# Patient Record
Sex: FEMALE | Race: WHITE | NOT HISPANIC OR LATINO | Employment: FULL TIME
[De-identification: names, ages, dates, MRNs, and addresses within clinical notes are randomized per-mention and may not be internally consistent; named-entity substitution may affect disease eponyms.]

---

## 2017-07-29 ENCOUNTER — HEALTH MAINTENANCE LETTER (OUTPATIENT)
Age: 37
End: 2017-07-29

## 2018-01-21 ENCOUNTER — HOSPITAL ENCOUNTER (EMERGENCY)
Facility: CLINIC | Age: 38
Discharge: HOME | End: 2018-01-21
Attending: NURSE PRACTITIONER | Admitting: NURSE PRACTITIONER
Payer: COMMERCIAL

## 2018-01-21 ENCOUNTER — APPOINTMENT (OUTPATIENT)
Dept: GENERAL RADIOLOGY | Facility: CLINIC | Age: 38
End: 2018-01-21
Attending: NURSE PRACTITIONER
Payer: COMMERCIAL

## 2018-01-21 VITALS
TEMPERATURE: 97.5 F | OXYGEN SATURATION: 100 % | DIASTOLIC BLOOD PRESSURE: 75 MMHG | SYSTOLIC BLOOD PRESSURE: 113 MMHG | HEART RATE: 87 BPM

## 2018-01-21 DIAGNOSIS — R07.81 RIB PAIN ON LEFT SIDE: Primary | ICD-10-CM

## 2018-01-21 PROCEDURE — 96372 THER/PROPH/DIAG INJ SC/IM: CPT

## 2018-01-21 PROCEDURE — 99213 OFFICE O/P EST LOW 20 MIN: CPT | Performed by: NURSE PRACTITIONER

## 2018-01-21 PROCEDURE — G0463 HOSPITAL OUTPT CLINIC VISIT: HCPCS | Mod: 25

## 2018-01-21 PROCEDURE — 71111 X-RAY EXAM RIBS/CHEST4/> VWS: CPT

## 2018-01-21 PROCEDURE — 25000128 H RX IP 250 OP 636: Performed by: NURSE PRACTITIONER

## 2018-01-21 RX ORDER — KETOROLAC TROMETHAMINE 10 MG/1
10 TABLET, FILM COATED ORAL EVERY 6 HOURS PRN
Qty: 20 TABLET | Refills: 0 | Status: SHIPPED | OUTPATIENT
Start: 2018-01-21 | End: 2018-01-26

## 2018-01-21 RX ORDER — KETOROLAC TROMETHAMINE 30 MG/ML
30 INJECTION, SOLUTION INTRAMUSCULAR; INTRAVENOUS ONCE
Status: COMPLETED | OUTPATIENT
Start: 2018-01-21 | End: 2018-01-21

## 2018-01-21 RX ADMIN — KETOROLAC TROMETHAMINE 30 MG: 30 INJECTION, SOLUTION INTRAMUSCULAR; INTRAVENOUS at 18:00

## 2018-01-21 ASSESSMENT — ENCOUNTER SYMPTOMS
ABDOMINAL PAIN: 0
APPETITE CHANGE: 0
DIARRHEA: 0
WHEEZING: 0
FEVER: 0
NAUSEA: 0
STRIDOR: 0
CONSTIPATION: 0
SORE THROAT: 0
COUGH: 0
EYE PAIN: 0
VOMITING: 0
FATIGUE: 0
WOUND: 0
HEADACHES: 0
SHORTNESS OF BREATH: 1
ACTIVITY CHANGE: 0
ARTHRALGIAS: 1

## 2018-01-21 NOTE — ED PROVIDER NOTES
History     Chief Complaint   Patient presents with     Back Pain     back and rib pain     HPI  Kristel Martinez is a 37 year old female who presents with sudden acute onset of rib pain noted immediately below left rest.  Patient states that the pain is worse with breathing and you can palpate the area and it is tender.  Patient states there is radiation of the pain bilaterally around to her back.  Patient denies trauma.  Patient denies recent illness.  Patient reports having similar symptoms in the past that have always been treated by her chiropractor and have always resolved.  Patient states that today the chiropractor is unavailable and she thought she would try here as the pain is severe.  Patient states that it is aching and sharp and throbbing.  Patient has tried Flexeril and reported it gave no relief for the pain.  Problem List:    There are no active problems to display for this patient.       Past Medical History:    No past medical history on file.    Past Surgical History:    No past surgical history on file.    Family History:    No family history on file.    Social History:  Marital Status:   [2]  Social History   Substance Use Topics     Smoking status: Not on file     Smokeless tobacco: Not on file     Alcohol use Not on file        Medications:      ketorolac (TORADOL) 10 MG tablet   ICY HOT EXTRA STRENGTH 10-30 % EX CREA   ZOLOFT 100 MG OR TABS     Review of Systems   Constitutional: Negative for activity change, appetite change, fatigue and fever.   HENT: Negative for congestion, ear pain and sore throat.    Eyes: Negative for pain.   Respiratory: Positive for shortness of breath. Negative for cough, wheezing and stridor.    Cardiovascular: Negative for chest pain.   Gastrointestinal: Negative for abdominal pain, constipation, diarrhea, nausea and vomiting.   Musculoskeletal: Positive for arthralgias (rib pain).   Skin: Negative for rash and wound.   Neurological: Negative for headaches.    All other systems reviewed and are negative.      Physical Exam   BP: 113/75  Pulse: 87  Temp: 97.5  F (36.4  C)  SpO2: 100 %      Physical Exam   Constitutional: She appears well-developed and well-nourished. No distress.   Eyes: Conjunctivae are normal. Right eye exhibits no discharge. Left eye exhibits no discharge.   Cardiovascular: Normal rate, regular rhythm and normal heart sounds.  Exam reveals no gallop and no friction rub.    No murmur heard.  Pulmonary/Chest: Effort normal and breath sounds normal. No respiratory distress. She has no wheezes. She has no rales. She exhibits tenderness (mid clavicular line 2 cm below left breast without palpable mass, nodule, fluctuance or crepitus or deformity or bruising).   Skin: Skin is warm and dry. No rash noted. She is not diaphoretic. No erythema.   Psychiatric: She has a normal mood and affect.   Nursing note and vitals reviewed.      ED Course     ED Course     Procedures      Labs Ordered and Resulted from Time of ED Arrival Up to the Time of Departure from the ED - No data to display  Results for orders placed or performed during the hospital encounter of 01/21/18   XR Ribs & Chest Bilateral G/E 4 Views    Narrative    BILATERAL RIBS WITH CHEST FOUR OR MORE VIEWS 1/21/2018 5:40 PM     HISTORY: Left and right lower rib pain.      Impression    IMPRESSION: Frontal view the chest and two views of the right and left  ribs are performed. Lungs are clear. No pneumothorax or pleural  effusion. Heart is normal in size. BB overlies the area of patient's  symptoms. No definite evidence of rib fracture.         PAULO TAPIA MD       Assessments & Plan (with Medical Decision Making)     I have reviewed the nursing notes.    I have reviewed the findings, diagnosis, plan and need for follow up with the patient.     WELLS PE SCORE for Pulmonary Embolism likelihood (calculator)  Background  Calculates likelihood of PE based on 7 criteria including suspected DVT, HR>100, recent  surgery or immobilization, prior VTE, hemoptysis, active cancer.  Data   does not have a problem list on file.   has no past surgical history on file.  Pulse: 87  Criteria   Of possible 12.5 points for 7 possible items:  NEGATIVE  Interpretation  Wells PE Score: pt declines DDimer lab draw  Score 0-2 points: Low PE probability (3.6% risk)    Kristel Martinez is a 37 year old female who presents with sudden acute onset of rib pain noted immediately below left rest.  Patient states that the pain is worse with breathing and you can palpate the area and it is tender.  Patient states there is radiation of the pain bilaterally around to her back.  Patient denies trauma.  Patient denies recent illness.  Patient reports having similar symptoms in the past that have always been treated by her chiropractor and have always resolved.  Patient states that today the chiropractor is unavailable and she thought she would try here as the pain is severe.  Patient states that it is aching and sharp and throbbing.  Patient has tried Flexeril and reported it gave no relief for the pain.  Exam as noted above.  Chest x-ray negative for any evidence of rib fractures or pneumothorax or pleural effusion.  These findings were explained to the patient wells criteria applied and patient is at a low risk for PE offered d-dimer and patient declined this.  As patient has had similar symptoms in the past it is likely this is musculoskeletal/costochondritis/rib pain.  Offered Toradol shot and patient accepted and offered Toradol prescription advised patient that she should not take any Advil or naproxen or any other NSAID while taking the Toradol patient agreed to this.  Advised patient she could take more Flexeril that she has at home if she so desires.  Advised topical therapies such as Biofreeze or icy hot would be acceptable.  And advised patient to follow-up if pain continues or worsens.  Advised patient she certainly can seek care with her  chiropractor as well.  Patient agrees with plan of care.    Discharge Medication List as of 1/21/2018  5:58 PM      START taking these medications    Details   ketorolac (TORADOL) 10 MG tablet Take 1 tablet (10 mg) by mouth every 6 hours as needed for moderate pain, Disp-20 tablet, R-0, E-Prescribe             Final diagnoses:   Rib pain on left side       1/21/2018   [facility] EMERGENCY DEPARTMENT     Delfina Little APRN CNP  01/21/18 2153

## 2018-01-21 NOTE — DISCHARGE INSTRUCTIONS
Chest Wall Pain: Costochondritis    The chest pain that you have had today is caused by costochondritis. This condition is caused by an inflammation of the cartilage joining your ribs to your breastbone. It is not caused by heart or lung problems. Your healthcare team has made sure that the chest pain you feel is not from a life threatening cause of chest pain such as heart attack, collapsed lung, blood clot in the lung, tear in the aorta, or esophageal rupture. The inflammation may have been brought on by a blow to the chest, lifting heavy objects, intense exercise, or an illness that made you cough and sneeze a lot. It often occurs during times of emotional stress. It can be painful, but it is not dangerous. It usually goes away in 1 to 2 weeks. But it may happen again. Rarely, a more serious condition may cause symptoms similar to costochondritis. That s why it s important to watch for the warning signs listed below.  Home care  Follow these guidelines when caring for yourself at home:    If you feel that emotional stress is a cause of your condition, try to figure out the sources of that stress. It may not be obvious. Learn ways to deal with the stress in your life. This can include regular exercise, muscle relaxation, meditation, or simply taking time out for yourself.    You may use acetaminophen, ibuprofen, or naproxen to control pain, unless another pain medicine was prescribed. If you have liver or kidney disease or ever had a stomach ulcer, talk with your healthcare provider before using these medicines.    You can also help ease pain by using a hot, wet compress or heating pad. Use this with or without a medicated skin cream that helps relieves pain.    Do stretching exercise as advised by your provider.    Take any prescribed medicines as directed.  Follow-up care  Follow up with your healthcare provider, or as advised, if you do not start to get better in the next 2 days.  When to seek medical  advice  Call your healthcare provider right away if any of these occur:    A change in the type of pain. Call if it feels different, becomes more serious, lasts longer, or spreads into your shoulder, arm, neck, jaw, or back.    Shortness of breath or pain gets worse when you breathe    Weakness, dizziness, or fainting    Cough with dark-colored sputum (phlegm) or blood    Abdominal pain    Dark red or black stools    Fever of 100.4 F (38 C) or higher, or as directed by your healthcare provider  Date Last Reviewed: 12/1/2016 2000-2017 [employer]. [address] [place] [address]. All rights reserved. This information is not intended as a substitute for professional medical care. Always follow your healthcare professional's instructions.

## 2018-01-21 NOTE — ED AVS SNAPSHOT
[facility] Emergency Department    [address] [address]    Phone:  [phone]    Fax:  [phone]                                       Kristel Martinez   MRN: [id number]    Department:  [facility] Emergency Department   Date of Visit:  1/21/2018           After Visit Summary Signature Page     I have received my discharge instructions, and my questions have been answered. I have discussed any challenges I see with this plan with the nurse or doctor.    ..........................................................................................................................................  Patient/Patient Representative Signature      ..........................................................................................................................................  Patient Representative Print Name and Relationship to Patient    ..................................................               ................................................  Date                                            Time    ..........................................................................................................................................  Reviewed by Signature/Title    ...................................................              ..............................................  Date                                                            Time

## 2018-01-21 NOTE — ED AVS SNAPSHOT
[facility] Emergency Department    [address] [place] [address]    Phone:  [phone]    Fax:  [phone]                                       Kristel Martinez   MRN: [id number]    Department:  [facility] Emergency Department   Date of Visit:  1/21/2018           Patient Information     Date Of Birth          1980        Your diagnoses for this visit were:     Rib pain on left side        You were seen by Delfina Little, DAVID CNP.      Follow-up Information     Follow up with Mar Gomez OT In 1 week.    Specialty:  Occupational Therapy    Why:  As needed, If symptoms worsen    Contact information:    XXX RESIGNED XXX  [address] [place] [address]  [phone]          Discharge Instructions         Chest Wall Pain: Costochondritis    The chest pain that you have had today is caused by costochondritis. This condition is caused by an inflammation of the cartilage joining your ribs to your breastbone. It is not caused by heart or lung problems. Your healthcare team has made sure that the chest pain you feel is not from a life threatening cause of chest pain such as heart attack, collapsed lung, blood clot in the lung, tear in the aorta, or esophageal rupture. The inflammation may have been brought on by a blow to the chest, lifting heavy objects, intense exercise, or an illness that made you cough and sneeze a lot. It often occurs during times of emotional stress. It can be painful, but it is not dangerous. It usually goes away in 1 to 2 weeks. But it may happen again. Rarely, a more serious condition may cause symptoms similar to costochondritis. That s why it s important to watch for the warning signs listed below.  Home care  Follow these guidelines when caring for yourself at home:    If you feel that emotional stress is a cause of your condition, try to figure out the sources of that stress. It may not be obvious. Learn ways to deal with the stress in your life. This can  include regular exercise, muscle relaxation, meditation, or simply taking time out for yourself.    You may use acetaminophen, ibuprofen, or naproxen to control pain, unless another pain medicine was prescribed. If you have liver or kidney disease or ever had a stomach ulcer, talk with your healthcare provider before using these medicines.    You can also help ease pain by using a hot, wet compress or heating pad. Use this with or without a medicated skin cream that helps relieves pain.    Do stretching exercise as advised by your provider.    Take any prescribed medicines as directed.  Follow-up care  Follow up with your healthcare provider, or as advised, if you do not start to get better in the next 2 days.  When to seek medical advice  Call your healthcare provider right away if any of these occur:    A change in the type of pain. Call if it feels different, becomes more serious, lasts longer, or spreads into your shoulder, arm, neck, jaw, or back.    Shortness of breath or pain gets worse when you breathe    Weakness, dizziness, or fainting    Cough with dark-colored sputum (phlegm) or blood    Abdominal pain    Dark red or black stools    Fever of 100.4 F (38 C) or higher, or as directed by your healthcare provider  Date Last Reviewed: 12/1/2016 2000-2017 [employer]. [address] [place] [address], [place] [address]. All rights reserved. This information is not intended as a substitute for professional medical care. Always follow your healthcare professional's instructions.          24 Hour Appointment Hotline       To make an appointment at any [facility], call [phone] ([phone]). If you don't have a family doctor or clinic, we will help you find one. [facility] clinics are conveniently located to serve the needs of you and your family.             Review of your medicines      START taking        Dose / Directions Last dose taken    ketorolac 10 MG tablet   Commonly known as:   TORADOL   Dose:  10 mg   Quantity:  20 tablet        Take 1 tablet (10 mg) by mouth every 6 hours as needed for moderate pain   Refills:  0          Our records show that you are taking the medicines listed below. If these are incorrect, please call your family doctor or clinic.        Dose / Directions Last dose taken    ICY HOT EXTRA STRENGTH 10-30 % Crea   Quantity:  OTC        as directed   Refills:  0        ZOLOFT 100 MG tablet   Generic drug:  sertraline        150mg daily   Refills:  1 YEAR          STOP taking        Dose Reason for stopping Comments    ibuprofen 200 MG tablet   Commonly known as:  ADVIL/MOTRIN              naproxen 500 MG tablet   Commonly known as:  NAPROSYN                      Prescriptions were sent or printed at these locations (1 Prescription)                   [facility] [place], [place] - [address]   [address] [address]    Telephone:  [phone]   Fax:  [phone]   Hours:                  E-Prescribed (1 of 1)         ketorolac (TORADOL) 10 MG tablet                Procedures and tests performed during your visit     XR Ribs & Chest Bilateral G/E 4 Views      Orders Needing Specimen Collection     None      Pending Results     No orders found from 1/19/2018 to 1/22/2018.            Pending Culture Results     No orders found from 1/19/2018 to 1/22/2018.            Pending Results Instructions     If you had any lab results that were not finalized at the time of your Discharge, you can call the ED Lab Result RN at [phone]. You will be contacted by this team for any positive Lab results or changes in treatment. The nurses are available 7 days a week from 10A to 6:30P.  You can leave a message 24 hours per day and they will return your call.        Test Results From Your Hospital Stay              1/21/2018  5:57 PM      Narrative     BILATERAL RIBS WITH CHEST FOUR OR MORE VIEWS 1/21/2018 5:40 PM     HISTORY: Left and right lower rib  pain.        Impression     IMPRESSION: Frontal view the chest and two views of the right and left  ribs are performed. Lungs are clear. No pneumothorax or pleural  effusion. Heart is normal in size. BB overlies the area of patient's  symptoms. No definite evidence of rib fracture.         PAULO TAPIA MD                Thank you for choosing [facility]       Thank you for choosing [facility] for your care. Our goal is always to provide you with excellent care. Hearing back from our patients is one way we can continue to improve our services. Please take a few minutes to complete the written survey that you may receive in the mail after you visit with us. Thank you!        [employer] Information     [employer] gives you secure access to your electronic health record. If you see a primary care provider, you can also send messages to your care team and make appointments. If you have questions, please call your primary care clinic.  If you do not have a primary care provider, please call [phone] and they will assist you.        Care EveryWhere ID     This is your Care EveryWhere ID. This could be used by other organizations to access your [facility] medical records  [id number]        Equal Access to Services     ASMIAT ALFARO : Hadii moustapha Alvarez, waaxda luqadaha, qaybta kaaldawn swain, kiah cowart . So [facility] [phone].    ATENCIÓN: Si habla español, tiene a juarez disposición servicios gratuitos de asistencia lingüística. Llame al [phone].    We comply with applicable federal civil rights laws and [place] laws. We do not discriminate on the basis of race, color, national origin, age, disability, sex, sexual orientation, or gender identity.            After Visit Summary       This is your record. Keep this with you and show to your community pharmacist(s) and doctor(s) at your next visit.

## 2018-03-17 ENCOUNTER — HOSPITAL ENCOUNTER (EMERGENCY)
Facility: CLINIC | Age: 38
Discharge: HOME | End: 2018-03-17
Attending: EMERGENCY MEDICINE | Admitting: EMERGENCY MEDICINE
Payer: COMMERCIAL

## 2018-03-17 VITALS
RESPIRATION RATE: 18 BRPM | OXYGEN SATURATION: 100 % | TEMPERATURE: 97.4 F | WEIGHT: 220 LBS | DIASTOLIC BLOOD PRESSURE: 71 MMHG | SYSTOLIC BLOOD PRESSURE: 124 MMHG | HEART RATE: 92 BPM

## 2018-03-17 DIAGNOSIS — K11.7 DISTURBANCE OF SALIVARY SECRETION: ICD-10-CM

## 2018-03-17 PROCEDURE — 99283 EMERGENCY DEPT VISIT LOW MDM: CPT | Mod: Z6 | Performed by: EMERGENCY MEDICINE

## 2018-03-17 PROCEDURE — 99282 EMERGENCY DEPT VISIT SF MDM: CPT | Performed by: EMERGENCY MEDICINE

## 2018-03-17 ASSESSMENT — ENCOUNTER SYMPTOMS
FEVER: 0
TROUBLE SWALLOWING: 0

## 2018-03-17 NOTE — DISCHARGE INSTRUCTIONS
Return if worsening or new symptoms.    Salivary Gland Swelling, Uncertain Cause  Salivary glands make saliva in response to food in your mouth. Saliva is mostly water. It also has minerals and proteins that help break down food and keep the mouth and teeth healthy. There are three pairs of salivary glands:    Parotid glands (in front of the ear)    Submandibular glands (below the jaw)    Sublingual glands (below the tongue)  Each gland has a duct (channel) that carries saliva from the gland into the mouth.   Swelling of the salivary glands can sometimes occur. Causes can include:    Viral infection (such as childhood mumps)    Bacterial infections    Sjögren's syndrome    Diabetes    Malnutrition    Sarcoidosis    Blockage of the salivary duct (from stones or tumors)  Certain medicines can affect salivary flow. This can lead to swelling of the gland. Be sure to tell your healthcare provider about all of the medicines you take.  Tests are being done to determine the cause of the swelling. These may include blood tests, X-ray, ultrasound, CT scan, or injection of dye into the duct to look for blockage. Treatment depends on the exact cause of the swelling.  Home care    If the area is painful, you can take over-the-counter medicines, such as acetaminophen or ibuprofen, unless you were prescribed another medicine. Wetting a cloth with warm water and putting it over the affected gland for 10-15 minutes at a time can also help ease pain.    To help prevent blockages and infections:    Drink 6-8 glasses of fluid per day (such as water, tea, and clear soup) to keep well hydrated.    If you smoke, ask your healthcare provider for help to quit. Smoking makes salivary gland stones more likely.    Maintain good dental hygiene. Brush and floss your teeth daily. See your dentist for regular cleanings.  Follow-up care  Follow up with your healthcare provider or as advised. See your healthcare provider for further exams and  testing. If you have been referred to a specialist, make an appointment promptly.  When to seek medical advice  Call your healthcare provider if any of the following occur:    Increasing pain or swelling in the gland    Inability to open mouth or pain when opening mouth    Fever of 100.4 F (38 C) or higher, or as directed by your healthcare provider    Redness over the gland    Pus draining into the mouth    Trouble breathing or swallowing    Any new symptoms  Date Last Reviewed: 5/4/2015 2000-2017 [employer]. [address] [place] [address]. All rights reserved. This information is not intended as a substitute for professional medical care. Always follow your healthcare professional's instructions.

## 2018-03-17 NOTE — ED NOTES
PT c/o 1 wk of constant right sided jaw pain/behind her ear and an associated metalic/watery taste in her mouth.

## 2018-03-17 NOTE — ED AVS SNAPSHOT
[facility] Emergency Department    [address] [place] [address]    Phone:  [phone]    Fax:  [phone]                                       Kristel Martinez   MRN: [id number]    Department:  [facility] Emergency Department   Date of Visit:  3/17/2018           After Visit Summary Signature Page     I have received my discharge instructions, and my questions have been answered. I have discussed any challenges I see with this plan with the nurse or doctor.    ..........................................................................................................................................  Patient/Patient Representative Signature      ..........................................................................................................................................  Patient Representative Print Name and Relationship to Patient    ..................................................               ................................................  Date                                            Time    ..........................................................................................................................................  Reviewed by Signature/Title    ...................................................              ..............................................  Date                                                            Time

## 2018-03-17 NOTE — ED AVS SNAPSHOT
[facility] Emergency Department    [address] [place] [address]    Phone:  [phone]    Fax:  [phone]                                       Kristel Martinez   MRN: [id number]    Department:  [facility]   Date of Visit:  3/17/2018           Patient Information     Date Of Birth          1980        Your diagnoses for this visit were:     Disturbance of salivary secretion        You were seen by Collin Marie MD.      Follow-up Information     Schedule an appointment as soon as possible for a visit with Riley Mendez MD.    Specialty:  Otolaryngology    Why:  ER follow up if symptoms don't improve    Contact information:    [address] [place] [address] [phone]          Discharge Instructions       Return if worsening or new symptoms.    Salivary Gland Swelling, Uncertain Cause  Salivary glands make saliva in response to food in your mouth. Saliva is mostly water. It also has minerals and proteins that help break down food and keep the mouth and teeth healthy. There are three pairs of salivary glands:    Parotid glands (in front of the ear)    Submandibular glands (below the jaw)    Sublingual glands (below the tongue)  Each gland has a duct (channel) that carries saliva from the gland into the mouth.   Swelling of the salivary glands can sometimes occur. Causes can include:    Viral infection (such as childhood mumps)    Bacterial infections    Sjögren's syndrome    Diabetes    Malnutrition    Sarcoidosis    Blockage of the salivary duct (from stones or tumors)  Certain medicines can affect salivary flow. This can lead to swelling of the gland. Be sure to tell your healthcare provider about all of the medicines you take.  Tests are being done to determine the cause of the swelling. These may include blood tests, X-ray, ultrasound, CT scan, or injection of dye into the duct to look for blockage. Treatment depends on the exact cause  of the swelling.  Home care    If the area is painful, you can take over-the-counter medicines, such as acetaminophen or ibuprofen, unless you were prescribed another medicine. Wetting a cloth with warm water and putting it over the affected gland for 10-15 minutes at a time can also help ease pain.    To help prevent blockages and infections:    Drink 6-8 glasses of fluid per day (such as water, tea, and clear soup) to keep well hydrated.    If you smoke, ask your healthcare provider for help to quit. Smoking makes salivary gland stones more likely.    Maintain good dental hygiene. Brush and floss your teeth daily. See your dentist for regular cleanings.  Follow-up care  Follow up with your healthcare provider or as advised. See your healthcare provider for further exams and testing. If you have been referred to a specialist, make an appointment promptly.  When to seek medical advice  Call your healthcare provider if any of the following occur:    Increasing pain or swelling in the gland    Inability to open mouth or pain when opening mouth    Fever of 100.4 F (38 C) or higher, or as directed by your healthcare provider    Redness over the gland    Pus draining into the mouth    Trouble breathing or swallowing    Any new symptoms  Date Last Reviewed: 5/4/2015 2000-2017 [employer]. [address] [place] [address]. All rights reserved. This information is not intended as a substitute for professional medical care. Always follow your healthcare professional's instructions.          Future Appointments        Provider Department Dept Phone Center    5/25/2018 3:30 PM Marian Jorgensen MD [facility] [phone] [place]      24 Hour Appointment Hotline       To make an appointment at any [facility], call [phone] ([phone]). If you don't have a family doctor or clinic, we will help you find one. [facility] are conveniently located to serve the  needs of you and your family.             Review of your medicines      START taking        Dose / Directions Last dose taken    amoxicillin-clavulanate 875-125 MG per tablet   Commonly known as:  AUGMENTIN   Dose:  1 tablet   Quantity:  14 tablet        Take 1 tablet by mouth 2 times daily for 7 days   Refills:  0          Our records show that you are taking the medicines listed below. If these are incorrect, please call your family doctor or clinic.        Dose / Directions Last dose taken    ICY HOT EXTRA STRENGTH 10-30 % Crea   Quantity:  OTC        as directed   Refills:  0        LEVOTHYROXINE SODIUM PO        Refills:  0        ZOLOFT 100 MG tablet   Generic drug:  sertraline        150mg daily   Refills:  1 YEAR                Prescriptions were sent or printed at these locations (1 Prescription)                   [facility] [place], [place] - [address]    [address]  [place] [address]    Telephone:  [phone]   Fax:  [phone]   Hours:                  E-Prescribed (1 of 1)         amoxicillin-clavulanate (AUGMENTIN) 875-125 MG per tablet                Orders Needing Specimen Collection     None      Pending Results     No orders found from 3/15/2018 to 3/18/2018.            Pending Culture Results     No orders found from 3/15/2018 to 3/18/2018.            Pending Results Instructions     If you had any lab results that were not finalized at the time of your Discharge, you can call the ED Lab Result RN at [phone]. You will be contacted by this team for any positive Lab results or changes in treatment. The nurses are available 7 days a week from 10A to 6:30P.  You can leave a message 24 hours per day and they will return your call.        Thank you for choosing [facility]       Thank you for choosing [facility] for your care. Our goal is always to provide you with excellent care. Hearing back from our patients is one way we can continue to improve our services. Please  take a few minutes to complete the written survey that you may receive in the mail after you visit with us. Thank you!        [employer] Information     [employer] gives you secure access to your electronic health record. If you see a primary care provider, you can also send messages to your care team and make appointments. If you have questions, please call your primary care clinic.  If you do not have a primary care provider, please call [phone] and they will assist you.        Care EveryWhere ID     This is your Care EveryWhere ID. This could be used by other organizations to access your [place] medical records  [id number]        Equal Access to Services     [facility] : Brayan Alvarez, all becerril, shabbir swain, kiah cowart . So [facility] [phone].    ATENCIÓN: Si habla español, tiene a juarez disposición servicios gratuitos de asistencia lingüística. Louieame al [phone].    We comply with applicable federal civil rights laws and [place] laws. We do not discriminate on the basis of race, color, national origin, age, disability, sex, sexual orientation, or gender identity.            After Visit Summary       This is your record. Keep this with you and show to your community pharmacist(s) and doctor(s) at your next visit.

## 2018-03-17 NOTE — ED PROVIDER NOTES
History     Chief Complaint   Patient presents with     Jaw Pain     The history is provided by the patient.     Kristel Martinez is a 37 year old female with a history of Hypothyroidism due to a thyroidectomy in 2015 to treat papillary adenocarcinoma of the thyroid, who presents to the ED complaining of jaw pain. The patient reports that she first started experiencing right jaw pain about 1 week ago, that has persisted since onset. She says that this pain is along her right jaw, under her right jaw, and around her right ear. She notes some swelling in the same area. Patient also reports a metallic taste in her mouth and increased salivation on the right. Patient denies any fever, pain with swallowing, jaw popping, or other associated symptoms. No PMHx of dental issues.     Problem List:    There are no active problems to display for this patient.       Past Medical History:    History reviewed. No pertinent past medical history.    Past Surgical History:    Past Surgical History:   Procedure Laterality Date     THYROIDECTOMY      2015       Family History:    No family history on file.    Social History:  Marital Status:   [2]  Social History   Substance Use Topics     Smoking status: Former Smoker     Smokeless tobacco: Never Used     Alcohol use Yes      Comment: 1 drink per wk        Medications:      LEVOTHYROXINE SODIUM PO   amoxicillin-clavulanate (AUGMENTIN) 875-125 MG per tablet   ZOLOFT 100 MG OR TABS   ICY HOT EXTRA STRENGTH 10-30 % EX CREA         Review of Systems   Constitutional: Negative for fever.   HENT: Negative for trouble swallowing.         Positive for right jaw pain radiating to the right ear and right neck.   Positive for metallic taste.   Positive for increased salivation.    All other systems reviewed and are negative.      Physical Exam   BP: 124/71  Pulse: 92  Temp: 97.4  F (36.3  C)  Resp: 18  Weight: 99.8 kg (220 lb)  SpO2: 100 %      Physical Exam  Vitals  reviewed  NAD  HEENT:NC/Atraumatic, no tmj tenderness or clicking, slight enlargement and tenderness to right parotid and submandibular glands along with slight tenderness under the right side of the tongue.  EOMI, anicteric, PERRL and symmetric, mucous membranes moist, Ext ears nl, OP clear, tm's are normal  Chest/PULM: no resp distress  CV: rrr without m/r/g S1S2, peripheral pulses normal  ABD: soft NT, non distended, no guarding or rebound  Extremities: atraumatic, no edema, full rom  : deferred  Neuro: CN 2-12 grossly intact, motor and sensation grossly intact    ED Course     ED Course     Procedures                    No results found for this or any previous visit (from the past 24 hour(s)).    Medications - No data to display    Assessments & Plan (with Medical Decision Making)  Kristel is a 37 year old female who presents to the ED complaining of right jaw pain for one week, radiating to her right neck and right ear. The patient also complains of increased salivation and a metallic taste in her mouth. She is afebrile with normal vitals upon presentation to the ED. On exam, she has some tenderness and swelling to the parotid and submandibular gland. Discussed with the patient that I think she likely has an infection of her salivary glands although its possible it could be something else causing this swelling. She doesn't have any dental pain. Prescribed Augmentin to treat possible infection, see orders. Patient can use heat/ Ibuprofen/ Tylenol for discomfort prn. If her symptoms persist, she will follow up with ENT - referred the patient. Patient is in agreement with this treatment plan and stable for discharge. Follow up with ENT as instructed, or sooner with high fever, trouble breathing, or other worsening symptoms.       I have reviewed the nursing notes.    I have reviewed the findings, diagnosis, plan and need for follow up with the patient.  A:  Parotid and salivary glad swelling    Discharge  Medication List as of 3/17/2018  3:21 PM      START taking these medications    Details   amoxicillin-clavulanate (AUGMENTIN) 875-125 MG per tablet Take 1 tablet by mouth 2 times daily for 7 days, Disp-14 tablet, R-0, E-Prescribe             Final diagnoses:   Disturbance of salivary secretion     This document serves as a record of services personally performed by Collin Marie MD. It was created on their behalf by Rosio Goldman, a trained medical scribe. The creation of this record is based on the provider's personal observations and the statements of the patient. This document has been checked and approved by the attending provider.    Note: Chart documentation done in part with Dragon Voice Recognition software. Although reviewed after completion, some word and grammatical errors may remain.    3/17/2018   [facility] EMERGENCY DEPARTMENT     Collin Marie MD  03/17/18 [id number]

## 2018-03-18 ENCOUNTER — TELEPHONE (OUTPATIENT)
Dept: EMERGENCY MEDICINE | Facility: CLINIC | Age: 38
End: 2018-03-18

## 2018-03-18 ENCOUNTER — NURSE TRIAGE (OUTPATIENT)
Dept: NURSING | Facility: CLINIC | Age: 38
End: 2018-03-18

## 2018-03-18 NOTE — TELEPHONE ENCOUNTER
Seen at [place] ED 3/17/18 with Jaw pain and Dx with disturbance of salivary secretions and Started antibiotic Augmentin , yesterday and within   hour had  severe stomach pain for few hours duration  and only took one dose so  Underlying  Symptoms unchanged  . Currently : no fever ,   jaw pain with movement  is continuing 3/10 on  painscale ,  nauseated but no abdominal pain .  Seen by Dr Wing Marie who advised calling back if problems with Augmentiin to ED  , Allergic to Sulfa and keflex with skin rashes  , and FNA  left message for charge nurse June to call back Pt and advised Pt , if she needs to go in to be seen but if no answer to message by 1230 pm to go into be seen locally at urgent care and Pt agrees.   .Ibis Barr RN [place] nurse advisors.

## 2018-03-18 NOTE — ED NOTES
Patient spoke with triage nurse via phone prior to calling ED.  Called ED to ask about augmentin that is making her stomach feel upset.  Patient has taken 1 dose last night and felt nauseated about 20 minutes after taking it.  Pt mentioned that she had zofran ODT at home.  Writer advised she could try a zofran and eat some food, wait about 20-30 minutes then try another dose of augmentin.  Informed patient that antibiotics on an empty stomach can cause upset.  Pt verbalized understanding and will try zofran, food and her 2nd dose.  If continues to have upset stomach will call ED back or come back in if needed.

## 2018-03-23 ENCOUNTER — OFFICE VISIT (OUTPATIENT)
Dept: FAMILY MEDICINE | Facility: CLINIC | Age: 38
End: 2018-03-23
Payer: COMMERCIAL

## 2018-03-23 VITALS
WEIGHT: 224 LBS | DIASTOLIC BLOOD PRESSURE: 70 MMHG | TEMPERATURE: 98.3 F | SYSTOLIC BLOOD PRESSURE: 110 MMHG | HEIGHT: 69 IN | HEART RATE: 88 BPM | BODY MASS INDEX: 33.18 KG/M2

## 2018-03-23 DIAGNOSIS — L98.9 SKIN LESION OF FACE: ICD-10-CM

## 2018-03-23 DIAGNOSIS — Z00.00 ENCOUNTER FOR ROUTINE ADULT HEALTH EXAMINATION WITHOUT ABNORMAL FINDINGS: Primary | ICD-10-CM

## 2018-03-23 DIAGNOSIS — Z01.419 ENCOUNTER FOR GYNECOLOGICAL EXAMINATION WITHOUT ABNORMAL FINDING: ICD-10-CM

## 2018-03-23 DIAGNOSIS — R51.9 HEADACHE DISORDER: ICD-10-CM

## 2018-03-23 PROBLEM — E89.0 POSTOPERATIVE HYPOTHYROIDISM: Status: ACTIVE | Noted: 2018-03-23

## 2018-03-23 PROCEDURE — 99213 OFFICE O/P EST LOW 20 MIN: CPT | Mod: 25 | Performed by: NURSE PRACTITIONER

## 2018-03-23 PROCEDURE — 99395 PREV VISIT EST AGE 18-39: CPT | Performed by: NURSE PRACTITIONER

## 2018-03-23 PROCEDURE — 87624 HPV HI-RISK TYP POOLED RSLT: CPT | Performed by: NURSE PRACTITIONER

## 2018-03-23 PROCEDURE — G0145 SCR C/V CYTO,THINLAYER,RESCR: HCPCS | Performed by: NURSE PRACTITIONER

## 2018-03-23 RX ORDER — SUMATRIPTAN 25 MG/1
25-50 TABLET, FILM COATED ORAL
Qty: 9 TABLET | Refills: 1 | Status: SHIPPED | OUTPATIENT
Start: 2018-03-23 | End: 2018-08-15

## 2018-03-23 NOTE — PATIENT INSTRUCTIONS
The the Imitrex for the headache-take at onset    Dermatology referral made    Preventive Health Recommendations  Female Ages 26 - 39  Yearly exam:   See your health care provider every year in order to    Review health changes.     Discuss preventive care.      Review your medicines if you your doctor has prescribed any.    Until age 30: Get a Pap test every three years (more often if you have had an abnormal result).    After age 30: Talk to your doctor about whether you should have a Pap test every 3 years or have a Pap test with HPV screening every 5 years.   You do not need a Pap test if your uterus was removed (hysterectomy) and you have not had cancer.  You should be tested each year for STDs (sexually transmitted diseases), if you're at risk.   Talk to your provider about how often to have your cholesterol checked.  If you are at risk for diabetes, you should have a diabetes test (fasting glucose).  Shots: Get a flu shot each year. Get a tetanus shot every 10 years.   Nutrition:     Eat at least 5 servings of fruits and vegetables each day.    Eat whole-grain bread, whole-wheat pasta and brown rice instead of white grains and rice.    Talk to your provider about Calcium and Vitamin D.     Lifestyle    Exercise at least 150 minutes a week (30 minutes a day, 5 days of the week). This will help you control your weight and prevent disease.    Limit alcohol to one drink per day.    No smoking.     Wear sunscreen to prevent skin cancer.    See your dentist every six months for an exam and cleaning.

## 2018-03-23 NOTE — MR AVS SNAPSHOT
After Visit Summary   3/23/2018    Kristel Martinez    MRN: [id number]           Patient Information     Date Of Birth          1980        Visit Information        Provider Department      3/23/2018 3:00 PM Yulissa Nogueira APRN [facility]        Today's Diagnoses     Encounter for gynecological examination without abnormal finding    -  1    Headache disorder        Skin lesion of face          Care Instructions    The the Imitrex for the headache-take at onset    Dermatology referral made    Preventive Health Recommendations  Female Ages 26 - 39  Yearly exam:   See your health care provider every year in order to    Review health changes.     Discuss preventive care.      Review your medicines if you your doctor has prescribed any.    Until age 30: Get a Pap test every three years (more often if you have had an abnormal result).    After age 30: Talk to your doctor about whether you should have a Pap test every 3 years or have a Pap test with HPV screening every 5 years.   You do not need a Pap test if your uterus was removed (hysterectomy) and you have not had cancer.  You should be tested each year for STDs (sexually transmitted diseases), if you're at risk.   Talk to your provider about how often to have your cholesterol checked.  If you are at risk for diabetes, you should have a diabetes test (fasting glucose).  Shots: Get a flu shot each year. Get a tetanus shot every 10 years.   Nutrition:     Eat at least 5 servings of fruits and vegetables each day.    Eat whole-grain bread, whole-wheat pasta and brown rice instead of white grains and rice.    Talk to your provider about Calcium and Vitamin D.     Lifestyle    Exercise at least 150 minutes a week (30 minutes a day, 5 days of the week). This will help you control your weight and prevent disease.    Limit alcohol to one drink per day.    No smoking.     Wear sunscreen to prevent skin cancer.    See your dentist  every six months for an exam and cleaning.            Follow-ups after your visit        Additional Services     DERMATOLOGY REFERRAL       Your provider has referred you to: [facility]AMRYN: [facility] [phone]   [email or web address]    Please be aware that coverage of these services is subject to the terms and limitations of your health insurance plan.  Call member services at your health plan with any benefit or coverage questions.      Please bring the following with you to your appointment:    (1) Any X-Rays, CTs or MRIs which have been performed.  Contact the facility where they were done to arrange for  prior to your scheduled appointment.    (2) List of current medications  (3) This referral request   (4) Any documents/labs given to you for this referral                  Your next 10 appointments already scheduled     May 25, 2018  3:30 PM CDT   New Visit with Marian Jorgensen MD   [facility] ([facility])    [address] [place] [id number] [phone]              Who to contact     If you have questions or need follow up information about today's clinic visit or your schedule please contact [facility] directly at [phone].  Normal or non-critical lab and imaging results will be communicated to you by MyChart, letter or phone within 4 business days after the clinic has received the results. If you do not hear from us within 7 days, please contact the clinic through MyChart or phone. If you have a critical or abnormal lab result, we will notify you by phone as soon as possible.  Submit refill requests through [employer] or call your pharmacy and they will forward the refill request to us. Please allow 3 business days for your refill to be completed.          Additional Information About Your Visit        [employer] Information     [employer] gives you secure access to your  "electronic health record. If you see a primary care provider, you can also send messages to your care team and make appointments. If you have questions, please call your primary care clinic.  If you do not have a primary care provider, please call [phone] and they will assist you.        Care EveryWhere ID     This is your Care EveryWhere ID. This could be used by other organizations to access your [place] medical records  [id number]        Your Vitals Were     Pulse Temperature Height Last Period BMI (Body Mass Index)       88 98.3  F (36.8  C) (Tympanic) 5' 9.25\" (1.759 m) 03/13/2018 32.84 kg/m2        Blood Pressure from Last 3 Encounters:   03/23/18 110/70   03/17/18 124/71   01/21/18 113/75    Weight from Last 3 Encounters:   03/23/18 224 lb (101.6 kg)   03/17/18 220 lb (99.8 kg)              We Performed the Following     DERMATOLOGY REFERRAL     HPV High Risk Types DNA Cervical     Pap imaged thin layer screen with HPV - recommended age 30 - 65          Today's Medication Changes          These changes are accurate as of 3/23/18  3:57 PM.  If you have any questions, ask your nurse or doctor.               Start taking these medicines.        Dose/Directions    SUMAtriptan 25 MG tablet   Commonly known as:  IMITREX   Used for:  Headache disorder   Started by:  Yulissa Nogueira APRN CNP        Dose:  25-50 mg   Take 1-2 tablets (25-50 mg) by mouth at onset of headache for migraine May repeat in 2 hours. Max 8 tablets/24 hours.   Quantity:  9 tablet   Refills:  1         Stop taking these medicines if you haven't already. Please contact your care team if you have questions.     ICY HOT EXTRA STRENGTH 10-30 % Crea   Stopped by:  Yulissa Nogueira APRN CNP                Where to get your medicines      These medications were sent to [facility] - [address], [facility] [address]     Phone:  [phone]     SUMAtriptan 25 MG " tablet                Primary Care Provider Fax #    Physician No Ref-Primary [phone]       No address on file        Equal Access to Services     ASMITA ALFARO : Hadii aad ku hadanmol Alvarez, wajuan jda lutiffanie, almazta kavelmada wiliam, kiah shahrodolfo salomón felipe laTraceyramy bowling. So [facility] [phone].    ATENCIÓN: Si habla español, tiene a juarez disposición servicios gratuitos de asistencia lingüística. Llame al [phone].    We comply with applicable federal civil rights laws and [place] laws. We do not discriminate on the basis of race, color, national origin, age, disability, sex, sexual orientation, or gender identity.            Thank you!     Thank you for choosing [facility]  for your care. Our goal is always to provide you with excellent care. Hearing back from our patients is one way we can continue to improve our services. Please take a few minutes to complete the written survey that you may receive in the mail after your visit with us. Thank you!             Your Updated Medication List - Protect others around you: Learn how to safely use, store and throw away your medicines at www.disposemymeds.org.          This list is accurate as of 3/23/18  3:57 PM.  Always use your most recent med list.                   Brand Name Dispense Instructions for use Diagnosis    amoxicillin-clavulanate 875-125 MG per tablet    AUGMENTIN    14 tablet    Take 1 tablet by mouth 2 times daily for 7 days        LEVOTHYROXINE SODIUM PO      Take 150 mcg by mouth daily        SUMAtriptan 25 MG tablet    IMITREX    9 tablet    Take 1-2 tablets (25-50 mg) by mouth at onset of headache for migraine May repeat in 2 hours. Max 8 tablets/24 hours.    Headache disorder       ZOLOFT 100 MG tablet   Generic drug:  sertraline      150mg daily

## 2018-03-23 NOTE — PROGRESS NOTES
"   SUBJECTIVE:   CC: Kristel Martinez is an 37 year old woman who presents for preventive health visit.     Physical   Annual:     Getting at least 3 servings of Calcium per day::  Yes    Bi-annual eye exam::  Yes    Dental care twice a year::  Yes    Sleep apnea or symptoms of sleep apnea::  None    Diet::  Regular (no restrictions) and Breakfast skipped    Taking medications regularly::  Yes    Medication side effects::  None    Additional concerns today::  YES            Derm Problem - pt states she seen a dermatologist in the past. States she has \"flat warts.\" These are on her face. Was told to use differen gel. This is not helping   Has been seen by two different dermatologists and no interventions have helped    Headaches      Duration: 5 years     Description  Location: from neck to middle of forehead    Character: dull pain  Frequency:  Once monthly   Duration:  1-2 days     Intensity:  moderate    Accompanying signs and symptoms:    Precipitating or Alleviating factors:  Nausea/vomiting: yes   Dizziness: no  Weakness or numbness: no  Visual changes: spots in vision   Fever: no   Sinus or URI symptoms no     History  Head trauma: no  Family history of migraines: no  Previous tests for headaches: YES- MRI in past   Neurologist evaluations: no  Able to do daily activities when headache present: no  Wake with headaches: YES  Daily pain medication use: no  Any changes in: none     Precipitating or Alleviating factors (light/sound/sleep/caffeine): sound worse   Therapies tried and outcome: has taken medication in the past but unsure of what it was.      Does have aura like symptoms prior to headache-sees \"spots\"  Has done chiropractic care, massage, Tens unit-but nothing seems to help symptoms.    Today's PHQ-2 Score:   PHQ-2 ( 1999 [employer]) 3/23/2018   Q1: Little interest or pleasure in doing things 1   Q2: Feeling down, depressed or hopeless 1   PHQ-2 Score 2   Q1: Little interest or pleasure in doing things Several " "days   Q2: Feeling down, depressed or hopeless Several days   PHQ-2 Score 2       Abuse: Current or Past(Physical, Sexual or Emotional)- No  Do you feel safe in your environment - Yes    Social History   Substance Use Topics     Smoking status: Former Smoker     Smokeless tobacco: Never Used     Alcohol use Yes      Comment: 1 drink per wk     Alcohol Use 3/23/2018   If you drink alcohol do you typically have greater than 3 drinks per day OR greater than 7 drinks per week? No     Reviewed orders with patient.  Reviewed health maintenance and updated orders accordingly - Yes  Labs reviewed in EPIC    Mammogram not appropriate for this patient based on age.    Pertinent mammograms are reviewed under the imaging tab.  History of abnormal Pap smear: Last 3 Pap Results: No results found for: PAP    Reviewed and updated as needed this visit by clinical staff  Tobacco  Allergies  Meds  Med Hx  Surg Hx  Fam Hx  Soc Hx        Reviewed and updated as needed this visit by Provider  Allergies            Review of Systems  C: NEGATIVE for fever, chills, change in weight  INTEGUMENTARY/SKIN: POSITIVE for pigmentation and papules on chin  E: NEGATIVE for vision changes or irritation  ENT: NEGATIVE for ear, mouth and throat problems  R: NEGATIVE for significant cough or SOB  B: NEGATIVE for masses, tenderness or discharge  CV: NEGATIVE for chest pain, palpitations or peripheral edema  GI: NEGATIVE for nausea, abdominal pain, heartburn, or change in bowel habits  : NEGATIVE for unusual urinary or vaginal symptoms. Periods are regular.  M: NEGATIVE for significant arthralgias or POSITIVE for neck and back pain  N: NEGATIVE for weakness, dizziness or paresthesias  P: NEGATIVE for changes in mood or affect     OBJECTIVE:   /70 (Cuff Size: Adult Large)  Pulse 88  Temp 98.3  F (36.8  C) (Tympanic)  Ht 5' 9.25\" (1.759 m)  Wt 224 lb (101.6 kg)  LMP 03/13/2018  BMI 32.84 kg/m2  Physical Exam  GENERAL: healthy, alert and " no distress  EYES: Eyes grossly normal to inspection, PERRL and conjunctivae and sclerae normal  HENT: ear canals and TM's normal, nose and mouth without ulcers or lesions  NECK: no adenopathy, no asymmetry, masses, or scars and thyroid normal to palpation  RESP: lungs clear to auscultation - no rales, rhonchi or wheezes  BREAST: normal without masses, tenderness or nipple discharge and no palpable axillary masses or adenopathy  CV: regular rate and rhythm, normal S1 S2, no S3 or S4, no murmur, click or rub, no peripheral edema and peripheral pulses strong  ABDOMEN: soft, nontender, no hepatosplenomegaly, no masses and bowel sounds normal   (female): normal female external genitalia, normal urethral meatus, vaginal mucosa pink, moist, well rugated, and normal cervix/adnexa/uterus without masses or discharge  MS: no gross musculoskeletal defects noted, no edema  SKIN: flesh colored papules on chin  NEURO: Normal strength and tone, mentation intact and speech normal  PSYCH: mentation appears normal, affect normal/bright    ASSESSMENT/PLAN:   1. Encounter for routine adult health examination without abnormal findings      2. Encounter for gynecological examination without abnormal finding    - Pap imaged thin layer screen with HPV - recommended age 30 - 65  - HPV High Risk Types DNA Cervical    3. Headache disorder  Tension vs migraine with aura symptoms-will try Imitrex for symptoms.  Risks and benefits of medication discussed.  - SUMAtriptan (IMITREX) 25 MG tablet; Take 1-2 tablets (25-50 mg) by mouth at onset of headache for migraine May repeat in 2 hours. Max 8 tablets/24 hours.  Dispense: 9 tablet; Refill: 1    4. Skin lesion of face  With ongoing symptoms on face will refer to dermatology for further evaluation and plan  - DERMATOLOGY REFERRAL    Home care instructions were reviewed with the patient. The risks, benefits and treatment options of prescribed medications or other treatments have been discussed with  "the patient. The patient verbalized their understanding and should call or follow up if no improvement or if they develop further problems.      COUNSELING:  Reviewed preventive health counseling, as reflected in patient instructions         reports that she has quit smoking. She has never used smokeless tobacco.    Estimated body mass index is 32.84 kg/(m^2) as calculated from the following:    Height as of this encounter: 5' 9.25\" (1.759 m).    Weight as of this encounter: 224 lb (101.6 kg).       Counseling Resources:  ATP IV Guidelines  Pooled Cohorts Equation Calculator  Breast Cancer Risk Calculator  FRAX Risk Assessment  ICSI Preventive Guidelines  Dietary Guidelines for Americans, 2010  USDA's MyPlate  ASA Prophylaxis  Lung CA Screening    DAVID Clifton [facility]  Answers for HPI/ROS submitted by the patient on 3/23/2018   PHQ-2 Score: 2    "

## 2018-03-27 LAB
COPATH REPORT: NORMAL
PAP: NORMAL

## 2018-03-29 LAB
FINAL DIAGNOSIS: NORMAL
HPV HR 12 DNA CVX QL NAA+PROBE: NEGATIVE
HPV16 DNA SPEC QL NAA+PROBE: NEGATIVE
HPV18 DNA SPEC QL NAA+PROBE: NEGATIVE
SPECIMEN DESCRIPTION: NORMAL
SPECIMEN SOURCE CVX/VAG CYTO: NORMAL

## 2018-05-25 ENCOUNTER — OFFICE VISIT (OUTPATIENT)
Dept: ENDOCRINOLOGY | Facility: CLINIC | Age: 38
End: 2018-05-25
Payer: COMMERCIAL

## 2018-05-25 VITALS
HEIGHT: 69 IN | HEART RATE: 89 BPM | SYSTOLIC BLOOD PRESSURE: 118 MMHG | DIASTOLIC BLOOD PRESSURE: 75 MMHG | BODY MASS INDEX: 32.79 KG/M2 | WEIGHT: 221.4 LBS

## 2018-05-25 DIAGNOSIS — C73 MALIGNANT NEOPLASM OF THYROID GLAND (H): Primary | ICD-10-CM

## 2018-05-25 LAB
T4 FREE SERPL-MCNC: 1.08 NG/DL (ref 0.76–1.46)
TSH SERPL DL<=0.005 MIU/L-ACNC: 0.36 MU/L (ref 0.4–4)

## 2018-05-25 PROCEDURE — 84443 ASSAY THYROID STIM HORMONE: CPT | Performed by: INTERNAL MEDICINE

## 2018-05-25 PROCEDURE — 99000 SPECIMEN HANDLING OFFICE-LAB: CPT | Performed by: INTERNAL MEDICINE

## 2018-05-25 PROCEDURE — 84432 ASSAY OF THYROGLOBULIN: CPT | Mod: 90 | Performed by: INTERNAL MEDICINE

## 2018-05-25 PROCEDURE — 99204 OFFICE O/P NEW MOD 45 MIN: CPT | Performed by: INTERNAL MEDICINE

## 2018-05-25 PROCEDURE — 84439 ASSAY OF FREE THYROXINE: CPT | Performed by: INTERNAL MEDICINE

## 2018-05-25 PROCEDURE — 36415 COLL VENOUS BLD VENIPUNCTURE: CPT | Performed by: INTERNAL MEDICINE

## 2018-05-25 PROCEDURE — 86800 THYROGLOBULIN ANTIBODY: CPT | Mod: 90 | Performed by: INTERNAL MEDICINE

## 2018-05-25 RX ORDER — LEVOTHYROXINE SODIUM 13 UG/1
150 CAPSULE ORAL DAILY
Qty: 90 CAPSULE | Refills: 3 | Status: SHIPPED | OUTPATIENT
Start: 2018-05-25 | End: 2018-05-31

## 2018-05-25 NOTE — NURSING NOTE
"Kristel Martinez's goals for this visit include:   Chief Complaint   Patient presents with     Endocrine Problem       She requests these members of her care team be copied on today's visit information: Yes PCP    PCP: No Ref-Primary, Physician    Referring Provider:  Referred Self, MD  No address on file    /75  Pulse 89  Ht 1.753 m (5' 9\")  Wt 100.4 kg (221 lb 6.4 oz)  BMI 32.7 kg/m2    Do you need any medication refills at today's visit? NO    "

## 2018-05-25 NOTE — MR AVS SNAPSHOT
After Visit Summary   5/25/2018    Kristel Martinez    MRN: [id number]           Patient Information     Date Of Birth          1980        Visit Information        Provider Department      5/25/2018 3:30 PM Marian Jorgensen MD [facility]        Today's Diagnoses     Malignant neoplasm of thyroid gland (H)    -  1      Care Instructions    Labs today, we will follow up the results.     Please schedule for the head and neck ultrasound.           Follow-ups after your visit        Follow-up notes from your care team     Return in about 1 year (around 5/25/2019).      Future tests that were ordered for you today     Open Future Orders        Priority Expected Expires Ordered    Thyroglobulin and antibody Routine  12/21/2018 5/25/2018    US Head Neck Soft Tissue Routine  5/25/2019 5/25/2018    TSH Routine  5/25/2019 5/25/2018    T4 free Routine  5/25/2019 5/25/2018            Who to contact     If you have questions or need follow up information about today's clinic visit or your schedule please contact [facility] directly at [phone].  Normal or non-critical lab and imaging results will be communicated to you by [employer] Interactive[employer], letter or phone within 4 business days after the clinic has received the results. If you do not hear from us within 7 days, please contact the clinic through [employer] or phone. If you have a critical or abnormal lab result, we will notify you by phone as soon as possible.  Submit refill requests through Mingly or call your pharmacy and they will forward the refill request to us. Please allow 3 business days for your refill to be completed.          Additional Information About Your Visit        MyChart Information     Mingly gives you secure access to your electronic health record. If you see a primary care provider, you can also send messages to your care team and make appointments. If you have questions, please call your primary  "care clinic.  If you do not have a primary care provider, please call [phone] and they will assist you.      sifonr is an electronic gateway that provides easy, online access to your medical records. With sifonr, you can request a clinic appointment, read your test results, renew a prescription or communicate with your care team.     To access your existing account, please contact your [facility] or call [phone] for assistance.        Care EveryWhere ID     This is your Care EveryWhere ID. This could be used by other organizations to access your [place] medical records  [id number]        Your Vitals Were     Pulse Height BMI (Body Mass Index)             89 1.753 m (5' 9\") 32.7 kg/m2          Blood Pressure from Last 3 Encounters:   05/25/18 118/75   03/23/18 110/70   03/17/18 124/71    Weight from Last 3 Encounters:   05/25/18 100.4 kg (221 lb 6.4 oz)   03/23/18 101.6 kg (224 lb)   03/17/18 99.8 kg (220 lb)                 Today's Medication Changes          These changes are accurate as of 5/25/18  3:55 PM.  If you have any questions, ask your nurse or doctor.               These medicines have changed or have updated prescriptions.        Dose/Directions    Levothyroxine Sodium 150 MCG Caps   This may have changed:  medication strength   Used for:  Malignant neoplasm of thyroid gland (H)   Changed by:  Marian Jorgensen MD        Dose:  150 mcg   Take 150 mcg by mouth daily   Quantity:  90 capsule   Refills:  3            Where to get your medicines      These medications were sent to [facility] [address] [place] [address] AT [facility] & [address]  [address] [facility] [address]     Phone:  [phone]     Levothyroxine Sodium 150 MCG Caps                Primary Care Provider Fax #    Physician No Ref-Primary [phone]       No address on file        Equal Access to Services     ASMITA ALFARO AH: Hadii moustapha purcell " joshua Alvarez, wajuan jda luchayitoadaha, qakaryta katodd swain, kiah dorinain hayaan mohsenkaleigh leajailyn lamilagrosrodolfo tawnya. [facility] [phone].    ATENCIÓN: Si habla español, tiene a juarez disposición servicios gratuitos de asistencia lingüística. Jaye al [phone].    We comply with applicable federal civil rights laws and [place] laws. We do not discriminate on the basis of race, color, national origin, age, disability, sex, sexual orientation, or gender identity.            Thank you!     Thank you for choosing [facility]  for your care. Our goal is always to provide you with excellent care. Hearing back from our patients is one way we can continue to improve our services. Please take a few minutes to complete the written survey that you may receive in the mail after your visit with us. Thank you!             Your Updated Medication List - Protect others around you: Learn how to safely use, store and throw away your medicines at www.disposemymeds.org.          This list is accurate as of 5/25/18  3:55 PM.  Always use your most recent med list.                   Brand Name Dispense Instructions for use Diagnosis    Levothyroxine Sodium 150 MCG Caps     90 capsule    Take 150 mcg by mouth daily    Malignant neoplasm of thyroid gland (H)       SUMAtriptan 25 MG tablet    IMITREX    9 tablet    Take 1-2 tablets (25-50 mg) by mouth at onset of headache for migraine May repeat in 2 hours. Max 8 tablets/24 hours.    Headache disorder       ZOLOFT 100 MG tablet   Generic drug:  sertraline      150mg daily

## 2018-05-25 NOTE — LETTER
5/25/2018         RE: Kristel Martniez  [address]  [place] [facility] [place] [place] [address]        Dear Colleague,    Thank you for referring your patient, Kristel Martinez, to the [facility]. Please see a copy of my visit note below.    Endocrine follow-up visit:     Date: May 25, 2018  Referring Physician: No Ref-Primary, Physician      Assessment   Kristel is a 37 year old female who is here for evaluation of Thyroid Cancer. There is no evidence of clinical features suggestive of hyper or hypothyroidism.     Thyroid cancer: FVPTC, encapsulated, multifocal, PT1b, N0  TTY 2014  GARCIAS 50 mCI 6/2014  Low risk of recurrence.   US 6/2017: no evidence of recurrence.   Annual follow up.   US till 6/2019 and then every other year.   TG and ab    Postsurgical hypothyroidism  LT4 150 mcg daily.   Labs today.     Obesity  Diet and exercise.   Discussed about plate methods,   Encouraged to continue exercising and maintaining diet.     Marian Jorgensen MD  [address]  Endocrinology Service      Patient Instructions   Labs today, we will follow up the results.     Please schedule for the head and neck ultrasound.         Orders Placed This Encounter   Procedures     US Head Neck Soft Tissue     Thyroglobulin and antibody     TSH     T4 free        ===============================      HPI:   Kristel is here for a initial visit.     She has history of goiter inititially felt by patient 2007. 3 nodule were detected. One was biopsied and was benign. Subsequent FU showed increasing size. Re-FNA showed benign cytology and due to continued increase in size over years, surgery was recommended that showed FVPTC in 2014.     Diagnosed in 12/13  Final Path: FVPTC 1.7 cm, encapsulated.   TNM pT1b Nx MX  Completion TTY 3/2014: 0.3 cm PTV  GARCIAS: 50 mcI 6/2014  MACIS:   CHRIS Risk: Low.     FU Labs and US    DATE TSH / CHRIS / TG US   6/2017 3.97/1.0/ 0.1 No LN                                  Temperature: Heat intolerance No  Denies having felt a goiter,  "neck pain, difficulty in swallowing, difficulty in breathing and changes in voice  General:  Good energy levels  Sleep is normal  Weight gain Yes: stable, cannot loose weight.   Wt Readings from Last 4 Encounters:   05/25/18 100.4 kg (221 lb 6.4 oz)   03/23/18 101.6 kg (224 lb)   03/17/18 99.8 kg (220 lb)     Palpitation No    Other ROS:   History of CAD No  Lipid disorder No  Shortness of breath No  Constipation No  Skin and hair:  No Change  Extremity Swelling No  Menstrual irregularity No  Musculoskeletal: Cramps   No eye symptoms  No headache, change in vision, loss of peripheral vision  Complete ROS that were obtained were negative.     Past Medical History:   Diagnosis Date     Depressive disorder      Past Surgical History:   Procedure Laterality Date     THYROIDECTOMY      2015     TONSILLECTOMY       TUBAL LIGATION       No family history on file.  Social History     Social History     Marital status:      Spouse name: N/A     Number of children: N/A     Years of education: N/A     Occupational History     Not on file.     Social History Main Topics     Smoking status: Former Smoker     Smokeless tobacco: Never Used     Alcohol use Yes      Comment: 1 drink per wk     Drug use: No     Sexual activity: Yes     Partners: Male     Other Topics Concern     Not on file     Social History Narrative        Allergies   Allergen Reactions     Cephalosporins      Sulfa Drugs      Current Outpatient Prescriptions   Medication     Levothyroxine Sodium 150 MCG CAPS     SUMAtriptan (IMITREX) 25 MG tablet     ZOLOFT 100 MG OR TABS     [DISCONTINUED] LEVOTHYROXINE SODIUM PO     No current facility-administered medications for this visit.            Exam:  /75  Pulse 89  Ht 1.753 m (5' 9\")  Wt 100.4 kg (221 lb 6.4 oz)  BMI 32.7 kg/m2   Constitutional: pleasant.   Head: Normocephalic. No masses, lesions, tenderness or abnormalities  Neck: Neck supple. No adenopathy.   Thyroid is not palpable. Carotids " without bruits.  ENT: No throat congestion, no neck nodes or sinus tenderness  Cardiovascular: regular rhythm, no tachycardia  Respiratory: Lungs clear  Gastrointestinal: Abdomen soft, non-tender. BS normal. No striae  Musculoskeletal: gait normal and normal muscle tone  Neurologic: Normal speech, reflexes normal.   Psychiatric: mentation appears normal       TSH   Date Value Ref Range Status   05/25/2018 0.36 (L) 0.40 - 4.00 mU/L Final   ]  T4 Free   Date Value Ref Range Status   05/25/2018 1.08 0.76 - 1.46 ng/dL Final   ]      CBC RESULTS: No results for input(s): WBC, RBC, HGB, HCT, MCV, MCH, MCHC, RDW, PLT in the last 62034 hours.  No results for input(s): NA, POTASSIUM, CHLORIDE, CO2, ANIONGAP, GLC, BUN, CR, VASQUEZ in the last 82789 hours.    I reviewed the outside records.     [facility] MEDICAL IMAGING  NM THYROID UPTAKE SINGLE MULTIPLE QUANT MEASUREMENT  6/19/2014 11:45 AM    INDICATION: Thyroid carcinoma post thyroidectomy, I-131 treatment planning.  TECHNIQUE: 0.2 mCi I-123 was ingested by the patient, and 24-hour neck uptake and pinhole neck imaging performed.  COMPARISON: None.    FINDINGS: 24-hour uptake is 13.8%. This corresponds to a relatively prominent amount of residual uptake in the right thyroid bed likely functioning thyroid tissue. Activity in the nasopharynx, stomach, colon, and bladder is compatible with normal excretion.    CONCLUSION:  1.  Residual tissue post thyroidectomy right neck.    2.  I-131 ablation therapy requested and planned    [facility] MEDICAL IMAGING  NM ONCOLOGY THYROID WHOLE BODY POST TREATMENT  6/24/2014 3:07 PM    INDICATION: Thyroid carcinoma post thyroidectomy and I-131 treatment whole body imaging for staging.  TECHNIQUE: Whole body and lateral head and neck imaging performed 5 days post I-131 therapy dosing.  COMPARISON: I-123 uptake and scan 06/19/2014    FINDINGS: Neck activity corresponds to pretreatment scan and indicates accumulation as desired for treatment. Activity in the  liver and bowel is compatible with normal excretion.    CONCLUSION:  1.  No evidence of additional metastatic disease.        2.   Patient is to followup with Dr. Munguia    [facility] MEDICAL IMAGING  NM THERAPY ORAL ADMIN  6/19/2014 1:48 PM    INDICATION: Thyroid carcinoma post thyroidectomy I-131 ablation requested  DOSE: A therapy dose of approximately 50 mCi is calculated as appropriate.  COMPARISON: I-123 pretreatment uptake and scan 06/19/2014    PROCEDURE: Following discussion of implications, alternative treatments, and precautions to take following the treatment, the patient ingested 51.3 mCi of I-131 without difficulty. The patient left the department with no acute change in condition, no unanswered questions, and written postprocedure instructions.    [facility]  US NECK OR HEAD SOFT TISSUE  6/29/2017 4:11 PM    INDICATION: Papillary carcinoma of the thyroid status post thyroidectomy. Follow-up.  TECHNIQUE: Routine thyroid bed ultrasound.  COMPARISON: 07/25/2016    FINDINGS: Thyroidectomy. No recurrent thyroid bed nodule.    No cervical adenopathy.    CONCLUSION:  1.  Thyroidectomy. No evidence for recurrent or metastatic disease.    SURGICAL PATHOLOGY REPORT  LAB:  Phone:    Case#:[id number]             Final Report    DIAGNOSIS  THYROID, RIGHT LOBE AND ISTHMUS, RESECTION:  1. 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 No parathyroid glands are identified  3. 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 Benign lymph node  2. Negative for metastatic carcinoma    B) THYROID, COMPLETION THYROIDECTOMY:  1. Papillary thyroid carcinoma, 0.3 cm focus  2. Surgical margins free of tumor  3. See staging parameters for additional information    Again, thank you for allowing me to participate in the care of your patient.         Sincerely,        Marian Jorgensen MD

## 2018-05-25 NOTE — LETTER
August 23, 2019      Kristel Martinez  [address]  [place] [address]              Dear Kristel,    APPOINTMENT REMINDER:        In order to ensure that we are providing the best quality care, we would like to remind you that you are due for a follow up endocrinology appointment with Dr. Jorgensen and a follow up thyroid ultrasound.      Please call your clinic at [phone] opt [phone] to make an appointment with your provider. Please let us know if you have any questions and we would be happy to help.      Thank you for trusting us with your care.  Sincerely,      Marbella Estrada, [facility]  Adult Endocrinology  [facility]

## 2018-05-25 NOTE — PROGRESS NOTES
Endocrine follow-up visit:     Date: May 25, 2018  Referring Physician: No Ref-Primary, Physician      Assessment   Kristel is a 37 year old female who is here for evaluation of Thyroid Cancer. There is no evidence of clinical features suggestive of hyper or hypothyroidism.     Thyroid cancer: FVPTC, encapsulated, multifocal, PT1b, N0  TTY 2014  GARCIAS 50 mCI 6/2014  Low risk of recurrence.   US 6/2017: no evidence of recurrence.   Annual follow up.   US till 6/2019 and then every other year.   TG and ab    Postsurgical hypothyroidism  LT4 150 mcg daily.   Labs today.     Obesity  Diet and exercise.   Discussed about plate methods,   Encouraged to continue exercising and maintaining diet.     Marian Jorgensen MD  [id number]  Endocrinology Service      Patient Instructions   Labs today, we will follow up the results.     Please schedule for the head and neck ultrasound.         Orders Placed This Encounter   Procedures     US Head Neck Soft Tissue     Thyroglobulin and antibody     TSH     T4 free        ===============================      HPI:   Kristel is here for a initial visit.     She has history of goiter inititially felt by patient 2007. 3 nodule were detected. One was biopsied and was benign. Subsequent FU showed increasing size. Re-FNA showed benign cytology and due to continued increase in size over years, surgery was recommended that showed FVPTC in 2014.     Diagnosed in 12/13  Final Path: FVPTC 1.7 cm, encapsulated.   TNM pT1b Nx MX  Completion TTY 3/2014: 0.3 cm PTV  GARCIAS: 50 mcI 6/2014  MACIS:   CHRIS Risk: Low.     FU Labs and US    DATE TSH / CHRIS / TG US   6/2017 3.97/1.0/ 0.1 No LN                                  Temperature: Heat intolerance No  Denies having felt a goiter, neck pain, difficulty in swallowing, difficulty in breathing and changes in voice  General:  Good energy levels  Sleep is normal  Weight gain Yes: stable, cannot loose weight.   Wt Readings from Last 4 Encounters:   05/25/18 100.4 kg (221  "lb 6.4 oz)   03/23/18 101.6 kg (224 lb)   03/17/18 99.8 kg (220 lb)     Palpitation No    Other ROS:   History of CAD No  Lipid disorder No  Shortness of breath No  Constipation No  Skin and hair:  No Change  Extremity Swelling No  Menstrual irregularity No  Musculoskeletal: Cramps   No eye symptoms  No headache, change in vision, loss of peripheral vision  Complete ROS that were obtained were negative.     Past Medical History:   Diagnosis Date     Depressive disorder      Past Surgical History:   Procedure Laterality Date     THYROIDECTOMY      2015     TONSILLECTOMY       TUBAL LIGATION       No family history on file.  Social History     Social History     Marital status:      Spouse name: N/A     Number of children: N/A     Years of education: N/A     Occupational History     Not on file.     Social History Main Topics     Smoking status: Former Smoker     Smokeless tobacco: Never Used     Alcohol use Yes      Comment: 1 drink per wk     Drug use: No     Sexual activity: Yes     Partners: Male     Other Topics Concern     Not on file     Social History Narrative        Allergies   Allergen Reactions     Cephalosporins      Sulfa Drugs      Current Outpatient Prescriptions   Medication     Levothyroxine Sodium 150 MCG CAPS     SUMAtriptan (IMITREX) 25 MG tablet     ZOLOFT 100 MG OR TABS     [DISCONTINUED] LEVOTHYROXINE SODIUM PO     No current facility-administered medications for this visit.            Exam:  /75  Pulse 89  Ht 1.753 m (5' 9\")  Wt 100.4 kg (221 lb 6.4 oz)  BMI 32.7 kg/m2   Constitutional: pleasant.   Head: Normocephalic. No masses, lesions, tenderness or abnormalities  Neck: Neck supple. No adenopathy.   Thyroid is not palpable. Carotids without bruits.  ENT: No throat congestion, no neck nodes or sinus tenderness  Cardiovascular: regular rhythm, no tachycardia  Respiratory: Lungs clear  Gastrointestinal: Abdomen soft, non-tender. BS normal. No striae  Musculoskeletal: gait " normal and normal muscle tone  Neurologic: Normal speech, reflexes normal.   Psychiatric: mentation appears normal       TSH   Date Value Ref Range Status   05/25/2018 0.36 (L) 0.40 - 4.00 mU/L Final   ]  T4 Free   Date Value Ref Range Status   05/25/2018 1.08 0.76 - 1.46 ng/dL Final   ]      CBC RESULTS: No results for input(s): WBC, RBC, HGB, HCT, MCV, MCH, MCHC, RDW, PLT in the last 17077 hours.  No results for input(s): NA, POTASSIUM, CHLORIDE, CO2, ANIONGAP, GLC, BUN, CR, VASQUEZ in the last 87324 hours.    I reviewed the outside records.     [facility] MEDICAL IMAGING  NM THYROID UPTAKE SINGLE MULTIPLE QUANT MEASUREMENT  6/19/2014 11:45 AM    INDICATION: Thyroid carcinoma post thyroidectomy, I-131 treatment planning.  TECHNIQUE: 0.2 mCi I-123 was ingested by the patient, and 24-hour neck uptake and pinhole neck imaging performed.  COMPARISON: None.    FINDINGS: 24-hour uptake is 13.8%. This corresponds to a relatively prominent amount of residual uptake in the right thyroid bed likely functioning thyroid tissue. Activity in the nasopharynx, stomach, colon, and bladder is compatible with normal excretion.    CONCLUSION:  1.  Residual tissue post thyroidectomy right neck.    2.  I-131 ablation therapy requested and planned    [facility] MEDICAL [facility] ONCOLOGY THYROID WHOLE BODY POST TREATMENT  6/24/2014 3:07 PM    INDICATION: Thyroid carcinoma post thyroidectomy and I-131 treatment whole body imaging for staging.  TECHNIQUE: Whole body and lateral head and neck imaging performed 5 days post I-131 therapy dosing.  COMPARISON: I-123 uptake and scan 06/19/2014    FINDINGS: Neck activity corresponds to pretreatment scan and indicates accumulation as desired for treatment. Activity in the liver and bowel is compatible with normal excretion.    CONCLUSION:  1.  No evidence of additional metastatic disease.        2.   Patient is to followup with Dr. Munguia    [facility] MEDICAL IMAGING  NM THERAPY ORAL ADMIN  6/19/2014 1:48  PM    INDICATION: Thyroid carcinoma post thyroidectomy I-131 ablation requested  DOSE: A therapy dose of approximately 50 mCi is calculated as appropriate.  COMPARISON: I-123 pretreatment uptake and scan 06/19/2014    PROCEDURE: Following discussion of implications, alternative treatments, and precautions to take following the treatment, the patient ingested 51.3 mCi of I-131 without difficulty. The patient left the department with no acute change in condition, no unanswered questions, and written postprocedure instructions.    [facility]  US NECK OR HEAD SOFT TISSUE  6/29/2017 4:11 PM    INDICATION: Papillary carcinoma of the thyroid status post thyroidectomy. Follow-up.  TECHNIQUE: Routine thyroid bed ultrasound.  COMPARISON: 07/25/2016    FINDINGS: Thyroidectomy. No recurrent thyroid bed nodule.    No cervical adenopathy.    CONCLUSION:  1.  Thyroidectomy. No evidence for recurrent or metastatic disease.    SURGICAL PATHOLOGY REPORT  LAB:  Phone:    Case#:[id number]             Final Report    DIAGNOSIS  THYROID, RIGHT LOBE AND ISTHMUS, RESECTION:  1. 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 No parathyroid glands are identified  3. 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 Benign lymph node  2. Negative for metastatic carcinoma    B) THYROID, COMPLETION THYROIDECTOMY:  1. Papillary thyroid carcinoma, 0.3 cm focus  2. Surgical margins free of tumor  3. See staging parameters for additional information

## 2018-05-29 ENCOUNTER — TELEPHONE (OUTPATIENT)
Dept: ENDOCRINOLOGY | Facility: CLINIC | Age: 38
End: 2018-05-29

## 2018-05-29 DIAGNOSIS — E89.0 POSTOPERATIVE HYPOTHYROIDISM: Primary | ICD-10-CM

## 2018-05-29 NOTE — TELEPHONE ENCOUNTER
Pt has [facility]-[id number]; PCN-[id number];GP-[facility]: [id number]    Central Prior Authorization Team   Phone: [phone]      PA Initiation    Medication: Levothyroxine Sodium 150 MCG CAPS (TIROSINT)-PA initiated  Insurance Company:    Pharmacy Filling the Rx: [facility] [id number] [place] [address] AT [facility] & [address]  Filling Pharmacy Phone: [phone]  Filling Pharmacy Fax:    Start Date: 5/29/2018

## 2018-05-30 NOTE — TELEPHONE ENCOUNTER
Will forward to Dr. Jorgensen.    Per PA Team Comment:  Hi,   This medication was denied. If the provider would like to appeal please provide a letter of medical necessity and route back to the team. Otherwise you can close the encounter.   Thank you,   Central PA Team   (Routing comment)                   Marisol Harley LPN  Adult Endocrinology  [facility]

## 2018-05-30 NOTE — TELEPHONE ENCOUNTER
PRIOR AUTHORIZATION DENIED    Medication: Levothyroxine Sodium 150 MCG CAPS (TIROSINT)-PA denied    Denial Date: 5/29/2018    Denial Rational:        Appeal Information: N/A

## 2018-05-31 RX ORDER — LEVOTHYROXINE SODIUM 150 UG/1
150 TABLET ORAL DAILY
Qty: 90 TABLET | Refills: 3 | Status: SHIPPED | OUTPATIENT
Start: 2018-05-31 | End: 2019-03-15

## 2018-05-31 NOTE — TELEPHONE ENCOUNTER
Please call patient and let her know that insurance declined it. She is currently absorbing levothyroxine without problem and therefore I do not think it will be approved if we re-appeal it.     Marian Jorgensen MD  [id number]  Endocrinology Service

## 2018-05-31 NOTE — TELEPHONE ENCOUNTER
Patient notified.     Prescription completed for Levothyroxine 150 mcg to [facility] per patient request.    Marisol Harley LPN  Adult Endocrinology  [facility]

## 2018-05-31 NOTE — TELEPHONE ENCOUNTER
Left message for patient to return call.    Marisol Harley LPN  Adult Endocrinology  [facility]

## 2018-06-07 LAB — LAB SCANNED RESULT: NORMAL

## 2018-06-08 NOTE — PROGRESS NOTES
Dear Kristel,     The thyroglobulin levels are reassuringly undetectable.   No changes are needed at this time. We will retest TSH in future, it is slightly suppressed but does not necessitate dose adjustments.     With Best Regards,   Marian Jorgensen MD  Endocrinology Service.

## 2018-08-10 ENCOUNTER — MYC MEDICAL ADVICE (OUTPATIENT)
Dept: FAMILY MEDICINE | Facility: CLINIC | Age: 38
End: 2018-08-10

## 2018-08-10 DIAGNOSIS — F33.1 MAJOR DEPRESSIVE DISORDER, RECURRENT EPISODE, MODERATE (H): Primary | ICD-10-CM

## 2018-08-13 ENCOUNTER — MYC MEDICAL ADVICE (OUTPATIENT)
Dept: FAMILY MEDICINE | Facility: CLINIC | Age: 38
End: 2018-08-13

## 2018-08-13 DIAGNOSIS — F33.1 MAJOR DEPRESSIVE DISORDER, RECURRENT EPISODE, MODERATE (H): ICD-10-CM

## 2018-08-13 RX ORDER — SERTRALINE HYDROCHLORIDE 100 MG/1
200 TABLET, FILM COATED ORAL DAILY
Qty: 180 TABLET | Refills: 0 | Status: CANCELLED | OUTPATIENT
Start: 2018-08-13

## 2018-08-13 RX ORDER — SERTRALINE HYDROCHLORIDE 100 MG/1
200 TABLET, FILM COATED ORAL DAILY
Qty: 180 TABLET | Refills: 0 | Status: SHIPPED | OUTPATIENT
Start: 2018-08-13 | End: 2018-08-15

## 2018-08-13 ASSESSMENT — PATIENT HEALTH QUESTIONNAIRE - PHQ9
SUM OF ALL RESPONSES TO PHQ QUESTIONS 1-9: 9
10. IF YOU CHECKED OFF ANY PROBLEMS, HOW DIFFICULT HAVE THESE PROBLEMS MADE IT FOR YOU TO DO YOUR WORK, TAKE CARE OF THINGS AT HOME, OR GET ALONG WITH OTHER PEOPLE: SOMEWHAT DIFFICULT
SUM OF ALL RESPONSES TO PHQ QUESTIONS 1-9: 9

## 2018-08-14 ENCOUNTER — MYC MEDICAL ADVICE (OUTPATIENT)
Dept: FAMILY MEDICINE | Facility: CLINIC | Age: 38
End: 2018-08-14

## 2018-08-14 ENCOUNTER — E-VISIT (OUTPATIENT)
Dept: FAMILY MEDICINE | Facility: CLINIC | Age: 38
End: 2018-08-14
Payer: COMMERCIAL

## 2018-08-14 DIAGNOSIS — K59.00 CONSTIPATION, UNSPECIFIED CONSTIPATION TYPE: Primary | ICD-10-CM

## 2018-08-14 DIAGNOSIS — F33.1 MAJOR DEPRESSIVE DISORDER, RECURRENT EPISODE, MODERATE (H): ICD-10-CM

## 2018-08-14 PROCEDURE — 99207 ZZC NO BILLABLE SERVICE THIS VISIT: CPT | Performed by: NURSE PRACTITIONER

## 2018-08-14 ASSESSMENT — PATIENT HEALTH QUESTIONNAIRE - PHQ9: SUM OF ALL RESPONSES TO PHQ QUESTIONS 1-9: 9

## 2018-08-15 RX ORDER — SERTRALINE HYDROCHLORIDE 100 MG/1
200 TABLET, FILM COATED ORAL DAILY
Qty: 180 TABLET | Refills: 0 | Status: SHIPPED | OUTPATIENT
Start: 2018-08-15 | End: 2018-09-21

## 2018-08-21 ENCOUNTER — HOSPITAL ENCOUNTER (EMERGENCY)
Facility: CLINIC | Age: 38
Discharge: HOME | End: 2018-08-21
Attending: EMERGENCY MEDICINE | Admitting: EMERGENCY MEDICINE
Payer: COMMERCIAL

## 2018-08-21 VITALS
HEIGHT: 70 IN | DIASTOLIC BLOOD PRESSURE: 74 MMHG | RESPIRATION RATE: 18 BRPM | BODY MASS INDEX: 31.5 KG/M2 | HEART RATE: 66 BPM | TEMPERATURE: 97.2 F | SYSTOLIC BLOOD PRESSURE: 112 MMHG | OXYGEN SATURATION: 100 % | WEIGHT: 220 LBS

## 2018-08-21 DIAGNOSIS — R10.32 ABDOMINAL PAIN, LEFT LOWER QUADRANT: ICD-10-CM

## 2018-08-21 DIAGNOSIS — R19.7 DIARRHEA, UNSPECIFIED TYPE: ICD-10-CM

## 2018-08-21 LAB
ALBUMIN SERPL-MCNC: 4.1 G/DL (ref 3.4–5)
ALBUMIN UR-MCNC: NEGATIVE MG/DL
ALP SERPL-CCNC: 57 U/L (ref 40–150)
ALT SERPL W P-5'-P-CCNC: 18 U/L (ref 0–50)
ANION GAP SERPL CALCULATED.3IONS-SCNC: 7 MMOL/L (ref 3–14)
APPEARANCE UR: CLEAR
AST SERPL W P-5'-P-CCNC: 9 U/L (ref 0–45)
BASOPHILS # BLD AUTO: 0 10E9/L (ref 0–0.2)
BASOPHILS NFR BLD AUTO: 0.4 %
BILIRUB SERPL-MCNC: 0.2 MG/DL (ref 0.2–1.3)
BILIRUB UR QL STRIP: NEGATIVE
BUN SERPL-MCNC: 8 MG/DL (ref 7–30)
CALCIUM SERPL-MCNC: 9.3 MG/DL (ref 8.5–10.1)
CHLORIDE SERPL-SCNC: 107 MMOL/L (ref 94–109)
CO2 SERPL-SCNC: 27 MMOL/L (ref 20–32)
COLOR UR AUTO: NORMAL
CREAT SERPL-MCNC: 0.67 MG/DL (ref 0.52–1.04)
DIFFERENTIAL METHOD BLD: NORMAL
EOSINOPHIL # BLD AUTO: 0.1 10E9/L (ref 0–0.7)
EOSINOPHIL NFR BLD AUTO: 1.2 %
ERYTHROCYTE [DISTWIDTH] IN BLOOD BY AUTOMATED COUNT: 12.8 % (ref 10–15)
GFR SERPL CREATININE-BSD FRML MDRD: >90 ML/MIN/1.7M2
GLUCOSE SERPL-MCNC: 63 MG/DL (ref 70–99)
GLUCOSE UR STRIP-MCNC: NEGATIVE MG/DL
HCG UR QL: NEGATIVE
HCT VFR BLD AUTO: 40.7 % (ref 35–47)
HGB BLD-MCNC: 13 G/DL (ref 11.7–15.7)
HGB UR QL STRIP: NEGATIVE
IMM GRANULOCYTES # BLD: 0 10E9/L (ref 0–0.4)
IMM GRANULOCYTES NFR BLD: 0.2 %
KETONES UR STRIP-MCNC: NEGATIVE MG/DL
LEUKOCYTE ESTERASE UR QL STRIP: NEGATIVE
LIPASE SERPL-CCNC: 129 U/L (ref 73–393)
LYMPHOCYTES # BLD AUTO: 1.6 10E9/L (ref 0.8–5.3)
LYMPHOCYTES NFR BLD AUTO: 32 %
MCH RBC QN AUTO: 27.5 PG (ref 26.5–33)
MCHC RBC AUTO-ENTMCNC: 31.9 G/DL (ref 31.5–36.5)
MCV RBC AUTO: 86 FL (ref 78–100)
MONOCYTES # BLD AUTO: 0.2 10E9/L (ref 0–1.3)
MONOCYTES NFR BLD AUTO: 4.9 %
NEUTROPHILS # BLD AUTO: 3 10E9/L (ref 1.6–8.3)
NEUTROPHILS NFR BLD AUTO: 61.3 %
NITRATE UR QL: NEGATIVE
NRBC # BLD AUTO: 0 10*3/UL
NRBC BLD AUTO-RTO: 0 /100
PH UR STRIP: 6 PH (ref 5–7)
PLATELET # BLD AUTO: 280 10E9/L (ref 150–450)
POTASSIUM SERPL-SCNC: 3.5 MMOL/L (ref 3.4–5.3)
PROT SERPL-MCNC: 7.5 G/DL (ref 6.8–8.8)
RBC # BLD AUTO: 4.73 10E12/L (ref 3.8–5.2)
RBC #/AREA URNS AUTO: 0 /HPF (ref 0–2)
SODIUM SERPL-SCNC: 141 MMOL/L (ref 133–144)
SOURCE: NORMAL
SP GR UR STRIP: 1.01 (ref 1–1.03)
SQUAMOUS #/AREA URNS AUTO: <1 /HPF (ref 0–1)
UROBILINOGEN UR STRIP-MCNC: 0 MG/DL (ref 0–2)
WBC # BLD AUTO: 4.9 10E9/L (ref 4–11)
WBC #/AREA URNS AUTO: 1 /HPF (ref 0–5)

## 2018-08-21 PROCEDURE — 81025 URINE PREGNANCY TEST: CPT | Performed by: EMERGENCY MEDICINE

## 2018-08-21 PROCEDURE — 85025 COMPLETE CBC W/AUTO DIFF WBC: CPT | Performed by: EMERGENCY MEDICINE

## 2018-08-21 PROCEDURE — 81003 URINALYSIS AUTO W/O SCOPE: CPT | Performed by: EMERGENCY MEDICINE

## 2018-08-21 PROCEDURE — 99283 EMERGENCY DEPT VISIT LOW MDM: CPT

## 2018-08-21 PROCEDURE — 83690 ASSAY OF LIPASE: CPT | Performed by: EMERGENCY MEDICINE

## 2018-08-21 PROCEDURE — 80053 COMPREHEN METABOLIC PANEL: CPT | Performed by: EMERGENCY MEDICINE

## 2018-08-21 PROCEDURE — 99283 EMERGENCY DEPT VISIT LOW MDM: CPT | Mod: Z6 | Performed by: EMERGENCY MEDICINE

## 2018-08-21 NOTE — ED AVS SNAPSHOT
[facility] Emergency Department    [address] [facility] [address]    Phone:  [phone]    Fax:  [phone]                                       Kristel Martinez   MRN: [id number]    Department:  [facility] Emergency Department   Date of Visit:  8/21/2018           After Visit Summary Signature Page     I have received my discharge instructions, and my questions have been answered. I have discussed any challenges I see with this plan with the nurse or doctor.    ..........................................................................................................................................  Patient/Patient Representative Signature      ..........................................................................................................................................  Patient Representative Print Name and Relationship to Patient    ..................................................               ................................................  Date                                            Time    ..........................................................................................................................................  Reviewed by Signature/Title    ...................................................              ..............................................  Date                                                            Time

## 2018-08-21 NOTE — ED NOTES
Pt discharged with stool sample collection kit. Pt denies any questions about collection for sample

## 2018-08-21 NOTE — ED AVS SNAPSHOT
[facility] Emergency Department    [address]    [facility] [address]    Phone:  [phone]    Fax:  [phone]                                       Kristel Martinez   MRN: [id number]    Department:  [facility] Emergency Department   Date of Visit:  8/21/2018           Patient Information     Date Of Birth          1980        Your diagnoses for this visit were:     Abdominal pain, left lower quadrant     Diarrhea, unspecified type        You were seen by Nicola Lawson MD.        Discharge Instructions         Diarrhea with Uncertain Cause (Adult)    Diarrhea is when stools are loose and watery. This can be caused by:    Viral infections    Bacterial infections    Food poisoning    Parasites    Irritable bowel syndrome (IBS)    Inflammatory bowel diseases such as ulcerative colitis, Crohn's disease, and celiac disease    Food intolerance, such as to lactose, the sugar found in milk and milk products    Reaction to medicines like antibiotics, laxatives, cancer drugs, and antacids  Along with diarrhea, you may also have:    Abdominal pain and cramping    Nausea and vomiting    Loss of bowel control    Fever and chills    Bloody stools  In some cases, antibiotics may help to treat diarrhea. You may have a stool sample test. This is done to see what is causing your diarrhea, and if antibiotics will help treat it. The results of a stool sample test may take up to 2 days. The healthcare provider may not give you antibiotics until he or she has the stool test results.  Diarrhea can cause dehydration. This is the loss of too much water and other fluids from the body. When this occurs, body fluid must be replaced. This can be done with oral rehydration solutions. Oral rehydration solutions are available at drugstores and grocery stores without a prescription.  Home care  Follow all instructions given by your healthcare provider. Rest at home for the next 24 hours, or until you feel better. Avoid  caffeine, tobacco, and alcohol. These can make diarrhea, cramping, and pain worse.  If taking medicines:    Don t take over-the-counter diarrhea or nausea medicines unless your healthcare provider tells you to.    You may use acetaminophen or NSAID medicines like ibuprofen or naproxen to reduce pain and fever. Don t use these if you have chronic liver or kidney disease, or ever had a stomach ulcer or gastrointestinal bleeding. Don't use NSAID medicines if you are already taking one for another condition (like arthritis) or are on daily aspirin therapy (such as for heart disease or after a stroke). Talk with your healthcare provider first.    If antibiotics were prescribed, be sure you take them until they are finished. Don t stop taking them even when you feel better. Antibiotics must be taken as a full course.  To prevent the spread of illness:    Remember that washing with soap and water and using alcohol-based  is the best way to prevent the spread of infection.    Clean the toilet after each use.    Wash your hands before eating.    Wash your hands before and after preparing food. Keep in mind that people with diarrhea or vomiting should not prepare food for others.    Wash your hands after using cutting boards, countertops, and knives that have been in contact with raw foods.    Wash and then peel fruits and vegetables.    Keep uncooked meats away from cooked and ready-to-eat foods.    Use a food thermometer when cooking. Cook poultry to at least 165 F (74 C). Cook ground meat (beef, veal, pork, lamb) to at least 160 F (71 C). Cook fresh beef, veal, lamb, and pork to at least 145 F (63 C).    Don t eat raw or undercooked eggs (poached or kennedi side up), poultry, meat, or unpasteurized milk and juices.  Food and drinks  The main goal while treating vomiting or diarrhea is to prevent dehydration. This is done by taking small amounts of liquids often.    Keep in mind that liquids are more important than  food right now.    Drink only small amounts of liquids at a time.    Don t force yourself to eat, especially if you are having cramping, vomiting, or diarrhea. Don t eat large amounts at a time, even if you are hungry.    If you eat, avoid fatty, greasy, spicy, or fried foods.    Don t eat dairy foods or drink milk if you have diarrhea. These can make diarrhea worse.  During the first 24 hours you can try:    Oral rehydration solutions. Do not use sports drinks. They have too much sugar and not enough electrolytes.    Soft drinks without caffeine    Ginger ale    Water (plain or flavored)    Decaf tea or coffee    Clear broth, consommé, or bouillon    Gelatin, popsicles, or frozen fruit juice bars  The second 24 hours, if you are feeling better, you can add:    Hot cereal, plain toast, bread, rolls, or crackers    Plain noodles, rice, mashed potatoes, chicken noodle soup, or rice soup    Unsweetened canned fruit (no pineapple)    Bananas  As you recover:    Limit fat intake to less than 15 grams per day. Don t eat margarine, butter, oils, mayonnaise, sauces, gravies, fried foods, peanut butter, meat, poultry, or fish.    Limit fiber. Don t eat raw or cooked vegetables, fresh fruits except bananas, or bran cereals.    Limit caffeine and chocolate.    Limit dairy.    Don t use spices or seasonings except salt.    Go back to your normal diet over time, as you feel better and your symptoms improve.    If the symptoms come back, go back to a simple diet or clear liquids.  Follow-up care  Follow up with your healthcare provider, or as advised. If a stool sample was taken or cultures were done, call the healthcare provider for the results as instructed.  Call 911  Call 911 if you have any of these symptoms:    Trouble breathing    Confusion    Extreme drowsiness or trouble walking    Loss of consciousness    Rapid heart rate    Chest pain    Stiff neck    Seizure  When to seek medical advice  Call your healthcare provider  right away if any of these occur:    Abdominal pain that gets worse    Constant lower right abdominal pain    Continued vomiting and inability to keep liquids down    Diarrhea more than 5 times a day    Blood in vomit or stool    Dark urine or no urine for 8 hours, dry mouth and tongue, tiredness, weakness, or dizziness    Drowsiness    New rash    You don t get better in 2 to 3 days    Fever of 100.4 F (38 C) or higher, or as directed by your healthcare provider  Date Last Reviewed: 1/3/2016    4772-4119 [employer]. [address] [place] [address]. All rights reserved. This information is not intended as a substitute for professional medical care. Always follow your healthcare professional's instructions.    Follow up [facility]      Your next 10 appointments already scheduled     Aug 22, 2018 11:20 AM CDT   SHORT with Avelina Mustafa NP   [facility]    [address] [place] [address]   [phone]            Aug 27, 2018  3:30 PM CDT   (Arrive by 3:00 PM)   New Visit with ANTHONY Morris   [facility] ([facility]    [address] [place] [address] [phone]              24 Hour Appointment Hotline       To make an appointment at any [facility], call [phone] ([phone]). If you don't have a family doctor or clinic, we will help you find one. [facility] are conveniently located to serve the needs of you and your family.          ED Discharge Orders     C. DIFF TOXIN A & B, BY EIA           Enteric Bacteria and Virus Panel by ROMY Stool           Giardia antigen                    Review of your medicines      Our records show that you are taking the medicines listed below. If these are incorrect, please call your family doctor or clinic.        Dose / Directions Last dose taken    levothyroxine 150 MCG tablet    Commonly known as:  SYNTHROID/LEVOTHROID   Dose:  150 mcg   Quantity:  90 tablet        Take 1 tablet (150 mcg) by mouth daily   Refills:  3        sertraline 100 MG tablet   Commonly known as:  ZOLOFT   Dose:  200 mg   Quantity:  180 tablet        Take 2 tablets (200 mg) by mouth daily   Refills:  0        SUMAtriptan 25 MG tablet   Commonly known as:  IMITREX   Quantity:  18 tablet        TAKE 1 TO 2 TABLETS BY  MOUTH AT ONSET OF HEADACHE  FOR MIGRAINE MAY REPEAT IN  2 HOURS. MAX 8 TABLETS IN  24 HOURS.   Refills:  0                Procedures and tests performed during your visit     CBC with platelets differential    Comprehensive metabolic panel    HCG qualitative urine (UPT)    Lipase    Peripheral IV catheter    UA reflex to Microscopic      Orders Needing Specimen Collection     Ordered          08/21/18 1703  Enteric Bacteria and Virus Panel by ROMY Stool - ROUTINE, Prio: Routine, Needs to be Collected     Scheduled Task Status   08/21/18 1704 Collect Enteric Bacteria and Virus Panel by ROMY Stool Open   Order Class:  PCU Collect                08/21/18 1703  Clostridium difficile toxin B PCR - ROUTINE, Prio: Routine, Needs to be Collected     Scheduled Task Status   08/21/18 1704 Collect Clostridium difficile toxin B PCR Open   Order Class:  PCU Collect                08/21/18 1703  Giardia antigen - ROUTINE, Prio: Routine, Needs to be Collected     Scheduled Task Status   08/21/18 1704 Collect Giardia antigen Open   Order Class:  PCU Collect                  Pending Results     No orders found from 8/19/2018 to 8/22/2018.            Pending Culture Results     No orders found from 8/19/2018 to 8/22/2018.            Pending Results Instructions     If you had any lab results that were not finalized at the time of your Discharge, you can call the ED Lab Result RN at [phone]. You will be contacted by this team for any positive Lab results or changes in treatment. The nurses are available 7 days a week  from 10A to 6:30P.  You can leave a message 24 hours per day and they will return your call.        Test Results From Your Hospital Stay        8/21/2018  3:54 PM      Component Results     Component Value Ref Range & Units Status    WBC 4.9 4.0 - 11.0 10e9/L Final    RBC Count 4.73 3.8 - 5.2 10e12/L Final    Hemoglobin 13.0 11.7 - 15.7 g/dL Final    Hematocrit 40.7 35.0 - 47.0 % Final    MCV 86 78 - 100 fl Final    MCH 27.5 26.5 - 33.0 pg Final    MCHC 31.9 31.5 - 36.5 g/dL Final    RDW 12.8 10.0 - 15.0 % Final    Platelet Count 280 150 - 450 10e9/L Final    Diff Method Automated Method  Final    % Neutrophils 61.3 % Final    % Lymphocytes 32.0 % Final    % Monocytes 4.9 % Final    % Eosinophils 1.2 % Final    % Basophils 0.4 % Final    % Immature Granulocytes 0.2 % Final    Nucleated RBCs 0 0 /100 Final    Absolute Neutrophil 3.0 1.6 - 8.3 10e9/L Final    Absolute Lymphocytes 1.6 0.8 - 5.3 10e9/L Final    Absolute Monocytes 0.2 0.0 - 1.3 10e9/L Final    Absolute Eosinophils 0.1 0.0 - 0.7 10e9/L Final    Absolute Basophils 0.0 0.0 - 0.2 10e9/L Final    Abs Immature Granulocytes 0.0 0 - 0.4 10e9/L Final    Absolute Nucleated RBC 0.0  Final         8/21/2018  4:11 PM      Component Results     Component Value Ref Range & Units Status    Sodium 141 133 - 144 mmol/L Final    Potassium 3.5 3.4 - 5.3 mmol/L Final    Chloride 107 94 - 109 mmol/L Final    Carbon Dioxide 27 20 - 32 mmol/L Final    Anion Gap 7 3 - 14 mmol/L Final    Glucose 63 (L) 70 - 99 mg/dL Final    Urea Nitrogen 8 7 - 30 mg/dL Final    Creatinine 0.67 0.52 - 1.04 mg/dL Final    GFR Estimate >90 >60 mL/min/1.7m2 Final    Non  GFR Calc    GFR Estimate If Black >90 >60 mL/min/1.7m2 Final    African American GFR Calc    Calcium 9.3 8.5 - 10.1 mg/dL Final    Bilirubin Total 0.2 0.2 - 1.3 mg/dL Final    Albumin 4.1 3.4 - 5.0 g/dL Final    Protein Total 7.5 6.8 - 8.8 g/dL Final    Alkaline Phosphatase 57 40 - 150 U/L Final    ALT 18 0 - 50 U/L  Final    AST 9 0 - 45 U/L Final         8/21/2018  4:08 PM      Component Results     Component Value Ref Range & Units Status    Lipase 129 73 - 393 U/L Final         8/21/2018  4:04 PM      Component Results     Component Value Ref Range & Units Status    Color Urine Straw  Final    Appearance Urine Clear  Final    Glucose Urine Negative NEG^Negative mg/dL Final    Bilirubin Urine Negative NEG^Negative Final    Ketones Urine Negative NEG^Negative mg/dL Final    Specific Gravity Urine 1.006 1.003 - 1.035 Final    Blood Urine Negative NEG^Negative Final    pH Urine 6.0 5.0 - 7.0 pH Final    Protein Albumin Urine Negative NEG^Negative mg/dL Final    Urobilinogen mg/dL 0.0 0.0 - 2.0 mg/dL Final    Nitrite Urine Negative NEG^Negative Final    Leukocyte Esterase Urine Negative NEG^Negative Final    Source Midstream Urine  Final    RBC Urine 0 0 - 2 /HPF Final    WBC Urine 1 0 - 5 /HPF Final    Squamous Epithelial /HPF Urine <1 0 - 1 /HPF Final         8/21/2018  4:00 PM      Component Results     Component Value Ref Range & Units Status    HCG Qual Urine Negative NEG^Negative Final    This test is for screening purposes.  Results should be interpreted along with   the clinical picture.  Confirmation testing is available if warranted by   ordering BSA443, HCG Quantitative Pregnancy.                  Thank you for choosing [facility]       Thank you for choosing [facility] for your care. Our goal is always to provide you with excellent care. Hearing back from our patients is one way we can continue to improve our services. Please take a few minutes to complete the written survey that you may receive in the mail after you visit with us. Thank you!        [facility] Information     [employer] gives you secure access to your electronic health record. If you see a primary care provider, you can also send messages to your care team and make appointments. If you have questions, please call your primary care clinic.  If you do not have a  primary care provider, please call [phone] and they will assist you.        Care EveryWhere ID     This is your Care EveryWhere ID. This could be used by other organizations to access your [facility] medical records  [id number]        Equal Access to Services     ASMITA ALFARO : Brayan Alvarez, all becerril, shabbir kaaldawn swain, kiah bowling. So [facility] [phone].    ATENCIÓN: Si habla español, tiene a juarez disposición servicios gratuitos de asistencia lingüística. Llame al [phone].    We comply with applicable federal civil rights laws and [place] laws. We do not discriminate on the basis of race, color, national origin, age, disability, sex, sexual orientation, or gender identity.            After Visit Summary       This is your record. Keep this with you and show to your community pharmacist(s) and doctor(s) at your next visit.

## 2018-08-21 NOTE — DISCHARGE INSTRUCTIONS
Diarrhea with Uncertain Cause (Adult)    Diarrhea is when stools are loose and watery. This can be caused by:    Viral infections    Bacterial infections    Food poisoning    Parasites    Irritable bowel syndrome (IBS)    Inflammatory bowel diseases such as ulcerative colitis, Crohn's disease, and celiac disease    Food intolerance, such as to lactose, the sugar found in milk and milk products    Reaction to medicines like antibiotics, laxatives, cancer drugs, and antacids  Along with diarrhea, you may also have:    Abdominal pain and cramping    Nausea and vomiting    Loss of bowel control    Fever and chills    Bloody stools  In some cases, antibiotics may help to treat diarrhea. You may have a stool sample test. This is done to see what is causing your diarrhea, and if antibiotics will help treat it. The results of a stool sample test may take up to 2 days. The healthcare provider may not give you antibiotics until he or she has the stool test results.  Diarrhea can cause dehydration. This is the loss of too much water and other fluids from the body. When this occurs, body fluid must be replaced. This can be done with oral rehydration solutions. Oral rehydration solutions are available at drugstores and grocery stores without a prescription.  Home care  Follow all instructions given by your healthcare provider. Rest at home for the next 24 hours, or until you feel better. Avoid caffeine, tobacco, and alcohol. These can make diarrhea, cramping, and pain worse.  If taking medicines:    Don t take over-the-counter diarrhea or nausea medicines unless your healthcare provider tells you to.    You may use acetaminophen or NSAID medicines like ibuprofen or naproxen to reduce pain and fever. Don t use these if you have chronic liver or kidney disease, or ever had a stomach ulcer or gastrointestinal bleeding. Don't use NSAID medicines if you are already taking one for another condition (like arthritis) or are on daily  aspirin therapy (such as for heart disease or after a stroke). Talk with your healthcare provider first.    If antibiotics were prescribed, be sure you take them until they are finished. Don t stop taking them even when you feel better. Antibiotics must be taken as a full course.  To prevent the spread of illness:    Remember that washing with soap and water and using alcohol-based  is the best way to prevent the spread of infection.    Clean the toilet after each use.    Wash your hands before eating.    Wash your hands before and after preparing food. Keep in mind that people with diarrhea or vomiting should not prepare food for others.    Wash your hands after using cutting boards, countertops, and knives that have been in contact with raw foods.    Wash and then peel fruits and vegetables.    Keep uncooked meats away from cooked and ready-to-eat foods.    Use a food thermometer when cooking. Cook poultry to at least 165 F (74 C). Cook ground meat (beef, veal, pork, lamb) to at least 160 F (71 C). Cook fresh beef, veal, lamb, and pork to at least 145 F (63 C).    Don t eat raw or undercooked eggs (poached or kennedi side up), poultry, meat, or unpasteurized milk and juices.  Food and drinks  The main goal while treating vomiting or diarrhea is to prevent dehydration. This is done by taking small amounts of liquids often.    Keep in mind that liquids are more important than food right now.    Drink only small amounts of liquids at a time.    Don t force yourself to eat, especially if you are having cramping, vomiting, or diarrhea. Don t eat large amounts at a time, even if you are hungry.    If you eat, avoid fatty, greasy, spicy, or fried foods.    Don t eat dairy foods or drink milk if you have diarrhea. These can make diarrhea worse.  During the first 24 hours you can try:    Oral rehydration solutions. Do not use sports drinks. They have too much sugar and not enough electrolytes.    Soft drinks without  caffeine    Ginger ale    Water (plain or flavored)    Decaf tea or coffee    Clear broth, consommé, or bouillon    Gelatin, popsicles, or frozen fruit juice bars  The second 24 hours, if you are feeling better, you can add:    Hot cereal, plain toast, bread, rolls, or crackers    Plain noodles, rice, mashed potatoes, chicken noodle soup, or rice soup    Unsweetened canned fruit (no pineapple)    Bananas  As you recover:    Limit fat intake to less than 15 grams per day. Don t eat margarine, butter, oils, mayonnaise, sauces, gravies, fried foods, peanut butter, meat, poultry, or fish.    Limit fiber. Don t eat raw or cooked vegetables, fresh fruits except bananas, or bran cereals.    Limit caffeine and chocolate.    Limit dairy.    Don t use spices or seasonings except salt.    Go back to your normal diet over time, as you feel better and your symptoms improve.    If the symptoms come back, go back to a simple diet or clear liquids.  Follow-up care  Follow up with your healthcare provider, or as advised. If a stool sample was taken or cultures were done, call the healthcare provider for the results as instructed.  Call 911  Call 911 if you have any of these symptoms:    Trouble breathing    Confusion    Extreme drowsiness or trouble walking    Loss of consciousness    Rapid heart rate    Chest pain    Stiff neck    Seizure  When to seek medical advice  Call your healthcare provider right away if any of these occur:    Abdominal pain that gets worse    Constant lower right abdominal pain    Continued vomiting and inability to keep liquids down    Diarrhea more than 5 times a day    Blood in vomit or stool    Dark urine or no urine for 8 hours, dry mouth and tongue, tiredness, weakness, or dizziness    Drowsiness    New rash    You don t get better in 2 to 3 days    Fever of 100.4 F (38 C) or higher, or as directed by your healthcare provider  Date Last Reviewed: 1/3/2016    3492-1466 [employer] [address]  [place] [address]. All rights reserved. This information is not intended as a substitute for professional medical care. Always follow your healthcare professional's instructions.    Follow up [facility]

## 2018-08-21 NOTE — ED PROVIDER NOTES
History     Chief Complaint   Patient presents with     Abdominal Pain     RUQ and LUQ pain x 1 week.  exacerbated by eating.  pt reports belching frequently.  afebrile. some diarrhea.  denies n/v     HPI  Kristel Martinez is a 38 year old female who presents with abdominal pain cramping and diarrhea.  Symptoms began several months ago, waxing and waning loose to watery stools without black or bloody component.  Max 6 times daily, yesterday 4 times, described as yellow, possibly with some associated mucus,.  Denies associated fever.  Intermittent abdominal cramps left upper quadrant.  Appetite somewhat diminished, no weight change.  No prior abdominal surgery.  Distant history of C. difficile colitis proximate 5 years ago.  No recent travel, ill contacts, exposure to surface water, antibiotics.  Denies family history of inflammatory bowel disease.    Problem List:    Patient Active Problem List    Diagnosis Date Noted     Postoperative hypothyroidism 03/23/2018     Priority: Medium     Vitiligo 04/09/2015     Priority: Medium     Papillary adenocarcinoma of thyroid (H) 03/01/2014     Priority: Medium     Overview:   12/2013: R thyroid lobectomy 1.7 cm follicular variant papillary cancer, MACIS 3.6  03/2014: Completion Thyroidectomy 0.3 cm incidental papillary cancer left lobe. Iodine ablation with 53 mCi.   07/2016, 07/2017: Neck US neg.       Vitamin D deficiency 07/06/2009     Priority: Medium     Last Assessment & Plan:   7/09  29.4  vit  d   on 1000units  daily  since  7/09       Major depressive disorder, recurrent episode, moderate (H) 11/16/2007     Priority: Medium        Past Medical History:    Past Medical History:   Diagnosis Date     Depressive disorder        Past Surgical History:    Past Surgical History:   Procedure Laterality Date     THYROIDECTOMY      2015     TONSILLECTOMY       TUBAL LIGATION         Family History:    No family history on file.    Social History:  Marital Status:    "[2]  Social History   Substance Use Topics     Smoking status: Former Smoker     Smokeless tobacco: Never Used     Alcohol use Yes      Comment: 1 drink per wk        Medications:      levothyroxine (SYNTHROID/LEVOTHROID) 150 MCG tablet   sertraline (ZOLOFT) 100 MG tablet   SUMAtriptan (IMITREX) 25 MG tablet         Review of Systems  All other systems reviewed and are negative.    Physical Exam   BP: 120/68  Pulse: 66  Temp: 97.2  F (36.2  C)  Resp: 18  Height: 177.8 cm (5' 10\")  Weight: 99.8 kg (220 lb)  SpO2: 100 %      Physical Exam  Nontoxic appearing no respiratory distress alert and oriented ×3  Head atraumatic normocephalic   Neck supple full active painless range of motion  Lungs clear to auscultation  Heart regular no murmur  Abdomen soft mild tenderness left upper quadrant without guarding or rebound bowel sounds positive no masses or HSM  Strength and sensation grossly intact throughout the extremities, gait and station normal  Speech is fluent, good eye contact, thought processes are rational  Lower extremities without swelling, redness or tenderness  Pedal pulses symmetrical and strong    ED Course     ED Course     Procedures               Critical Care time:  none               Results for orders placed or performed during the hospital encounter of 08/21/18 (from the past 24 hour(s))   CBC with platelets differential   Result Value Ref Range    WBC 4.9 4.0 - 11.0 10e9/L    RBC Count 4.73 3.8 - 5.2 10e12/L    Hemoglobin 13.0 11.7 - 15.7 g/dL    Hematocrit 40.7 35.0 - 47.0 %    MCV 86 78 - 100 fl    MCH 27.5 26.5 - 33.0 pg    MCHC 31.9 31.5 - 36.5 g/dL    RDW 12.8 10.0 - 15.0 %    Platelet Count 280 150 - 450 10e9/L    Diff Method Automated Method     % Neutrophils 61.3 %    % Lymphocytes 32.0 %    % Monocytes 4.9 %    % Eosinophils 1.2 %    % Basophils 0.4 %    % Immature Granulocytes 0.2 %    Nucleated RBCs 0 0 /100    Absolute Neutrophil 3.0 1.6 - 8.3 10e9/L    Absolute Lymphocytes 1.6 0.8 - 5.3 " 10e9/L    Absolute Monocytes 0.2 0.0 - 1.3 10e9/L    Absolute Eosinophils 0.1 0.0 - 0.7 10e9/L    Absolute Basophils 0.0 0.0 - 0.2 10e9/L    Abs Immature Granulocytes 0.0 0 - 0.4 10e9/L    Absolute Nucleated RBC 0.0    Comprehensive metabolic panel   Result Value Ref Range    Sodium 141 133 - 144 mmol/L    Potassium 3.5 3.4 - 5.3 mmol/L    Chloride 107 94 - 109 mmol/L    Carbon Dioxide 27 20 - 32 mmol/L    Anion Gap 7 3 - 14 mmol/L    Glucose 63 (L) 70 - 99 mg/dL    Urea Nitrogen 8 7 - 30 mg/dL    Creatinine 0.67 0.52 - 1.04 mg/dL    GFR Estimate >90 >60 mL/min/1.7m2    GFR Estimate If Black >90 >60 mL/min/1.7m2    Calcium 9.3 8.5 - 10.1 mg/dL    Bilirubin Total 0.2 0.2 - 1.3 mg/dL    Albumin 4.1 3.4 - 5.0 g/dL    Protein Total 7.5 6.8 - 8.8 g/dL    Alkaline Phosphatase 57 40 - 150 U/L    ALT 18 0 - 50 U/L    AST 9 0 - 45 U/L   Lipase   Result Value Ref Range    Lipase 129 73 - 393 U/L   UA reflex to Microscopic   Result Value Ref Range    Color Urine Straw     Appearance Urine Clear     Glucose Urine Negative NEG^Negative mg/dL    Bilirubin Urine Negative NEG^Negative    Ketones Urine Negative NEG^Negative mg/dL    Specific Gravity Urine 1.006 1.003 - 1.035    Blood Urine Negative NEG^Negative    pH Urine 6.0 5.0 - 7.0 pH    Protein Albumin Urine Negative NEG^Negative mg/dL    Urobilinogen mg/dL 0.0 0.0 - 2.0 mg/dL    Nitrite Urine Negative NEG^Negative    Leukocyte Esterase Urine Negative NEG^Negative    Source Midstream Urine     RBC Urine 0 0 - 2 /HPF    WBC Urine 1 0 - 5 /HPF    Squamous Epithelial /HPF Urine <1 0 - 1 /HPF   HCG qualitative urine (UPT)   Result Value Ref Range    HCG Qual Urine Negative NEG^Negative       Medications - No data to display    Assessments & Plan (with Medical Decision Making)  38-year-old female presents with waxing waning diarrhea for the past several months.  Abdominal exam is benign, lab workup within normal.  Usual differential considered, stool studies ordered, recommend  follow-up with GI for further evaluation and probable endoscopy.  Return here for fever, bleeding, pain or any other concern.     I have reviewed the nursing notes.    I have reviewed the findings, diagnosis, plan and need for follow up with the patient.       New Prescriptions    No medications on file       Final diagnoses:   Abdominal pain, left lower quadrant   Diarrhea, unspecified type       8/21/2018   [facility] EMERGENCY DEPARTMENT     Nicola Lawson MD  08/21/18 0550

## 2018-08-22 ENCOUNTER — TRANSFERRED RECORDS (OUTPATIENT)
Dept: HEALTH INFORMATION MANAGEMENT | Facility: CLINIC | Age: 38
End: 2018-08-22

## 2018-08-27 ENCOUNTER — TRANSFERRED RECORDS (OUTPATIENT)
Dept: HEALTH INFORMATION MANAGEMENT | Facility: CLINIC | Age: 38
End: 2018-08-27

## 2018-08-27 ENCOUNTER — MEDICAL CORRESPONDENCE (OUTPATIENT)
Dept: HEALTH INFORMATION MANAGEMENT | Facility: CLINIC | Age: 38
End: 2018-08-27

## 2018-08-30 ENCOUNTER — TRANSFERRED RECORDS (OUTPATIENT)
Dept: HEALTH INFORMATION MANAGEMENT | Facility: CLINIC | Age: 38
End: 2018-08-30

## 2018-09-06 ENCOUNTER — MYC MEDICAL ADVICE (OUTPATIENT)
Dept: FAMILY MEDICINE | Facility: CLINIC | Age: 38
End: 2018-09-06

## 2018-09-06 DIAGNOSIS — L98.9 SKIN LESION: Primary | ICD-10-CM

## 2018-09-12 ENCOUNTER — OFFICE VISIT (OUTPATIENT)
Dept: ALLERGY | Facility: OTHER | Age: 38
End: 2018-09-12
Payer: COMMERCIAL

## 2018-09-12 VITALS
OXYGEN SATURATION: 99 % | TEMPERATURE: 98.6 F | WEIGHT: 217 LBS | SYSTOLIC BLOOD PRESSURE: 102 MMHG | DIASTOLIC BLOOD PRESSURE: 68 MMHG | HEART RATE: 94 BPM | BODY MASS INDEX: 31.14 KG/M2

## 2018-09-12 DIAGNOSIS — D80.2 IGA DEFICIENCY (H): Primary | ICD-10-CM

## 2018-09-12 PROCEDURE — 99203 OFFICE O/P NEW LOW 30 MIN: CPT | Performed by: ALLERGY & IMMUNOLOGY

## 2018-09-12 NOTE — PROGRESS NOTES
Kristel Martinez is a 38 year old White female with previous medical history significant for hypothyroidism. Kristel Martinez is being seen today for evaluation of low IgA level.     The patient recently had blood testing done by gastroenterology. She has a history of chronic diarrhea over the last few months. She had recent colonoscopy that she will be following up with GI about in the next few weeks. The patient reports that she had a low IgA level. I do not see these results but she tells me her IgA level was 71. She denies any history of recurrent sinusitis, pneumonias or GI infections.  No history of chronic skin infections, osteomyelitis or meningitis.  No prior history of IgA deficiency.  No history of immunodeficiency.    The patient has no history of asthma, eczema, food allergies, medications allergies or hives.     ENVIRONMENTAL HISTORY: [place] The [place] is heated with a forced air. They does not have central air conditioning. The patient's bedroom is furnished with.  Pets inside the house include 2 cat(s) and 2 dog(s). There is not history of cockroach or mice infestation. There is/are 0 smokers in the house.  The house does have a damp basement.       Past Medical History:   Diagnosis Date     Depressive disorder      History reviewed. No pertinent family history.  Past Surgical History:   Procedure Laterality Date     COLONOSCOPY  8-27/18     THYROIDECTOMY      2015     TONSILLECTOMY       TUBAL LIGATION         REVIEW OF SYSTEMS:  General: negative for weight gain. positive  for weight loss. negative for changes in sleep.   Ears: negative for fullness. negative for hearing loss. negative for dizziness.   Nose: negative for snoring.negative for changes in smell. negative for drainage.   Eyes: negative for eye watering. negative for eye itching. negative for vision changes. negative for eye redness.  Throat: negative for hoarseness. negative for sore throat. negative  for trouble swallowing.   Lungs: negative for shortness of breath.negative for wheezing. negative for sputum production.   Cardiovascular: negative for chest pain. negative for swelling of ankles. negative for fast or irregular heartbeat.   Gastrointestinal: positive  for nausea. negative for heartburn. negative for acid reflux.   Musculoskeletal: negative for joint pain. negative for joint stiffness. negative for joint swelling.   Neurologic: negative for seizures. negative for fainting. negative for weakness.   Psychiatric: negative for changes in mood. negative for anxiety.   Endocrine: negative for cold intolerance. negative for heat intolerance. negative for tremors.   Lymphatic: negative for lower extremity swelling. negative for lymph node swelling.   Hematologic: negative for easy bruising. negative for easy bleeding.  Integumentary: negative for rash. negative for scaling. negative for nail changes.       Current Outpatient Prescriptions:      levothyroxine (SYNTHROID/LEVOTHROID) 150 MCG tablet, Take 1 tablet (150 mcg) by mouth daily, Disp: 90 tablet, Rfl: 3     sertraline (ZOLOFT) 100 MG tablet, Take 2 tablets (200 mg) by mouth daily, Disp: 180 tablet, Rfl: 0     SUMAtriptan (IMITREX) 25 MG tablet, TAKE 1 TO 2 TABLETS BY  MOUTH AT ONSET OF HEADACHE  FOR MIGRAINE MAY REPEAT IN  2 HOURS. MAX 8 TABLETS IN  24 HOURS., Disp: 18 tablet, Rfl: 0    There is no immunization history on file for this patient.  Allergies   Allergen Reactions     Cephalosporins Rash     Cephalexin     Sulfa Drugs Rash         EXAM:   Constitutional:  Appears well-developed and well-nourished. No distress.   HEENT:   Head: Normocephalic.   Cardiovascular: Normal rate, regular rhythm and normal heart sounds. Exam reveals no gallop and no friction rub.   No murmur heard.  Respiratory: Effort normal and breath sounds normal. No respiratory distress. No wheezes. No rales.   Musculoskeletal: Normal range of motion.   Neuro: Oriented to  person, place, and time.  Psychiatric: Normal mood and affect.     Nursing note and vitals reviewed.      ASSESSMENT/PLAN:  Problem List Items Addressed This Visit        Immune    IgA deficiency (H) - Primary     Slightly low IgA level. No history of recurrent infections. IgA level is low but not absent.     - Discussed with patient that IgA deficiency is the most common immunodeficiency and that most IgA deficient patients have no clinical symptoms. She does not have complete deficiency but rather very mild. I do not think this mild deficiency is causing her any clinical symptoms as no recurrent infections.   - No need to check IgM, IgG or complete immune workup given no recurrent infections.              Return as needed.     Chart documentation with Dragon Voice recognition Software. Although reviewed after completion, some words and grammatical errors may remain.    Jose Griffin DO   Allergy/Immunology  [facility], [place] and [place], [place]

## 2018-09-12 NOTE — PATIENT INSTRUCTIONS
Allergy Staff Appt Hours Shot Hours Locations    Physician     Jose Griffin, DO       Support Staff     Sandra REZA RN      Lizzette REZA, [facility]  Monday:                      [place] 8-7     Tuesday:         [place] 8-5     Wednesday:        [place]: 7-5     Friday:        [place] 7-5   [place]        Monday: 9-5:50        Wednesday: 2-5:50        Friday: 7-12:50     [place]        Tuesday: 7-10:50        Thursday: 1:30-6:30     [place] Monday: 7:10-4:50        Tuesday: 12:30-6:30        Thursday: 7-11:50 [facility]  [address] [place] [address]  Appt Line: [phone]  Allergy RN (Monday):  [phone]    [facility]  [address] [place] [address]  Appt Line: [phone]  Allergy RN (Tues & Wed):  [phone]    [facility]  [address] [place] [address]  Appt Line: [phone]  Allergy RN (Friday):  [phone]       Important Scheduling Information  Aspirin Desensitization: Appt will last 2 clinic days. Please call the Allergy RN line for your clinic to schedule. Discontinue antihistamines 7 days prior to the appointment.     Food Challenges: Appt will last 3-4 hours. Please call the Allergy RN line for your clinic to schedule. Discontinue antihistamines 7 days prior to the appointment.     Penicillin Testing: Appt will last 2-3 hours. Please call the Allergy RN line for your clinic to schedule. Discontinue antihistamines 7 days prior to the appointment.     Skin Testing: Appt will about 40 minutes. Call the appointment line for your clinic to schedule. Discontinue antihistamines 7 days prior to the appointment.     Venom Testing: Appt will last 2-3 hours. Please call the Allergy RN line for your clinic to schedule. Discontinue antihistamines 7 days prior to the appointment.     Thank you for trusting us with your Allergy, Asthma, and Immunology care. Please feel free to contact us with any questions or concerns you may have.

## 2018-09-12 NOTE — MR AVS SNAPSHOT
After Visit Summary   9/12/2018    Kristel Martinez    MRN: [id number]           Patient Information     Date Of Birth          1980        Visit Information        Provider Department      9/12/2018 2:00 PM Jose Griffin DO [facility]        Today's Diagnoses     IgA deficiency (H)    -  1      Care Instructions    Allergy Staff Appt Hours Shot Hours Locations    Physician     Jose Griffin DO       Support Staff     Sandra REZA RN      Lizzette REZA, [facility]  Monday:                      [place] 8-7     Tuesday:         [place] 8-5     Wednesday:        [place]: 7-5     Friday:        [place] 7-5   [place] Monday: 9-5:50        Wednesday: 2-5:50        Friday: 7-12:50     [place]        Tuesday: 7-10:50        Thursday: 1:30-6:30     [place] Monday: 7:10-4:50        Tuesday: 12:30-6:30        Thursday: 7-11:50 [facility]  [address] [place] [address]  Appt Line: [phone]  Allergy RN (Monday):  [phone]    [facility]  [address] [place] [address]  Appt Line: [phone]  Allergy RN (Tues & Wed):  [phone]    [facility]  [address] [place] [address]  Appt Line: [phone]  Allergy RN (Friday):  [phone]       Important Scheduling Information  Aspirin Desensitization: Appt will last 2 clinic days. Please call the Allergy RN line for your clinic to schedule. Discontinue antihistamines 7 days prior to the appointment.     Food Challenges: Appt will last 3-4 hours. Please call the Allergy RN line for your clinic to schedule. Discontinue antihistamines 7 days prior to the appointment.     Penicillin Testing: Appt will last 2-3 hours. Please call the Allergy RN line for your clinic to schedule. Discontinue antihistamines 7 days prior to the appointment.     Skin Testing: Appt will about 40 minutes. Call the appointment line for your clinic to schedule. Discontinue antihistamines 7 days prior to the  appointment.     Venom Testing: Appt will last 2-3 hours. Please call the Allergy RN line for your clinic to schedule. Discontinue antihistamines 7 days prior to the appointment.     Thank you for trusting us with your Allergy, Asthma, and Immunology care. Please feel free to contact us with any questions or concerns you may have.                Follow-ups after your visit        Follow-up notes from your care team     Return if symptoms worsen or fail to improve.      Your next 10 appointments already scheduled     Oct 22, 2018  2:15 PM CDT   [facility] Visit with [facility]    [address] [facility] [address] [phone]              Who to contact     If you have questions or need follow up information about today's clinic visit or your schedule please contact [facility] at [phone].  Normal or non-critical lab and imaging results will be communicated to you by MyChart, letter or phone within 4 business days after the clinic has received the results. If you do not hear from us within 7 days, please contact the clinic through [employer] or phone. If you have a critical or abnormal lab result, we will notify you by phone as soon as possible.  Submit refill requests through [employer] or call your pharmacy and they will forward the refill request to us. Please allow 3 business days for your refill to be completed.          Additional Information About Your Visit        MyChart Information     [employer] gives you secure access to your electronic health record. If you see a primary care provider, you can also send messages to your care team and make appointments. If you have questions, please call your primary care clinic.  If you do not have a primary care provider, please call [phone] and they will assist you.        Care EveryWhere ID     This is your Care EveryWhere ID. This could be used by other  organizations to access your [facility] medical records  [id number]        Your Vitals Were     Pulse Temperature Pulse Oximetry BMI (Body Mass Index)          94 98.6  F (37  C) (Oral) 99% 31.14 kg/m2         Blood Pressure from Last 3 Encounters:   09/12/18 102/68   08/21/18 112/74   05/25/18 118/75    Weight from Last 3 Encounters:   09/12/18 98.4 kg (217 lb)   08/21/18 99.8 kg (220 lb)   05/25/18 100.4 kg (221 lb 6.4 oz)              Today, you had the following     No orders found for display       Primary Care Provider Office Phone # Fax #    Yulissa Nogueira DAVID [phone] [address] [place] [address]        Equal Access to Services     ASMITA ALFARO : Brayan Alvarez, waaxda luqadaha, qaybta kaalmada wiliam, kiah cowart . So [facility] [place] [phone].    ATENCIÓN: Si habla español, tiene a juarez disposición servicios gratuitos de asistencia lingüística. Jaye al [phone].    We comply with applicable federal civil rights laws and [place] laws. We do not discriminate on the basis of race, color, national origin, age, disability, sex, sexual orientation, or gender identity.            Thank you!     Thank you for choosing [facility]  for your care. Our goal is always to provide you with excellent care. Hearing back from our patients is one way we can continue to improve our services. Please take a few minutes to complete the written survey that you may receive in the mail after your visit with us. Thank you!             Your Updated Medication List - Protect others around you: Learn how to safely use, store and throw away your medicines at www.disposemymeds.org.          This list is accurate as of 9/12/18  2:45 PM.  Always use your most recent med list.                   Brand Name Dispense Instructions for use Diagnosis    levothyroxine 150 MCG tablet    SYNTHROID/LEVOTHROID    90 tablet    Take 1 tablet (150 mcg)  by mouth daily    Postoperative hypothyroidism       sertraline 100 MG tablet    ZOLOFT    180 tablet    Take 2 tablets (200 mg) by mouth daily    Major depressive disorder, recurrent episode, moderate (H)       SUMAtriptan 25 MG tablet    IMITREX    18 tablet    TAKE 1 TO 2 TABLETS BY  MOUTH AT ONSET OF HEADACHE  FOR MIGRAINE MAY REPEAT IN  2 HOURS. MAX 8 TABLETS IN  24 HOURS.    Headache disorder

## 2018-09-12 NOTE — ASSESSMENT & PLAN NOTE
Slightly low IgA level. No history of recurrent infections. IgA level is low but not absent.     - Discussed with patient that IgA deficiency is the most common immunodeficiency and that most IgA deficient patients have no clinical symptoms. She does not have complete deficiency but rather very mild. I do not think this mild deficiency is causing her any clinical symptoms as no recurrent infections.   - No need to check IgM, IgG or complete immune workup given no recurrent infections.

## 2018-09-12 NOTE — LETTER
9/12/2018         RE: Kristel Martinez  [address] [facility] [address] [facility], located within [facility] [address]        Dear Colleague,    Thank you for referring your patient, Kristel Martinez, to the [facility]. Please see a copy of my visit note below.    Kristel Martinez is a 38 year old White female with previous medical history significant for hypothyroidism. Kristel Martinez is being seen today for evaluation of low IgA level.     The patient recently had blood testing done by gastroenterology. She has a history of chronic diarrhea over the last few months. She had recent colonoscopy that she will be following up with GI about in the next few weeks. The patient reports that she had a low IgA level. I do not see these results but she tells me her IgA level was 71. She denies any history of recurrent sinusitis, pneumonias or GI infections.  No history of chronic skin infections, osteomyelitis or meningitis.  No prior history of IgA deficiency.  No history of immunodeficiency.    The patient has no history of asthma, eczema, food allergies, medications allergies or hives.     ENVIRONMENTAL HISTORY: [place] The home is heated with a forced air. They does not have central air conditioning. The patient's bedroom is furnished with.  Pets inside the house include 2 cat(s) and 2 dog(s). There is not history of cockroach or mice infestation. There is/are 0 smokers in the house.  The house does have a damp basement.       Past Medical History:   Diagnosis Date     Depressive disorder      History reviewed. No pertinent family history.  Past Surgical History:   Procedure Laterality Date     COLONOSCOPY  8-27/18     THYROIDECTOMY      2015     TONSILLECTOMY       TUBAL LIGATION         REVIEW OF SYSTEMS:  General: negative for weight gain. positive  for weight loss. negative for changes in sleep.   Ears: negative for fullness. negative for hearing loss. negative for dizziness.   Nose: negative for  snoring.negative for changes in smell. negative for drainage.   Eyes: negative for eye watering. negative for eye itching. negative for vision changes. negative for eye redness.  Throat: negative for hoarseness. negative for sore throat. negative for trouble swallowing.   Lungs: negative for shortness of breath.negative for wheezing. negative for sputum production.   Cardiovascular: negative for chest pain. negative for swelling of ankles. negative for fast or irregular heartbeat.   Gastrointestinal: positive  for nausea. negative for heartburn. negative for acid reflux.   Musculoskeletal: negative for joint pain. negative for joint stiffness. negative for joint swelling.   Neurologic: negative for seizures. negative for fainting. negative for weakness.   Psychiatric: negative for changes in mood. negative for anxiety.   Endocrine: negative for cold intolerance. negative for heat intolerance. negative for tremors.   Lymphatic: negative for lower extremity swelling. negative for lymph node swelling.   Hematologic: negative for easy bruising. negative for easy bleeding.  Integumentary: negative for rash. negative for scaling. negative for nail changes.       Current Outpatient Prescriptions:      levothyroxine (SYNTHROID/LEVOTHROID) 150 MCG tablet, Take 1 tablet (150 mcg) by mouth daily, Disp: 90 tablet, Rfl: 3     sertraline (ZOLOFT) 100 MG tablet, Take 2 tablets (200 mg) by mouth daily, Disp: 180 tablet, Rfl: 0     SUMAtriptan (IMITREX) 25 MG tablet, TAKE 1 TO 2 TABLETS BY  MOUTH AT ONSET OF HEADACHE  FOR MIGRAINE MAY REPEAT IN  2 HOURS. MAX 8 TABLETS IN  24 HOURS., Disp: 18 tablet, Rfl: 0    There is no immunization history on file for this patient.  Allergies   Allergen Reactions     Cephalosporins Rash     Cephalexin     Sulfa Drugs Rash         EXAM:   Constitutional:  Appears well-developed and well-nourished. No distress.   HEENT:   Head: Normocephalic.   Cardiovascular: Normal rate, regular rhythm and normal  heart sounds. Exam reveals no gallop and no friction rub.   No murmur heard.  Respiratory: Effort normal and breath sounds normal. No respiratory distress. No wheezes. No rales.   Musculoskeletal: Normal range of motion.   Neuro: Oriented to person, place, and time.  Psychiatric: Normal mood and affect.     Nursing note and vitals reviewed.      ASSESSMENT/PLAN:  Problem List Items Addressed This Visit        Immune    IgA deficiency (H) - Primary     Slightly low IgA level. No history of recurrent infections. IgA level is low but not absent.     - Discussed with patient that IgA deficiency is the most common immunodeficiency and that most IgA deficient patients have no clinical symptoms. She does not have complete deficiency but rather very mild. I do not think this mild deficiency is causing her any clinical symptoms as no recurrent infections.   - No need to check IgM, IgG or complete immune workup given no recurrent infections.              Return as needed.     Chart documentation with Dragon Voice recognition Software. Although reviewed after completion, some words and grammatical errors may remain.    Jose Griffin,    Allergy/Immunology  [facility] [place] and [facility]        Again, thank you for allowing me to participate in the care of your patient.        Sincerely,        Jose Griffin, DO

## 2018-09-17 ENCOUNTER — MYC MEDICAL ADVICE (OUTPATIENT)
Dept: FAMILY MEDICINE | Facility: CLINIC | Age: 38
End: 2018-09-17

## 2018-09-21 ENCOUNTER — OFFICE VISIT (OUTPATIENT)
Dept: FAMILY MEDICINE | Facility: CLINIC | Age: 38
End: 2018-09-21
Payer: COMMERCIAL

## 2018-09-21 VITALS
DIASTOLIC BLOOD PRESSURE: 62 MMHG | RESPIRATION RATE: 16 BRPM | HEIGHT: 70 IN | TEMPERATURE: 98.7 F | BODY MASS INDEX: 31.5 KG/M2 | WEIGHT: 220 LBS | SYSTOLIC BLOOD PRESSURE: 100 MMHG | HEART RATE: 76 BPM

## 2018-09-21 DIAGNOSIS — F33.1 MAJOR DEPRESSIVE DISORDER, RECURRENT EPISODE, MODERATE (H): ICD-10-CM

## 2018-09-21 PROCEDURE — 99214 OFFICE O/P EST MOD 30 MIN: CPT | Performed by: NURSE PRACTITIONER

## 2018-09-21 RX ORDER — BUPROPION HYDROCHLORIDE 150 MG/1
150 TABLET ORAL EVERY MORNING
Qty: 90 TABLET | Refills: 1 | Status: SHIPPED | OUTPATIENT
Start: 2018-09-21 | End: 2018-10-30

## 2018-09-21 RX ORDER — SERTRALINE HYDROCHLORIDE 100 MG/1
200 TABLET, FILM COATED ORAL DAILY
Qty: 180 TABLET | Refills: 1 | Status: SHIPPED | OUTPATIENT
Start: 2018-09-21 | End: 2018-12-27

## 2018-09-21 ASSESSMENT — ANXIETY QUESTIONNAIRES
7. FEELING AFRAID AS IF SOMETHING AWFUL MIGHT HAPPEN: NOT AT ALL
4. TROUBLE RELAXING: SEVERAL DAYS
1. FEELING NERVOUS, ANXIOUS, OR ON EDGE: MORE THAN HALF THE DAYS
7. FEELING AFRAID AS IF SOMETHING AWFUL MIGHT HAPPEN: NOT AT ALL
GAD7 TOTAL SCORE: 9
2. NOT BEING ABLE TO STOP OR CONTROL WORRYING: MORE THAN HALF THE DAYS
GAD7 TOTAL SCORE: 9
5. BEING SO RESTLESS THAT IT IS HARD TO SIT STILL: NOT AT ALL
6. BECOMING EASILY ANNOYED OR IRRITABLE: MORE THAN HALF THE DAYS
GAD7 TOTAL SCORE: 9
3. WORRYING TOO MUCH ABOUT DIFFERENT THINGS: MORE THAN HALF THE DAYS

## 2018-09-21 ASSESSMENT — PATIENT HEALTH QUESTIONNAIRE - PHQ9
SUM OF ALL RESPONSES TO PHQ QUESTIONS 1-9: 14
10. IF YOU CHECKED OFF ANY PROBLEMS, HOW DIFFICULT HAVE THESE PROBLEMS MADE IT FOR YOU TO DO YOUR WORK, TAKE CARE OF THINGS AT HOME, OR GET ALONG WITH OTHER PEOPLE: VERY DIFFICULT
SUM OF ALL RESPONSES TO PHQ QUESTIONS 1-9: 14

## 2018-09-21 NOTE — MR AVS SNAPSHOT
After Visit Summary   9/21/2018    Kristel Martinez    MRN: [id number]           Patient Information     Date Of Birth          1980        Visit Information        Provider Department      9/21/2018 2:40 PM Yulissa Nogueira APRN CNP [facility]        Today's Diagnoses     Major depressive disorder, recurrent episode, moderate (H)          Care Instructions    Add the Wellbutrin-take in the morning    Sertraline sent to the pharmacy for symptoms    Recheck in 1-2 months, sooner if needed          Follow-ups after your visit        Your next 10 appointments already scheduled     Sep 25, 2018  3:00 PM CDT   (Arrive by 2:45 PM)   New Patient Visit with Rissa Corona PA-C   [facility] ([facility])    [address] [place] [id number] [phone]              Who to contact     If you have questions or need follow up information about today's clinic visit or your schedule please contact [facility] directly at [phone].  Normal or non-critical lab and imaging results will be communicated to you by [facility] [facility], letter or phone within 4 business days after the clinic has received the results. If you do not hear from us within 7 days, please contact the clinic through MyChart or phone. If you have a critical or abnormal lab result, we will notify you by phone as soon as possible.  Submit refill requests through [employer] or call your pharmacy and they will forward the refill request to us. Please allow 3 business days for your refill to be completed.          Additional Information About Your Visit        MyChart Information     [employer] gives you secure access to your electronic health record. If you see a primary care provider, you can also send messages to your care team and make appointments. If you have questions, please call your primary care clinic.  If you do not have a primary care provider,  "please call [phone] and they will assist you.        Care EveryWhere ID     This is your Care EveryWhere ID. This could be used by other organizations to access your [place] medical records  [id number]        Your Vitals Were     Pulse Temperature Respirations Height BMI (Body Mass Index)       76 98.7  F (37.1  C) (Tympanic) 16 5' 10\" (1.778 m) 31.57 kg/m2        Blood Pressure from Last 3 Encounters:   09/21/18 100/62   09/12/18 102/68   08/21/18 112/74    Weight from Last 3 Encounters:   09/21/18 220 lb (99.8 kg)   09/12/18 217 lb (98.4 kg)   08/21/18 220 lb (99.8 kg)              We Performed the Following     DEPRESSION ACTION PLAN (DAP)          Today's Medication Changes          These changes are accurate as of 9/21/18  3:00 PM.  If you have any questions, ask your nurse or doctor.               Start taking these medicines.        Dose/Directions    buPROPion 150 MG 24 hr tablet   Commonly known as:  WELLBUTRIN XL   Used for:  Major depressive disorder, recurrent episode, moderate (H)   Started by:  Yulissa Nogueira APRN CNP        Dose:  150 mg   Take 1 tablet (150 mg) by mouth every morning   Quantity:  90 tablet   Refills:  1            Where to get your medicines      These medications were sent to [facility] [address] [place] [address] AT [facility] &  [address]  [address] [facility] [address]     Phone:  [phone]     buPROPion 150 MG 24 hr tablet    sertraline 100 MG tablet                Primary Care Provider Office Phone # Fax #    DAVID Novak [phone] [address] [place] [address]        Equal Access to Services     ASMITA ALFARO AH: Brayan Alvarez, all becerril, shabbir kaalmada wiliam, kiah bowling. Sofy [facility] [phone].    ATENCIÓN: Si habla español, tiene a juarez disposición servicios gratuitos de asistencia lingüística. Llame al " [phone].    We comply with applicable federal civil rights laws and [place] laws. We do not discriminate on the basis of race, color, national origin, age, disability, sex, sexual orientation, or gender identity.            Thank you!     Thank you for choosing [facility]  for your care. Our goal is always to provide you with excellent care. Hearing back from our patients is one way we can continue to improve our services. Please take a few minutes to complete the written survey that you may receive in the mail after your visit with us. Thank you!             Your Updated Medication List - Protect others around you: Learn how to safely use, store and throw away your medicines at www.disposemymeds.org.          This list is accurate as of 9/21/18  3:00 PM.  Always use your most recent med list.                   Brand Name Dispense Instructions for use Diagnosis    buPROPion 150 MG 24 hr tablet    WELLBUTRIN XL    90 tablet    Take 1 tablet (150 mg) by mouth every morning    Major depressive disorder, recurrent episode, moderate (H)       levothyroxine 150 MCG tablet    SYNTHROID/LEVOTHROID    90 tablet    Take 1 tablet (150 mcg) by mouth daily    Postoperative hypothyroidism       sertraline 100 MG tablet    ZOLOFT    180 tablet    Take 2 tablets (200 mg) by mouth daily    Major depressive disorder, recurrent episode, moderate (H)       SUMAtriptan 25 MG tablet    IMITREX    18 tablet    TAKE 1 TO 2 TABLETS BY  MOUTH AT ONSET OF HEADACHE  FOR MIGRAINE MAY REPEAT IN  2 HOURS. MAX 8 TABLETS IN  24 HOURS.    Headache disorder

## 2018-09-21 NOTE — PROGRESS NOTES
SUBJECTIVE:   Kristel Martinez is a 38 year old female who presents to clinic today for the following health issues:    Depression Followup    Status since last visit: Worsened     See PHQ-9 for current symptoms.  Other associated symptoms: None    Complicating factors:   Significant life event:  No   Current substance abuse:  None  Anxiety or Panic symptoms:  Yes    PHQ-9 8/13/2018 9/21/2018   Total Score 9 14   Q9: Suicide Ideation Not at all Not at all     PHQ-9 ([employer]) 9/21/2018   1.  Little interest or pleasure in doing things 1   2.  Feeling down, depressed, or hopeless 3   3.  Trouble falling or staying asleep, or sleeping too much 3   4.  Feeling tired or having little energy 3   5.  Poor appetite or overeating 2   6.  Feeling bad about yourself 2   7.  Trouble concentrating 0   8.  Moving slowly or restless 0   9.  Suicidal or self-harm thoughts 0   PHQ-9 Total Score 14     MALCOM-7   [employer], 2002; Used with Permission) 9/21/2018   MALCOM 7 TOTAL SCORE 9 (mild anxiety)   1. Feeling nervous, anxious, or on edge 2   2. Not being able to stop or control worrying 2   3. Worrying too much about different things 2   4. Trouble relaxing 1   5. Being so restless that it is hard to sit still 0   6. Becoming easily annoyed or irritable 2   7. Feeling afraid, as if something awful might happen 0   MALCOM-7 Total Score 9     PHQ-9  English  PHQ-9   Any Language  Suicide Assessment Five-step Evaluation and Treatment (SAFE-T)    Problems at home or worsening symptoms.  [facility] and [facility] are [facility].  No thoughts of suicide or self harm    Problem list and histories reviewed & adjusted, as indicated.  Additional history: as documented    Patient Active Problem List   Diagnosis     Major depressive disorder, recurrent episode, moderate (H)     Papillary adenocarcinoma of thyroid (H)     Postoperative hypothyroidism     Vitiligo     Vitamin D deficiency     IgA deficiency (H)     Past  "Surgical History:   Procedure Laterality Date     COLONOSCOPY  8-27/18     THYROIDECTOMY      2015     TONSILLECTOMY       TUBAL LIGATION         Social History   Substance Use Topics     Smoking status: Former Smoker     Smokeless tobacco: Never Used     Alcohol use Yes      Comment: 1 drink per wk     Family History   Problem Relation Age of Onset     Hypertension Father      Arrhythmia Father      Lymphoma Maternal Grandmother      Diabetes Paternal Grandfather      type 1      Asthma Son      Arthritis Daughter          Current Outpatient Prescriptions   Medication Sig Dispense Refill     buPROPion (WELLBUTRIN XL) 150 MG 24 hr tablet Take 1 tablet (150 mg) by mouth every morning 90 tablet 1     levothyroxine (SYNTHROID/LEVOTHROID) 150 MCG tablet Take 1 tablet (150 mcg) by mouth daily 90 tablet 3     sertraline (ZOLOFT) 100 MG tablet Take 2 tablets (200 mg) by mouth daily 180 tablet 1     SUMAtriptan (IMITREX) 25 MG tablet TAKE 1 TO 2 TABLETS BY  MOUTH AT ONSET OF HEADACHE  FOR MIGRAINE MAY REPEAT IN  2 HOURS. MAX 8 TABLETS IN  24 HOURS. 18 tablet 0     Allergies   Allergen Reactions     Cephalosporins Rash     Cephalexin     Sulfa Drugs Rash     Labs reviewed in EPIC    Reviewed and updated as needed this visit by clinical staff       Reviewed and updated as needed this visit by Provider         ROS:  Constitutional, HEENT, cardiovascular, pulmonary, gi and gu systems are negative, except as otherwise noted.    OBJECTIVE:     /62 (Cuff Size: Adult Large)  Pulse 76  Temp 98.7  F (37.1  C) (Tympanic)  Resp 16  Ht 5' 10\" (1.778 m)  Wt 220 lb (99.8 kg)  BMI 31.57 kg/m2  Body mass index is 31.57 kg/(m^2).  GENERAL: healthy, alert and no distress  PSYCH: mentation appears normal, affect normal/bright    Diagnostic Test Results:  none     ASSESSMENT/PLAN:     1. Major depressive disorder, recurrent episode, moderate (H)  Not controlled.  Will add Wellbutrin for better control of symptoms.  Potential side " effects discussed including but not limited to increased risk of self harm or suicide.  Kristel verbalized understanding of risks.  Recheck in 1-2 months.  - sertraline (ZOLOFT) 100 MG tablet; Take 2 tablets (200 mg) by mouth daily  Dispense: 180 tablet; Refill: 1  - buPROPion (WELLBUTRIN XL) 150 MG 24 hr tablet; Take 1 tablet (150 mg) by mouth every morning  Dispense: 90 tablet; Refill: 1    Home care instructions were reviewed with the patient. The risks, benefits and treatment options of prescribed medications or other treatments have been discussed with the patient. The patient verbalized their understanding and should call or follow up if no improvement or if they develop further problems.    Patient Instructions   Add the Wellbutrin-take in the morning    Sertraline sent to the pharmacy for symptoms    Recheck in 1-2 months, sooner if needed      DAVID [facility]

## 2018-09-21 NOTE — PATIENT INSTRUCTIONS
Add the Wellbutrin-take in the morning    Sertraline sent to the pharmacy for symptoms    Recheck in 1-2 months, sooner if needed

## 2018-09-21 NOTE — LETTER
My Depression Action Plan  Name: Kristel Martinez   Date of Birth 1980  Date: 9/21/2018    My doctor: Yulissa Nogueira   My clinic: [address] [address] [phone]          GREEN    ZONE   Good Control    What it looks like:     Things are going generally well. You have normal up s and down s. You may even feel depressed from time to time, but bad moods usually last less than a day.   What you need to do:  1. Continue to care for yourself (see self care plan)  2. Check your depression survival kit and update it as needed  3. Follow your physician s recommendations including any medication.  4. Do not stop taking medication unless you consult with your physician first.           YELLOW         ZONE Getting Worse    What it looks like:     Depression is starting to interfere with your life.     It may be hard to get out of bed; you may be starting to isolate yourself from others.    Symptoms of depression are starting to last most all day and this has happened for several days.     You may have suicidal thoughts but they are not constant.   What you need to do:     1. Call your care team, your response to treatment will improve if you keep your care team informed of your progress. Yellow periods are signs an adjustment may need to be made.     2. Continue your self-care, even if you have to fake it!    3. Talk to someone in your support network    4. Open up your depression survival kit           RED    ZONE Medical Alert - Get Help    What it looks like:     Depression is seriously interfering with your life.     You may experience these or other symptoms: You can t get out of bed most days, can t work or engage in other necessary activities, you have trouble taking care of basic hygiene, or basic responsibilities, thoughts of suicide or death that will not go away, self-injurious behavior.     What you need to do:  1. Call your care team  and request a same-day appointment. If they are not available (weekends or after hours) call your local crisis line, emergency room or 911.            Depression Self Care Plan / Survival Kit    Self-Care for Depression  Here s the deal. Your body and mind are really not as separate as most people think.  What you do and think affects how you feel and how you feel influences what you do and think. This means if you do things that people who feel good do, it will help you feel better.  Sometimes this is all it takes.  There is also a place for medication and therapy depending on how severe your depression is, so be sure to consult with your medical provider and/ or Behavioral Health Consultant if your symptoms are worsening or not improving.     In order to better manage my stress, I will:    Exercise  Get some form of exercise, every day. This will help reduce pain and release endorphins, the  feel good  chemicals in your brain. This is almost as good as taking antidepressants!  This is not the same as joining a gym and then never going! (they count on that by the way ) It can be as simple as just going for a walk or doing some gardening, anything that will get you moving.      Hygiene   Maintain good hygiene (Get out of bed in the morning, Make your bed, Brush your teeth, Take a shower, and Get dressed like you were going to work, even if you are unemployed).  If your clothes don't fit try to get ones that do.    Diet  I will strive to eat foods that are good for me, drink plenty of water, and avoid excessive sugar, caffeine, alcohol, and other mood-altering substances.  Some foods that are helpful in depression are: complex carbohydrates, B vitamins, flaxseed, fish or fish oil, fresh fruits and vegetables.    Psychotherapy  I agree to participate in Individual Therapy (if recommended).    Medication  If prescribed medications, I agree to take them.  Missing doses can result in serious side effects.  I understand  that drinking alcohol, or other illicit drug use, may cause potential side effects.  I will not stop my medication abruptly without first discussing it with my provider.    Staying Connected With Others  I will stay in touch with my friends, family members, and my primary care provider/team.    Use your imagination  Be creative.  We all have a creative side; it doesn t matter if it s oil painting, sand castles, or mud pies! This will also kick up the endorphins.    Witness Beauty  (AKA stop and smell the roses) Take a look outside, even in mid-winter. Notice colors, textures. Watch the squirrels and birds.     Service to others  Be of service to others.  There is always someone else in need.  By helping others we can  get out of ourselves  and remember the really important things.  This also provides opportunities for practicing all the other parts of the program.    Humor  Laugh and be silly!  Adjust your TV habits for less news and crime-drama and more comedy.    Control your stress  Try breathing deep, massage therapy, biofeedback, and meditation. Find time to relax each day.     My support system    Clinic Contact:  Phone number:    Contact 1:  Phone number:    Contact 2:  Phone number:    Uatsdin/:  Phone number:    Therapist:  Phone number:    Local crisis center:    Phone number:    Other community support:  Phone number:

## 2018-09-22 ASSESSMENT — ANXIETY QUESTIONNAIRES: GAD7 TOTAL SCORE: 9

## 2018-09-22 ASSESSMENT — PATIENT HEALTH QUESTIONNAIRE - PHQ9: SUM OF ALL RESPONSES TO PHQ QUESTIONS 1-9: 14

## 2018-09-25 ENCOUNTER — OFFICE VISIT (OUTPATIENT)
Dept: DERMATOLOGY | Facility: CLINIC | Age: 38
End: 2018-09-25

## 2018-09-25 DIAGNOSIS — L71.0 POD (PERIORAL DERMATITIS): Primary | ICD-10-CM

## 2018-09-25 RX ORDER — METRONIDAZOLE 7.5 MG/G
LOTION TOPICAL DAILY
Qty: 59 ML | Refills: 1 | Status: SHIPPED | OUTPATIENT
Start: 2018-09-25 | End: 2019-06-21

## 2018-09-25 RX ORDER — MINOCYCLINE HYDROCHLORIDE 100 MG/1
100 CAPSULE ORAL 2 TIMES DAILY
Qty: 120 CAPSULE | Refills: 0 | Status: SHIPPED | OUTPATIENT
Start: 2018-09-25 | End: 2018-12-05

## 2018-09-25 ASSESSMENT — PAIN SCALES - GENERAL: PAINLEVEL: NO PAIN (0)

## 2018-09-25 NOTE — PROGRESS NOTES
[facility] Dermatology Note      Dermatology Problem List:  1. Hx of vitiligo - hands/feet  2. Hx of thyroid cancer - has since had thyroid removed  3. Perioral dermatitis - minocycline 100mg po BID, metronidazole 0.75% lotion    CC:   Chief Complaint   Patient presents with     Derm Problem     Kristel is here today to be seen for a rash on her face- been going on for about 3wks.          Encounter Date: Sep 25, 2018    History of Present Illness:  Ms. Kristel Martinez is a 38 year old female who presents with a rash on the lower face. She has had this before. She has tried some kind of liquid antibiotic as well as Differin gel. Neither of these have helped. She most recently had this back about 6mo ago. She says it eventually goes away. She says this current flare up is the worst its been and she feels like it is moving up her face. She gets regular menstrual cycles. She does not notice a correlation with this rash and that however. Currently washes face with a sensitive skin cleanser and a medicated cleasner as well as a cleanser offline which was supposed annita good for dermatitis. She uses differin-brand moisturizer. She also uses a dermatitis cream that came to her in a jar. She has not dexter using any steroids or hydrocortisone cream on the face.    She says this rash is a little itchy and kind of flakes.     Past Medical History:   Patient Active Problem List   Diagnosis     Major depressive disorder, recurrent episode, moderate (H)     Papillary adenocarcinoma of thyroid (H)     Postoperative hypothyroidism     Vitiligo     Vitamin D deficiency     IgA deficiency (H)     Past Medical History:   Diagnosis Date     Depressive disorder      Past Surgical History:   Procedure Laterality Date     COLONOSCOPY  8-27/18     THYROIDECTOMY      2015     TONSILLECTOMY       TUBAL LIGATION         Social History:  Not obtained today.    Family History:  Not obtained today.    Medications:  Current Outpatient  Prescriptions   Medication Sig Dispense Refill     buPROPion (WELLBUTRIN XL) 150 MG 24 hr tablet Take 1 tablet (150 mg) by mouth every morning 90 tablet 1     levothyroxine (SYNTHROID/LEVOTHROID) 150 MCG tablet Take 1 tablet (150 mcg) by mouth daily 90 tablet 3     sertraline (ZOLOFT) 100 MG tablet Take 2 tablets (200 mg) by mouth daily 180 tablet 1     SUMAtriptan (IMITREX) 25 MG tablet TAKE 1 TO 2 TABLETS BY  MOUTH AT ONSET OF HEADACHE  FOR MIGRAINE MAY REPEAT IN  2 HOURS. MAX 8 TABLETS IN  24 HOURS. 18 tablet 0     Allergies   Allergen Reactions     Cephalosporins Rash     Cephalexin     Sulfa Drugs Rash         Review of Systems:  -Constitutional: The patient denies fatigue, fevers, chills, unintended weight loss, and night sweats.  -GI: no nausea, abdominal pain, vomiting, diarrhea or blood in the stool  -Skin: As above in HPI. No additional skin concerns.    Physical exam:  Vitals: There were no vitals taken for this visit.  GEN: This is a well developed, well-nourished female in no acute distress, in a pleasant mood.    SKIN: Focused examination of the face and neck was performed.  -scattered, numerous, erythematous micro papules with a slight scaling periorally sparing the upper and lower lip boarder  -No other lesions of concern on areas examined.     Impression/Plan:  1. Perioral dermatitis    Start minocycline 100mg po BID x 2 mo - SE discussed with patient    Start metronidazole 0.75% lotion every day or BID to lower face x 3mo    Avoid topical corticosteroids to the face as this may make worse - also avoid irritants like toothpastes/certain cosmetics if she feels her condition is not improving with treatment      CC Dr. Murillo on close of this encounter.  Follow-up in 3 months, earlier for new or changing lesions.       Staff Involved:  Staff Only  All risks, benefits and alternatives were discussed with patient.  Patient is in agreement and understands the assessment and plan.  All questions were  answered.    Rissa Corona PA-C  [facility]: Phone: [phone], Fax: [phone]

## 2018-09-25 NOTE — LETTER
9/25/2018       RE: Kristel Martinez  [address] [place] [address]     Dear Colleague,    Thank you for referring your patient, Kristel Martinez, to the [facility] DERMATOLOGY at [facility]. Please see a copy of my visit note below.    [facility] Dermatology Note      Dermatology Problem List:  1. Hx of vitiligo - hands/feet  2. Hx of thyroid cancer - has since had thyroid removed  3. Perioral dermatitis - minocycline 100mg po BID, metronidazole 0.75% lotion    CC:   Chief Complaint   Patient presents with     Derm Problem     Kristel is here today to be seen for a rash on her face- been going on for about 3wks.          Encounter Date: Sep 25, 2018    History of Present Illness:  Ms. Kristel Martinez is a 38 year old female who presents with a rash on the lower face. She has had this before. She has tried some kind of liquid antibiotic as well as Differin gel. Neither of these have helped. She most recently had this back about 6mo ago. She says it eventually goes away. She says this current flare up is the worst its been and she feels like it is moving up her face. She gets regular menstrual cycles. She does not notice a correlation with this rash and that however. Currently washes face with a sensitive skin cleanser and a medicated cleasner as well as a cleanser offline which was supposed annita good for dermatitis. She uses differin-brand moisturizer. She also uses a dermatitis cream that came to her in a jar. She has not dexter using any steroids or hydrocortisone cream on the face.    She says this rash is a little itchy and kind of flakes.     Past Medical History:   Patient Active Problem List   Diagnosis     Major depressive disorder, recurrent episode, moderate (H)     Papillary adenocarcinoma of thyroid (H)     Postoperative hypothyroidism     Vitiligo     Vitamin D deficiency     IgA deficiency (H)     Past Medical History:   Diagnosis Date     Depressive  disorder      Past Surgical History:   Procedure Laterality Date     COLONOSCOPY  8-27/18     THYROIDECTOMY      2015     TONSILLECTOMY       TUBAL LIGATION         Social History:  Not obtained today.    Family History:  Not obtained today.    Medications:  Current Outpatient Prescriptions   Medication Sig Dispense Refill     buPROPion (WELLBUTRIN XL) 150 MG 24 hr tablet Take 1 tablet (150 mg) by mouth every morning 90 tablet 1     levothyroxine (SYNTHROID/LEVOTHROID) 150 MCG tablet Take 1 tablet (150 mcg) by mouth daily 90 tablet 3     sertraline (ZOLOFT) 100 MG tablet Take 2 tablets (200 mg) by mouth daily 180 tablet 1     SUMAtriptan (IMITREX) 25 MG tablet TAKE 1 TO 2 TABLETS BY  MOUTH AT ONSET OF HEADACHE  FOR MIGRAINE MAY REPEAT IN  2 HOURS. MAX 8 TABLETS IN  24 HOURS. 18 tablet 0     Allergies   Allergen Reactions     Cephalosporins Rash     Cephalexin     Sulfa Drugs Rash         Review of Systems:  -Constitutional: The patient denies fatigue, fevers, chills, unintended weight loss, and night sweats.  -GI: no nausea, abdominal pain, vomiting, diarrhea or blood in the stool  -Skin: As above in HPI. No additional skin concerns.    Physical exam:  Vitals: There were no vitals taken for this visit.  GEN: This is a well developed, well-nourished female in no acute distress, in a pleasant mood.    SKIN: Focused examination of the face and neck was performed.  -scattered, numerous, erythematous micro papules with a slight scaling periorally sparing the upper and lower lip boarder  -No other lesions of concern on areas examined.     Impression/Plan:  1. Perioral dermatitis    Start minocycline 100mg po BID x 2 mo - SE discussed with patient    Start metronidazole 0.75% lotion every day or BID to lower face x 3mo    Avoid topical corticosteroids to the face as this may make worse - also avoid irritants like toothpastes/certain cosmetics if she feels her condition is not improving with treatment      CC Dr. Murillo  on close of this encounter.  Follow-up in 3 months, earlier for new or changing lesions.       Staff Involved:  Staff Only  All risks, benefits and alternatives were discussed with patient.  Patient is in agreement and understands the assessment and plan.  All questions were answered.      Again, thank you for allowing me to participate in the care of your patient.      Sincerely,    Rissa Corona PA-C

## 2018-09-25 NOTE — NURSING NOTE
Dermatology Rooming Note    Kristel Martinez's goals for this visit include:   Chief Complaint   Patient presents with     Derm Problem     Kristel is here today to be seen for a rash on her face- been going on for about 3wks.      GALINA Mcknight

## 2018-09-25 NOTE — MR AVS SNAPSHOT
After Visit Summary   9/25/2018    Kristel Martinez    MRN: [id number]           Patient Information     Date Of Birth          1980        Visit Information        Provider Department      9/25/2018 3:00 PM Rissa Corona PA-C [facility] Dermatology        Today's Diagnoses     POD (perioral dermatitis)    -  1      Care Instructions    SUMMARY AND RECOMMENDATIONS  ?Perioral dermatitis (POD), also known as periorificial dermatitis, is an inflammatory condition of facial skin that primarily affects young women. Less frequently, the disorder occurs in older individuals, men, and children. (See 'Epidemiology' above.)  ?Although a variety of intrinsic and extrinsic factors have been proposed as contributors to POD, the pathogenesis of this disorder is not well understood. The factor most frequently reported in association with the occurrence or persistence of POD is topical corticosteroid use. (See 'Etiology and pathogenesis' above.)  ?Patients with POD typically present with small, inflammatory papules, papulovesicles, or papulopustules and scaling surrounding the mouth, nose, or eyes (picture 1A-H, 2A-B). In patients with perioral lesions, the skin adjacent to the lip border is typically spared. (See 'Clinical manifestations' above.)  ?Chronic granulomatous periorificial dermatitis is a variant of POD that is typically seen in prepubertal children (picture 3). Histopathologic examination of lesional skin reveals a granulomatous infiltrate in these patients. (See 'Clinical manifestations' above.)  ?The clinical history and physical examination is usually sufficient for the diagnosis of POD. The histopathologic findings of POD are nonspecific, and skin biopsies are rarely necessary. (See 'Diagnosis' above.)  ?The treatment of perioral dermatitis involves the discontinuation of topical corticosteroids and the avoidance of skin irritants. Although patients may eventually improve without additional  interventions, pharmacologic therapy is often instituted in an attempt to accelerate lesion resolution. (See 'Approach to therapy' above.)  ?For adults and children with mild to moderate perioral dermatitis who desire an attempt to accelerate improvement, we suggest treatment with topical pimecrolimus (Grade 2A). Topical erythromycin and topical metronidazole are additional, less expensive options for therapy. (See 'First-line topical pharmacologic agents' above.)  ?For adolescents and adults with moderate to severe perioral dermatitis or with milder disease refractory to topical agents who desire an attempt to accelerate improvement, we suggest treatment with oral tetracycline (Grade 2B). Doxycycline and minocycline may also be used for treatment. The response to oral antibiotic therapy is often slow; a typical course of treatment is two months. (See 'First-line systemic agents' above.)  ?For children under the age of nine with moderate to severe perioral dermatitis or who fail treatment with topical agents, we suggest treatment with oral erythromycin or oral azithromycin (Grade 2C). Young children should not receive oral tetracyclines due to the potential for adverse effects on tooth and bone development. (See 'First-line systemic agents' above.)            Follow-ups after your visit        Your next 10 appointments already scheduled     Dec 18, 2018  3:30 PM CST   (Arrive by 3:15 PM)   Return Visit with Rissa Corona PA-C   [facility] ([facility])    [address] [place] [address] [phone]              Who to contact     Please call your clinic at [phone] to:    Ask questions about your health    Make or cancel appointments    Discuss your medicines    Learn about your test results    Speak to your doctor            Additional Information About Your Visit        [employer] Information     [employer] gives you secure access to your electronic  health record. If you see a primary care provider, you can also send messages to your care team and make appointments. If you have questions, please call your primary care clinic.  If you do not have a primary care provider, please call [phone] and they will assist you.      [employer] is an electronic gateway that provides easy, online access to your medical records. With [employer], you can request a clinic appointment, read your test results, renew a prescription or communicate with your care team.     To access your existing account, please contact your [facility] or call [phone] for assistance.        Care EveryWhere ID     This is your Care EveryWhere ID. This could be used by other organizations to access your [place] medical records  [id number]         Blood Pressure from Last 3 Encounters:   09/21/18 100/62   09/12/18 102/68   08/21/18 112/74    Weight from Last 3 Encounters:   09/21/18 99.8 kg (220 lb)   09/12/18 98.4 kg (217 lb)   08/21/18 99.8 kg (220 lb)              Today, you had the following     No orders found for display         Today's Medication Changes          These changes are accurate as of 9/25/18  3:34 PM.  If you have any questions, ask your nurse or doctor.               Start taking these medicines.        Dose/Directions    metroNIDAZOLE 0.75 % Lotn   Used for:  POD (perioral dermatitis)   Started by:  Rissa Corona PA-C        Externally apply topically daily   Quantity:  59 mL   Refills:  1       minocycline 100 MG capsule   Commonly known as:  MINOCIN/DYNACIN   Used for:  POD (perioral dermatitis)   Started by:  Rissa Corona PA-C        Dose:  100 mg   Take 1 capsule (100 mg) by mouth 2 times daily   Quantity:  120 capsule   Refills:  0            Where to get your medicines      These medications were sent to [facility] [place], [place] - [address] AT [address]  [address]  [address]     Phone:  [phone]     metroNIDAZOLE 0.75 % Lotn    minocycline 100 MG capsule                Primary Care Provider Office Phone # Fax #    DAVID Novak [phone] [address] [facility] [address]        Equal Access to Services     ASMITA ALFARO : Hadii aad ku hadasho Soomaali, waaxda luqadaha, qaybta kaalmada adeegyada, kiah madden pablorodolfo tatevasquezpascual bowling. So [facility] [place] [phone].    ATENCIÓN: Si habla español, tiene a juarez disposición servicios gratuitos de asistencia lingüística. [facility] [phone].    We comply with applicable federal civil rights laws and [place] laws. We do not discriminate on the basis of race, color, national origin, age, disability, sex, sexual orientation, or gender identity.            Thank you!     Thank you for choosing [facility] DERMATOLOGY  for your care. Our goal is always to provide you with excellent care. Hearing back from our patients is one way we can continue to improve our services. Please take a few minutes to complete the written survey that you may receive in the mail after your visit with us. Thank you!             Your Updated Medication List - Protect others around you: Learn how to safely use, store and throw away your medicines at www.disposemymeds.org.          This list is accurate as of 9/25/18  3:34 PM.  Always use your most recent med list.                   Brand Name Dispense Instructions for use Diagnosis    buPROPion 150 MG 24 hr tablet    WELLBUTRIN XL    90 tablet    Take 1 tablet (150 mg) by mouth every morning    Major depressive disorder, recurrent episode, moderate (H)       levothyroxine 150 MCG tablet    SYNTHROID/LEVOTHROID    90 tablet    Take 1 tablet (150 mcg) by mouth daily    Postoperative hypothyroidism       metroNIDAZOLE 0.75 % Lotn     59 mL    Externally apply topically daily    POD (perioral dermatitis)       minocycline 100 MG capsule    MINOCIN/DYNACIN    120 capsule     Take 1 capsule (100 mg) by mouth 2 times daily    POD (perioral dermatitis)       sertraline 100 MG tablet    ZOLOFT    180 tablet    Take 2 tablets (200 mg) by mouth daily    Major depressive disorder, recurrent episode, moderate (H)       SUMAtriptan 25 MG tablet    IMITREX    18 tablet    TAKE 1 TO 2 TABLETS BY  MOUTH AT ONSET OF HEADACHE  FOR MIGRAINE MAY REPEAT IN  2 HOURS. MAX 8 TABLETS IN  24 HOURS.    Headache disorder

## 2018-09-25 NOTE — PATIENT INSTRUCTIONS
SUMMARY AND RECOMMENDATIONS  ?Perioral dermatitis (POD), also known as periorificial dermatitis, is an inflammatory condition of facial skin that primarily affects young women. Less frequently, the disorder occurs in older individuals, men, and children. (See 'Epidemiology' above.)  ?Although a variety of intrinsic and extrinsic factors have been proposed as contributors to POD, the pathogenesis of this disorder is not well understood. The factor most frequently reported in association with the occurrence or persistence of POD is topical corticosteroid use. (See 'Etiology and pathogenesis' above.)  ?Patients with POD typically present with small, inflammatory papules, papulovesicles, or papulopustules and scaling surrounding the mouth, nose, or eyes (picture 1A-H, 2A-B). In patients with perioral lesions, the skin adjacent to the lip border is typically spared. (See 'Clinical manifestations' above.)  ?Chronic granulomatous periorificial dermatitis is a variant of POD that is typically seen in prepubertal children (picture 3). Histopathologic examination of lesional skin reveals a granulomatous infiltrate in these patients. (See 'Clinical manifestations' above.)  ?The clinical history and physical examination is usually sufficient for the diagnosis of POD. The histopathologic findings of POD are nonspecific, and skin biopsies are rarely necessary. (See 'Diagnosis' above.)  ?The treatment of perioral dermatitis involves the discontinuation of topical corticosteroids and the avoidance of skin irritants. Although patients may eventually improve without additional interventions, pharmacologic therapy is often instituted in an attempt to accelerate lesion resolution. (See 'Approach to therapy' above.)  ?For adults and children with mild to moderate perioral dermatitis who desire an attempt to accelerate improvement, we suggest treatment with topical pimecrolimus (Grade 2A). Topical erythromycin and topical metronidazole  are additional, less expensive options for therapy. (See 'First-line topical pharmacologic agents' above.)  ?For adolescents and adults with moderate to severe perioral dermatitis or with milder disease refractory to topical agents who desire an attempt to accelerate improvement, we suggest treatment with oral tetracycline (Grade 2B). Doxycycline and minocycline may also be used for treatment. The response to oral antibiotic therapy is often slow; a typical course of treatment is two months. (See 'First-line systemic agents' above.)  ?For children under the age of nine with moderate to severe perioral dermatitis or who fail treatment with topical agents, we suggest treatment with oral erythromycin or oral azithromycin (Grade 2C). Young children should not receive oral tetracyclines due to the potential for adverse effects on tooth and bone development. (See 'First-line systemic agents' above.)

## 2018-10-08 ENCOUNTER — TRANSFERRED RECORDS (OUTPATIENT)
Dept: HEALTH INFORMATION MANAGEMENT | Facility: CLINIC | Age: 38
End: 2018-10-08

## 2018-10-26 ENCOUNTER — MYC MEDICAL ADVICE (OUTPATIENT)
Dept: FAMILY MEDICINE | Facility: CLINIC | Age: 38
End: 2018-10-26

## 2018-10-28 ENCOUNTER — MYC MEDICAL ADVICE (OUTPATIENT)
Dept: FAMILY MEDICINE | Facility: CLINIC | Age: 38
End: 2018-10-28

## 2018-10-29 NOTE — TELEPHONE ENCOUNTER
Spoke with Kristel about symptoms, stable for past week. Appointment made for tomorrow morning, needs to be seen sooner with any worsening symptoms overnight.  Kristel verbalized understanding.    DAVID Clifton CNP

## 2018-10-30 ENCOUNTER — MYC MEDICAL ADVICE (OUTPATIENT)
Dept: FAMILY MEDICINE | Facility: CLINIC | Age: 38
End: 2018-10-30

## 2018-10-30 ENCOUNTER — HOSPITAL ENCOUNTER (OUTPATIENT)
Dept: CT IMAGING | Facility: CLINIC | Age: 38
Discharge: HOME | End: 2018-10-30
Attending: NURSE PRACTITIONER | Admitting: NURSE PRACTITIONER
Payer: COMMERCIAL

## 2018-10-30 ENCOUNTER — OFFICE VISIT (OUTPATIENT)
Dept: FAMILY MEDICINE | Facility: CLINIC | Age: 38
End: 2018-10-30
Payer: COMMERCIAL

## 2018-10-30 VITALS
HEIGHT: 70 IN | OXYGEN SATURATION: 98 % | BODY MASS INDEX: 31.64 KG/M2 | RESPIRATION RATE: 18 BRPM | TEMPERATURE: 98.8 F | SYSTOLIC BLOOD PRESSURE: 110 MMHG | HEART RATE: 100 BPM | DIASTOLIC BLOOD PRESSURE: 68 MMHG | WEIGHT: 221 LBS

## 2018-10-30 DIAGNOSIS — R07.89 CHEST PRESSURE: ICD-10-CM

## 2018-10-30 DIAGNOSIS — R00.2 PALPITATIONS: ICD-10-CM

## 2018-10-30 DIAGNOSIS — R06.02 SOB (SHORTNESS OF BREATH): ICD-10-CM

## 2018-10-30 DIAGNOSIS — R07.89 CHEST PRESSURE: Primary | ICD-10-CM

## 2018-10-30 DIAGNOSIS — K64.4 EXTERNAL HEMORRHOIDS: ICD-10-CM

## 2018-10-30 DIAGNOSIS — R00.2 PALPITATIONS: Primary | ICD-10-CM

## 2018-10-30 LAB
ALBUMIN SERPL-MCNC: 3.9 G/DL (ref 3.4–5)
ALP SERPL-CCNC: 62 U/L (ref 40–150)
ALT SERPL W P-5'-P-CCNC: 15 U/L (ref 0–50)
ANION GAP SERPL CALCULATED.3IONS-SCNC: 5 MMOL/L (ref 3–14)
AST SERPL W P-5'-P-CCNC: 11 U/L (ref 0–45)
BILIRUB SERPL-MCNC: 0.4 MG/DL (ref 0.2–1.3)
BUN SERPL-MCNC: 10 MG/DL (ref 7–30)
CALCIUM SERPL-MCNC: 8.3 MG/DL (ref 8.5–10.1)
CHLORIDE SERPL-SCNC: 107 MMOL/L (ref 94–109)
CO2 SERPL-SCNC: 27 MMOL/L (ref 20–32)
CREAT SERPL-MCNC: 0.75 MG/DL (ref 0.52–1.04)
ERYTHROCYTE [DISTWIDTH] IN BLOOD BY AUTOMATED COUNT: 13.3 % (ref 10–15)
GFR SERPL CREATININE-BSD FRML MDRD: 87 ML/MIN/1.7M2
GLUCOSE SERPL-MCNC: 84 MG/DL (ref 70–99)
HCT VFR BLD AUTO: 39.1 % (ref 35–47)
HGB BLD-MCNC: 13 G/DL (ref 11.7–15.7)
MAGNESIUM SERPL-MCNC: 2.2 MG/DL (ref 1.6–2.3)
MCH RBC QN AUTO: 27.5 PG (ref 26.5–33)
MCHC RBC AUTO-ENTMCNC: 33.2 G/DL (ref 31.5–36.5)
MCV RBC AUTO: 83 FL (ref 78–100)
PLATELET # BLD AUTO: 187 10E9/L (ref 150–450)
POTASSIUM SERPL-SCNC: 4.2 MMOL/L (ref 3.4–5.3)
PROT SERPL-MCNC: 7.2 G/DL (ref 6.8–8.8)
RBC # BLD AUTO: 4.73 10E12/L (ref 3.8–5.2)
SODIUM SERPL-SCNC: 139 MMOL/L (ref 133–144)
TSH SERPL DL<=0.005 MIU/L-ACNC: 0.79 MU/L (ref 0.4–4)
WBC # BLD AUTO: 4.7 10E9/L (ref 4–11)

## 2018-10-30 PROCEDURE — 85027 COMPLETE CBC AUTOMATED: CPT | Performed by: NURSE PRACTITIONER

## 2018-10-30 PROCEDURE — 80053 COMPREHEN METABOLIC PANEL: CPT | Performed by: NURSE PRACTITIONER

## 2018-10-30 PROCEDURE — 71260 CT THORAX DX C+: CPT

## 2018-10-30 PROCEDURE — 99214 OFFICE O/P EST MOD 30 MIN: CPT | Performed by: NURSE PRACTITIONER

## 2018-10-30 PROCEDURE — 25000128 H RX IP 250 OP 636: Performed by: RADIOLOGY

## 2018-10-30 PROCEDURE — 93000 ELECTROCARDIOGRAM COMPLETE: CPT | Performed by: NURSE PRACTITIONER

## 2018-10-30 PROCEDURE — 83735 ASSAY OF MAGNESIUM: CPT | Performed by: NURSE PRACTITIONER

## 2018-10-30 PROCEDURE — 84443 ASSAY THYROID STIM HORMONE: CPT | Performed by: NURSE PRACTITIONER

## 2018-10-30 PROCEDURE — 36415 COLL VENOUS BLD VENIPUNCTURE: CPT | Performed by: NURSE PRACTITIONER

## 2018-10-30 PROCEDURE — 25000125 ZZHC RX 250: Performed by: RADIOLOGY

## 2018-10-30 RX ORDER — IOPAMIDOL 755 MG/ML
89 INJECTION, SOLUTION INTRAVASCULAR ONCE
Status: COMPLETED | OUTPATIENT
Start: 2018-10-30 | End: 2018-10-30

## 2018-10-30 RX ADMIN — IOPAMIDOL 89 ML: 755 INJECTION, SOLUTION INTRAVENOUS at 08:53

## 2018-10-30 RX ADMIN — SODIUM CHLORIDE 100 ML: 9 INJECTION, SOLUTION INTRAVENOUS at 08:53

## 2018-10-30 NOTE — PROGRESS NOTES
SUBJECTIVE:   Kristel Martinez is a 38 year old female who presents to clinic today for the following health issues:    Chest Pain      Onset: 1.5 weeks     Description (location/character/radiation/duration): chest pressure, tachycardia, sob    Intensity:  mild    Accompanying signs and symptoms:        Shortness of breath: YES       Sweating: no        Nausea/vomitting: no        Palpitations: YES       Other (fevers/chills/cough/heartburn/lightheadedness): no     History (similar episodes/previous evaluation): episodes like this in past - pt states work up in past with no diagnosis     Precipitating or alleviating factors:       Worse with exertion: YES       Worse with breathing: YES       Related to eating: no        Better with burping: no     Therapies tried and outcome: Stopped taking Wellbutrin yesterday with no improvement     History of a tubal ligation-not on birth control  No personal for family history of blood clots or heart problems  Had been on Wellbutrin for 5 weeks-symptoms just started a week ago, stopping Wellbutrin did not help  Feels like something is sitting on her chest  No recent URI symptoms  Difficult to take a deep breath    Hemorid painful   Has been doing symptomatic care but symptoms continue    Problem list and histories reviewed & adjusted, as indicated.  Additional history: as documented    Patient Active Problem List   Diagnosis     Major depressive disorder, recurrent episode, moderate (H)     Papillary adenocarcinoma of thyroid (H)     Postoperative hypothyroidism     Vitiligo     Vitamin D deficiency     IgA deficiency (H)     Past Surgical History:   Procedure Laterality Date     COLONOSCOPY  8-27/18     THYROIDECTOMY      2015     TONSILLECTOMY       TUBAL LIGATION         Social History   Substance Use Topics     Smoking status: Former Smoker     Smokeless tobacco: Never Used     Alcohol use Yes      Comment: 1 drink per wk     Family History   Problem Relation Age of Onset  "    Hypertension Father      Arrhythmia Father      Lymphoma Maternal Grandmother      Diabetes Paternal Grandfather      type 1      Asthma Son      Arthritis Daughter          Current Outpatient Prescriptions   Medication Sig Dispense Refill     levothyroxine (SYNTHROID/LEVOTHROID) 150 MCG tablet Take 1 tablet (150 mcg) by mouth daily 90 tablet 3     metroNIDAZOLE 0.75 % LOTN Externally apply topically daily 59 mL 1     minocycline (MINOCIN/DYNACIN) 100 MG capsule Take 1 capsule (100 mg) by mouth 2 times daily 120 capsule 0     sertraline (ZOLOFT) 100 MG tablet Take 2 tablets (200 mg) by mouth daily 180 tablet 1     SUMAtriptan (IMITREX) 25 MG tablet TAKE 1 TO 2 TABLETS BY  MOUTH AT ONSET OF HEADACHE  FOR MIGRAINE MAY REPEAT IN  2 HOURS. MAX 8 TABLETS IN  24 HOURS. 18 tablet 0     buPROPion (WELLBUTRIN XL) 150 MG 24 hr tablet Take 1 tablet (150 mg) by mouth every morning (Patient not taking: Reported on 10/30/2018) 90 tablet 1     Allergies   Allergen Reactions     Cephalosporins Rash     Cephalexin     Sulfa Drugs Rash     Labs reviewed in EPIC    Reviewed and updated as needed this visit by clinical staff       Reviewed and updated as needed this visit by Provider         ROS:  Constitutional, HEENT, cardiovascular, pulmonary, gi and gu systems are negative, except as otherwise noted.    OBJECTIVE:     /68 (Cuff Size: Adult Regular)  Pulse 100  Temp 98.8  F (37.1  C) (Tympanic)  Resp 18  Ht 5' 10\" (1.778 m)  Wt 221 lb (100.2 kg)  SpO2 98%  BMI 31.71 kg/m2  Body mass index is 31.71 kg/(m^2).  GENERAL: healthy, alert and no distress  HENT: ear canals and TM's normal, nose and mouth without ulcers or lesions  NECK: no adenopathy, no asymmetry, masses, or scars and thyroid normal to palpation  RESP: lungs clear to auscultation - no rales, rhonchi or wheezes  CV: regular rate and rhythm, normal S1 S2, no S3 or S4, no murmur, click or rub, no peripheral edema and peripheral pulses strong  ABDOMEN: soft, " nontender, no hepatosplenomegaly, no masses and bowel sounds normal  RECTAL: dime sized non-thrombosed external hemorid present  MS: no gross musculoskeletal defects noted, no edema  NEURO: Normal strength and tone, mentation intact and speech normal  PSYCH: mentation appears normal, affect normal/bright    Diagnostic Test Results:  No results found for this or any previous visit (from the past 24 hour(s)).    ASSESSMENT/PLAN:     1. Chest pressure  No acute distress, EKG normal.  With chest pressure, shortness of breath and palpations will do CT scan to rule out PE.  Will do labs pending CT scan for further work up of palpations.  Stay off Wellbutrin.  - EKG 12-lead complete w/read - Clinics  - CT Chest Pulmonary Embolism w Contrast; Future  - CBC with platelets; Future  - **TSH with free T4 reflex FUTURE anytime; Future  - **Comprehensive metabolic panel FUTURE anytime; Future  - Magnesium; Future    2. SOB (shortness of breath)  Per above.  - EKG 12-lead complete w/read - Clinics  - CT Chest Pulmonary Embolism w Contrast; Future  - CBC with platelets; Future  - **TSH with free T4 reflex FUTURE anytime; Future  - **Comprehensive metabolic panel FUTURE anytime; Future  - Magnesium; Future    3. Palpitations  Per above.  - EKG 12-lead complete w/read - Clinics  - CT Chest Pulmonary Embolism w Contrast; Future  - CBC with platelets; Future  - **TSH with free T4 reflex FUTURE anytime; Future  - **Comprehensive metabolic panel FUTURE anytime; Future  - Magnesium; Future    4. External hemorrhoids  Anusol sent to the pharmacy for symptoms. Symptomatic care and follow up discussed.  - hydrocortisone (ANUSOL-HC) 2.5 % cream; Place rectally 2 times daily as needed for hemorrhoids  Dispense: 30 g; Refill: 0    Home care instructions were reviewed with the patient. The risks, benefits and treatment options of prescribed medications or other treatments have been discussed with the patient. The patient verbalized their  understanding and should call or follow up if no improvement or if they develop further problems.    DAVID Clifton [facility]

## 2018-10-30 NOTE — MR AVS SNAPSHOT
After Visit Summary   10/30/2018    Kristel Martinez    MRN: [id number]           Patient Information     Date Of Birth          1980        Visit Information        Provider Department      10/30/2018 7:40 AM Yulissa Nogueira APRN CNP [facility]        Today's Diagnoses     Chest pressure    -  1    SOB (shortness of breath)        Palpitations        External hemorrhoids           Follow-ups after your visit        Follow-up notes from your care team     Return in about 1 week (around 11/6/2018), or if symptoms worsen or fail to improve.      Your next 10 appointments already scheduled     Dec 18, 2018  3:30 PM CST   (Arrive by 3:15 PM)   Return Visit with Rissa Corona PA-C   [facility] ([facility])    [address] [place] [address]  3rd [facility] [address] [phone]              Future tests that were ordered for you today     Open Future Orders        Priority Expected Expires Ordered    CT Chest Pulmonary Embolism w Contrast STAT  10/30/2019 10/30/2018            Who to contact     If you have questions or need follow up information about today's clinic visit or your schedule please contact [facility] directly at [phone].  Normal or non-critical lab and imaging results will be communicated to you by MyChart, letter or phone within 4 business days after the clinic has received the results. If you do not hear from us within 7 days, please contact the clinic through MyChart or phone. If you have a critical or abnormal lab result, we will notify you by phone as soon as possible.  Submit refill requests through [facility] or call your pharmacy and they will forward the refill request to us. Please allow 3 business days for your refill to be completed.          Additional Information About Your Visit        MyChart Information     [facility] gives you secure access to your electronic health record. If you see a  "primary care provider, you can also send messages to your care team and make appointments. If you have questions, please call your primary care clinic.  If you do not have a primary care provider, please call [phone] and they will assist you.        Care EveryWhere ID     This is your Care EveryWhere ID. This could be used by other organizations to access your [place] medical records  [id number]        Your Vitals Were     Pulse Temperature Respirations Height Pulse Oximetry BMI (Body Mass Index)    100 98.8  F (37.1  C) (Tympanic) 18 5' 10\" (1.778 m) 98% 31.71 kg/m2       Blood Pressure from Last 3 Encounters:   10/30/18 110/68   09/21/18 100/62   09/12/18 102/68    Weight from Last 3 Encounters:   10/30/18 221 lb (100.2 kg)   09/21/18 220 lb (99.8 kg)   09/12/18 217 lb (98.4 kg)              We Performed the Following     EKG 12-lead complete w/read - Clinics          Today's Medication Changes          These changes are accurate as of 10/30/18  9:40 AM.  If you have any questions, ask your nurse or doctor.               Start taking these medicines.        Dose/Directions    hydrocortisone 2.5 % cream   Commonly known as:  ANUSOL-HC   Used for:  External hemorrhoids   Started by:  Yulissa Nogueira APRN CNP        Place rectally 2 times daily as needed for hemorrhoids   Quantity:  30 g   Refills:  0         Stop taking these medicines if you haven't already. Please contact your care team if you have questions.     buPROPion 150 MG 24 hr tablet   Commonly known as:  WELLBUTRIN XL   Stopped by:  Yulissa Nogueira APRN CNP                Where to get your medicines      These medications were sent to [facility] [address] [place] [address] - [address] [place] [address] AT [facility] [address] [facility] [address]  [address] [facility] [address]     Phone:  [phone]     hydrocortisone 2.5 % cream                Primary Care Provider Office Phone # Fax #    DAVID Novak [phone] " [phone]       [address] [place] [address]        Equal Access to Services     KEVONKARAN ANGELINA : Hadii aad ku hadebenezermarian Alvarez, all becerril, almazsolomon sauervelmatheresa swain, kiah tatevasquezpascual bowling. So [facility] [phone].    ATENCIÓN: Si habla español, tiene a juarez disposición servicios gratuitos de asistencia lingüística. Llame al [phone].    We comply with applicable federal civil rights laws and [place] laws. We do not discriminate on the basis of race, color, national origin, age, disability, sex, sexual orientation, or gender identity.            Thank you!     Thank you for choosing [facility]  for your care. Our goal is always to provide you with excellent care. Hearing back from our patients is one way we can continue to improve our services. Please take a few minutes to complete the written survey that you may receive in the mail after your visit with us. Thank you!             Your Updated Medication List - Protect others around you: Learn how to safely use, store and throw away your medicines at www.disposemymeds.org.          This list is accurate as of 10/30/18  9:40 AM.  Always use your most recent med list.                   Brand Name Dispense Instructions for use Diagnosis    hydrocortisone 2.5 % cream    ANUSOL-HC    30 g    Place rectally 2 times daily as needed for hemorrhoids    External hemorrhoids       levothyroxine 150 MCG tablet    SYNTHROID/LEVOTHROID    90 tablet    Take 1 tablet (150 mcg) by mouth daily    Postoperative hypothyroidism       metroNIDAZOLE 0.75 % Lotn     59 mL    Externally apply topically daily    POD (perioral dermatitis)       minocycline 100 MG capsule    MINOCIN/DYNACIN    120 capsule    Take 1 capsule (100 mg) by mouth 2 times daily    POD (perioral dermatitis)       sertraline 100 MG tablet    ZOLOFT    180 tablet    Take 2 tablets (200 mg) by mouth daily    Major depressive disorder, recurrent episode, moderate (H)        SUMAtriptan 25 MG tablet    IMITREX    18 tablet    TAKE 1 TO 2 TABLETS BY  MOUTH AT ONSET OF HEADACHE  FOR MIGRAINE MAY REPEAT IN  2 HOURS. MAX 8 TABLETS IN  24 HOURS.    Headache disorder

## 2018-11-06 DIAGNOSIS — F33.1 MAJOR DEPRESSIVE DISORDER, RECURRENT EPISODE, MODERATE (H): ICD-10-CM

## 2018-11-07 RX ORDER — SERTRALINE HYDROCHLORIDE 100 MG/1
TABLET, FILM COATED ORAL
Qty: 180 TABLET | Refills: 0 | Status: SHIPPED | OUTPATIENT
Start: 2018-11-07 | End: 2018-12-05

## 2018-11-07 NOTE — TELEPHONE ENCOUNTER
"Requested Prescriptions   Pending Prescriptions Disp Refills     sertraline (ZOLOFT) 100 MG tablet [Pharmacy Med Name: SERTRALINE HCL 100MG TABLET] 180 tablet      Sig: TAKE 2 TABLETS BY MOUTH  DAILY    SSRIs Protocol Failed    11/6/2018  8:13 PM       Failed - PHQ-9 score less than 5 in past 6 months    Please review last PHQ-9 score.     PHQ-9 SCORE 8/13/2018 9/21/2018   Total Score MyChart 9 (Mild depression) 14 (Moderate depression)   Total Score 9 14     MALCOM-7 SCORE 9/21/2018   Total Score 9 (mild anxiety)   Total Score 9                Passed - Patient is age 18 or older       Passed - No active pregnancy on record       Passed - No positive pregnancy test in last 12 months       Passed - Recent (6 mo) or future (30 days) visit within the authorizing provider's specialty    Patient had office visit in the last 6 months or has a visit in the next 30 days with authorizing provider or within the authorizing provider's specialty.  See \"Patient Info\" tab in inbasket, or \"Choose Columns\" in Meds & Orders section of the refill encounter.     Last Written Prescription Date:  9/21/18  Last Fill Quantity: 90,  # refills: 1   Last office visit: 10/30/2018 with prescribing provider:     Future Office Visit:                 "

## 2018-11-08 ENCOUNTER — HOSPITAL ENCOUNTER (OUTPATIENT)
Dept: CARDIOLOGY | Facility: CLINIC | Age: 38
Discharge: HOME | End: 2018-11-08
Attending: NURSE PRACTITIONER | Admitting: NURSE PRACTITIONER
Payer: COMMERCIAL

## 2018-11-08 DIAGNOSIS — R00.2 PALPITATIONS: ICD-10-CM

## 2018-11-08 PROCEDURE — 0298T ZZC EXT ECG > 48HR TO 21 DAY REVIEW AND INTERPRETATN: CPT | Performed by: INTERNAL MEDICINE

## 2018-11-08 PROCEDURE — 0296T ZIO PATCH HOLTER: CPT

## 2018-11-20 ENCOUNTER — MYC MEDICAL ADVICE (OUTPATIENT)
Dept: FAMILY MEDICINE | Facility: CLINIC | Age: 38
End: 2018-11-20

## 2018-11-29 DIAGNOSIS — R07.89 CHEST PRESSURE: Primary | ICD-10-CM

## 2018-12-03 ENCOUNTER — MYC MEDICAL ADVICE (OUTPATIENT)
Dept: FAMILY MEDICINE | Facility: CLINIC | Age: 38
End: 2018-12-03

## 2018-12-05 ENCOUNTER — OFFICE VISIT (OUTPATIENT)
Dept: PODIATRY | Facility: CLINIC | Age: 38
End: 2018-12-05
Payer: COMMERCIAL

## 2018-12-05 VITALS
HEIGHT: 70 IN | WEIGHT: 220 LBS | SYSTOLIC BLOOD PRESSURE: 109 MMHG | DIASTOLIC BLOOD PRESSURE: 72 MMHG | BODY MASS INDEX: 31.5 KG/M2 | HEART RATE: 92 BPM

## 2018-12-05 DIAGNOSIS — M79.89 MASS OF SOFT TISSUE: Primary | ICD-10-CM

## 2018-12-05 PROCEDURE — 99203 OFFICE O/P NEW LOW 30 MIN: CPT | Performed by: PODIATRIST

## 2018-12-05 NOTE — PROGRESS NOTES
PATIENT HISTORY:  Kristel Martinez is a 38 year old female who presents to clinic for a painful left foot .  The patient describes having a lump on the heel of the left foot.    REVIEW OF SYSTEMS:  Constitutional, HEENT, cardiovascular, pulmonary, GI, , musculoskeletal, neuro, skin, endocrine and psych systems are negative, except as otherwise noted.     PAST MEDICAL HISTORY:   Past Medical History:   Diagnosis Date     Depressive disorder         PAST SURGICAL HISTORY:   Past Surgical History:   Procedure Laterality Date     COLONOSCOPY  8-27/18     THYROIDECTOMY      2015     TONSILLECTOMY       TUBAL LIGATION          MEDICATIONS:   Current Outpatient Prescriptions:      hydrocortisone (ANUSOL-HC) 2.5 % cream, Place rectally 2 times daily as needed for hemorrhoids, Disp: 30 g, Rfl: 0     levothyroxine (SYNTHROID/LEVOTHROID) 150 MCG tablet, Take 1 tablet (150 mcg) by mouth daily, Disp: 90 tablet, Rfl: 3     metroNIDAZOLE 0.75 % LOTN, Externally apply topically daily, Disp: 59 mL, Rfl: 1     sertraline (ZOLOFT) 100 MG tablet, Take 2 tablets (200 mg) by mouth daily, Disp: 180 tablet, Rfl: 1     SUMAtriptan (IMITREX) 25 MG tablet, TAKE 1 TO 2 TABLETS BY  MOUTH AT ONSET OF HEADACHE  FOR MIGRAINE MAY REPEAT IN  2 HOURS. MAX 8 TABLETS IN  24 HOURS., Disp: 18 tablet, Rfl: 0     [DISCONTINUED] sertraline (ZOLOFT) 100 MG tablet, TAKE 2 TABLETS BY MOUTH  DAILY, Disp: 180 tablet, Rfl: 0     ALLERGIES:    Allergies   Allergen Reactions     Cephalosporins Rash     Cephalexin     Sulfa Drugs Rash        SOCIAL HISTORY:   Social History     Social History     Marital status:      Spouse name: N/A     Number of children: N/A     Years of education: N/A     Occupational History     Not on file.     Social History Main Topics     Smoking status: Former Smoker     Smokeless tobacco: Never Used     Alcohol use Yes      Comment: 1 drink per wk     Drug use: No     Sexual activity: Yes     Partners: Male     Other Topics Concern  "    Not on file     Social History Narrative        FAMILY HISTORY:   Family History   Problem Relation Age of Onset     Hypertension Father      Arrhythmia Father      Lymphoma Maternal Grandmother      Diabetes Paternal Grandfather      type 1      Asthma Son      Arthritis Daughter         EXAM:Vitals: /72 (BP Location: Right arm, Patient Position: Sitting, Cuff Size: Adult Large)  Pulse 92  Ht 5' 10\" (1.778 m)  Wt 220 lb (99.8 kg)  BMI 31.57 kg/m2  BMI= Body mass index is 31.57 kg/(m^2).    Weight management plan: Patient was referred to their PCP to discuss a diet and exercise plan.    General appearance: Patient is alert and fully cooperative with history & exam.  No sign of distress is noted during the visit.     Psychiatric: Affect is pleasant & appropriate.  Patient appears motivated to improve health.     Respiratory: Breathing is regular & unlabored while sitting.     HEENT: Hearing is intact to spoken word.  Speech is clear.  No gross evidence of visual impairment that would impact ambulation.     Dermatologic: Skin is intact to both lower extremities without significant lesions, rash or abrasion.  No paronychia or evidence of soft tissue infection is noted.     Vascular: DP & PT pulses are intact & regular bilaterally.  No significant edema or varicosities noted.  CFT and skin temperature is normal to both lower extremities.     Neurologic: Lower extremity sensation is intact to light touch.  No evidence of weakness or contracture in the lower extremities.  No evidence of neuropathy.     Musculoskeletal: Patient is ambulatory without assistive device or brace.  No gross ankle deformity noted.  No foot or ankle joint effusion is noted.  One can palpate a small area of fullness located on the plantar instep of the left heel.  No surrounding erythema noted.         ASSESSMENT / PLAN:     ICD-10-CM    1. Mass of soft tissue M79.9     left heel       I have explained to Kristel  about the conditions. "  We discussed the nature of the condition as well as the treatment plan and expected length of recovery.  At this time, the patient will obtain an MRI of the left heel for further evaluation of soft tissue mass.  Patient will be notified of the results.      Disclaimer: This note consists of symbols derived from keyboarding, dictation and/or voice recognition software. As a result, there may be errors in the script that have gone undetected. Please consider this when interpreting information found in this chart.       DIONI Pearson D.P.M., F.AMREK.C.F.A.S.

## 2018-12-05 NOTE — LETTER
12/5/2018         RE: Kristel Martinez  [address] [facility] [address] [facility] [address]        Dear Colleague,    Thank you for referring your patient, Kristel Martinez, to the [facility]. Please see a copy of my visit note below.    PATIENT HISTORY:  Kristel Martinez is a 38 year old female who presents to clinic for a painful left foot .  The patient describes having a lump on the heel of the left foot.    REVIEW OF SYSTEMS:  Constitutional, HEENT, cardiovascular, pulmonary, GI, , musculoskeletal, neuro, skin, endocrine and psych systems are negative, except as otherwise noted.     PAST MEDICAL HISTORY:   Past Medical History:   Diagnosis Date     Depressive disorder         PAST SURGICAL HISTORY:   Past Surgical History:   Procedure Laterality Date     COLONOSCOPY  8-27/18     THYROIDECTOMY      2015     TONSILLECTOMY       TUBAL LIGATION          MEDICATIONS:   Current Outpatient Prescriptions:      hydrocortisone (ANUSOL-HC) 2.5 % cream, Place rectally 2 times daily as needed for hemorrhoids, Disp: 30 g, Rfl: 0     levothyroxine (SYNTHROID/LEVOTHROID) 150 MCG tablet, Take 1 tablet (150 mcg) by mouth daily, Disp: 90 tablet, Rfl: 3     metroNIDAZOLE 0.75 % LOTN, Externally apply topically daily, Disp: 59 mL, Rfl: 1     sertraline (ZOLOFT) 100 MG tablet, Take 2 tablets (200 mg) by mouth daily, Disp: 180 tablet, Rfl: 1     SUMAtriptan (IMITREX) 25 MG tablet, TAKE 1 TO 2 TABLETS BY  MOUTH AT ONSET OF HEADACHE  FOR MIGRAINE MAY REPEAT IN  2 HOURS. MAX 8 TABLETS IN  24 HOURS., Disp: 18 tablet, Rfl: 0     [DISCONTINUED] sertraline (ZOLOFT) 100 MG tablet, TAKE 2 TABLETS BY MOUTH  DAILY, Disp: 180 tablet, Rfl: 0     ALLERGIES:    Allergies   Allergen Reactions     Cephalosporins Rash     Cephalexin     Sulfa Drugs Rash        SOCIAL HISTORY:   Social History     Social History     Marital status:      Spouse name: N/A     Number of children: N/A     Years of education: N/A     Occupational History     Not on  "file.     Social History Main Topics     Smoking status: Former Smoker     Smokeless tobacco: Never Used     Alcohol use Yes      Comment: 1 drink per wk     Drug use: No     Sexual activity: Yes     Partners: Male     Other Topics Concern     Not on file     Social History Narrative        FAMILY HISTORY:   Family History   Problem Relation Age of Onset     Hypertension Father      Arrhythmia Father      Lymphoma Maternal Grandmother      Diabetes Paternal Grandfather      type 1      Asthma Son      Arthritis Daughter         EXAM:Vitals: /72 (BP Location: Right arm, Patient Position: Sitting, Cuff Size: Adult Large)  Pulse 92  Ht 5' 10\" (1.778 m)  Wt 220 lb (99.8 kg)  BMI 31.57 kg/m2  BMI= Body mass index is 31.57 kg/(m^2).    Weight management plan: Patient was referred to their PCP to discuss a diet and exercise plan.    General appearance: Patient is alert and fully cooperative with history & exam.  No sign of distress is noted during the visit.     Psychiatric: Affect is pleasant & appropriate.  Patient appears motivated to improve health.     Respiratory: Breathing is regular & unlabored while sitting.     HEENT: Hearing is intact to spoken word.  Speech is clear.  No gross evidence of visual impairment that would impact ambulation.     Dermatologic: Skin is intact to both lower extremities without significant lesions, rash or abrasion.  No paronychia or evidence of soft tissue infection is noted.     Vascular: DP & PT pulses are intact & regular bilaterally.  No significant edema or varicosities noted.  CFT and skin temperature is normal to both lower extremities.     Neurologic: Lower extremity sensation is intact to light touch.  No evidence of weakness or contracture in the lower extremities.  No evidence of neuropathy.     Musculoskeletal: Patient is ambulatory without assistive device or brace.  No gross ankle deformity noted.  No foot or ankle joint effusion is noted.  One can palpate a " small area of fullness located on the plantar instep of the left heel.  No surrounding erythema noted.         ASSESSMENT / PLAN:     ICD-10-CM    1. Mass of soft tissue M79.9     left heel       I have explained to Kristel  about the conditions.  We discussed the nature of the condition as well as the treatment plan and expected length of recovery.  At this time, the patient will obtain an MRI of the left heel for further evaluation of soft tissue mass.  Patient will be notified of the results.      Disclaimer: This note consists of symbols derived from keyboarding, dictation and/or voice recognition software. As a result, there may be errors in the script that have gone undetected. Please consider this when interpreting information found in this chart.       TARA To.SANTA.RIKY., F.A.C.F.A.S.        Again, thank you for allowing me to participate in the care of your patient.        Sincerely,        Nile Pearson DPM

## 2018-12-08 ENCOUNTER — HOSPITAL ENCOUNTER (OUTPATIENT)
Dept: MRI IMAGING | Facility: CLINIC | Age: 38
Discharge: HOME | End: 2018-12-08
Attending: PODIATRIST | Admitting: PODIATRIST
Payer: COMMERCIAL

## 2018-12-08 DIAGNOSIS — M79.89 MASS OF SOFT TISSUE: ICD-10-CM

## 2018-12-08 PROCEDURE — 73721 MRI JNT OF LWR EXTRE W/O DYE: CPT | Mod: LT

## 2018-12-27 DIAGNOSIS — F33.1 MAJOR DEPRESSIVE DISORDER, RECURRENT EPISODE, MODERATE (H): ICD-10-CM

## 2018-12-27 DIAGNOSIS — R51.9 HEADACHE DISORDER: ICD-10-CM

## 2018-12-28 RX ORDER — SUMATRIPTAN 25 MG/1
TABLET, FILM COATED ORAL
Qty: 18 TABLET | Refills: 0 | Status: SHIPPED | OUTPATIENT
Start: 2018-12-28 | End: 2019-03-15

## 2018-12-28 RX ORDER — SERTRALINE HYDROCHLORIDE 100 MG/1
TABLET, FILM COATED ORAL
Qty: 180 TABLET | Refills: 0 | Status: SHIPPED | OUTPATIENT
Start: 2018-12-28 | End: 2019-03-15

## 2018-12-28 NOTE — TELEPHONE ENCOUNTER
"Requested Prescriptions   Pending Prescriptions Disp Refills     sertraline (ZOLOFT) 100 MG tablet [Pharmacy Med Name: SERTRALINE HCL 100MG TABLET] 180 tablet 1     Sig: TAKE 2 TABLETS BY MOUTH  DAILY    SSRIs Protocol Failed - 12/27/2018  3:01 PM       Failed - PHQ-9 score less than 5 in past 6 months    Please review last PHQ-9 score.          Passed - Patient is age 18 or older       Passed - No active pregnancy on record       Passed - No positive pregnancy test in last 12 months       Passed - Recent (6 mo) or future (30 days) visit within the authorizing provider's specialty    Patient had office visit in the last 6 months or has a visit in the next 30 days with authorizing provider or within the authorizing provider's specialty.  See \"Patient Info\" tab in inbasket, or \"Choose Columns\" in Meds & Orders section of the refill encounter.            SUMAtriptan (IMITREX) 25 MG tablet [Pharmacy Med Name: SUMATRIPTAN  25MG  TAB] 18 tablet 0     Sig: TAKE 1 TO 2 TABLETS BY  MOUTH AT ONSET OF HEADACHE  FOR MIGRAINE MAY REPEAT IN  2 HOURS. MAX 8 TABLETS IN  24 HOURS.    Serotonin Agonists Failed - 12/27/2018  3:01 PM       Failed - Serotonin Agonist request needs review.    Please review patient's record. If patient has had 8 or more treatments in the past month, please forward to provider.         Passed - Blood pressure under 140/90 in past 12 months    BP Readings from Last 3 Encounters:   12/05/18 109/72   10/30/18 110/68   09/21/18 100/62                Passed - Recent (12 mo) or future (30 days) visit within the authorizing provider's specialty    Patient had office visit in the last 12 months or has a visit in the next 30 days with authorizing provider or within the authorizing provider's specialty.  See \"Patient Info\" tab in inbasket, or \"Choose Columns\" in Meds & Orders section of the refill encounter.             Passed - Patient is age 18 or older       Passed - No active pregnancy on record       Passed - " No positive pregnancy test in past 12 months        sertraline (ZOLOFT) 100 MG tablet  Last Written Prescription Date:  09/21/2018  Last Fill Quantity: 180 tablet,  # refills: 1   Last office visit: 10/30/2018 with prescribing provider:  DICK Nogueira   Future Office Visit:      SUMAtriptan (IMITREX) 25 MG tablet  Last Written Prescription Date:  08/16/2018  Last Fill Quantity: 18 tablet,  # refills: 0   Last office visit: 10/30/2018 with prescribing provider:  DICK Nogueira   Future Office Visit:      PHQ-9 SCORE 8/13/2018 9/21/2018   PHQ-9 Total Score MyChart 9 (Mild depression) 14 (Moderate depression)   PHQ-9 Total Score 9 14     MALCOM-7 SCORE 9/21/2018   Total Score 9 (mild anxiety)   Total Score 9       Elizabeth STALLINGS (R) (M)

## 2019-01-25 ENCOUNTER — OFFICE VISIT (OUTPATIENT)
Dept: FAMILY MEDICINE | Facility: CLINIC | Age: 39
End: 2019-01-25
Payer: COMMERCIAL

## 2019-01-25 VITALS
SYSTOLIC BLOOD PRESSURE: 105 MMHG | TEMPERATURE: 99.4 F | WEIGHT: 222 LBS | DIASTOLIC BLOOD PRESSURE: 71 MMHG | HEART RATE: 80 BPM | BODY MASS INDEX: 31.85 KG/M2 | RESPIRATION RATE: 14 BRPM | OXYGEN SATURATION: 98 %

## 2019-01-25 DIAGNOSIS — J02.9 SORE THROAT: ICD-10-CM

## 2019-01-25 DIAGNOSIS — J06.9 VIRAL UPPER RESPIRATORY TRACT INFECTION: Primary | ICD-10-CM

## 2019-01-25 DIAGNOSIS — Z20.828 EXPOSURE TO THE FLU: ICD-10-CM

## 2019-01-25 LAB
DEPRECATED S PYO AG THROAT QL EIA: NORMAL
FLUAV+FLUBV AG SPEC QL: NEGATIVE
FLUAV+FLUBV AG SPEC QL: NEGATIVE
SPECIMEN SOURCE: NORMAL
SPECIMEN SOURCE: NORMAL

## 2019-01-25 PROCEDURE — 87081 CULTURE SCREEN ONLY: CPT | Performed by: PHYSICIAN ASSISTANT

## 2019-01-25 PROCEDURE — 87804 INFLUENZA ASSAY W/OPTIC: CPT | Mod: 59 | Performed by: PHYSICIAN ASSISTANT

## 2019-01-25 PROCEDURE — 87880 STREP A ASSAY W/OPTIC: CPT | Performed by: PHYSICIAN ASSISTANT

## 2019-01-25 PROCEDURE — 99213 OFFICE O/P EST LOW 20 MIN: CPT | Performed by: PHYSICIAN ASSISTANT

## 2019-01-25 ASSESSMENT — ENCOUNTER SYMPTOMS
SHORTNESS OF BREATH: 0
NAUSEA: 0
EYE REDNESS: 0
EYE DISCHARGE: 0
COUGH: 1
SORE THROAT: 1
WHEEZING: 0
DIARRHEA: 0
PALPITATIONS: 0
FEVER: 1
CHILLS: 0
MYALGIAS: 0
VOMITING: 0
HEADACHES: 0
BLURRED VISION: 0
ABDOMINAL PAIN: 0

## 2019-01-25 ASSESSMENT — PAIN SCALES - GENERAL: PAINLEVEL: MODERATE PAIN (5)

## 2019-01-25 NOTE — PROGRESS NOTES
SUBJECTIVE:   Kristel Martinez is a 38 year old female who presents to clinic today for the following health issues:      ENT Symptoms             Symptoms: cc Present Absent Comment   Fever/Chills  x     Fatigue  x     Muscle Aches  x     Eye Irritation   x    Sneezing   x    Nasal Thanh/Drg  x     Sinus Pressure/Pain   x    Loss of smell   x    Dental pain   x    Sore Throat x      Swollen Glands  x     Ear Pain/Fullness  x     Cough  x  Little bit   Wheeze   x    Chest Pain   x    Shortness of breath  x     Rash   x    Other         Symptom duration:  3 days   Symptom severity:  severe   Treatments tried:  dayquil   Contacts:  works at school, daughter has influenza A         Problem list and histories reviewed & adjusted, as indicated.  Additional history: as documented    Patient Active Problem List   Diagnosis     Major depressive disorder, recurrent episode, moderate (H)     Papillary adenocarcinoma of thyroid (H)     Postoperative hypothyroidism     Vitiligo     Vitamin D deficiency     IgA deficiency (H)     Past Surgical History:   Procedure Laterality Date     COLONOSCOPY  8-27/18     THYROIDECTOMY      2015     TONSILLECTOMY       TUBAL LIGATION         Social History     Tobacco Use     Smoking status: Former Smoker     Smokeless tobacco: Never Used   Substance Use Topics     Alcohol use: Yes     Comment: 1 drink per wk     Family History   Problem Relation Age of Onset     Hypertension Father      Arrhythmia Father      Lymphoma Maternal Grandmother      Diabetes Paternal Grandfather         type 1      Asthma Son      Arthritis Daughter          Current Outpatient Medications   Medication Sig Dispense Refill     hydrocortisone (ANUSOL-HC) 2.5 % cream Place rectally 2 times daily as needed for hemorrhoids 30 g 0     levothyroxine (SYNTHROID/LEVOTHROID) 150 MCG tablet Take 1 tablet (150 mcg) by mouth daily 90 tablet 3     metroNIDAZOLE 0.75 % LOTN Externally apply topically daily 59 mL 1     sertraline  (ZOLOFT) 100 MG tablet TAKE 2 TABLETS BY MOUTH  DAILY 180 tablet 0     SUMAtriptan (IMITREX) 25 MG tablet TAKE 1 TO 2 TABLETS BY  MOUTH AT ONSET OF HEADACHE  FOR MIGRAINE MAY REPEAT IN  2 HOURS. MAX 8 TABLETS IN  24 HOURS. 18 tablet 0     Allergies   Allergen Reactions     Cephalosporins Rash     Cephalexin     Sulfa Drugs Rash     Labs reviewed in EPIC    Reviewed and updated as needed this visit by clinical staff  Tobacco  Allergies  Meds  Problems  Med Hx  Surg Hx  Fam Hx       Reviewed and updated as needed this visit by Provider  Tobacco  Allergies  Meds  Problems  Med Hx  Surg Hx  Fam Hx         ROS:  Review of Systems   Constitutional: Positive for fever and malaise/fatigue. Negative for chills.   HENT: Positive for congestion and sore throat. Negative for ear pain.    Eyes: Negative for blurred vision, discharge and redness.   Respiratory: Positive for cough. Negative for shortness of breath and wheezing.    Cardiovascular: Negative for chest pain and palpitations.   Gastrointestinal: Negative for abdominal pain, diarrhea, nausea and vomiting.   Musculoskeletal: Negative for joint pain and myalgias.   Skin: Negative for rash.   Neurological: Negative for headaches.         OBJECTIVE:     /71   Pulse 80   Temp 99.4  F (37.4  C) (Tympanic)   Resp 14   Wt 100.7 kg (222 lb)   SpO2 98%   BMI 31.85 kg/m    Body mass index is 31.85 kg/m .    Physical Exam   Constitutional: She appears well-developed and well-nourished.   HENT:   Head: Normocephalic.   Right Ear: Tympanic membrane and ear canal normal.   Left Ear: Tympanic membrane and ear canal normal.   Mouth/Throat: Posterior oropharyngeal erythema present. No oropharyngeal exudate.   Eyes: Conjunctivae are normal. Pupils are equal, round, and reactive to light.   Cardiovascular: Normal rate and normal heart sounds.   Pulmonary/Chest: Effort normal and breath sounds normal.   Skin: Skin is warm and dry. No rash noted.   Psychiatric: She  has a normal mood and affect. Her behavior is normal.       Diagnostic Test Results:  Strep screen - Negative  Influenza- Negative     ASSESSMENT/PLAN:       1. Viral upper respiratory tract infection  Rapid strep and influenza are negative. This is likely viral. Continue with supportive care. Get plenty of rest and push fluids. Can use Tylenol and/or ibuprofen as needed for pain and/or fever control. Allow 7-10 days for symptoms to improve. Follow up as needed.      2. Sore throat    - Strep, Rapid Screen  - Beta strep group A culture    3. Exposure to the flu    - Influenza A/B antigen       ELLE Haile SAME DAY PROVIDER  [facility]

## 2019-01-25 NOTE — LETTER
[facility]  [address] [place] [id number]  Phone: [phone]  Fax: [phone]    January 25, 2019        Kristel Martinez  [address] [place] [address] [address] [facility] [address]          To whom it may concern:    RE: Kristel Martinez    Patient was seen and treated today at our clinic and missed work 01/24/19 and 01/25/19    Please contact me for questions or concerns.      Sincerely,        Tess Laboy PA-C

## 2019-01-25 NOTE — NURSING NOTE
"Chief Complaint   Patient presents with     Pharyngitis     fever, body aches       Initial /71   Pulse 80   Temp 99.4  F (37.4  C) (Tympanic)   Resp 14   Wt 100.7 kg (222 lb)   SpO2 98%   BMI 31.85 kg/m   Estimated body mass index is 31.85 kg/m  as calculated from the following:    Height as of 12/5/18: 1.778 m (5' 10\").    Weight as of this encounter: 100.7 kg (222 lb).    Patient presents to the clinic using No DME    Health Maintenance that is potentially due pending provider review:  NONE    n/a    Is there anyone who you would like to be able to receive your results? No  If yes have patient fill out GILBERT      "

## 2019-01-26 LAB
BACTERIA SPEC CULT: NORMAL
SPECIMEN SOURCE: NORMAL

## 2019-02-18 ENCOUNTER — OFFICE VISIT (OUTPATIENT)
Dept: CARDIOLOGY | Facility: CLINIC | Age: 39
End: 2019-02-18
Payer: COMMERCIAL

## 2019-02-18 VITALS
OXYGEN SATURATION: 98 % | DIASTOLIC BLOOD PRESSURE: 71 MMHG | SYSTOLIC BLOOD PRESSURE: 111 MMHG | HEART RATE: 74 BPM | BODY MASS INDEX: 32.14 KG/M2 | WEIGHT: 224 LBS

## 2019-02-18 DIAGNOSIS — R00.2 PALPITATIONS: Primary | ICD-10-CM

## 2019-02-18 PROCEDURE — 99204 OFFICE O/P NEW MOD 45 MIN: CPT | Performed by: INTERNAL MEDICINE

## 2019-02-18 NOTE — PROGRESS NOTES
HPI and Plan:   Today I had the pleasure of seeing Kristel Martinez at [facility] in [place]. She is a pleasant 38 year old patient with a past medical history of thyroid cancer status post thyroidectomy   In 2013, now with hypothyroidism who presents to the clinic for complaints of palpitations.    The patient underwent placement of 3 day zio patch which showed rare PACs and PVCs.  Monitor activation was sometimes correlated with PACs.  There were no significant arrhythmias noted on the study.  The patient today reports that she occasionally develops palpitations.  It is accompanied with shortness of breath.  This lasted for a couple of seconds.  There are no aggravating or relieving factors.  She does not become lightheaded or dizzy during these episodes.  She does not take any stimulants or drink coffee during the day.  She does not have any family history of significant arrhythmias in any of her first-degree relatives.    Assessment and plan    1.  Palpitations  I discussed with the patient that PACs can be a frequent cause of palpitations.  However, I also reassured her that PACs are generally benign and do not have any long-term sequela.  I told her that PACs do not cause cardiomyopathy or heart failure and do not lead to any significant arrhythmias.  I discussed with her that we can start her on a low-dose of beta-blocker if the symptoms are limiting her lifestyle.  However, the patient does not want to start medications at this time.  I will will perform a transthoracic echocardiogram.  I will see her as needed.  She constantly has her thyroid levels checked and it is being monitored by her primary care physician.  I discussed with her that hypothyroidism can cause palpitations.     Thank you for allowing me to participate in the care of Kristel Martinez    Shane Ball MD  Cardiology        Orders Placed This Encounter   Procedures     Echocardiogram Complete       No orders of the defined  types were placed in this encounter.      Medications Discontinued During This Encounter   Medication Reason     amoxicillin-clavulanate (AUGMENTIN) 875-125 MG per tablet        Encounter Diagnosis   Name Primary?     Palpitations Yes       CURRENT MEDICATIONS:  Current Outpatient Medications   Medication Sig Dispense Refill     levothyroxine (SYNTHROID/LEVOTHROID) 150 MCG tablet Take 1 tablet (150 mcg) by mouth daily 90 tablet 3     metroNIDAZOLE 0.75 % LOTN Externally apply topically daily 59 mL 1     sertraline (ZOLOFT) 100 MG tablet TAKE 2 TABLETS BY MOUTH  DAILY 180 tablet 0     SUMAtriptan (IMITREX) 25 MG tablet TAKE 1 TO 2 TABLETS BY  MOUTH AT ONSET OF HEADACHE  FOR MIGRAINE MAY REPEAT IN  2 HOURS. MAX 8 TABLETS IN  24 HOURS. 18 tablet 0     hydrocortisone (ANUSOL-HC) 2.5 % cream Place rectally 2 times daily as needed for hemorrhoids (Patient not taking: Reported on 2/18/2019) 30 g 0       ALLERGIES     Allergies   Allergen Reactions     Cephalosporins Rash     Cephalexin     Sulfa Drugs Rash       PAST MEDICAL HISTORY:  Past Medical History:   Diagnosis Date     Depressive disorder        PAST SURGICAL HISTORY:  Past Surgical History:   Procedure Laterality Date     COLONOSCOPY  8-27/18     THYROIDECTOMY      2015     TONSILLECTOMY       TUBAL LIGATION         FAMILY HISTORY:  Family History   Problem Relation Age of Onset     Hypertension Father      Arrhythmia Father      Lymphoma Maternal Grandmother      Diabetes Paternal Grandfather         type 1      Asthma Son      Arthritis Daughter        SOCIAL HISTORY:  Social History     Socioeconomic History     Marital status:      Spouse name: None     Number of children: None     Years of education: None     Highest education level: None   Social Needs     Financial resource strain: None     Food insecurity - worry: None     Food insecurity - inability: None     Transportation needs - medical: None     Transportation needs - non-medical: None    Occupational History     None   Tobacco Use     Smoking status: Former Smoker     Smokeless tobacco: Never Used   Substance and Sexual Activity     Alcohol use: Yes     Comment: 1 drink per wk     Drug use: No     Sexual activity: Yes     Partners: Male   Other Topics Concern     Parent/sibling w/ CABG, MI or angioplasty before 65F 55M? Not Asked   Social History Narrative     None       Review of Systems:  Skin:  Negative       Eyes:  Positive for glasses    ENT:  Negative      Respiratory:  Positive for shortness of breath     Cardiovascular:    palpitations;Positive for;fatigue;lightheadedness;dizziness    Gastroenterology: Negative      Genitourinary:  Negative      Musculoskeletal:  Negative      Neurologic:  Negative      Psychiatric:  Positive for anxiety;depression    Heme/Lymph/Imm:  Negative      Endocrine:  Positive for thyroid disorder      Physical Exam:  Vitals: /71   Pulse 74   Wt 101.6 kg (224 lb)   SpO2 98%   BMI 32.14 kg/m      Constitutional:       Well-developed well-nourished    Skin:         No ulcers or excoriations    Head:       Normocephalic atraumatic    Eyes:       Normal conjunctiva and sclera    Lymph:  No cervical lymphadenopathy    ENT:       Normal external ears    Neck:       No JVD    Respiratory:        Good air entry bilaterally, no accessory muscle use, bronchial vesicular breath sounds on all auscultated areas, no crackles or wheezes    Cardiac:                Regular rate and rhythm, normal S1-S2, no murmur border.                                        GI:       Soft, non-distended    Extremities and Muscular Skeletal:             Normal range of motion of all 4 extremities    Neurological:       No sensory deficits    Psych:     Normal mood and affect      Recent Lab Results:  LIPID RESULTS:    LIVER ENZYME RESULTS:  Lab Results   Component Value Date    AST 11 10/30/2018    ALT 15 10/30/2018       CBC RESULTS:  Lab Results   Component Value Date    WBC 4.7  10/30/2018    RBC 4.73 10/30/2018    HGB 13.0 10/30/2018    HCT 39.1 10/30/2018    MCV 83 10/30/2018    MCH 27.5 10/30/2018    MCHC 33.2 10/30/2018    RDW 13.3 10/30/2018     10/30/2018       BMP RESULTS:  Lab Results   Component Value Date     10/30/2018    POTASSIUM 4.2 10/30/2018    CHLORIDE 107 10/30/2018    CO2 27 10/30/2018    ANIONGAP 5 10/30/2018    GLC 84 10/30/2018    BUN 10 10/30/2018    CR 0.75 10/30/2018    GFRESTIMATED 87 10/30/2018    GFRESTBLACK >90 10/30/2018    VASQUEZ 8.3 (L) 10/30/2018        A1C RESULTS:  No results found for: A1C    INR RESULTS:  No results found for: INR    CC  DAVID Novak CNP  [address] [place] [id number]      All medical records were reviewed in detail and discussed with the patient. Greater than 60 mins were spent with the patient.  50% of this time was spent on counseling and coordination of care.  After visit summary was printed and given to the patient.

## 2019-02-18 NOTE — LETTER
2/18/2019    Yulissa Nogueira, APRN CNP  [address] [facility] [address]    RE: Kristel Hamptonsch       Dear Colleague,    I had the pleasure of seeing Kristel Martinez in the [facility].    HPI and Plan:   Today I had the pleasure of seeing Kristel Martinez at [facility] in [place]. She is a pleasant 38 year old patient with a past medical history of thyroid cancer status post thyroidectomy   In 2013, now with hypothyroidism who presents to the clinic for complaints of palpitations.    The patient underwent placement of 3 day zio patch which showed rare PACs and PVCs.  Monitor activation was sometimes correlated with PACs.  There were no significant arrhythmias noted on the study.  The patient today reports that she occasionally develops palpitations.  It is accompanied with shortness of breath.  This lasted for a couple of seconds.  There are no aggravating or relieving factors.  She does not become lightheaded or dizzy during these episodes.  She does not take any stimulants or drink coffee during the day.  She does not have any family history of significant arrhythmias in any of her first-degree relatives.    Assessment and plan    1.  Palpitations  I discussed with the patient that PACs can be a frequent cause of palpitations.  However, I also reassured her that PACs are generally benign and do not have any long-term sequela.  I told her that PACs do not cause cardiomyopathy or heart failure and do not lead to any significant arrhythmias.  I discussed with her that we can start her on a low-dose of beta-blocker if the symptoms are limiting her lifestyle.  However, the patient does not want to start medications at this time.  I will will perform a transthoracic echocardiogram.  I will see her as needed.  She constantly has her thyroid levels checked and it is being monitored by her primary care physician.  I discussed with her that hypothyroidism can cause  palpitations.     Thank you for allowing me to participate in the care of Kristel Martinez    Shane Ball MD  Cardiology        Orders Placed This Encounter   Procedures     Echocardiogram Complete       No orders of the defined types were placed in this encounter.      Medications Discontinued During This Encounter   Medication Reason     amoxicillin-clavulanate (AUGMENTIN) 875-125 MG per tablet        Encounter Diagnosis   Name Primary?     Palpitations Yes       CURRENT MEDICATIONS:  Current Outpatient Medications   Medication Sig Dispense Refill     levothyroxine (SYNTHROID/LEVOTHROID) 150 MCG tablet Take 1 tablet (150 mcg) by mouth daily 90 tablet 3     metroNIDAZOLE 0.75 % LOTN Externally apply topically daily 59 mL 1     sertraline (ZOLOFT) 100 MG tablet TAKE 2 TABLETS BY MOUTH  DAILY 180 tablet 0     SUMAtriptan (IMITREX) 25 MG tablet TAKE 1 TO 2 TABLETS BY  MOUTH AT ONSET OF HEADACHE  FOR MIGRAINE MAY REPEAT IN  2 HOURS. MAX 8 TABLETS IN  24 HOURS. 18 tablet 0     hydrocortisone (ANUSOL-HC) 2.5 % cream Place rectally 2 times daily as needed for hemorrhoids (Patient not taking: Reported on 2/18/2019) 30 g 0       ALLERGIES     Allergies   Allergen Reactions     Cephalosporins Rash     Cephalexin     Sulfa Drugs Rash       PAST MEDICAL HISTORY:  Past Medical History:   Diagnosis Date     Depressive disorder        PAST SURGICAL HISTORY:  Past Surgical History:   Procedure Laterality Date     COLONOSCOPY  8-27/18     THYROIDECTOMY      2015     TONSILLECTOMY       TUBAL LIGATION         FAMILY HISTORY:  Family History   Problem Relation Age of Onset     Hypertension Father      Arrhythmia Father      Lymphoma Maternal Grandmother      Diabetes Paternal Grandfather         type 1      Asthma Son      Arthritis Daughter        SOCIAL HISTORY:  Social History     Socioeconomic History     Marital status:      Spouse name: None     Number of children: None     Years of education: None     Highest education  level: None   Social Needs     Financial resource strain: None     Food insecurity - worry: None     Food insecurity - inability: None     Transportation needs - medical: None     Transportation needs - non-medical: None   Occupational History     None   Tobacco Use     Smoking status: Former Smoker     Smokeless tobacco: Never Used   Substance and Sexual Activity     Alcohol use: Yes     Comment: 1 drink per wk     Drug use: No     Sexual activity: Yes     Partners: Male   Other Topics Concern     Parent/sibling w/ CABG, MI or angioplasty before 65F 55M? Not Asked   Social History Narrative     None       Review of Systems:  Skin:  Negative       Eyes:  Positive for glasses    ENT:  Negative      Respiratory:  Positive for shortness of breath     Cardiovascular:    palpitations;Positive for;fatigue;lightheadedness;dizziness    Gastroenterology: Negative      Genitourinary:  Negative      Musculoskeletal:  Negative      Neurologic:  Negative      Psychiatric:  Positive for anxiety;depression    Heme/Lymph/Imm:  Negative      Endocrine:  Positive for thyroid disorder      Physical Exam:  Vitals: /71   Pulse 74   Wt 101.6 kg (224 lb)   SpO2 98%   BMI 32.14 kg/m       Constitutional:       Well-developed well-nourished    Skin:         No ulcers or excoriations    Head:       Normocephalic atraumatic    Eyes:       Normal conjunctiva and sclera    Lymph:  No cervical lymphadenopathy    ENT:       Normal external ears    Neck:       No JVD    Respiratory:        Good air entry bilaterally, no accessory muscle use, bronchial vesicular breath sounds on all auscultated areas, no crackles or wheezes    Cardiac:                Regular rate and rhythm, normal S1-S2, no murmur border.                                        GI:       Soft, non-distended    Extremities and Muscular Skeletal:             Normal range of motion of all 4 extremities    Neurological:       No sensory deficits    Psych:     Normal mood and  affect      Recent Lab Results:  LIPID RESULTS:    LIVER ENZYME RESULTS:  Lab Results   Component Value Date    AST 11 10/30/2018    ALT 15 10/30/2018       CBC RESULTS:  Lab Results   Component Value Date    WBC 4.7 10/30/2018    RBC 4.73 10/30/2018    HGB 13.0 10/30/2018    HCT 39.1 10/30/2018    MCV 83 10/30/2018    MCH 27.5 10/30/2018    MCHC 33.2 10/30/2018    RDW 13.3 10/30/2018     10/30/2018       BMP RESULTS:  Lab Results   Component Value Date     10/30/2018    POTASSIUM 4.2 10/30/2018    CHLORIDE 107 10/30/2018    CO2 27 10/30/2018    ANIONGAP 5 10/30/2018    GLC 84 10/30/2018    BUN 10 10/30/2018    CR 0.75 10/30/2018    GFRESTIMATED 87 10/30/2018    GFRESTBLACK >90 10/30/2018    VASQUEZ 8.3 (L) 10/30/2018        A1C RESULTS:  No results found for: A1C    INR RESULTS:  No results found for: INR    CC  DAVID Novak CNP  [address] [place], [place] [id number]      All medical records were reviewed in detail and discussed with the patient. Greater than 60 mins were spent with the patient.  50% of this time was spent on counseling and coordination of care.  After visit summary was printed and given to the patient.        Thank you for allowing me to participate in the care of your patient.      Sincerely,     Shane Ball MD     [facility]

## 2019-02-18 NOTE — PATIENT INSTRUCTIONS
"[facility]  [facility]~[address] [facility] Floor~[place], [place]~[address]  Thank you for your [facility] visit today. If you have questions regarding your visit, please contact your cardiology RN's, Luna Singh or Aaliyah Nathan, at [phone]. Your provider has recommended the following:  Medication Changes:  None today. Continue taking your medications as you have been.    Recommendations:  Echocardiogram next available, we will call you with the results.  Follow-up:  As needed.    To schedule a future appointment, we kindly ask that you call cardiology scheduling at [phone] three months prior to requested revisit date.  [facility] cardiology clinic is staffed with \"Advance Practice Providers\". These are our cardiology Physician Assistants and Nurse Practitioners. Please call cardiology scheduling if you feel you need clinical evaluation with them at any time for any cardiac reason.                 Reminder:    For your safety, we ask that you bring in your current medication(s) or an updated list of your medications with you to EACH office visit. Include the medication name, dose of pill on bottle and how you are taking it. Include over-the-counter medications or supplements. Your provider will review this at each visit and plan your care based on your current information.       "

## 2019-02-21 ENCOUNTER — HOSPITAL ENCOUNTER (OUTPATIENT)
Dept: CARDIOLOGY | Facility: CLINIC | Age: 39
Discharge: HOME | End: 2019-02-21
Attending: INTERNAL MEDICINE | Admitting: INTERNAL MEDICINE
Payer: COMMERCIAL

## 2019-02-21 DIAGNOSIS — R00.2 PALPITATIONS: ICD-10-CM

## 2019-02-21 PROCEDURE — 93306 TTE W/DOPPLER COMPLETE: CPT

## 2019-02-21 PROCEDURE — 93306 TTE W/DOPPLER COMPLETE: CPT | Mod: 26 | Performed by: INTERNAL MEDICINE

## 2019-03-12 ENCOUNTER — MYC MEDICAL ADVICE (OUTPATIENT)
Dept: FAMILY MEDICINE | Facility: CLINIC | Age: 39
End: 2019-03-12

## 2019-03-12 ASSESSMENT — PATIENT HEALTH QUESTIONNAIRE - PHQ9
10. IF YOU CHECKED OFF ANY PROBLEMS, HOW DIFFICULT HAVE THESE PROBLEMS MADE IT FOR YOU TO DO YOUR WORK, TAKE CARE OF THINGS AT HOME, OR GET ALONG WITH OTHER PEOPLE: SOMEWHAT DIFFICULT
SUM OF ALL RESPONSES TO PHQ QUESTIONS 1-9: 8
SUM OF ALL RESPONSES TO PHQ QUESTIONS 1-9: 8

## 2019-03-13 ASSESSMENT — PATIENT HEALTH QUESTIONNAIRE - PHQ9: SUM OF ALL RESPONSES TO PHQ QUESTIONS 1-9: 8

## 2019-03-15 DIAGNOSIS — E89.0 POSTOPERATIVE HYPOTHYROIDISM: ICD-10-CM

## 2019-03-15 DIAGNOSIS — F33.1 MAJOR DEPRESSIVE DISORDER, RECURRENT EPISODE, MODERATE (H): ICD-10-CM

## 2019-03-15 DIAGNOSIS — R51.9 HEADACHE DISORDER: ICD-10-CM

## 2019-03-15 RX ORDER — SERTRALINE HYDROCHLORIDE 100 MG/1
TABLET, FILM COATED ORAL
Qty: 180 TABLET | Refills: 0 | Status: SHIPPED | OUTPATIENT
Start: 2019-03-15 | End: 2019-06-28

## 2019-03-15 RX ORDER — SUMATRIPTAN 25 MG/1
TABLET, FILM COATED ORAL
Qty: 18 TABLET | Refills: 0 | Status: SHIPPED | OUTPATIENT
Start: 2019-03-15 | End: 2019-08-30

## 2019-03-15 RX ORDER — LEVOTHYROXINE SODIUM 150 UG/1
150 TABLET ORAL DAILY
Qty: 90 TABLET | Refills: 0 | Status: SHIPPED | OUTPATIENT
Start: 2019-03-15 | End: 2019-07-01

## 2019-03-15 NOTE — TELEPHONE ENCOUNTER
"Requested Prescriptions   Pending Prescriptions Disp Refills     SUMAtriptan (IMITREX) 25 MG tablet [Pharmacy Med Name: SUMATRIPTAN  25MG  TAB] 18 tablet 0     Sig: TAKE 1 TO 2 TABLETS BY  MOUTH AT ONSET OF HEADACHE  FOR MIGRAINE. MAY REPEAT IN 2 HOURS. MAX OF 8 TABLETS  IN 24 HOURS.    Serotonin Agonists Failed - 3/15/2019  8:46 AM       Failed - Serotonin Agonist request needs review.    Please review patient's record. If patient has had 8 or more treatments in the past month, please forward to provider.         Passed - Blood pressure under 140/90 in past 12 months    BP Readings from Last 3 Encounters:   02/18/19 111/71   01/25/19 105/71   12/05/18 109/72                Passed - Recent (12 mo) or future (30 days) visit within the authorizing provider's specialty    Patient had office visit in the last 12 months or has a visit in the next 30 days with authorizing provider or within the authorizing provider's specialty.  See \"Patient Info\" tab in inbasket, or \"Choose Columns\" in Meds & Orders section of the refill encounter.             Passed - Medication is active on med list       Passed - Patient is age 18 or older       Passed - No active pregnancy on record       Passed - No positive pregnancy test in past 12 months        sertraline (ZOLOFT) 100 MG tablet [Pharmacy Med Name: SERTRALINE HCL 100MG TABLET] 180 tablet 0     Sig: TAKE 2 TABLETS BY MOUTH  DAILY    SSRIs Protocol Failed - 3/15/2019  8:46 AM       Failed - PHQ-9 score less than 5 in past 6 months    Please review last PHQ-9 score.          Passed - Medication is active on med list       Passed - Patient is age 18 or older       Passed - No active pregnancy on record       Passed - No positive pregnancy test in last 12 months       Passed - Recent (6 mo) or future (30 days) visit within the authorizing provider's specialty    Patient had office visit in the last 6 months or has a visit in the next 30 days with authorizing provider or within the " "authorizing provider's specialty.  See \"Patient Info\" tab in inbasket, or \"Choose Columns\" in Meds & Orders section of the refill encounter.            Sertraline 100 mg tab      Last Written Prescription Date:  12/28/18  Last Fill Quantity: 180,   # refills: 0  Last Office Visit: 1/25/19   Future Office visit:       Sumatriptan 25 mg tab      Last Written Prescription Date:  12/28/18  Last Fill Quantity: 18,   # refills: 0  Last Office Visit: 1/25/19  Future Office visit:         "

## 2019-03-15 NOTE — TELEPHONE ENCOUNTER
Faxed refill request received from   [facility] - [address]  [address] [place] [address]  [address]  [facility] [address]  Phone: [phone] Fax: [phone]  Medication Requested: Levothyroxine  Directions: take 1 tab daily  Quantity: 90  Last Office Visit: 5/25/18  Next Appointment Scheduled for: None scheduled    Marbella Estrada CMA  Adult Endocrinology  [facility]

## 2019-03-18 NOTE — TELEPHONE ENCOUNTER
1st Attempt LVM for Kristel to call back to schedule her yearly follow up appointment with Dr Jorgensen. I asked her to call our office at [phone] to schedule this appointment.     Cheng Khan  Procedure , [place] [facility] Specialty and Adult Endocrinology

## 2019-03-27 NOTE — TELEPHONE ENCOUNTER
2nd Attempt: Letter sent to Kristel to call back to schedule her routine follow up appointment with Dr Jorgensen.  Patient is due to be seen May 2019 for an office visit and an ultrasound of her neck .  I asked Kristel to call [phone] to schedule this appointment.     Cheng Khan  Procedure , [place]  [facility] Specialty and Adult Endocrinology

## 2019-04-08 NOTE — TELEPHONE ENCOUNTER
3rd Attempt: LVM for Kristel to call back to schedule her routine follow up appointment with Dr Jorgensen.  Patient is due to be seen May 2019 .  I asked Kristel to call [phone] to schedule this appointment.       Cheng Khan  Procedure , [place] [facility] Specialty and Adult Endocrinology

## 2019-04-29 ENCOUNTER — MYC MEDICAL ADVICE (OUTPATIENT)
Dept: FAMILY MEDICINE | Facility: CLINIC | Age: 39
End: 2019-04-29

## 2019-04-29 DIAGNOSIS — F33.1 MAJOR DEPRESSIVE DISORDER, RECURRENT EPISODE, MODERATE (H): Primary | ICD-10-CM

## 2019-06-17 NOTE — PROGRESS NOTES
Subjective     Kristel Martinez is a 38 year old female who presents to clinic today for the following health issues:    HPI   Musculoskeletal problem/pain      Duration: 1 month     Description  Location: right shoulder     Intensity:  moderate    Accompanying signs and symptoms: pain with ROM or lifting heavy. Pain down right arm     History  Previous similar problem: yes - bursitis in past   Previous evaluation:  x-ray     Precipitating or alleviating factors:  Trauma or overuse: no     Therapies tried and outcome: rest/inactivity    Patient Active Problem List   Diagnosis     Major depressive disorder, recurrent episode, moderate (H)     Papillary adenocarcinoma of thyroid (H)     Postoperative hypothyroidism     Vitiligo     Vitamin D deficiency     IgA deficiency (H)     Past Surgical History:   Procedure Laterality Date     COLONOSCOPY  8-27/18     THYROIDECTOMY      2015     TONSILLECTOMY       TUBAL LIGATION         Social History     Tobacco Use     Smoking status: Former Smoker     Smokeless tobacco: Never Used   Substance Use Topics     Alcohol use: Yes     Comment: 1 drink per wk     Family History   Problem Relation Age of Onset     Hypertension Father      Arrhythmia Father      Lymphoma Maternal Grandmother      Diabetes Paternal Grandfather         type 1      Asthma Son      Arthritis Daughter          Current Outpatient Medications   Medication Sig Dispense Refill     hydrocortisone (ANUSOL-HC) 2.5 % cream Place rectally 2 times daily as needed for hemorrhoids 30 g 0     levothyroxine (SYNTHROID/LEVOTHROID) 150 MCG tablet Take 1 tablet (150 mcg) by mouth daily 90 tablet 0     sertraline (ZOLOFT) 100 MG tablet TAKE 2 TABLETS BY MOUTH  DAILY 180 tablet 0     SUMAtriptan (IMITREX) 25 MG tablet TAKE 1 TO 2 TABLETS BY  MOUTH AT ONSET OF HEADACHE  FOR MIGRAINE. MAY REPEAT IN 2 HOURS. MAX OF 8 TABLETS  IN 24 HOURS. 18 tablet 0     metroNIDAZOLE (METROLOTION) 0.75 % external lotion Apply topically daily 59 mL  "1     Allergies   Allergen Reactions     Cephalosporins Rash     Cephalexin     Sulfa Drugs Rash     Reviewed and updated as needed this visit by Provider  Tobacco  Allergies  Meds  Problems  Med Hx  Surg Hx  Fam Hx         Review of Systems   ROS COMP: Constitutional, HEENT, cardiovascular, pulmonary, gi and gu systems are negative, except as otherwise noted.      Objective    /68 (Cuff Size: Adult Large)   Pulse 72   Temp 98.5  F (36.9  C) (Tympanic)   Resp 16   Ht 1.778 m (5' 10\")   Wt 102.1 kg (225 lb)   BMI 32.28 kg/m    Body mass index is 32.28 kg/m .  Physical Exam   GENERAL: healthy, alert and no distress  MS: tenderness to palpation right AC, full range of motion and strength  PSYCH: mentation appears normal, affect normal/bright    Diagnostic Test Results:  Xray - HISTORY:  Acute pain of right shoulder     COMPARISON:  None.     FINDINGS:  No fracture or osseous lesion is seen. The joint spaces are  well preserved. No soft tissue pathology is seen.                                                                      IMPRESSION:  Unremarkable examination.     BRANDON LANGSTON MD        Assessment & Plan     1. Acute pain of right shoulder  With ongoing symptoms recommend PT for further evaluation of symptoms.  Symptomatic care and follow up discussed.  - XR Shoulder Right 2 Views; Future  - PHYSICAL THERAPY REFERRAL; Future     Home care instructions were reviewed with the patient. The risks, benefits and treatment options of prescribed medications or other treatments have been discussed with the patient. The patient verbalized their understanding and should call or follow up if no improvement or if they develop further problems.    Patient Instructions   Physical therapy referral placed    2 Aleve twice daily with food for 1 week    Follow up if symptoms do not improve or worsen.        Return in about 4 weeks (around 7/16/2019), or if symptoms worsen or fail to improve.    Yulissa SAUCEDO" DAVID Nogueira [facility]

## 2019-06-18 ENCOUNTER — OFFICE VISIT (OUTPATIENT)
Dept: FAMILY MEDICINE | Facility: CLINIC | Age: 39
End: 2019-06-18
Payer: COMMERCIAL

## 2019-06-18 ENCOUNTER — ANCILLARY PROCEDURE (OUTPATIENT)
Dept: GENERAL RADIOLOGY | Facility: CLINIC | Age: 39
End: 2019-06-18
Attending: NURSE PRACTITIONER
Payer: COMMERCIAL

## 2019-06-18 VITALS
SYSTOLIC BLOOD PRESSURE: 108 MMHG | BODY MASS INDEX: 32.21 KG/M2 | HEART RATE: 72 BPM | DIASTOLIC BLOOD PRESSURE: 68 MMHG | WEIGHT: 225 LBS | HEIGHT: 70 IN | TEMPERATURE: 98.5 F | RESPIRATION RATE: 16 BRPM

## 2019-06-18 DIAGNOSIS — M25.511 ACUTE PAIN OF RIGHT SHOULDER: Primary | ICD-10-CM

## 2019-06-18 DIAGNOSIS — M25.511 ACUTE PAIN OF RIGHT SHOULDER: ICD-10-CM

## 2019-06-18 PROCEDURE — 99213 OFFICE O/P EST LOW 20 MIN: CPT | Performed by: NURSE PRACTITIONER

## 2019-06-18 PROCEDURE — 73030 X-RAY EXAM OF SHOULDER: CPT | Mod: RT

## 2019-06-18 ASSESSMENT — MIFFLIN-ST. JEOR: SCORE: 1780.84

## 2019-06-18 NOTE — PATIENT INSTRUCTIONS
Physical therapy referral placed    2 Aleve twice daily with food for 1 week    Follow up if symptoms do not improve or worsen.

## 2019-06-21 DIAGNOSIS — L71.0 POD (PERIORAL DERMATITIS): ICD-10-CM

## 2019-06-21 RX ORDER — METRONIDAZOLE 7.5 MG/G
LOTION TOPICAL DAILY
Qty: 59 ML | Refills: 1 | Status: SHIPPED | OUTPATIENT
Start: 2019-06-21 | End: 2019-07-10

## 2019-06-28 DIAGNOSIS — F33.1 MAJOR DEPRESSIVE DISORDER, RECURRENT EPISODE, MODERATE (H): ICD-10-CM

## 2019-06-30 NOTE — TELEPHONE ENCOUNTER
"Requested Prescriptions   Pending Prescriptions Disp Refills     sertraline (ZOLOFT) 100 MG tablet   Last Written Prescription Date:  3/15/19  Last Fill Quantity: 180,  # refills: 0   Last office visit: 6/18/2019 with prescribing provider:  TOM Nogueira   Future Office Visit:   Next 5 appointments (look out 90 days)    Aug 30, 2019  8:00 AM CDT  [facility] Visit with [facility]ensen MD  [facility] [address] [place] [id number]  [phone]            180 tablet 0     Sig: TAKE 2 TABLETS BY MOUTH  DAILY       SSRIs Protocol Failed - 6/28/2019  8:15 PM        Failed - PHQ-9 score less than 5 in past 6 months     Please review last PHQ-9 score.           Passed - Medication is active on med list        Passed - Patient is age 18 or older        Passed - No active pregnancy on record        Passed - No positive pregnancy test in last 12 months        Passed - Recent (6 mo) or future (30 days) visit within the authorizing provider's specialty     Patient had office visit in the last 6 months or has a visit in the next 30 days with authorizing provider or within the authorizing provider's specialty.  See \"Patient Info\" tab in inbasket, or \"Choose Columns\" in Meds & Orders section of the refill encounter.              "

## 2019-07-01 DIAGNOSIS — E89.0 POSTOPERATIVE HYPOTHYROIDISM: ICD-10-CM

## 2019-07-01 RX ORDER — LEVOTHYROXINE SODIUM 150 UG/1
150 TABLET ORAL DAILY
Qty: 90 TABLET | Refills: 0 | Status: SHIPPED | OUTPATIENT
Start: 2019-07-01 | End: 2019-08-25

## 2019-07-01 RX ORDER — SERTRALINE HYDROCHLORIDE 100 MG/1
TABLET, FILM COATED ORAL
Qty: 180 TABLET | Refills: 0 | Status: SHIPPED | OUTPATIENT
Start: 2019-07-01 | End: 2019-08-25

## 2019-07-01 NOTE — TELEPHONE ENCOUNTER
Faxed refill request received from [facility].   Medication Requested: Levothyroxine 150mcg  Directions: Take 1 tablet by mouth daily  Quantity: 90  Last Office Visit: 5/28/18  Next Appointment Scheduled for: 8/30/19

## 2019-07-01 NOTE — TELEPHONE ENCOUNTER
Will refill 1 time until appointment on 8/30/19.       Vira Beatty, RN  Endocrine Care Coordinator  [facility]

## 2019-07-10 ENCOUNTER — OFFICE VISIT (OUTPATIENT)
Dept: DERMATOLOGY | Facility: CLINIC | Age: 39
End: 2019-07-10
Payer: COMMERCIAL

## 2019-07-10 VITALS — DIASTOLIC BLOOD PRESSURE: 76 MMHG | SYSTOLIC BLOOD PRESSURE: 114 MMHG

## 2019-07-10 DIAGNOSIS — Z79.899 MEDICATION MANAGEMENT: ICD-10-CM

## 2019-07-10 DIAGNOSIS — L70.0 ACNE VULGARIS: Primary | ICD-10-CM

## 2019-07-10 LAB
BUN SERPL-MCNC: 9 MG/DL (ref 7–30)
CREAT SERPL-MCNC: 0.62 MG/DL (ref 0.52–1.04)
GFR SERPL CREATININE-BSD FRML MDRD: >90 ML/MIN/{1.73_M2}
POTASSIUM SERPL-SCNC: 4 MMOL/L (ref 3.4–5.3)

## 2019-07-10 RX ORDER — METRONIDAZOLE 7.5 MG/G
LOTION TOPICAL DAILY
Qty: 59 ML | Refills: 1 | Status: SHIPPED | OUTPATIENT
Start: 2019-07-10 | End: 2019-08-30

## 2019-07-10 RX ORDER — SPIRONOLACTONE 50 MG/1
50 TABLET, FILM COATED ORAL 2 TIMES DAILY
Qty: 120 TABLET | Refills: 1 | Status: SHIPPED | OUTPATIENT
Start: 2019-07-10 | End: 2019-08-30

## 2019-07-10 RX ORDER — MINOCYCLINE HYDROCHLORIDE 100 MG/1
100 CAPSULE ORAL 2 TIMES DAILY
Qty: 120 CAPSULE | Refills: 0 | Status: SHIPPED | OUTPATIENT
Start: 2019-07-10 | End: 2019-08-30

## 2019-07-10 ASSESSMENT — PAIN SCALES - GENERAL: PAINLEVEL: NO PAIN (0)

## 2019-07-10 NOTE — PROGRESS NOTES
[facility] Dermatology Note      Dermatology Problem List:  1. Hx of vitiligo - hands/feet  2. Hx of thyroid cancer - has since had thyroid removed  3. Acne Vulgaris  -Current Tx: minocycline 100mg po BID, metronidazole 0.75% lotion, spironolactone 50 mg BID - 7/10/2019    Encounter Date: Jul 10, 2019    CC:  Chief Complaint   Patient presents with     Derm Problem     Kristel is here today for a perioral dermatitis follow up.      Skin Check     Kristel would like a spot on her back looked at.          History of Present Illness:  Ms. Kristel Martinez is a 38 year old female who is a return patient that presents for perioral dermatitis follow up and a lesion of concern on her back. The patient was last seen on 09/25/2018 when she started minocycline 100 mg and metronidazole 0.75% for Perioral dermatitis. At today's visit, the patient states that she has been experiencing perioral dermatitis breakouts as of late. She also complains of itchiness. She says her POD will get better on the medications, but about 1-2mo later will reappear.The patient mentions she was previously on spironolactone many years ago, but does not remember taking it for very long or it was helpful. Patient denies history of kidney or heart disease.The patient denies painful, itching, tingling or bleeding lesions unless otherwise noted.      Past Medical History:   Patient Active Problem List   Diagnosis     Major depressive disorder, recurrent episode, moderate (H)     Papillary adenocarcinoma of thyroid (H)     Postoperative hypothyroidism     Vitiligo     Vitamin D deficiency     IgA deficiency (H)     Past Medical History:   Diagnosis Date     Depressive disorder      Past Surgical History:   Procedure Laterality Date     COLONOSCOPY  8-27/18     THYROIDECTOMY      2015     TONSILLECTOMY       TUBAL LIGATION         Social History:   reports that she has quit smoking. She has never used smokeless tobacco. She reports that she  drinks alcohol. She reports that she does not use drugs.    Family History:  Family History   Problem Relation Age of Onset     Hypertension Father      Arrhythmia Father      Lymphoma Maternal Grandmother      Diabetes Paternal Grandfather         type 1      Asthma Son      Arthritis Daughter        Medications:  Current Outpatient Medications   Medication Sig Dispense Refill     hydrocortisone (ANUSOL-HC) 2.5 % cream Place rectally 2 times daily as needed for hemorrhoids 30 g 0     levothyroxine (SYNTHROID/LEVOTHROID) 150 MCG tablet Take 1 tablet (150 mcg) by mouth daily 90 tablet 0     metroNIDAZOLE (METROLOTION) 0.75 % external lotion Apply topically daily 59 mL 1     sertraline (ZOLOFT) 100 MG tablet TAKE 2 TABLETS BY MOUTH  DAILY 180 tablet 0     SUMAtriptan (IMITREX) 25 MG tablet TAKE 1 TO 2 TABLETS BY  MOUTH AT ONSET OF HEADACHE  FOR MIGRAINE. MAY REPEAT IN 2 HOURS. MAX OF 8 TABLETS  IN 24 HOURS. 18 tablet 0       Allergies   Allergen Reactions     Cephalosporins Rash     Cephalexin     Sulfa Drugs Rash       Review of Systems:  -GI: The patient denies nausea, vomiting, diarrhea or abdominal pain.  -: No changes in menstrual cycle, no breast tenderness, no change in urination  -CVS: no palpitations, dizziness  -Constitutional: The patient denies fatigue, fevers, chills, unintended weight loss, and night sweats.  -HEENT: Patient denies nonhealing oral sores.  -Skin: As above in HPI. No additional skin concerns.    Physical exam:  Vitals: There were no vitals taken for this visit.  GEN: This is a well developed, well-nourished female in no acute distress, in a pleasant mood.    SKIN: Waist-up skin, which includes the head/face, neck, both arms, chest, back, abdomen, digits and/or nails was examined.  -There is a waxy stuck on tan to brown papule on the left mid back.  -Scattered erythematous papules on the lower face and jaw line, forehead and cheeks are clear  -No other lesions of concern on areas  examined.       Impression/Plan:  1. Acne Vulgaris - Previously diagnosed as perioral dermatitis, however given continuous recurrece it seems to more closely resemble hormonal acne    Discussed treatment options including oral antibiotics and spironolactone     Side effects of spironolactone discussed including postural hypotension, increased frequency of urination, breast tenderness, menstrual spotting, cardiac arrythmias and the need for contraception due to fetal feminization.      Baseline blood pressure, potassium, and BUN/Cr obtained. Discussed with patient that medication will need to be stopped if pregnancy is desired.     Start Spironolactone 50mg BID.      There is no family history or personal history of breast malignancy known to patient    Continue metronidazole 0.75% lotion, refilled today    Restart minocycline 100mg po BID x 2mo while waiting for spironolactone to take effect, then will discontinue minocycline    CC Dr. Rodriguez on close of this encounter.  Follow-up in 3 months, earlier for new or changing lesions.       Staff Involved:  Staff/Scribe    Scribe Disclosure:  I, Basilio Bourgeois, am serving as a scribe to document services personally performed by Rissa Corona PA-C, based on data collection and the provider's statements to me.     Provider Disclosure:   The documentation recorded by the scribe accurately reflects the services I personally performed and the decisions made by me.    All risks, benefits and alternatives were discussed with patient.  Patient is in agreement and understands the assessment and plan.  All questions were answered.    Rissa Corona PA-C  [facility]: Phone: [phone], Fax: [phone]

## 2019-07-10 NOTE — NURSING NOTE
Dermatology Rooming Note    Kristel Martinez's goals for this visit include:   Chief Complaint   Patient presents with     Derm Problem     Kristel is here today for a perioral dermatitis follow up.      Skin Check     Kristel would like a spot on her back looked at.      GALINA Mcknight

## 2019-07-10 NOTE — LETTER
7/10/2019       RE: Kristel Martinez  [id number] [facility] [address]     Dear Colleague,    Thank you for referring your patient, Kristel Martinez, to the [facility] DERMATOLOGY at [facility]. Please see a copy of my visit note below.    [facility] Dermatology Note      Dermatology Problem List:  1. Hx of vitiligo - hands/feet  2. Hx of thyroid cancer - has since had thyroid removed  3. Acne Vulgaris  -Current Tx: minocycline 100mg po BID, metronidazole 0.75% lotion, spironolactone 50 mg BID - 7/10/2019    Encounter Date: Jul 10, 2019    CC:  Chief Complaint   Patient presents with     Derm Problem     Kristel is here today for a perioral dermatitis follow up.      Skin Check     Kristel would like a spot on her back looked at.          History of Present Illness:  Ms. Kristel Martinez is a 38 year old female who is a return patient that presents for perioral dermatitis follow up and a lesion of concern on her back. The patient was last seen on 09/25/2018 when she started minocycline 100 mg and metronidazole 0.75% for Perioral dermatitis. At today's visit, the patient states that she has been experiencing perioral dermatitis breakouts as of late. She also complains of itchiness. She says her POD will get better on the medications, but about 1-2mo later will reappear.The patient mentions she was previously on spironolactone many years ago, but does not remember taking it for very long or it was helpful. Patient denies history of kidney or heart disease.The patient denies painful, itching, tingling or bleeding lesions unless otherwise noted.      Past Medical History:   Patient Active Problem List   Diagnosis     Major depressive disorder, recurrent episode, moderate (H)     Papillary adenocarcinoma of thyroid (H)     Postoperative hypothyroidism     Vitiligo     Vitamin D deficiency     IgA deficiency (H)     Past Medical History:   Diagnosis Date     Depressive disorder       Past Surgical History:   Procedure Laterality Date     COLONOSCOPY  8-27/18     THYROIDECTOMY      2015     TONSILLECTOMY       TUBAL LIGATION         Social History:   reports that she has quit smoking. She has never used smokeless tobacco. She reports that she drinks alcohol. She reports that she does not use drugs.    Family History:  Family History   Problem Relation Age of Onset     Hypertension Father      Arrhythmia Father      Lymphoma Maternal Grandmother      Diabetes Paternal Grandfather         type 1      Asthma Son      Arthritis Daughter        Medications:  Current Outpatient Medications   Medication Sig Dispense Refill     hydrocortisone (ANUSOL-HC) 2.5 % cream Place rectally 2 times daily as needed for hemorrhoids 30 g 0     levothyroxine (SYNTHROID/LEVOTHROID) 150 MCG tablet Take 1 tablet (150 mcg) by mouth daily 90 tablet 0     metroNIDAZOLE (METROLOTION) 0.75 % external lotion Apply topically daily 59 mL 1     sertraline (ZOLOFT) 100 MG tablet TAKE 2 TABLETS BY MOUTH  DAILY 180 tablet 0     SUMAtriptan (IMITREX) 25 MG tablet TAKE 1 TO 2 TABLETS BY  MOUTH AT ONSET OF HEADACHE  FOR MIGRAINE. MAY REPEAT IN 2 HOURS. MAX OF 8 TABLETS  IN 24 HOURS. 18 tablet 0       Allergies   Allergen Reactions     Cephalosporins Rash     Cephalexin     Sulfa Drugs Rash       Review of Systems:  -GI: The patient denies nausea, vomiting, diarrhea or abdominal pain.  -: No changes in menstrual cycle, no breast tenderness, no change in urination  -CVS: no palpitations, dizziness  -Constitutional: The patient denies fatigue, fevers, chills, unintended weight loss, and night sweats.  -HEENT: Patient denies nonhealing oral sores.  -Skin: As above in HPI. No additional skin concerns.    Physical exam:  Vitals: There were no vitals taken for this visit.  GEN: This is a well developed, well-nourished female in no acute distress, in a pleasant mood.    SKIN: Waist-up skin, which includes the head/face, neck, both arms,  chest, back, abdomen, digits and/or nails was examined.  -There is a waxy stuck on tan to brown papule on the left mid back.  -Scattered erythematous papules on the lower face and jaw line, forehead and cheeks are clear  -No other lesions of concern on areas examined.       Impression/Plan:  1. Acne Vulgaris - Previously diagnosed as perioral dermatitis, however given continuous recurrece it seems to more closely resemble hormonal acne    Discussed treatment options including oral antibiotics and spironolactone     Side effects of spironolactone discussed including postural hypotension, increased frequency of urination, breast tenderness, menstrual spotting, cardiac arrythmias and the need for contraception due to fetal feminization.      Baseline blood pressure, potassium, and BUN/Cr obtained. Discussed with patient that medication will need to be stopped if pregnancy is desired.     Start Spironolactone 50mg BID.      There is no family history or personal history of breast malignancy known to patient    Continue metronidazole 0.75% lotion, refilled today    Restart minocycline 100mg po BID x 2mo while waiting for spironolactone to take effect, then will discontinue minocycline    CC Dr. Rodriguez on close of this encounter.  Follow-up in 3 months, earlier for new or changing lesions.       Staff Involved:  Staff/Scribe    Scribe Disclosure:  I, Basilio Bourgeois, am serving as a scribe to document services personally performed by Rissa Corona PA-C, based on data collection and the provider's statements to me.     Provider Disclosure:   The documentation recorded by the scribe accurately reflects the services I personally performed and the decisions made by me.    All risks, benefits and alternatives were discussed with patient.  Patient is in agreement and understands the assessment and plan.  All questions were answered.    Rissa Corona PA-C  [facility]: Phone: [phone], Fax: [phone]

## 2019-08-05 ENCOUNTER — MYC MEDICAL ADVICE (OUTPATIENT)
Dept: FAMILY MEDICINE | Facility: CLINIC | Age: 39
End: 2019-08-05

## 2019-08-12 ENCOUNTER — OFFICE VISIT (OUTPATIENT)
Dept: FAMILY MEDICINE | Facility: CLINIC | Age: 39
End: 2019-08-12
Payer: COMMERCIAL

## 2019-08-12 VITALS
SYSTOLIC BLOOD PRESSURE: 108 MMHG | TEMPERATURE: 99.2 F | HEIGHT: 70 IN | HEART RATE: 83 BPM | WEIGHT: 227 LBS | OXYGEN SATURATION: 99 % | RESPIRATION RATE: 16 BRPM | DIASTOLIC BLOOD PRESSURE: 76 MMHG | BODY MASS INDEX: 32.5 KG/M2

## 2019-08-12 DIAGNOSIS — J02.9 SORE THROAT: Primary | ICD-10-CM

## 2019-08-12 LAB
DEPRECATED S PYO AG THROAT QL EIA: NORMAL
SPECIMEN SOURCE: NORMAL

## 2019-08-12 PROCEDURE — 87880 STREP A ASSAY W/OPTIC: CPT | Performed by: NURSE PRACTITIONER

## 2019-08-12 PROCEDURE — 99213 OFFICE O/P EST LOW 20 MIN: CPT | Performed by: NURSE PRACTITIONER

## 2019-08-12 PROCEDURE — 87081 CULTURE SCREEN ONLY: CPT | Performed by: NURSE PRACTITIONER

## 2019-08-12 ASSESSMENT — MIFFLIN-ST. JEOR: SCORE: 1789.92

## 2019-08-12 NOTE — NURSING NOTE
"Chief Complaint   Patient presents with     Pharyngitis       Initial /76 (BP Location: Right arm, Patient Position: Chair, Cuff Size: Adult Large)   Pulse 83   Temp 99.2  F (37.3  C) (Tympanic)   Resp 16   Ht 1.778 m (5' 10\")   Wt 103 kg (227 lb)   SpO2 99%   Breastfeeding? No   BMI 32.57 kg/m   Estimated body mass index is 32.57 kg/m  as calculated from the following:    Height as of this encounter: 1.778 m (5' 10\").    Weight as of this encounter: 103 kg (227 lb).    Patient presents to the clinic using No DME    Health Maintenance that is potentially due pending provider review:  NONE    n/a    Is there anyone who you would like to be able to receive your results? No  If yes have patient fill out [facility]IL[facility]    "

## 2019-08-12 NOTE — PATIENT INSTRUCTIONS

## 2019-08-12 NOTE — PROGRESS NOTES
Subjective     Kristel Martinez is a 38 year old female who presents to clinic today for the following health issues:    HPI   ENT Symptoms             Symptoms: cc Present Absent Comment   Fever/Chills  x     Fatigue  x     Muscle Aches  x     Eye Irritation   x    Sneezing   x    Nasal Thanh/Drg  x     Sinus Pressure/Pain   x    Loss of smell   x    Dental pain   x    Sore Throat  x     Swollen Glands  x     Ear Pain/Fullness  x     Cough   x    Wheeze   x    Chest Pain   x    Shortness of breath   x    Rash   x    Other         Symptom duration:  end of last week   Symptom severity:  moderate   Treatments tried:  none   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       -------------------------------------    Patient Active Problem List   Diagnosis     Major depressive disorder, recurrent episode, moderate (H)     Papillary adenocarcinoma of thyroid (H)     Postoperative hypothyroidism     Vitiligo     Vitamin D deficiency     IgA deficiency (H)     Past Surgical History:   Procedure Laterality Date     COLONOSCOPY  8-27/18     THYROIDECTOMY      2015     TONSILLECTOMY       TUBAL LIGATION         Social History     Tobacco Use     Smoking status: Former Smoker     Smokeless tobacco: Never Used   Substance Use Topics     Alcohol use: Yes     Comment: 1 drink per wk     Family History   Problem Relation Age of Onset     Hypertension Father      Arrhythmia Father      Lymphoma Maternal Grandmother      Diabetes Paternal Grandfather         type 1      Asthma Son      Arthritis Daughter            -------------------------------------  Reviewed and updated as needed this visit by Provider         Review of Systems   ROS COMP: Constitutional, HEENT, cardiovascular, pulmonary, GI, , musculoskeletal, neuro, skin, endocrine and psych systems are negative, except as otherwise noted.      Objective    /76 (BP Location: Right arm, Patient Position: Chair, Cuff Size: Adult Large)   Pulse 83   Temp 99.2  F (37.3  C)  "(Tympanic)   Resp 16   Ht 1.778 m (5' 10\")   Wt 103 kg (227 lb)   SpO2 99%   Breastfeeding? No   BMI 32.57 kg/m    Body mass index is 32.57 kg/m .  Physical Exam   GENERAL: healthy, alert and no distress  EYES: Eyes grossly normal to inspection, PERRL and conjunctivae and sclerae normal  HENT: ear canals and TM's normal, nose and mouth without ulcers or lesions posterior pharynx is slightly injected  NECK: Tender anterior cervical adenopathy adenopathy, no other asymmetry, status post thyroidectomy  RESP: lungs clear to auscultation - no rales, rhonchi or wheezes  CV: regular rate and rhythm, normal S1 S2, no S3 or S4, no murmur, click or rub, no peripheral edema and peripheral pulses strong  ABDOMEN: soft, nontender, no hepatosplenomegaly, no masses and bowel sounds normal  MS: no gross musculoskeletal defects noted, no edema  SKIN: no suspicious lesions or rashes  NEURO: Normal strength and tone, mentation intact and speech normal  PSYCH: mentation appears normal, affect normal/bright    Diagnostic Test Results:  Labs reviewed in [facility]    Rapid strep negative    Assessment & Plan       ICD-10-CM    1. Sore throat J02.9 Strep, Rapid Screen     Beta strep group A culture     Symptomatic care strategies reviewed.  Viral URI suspected  Salt water gargles recommended  Saline nasal irrigation recommended  Tylenol up to 3500 mg daily as needed for fever and discomfort  We will contact her with results of her rapid strep culture as they become available      BMI:   Estimated body mass index is 32.57 kg/m  as calculated from the following:    Height as of this encounter: 1.778 m (5' 10\").    Weight as of this encounter: 103 kg (227 lb).   Weight management plan: Discussed healthy diet and exercise guidelines        Call or return to the clinic with any worsening of symptoms or no resolution. Patient/Parent verbalized understanding and is in agreement. Medication side effects reviewed.   Current Outpatient Medications "   Medication Sig Dispense Refill     hydrocortisone (ANUSOL-HC) 2.5 % cream Place rectally 2 times daily as needed for hemorrhoids 30 g 0     levothyroxine (SYNTHROID/LEVOTHROID) 150 MCG tablet Take 1 tablet (150 mcg) by mouth daily 90 tablet 0     metroNIDAZOLE (METROLOTION) 0.75 % external lotion Apply topically daily 59 mL 1     minocycline (MINOCIN/DYNACIN) 100 MG capsule Take 1 capsule (100 mg) by mouth 2 times daily 120 capsule 0     sertraline (ZOLOFT) 100 MG tablet TAKE 2 TABLETS BY MOUTH  DAILY 180 tablet 0     spironolactone (ALDACTONE) 50 MG tablet Take 1 tablet (50 mg) by mouth 2 times daily 120 tablet 1     SUMAtriptan (IMITREX) 25 MG tablet TAKE 1 TO 2 TABLETS BY  MOUTH AT ONSET OF HEADACHE  FOR MIGRAINE. MAY REPEAT IN 2 HOURS. MAX OF 8 TABLETS  IN 24 HOURS. 18 tablet 0     Chart documentation with Dragon Voice recognition Software. Although reviewed after completion, some words and grammatical errors may remain.    See Patient Instructions    Return in about 2 weeks (around 8/26/2019) for Primary care provider with ongoing symptoms.    DAVID Al [facility]

## 2019-08-13 LAB
BACTERIA SPEC CULT: NORMAL
SPECIMEN SOURCE: NORMAL

## 2019-08-19 ENCOUNTER — OFFICE VISIT (OUTPATIENT)
Dept: FAMILY MEDICINE | Facility: CLINIC | Age: 39
End: 2019-08-19
Payer: COMMERCIAL

## 2019-08-19 VITALS
TEMPERATURE: 98.6 F | WEIGHT: 228.2 LBS | DIASTOLIC BLOOD PRESSURE: 74 MMHG | SYSTOLIC BLOOD PRESSURE: 110 MMHG | HEART RATE: 68 BPM | BODY MASS INDEX: 32.74 KG/M2

## 2019-08-19 DIAGNOSIS — R51.9 HEADACHE, CHRONIC DAILY: Primary | ICD-10-CM

## 2019-08-19 DIAGNOSIS — R11.0 NAUSEA: ICD-10-CM

## 2019-08-19 PROCEDURE — 99214 OFFICE O/P EST MOD 30 MIN: CPT | Performed by: NURSE PRACTITIONER

## 2019-08-19 RX ORDER — ONDANSETRON 4 MG/1
4 TABLET, ORALLY DISINTEGRATING ORAL EVERY 8 HOURS PRN
Qty: 30 TABLET | Refills: 3 | Status: SHIPPED | OUTPATIENT
Start: 2019-08-19 | End: 2020-08-14

## 2019-08-19 RX ORDER — TOPIRAMATE 25 MG/1
25 TABLET, FILM COATED ORAL 2 TIMES DAILY
Qty: 60 TABLET | Refills: 3 | Status: SHIPPED | OUTPATIENT
Start: 2019-08-19 | End: 2020-12-29

## 2019-08-19 NOTE — NURSING NOTE
"Chief Complaint   Patient presents with     Headache       Initial /74 (BP Location: Right arm, Patient Position: Chair, Cuff Size: Adult Large)   Pulse 68   Temp 98.6  F (37  C) (Tympanic)   Wt 103.5 kg (228 lb 3.2 oz)   BMI 32.74 kg/m   Estimated body mass index is 32.74 kg/m  as calculated from the following:    Height as of 8/12/19: 1.778 m (5' 10\").    Weight as of this encounter: 103.5 kg (228 lb 3.2 oz).    Patient presents to the clinic using No DME    Health Maintenance that is potentially due pending provider review:  NONE    n/a    Is there anyone who you would like to be able to receive your results? No  If yes have patient fill out GILBERT  Vinod Gonzalez M.A.        "

## 2019-08-19 NOTE — PROGRESS NOTES
Subjective     Kristel Martinez is a 38 year old female who presents to clinic today for the following health issues:    HPI   Headache  Onset: Worse last few months     Description:   Location: varies on location   Character: squeezing pain  Frequency:  Daily   Duration:  Whole day     Intensity: moderate    Progression of Symptoms:  worsening    Accompanying Signs & Symptoms:  Stiff neck: YES  Neck or upper back pain: YES  Fever: no  Sinus pressure: YES  Nausea or vomiting: YES  Dizziness: YES  Numbness: no  Weakness: no  Visual changes: YES    History:   Head trauma: no  Family history of migraines: no  Previous tests for headaches: YES- MRI possibly at [facility]   Neurologist evaluations: no  Able to do daily activities: YES- sometimes  Wake with a headaches: YES  Do headaches wake you up: no  Daily pain medication use: no, trying not to  Work/school stressors/changes: no    Precipitating factors:   Does light make it worse: no  Does sound make it worse: YES    Alleviating factors:  Does sleep help: YES- Sometimes     Therapies Tried and outcome: Chiropractor, ice/ heat, stretching, Ibuprofen (Advil, Motrin), Excedrin and Imitrex    MRI 2010 at [facility] with small stable pineal cyst otherwise unremarkable    Patient Active Problem List   Diagnosis     Major depressive disorder, recurrent episode, moderate (H)     Papillary adenocarcinoma of thyroid (H)     Postoperative hypothyroidism     Vitiligo     Vitamin D deficiency     IgA deficiency (H)     Past Surgical History:   Procedure Laterality Date     COLONOSCOPY  8-27/18     THYROIDECTOMY      2015     TONSILLECTOMY       TUBAL LIGATION         Social History     Tobacco Use     Smoking status: Former Smoker     Smokeless tobacco: Never Used   Substance Use Topics     Alcohol use: Yes     Comment: 1 drink per wk     Family History   Problem Relation Age of Onset     Hypertension Father      Arrhythmia Father      Lymphoma Maternal Grandmother      Diabetes Paternal  Grandfather         type 1      Asthma Son      Arthritis Daughter          Current Outpatient Medications   Medication Sig Dispense Refill     hydrocortisone (ANUSOL-HC) 2.5 % cream Place rectally 2 times daily as needed for hemorrhoids 30 g 0     levothyroxine (SYNTHROID/LEVOTHROID) 150 MCG tablet Take 1 tablet (150 mcg) by mouth daily 90 tablet 0     metroNIDAZOLE (METROLOTION) 0.75 % external lotion Apply topically daily 59 mL 1     minocycline (MINOCIN/DYNACIN) 100 MG capsule Take 1 capsule (100 mg) by mouth 2 times daily 120 capsule 0     ondansetron (ZOFRAN-ODT) 4 MG ODT tab Take 1 tablet (4 mg) by mouth every 8 hours as needed for nausea 30 tablet 3     sertraline (ZOLOFT) 100 MG tablet TAKE 2 TABLETS BY MOUTH  DAILY 180 tablet 0     spironolactone (ALDACTONE) 50 MG tablet Take 1 tablet (50 mg) by mouth 2 times daily 120 tablet 1     SUMAtriptan (IMITREX) 25 MG tablet TAKE 1 TO 2 TABLETS BY  MOUTH AT ONSET OF HEADACHE  FOR MIGRAINE. MAY REPEAT IN 2 HOURS. MAX OF 8 TABLETS  IN 24 HOURS. 18 tablet 0     topiramate (TOPAMAX) 25 MG tablet Take 1 tablet (25 mg) by mouth 2 times daily 60 tablet 3     Allergies   Allergen Reactions     Cephalosporins Rash     Cephalexin     Sulfa Drugs Rash       Reviewed and updated as needed this visit by Provider  Tobacco  Allergies  Meds  Problems  Med Hx  Surg Hx  Fam Hx         Review of Systems   ROS COMP: Constitutional, HEENT, cardiovascular, pulmonary, gi and gu systems are negative, except as otherwise noted.      Objective    /74 (BP Location: Right arm, Patient Position: Chair, Cuff Size: Adult Large)   Pulse 68   Temp 98.6  F (37  C) (Tympanic)   Wt 103.5 kg (228 lb 3.2 oz)   BMI 32.74 kg/m    Body mass index is 32.74 kg/m .  Physical Exam   GENERAL: healthy, alert and no distress  EYES: Eyes grossly normal to inspection, PERRL and conjunctivae and sclerae normal  HENT: ear canals and TM's normal, nose and mouth without ulcers or lesions  NECK: no  adenopathy, no asymmetry, masses, or scars and thyroid normal to palpation  RESP: lungs clear to auscultation - no rales, rhonchi or wheezes  CV: regular rate and rhythm, normal S1 S2, no S3 or S4, no murmur, click or rub, no peripheral edema and peripheral pulses strong  ABDOMEN: soft, nontender, no hepatosplenomegaly, no masses and bowel sounds normal  MUSCULOSKELETAL:  tenderness present on upper trapezius and occipital area of neck  NEURO: Normal strength and tone, sensory exam grossly normal, mentation intact and cranial nerves 2-12 intact  PSYCH: mentation appears normal, affect normal/bright    Diagnostic Test Results:  Labs reviewed in Epic        Assessment & Plan     1. Headache, chronic daily  With significant daily headaches will do MRI for further evaluation.  Will also start Topamax and refer to neurology for further evaluation and plan.  Follow up immediately with any worsening symptoms.   - topiramate (TOPAMAX) 25 MG tablet; Take 1 tablet (25 mg) by mouth 2 times daily  Dispense: 60 tablet; Refill: 3  - NEUROLOGY ADULT REFERRAL  - MR Brain w/o & w Contrast; Future  - ondansetron (ZOFRAN-ODT) 4 MG ODT tab; Take 1 tablet (4 mg) by mouth every 8 hours as needed for nausea  Dispense: 30 tablet; Refill: 3    2. Nausea  Zofran as needed with nausea associated with headaches.   - ondansetron (ZOFRAN-ODT) 4 MG ODT tab; Take 1 tablet (4 mg) by mouth every 8 hours as needed for nausea  Dispense: 30 tablet; Refill: 3     Home care instructions were reviewed with the patient. The risks, benefits and treatment options of prescribed medications or other treatments have been discussed with the patient. The patient verbalized their understanding and should call or follow up if no improvement or if they develop further problems.    Patient Instructions   Please call [phone] to schedule your MRI    Neurology referral placed    Topamax twice daily-start at night to minimize side effects    Zofran as needed for  nausea    Follow up if symptoms do not improve or worsen.        Return in about 1 week (around 8/26/2019), or if symptoms worsen or fail to improve.    DAVID Clifton [facility]

## 2019-08-19 NOTE — PATIENT INSTRUCTIONS
Please call [phone] to schedule your MRI    Neurology referral placed    Topamax twice daily-start at night to minimize side effects    Zofran as needed for nausea    Follow up if symptoms do not improve or worsen.

## 2019-08-20 ENCOUNTER — PRE VISIT (OUTPATIENT)
Dept: NEUROLOGY | Facility: CLINIC | Age: 39
End: 2019-08-20

## 2019-08-20 NOTE — TELEPHONE ENCOUNTER
FUTURE VISIT INFORMATION      FUTURE VISIT INFORMATION:    Date: 9/23/2019    Time: 3pm    Location: [facility]  REFERRAL INFORMATION:    Referring provider:  Yulissa Nogueira CNP    Referring providers clinic:  [facility]    Reason for visit/diagnosis  Headaches     RECORDS REQUESTED FROM:       Clinic name Comments Records Status Imaging Status   [facility]  Care Everywhere  N/A

## 2019-08-25 DIAGNOSIS — F33.1 MAJOR DEPRESSIVE DISORDER, RECURRENT EPISODE, MODERATE (H): ICD-10-CM

## 2019-08-25 DIAGNOSIS — E89.0 POSTOPERATIVE HYPOTHYROIDISM: ICD-10-CM

## 2019-08-26 RX ORDER — LEVOTHYROXINE SODIUM 150 UG/1
TABLET ORAL
Qty: 90 TABLET | Refills: 0 | Status: SHIPPED | OUTPATIENT
Start: 2019-08-26 | End: 2019-10-01

## 2019-08-26 RX ORDER — SERTRALINE HYDROCHLORIDE 100 MG/1
TABLET, FILM COATED ORAL
Qty: 180 TABLET | Refills: 0 | Status: SHIPPED | OUTPATIENT
Start: 2019-08-26 | End: 2019-10-01

## 2019-08-26 NOTE — TELEPHONE ENCOUNTER
"Requested Prescriptions   Pending Prescriptions Disp Refills     sertraline (ZOLOFT) 100 MG tablet [Pharmacy Med Name: SERTRALINE HCL 100MG TABLET] 180 tablet 0     Sig: TAKE 2 TABLETS BY MOUTH  DAILY       SSRIs Protocol Failed - 8/25/2019  8:29 PM        Failed - PHQ-9 score less than 5 in past 6 months     Please review last PHQ-9 score.           Passed - Medication is active on med list        Passed - Patient is age 18 or older        Passed - No active pregnancy on record        Passed - No positive pregnancy test in last 12 months        Passed - Recent (6 mo) or future (30 days) visit within the authorizing provider's specialty     Patient had office visit in the last 6 months or has a visit in the next 30 days with authorizing provider or within the authorizing provider's specialty.  See \"Patient Info\" tab in inbasket, or \"Choose Columns\" in Meds & Orders section of the refill encounter.            Last Written Prescription Date:  7/1/19  Last Fill Quantity: 180,  # refills: 0   Last office visit: 8/19/2019 with prescribing provider:  Yulissa Nogueira     Future Office Visit:   Next 5 appointments (look out 90 days)    Aug 30, 2019  8:00 AM CDT  Return Visit with Marian Jorgensen MD  [facility] ([facility]) [address] [address] [place] [id number] [phone]           "

## 2019-08-30 DIAGNOSIS — R51.9 HEADACHE DISORDER: ICD-10-CM

## 2019-08-30 DIAGNOSIS — L70.0 ACNE VULGARIS: ICD-10-CM

## 2019-09-01 NOTE — TELEPHONE ENCOUNTER
"Requested Prescriptions   Pending Prescriptions Disp Refills     SUMAtriptan (IMITREX) 25 MG tablet   Last Written Prescription Date:  3/15/19  Last Fill Quantity: 18,  # refills: 0   Last office visit: 8/19/2019 with prescribing provider:  TOM Nogueira   Future Office Visit:   Next 5 appointments (look out 90 days)    Nov 29, 2019 12:30 PM CST  [facility] [address] [place] [id number] [phone]          18 tablet 0     Sig: TAKE 1 TO 2 TABLETS BY  MOUTH AT ONSET OF HEADACHE  FOR MIGRAINE. MAY REPEAT IN 2 HOURS. MAX OF 8 TABLETS  IN 24 HOURS.       Serotonin Agonists Failed - 8/30/2019  4:30 PM        Failed - Serotonin Agonist request needs review.     Please review patient's record. If patient has had 8 or more treatments in the past month, please forward to provider.          Passed - Blood pressure under 140/90 in past 12 months     BP Readings from Last 3 Encounters:   08/19/19 110/74   08/12/19 108/76   07/10/19 114/76                 Passed - Recent (12 mo) or future (30 days) visit within the authorizing provider's specialty     Patient had office visit in the last 12 months or has a visit in the next 30 days with authorizing provider or within the authorizing provider's specialty.  See \"Patient Info\" tab in inbasket, or \"Choose Columns\" in Meds & Orders section of the refill encounter.              Passed - Medication is active on med list        Passed - Patient is age 18 or older        Passed - No active pregnancy on record        Passed - No positive pregnancy test in past 12 months          "

## 2019-09-03 RX ORDER — METRONIDAZOLE 7.5 MG/G
LOTION TOPICAL
Qty: 59 ML | Refills: 1 | Status: SHIPPED | OUTPATIENT
Start: 2019-09-03 | End: 2021-07-09

## 2019-09-03 RX ORDER — SPIRONOLACTONE 50 MG/1
TABLET, FILM COATED ORAL
Qty: 120 TABLET | Refills: 1 | Status: SHIPPED | OUTPATIENT
Start: 2019-09-03 | End: 2019-12-16

## 2019-09-03 RX ORDER — MINOCYCLINE HYDROCHLORIDE 100 MG/1
CAPSULE ORAL
Qty: 120 CAPSULE | Refills: 0 | Status: SHIPPED | OUTPATIENT
Start: 2019-09-03 | End: 2019-10-01

## 2019-09-03 RX ORDER — SUMATRIPTAN 25 MG/1
TABLET, FILM COATED ORAL
Qty: 18 TABLET | Refills: 0 | Status: SHIPPED | OUTPATIENT
Start: 2019-09-03 | End: 2019-10-01

## 2019-09-12 ENCOUNTER — MYC MEDICAL ADVICE (OUTPATIENT)
Dept: FAMILY MEDICINE | Facility: CLINIC | Age: 39
End: 2019-09-12

## 2019-09-12 DIAGNOSIS — B37.31 CANDIDIASIS OF VAGINA: Primary | ICD-10-CM

## 2019-09-13 RX ORDER — FLUCONAZOLE 150 MG/1
150 TABLET ORAL ONCE
Qty: 1 TABLET | Refills: 0 | Status: SHIPPED | OUTPATIENT
Start: 2019-09-13 | End: 2019-10-21

## 2019-09-16 ENCOUNTER — OFFICE VISIT (OUTPATIENT)
Dept: FAMILY MEDICINE | Facility: CLINIC | Age: 39
End: 2019-09-16
Payer: COMMERCIAL

## 2019-09-16 VITALS
WEIGHT: 226 LBS | HEART RATE: 78 BPM | DIASTOLIC BLOOD PRESSURE: 76 MMHG | RESPIRATION RATE: 16 BRPM | HEIGHT: 70 IN | TEMPERATURE: 97.9 F | BODY MASS INDEX: 32.35 KG/M2 | OXYGEN SATURATION: 98 % | SYSTOLIC BLOOD PRESSURE: 120 MMHG

## 2019-09-16 DIAGNOSIS — M25.562 ACUTE PAIN OF LEFT KNEE: Primary | ICD-10-CM

## 2019-09-16 PROCEDURE — 99214 OFFICE O/P EST MOD 30 MIN: CPT | Performed by: NURSE PRACTITIONER

## 2019-09-16 ASSESSMENT — MIFFLIN-ST. JEOR: SCORE: 1780.38

## 2019-09-16 ASSESSMENT — PAIN SCALES - GENERAL: PAINLEVEL: MODERATE PAIN (4)

## 2019-09-16 NOTE — PROGRESS NOTES
Subjective     Kristel Martinez is a 39 year old female who presents to clinic today for the following health issues:    HPI   Joint Pain    Onset: Aug 22nd - fell    Description:   Location: left knee  Character: Dull ache    Intensity: moderate    Progression of Symptoms: worse    Accompanying Signs & Symptoms:  Other symptoms: none    History:   Previous similar pain: no       Precipitating factors:   Trauma or overuse: YES    Alleviating factors:  Improved by: rest/inactivity, heat and ice    Therapies Tried and outcome: not helping    Locks and clicks  Was dancing a slipped falling hard and twisting left knee    Patient Active Problem List   Diagnosis     Major depressive disorder, recurrent episode, moderate (H)     Papillary adenocarcinoma of thyroid (H)     Postoperative hypothyroidism     Vitiligo     Vitamin D deficiency     IgA deficiency (H)     Past Surgical History:   Procedure Laterality Date     COLONOSCOPY  8-27/18     THYROIDECTOMY      2015     TONSILLECTOMY       TUBAL LIGATION         Social History     Tobacco Use     Smoking status: Former Smoker     Smokeless tobacco: Never Used   Substance Use Topics     Alcohol use: Yes     Comment: 1 drink per wk     Family History   Problem Relation Age of Onset     Hypertension Father      Arrhythmia Father      Lymphoma Maternal Grandmother      Diabetes Paternal Grandfather         type 1      Asthma Son      Arthritis Daughter          Current Outpatient Medications   Medication Sig Dispense Refill     hydrocortisone (ANUSOL-HC) 2.5 % cream Place rectally 2 times daily as needed for hemorrhoids 30 g 0     levothyroxine (SYNTHROID/LEVOTHROID) 150 MCG tablet TAKE 1 TABLET BY MOUTH  DAILY 90 tablet 0     metroNIDAZOLE (METROLOTION) 0.75 % external lotion APPLY TO AFFECTED AREA(S)  TOPICALLY DAILY 59 mL 1     minocycline (MINOCIN/DYNACIN) 100 MG capsule TAKE 1 CAPSULE BY MOUTH TWO TIMES DAILY 120 capsule 0     ondansetron (ZOFRAN-ODT) 4 MG ODT tab Take 1  "tablet (4 mg) by mouth every 8 hours as needed for nausea 30 tablet 3     sertraline (ZOLOFT) 100 MG tablet TAKE 2 TABLETS BY MOUTH  DAILY 180 tablet 0     spironolactone (ALDACTONE) 50 MG tablet TAKE 1 TABLET BY MOUTH TWO  TIMES DAILY 120 tablet 1     SUMAtriptan (IMITREX) 25 MG tablet TAKE 1 TO 2 TABLETS BY  MOUTH AT ONSET OF HEADACHE  FOR MIGRAINE. MAY REPEAT IN 2 HOURS. MAX OF 8 TABLETS  IN 24 HOURS. 18 tablet 0     topiramate (TOPAMAX) 25 MG tablet Take 1 tablet (25 mg) by mouth 2 times daily 60 tablet 3     Allergies   Allergen Reactions     Cephalosporins Rash     Cephalexin     Sulfa Drugs Rash       Reviewed and updated as needed this visit by Provider  Tobacco  Allergies  Meds  Problems  Med Hx  Surg Hx  Fam Hx         Review of Systems   ROS COMP: Constitutional, HEENT, cardiovascular, pulmonary, gi and gu systems are negative, except as otherwise noted.      Objective    /76 (BP Location: Right arm, Patient Position: Sitting, Cuff Size: Adult Large)   Pulse 78   Temp 97.9  F (36.6  C) (Tympanic)   Resp 16   Ht 1.778 m (5' 10\")   Wt 102.5 kg (226 lb)   LMP 09/12/2019 (Exact Date)   SpO2 98%   Breastfeeding? No   BMI 32.43 kg/m    Body mass index is 32.43 kg/m .  Physical Exam   GENERAL: healthy, alert and no distress  MS: tenderness to palpation left medial knee, full range of motion and strength  NEURO: Normal strength and tone, mentation intact and speech normal  PSYCH: mentation appears normal, affect normal/bright    Diagnostic Test Results:  none         Assessment & Plan     1. Acute pain of left knee  With worsening symptoms, locking and injury will do MRI for further evaluation of symptoms.  Symptomatic care and follow up discussed.  - MR Knee Left w/o Contrast; Future     Home care instructions were reviewed with the patient. The risks, benefits and treatment options of prescribed medications or other treatments have been discussed with the patient. The patient verbalized " their understanding and should call or follow up if no improvement or if they develop further problems.    Return in about 4 weeks (around 10/14/2019), or if symptoms worsen or fail to improve.    DAVID Clifton [facility]

## 2019-09-20 ENCOUNTER — TELEPHONE (OUTPATIENT)
Dept: FAMILY MEDICINE | Facility: CLINIC | Age: 39
End: 2019-09-20

## 2019-09-20 ENCOUNTER — NURSE TRIAGE (OUTPATIENT)
Dept: NURSING | Facility: CLINIC | Age: 39
End: 2019-09-20

## 2019-09-20 DIAGNOSIS — M25.562 ACUTE PAIN OF LEFT KNEE: Primary | ICD-10-CM

## 2019-09-20 NOTE — TELEPHONE ENCOUNTER
Left message for Kristel to return call to clinic. We need to do an x ray of her knee as insurance will not cover the MRI due to no x ray.  I ordered the X ray so she can schedule an appointment with imaging to get this done.    Thanks,     DAVID Clifton CNP

## 2019-09-20 NOTE — TELEPHONE ENCOUNTER
Relayed message from provider to patient about needing an xray.  She will call back to make an appointment.    Sirisha Elder RN  [facility]    Reason for Disposition    Health Information question, no triage required and triager able to answer question    Protocols used: INFORMATION ONLY CALL-A-AH

## 2019-09-24 ENCOUNTER — HOSPITAL ENCOUNTER (OUTPATIENT)
Dept: MRI IMAGING | Facility: CLINIC | Age: 39
Discharge: HOME | End: 2019-09-24
Attending: NURSE PRACTITIONER | Admitting: NURSE PRACTITIONER
Payer: COMMERCIAL

## 2019-09-24 DIAGNOSIS — R51.9 HEADACHE, CHRONIC DAILY: ICD-10-CM

## 2019-09-24 PROCEDURE — 25500064 ZZH RX 255 OP 636: Performed by: NURSE PRACTITIONER

## 2019-09-24 PROCEDURE — 70553 MRI BRAIN STEM W/O & W/DYE: CPT

## 2019-09-24 PROCEDURE — A9585 GADOBUTROL INJECTION: HCPCS | Performed by: NURSE PRACTITIONER

## 2019-09-24 RX ORDER — GADOBUTROL 604.72 MG/ML
10 INJECTION INTRAVENOUS ONCE
Status: COMPLETED | OUTPATIENT
Start: 2019-09-24 | End: 2019-09-24

## 2019-09-24 RX ADMIN — GADOBUTROL 10 ML: 604.72 INJECTION INTRAVENOUS at 18:18

## 2019-10-01 DIAGNOSIS — L70.0 ACNE VULGARIS: ICD-10-CM

## 2019-10-01 DIAGNOSIS — F33.1 MAJOR DEPRESSIVE DISORDER, RECURRENT EPISODE, MODERATE (H): ICD-10-CM

## 2019-10-01 DIAGNOSIS — R51.9 HEADACHE DISORDER: ICD-10-CM

## 2019-10-01 NOTE — TELEPHONE ENCOUNTER
"Requested Prescriptions   Pending Prescriptions Disp Refills     SUMAtriptan (IMITREX) 25 MG tablet [Pharmacy Med Name: SUMATRIPTAN  25MG  TAB] 18 tablet 0     Sig: TAKE 1 TO 2 TABLETS BY  MOUTH AT ONSET OF HEADACHE  FOR MIGRAINE. MAY REPEAT IN 2 HOURS. MAX OF 8 TABLETS  IN 24 HOURS.       Serotonin Agonists Failed - 10/1/2019  1:01 PM        Failed - Serotonin Agonist request needs review.     Please review patient's record. If patient has had 8 or more treatments in the past month, please forward to provider.          Passed - Blood pressure under 140/90 in past 12 months     BP Readings from Last 3 Encounters:   09/16/19 120/76   08/19/19 110/74   08/12/19 108/76                 Passed - Recent (12 mo) or future (30 days) visit within the authorizing provider's specialty     Patient has had an office visit with the authorizing provider or a provider within the authorizing providers department within the previous 12 mos or has a future within next 30 days. See \"Patient Info\" tab in inbasket, or \"Choose Columns\" in Meds & Orders section of the refill encounter.      Last Written Prescription Date:  9/3/18  Last Fill Quantity: 18,  # refills: 0   Last office visit: 9/16/2019 with prescribing provider:     Future Office Visit:   Next 5 appointments (look out 90 days)    Nov 06, 2019 12:30 PM CST  Return Visit with Randee Fuchs [facility] ([facility]) [address] [place] [id number] [phone]                     Passed - Medication is active on med list        Passed - Patient is age 18 or older        Passed - No active pregnancy on record        Passed - No positive pregnancy test in past 12 months        sertraline (ZOLOFT) 100 MG tablet [Pharmacy Med Name: SERTRALINE HCL 100MG TABLET] 180 tablet 0     Sig: TAKE 2 TABLETS BY MOUTH  DAILY       SSRIs Protocol Failed - 10/1/2019  1:01 PM        Failed - PHQ-9 score less than 5 in past 6 months     Please " "review last PHQ-9 score.           Passed - Medication is active on med list        Passed - Patient is age 18 or older        Passed - No active pregnancy on record        Passed - No positive pregnancy test in last 12 months        Passed - Recent (6 mo) or future (30 days) visit within the authorizing provider's specialty     Patient had office visit in the last 6 months or has a visit in the next 30 days with authorizing provider or within the authorizing provider's specialty.  See \"Patient Info\" tab in inbasket, or \"Choose Columns\" in Meds & Orders section of the refill encounter.      Last Written Prescription Date:  8/26/19  Last Fill Quantity: 180,  # refills: 0   Last office visit: 9/16/2019 with prescribing provider:     Future Office Visit:   Next 5 appointments (look out 90 days)    Nov 06, 2019 12:30 PM CST  Return Visit with Randee Fuchs [facility] ([facility]) [address] [place] [id number] [phone]                     "

## 2019-10-01 NOTE — TELEPHONE ENCOUNTER
"Requested Prescriptions   Pending Prescriptions Disp Refills     minocycline (MINOCIN/DYNACIN) 100 MG capsule [Pharmacy Med Name: MINOCYCLINE  100MG  CAP] 120 capsule 0     Sig: TAKE 1 CAPSULE BY MOUTH TWO TIMES DAILY       Oral Acne/Rosacea Medications Protocol Failed - 10/1/2019  1:00 PM        Failed - Confirmation of diagnosis is required     Please confirm diagnosis is acne or rosacea.     If NOT acne or rosacea; refer request to provider for further evaluation.    If diagnosis IS acne or rosacea, OK to refill BASED ON PREVIOUS REFILL CLINICAL NOTE RECOMMENDATION.          Passed - Patient is 12 years of age or older        Passed - Recent (12 mo) or future (30 days) visit within the authorizing provider's specialty     Patient has had an office visit with the authorizing provider or a provider within the authorizing providers department within the previous 12 mos or has a future within next 30 days. See \"Patient Info\" tab in inbasket, or \"Choose Columns\" in Meds & Orders section of the refill encounter.     Last Written Prescription Date:  9/3/19  Last Fill Quantity: 120,  # refills: 0   Last office visit: 9/16/19   Future Office Visit:   Next 5 appointments (look out 90 days)    Nov 06, 2019 12:30 PM CST  Return Visit with Randee Fuchs [facility] [address] [place] [id number] [phone]                     Passed - Medication is active on med list        Passed - No active pregnancy on record        Passed - No positive prenancy test is past 12 months          "

## 2019-10-02 RX ORDER — MINOCYCLINE HYDROCHLORIDE 100 MG/1
CAPSULE ORAL
Qty: 60 CAPSULE | Refills: 0 | Status: SHIPPED | OUTPATIENT
Start: 2019-10-02 | End: 2020-01-23

## 2019-10-03 RX ORDER — SERTRALINE HYDROCHLORIDE 100 MG/1
TABLET, FILM COATED ORAL
Qty: 180 TABLET | Refills: 0 | Status: SHIPPED | OUTPATIENT
Start: 2019-10-03 | End: 2020-01-23

## 2019-10-03 RX ORDER — SUMATRIPTAN 25 MG/1
TABLET, FILM COATED ORAL
Qty: 18 TABLET | Refills: 0 | Status: SHIPPED | OUTPATIENT
Start: 2019-10-03 | End: 2020-08-11

## 2019-10-04 ENCOUNTER — MYC MEDICAL ADVICE (OUTPATIENT)
Dept: FAMILY MEDICINE | Facility: CLINIC | Age: 39
End: 2019-10-04

## 2019-10-21 ENCOUNTER — OFFICE VISIT (OUTPATIENT)
Dept: ENDOCRINOLOGY | Facility: CLINIC | Age: 39
End: 2019-10-21
Payer: COMMERCIAL

## 2019-10-21 VITALS
DIASTOLIC BLOOD PRESSURE: 73 MMHG | HEART RATE: 76 BPM | WEIGHT: 226 LBS | SYSTOLIC BLOOD PRESSURE: 112 MMHG | BODY MASS INDEX: 32.43 KG/M2 | OXYGEN SATURATION: 98 %

## 2019-10-21 DIAGNOSIS — E89.0 POSTOPERATIVE HYPOTHYROIDISM: Primary | ICD-10-CM

## 2019-10-21 DIAGNOSIS — C73 MALIGNANT NEOPLASM OF THYROID GLAND (H): ICD-10-CM

## 2019-10-21 DIAGNOSIS — N92.0 MENORRHAGIA WITH REGULAR CYCLE: ICD-10-CM

## 2019-10-21 LAB
ESTRADIOL SERPL-MCNC: 129 PG/ML
FSH SERPL-ACNC: 3.5 IU/L
IRON SATN MFR SERPL: 10 % (ref 15–46)
IRON SERPL-MCNC: 37 UG/DL (ref 35–180)
LH SERPL-ACNC: 6.9 IU/L
PROLACTIN SERPL-MCNC: 20 UG/L (ref 3–27)
T4 FREE SERPL-MCNC: 0.94 NG/DL (ref 0.76–1.46)
TIBC SERPL-MCNC: 361 UG/DL (ref 240–430)
TSH SERPL DL<=0.005 MIU/L-ACNC: 0.37 MU/L (ref 0.4–4)

## 2019-10-21 PROCEDURE — 84432 ASSAY OF THYROGLOBULIN: CPT | Performed by: INTERNAL MEDICINE

## 2019-10-21 PROCEDURE — 83002 ASSAY OF GONADOTROPIN (LH): CPT | Performed by: INTERNAL MEDICINE

## 2019-10-21 PROCEDURE — 83550 IRON BINDING TEST: CPT | Performed by: INTERNAL MEDICINE

## 2019-10-21 PROCEDURE — 99214 OFFICE O/P EST MOD 30 MIN: CPT | Performed by: INTERNAL MEDICINE

## 2019-10-21 PROCEDURE — 82670 ASSAY OF TOTAL ESTRADIOL: CPT | Performed by: INTERNAL MEDICINE

## 2019-10-21 PROCEDURE — 83001 ASSAY OF GONADOTROPIN (FSH): CPT | Performed by: INTERNAL MEDICINE

## 2019-10-21 PROCEDURE — 84443 ASSAY THYROID STIM HORMONE: CPT | Performed by: INTERNAL MEDICINE

## 2019-10-21 PROCEDURE — 83540 ASSAY OF IRON: CPT | Performed by: INTERNAL MEDICINE

## 2019-10-21 PROCEDURE — 86800 THYROGLOBULIN ANTIBODY: CPT | Performed by: INTERNAL MEDICINE

## 2019-10-21 PROCEDURE — 84439 ASSAY OF FREE THYROXINE: CPT | Performed by: INTERNAL MEDICINE

## 2019-10-21 PROCEDURE — 84146 ASSAY OF PROLACTIN: CPT | Performed by: INTERNAL MEDICINE

## 2019-10-21 PROCEDURE — 36415 COLL VENOUS BLD VENIPUNCTURE: CPT | Performed by: INTERNAL MEDICINE

## 2019-10-21 NOTE — LETTER
10/21/2019         RE: Kristel Martinez  [address] [facility] [address]        Dear Colleague,    Thank you for referring your patient, Kristel Martinez, to the [facility]. Please see a copy of my visit note below.    S: Pt being seen in f/u for PTC.  Previously seen by Dr Jorgensen.    Surgery 3/5/14:  DIAGNOSIS  A) SUPERIOR THYROID LYMPH NODE, EXCISION:  1. Benign lymph node  2. Negative for metastatic carcinoma    B) THYROID, COMPLETION THYROIDECTOMY:  1. Papillary thyroid carcinoma, 0.3 cm focus  2. Surgical margins free of tumor  3. See staging parameters for additional information    2013 THYROID CANCER STAGING PARAMETERS  Case number: [id number]    Patient name: Kristel Martinez  Staging parameters include data from the following case(s): [id number]    Final TNM: [id number]  2013 stage: I    MACROSCOPIC     SPECIMEN TYPE          Total thyroidectomy     DOMINANT TUMOR LOCATION AND SIZE          Location: Right lobe          Greatest dimension: 1.7 cm     ADDITIONAL TUMOR SIZE(S)          Left lobe: 0.3 cm     TUMOR FOCALITY          Multifocal - Bilateral    MICROSCOPIC     HISTOLOGIC TYPE          Papillary carcinoma, follicular variant, encapsulated     MARGINS          Uninvolved by invasive carcinoma     LYMPHATIC VASCULAR INVASION          Not identified     EXTRATHYROIDAL EXTENSION          Not identified    PATHOLOGIC STAGING     EXTENT OF INVASION          pT1b.  (Tumor more than 1 cm but not more than 2 cm in greatest            dimension, limited to the thyroid)     REGIONAL LYMPH NODES          pN0.  (No regional lymph node metastasis)            Number of lymph nodes examined:     1            Number of lymph nodes involved:     0     DISTANT METASTASIS          pM0. (Not applicable)     MACIS SCORE          3.6     PATHOLOGIC STAGE Summary          Final TNM: [id number]          2013 stage: I          **  The pathologic stage presumes no distant metastasis.    No plans for more children. She has a  "tubal ligation.   No recent change to levothyroxine dose. Continues on 150 mcg daily.     Menses are regular but have become heavier over the last year.   Also now beginning to have migraines with menses.   Energy down over the last year. No sleep disturbance.     Palpitations unrelated to activity.     Struggles with losing weight.     ROS: 10 point ROS neg other than the symptoms noted above in the HPI.    O:  Vital signs:      BP: 112/73 Pulse: 76     SpO2: 98 %       Weight: 102.5 kg (226 lb)  Estimated body mass index is 32.43 kg/m  as calculated from the following:    Height as of 9/16/19: 1.778 m (5' 10\").    Weight as of this encounter: 102.5 kg (226 lb).  Gen: In NAD.   HEENT: no proptosis or lid lag, EOMI, no palpable thyroid tissue.  Card: S1 S2 RRR no m/r/g.  Pulm: CTA b/l.   GI: NT ND +BS.   Ext: no LE edema.   MSK: no gross deformities.  Neuro: no tremor, +2 DTR's.     A/P:   Papillary thyroid cancer - Original surgery in 2014. Follicular variant. PT1b, N0. 50 mCi I 131 given in 6/2014.   US - negative in 6/2017. Repeat due.   Tg - negative with negative Ab in 5/2018 ([facility] lab assay). Repeat due.   TSH - at goal in 10/2018. Repeat due.     -Labs today.   -1500 calories a day or less from food to loose 1/2 pound a week.   -Schedule ultrasound.   Call [place] radiology scheduling for your procedure:  For scheduling in the North ([facility], [facility]) call [phone] or [phone]      For scheduling at ealth ([facility], [facility]), call [phone] or [phone]      For scheduling in the South ([facility], [facility]) call [phone]  or  [phone]      Heavy menses - New issue over the last 1 year.   -Check iron panel, LH, FSH, TSH, estradiol, prolactin. LMP 3 weeks ago.   -Recommend pelvic US.     Hadley GUERRIER I spent 25 minutes with the patient. Greater than 50% of the time spent in " . Risks/benefits/alternatives reviewed.     Hadley Santillan MD on 10/22/2019 at 7:51 AM          Again, thank you for allowing me to participate in the care of your patient.        Sincerely,        Hadley Santillan MD

## 2019-10-21 NOTE — PROGRESS NOTES
S: Pt being seen in f/u for PTC.  Previously seen by Dr Jorgensen.    Surgery 3/5/14:  DIAGNOSIS  A) SUPERIOR THYROID LYMPH NODE, EXCISION:  1. Benign lymph node  2. Negative for metastatic carcinoma    B) THYROID, COMPLETION THYROIDECTOMY:  1. Papillary thyroid carcinoma, 0.3 cm focus  2. Surgical margins free of tumor  3. See staging parameters for additional information    2013 THYROID CANCER STAGING PARAMETERS  Case number: [id number]    Patient name: Kristel Martinez  Staging parameters include data from the following case(s): [id number]    Final TNM: [id number]  2013 stage: I    MACROSCOPIC     SPECIMEN TYPE          Total thyroidectomy     DOMINANT TUMOR LOCATION AND SIZE          Location: Right lobe          Greatest dimension: 1.7 cm     ADDITIONAL TUMOR SIZE(S)          Left lobe: 0.3 cm     TUMOR FOCALITY          Multifocal - Bilateral    MICROSCOPIC     HISTOLOGIC TYPE          Papillary carcinoma, follicular variant, encapsulated     MARGINS          Uninvolved by invasive carcinoma     LYMPHATIC VASCULAR INVASION          Not identified     EXTRATHYROIDAL EXTENSION          Not identified    PATHOLOGIC STAGING     EXTENT OF INVASION          pT1b.  (Tumor more than 1 cm but not more than 2 cm in greatest            dimension, limited to the thyroid)     REGIONAL LYMPH NODES          pN0.  (No regional lymph node metastasis)            Number of lymph nodes examined:     1            Number of lymph nodes involved:     0     DISTANT METASTASIS          pM0. (Not applicable)     MACIS SCORE          3.6     PATHOLOGIC STAGE Summary          Final TNM: [id number]          2013 stage: I          **  The pathologic stage presumes no distant metastasis.    No plans for more children. She has a tubal ligation.   No recent change to levothyroxine dose. Continues on 150 mcg daily.     Menses are regular but have become heavier over the last year.   Also now beginning to have migraines with menses.   Energy down over  "the last year. No sleep disturbance.     Palpitations unrelated to activity.     Struggles with losing weight.     ROS: 10 point ROS neg other than the symptoms noted above in the HPI.    O:  Vital signs:      BP: 112/73 Pulse: 76     SpO2: 98 %       Weight: 102.5 kg (226 lb)  Estimated body mass index is 32.43 kg/m  as calculated from the following:    Height as of 9/16/19: 1.778 m (5' 10\").    Weight as of this encounter: 102.5 kg (226 lb).  Gen: In NAD.   HEENT: no proptosis or lid lag, EOMI, no palpable thyroid tissue.  Card: S1 S2 RRR no m/r/g.  Pulm: CTA b/l.   GI: NT ND +BS.   Ext: no LE edema.   MSK: no gross deformities.  Neuro: no tremor, +2 DTR's.     A/P:   Papillary thyroid cancer - Original surgery in 2014. Follicular variant. PT1b, N0. 50 mCi I 131 given in 6/2014.   US - negative in 6/2017. Repeat due.   Tg - negative with negative Ab in 5/2018 ([facility] lab assay). Repeat due.   TSH - at goal in 10/2018. Repeat due.     -Labs today.   -1500 calories a day or less from food to loose 1/2 pound a week.   -Schedule ultrasound.   Call [place] radiology scheduling for your procedure:  For scheduling in the [place] ([facility], [facility]) call [phone] or [phone]      For scheduling at [facility] ([facility], [facility]), call [phone] or [phone]      For scheduling in the South ([facility]) call [phone]  or  [phone]      Heavy menses - New issue over the last 1 year.   -Check iron panel, LH, FSH, TSH, estradiol, prolactin. LMP 3 weeks ago.   -Recommend pelvic US.     Hadley GUERRIER I spent 25 minutes with the patient. Greater than 50% of the time spent in . Risks/benefits/alternatives reviewed.     Hadley Santillan MD on 10/22/2019 at 7:51 AM        "

## 2019-10-21 NOTE — PATIENT INSTRUCTIONS
-Labs today.     -1500 calories a day or less from food to loose 1/2 pound a week.     -Schedule ultrasound thyroid and pelvis.     Call [place] radiology scheduling for your procedure:  For scheduling in the North ([facility], [facility]) call [phone] or [phone]      For scheduling at [facility] ([facility], [facility]), call [phone] or [phone]      For scheduling in the [place] ([facility]) call [phone]  or  [phone]

## 2019-10-22 LAB
THYROGLOB AB SERPL IA-ACNC: <20 IU/ML (ref 0–40)
THYROGLOB SERPL-MCNC: <0.2 NG/ML

## 2019-10-23 DIAGNOSIS — E89.0 POSTOPERATIVE HYPOTHYROIDISM: Primary | ICD-10-CM

## 2019-10-24 ENCOUNTER — OFFICE VISIT (OUTPATIENT)
Dept: URGENT CARE | Facility: RETAIL CLINIC | Age: 39
End: 2019-10-24
Payer: COMMERCIAL

## 2019-10-24 VITALS
OXYGEN SATURATION: 100 % | DIASTOLIC BLOOD PRESSURE: 75 MMHG | RESPIRATION RATE: 18 BRPM | HEART RATE: 84 BPM | TEMPERATURE: 98.5 F | SYSTOLIC BLOOD PRESSURE: 133 MMHG

## 2019-10-24 DIAGNOSIS — J02.9 ACUTE PHARYNGITIS, UNSPECIFIED ETIOLOGY: Primary | ICD-10-CM

## 2019-10-24 DIAGNOSIS — B34.9 VIRAL SYNDROME: ICD-10-CM

## 2019-10-24 LAB — S PYO AG THROAT QL IA.RAPID: NORMAL

## 2019-10-24 PROCEDURE — 87081 CULTURE SCREEN ONLY: CPT | Performed by: FAMILY MEDICINE

## 2019-10-24 PROCEDURE — 99213 OFFICE O/P EST LOW 20 MIN: CPT | Performed by: FAMILY MEDICINE

## 2019-10-24 PROCEDURE — 87880 STREP A ASSAY W/OPTIC: CPT | Mod: QW | Performed by: FAMILY MEDICINE

## 2019-10-24 NOTE — PROGRESS NOTES
SUBJECTIVE:  Kristel Martinez is a 39 year old female with a chief complaint of sore throat.  Onset of symptoms was 3 day(s) ago.    Course of illness: still present.  Severity moderate  Current and Associated symptoms: fever, chills, fatigue and nausea  Treatment measures tried include Tylenol/Ibuprofen.  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    Past Medical History:   Diagnosis Date     Depressive disorder      Current Outpatient Medications   Medication Sig Dispense Refill     hydrocortisone (ANUSOL-HC) 2.5 % cream Place rectally 2 times daily as needed for hemorrhoids 30 g 0     levothyroxine (SYNTHROID/LEVOTHROID) 150 MCG tablet TAKE 1 TABLET BY MOUTH  DAILY 30 tablet 1     metroNIDAZOLE (METROLOTION) 0.75 % external lotion APPLY TO AFFECTED AREA(S)  TOPICALLY DAILY 59 mL 1     minocycline (MINOCIN/DYNACIN) 100 MG capsule TAKE 1 CAPSULE BY MOUTH TWO TIMES DAILY 60 capsule 0     ondansetron (ZOFRAN-ODT) 4 MG ODT tab Take 1 tablet (4 mg) by mouth every 8 hours as needed for nausea 30 tablet 3     sertraline (ZOLOFT) 100 MG tablet TAKE 2 TABLETS BY MOUTH  DAILY 180 tablet 0     spironolactone (ALDACTONE) 50 MG tablet TAKE 1 TABLET BY MOUTH TWO  TIMES DAILY 120 tablet 1     SUMAtriptan (IMITREX) 25 MG tablet TAKE 1 TO 2 TABLETS BY  MOUTH AT ONSET OF HEADACHE  FOR MIGRAINE. MAY REPEAT IN 2 HOURS. MAX OF 8 TABLETS  IN 24 HOURS. 18 tablet 0     topiramate (TOPAMAX) 25 MG tablet Take 1 tablet (25 mg) by mouth 2 times daily 60 tablet 3     History   Smoking Status     Former Smoker   Smokeless Tobacco     Never Used       ROS:  Review of systems negative except as stated above.    OBJECTIVE:   /75 (BP Location: Left arm, Patient Position: Sitting, Cuff Size: Adult Regular)   Pulse 84   Temp 98.5  F (36.9  C) (Oral)   Resp 18   SpO2 100%   GENERAL APPEARANCE: healthy, alert and no distress  EYES: EOMI,  PERRL, conjunctiva clear  HENT: ear canals and TM's normal.  Nose normal.  Pharynx  erythematous with some exudate noted.  NECK: supple, non-tender to palpation, no adenopathy noted  RESP: lungs clear to auscultation - no rales, rhonchi or wheezes  CV: regular rates and rhythm, normal S1 S2, no murmur noted  ABDOMEN:  soft, nontender, no HSM or masses and bowel sounds normal  SKIN: no suspicious lesions or rashes    Rapid Strep test is negative; await throat culture results.    ASSESSMENT:     Viral syndrome  Acute pharyngitis, unspecified etiology    PLAN: emphasized rest. Symptomatic treat with gargles, lozenges, and OTC analgesic as needed.   Follow-up with primary care provider if not improving.

## 2019-10-24 NOTE — LETTER
[facility]  [address]  [place] [address]  Phone: [phone]    October 24, 2019        Kristel Martinez  [facility]  [place] [address]          To whom it may concern:    RE: Kristel Martinez    Patient was seen and treated today at our clinic and missed work.    Please contact me for questions or concerns.      Sincerely,        Dannie Miller MD

## 2019-10-25 ENCOUNTER — OFFICE VISIT (OUTPATIENT)
Dept: URGENT CARE | Facility: URGENT CARE | Age: 39
End: 2019-10-25
Payer: COMMERCIAL

## 2019-10-25 VITALS
TEMPERATURE: 100.1 F | SYSTOLIC BLOOD PRESSURE: 128 MMHG | BODY MASS INDEX: 32.71 KG/M2 | HEART RATE: 106 BPM | OXYGEN SATURATION: 100 % | DIASTOLIC BLOOD PRESSURE: 68 MMHG | RESPIRATION RATE: 18 BRPM | WEIGHT: 228 LBS

## 2019-10-25 DIAGNOSIS — R52 GENERALIZED BODY ACHES: ICD-10-CM

## 2019-10-25 DIAGNOSIS — J02.9 PHARYNGITIS WITH VIRAL SYNDROME: Primary | ICD-10-CM

## 2019-10-25 DIAGNOSIS — R50.9 FEVER, UNSPECIFIED FEVER CAUSE: ICD-10-CM

## 2019-10-25 DIAGNOSIS — B34.9 PHARYNGITIS WITH VIRAL SYNDROME: Primary | ICD-10-CM

## 2019-10-25 LAB
FLUAV+FLUBV AG SPEC QL: NEGATIVE
FLUAV+FLUBV AG SPEC QL: NEGATIVE
SPECIMEN SOURCE: NORMAL

## 2019-10-25 PROCEDURE — 99213 OFFICE O/P EST LOW 20 MIN: CPT | Performed by: NURSE PRACTITIONER

## 2019-10-25 PROCEDURE — 87804 INFLUENZA ASSAY W/OPTIC: CPT | Performed by: NURSE PRACTITIONER

## 2019-10-25 ASSESSMENT — ENCOUNTER SYMPTOMS
MYALGIAS: 1
FATIGUE: 1
WHEEZING: 0
ACTIVITY CHANGE: 1
SORE THROAT: 1
VOMITING: 0
HEADACHES: 0
DIARRHEA: 0
RHINORRHEA: 1
COUGH: 0
SHORTNESS OF BREATH: 0
FEVER: 1
DYSURIA: 0
APPETITE CHANGE: 1

## 2019-10-25 NOTE — LETTER
Return to  Work Release    Date: 10/25/2019      Name: Kristel Martinez                       YOB: 1980    The patient was seen at: [facility], may return to work on, 10/29/2019.          _________________________  DAVID Montiel CNP

## 2019-10-26 LAB
BACTERIA SPEC CULT: NORMAL
SPECIMEN SOURCE: NORMAL

## 2019-10-26 NOTE — NURSING NOTE
"Chief Complaint   Patient presents with     Generalized Body Aches     Started yesterday with sore throat.  Today woke up with congestion, cough, body aches, Fever- 99.4.  Dayquil this morning        Initial /68 (BP Location: Right arm, Patient Position: Chair, Cuff Size: Adult Large)   Pulse 106   Temp 100.1  F (37.8  C) (Tympanic)   Resp 18   Wt 103.4 kg (228 lb)   SpO2 100%   BMI 32.71 kg/m   Estimated body mass index is 32.71 kg/m  as calculated from the following:    Height as of 9/16/19: 1.778 m (5' 10\").    Weight as of this encounter: 103.4 kg (228 lb).    Patient presents to the clinic using No DME    Health Maintenance that is potentially due pending provider review:  NONE    n/a    Is there anyone who you would like to be able to receive your results? No  If yes have patient fill out [facility]    Vinod Gonzalez M.A.      "

## 2019-10-26 NOTE — PROGRESS NOTES
SUBJECTIVE:   Kristel Martinez is a 39 year old female presenting with a chief complaint of   Chief Complaint   Patient presents with     Generalized Body Aches     Started yesterday with sore throat.  Today woke up with congestion, cough, body aches, Fever- 99.4.  Dayquil this morning        She is an established patient of [facility].    URI Adult    Onset of symptoms was 2 days ago.  Course of illness is worsening  Current and Associated symptoms: fever, chills, stuffy nose, body aches, and sore throat  Treatment measures tried include OTC Cough med.  Predisposing factors include ill contact: School and Work.  Negative rapid strep in [facility] 10/24  LMP:       Review of Systems   Constitutional: Positive for activity change, appetite change, fatigue and fever.   HENT: Positive for congestion, rhinorrhea and sore throat.    Respiratory: Negative for cough, shortness of breath and wheezing.    Gastrointestinal: Negative for diarrhea and vomiting.   Genitourinary: Negative for dysuria.   Musculoskeletal: Positive for myalgias.   Neurological: Negative for headaches.   All other systems reviewed and are negative.      Past Medical History:   Diagnosis Date     Depressive disorder      Family History   Problem Relation Age of Onset     Hypertension Father      Arrhythmia Father      Lymphoma Maternal Grandmother      Diabetes Paternal Grandfather         type 1      Asthma Son      Arthritis Daughter      Current Outpatient Medications   Medication Sig Dispense Refill     hydrocortisone (ANUSOL-HC) 2.5 % cream Place rectally 2 times daily as needed for hemorrhoids 30 g 0     levothyroxine (SYNTHROID/LEVOTHROID) 150 MCG tablet TAKE 1 TABLET BY MOUTH  DAILY 30 tablet 1     metroNIDAZOLE (METROLOTION) 0.75 % external lotion APPLY TO AFFECTED AREA(S)  TOPICALLY DAILY 59 mL 1     minocycline (MINOCIN/DYNACIN) 100 MG capsule TAKE 1 CAPSULE BY MOUTH TWO TIMES DAILY 60 capsule 0     ondansetron (ZOFRAN-ODT) 4 MG ODT tab Take 1  tablet (4 mg) by mouth every 8 hours as needed for nausea 30 tablet 3     sertraline (ZOLOFT) 100 MG tablet TAKE 2 TABLETS BY MOUTH  DAILY 180 tablet 0     spironolactone (ALDACTONE) 50 MG tablet TAKE 1 TABLET BY MOUTH TWO  TIMES DAILY 120 tablet 1     SUMAtriptan (IMITREX) 25 MG tablet TAKE 1 TO 2 TABLETS BY  MOUTH AT ONSET OF HEADACHE  FOR MIGRAINE. MAY REPEAT IN 2 HOURS. MAX OF 8 TABLETS  IN 24 HOURS. 18 tablet 0     topiramate (TOPAMAX) 25 MG tablet Take 1 tablet (25 mg) by mouth 2 times daily 60 tablet 3     Social History     Tobacco Use     Smoking status: Former Smoker     Smokeless tobacco: Never Used   Substance Use Topics     Alcohol use: Yes     Comment: 1 drink per wk       OBJECTIVE  /68 (BP Location: Right arm, Patient Position: Chair, Cuff Size: Adult Large)   Pulse 106   Temp 100.1  F (37.8  C) (Tympanic)   Resp 18   Wt 103.4 kg (228 lb)   SpO2 100%   BMI 32.71 kg/m      Physical Exam  Constitutional:       General: She is not in acute distress.     Appearance: She is ill-appearing. She is not toxic-appearing or diaphoretic.   HENT:      Right Ear: Tympanic membrane, ear canal and external ear normal. There is no impacted cerumen.      Left Ear: Tympanic membrane, ear canal and external ear normal. There is no impacted cerumen.      Nose: Congestion and rhinorrhea present.      Mouth/Throat:      Mouth: Mucous membranes are moist.      Pharynx: Posterior oropharyngeal erythema present. No oropharyngeal exudate.   Neck:      Musculoskeletal: Neck supple.   Cardiovascular:      Rate and Rhythm: Regular rhythm. Tachycardia present.      Pulses: Normal pulses.      Heart sounds: Normal heart sounds. No murmur. No friction rub. No gallop.    Pulmonary:      Effort: Pulmonary effort is normal. No respiratory distress.      Breath sounds: Normal breath sounds. No stridor. No rhonchi or rales.   Chest:      Chest wall: No tenderness.   Lymphadenopathy:      Cervical: Cervical adenopathy present.    Skin:     General: Skin is warm.      Capillary Refill: Capillary refill takes less than 2 seconds.      Findings: No rash.   Neurological:      General: No focal deficit present.      Mental Status: She is alert and oriented to person, place, and time. Mental status is at baseline.   Psychiatric:         Mood and Affect: Mood normal.         Behavior: Behavior normal.         Labs:  Results for orders placed or performed in visit on 10/25/19 (from the past 24 hour(s))   Influenza A/B antigen   Result Value Ref Range    Influenza A/B Agn Specimen Nasal     Influenza A Negative NEG^Negative    Influenza B Negative NEG^Negative       ASSESSMENT:    ICD-10-CM    1. Pharyngitis with viral syndrome J02.9     B34.9    2. Fever, unspecified fever cause R50.9 Influenza A/B antigen   3. Generalized body aches R52 Influenza A/B antigen        Medical Decision Making:    Differential Diagnosis:  URI Adult/Peds:  Influenza, Viral syndrome and Viral upper respiratory illness    Serious Comorbid Conditions:  Adult:  None    PLAN:Discussed with patient causes for viral syndrome. Advised ongoing symptomatic cares with warm gargle, use acetaminophen OTC for fever control, increased soups, water and rest to stay hydrated. Negative flu reviewed. Letter for work provided.     Follow up with PCP in 1 week if not improved or sooner if symptoms worsen or do not improve as discussed.    DAVID Montiel, CNP      Patient Instructions     Patient Education     Viral Syndrome (Adult)  A viral illness may cause a number of symptoms such as fever. Other symptoms depend on the part of the body that the virus affects. If it settles in your nose, throat, and lungs, it may cause cough, sore throat, congestion, runny nose, headache, earache and other ear symptoms, or shortness of breath. If it settles in your stomach and intestinal tract, it may cause nausea, vomiting, cramping, and diarrhea. Sometimes it causes generalized symptoms like  "\"aching all over,\" feeling tired, loss of energy, or loss of appetite.  A viral illness usually lasts anywhere from several days to several weeks, but sometimes it lasts longer. In some cases, a more serious infection can look like a viral syndrome in the first few days of the illness. You may need another exam and additional tests to know the difference. Watch for the warning signs listed below for when to seek medical advice.  Home care  Follow these guidelines for taking care of yourself at home:    If symptoms are severe, rest at home for the first 2 to 3 days.    Stay away from cigarette smoke - both your smoke and the smoke from others.    You may use over-the-counter acetaminophen or ibuprofen for fever, muscle aching, and headache, unless another medicine was prescribed for this. If you have chronic liver or kidney disease or ever had a stomach ulcer or gastrointestinal bleeding, talk with your healthcare provider before using these medicines. No one who is younger than 18 and ill with a fever should take aspirin. It may cause severe disease or death.    Your appetite may be poor, so a light diet is fine. Avoid dehydration by drinking 8 to 12, 8-ounce glasses of fluids each day. This may include water; orange juice; lemonade; apple, grape, and cranberry juice; clear fruit drinks; electrolyte replacement and sports drinks; and decaffeinated teas and coffee. If you have been diagnosed with a kidney disease, ask your healthcare provider how much and what types of fluids you should drink to prevent dehydration. If you have kidney disease, drinking too much fluid can cause it build up in the your body and be dangerous to your health.    Over-the-counter remedies won't shorten the length of the illness but may be helpful for symptoms such as cough, sore throat, nasal and sinus congestion, or diarrhea. Don't use decongestants if you have high blood pressure.  Follow-up care  Follow up with your healthcare provider " if you do not improve over the next week.  Call 911  Call 911 if any of the following occur:    Convulsion    Feeling weak, dizzy, or like you are going to faint    Chest pain, or more than mild shortness of breath  When to seek medical advice  Call your healthcare provider right away if any of these occur:    Cough with lots of colored sputum (mucus) or blood in your sputum    Chest pain, shortness of breath, wheezing, or trouble breathing    Severe headache; face, neck, or ear pain    Severe, constant pain in the lower right side of your belly (abdominal)    Continued vomiting (can t keep liquids down)    Frequent diarrhea (more than 5 times a day); blood (red or black color) or mucus in diarrhea    Feeling weak, dizzy, or like you are going to faint    Extreme thirst    Fever of 100.4 F (38 C) or higher, or as directed by your healthcare provider  Date Last Reviewed: 4/1/2018 2000-2018 [employer]. [address] [place] [address], [place] [address]. All rights reserved. This information is not intended as a substitute for professional medical care. Always follow your healthcare professional's instructions.

## 2019-10-26 NOTE — PATIENT INSTRUCTIONS
"  Patient Education     Viral Syndrome (Adult)  A viral illness may cause a number of symptoms such as fever. Other symptoms depend on the part of the body that the virus affects. If it settles in your nose, throat, and lungs, it may cause cough, sore throat, congestion, runny nose, headache, earache and other ear symptoms, or shortness of breath. If it settles in your stomach and intestinal tract, it may cause nausea, vomiting, cramping, and diarrhea. Sometimes it causes generalized symptoms like \"aching all over,\" feeling tired, loss of energy, or loss of appetite.  A viral illness usually lasts anywhere from several days to several weeks, but sometimes it lasts longer. In some cases, a more serious infection can look like a viral syndrome in the first few days of the illness. You may need another exam and additional tests to know the difference. Watch for the warning signs listed below for when to seek medical advice.  Home care  Follow these guidelines for taking care of yourself at home:    If symptoms are severe, rest at home for the first 2 to 3 days.    Stay away from cigarette smoke - both your smoke and the smoke from others.    You may use over-the-counter acetaminophen or ibuprofen for fever, muscle aching, and headache, unless another medicine was prescribed for this. If you have chronic liver or kidney disease or ever had a stomach ulcer or gastrointestinal bleeding, talk with your healthcare provider before using these medicines. No one who is younger than 18 and ill with a fever should take aspirin. It may cause severe disease or death.    Your appetite may be poor, so a light diet is fine. Avoid dehydration by drinking 8 to 12, 8-ounce glasses of fluids each day. This may include water; orange juice; lemonade; apple, grape, and cranberry juice; clear fruit drinks; electrolyte replacement and sports drinks; and decaffeinated teas and coffee. If you have been diagnosed with a kidney disease, ask your " healthcare provider how much and what types of fluids you should drink to prevent dehydration. If you have kidney disease, drinking too much fluid can cause it build up in the your body and be dangerous to your health.    Over-the-counter remedies won't shorten the length of the illness but may be helpful for symptoms such as cough, sore throat, nasal and sinus congestion, or diarrhea. Don't use decongestants if you have high blood pressure.  Follow-up care  Follow up with your healthcare provider if you do not improve over the next week.  Call 911  Call 911 if any of the following occur:    Convulsion    Feeling weak, dizzy, or like you are going to faint    Chest pain, or more than mild shortness of breath  When to seek medical advice  Call your healthcare provider right away if any of these occur:    Cough with lots of colored sputum (mucus) or blood in your sputum    Chest pain, shortness of breath, wheezing, or trouble breathing    Severe headache; face, neck, or ear pain    Severe, constant pain in the lower right side of your belly (abdominal)    Continued vomiting (can t keep liquids down)    Frequent diarrhea (more than 5 times a day); blood (red or black color) or mucus in diarrhea    Feeling weak, dizzy, or like you are going to faint    Extreme thirst    Fever of 100.4 F (38 C) or higher, or as directed by your healthcare provider  Date Last Reviewed: 4/1/2018 2000-2018 [employer]. [address] [place] [id number]. All rights reserved. This information is not intended as a substitute for professional medical care. Always follow your healthcare professional's instructions.

## 2019-10-30 ENCOUNTER — OFFICE VISIT (OUTPATIENT)
Dept: PSYCHOLOGY | Facility: CLINIC | Age: 39
End: 2019-10-30
Attending: NURSE PRACTITIONER
Payer: COMMERCIAL

## 2019-10-30 DIAGNOSIS — F33.1 MAJOR DEPRESSIVE DISORDER, RECURRENT EPISODE, MODERATE (H): Primary | ICD-10-CM

## 2019-10-30 PROCEDURE — 90791 PSYCH DIAGNOSTIC EVALUATION: CPT | Performed by: SOCIAL WORKER

## 2019-10-30 ASSESSMENT — COLUMBIA-SUICIDE SEVERITY RATING SCALE - C-SSRS
LETHALITY/MEDICAL DAMAGE CODE MOST RECENT ACTUAL ATTEMPT: MODERATE PHYSICAL DAMAGE, MEDICAL ATTENTION NEEDED
TOTAL  NUMBER OF ACTUAL ATTEMPTS LIFETIME: 1
REASONS FOR IDEATION LIFETIME: EQUALLY TO GET ATTENTION, REVENGE OR A REACTION FROM OTHERS AND TO END/STOP THE PAIN
6. HAVE YOU EVER DONE ANYTHING, STARTED TO DO ANYTHING, OR PREPARED TO DO ANYTHING TO END YOUR LIFE?: NO
TOTAL  NUMBER OF INTERRUPTED ATTEMPTS LIFETIME: NO
TOTAL  NUMBER OF ABORTED OR SELF INTERRUPTED ATTEMPTS PAST LIFETIME: NO
ATTEMPT LIFETIME: YES
ATTEMPT PAST THREE MONTHS: NO
2. HAVE YOU ACTUALLY HAD ANY THOUGHTS OF KILLING YOURSELF LIFETIME?: YES
1. IN THE PAST MONTH, HAVE YOU WISHED YOU WERE DEAD OR WISHED YOU COULD GO TO SLEEP AND NOT WAKE UP?: YES
4. HAVE YOU HAD THESE THOUGHTS AND HAD SOME INTENTION OF ACTING ON THEM?: YES
LETHALITY/MEDICAL DAMAGE CODE FIRST ACTUAL ATTEMPT: MODERATE PHYSICAL DAMAGE, MEDICAL ATTENTION NEEDED
TOTAL  NUMBER OF INTERRUPTED ATTEMPTS PAST 3 MONTHS: NO
2. HAVE YOU ACTUALLY HAD ANY THOUGHTS OF KILLING YOURSELF?: NO
4. HAVE YOU HAD THESE THOUGHTS AND HAD SOME INTENTION OF ACTING ON THEM?: NO
5. HAVE YOU STARTED TO WORK OUT OR WORKED OUT THE DETAILS OF HOW TO KILL YOURSELF? DO YOU INTEND TO CARRY OUT THIS PLAN?: YES
5. HAVE YOU STARTED TO WORK OUT OR WORKED OUT THE DETAILS OF HOW TO KILL YOURSELF? DO YOU INTEND TO CARRY OUT THIS PLAN?: NO
1. IN THE PAST MONTH, HAVE YOU WISHED YOU WERE DEAD OR WISHED YOU COULD GO TO SLEEP AND NOT WAKE UP?: NO
6. HAVE YOU EVER DONE ANYTHING, STARTED TO DO ANYTHING, OR PREPARED TO DO ANYTHING TO END YOUR LIFE?: YES
LETHALITY/MEDICAL DAMAGE CODE MOST LETHAL ACTUAL ATTEMPT: MODERATE PHYSICAL DAMAGE, MEDICAL ATTENTION NEEDED
TOTAL  NUMBER OF ABORTED OR SELF INTERRUPTED ATTEMPTS PAST 3 MONTHS: NO
3. HAVE YOU BEEN THINKING ABOUT HOW YOU MIGHT KILL YOURSELF?: YES

## 2019-10-30 ASSESSMENT — ANXIETY QUESTIONNAIRES
GAD7 TOTAL SCORE: 4
1. FEELING NERVOUS, ANXIOUS, OR ON EDGE: SEVERAL DAYS
5. BEING SO RESTLESS THAT IT IS HARD TO SIT STILL: NOT AT ALL
3. WORRYING TOO MUCH ABOUT DIFFERENT THINGS: SEVERAL DAYS
2. NOT BEING ABLE TO STOP OR CONTROL WORRYING: NOT AT ALL
7. FEELING AFRAID AS IF SOMETHING AWFUL MIGHT HAPPEN: NOT AT ALL
IF YOU CHECKED OFF ANY PROBLEMS ON THIS QUESTIONNAIRE, HOW DIFFICULT HAVE THESE PROBLEMS MADE IT FOR YOU TO DO YOUR WORK, TAKE CARE OF THINGS AT HOME, OR GET ALONG WITH OTHER PEOPLE: NOT DIFFICULT AT ALL
6. BECOMING EASILY ANNOYED OR IRRITABLE: MORE THAN HALF THE DAYS

## 2019-10-30 ASSESSMENT — PATIENT HEALTH QUESTIONNAIRE - PHQ9
SUM OF ALL RESPONSES TO PHQ QUESTIONS 1-9: 9
5. POOR APPETITE OR OVEREATING: NOT AT ALL
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
## 2019-10-30 NOTE — Clinical Note
Good Afternoon, I met with this patient to complete a diagnostic assessment, and plan to continue to see her for individual therapy services. Please let me know if there are any questions or additional concerns to be discussed in therapy. Thank you,Randee

## 2019-10-31 ASSESSMENT — ANXIETY QUESTIONNAIRES: GAD7 TOTAL SCORE: 4

## 2019-11-04 ENCOUNTER — HEALTH MAINTENANCE LETTER (OUTPATIENT)
Age: 39
End: 2019-11-04

## 2019-11-06 ENCOUNTER — OFFICE VISIT (OUTPATIENT)
Dept: PSYCHOLOGY | Facility: CLINIC | Age: 39
End: 2019-11-06
Payer: COMMERCIAL

## 2019-11-06 DIAGNOSIS — F33.1 MAJOR DEPRESSIVE DISORDER, RECURRENT EPISODE, MODERATE (H): Primary | ICD-10-CM

## 2019-11-06 PROCEDURE — 90834 PSYTX W PT 45 MINUTES: CPT | Performed by: SOCIAL WORKER

## 2019-11-06 NOTE — PATIENT INSTRUCTIONS
Between now and the next appointment, Kristel will start to notice/track her symptoms as they occur, making note of what else is occurring at the time, as well as thoughts, emotions, and responses to symptoms.

## 2019-11-06 NOTE — PROGRESS NOTES
Progress Note    Patient Name: Kristel Martinez  Date: 11/6/19         Service Type: Individual  Video Visit: No     Session Start Time: 12:30pm Session End Time: 1:12pm     Session Length: 42 minutes  Session #: 2    Attendees: Patient attended alone     Treatment Plan Last Reviewed: Treatment plan developed and reviewed today, 11/6/19  PHQ-9 / MALCOM-7 : completed on 10/30/19  PHQ-9: 9  MALCOM-7: 4    DATA  Interactive Complexity: No  Crisis: No       Progress Since Last Session (Related to Symptoms / Goals / Homework):   Symptoms: No change Patient reports no significant changes to reported symptoms since previous appointment    Homework: N/A--previous appointment was a diagnostic assessment, so no homework had been assigned      Episode of Care Goals: N/A--treatment plan goals were developed during today's appointment, 11/6/19     Current / Ongoing Stressors and Concerns:   Patient reports that her primary stressor at this time is her relationship with her , following an incident this past year, that caused significant conflict in their marriage, which continues to date. She notes that they had tried couples' therapy, but that this was not successful, and that after a few appointments, the couple's therapist had recommended that both patient and her  access individual therapy services prior to resuming couple's therapy.      Treatment Objective(s) Addressed in This Session:   N/A--treatment plan objectives were developed during today's appointment, 11/6/19     Intervention:   CBT: Time was spent discussing the current symptoms and situations in patient's life that she finds most challenging, as well as the thoughts, emotions and behaviors that often accompany these symptoms and situations. We discussed her starting to track her symptoms between now and the next appointment, making note of symptoms she was experiencing, what else was occurring at the time, as well  as thoughts, emotions and behaviors in response to the situation/symptoms.      Motivational Interviewing: Therapist utilized open ended questions, reflective listening, affirmations, summarizing and eliciting self-motivational statements to better understand patient's perception of her current challenges, and to develop a treatment plan that is meaningful and useful to her in working toward her identified goals.     ASSESSMENT: Current Emotional / Mental Status (status of significant symptoms):   Risk status (Self / Other harm or suicidal ideation)   Patient denies current fears or concerns for personal safety.   Patient denies current or recent suicidal ideation or behaviors.   Patientdenies current or recent homicidal ideation or behaviors.   Patient denies current or recent self injurious behavior or ideation.   Patient denies other safety concerns.   Patient reports there has been no change in risk factors since their last session.     Patient reports there has been no change in protective factors since their last session.     Recommended that patient call 911 or go to the local ED should there be a change in any of these risk factors.     Appearance:   Appropriate    Eye Contact:   Good    Psychomotor Behavior: Normal    Attitude:   Cooperative    Orientation:   All   Speech    Rate / Production: Normal     Volume:  Normal    Mood:    Normal   Affect:    Appropriate    Thought Content:  Clear    Thought Form:  Coherent  Logical    Insight:    Good      Medication Review:   No changes to current psychiatric medication(s)     Medication Compliance:   Yes     Changes in Health Issues:   None reported     Chemical Use Review:   Substance Use: Chemical use reviewed, no active concerns identified      Tobacco Use: No current tobacco use.      Diagnosis:  1. Major depressive disorder, recurrent episode, moderate (H)        Collateral Reports Completed:   Not Applicable    PLAN: (Patient Tasks / Therapist Tasks /  Other)  Between now and the next appointment, patient will start to notice/track her symptoms as they occur, making note of what else is occurring at the time, as well as thoughts, emotions, and responses to symptoms.       Randee Fuchs, [facility]                                                         ______________________________________________________________________    Treatment Plan    Patient's Name: Kristel Martinez  YOB: 1980    Date: 11/6/19    DSM5 Diagnoses: 296.32 (F33.1) Major Depressive Disorder, Recurrent Episode, Moderate _     Psychosocial / Contextual Factors:      Patient is a 39 year old female, who presents for a diagnostic assessment and individual therapy services, stating that she has experienced an increase in the frequency and intensity of depression and anxiety symptoms over the past year. She states that she has struggled with depression for many years, and has been effectively managing her symptoms with medication. She has tried therapy services in the past as well. Patient notes that this past year, there was an incident with her  that caused significant conflict in their marriage, and they sought out couple's therapy. Patient states that after a few appointments, the couple's therapist had recommended that both client and her  access individual therapy services prior to resuming couple's therapy. Patient currently resides with her  and two children. She reports that she is employed full time in a job that she enjoys. She reports a support system of family and friends. Patient would like to access therapy services in order to process through life stressors and significant life events, as well as to further develop adaptive coping skills that can assist her in managing her reported symptoms.     WHODAS: WHODAS completed at the time of diagnostic assessment, with a score of 17    Referral / Collaboration:  Referral to another professional/service is not  indicated at this time..    Anticipated number of session or this episode of care: 6-8      MeasurableTreatment Goal(s) related to diagnosis / functional impairment(s)  Goal 1: Patient will experience a decrease in reported symptoms of depression and anxiety, as evidenced by a decrease in PHQ-9 and MALCOM-7 scores (baseline PHQ-9: 9  MALCOM-7: 4 ), her report, and therapist's observation.       I will know I've met my goal when I'm able to get more done, and feel more motivated.      Objective #A (Patient Action)    Patient will be able to identify at least 2-3 triggers to her reported symptoms, as well as being able to identify at least 2-3 initial signs that she is starting to feel depressed or anxious, so that she will be able to make decisions about how to cope with symptoms as they arise.  Status: New - Date: 11/619     Intervention(s)  Therapist will ask patient to track mood and anxiety symptoms, in order to notice and address patterns in the frequency and intensity of her symptoms, as well as to identify triggers and cues that accompany her symptoms.    Objective #B  Patient will be able to effectively utilize adaptive coping skills, at least 1-2 times daily, to assist her in managing her reported symptoms of depression and anxiety.  Status: New - Date: 11/6/19     Intervention(s)  Therapist will assist patient in identifying situations in which she tends to experience symptoms of depression and anxiety, and will assist her in developing short and long term plans for how she might manage this, making use of adaptive coping skills, and through processing current life circumstances identified as being related to her reported symptoms.      Goal 2: Patient will experience increased satisfaction in her relationships and communication with her , as evidenced by the use of assertive communication skills and healthy boundary setting, at least 1-2 times weekly.       I will know I've met my goal when I know how I  want to move forward with my marriage.      Objective #A (Patient Action)  Patient will compile a list of boundaries that she would like to set with her , that she believes to be healthy and sustainable, and will practice setting healthy boundaries with her  at least 1-2 times per week.     Status: New - Date: 11/6/19     Intervention(s)  Therapist will assist patient in identifying potential areas of change that she would like to address in her marriage,  related to healthy boundaries and communication, and will assist patient in developing short and long-term plans for how she might work toward making these changes.     Objective #B  Patient will learn and utilize healthy and assertive communication skills at least 1-2 times daily in her interactions with her , so that she can feel as though she is being heard and having her needs met in this relationship.  Status: New - Date: 11/6/19     Intervention(s)  Therapist will role-play assertive communication skills with patient, so that she feels confident in utilizing these skills in her daily life.       Patient has reviewed and agreed to the above plan.      Randee Fuchs, [facility]  November 6, 2019

## 2019-11-10 ENCOUNTER — E-VISIT (OUTPATIENT)
Dept: FAMILY MEDICINE | Facility: CLINIC | Age: 39
End: 2019-11-10
Payer: COMMERCIAL

## 2019-11-10 DIAGNOSIS — J01.90 ACUTE SINUSITIS WITH SYMPTOMS > 10 DAYS: Primary | ICD-10-CM

## 2019-11-10 PROCEDURE — 99444 ZZC PHYSICIAN ONLINE EVALUATION & MANAGEMENT SERVICE: CPT | Performed by: NURSE PRACTITIONER

## 2019-11-18 ENCOUNTER — HOSPITAL ENCOUNTER (EMERGENCY)
Facility: CLINIC | Age: 39
Discharge: HOME | End: 2019-11-19
Attending: FAMILY MEDICINE | Admitting: FAMILY MEDICINE
Payer: COMMERCIAL

## 2019-11-18 ENCOUNTER — FCC EXTENDED DOCUMENTATION (OUTPATIENT)
Dept: PSYCHOLOGY | Facility: CLINIC | Age: 39
End: 2019-11-18

## 2019-11-18 DIAGNOSIS — R10.9 ABDOMINAL DISCOMFORT: ICD-10-CM

## 2019-11-18 DIAGNOSIS — R11.10 VOMITING AND DIARRHEA: ICD-10-CM

## 2019-11-18 DIAGNOSIS — R19.7 VOMITING AND DIARRHEA: ICD-10-CM

## 2019-11-18 PROCEDURE — 99284 EMERGENCY DEPT VISIT MOD MDM: CPT | Mod: Z6 | Performed by: FAMILY MEDICINE

## 2019-11-18 PROCEDURE — 96374 THER/PROPH/DIAG INJ IV PUSH: CPT | Performed by: FAMILY MEDICINE

## 2019-11-18 PROCEDURE — 96361 HYDRATE IV INFUSION ADD-ON: CPT | Performed by: FAMILY MEDICINE

## 2019-11-18 PROCEDURE — 99284 EMERGENCY DEPT VISIT MOD MDM: CPT | Mod: 25 | Performed by: FAMILY MEDICINE

## 2019-11-18 NOTE — LETTER
[facility]  Emergency Department  [address] [place] [address]    [phone] fax      11/19/2019      Kristel Martinez  [address] [place] [address] [address] [facility] [address]  [phone] (home)         TO WHOM IT MAY CONCERN:      Kristel was seen in the Emergency Department on 11/19/2019.    Please excuse from work due to illness.    She may return, without restrictions, when improved.      Sincerely,        Tremaine Mcdaniel MD  Emergency Physician

## 2019-11-18 NOTE — ED AVS SNAPSHOT
[facility] Emergency Department  [address] [place] [address]  Phone:  [phone]  Fax:  [phone]                                    Kristel Martinez   MRN: [id number]    Department:  [facility] Emergency Department   Date of Visit:  11/18/2019           After Visit Summary Signature Page    I have received my discharge instructions, and my questions have been answered. I have discussed any challenges I see with this plan with the nurse or doctor.    ..........................................................................................................................................  Patient/Patient Representative Signature      ..........................................................................................................................................  Patient Representative Print Name and Relationship to Patient    ..................................................               ................................................  Date                                   Time    ..........................................................................................................................................  Reviewed by Signature/Title    ...................................................              ..............................................  Date                                               Time          22EPIC Rev 08/18

## 2019-11-18 NOTE — PROGRESS NOTES
Discharge Summary  Multiple Sessions    Client Name: Kristel Martinez MRN#: [id number] YOB: 1980      Intake / Discharge Date: 10/30/19 (intake date), 11/18/19 (discharge date)      DSM5 Diagnoses: (Sustained by DSM5 Criteria Listed Above)  Diagnoses: 296.32 (F33.1) Major Depressive Disorder, Recurrent Episode, Moderate _  Psychosocial & Contextual Factors:    Client is a 39 year old female, who presented for a diagnostic assessment and individual therapy services, stating that she has experienced an increase in the frequency and intensity of depression and anxiety symptoms over the past year. She stated that she has struggled with depression for many years, and has been effectively managing her symptoms with medication. She has tried therapy services in the past as well. Client notes that this past year, there was an incident with her  that caused significant conflict in their marriage, and they sought out couple's therapy. Client stated that after a few appointments, the couple's therapist had recommended that both client and her  access individual therapy services prior to resuming couple's therapy. Client resides with her  and two children. She reported that she is employed full time in a job that she enjoys. She reported a support system of family and friends.    WHODAS 2.0 (12 item) Score: WHODAS completed at the time of diagnostic assessment, with a score of 17          Presenting Concern:  Client reported the reason for seeking therapy as: increased depression and anxiety symptoms over the past year, which she believes is related to conflict wtihin her marriage. Client stated that her symptoms have resulted in the following functional impairments: home life with her , management/completion of household tasks, relationship(s) and self-care. Client reported that she often experiences low motivation to complete tasks that she knows she needs to complete,  "and that she has had some difficulties in relating to her  since an incident last year that created significant conflict within their marriage. She reported that she sometimes \"feels stuck\" and is not sure what she should do about her circumstances. Client and her  had tried couple's therapy, however the counselor had recommended that they both attend individual therapy, prior to resuming couple's therapy. Client reported that these problem(s) began approximately one year ago. Client has attempted to resolve these concerns in the past through medication management and counseling. Client reported that other professional(s) are involved in providing support / services including her primary care provider for medication management.      Reason for Discharge:  Client did not return      Disposition at Time of Last Encounter:   Comments:   This provider met with client for two appointments. At the time of the second appointment, client reported that she continued to experience depression and anxiety symptoms, and was considering different strategies that might be helpful to her in managing her symptoms.      Risk Management:   Client denies a history of suicidal ideation, suicide attempts, self-injurious behavior, homicidal ideation, homicidal behavior and and other safety concerns  Recommended that patient call 911 or go to the local ED should there be a change in any of these risk factors.      Referred To:  N/A        Randee Fuchs, [facility]   11/18/2019  "

## 2019-11-19 ENCOUNTER — HOSPITAL ENCOUNTER (EMERGENCY)
Facility: CLINIC | Age: 39
Discharge: HOME | End: 2019-11-19
Attending: PHYSICIAN ASSISTANT | Admitting: PHYSICIAN ASSISTANT
Payer: COMMERCIAL

## 2019-11-19 ENCOUNTER — NURSE TRIAGE (OUTPATIENT)
Dept: NURSING | Facility: CLINIC | Age: 39
End: 2019-11-19

## 2019-11-19 ENCOUNTER — MYC MEDICAL ADVICE (OUTPATIENT)
Dept: FAMILY MEDICINE | Facility: CLINIC | Age: 39
End: 2019-11-19

## 2019-11-19 VITALS
DIASTOLIC BLOOD PRESSURE: 68 MMHG | OXYGEN SATURATION: 98 % | TEMPERATURE: 98.5 F | RESPIRATION RATE: 17 BRPM | SYSTOLIC BLOOD PRESSURE: 108 MMHG

## 2019-11-19 VITALS
BODY MASS INDEX: 33 KG/M2 | TEMPERATURE: 98.3 F | SYSTOLIC BLOOD PRESSURE: 118 MMHG | HEART RATE: 77 BPM | WEIGHT: 230 LBS | RESPIRATION RATE: 17 BRPM | DIASTOLIC BLOOD PRESSURE: 67 MMHG | OXYGEN SATURATION: 97 %

## 2019-11-19 DIAGNOSIS — R51.9 LEFT-SIDED HEADACHE: ICD-10-CM

## 2019-11-19 LAB
ALBUMIN SERPL-MCNC: 4 G/DL (ref 3.4–5)
ALBUMIN UR-MCNC: NEGATIVE MG/DL
ALP SERPL-CCNC: 68 U/L (ref 40–150)
ALT SERPL W P-5'-P-CCNC: 15 U/L (ref 0–50)
ANION GAP SERPL CALCULATED.3IONS-SCNC: 6 MMOL/L (ref 3–14)
APPEARANCE UR: CLEAR
AST SERPL W P-5'-P-CCNC: 9 U/L (ref 0–45)
BACTERIA #/AREA URNS HPF: ABNORMAL /HPF
BASOPHILS # BLD AUTO: 0 10E9/L (ref 0–0.2)
BASOPHILS NFR BLD AUTO: 0.3 %
BILIRUB SERPL-MCNC: 0.3 MG/DL (ref 0.2–1.3)
BILIRUB UR QL STRIP: NEGATIVE
BUN SERPL-MCNC: 13 MG/DL (ref 7–30)
CALCIUM SERPL-MCNC: 8.8 MG/DL (ref 8.5–10.1)
CHLORIDE SERPL-SCNC: 109 MMOL/L (ref 94–109)
CO2 SERPL-SCNC: 28 MMOL/L (ref 20–32)
COLOR UR AUTO: YELLOW
CREAT SERPL-MCNC: 0.68 MG/DL (ref 0.52–1.04)
DIFFERENTIAL METHOD BLD: ABNORMAL
EOSINOPHIL NFR BLD AUTO: 0.9 %
ERYTHROCYTE [DISTWIDTH] IN BLOOD BY AUTOMATED COUNT: 13.1 % (ref 10–15)
GFR SERPL CREATININE-BSD FRML MDRD: >90 ML/MIN/{1.73_M2}
GLUCOSE SERPL-MCNC: 113 MG/DL (ref 70–99)
GLUCOSE UR STRIP-MCNC: NEGATIVE MG/DL
HCG UR QL: NEGATIVE
HCT VFR BLD AUTO: 40.5 % (ref 35–47)
HGB BLD-MCNC: 13.1 G/DL (ref 11.7–15.7)
HGB UR QL STRIP: NEGATIVE
IMM GRANULOCYTES # BLD: 0.1 10E9/L (ref 0–0.4)
IMM GRANULOCYTES NFR BLD: 0.6 %
KETONES UR STRIP-MCNC: NEGATIVE MG/DL
LEUKOCYTE ESTERASE UR QL STRIP: NEGATIVE
LIPASE SERPL-CCNC: 114 U/L (ref 73–393)
LYMPHOCYTES # BLD AUTO: 0.6 10E9/L (ref 0.8–5.3)
LYMPHOCYTES NFR BLD AUTO: 7.4 %
MCH RBC QN AUTO: 26.4 PG (ref 26.5–33)
MCHC RBC AUTO-ENTMCNC: 32.3 G/DL (ref 31.5–36.5)
MCV RBC AUTO: 82 FL (ref 78–100)
MONOCYTES # BLD AUTO: 0.5 10E9/L (ref 0–1.3)
MONOCYTES NFR BLD AUTO: 6 %
MUCOUS THREADS #/AREA URNS LPF: PRESENT /LPF
NEUTROPHILS # BLD AUTO: 7.3 10E9/L (ref 1.6–8.3)
NEUTROPHILS NFR BLD AUTO: 84.8 %
NITRATE UR QL: NEGATIVE
NRBC # BLD AUTO: 0 10*3/UL
NRBC BLD AUTO-RTO: 0 /100
PH UR STRIP: 5 PH (ref 5–7)
PLATELET # BLD AUTO: 231 10E9/L (ref 150–450)
POTASSIUM SERPL-SCNC: 3.7 MMOL/L (ref 3.4–5.3)
PROT SERPL-MCNC: 7.7 G/DL (ref 6.8–8.8)
RBC # BLD AUTO: 4.96 10E12/L (ref 3.8–5.2)
RBC #/AREA URNS AUTO: 1 /HPF (ref 0–2)
SODIUM SERPL-SCNC: 143 MMOL/L (ref 133–144)
SOURCE: ABNORMAL
SP GR UR STRIP: 1.03 (ref 1–1.03)
SQUAMOUS #/AREA URNS AUTO: 1 /HPF (ref 0–1)
UROBILINOGEN UR STRIP-MCNC: 2 MG/DL (ref 0–2)
WBC # BLD AUTO: 8.6 10E9/L (ref 4–11)
WBC #/AREA URNS AUTO: 3 /HPF (ref 0–5)

## 2019-11-19 PROCEDURE — 99283 EMERGENCY DEPT VISIT LOW MDM: CPT | Mod: Z6 | Performed by: PHYSICIAN ASSISTANT

## 2019-11-19 PROCEDURE — 25800030 ZZH RX IP 258 OP 636: Performed by: FAMILY MEDICINE

## 2019-11-19 PROCEDURE — 96374 THER/PROPH/DIAG INJ IV PUSH: CPT | Performed by: FAMILY MEDICINE

## 2019-11-19 PROCEDURE — 81001 URINALYSIS AUTO W/SCOPE: CPT | Performed by: FAMILY MEDICINE

## 2019-11-19 PROCEDURE — 81025 URINE PREGNANCY TEST: CPT | Performed by: FAMILY MEDICINE

## 2019-11-19 PROCEDURE — 99283 EMERGENCY DEPT VISIT LOW MDM: CPT | Performed by: PHYSICIAN ASSISTANT

## 2019-11-19 PROCEDURE — 85025 COMPLETE CBC W/AUTO DIFF WBC: CPT | Performed by: FAMILY MEDICINE

## 2019-11-19 PROCEDURE — 80053 COMPREHEN METABOLIC PANEL: CPT | Performed by: FAMILY MEDICINE

## 2019-11-19 PROCEDURE — 83690 ASSAY OF LIPASE: CPT | Performed by: FAMILY MEDICINE

## 2019-11-19 PROCEDURE — 25000128 H RX IP 250 OP 636: Performed by: FAMILY MEDICINE

## 2019-11-19 RX ORDER — TETRACAINE HYDROCHLORIDE 5 MG/ML
1-2 SOLUTION OPHTHALMIC ONCE
Status: DISCONTINUED | OUTPATIENT
Start: 2019-11-19 | End: 2019-11-20 | Stop reason: HOSPADM

## 2019-11-19 RX ORDER — ONDANSETRON 2 MG/ML
4 INJECTION INTRAMUSCULAR; INTRAVENOUS
Status: COMPLETED | OUTPATIENT
Start: 2019-11-19 | End: 2019-11-19

## 2019-11-19 RX ORDER — SODIUM CHLORIDE 9 MG/ML
1000 INJECTION, SOLUTION INTRAVENOUS CONTINUOUS
Status: DISCONTINUED | OUTPATIENT
Start: 2019-11-19 | End: 2019-11-19 | Stop reason: HOSPADM

## 2019-11-19 RX ADMIN — SODIUM CHLORIDE 1000 ML: 9 INJECTION, SOLUTION INTRAVENOUS at 00:40

## 2019-11-19 RX ADMIN — ONDANSETRON 4 MG: 2 INJECTION INTRAMUSCULAR; INTRAVENOUS at 00:13

## 2019-11-19 NOTE — DISCHARGE INSTRUCTIONS
Your blood work was reassuring tonight.  I suspect your symptoms are related to the Augmentin.  Your ear actually looks and I would suggest stopping the Augmentin and letting things settle down.    Use the Zofran ODT as needed for nausea.  Encourage fluids.  As we discussed, we will use time as a diagnostic tool and if your symptoms worsen or change, we will need to reevaluate.  Please return to the ED if you develop persistent vomiting, bloody diarrhea, increasing pain, or any concerns.    It was a pleasure visiting with both of you.  I am glad you are feeling at least a little bit better and hope you continue to improve.    Thank you for choosing [facility]. We appreciate the opportunity to meet your urgent medical needs. Please let us know if we could have done anything to make your stay more satisfying.    After discharge, please closely monitor for any new or worsening symptoms. Return to the Emergency Department if you develop any acute worsening signs or symptoms.    If you had lab work, cultures or imaging studies done during your stay, the final results may still be pending. We will call you if your plan of care needs to change. However, if you are not improving as expected, please follow up with your primary care provider or clinic.     Start any prescription medications that were prescribed to you and take them as directed.     Please see additional handouts that may be pertinent to your condition.

## 2019-11-19 NOTE — ED AVS SNAPSHOT
[facility] Emergency Department  [address] [place] [address]  Phone:  [phone]  Fax:  [phone]                                    Kristel Martinez   MRN: [id number]    Department:  [facility] Emergency Department   Date of Visit:  11/19/2019           After Visit Summary Signature Page    I have received my discharge instructions, and my questions have been answered. I have discussed any challenges I see with this plan with the nurse or doctor.    ..........................................................................................................................................  Patient/Patient Representative Signature      ..........................................................................................................................................  Patient Representative Print Name and Relationship to Patient    ..................................................               ................................................  Date                                   Time    ..........................................................................................................................................  Reviewed by Signature/Title    ...................................................              ..............................................  Date                                               Time          22EPIC Rev 08/18

## 2019-11-19 NOTE — ED PROVIDER NOTES
"  History     Chief Complaint   Patient presents with     Abdominal Pain     Nausea & Vomiting     HPI  Kristel Martienz is a 39 year old female who presents to the ED with abdominal discomfort and nausea.  She has had a rough several weeks she had a fever sore throat for about 10 days and had a negative influenza and strep test.  Was several weeks ago.  A week ago after lunch she just did not feel well after taking the first dose of Augmentin for an ear infection.  She vomited that night.  She thought she probably had a stomach flu because several other coworkers and kids at her work were sick with similar symptoms.  She only took the 1 dose of Augmentin and stopped that.  Her ear is still bothering her.  She was sick for about 3 days with vomiting diarrhea and body aches.  She improved about 80% but never quite made it back to full strength.      Since her ear was still bothering her, she did take another dose of Augmentin today at around 4:30 PM with food.  About an hour later she started with some epigastric discomfort and then around 10:30 this evening had another episode of emesis.  She just had an episode of diarrhea here in the emergency department and feels like she has to go again.  Not so much abdominal pain just a \"achy feeling\" and nausea right now.  Status post tubal ligation.  Denies other abdominal surgeries.  Last menstrual period was 2 weeks ago.  Here with her , Farhad.  He did not eat the same thing she did but her kids did and they are not sick.  Earlier today she had a muffin, mashed potatoes and a quesadilla at home.        Allergies:  Allergies   Allergen Reactions     Cephalosporins Rash     Cephalexin     Sulfa Drugs Rash       Problem List:    Patient Active Problem List    Diagnosis Date Noted     IgA deficiency (H) 09/12/2018     Priority: Medium     Postoperative hypothyroidism 03/23/2018     Priority: Medium     Vitiligo 04/09/2015     Priority: Medium     Papillary adenocarcinoma of " thyroid (H) 03/01/2014     Priority: Medium     Overview:   12/2013: R thyroid lobectomy 1.7 cm follicular variant papillary cancer, MACIS 3.6  03/2014: Completion Thyroidectomy 0.3 cm incidental papillary cancer left lobe. Iodine ablation with 53 mCi.   07/2016, 07/2017: Neck US neg.       Vitamin D deficiency 07/06/2009     Priority: Medium     Last Assessment & Plan:   7/09  29.4  vit  d   on 1000units  daily  since  7/09       Major depressive disorder, recurrent episode, moderate (H) 11/16/2007     Priority: Medium        Past Medical History:    Past Medical History:   Diagnosis Date     Depressive disorder        Past Surgical History:    Past Surgical History:   Procedure Laterality Date     COLONOSCOPY  8-27/18     THYROIDECTOMY      2015     TONSILLECTOMY       TUBAL LIGATION         Family History:    Family History   Problem Relation Age of Onset     Hypertension Father      Arrhythmia Father      Lymphoma Maternal Grandmother      Diabetes Paternal Grandfather         type 1      Asthma Son      Arthritis Daughter        Social History:  Marital Status:   [2]  Social History     Tobacco Use     Smoking status: Former Smoker     Smokeless tobacco: Never Used   Substance Use Topics     Alcohol use: Yes     Comment: 1 drink per wk     Drug use: No        Medications:    amoxicillin-clavulanate (AUGMENTIN) 875-125 MG tablet  levothyroxine (SYNTHROID/LEVOTHROID) 150 MCG tablet  metroNIDAZOLE (METROLOTION) 0.75 % external lotion  minocycline (MINOCIN/DYNACIN) 100 MG capsule  ondansetron (ZOFRAN-ODT) 4 MG ODT tab  sertraline (ZOLOFT) 100 MG tablet  topiramate (TOPAMAX) 25 MG tablet  hydrocortisone (ANUSOL-HC) 2.5 % cream  spironolactone (ALDACTONE) 50 MG tablet  SUMAtriptan (IMITREX) 25 MG tablet          Review of Systems   All other systems reviewed and are negative.      Physical Exam   BP: 127/61  Heart Rate: 95  Temp: 98.5  F (36.9  C)  Resp: 20  SpO2: 97 %      Physical Exam  Vitals signs  "reviewed.   Constitutional:       General: She is in acute distress (mild).      Appearance: She is well-developed.   Cardiovascular:      Rate and Rhythm: Normal rate and regular rhythm.   Pulmonary:      Effort: Pulmonary effort is normal.      Breath sounds: Normal breath sounds.   Abdominal:      General: Abdomen is flat.      Tenderness: There is no abdominal tenderness.      Comments: No tenderness, just feels \"blah\" with palpation   Skin:     General: Skin is warm and dry.   Neurological:      General: No focal deficit present.      Mental Status: She is alert.   Psychiatric:         Mood and Affect: Mood normal.         ED Course  (with Medical Decision Making)    39-year-old female with nausea vomiting and now diarrhea x1 after restarting Augmentin earlier today for an ear infection.  Had similar symptoms about a week ago and thought she had the stomach flu as coworkers and kids at work were all sick with similar symptoms.  She had vomiting diarrhea and body aches for about 3 days and improved but never quite got back to normal.  Nominal discomfort started about an hour after taking another dose of Augmentin this afternoon and now vomiting tonight.    On exam she really does not have tenderness to palpation just feels \"blah\".  Abdomen is soft.  IV placed.  Labs drawn.  1 L normal saline.  Zofran for nausea.      She is feeling improved after the fluids and Zofran.  Her CBC, comprehensive profile and lipase were all unremarkable.  Suspect that her abdominal discomfort, nausea vomiting and diarrhea is due to the Augmentin.  Could have a stomach virus as well but that will have to run its course.  Her ear actually looks very good and I suggested she stop the Augmentin and let things settle down.  We discussed reportable signs and the need to reevaluate if her symptoms change or worsen.  She is feeling better and feels ready to go home.  She feels comfortable with this plan.  Work note written.        "   Procedures               Critical Care time:  none               Results for orders placed or performed during the hospital encounter of 11/18/19 (from the past 24 hour(s))   Routine UA with microscopic   Result Value Ref Range    Color Urine Yellow     Appearance Urine Clear     Glucose Urine Negative NEG^Negative mg/dL    Bilirubin Urine Negative NEG^Negative    Ketones Urine Negative NEG^Negative mg/dL    Specific Gravity Urine 1.028 1.003 - 1.035    Blood Urine Negative NEG^Negative    pH Urine 5.0 5.0 - 7.0 pH    Protein Albumin Urine Negative NEG^Negative mg/dL    Urobilinogen mg/dL 2.0 0.0 - 2.0 mg/dL    Nitrite Urine Negative NEG^Negative    Leukocyte Esterase Urine Negative NEG^Negative    Source Midstream Urine     WBC Urine 3 0 - 5 /HPF    RBC Urine 1 0 - 2 /HPF    Bacteria Urine Few (A) NEG^Negative /HPF    Squamous Epithelial /HPF Urine 1 0 - 1 /HPF    Mucous Urine Present (A) NEG^Negative /LPF   HCG qualitative urine   Result Value Ref Range    HCG Qual Urine Negative NEG^Negative   CBC with platelets differential   Result Value Ref Range    WBC 8.6 4.0 - 11.0 10e9/L    RBC Count 4.96 3.8 - 5.2 10e12/L    Hemoglobin 13.1 11.7 - 15.7 g/dL    Hematocrit 40.5 35.0 - 47.0 %    MCV 82 78 - 100 fl    MCH 26.4 (L) 26.5 - 33.0 pg    MCHC 32.3 31.5 - 36.5 g/dL    RDW 13.1 10.0 - 15.0 %    Platelet Count 231 150 - 450 10e9/L    Diff Method Automated Method     % Neutrophils 84.8 %    % Lymphocytes 7.4 %    % Monocytes 6.0 %    % Eosinophils 0.9 %    % Basophils 0.3 %    % Immature Granulocytes 0.6 %    Nucleated RBCs 0 0 /100    Absolute Neutrophil 7.3 1.6 - 8.3 10e9/L    Absolute Lymphocytes 0.6 (L) 0.8 - 5.3 10e9/L    Absolute Monocytes 0.5 0.0 - 1.3 10e9/L    Absolute Basophils 0.0 0.0 - 0.2 10e9/L    Abs Immature Granulocytes 0.1 0 - 0.4 10e9/L    Absolute Nucleated RBC 0.0    Comprehensive metabolic panel   Result Value Ref Range    Sodium 143 133 - 144 mmol/L    Potassium 3.7 3.4 - 5.3 mmol/L    Chloride  109 94 - 109 mmol/L    Carbon Dioxide 28 20 - 32 mmol/L    Anion Gap 6 3 - 14 mmol/L    Glucose 113 (H) 70 - 99 mg/dL    Urea Nitrogen 13 7 - 30 mg/dL    Creatinine 0.68 0.52 - 1.04 mg/dL    GFR Estimate >90 >60 mL/min/[1.73_m2]    GFR Estimate If Black >90 >60 mL/min/[1.73_m2]    Calcium 8.8 8.5 - 10.1 mg/dL    Bilirubin Total 0.3 0.2 - 1.3 mg/dL    Albumin 4.0 3.4 - 5.0 g/dL    Protein Total 7.7 6.8 - 8.8 g/dL    Alkaline Phosphatase 68 40 - 150 U/L    ALT 15 0 - 50 U/L    AST 9 0 - 45 U/L   Lipase   Result Value Ref Range    Lipase 114 73 - 393 U/L       Medications   0.9% sodium chloride BOLUS (has no administration in time range)     Followed by   sodium chloride 0.9% infusion (has no administration in time range)   ondansetron (ZOFRAN) injection 4 mg (4 mg Intravenous Given 11/19/19 0013)       Assessments & Plan     I have reviewed the nursing notes.    I have reviewed the findings, diagnosis, plan and need for follow up with the patient.       New Prescriptions    No medications on file       Final diagnoses:   Vomiting and diarrhea   Abdominal discomfort       11/18/2019   [facility] EMERGENCY DEPARTMENT     Tremaine Mckinley MD  11/19/19 0150

## 2019-11-19 NOTE — ED TRIAGE NOTES
Pt reports last Monday she had n/v and abd pain last week Monday, states she didn't feel well for about 3 days but then started feeling better. States this afternoon she started having some nausea and abd this afternoon and this evening about an hour ago she started vomiting.

## 2019-11-20 NOTE — TELEPHONE ENCOUNTER
Kristel calls and says that last night she vomited  And developed left eye pressure. Upper eyelid is swollen. Denies any eye injury.      Reason for Disposition    Severe eye pain    Additional Information    Negative: Chemical got in the eye    Negative: Piece of something got in the eye    Negative: Eye redness followed an eye injury    Negative: Yellow or green pus in the eyes    Negative: [1] Eyelid is swollen AND [2] no redness of white of eye (sclera)    Negative: Caused by pollen or other allergy OR the main symptom is itchy eyes    Negative: Red, widespread rash also present    Protocols used: EYE - RED WITHOUT PUS-A-AH

## 2019-11-20 NOTE — DISCHARGE INSTRUCTIONS
I think the pressure you have behind her eye is related to a headache caused from vomiting.  The force of vomiting can increase pressure in your sinuses which can lead to pain behind the eyes.  I suggest trying ibuprofen or Tylenol at home with lots of fluids and rest.  As discussed if you develop any worsening symptoms please return to the emergency department.    Thank you for choosing [facility] Emergency Department. It was a pleasure taking care of you today. If you have any questions, please call [phone].    Odilia Rojas PA-C

## 2019-11-20 NOTE — ED TRIAGE NOTES
Presents to ED with left eye pain that started last night after she vomited. Mild swelling noted periorbital area. No redness noted. Patient reports no vision change.

## 2019-11-20 NOTE — ED PROVIDER NOTES
History     Chief Complaint   Patient presents with     Eye Pain     HPI  Kristel Martinez is a 39 year old female who presents to the emergency department complaining of left eye pain. The patient reports she was here yesterday with vomiting and diarrhea and overall is feeling better from that.  Last night after significant episode of vomiting she developed pain behind her left eye she describes as a pressure.  She denies any associated vision changes, pain in the eye itself, drainage from the eye.  Her nausea and vomiting has resolved.  She has not taken anything for her symptoms due to fear of upsetting her stomach.  She otherwise is feeling improved.    Allergies:  Allergies   Allergen Reactions     Cephalosporins Rash     Cephalexin     Sulfa Drugs Rash       Problem List:    Patient Active Problem List    Diagnosis Date Noted     IgA deficiency (H) 09/12/2018     Priority: Medium     Postoperative hypothyroidism 03/23/2018     Priority: Medium     Vitiligo 04/09/2015     Priority: Medium     Papillary adenocarcinoma of thyroid (H) 03/01/2014     Priority: Medium     Overview:   12/2013: R thyroid lobectomy 1.7 cm follicular variant papillary cancer, MACIS 3.6  03/2014: Completion Thyroidectomy 0.3 cm incidental papillary cancer left lobe. Iodine ablation with 53 mCi.   07/2016, 07/2017: Neck US neg.       Vitamin D deficiency 07/06/2009     Priority: Medium     Last Assessment & Plan:   7/09  29.4  vit  d   on 1000units  daily  since  7/09       Major depressive disorder, recurrent episode, moderate (H) 11/16/2007     Priority: Medium        Past Medical History:    Past Medical History:   Diagnosis Date     Depressive disorder        Past Surgical History:    Past Surgical History:   Procedure Laterality Date     COLONOSCOPY  8-27/18     THYROIDECTOMY      2015     TONSILLECTOMY       TUBAL LIGATION         Family History:    Family History   Problem Relation Age of Onset     Hypertension Father      Arrhythmia  Father      Lymphoma Maternal Grandmother      Diabetes Paternal Grandfather         type 1      Asthma Son      Arthritis Daughter        Social History:  Marital Status:   [2]  Social History     Tobacco Use     Smoking status: Former Smoker     Smokeless tobacco: Never Used   Substance Use Topics     Alcohol use: Yes     Comment: 1 drink per wk     Drug use: No        Medications:    amoxicillin-clavulanate (AUGMENTIN) 875-125 MG tablet  hydrocortisone (ANUSOL-HC) 2.5 % cream  levothyroxine (SYNTHROID/LEVOTHROID) 150 MCG tablet  metroNIDAZOLE (METROLOTION) 0.75 % external lotion  minocycline (MINOCIN/DYNACIN) 100 MG capsule  ondansetron (ZOFRAN-ODT) 4 MG ODT tab  sertraline (ZOLOFT) 100 MG tablet  spironolactone (ALDACTONE) 50 MG tablet  SUMAtriptan (IMITREX) 25 MG tablet  topiramate (TOPAMAX) 25 MG tablet          Review of Systems   All other systems reviewed and are negative.      Physical Exam   BP: 118/67  Pulse: 77  Temp: 98.3  F (36.8  C)  Resp: 17  Weight: 104.3 kg (230 lb)  SpO2: 97 %      Physical Exam  Vitals signs and nursing note reviewed.   Constitutional:       General: She is not in acute distress.     Appearance: Normal appearance. She is not ill-appearing, toxic-appearing or diaphoretic.   HENT:      Head: Normocephalic and atraumatic.      Right Ear: Tympanic membrane normal.      Left Ear: Tympanic membrane normal.      Nose: Nose normal.      Mouth/Throat:      Mouth: Mucous membranes are moist.      Pharynx: Oropharynx is clear.   Eyes:      General:         Right eye: No discharge.         Left eye: No discharge.      Extraocular Movements: Extraocular movements intact.      Conjunctiva/sclera: Conjunctivae normal.      Pupils: Pupils are equal, round, and reactive to light.      Comments: No tenderness surrounding eyes.  Left eye pressure 16  Right eye pressure 18   Neck:      Musculoskeletal: Normal range of motion and neck supple.   Pulmonary:      Effort: Pulmonary effort is  normal. No respiratory distress.   Musculoskeletal:         General: No deformity.   Skin:     General: Skin is warm and dry.   Neurological:      General: No focal deficit present.      Mental Status: She is alert and oriented to person, place, and time. Mental status is at baseline.      Cranial Nerves: No cranial nerve deficit.      Motor: No weakness.      Coordination: Coordination normal.   Psychiatric:         Mood and Affect: Mood normal.         Behavior: Behavior normal.         ED Course        Procedures           Results for orders placed or performed during the hospital encounter of 11/18/19 (from the past 24 hour(s))   Routine UA with microscopic   Result Value Ref Range    Color Urine Yellow     Appearance Urine Clear     Glucose Urine Negative NEG^Negative mg/dL    Bilirubin Urine Negative NEG^Negative    Ketones Urine Negative NEG^Negative mg/dL    Specific Gravity Urine 1.028 1.003 - 1.035    Blood Urine Negative NEG^Negative    pH Urine 5.0 5.0 - 7.0 pH    Protein Albumin Urine Negative NEG^Negative mg/dL    Urobilinogen mg/dL 2.0 0.0 - 2.0 mg/dL    Nitrite Urine Negative NEG^Negative    Leukocyte Esterase Urine Negative NEG^Negative    Source Midstream Urine     WBC Urine 3 0 - 5 /HPF    RBC Urine 1 0 - 2 /HPF    Bacteria Urine Few (A) NEG^Negative /HPF    Squamous Epithelial /HPF Urine 1 0 - 1 /HPF    Mucous Urine Present (A) NEG^Negative /LPF   HCG qualitative urine   Result Value Ref Range    HCG Qual Urine Negative NEG^Negative   CBC with platelets differential   Result Value Ref Range    WBC 8.6 4.0 - 11.0 10e9/L    RBC Count 4.96 3.8 - 5.2 10e12/L    Hemoglobin 13.1 11.7 - 15.7 g/dL    Hematocrit 40.5 35.0 - 47.0 %    MCV 82 78 - 100 fl    MCH 26.4 (L) 26.5 - 33.0 pg    MCHC 32.3 31.5 - 36.5 g/dL    RDW 13.1 10.0 - 15.0 %    Platelet Count 231 150 - 450 10e9/L    Diff Method Automated Method     % Neutrophils 84.8 %    % Lymphocytes 7.4 %    % Monocytes 6.0 %    % Eosinophils 0.9 %    %  Basophils 0.3 %    % Immature Granulocytes 0.6 %    Nucleated RBCs 0 0 /100    Absolute Neutrophil 7.3 1.6 - 8.3 10e9/L    Absolute Lymphocytes 0.6 (L) 0.8 - 5.3 10e9/L    Absolute Monocytes 0.5 0.0 - 1.3 10e9/L    Absolute Basophils 0.0 0.0 - 0.2 10e9/L    Abs Immature Granulocytes 0.1 0 - 0.4 10e9/L    Absolute Nucleated RBC 0.0    Comprehensive metabolic panel   Result Value Ref Range    Sodium 143 133 - 144 mmol/L    Potassium 3.7 3.4 - 5.3 mmol/L    Chloride 109 94 - 109 mmol/L    Carbon Dioxide 28 20 - 32 mmol/L    Anion Gap 6 3 - 14 mmol/L    Glucose 113 (H) 70 - 99 mg/dL    Urea Nitrogen 13 7 - 30 mg/dL    Creatinine 0.68 0.52 - 1.04 mg/dL    GFR Estimate >90 >60 mL/min/[1.73_m2]    GFR Estimate If Black >90 >60 mL/min/[1.73_m2]    Calcium 8.8 8.5 - 10.1 mg/dL    Bilirubin Total 0.3 0.2 - 1.3 mg/dL    Albumin 4.0 3.4 - 5.0 g/dL    Protein Total 7.7 6.8 - 8.8 g/dL    Alkaline Phosphatase 68 40 - 150 U/L    ALT 15 0 - 50 U/L    AST 9 0 - 45 U/L   Lipase   Result Value Ref Range    Lipase 114 73 - 393 U/L       Medications   tetracaine (PONTOCAINE) 0.5 % ophthalmic solution 1-2 drop (1 drop Both Eyes Handoff 11/19/19 2209)       Assessments & Plan (with Medical Decision Making)  Kristel Martinez is a 39 year old female who presented to the ED complaining of pressure behind the left eye after forceful vomiting last night.  Denies any visual changes, drainage of the eye, pain in the eye, nausea or vomiting.  Her vitals on arrival were unremarkable.  Ocular exam completely benign.  Ocular pressures were normal bilaterally, left is actually slightly lower than the right.  Discussed case with Dr. Mckinley.  Given her reassuring exam I did not feel imaging warranted.  I suspect she likely has a headache related to the increased pressure caused from vomiting.  Discussed treatment options and offered her medications here in the ED but she felt comfortable going home and trying ibuprofen and Tylenol with fluids and rest.  I  think this is reasonable.  I did go over warning signs and symptoms of when to return to the ED.  All questions were answered and the patient was discharged home in suitable condition.     I have reviewed the nursing notes.    I have reviewed the findings, diagnosis, plan and need for follow up with the patient.    New Prescriptions    No medications on file       Final diagnoses:   Left-sided headache     Note: Chart documentation done in part with Dragon Voice Recognition software. Although reviewed after completion, some word and grammatical errors may remain.     11/19/2019   [facility] EMERGENCY DEPARTMENT     Odilia Rojas PA-C  11/19/19 3597

## 2019-11-25 DIAGNOSIS — L70.0 ACNE VULGARIS: ICD-10-CM

## 2019-11-26 RX ORDER — SPIRONOLACTONE 50 MG/1
TABLET, FILM COATED ORAL
Qty: 180 TABLET | OUTPATIENT
Start: 2019-11-26

## 2019-12-05 DIAGNOSIS — L70.0 ACNE VULGARIS: ICD-10-CM

## 2019-12-06 RX ORDER — SPIRONOLACTONE 50 MG/1
TABLET, FILM COATED ORAL
Qty: 180 TABLET | OUTPATIENT
Start: 2019-12-06

## 2019-12-06 NOTE — TELEPHONE ENCOUNTER
Last Clinic Visit: 7/10/19  NV: NONE * Follow-up in 3 months   Scheduling has been notified to contact the pt for appointment.

## 2019-12-12 ENCOUNTER — OFFICE VISIT (OUTPATIENT)
Dept: DERMATOLOGY | Facility: CLINIC | Age: 39
End: 2019-12-12
Payer: COMMERCIAL

## 2019-12-12 VITALS — DIASTOLIC BLOOD PRESSURE: 69 MMHG | SYSTOLIC BLOOD PRESSURE: 117 MMHG

## 2019-12-12 DIAGNOSIS — L70.0 ACNE VULGARIS: Primary | ICD-10-CM

## 2019-12-12 RX ORDER — TRETINOIN 0.25 MG/G
CREAM TOPICAL
Qty: 45 G | Refills: 0 | Status: SHIPPED | OUTPATIENT
Start: 2019-12-12 | End: 2021-07-09

## 2019-12-12 ASSESSMENT — PAIN SCALES - GENERAL: PAINLEVEL: NO PAIN (0)

## 2019-12-12 NOTE — LETTER
12/12/2019       RE: Kristel Martinez  [address] [facility] [address] [facility] [address]     Dear Colleague,    Thank you for referring your patient, Kristel Martinez, to the [facility] DERMATOLOGY at [facility]. Please see a copy of my visit note below.    [facility] Dermatology Note      Dermatology Problem List:  1. Hx of vitiligo - hands/feet  2. Hx of thyroid cancer - has since had thyroid removed  3. Acne Vulgaris  -Current Tx: tretinoin 0.025% cream, metronidazole 0.75% lotion  -Previous Tx: Spironolactone (discontinued due to constipation, patient also has IBS) , minocycline 100 mg       Encounter Date: Dec 12, 2019    CC:  Chief Complaint   Patient presents with     Acne     Kristel is here today for an acne follow up.          History of Present Illness:  Ms. Kristel Martinez is a 39 year old female who presents for an acne follow up. She was last seen on 7/10/2019 when she started on spironolactone 50 mg, continued on metronidazole 0.75% lotion and restarted minocycline 100 mg for two months. She reports she is doing well since her last visit and that her skin is clear. She discontinued spironolactone due to constipation as a side effect and tried miralax without any relief. The patient suffers from IBS, so she notes sensitivity to the diuretic. She is currently taking minocycline and using metro lotion. She voices no other concerns.     Past Medical History:   Patient Active Problem List   Diagnosis     Major depressive disorder, recurrent episode, moderate (H)     Papillary adenocarcinoma of thyroid (H)     Postoperative hypothyroidism     Vitiligo     Vitamin D deficiency     IgA deficiency (H)     Past Medical History:   Diagnosis Date     Depressive disorder      Past Surgical History:   Procedure Laterality Date     COLONOSCOPY  8-27/18     THYROIDECTOMY      2015     TONSILLECTOMY       TUBAL LIGATION         Social History:   reports that she has quit smoking. She has  never used smokeless tobacco. She reports current alcohol use. She reports that she does not use drugs.    Family History:  Family History   Problem Relation Age of Onset     Hypertension Father      Arrhythmia Father      Lymphoma Maternal Grandmother      Diabetes Paternal Grandfather         type 1      Asthma Son      Arthritis Daughter        Medications:  Current Outpatient Medications   Medication Sig Dispense Refill     hydrocortisone (ANUSOL-HC) 2.5 % cream Place rectally 2 times daily as needed for hemorrhoids 30 g 0     levothyroxine (SYNTHROID/LEVOTHROID) 150 MCG tablet TAKE 1 TABLET BY MOUTH  DAILY 30 tablet 1     metroNIDAZOLE (METROLOTION) 0.75 % external lotion APPLY TO AFFECTED AREA(S)  TOPICALLY DAILY 59 mL 1     minocycline (MINOCIN/DYNACIN) 100 MG capsule TAKE 1 CAPSULE BY MOUTH TWO TIMES DAILY 60 capsule 0     ondansetron (ZOFRAN-ODT) 4 MG ODT tab Take 1 tablet (4 mg) by mouth every 8 hours as needed for nausea 30 tablet 3     sertraline (ZOLOFT) 100 MG tablet TAKE 2 TABLETS BY MOUTH  DAILY 180 tablet 0     spironolactone (ALDACTONE) 50 MG tablet TAKE 1 TABLET BY MOUTH TWO  TIMES DAILY 120 tablet 1     SUMAtriptan (IMITREX) 25 MG tablet TAKE 1 TO 2 TABLETS BY  MOUTH AT ONSET OF HEADACHE  FOR MIGRAINE. MAY REPEAT IN 2 HOURS. MAX OF 8 TABLETS  IN 24 HOURS. 18 tablet 0     topiramate (TOPAMAX) 25 MG tablet Take 1 tablet (25 mg) by mouth 2 times daily 60 tablet 3       Allergies   Allergen Reactions     Cephalosporins Rash     Cephalexin     Sulfa Drugs Rash       Review of Systems:  -GI: The patient denies nausea, vomiting, diarrhea or abdominal pain.  -: No changes in menstrual cycle, no breast tenderness, no change in urination  -CVS: no palpitations, dizziness  -Constitutional: The patient denies fatigue, fevers, chills, unintended weight loss, and night sweats.  -HEENT: Patient denies nonhealing oral sores.  -Skin: As above in HPI. No additional skin concerns.    Physical exam:  Vitals: BP  117/69 (BP Location: Left arm, Patient Position: Sitting, Cuff Size: Adult Regular)   GEN: This is a well developed, well-nourished female in no acute distress, in a pleasant mood.    SKIN: Acne exam, which includes the face, neck, upper central chest, and upper central back was performed.  -  Face is clear  -No other lesions of concern on areas examined.       Impression/Plan:  1. Acne Vulgaris - Previously diagnosed as perioral dermatitis, however given continuous recurrece it seems to more closely resemble hormonal acne  - Discontinued spironolactone due to constipation  - Continue metronidazole 0.75% lotion QD  - Start tretinoin 0.025 cream. Apply a pea-sized amount to face nightly as tolerated. Edu on associated dryness.  - Discontinue minocycline 100mg  - We will consider Accutane in the future if acne persists    CC Dr. Murillo on close of this encounter.  Follow-up in 6 months, earlier for new or changing lesions.       Staff Involved:  Staff/Scribe    Scribe Disclosure:  I, Cynthia Velazquez, am serving as a scribe to document services personally performed by Rissa Corona PA-C, based on data collection and the provider's statements to me.     Provider Disclosure:   The documentation recorded by the scribe accurately reflects the services I personally performed and the decisions made by me.    All risks, benefits and alternatives were discussed with patient.  Patient is in agreement and understands the assessment and plan.  All questions were answered.    Rissa Corona PA-C, MPAS  [facility] [place]: Phone: [phone], Fax: [phone]  [facility]: Phone: [phone],  Fax: [phone]

## 2019-12-12 NOTE — NURSING NOTE
Dermatology Rooming Note    Kristel Martinez's goals for this visit include:   Chief Complaint   Patient presents with     Acne     Kristel is here today for an acne follow up.      GALINA Mcknight

## 2019-12-12 NOTE — PROGRESS NOTES
[facility] Dermatology Note      Dermatology Problem List:  1. Hx of vitiligo - hands/feet  2. Hx of thyroid cancer - has since had thyroid removed  3. Acne Vulgaris  -Current Tx: tretinoin 0.025% cream, metronidazole 0.75% lotion  -Previous Tx: Spironolactone (discontinued due to constipation, patient also has IBS) , minocycline 100 mg       Encounter Date: Dec 12, 2019    CC:  Chief Complaint   Patient presents with     Acne     Kristel is here today for an acne follow up.          History of Present Illness:  Ms. Kristel Martinez is a 39 year old female who presents for an acne follow up. She was last seen on 7/10/2019 when she started on spironolactone 50 mg, continued on metronidazole 0.75% lotion and restarted minocycline 100 mg for two months. She reports she is doing well since her last visit and that her skin is clear. She discontinued spironolactone due to constipation as a side effect and tried miralax without any relief. The patient suffers from IBS, so she notes sensitivity to the diuretic. She is currently taking minocycline and using metro lotion. She voices no other concerns.     Past Medical History:   Patient Active Problem List   Diagnosis     Major depressive disorder, recurrent episode, moderate (H)     Papillary adenocarcinoma of thyroid (H)     Postoperative hypothyroidism     Vitiligo     Vitamin D deficiency     IgA deficiency (H)     Past Medical History:   Diagnosis Date     Depressive disorder      Past Surgical History:   Procedure Laterality Date     COLONOSCOPY  8-27/18     THYROIDECTOMY      2015     TONSILLECTOMY       TUBAL LIGATION         Social History:   reports that she has quit smoking. She has never used smokeless tobacco. She reports current alcohol use. She reports that she does not use drugs.    Family History:  Family History   Problem Relation Age of Onset     Hypertension Father      Arrhythmia Father      Lymphoma Maternal Grandmother      Diabetes  Paternal Grandfather         type 1      Asthma Son      Arthritis Daughter        Medications:  Current Outpatient Medications   Medication Sig Dispense Refill     hydrocortisone (ANUSOL-HC) 2.5 % cream Place rectally 2 times daily as needed for hemorrhoids 30 g 0     levothyroxine (SYNTHROID/LEVOTHROID) 150 MCG tablet TAKE 1 TABLET BY MOUTH  DAILY 30 tablet 1     metroNIDAZOLE (METROLOTION) 0.75 % external lotion APPLY TO AFFECTED AREA(S)  TOPICALLY DAILY 59 mL 1     minocycline (MINOCIN/DYNACIN) 100 MG capsule TAKE 1 CAPSULE BY MOUTH TWO TIMES DAILY 60 capsule 0     ondansetron (ZOFRAN-ODT) 4 MG ODT tab Take 1 tablet (4 mg) by mouth every 8 hours as needed for nausea 30 tablet 3     sertraline (ZOLOFT) 100 MG tablet TAKE 2 TABLETS BY MOUTH  DAILY 180 tablet 0     spironolactone (ALDACTONE) 50 MG tablet TAKE 1 TABLET BY MOUTH TWO  TIMES DAILY 120 tablet 1     SUMAtriptan (IMITREX) 25 MG tablet TAKE 1 TO 2 TABLETS BY  MOUTH AT ONSET OF HEADACHE  FOR MIGRAINE. MAY REPEAT IN 2 HOURS. MAX OF 8 TABLETS  IN 24 HOURS. 18 tablet 0     topiramate (TOPAMAX) 25 MG tablet Take 1 tablet (25 mg) by mouth 2 times daily 60 tablet 3       Allergies   Allergen Reactions     Cephalosporins Rash     Cephalexin     Sulfa Drugs Rash       Review of Systems:  -GI: The patient denies nausea, vomiting, diarrhea or abdominal pain.  -: No changes in menstrual cycle, no breast tenderness, no change in urination  -CVS: no palpitations, dizziness  -Constitutional: The patient denies fatigue, fevers, chills, unintended weight loss, and night sweats.  -HEENT: Patient denies nonhealing oral sores.  -Skin: As above in HPI. No additional skin concerns.    Physical exam:  Vitals: /69 (BP Location: Left arm, Patient Position: Sitting, Cuff Size: Adult Regular)   GEN: This is a well developed, well-nourished female in no acute distress, in a pleasant mood.    SKIN: Acne exam, which includes the face, neck, upper central chest, and upper  central back was performed.  -  Face is clear  -No other lesions of concern on areas examined.       Impression/Plan:  1. Acne Vulgaris - Previously diagnosed as perioral dermatitis, however given continuous recurrece it seems to more closely resemble hormonal acne  - Discontinued spironolactone due to constipation  - Continue metronidazole 0.75% lotion QD  - Start tretinoin 0.025 cream. Apply a pea-sized amount to face nightly as tolerated. Edu on associated dryness.  - Discontinue minocycline 100mg  - We will consider Accutane in the future if acne persists    CC Dr. Murillo on close of this encounter.  Follow-up in 6 months, earlier for new or changing lesions.       Staff Involved:  Staff/Scribe    Scribe Disclosure:  I, Cynthia Velazquez, am serving as a scribe to document services personally performed by Rissa Corona PA-C, based on data collection and the provider's statements to me.     Provider Disclosure:   The documentation recorded by the scribe accurately reflects the services I personally performed and the decisions made by me.    All risks, benefits and alternatives were discussed with patient.  Patient is in agreement and understands the assessment and plan.  All questions were answered.    Rissa Corona PA-C, MPAS  [facility] [place]: Phone: [phone], Fax: [phone]  [facility]: Phone: [phone],  Fax: [phone]

## 2019-12-16 ENCOUNTER — OFFICE VISIT (OUTPATIENT)
Dept: FAMILY MEDICINE | Facility: CLINIC | Age: 39
End: 2019-12-16
Payer: COMMERCIAL

## 2019-12-16 ENCOUNTER — ANCILLARY PROCEDURE (OUTPATIENT)
Dept: GENERAL RADIOLOGY | Facility: CLINIC | Age: 39
End: 2019-12-16
Attending: NURSE PRACTITIONER
Payer: COMMERCIAL

## 2019-12-16 VITALS
WEIGHT: 229 LBS | BODY MASS INDEX: 32.78 KG/M2 | SYSTOLIC BLOOD PRESSURE: 106 MMHG | RESPIRATION RATE: 16 BRPM | TEMPERATURE: 97.9 F | HEIGHT: 70 IN | DIASTOLIC BLOOD PRESSURE: 62 MMHG | HEART RATE: 68 BPM

## 2019-12-16 DIAGNOSIS — R59.1 LYMPHADENOPATHY: Primary | ICD-10-CM

## 2019-12-16 DIAGNOSIS — J06.9 UPPER RESPIRATORY TRACT INFECTION, UNSPECIFIED TYPE: ICD-10-CM

## 2019-12-16 DIAGNOSIS — N63.21 BREAST LUMP ON LEFT SIDE AT 1 O'CLOCK POSITION: ICD-10-CM

## 2019-12-16 DIAGNOSIS — R59.1 LYMPHADENOPATHY: ICD-10-CM

## 2019-12-16 LAB
BASOPHILS # BLD AUTO: 0 10E9/L (ref 0–0.2)
BASOPHILS NFR BLD AUTO: 0.4 %
DIFFERENTIAL METHOD BLD: NORMAL
EOSINOPHIL # BLD AUTO: 0.1 10E9/L (ref 0–0.7)
EOSINOPHIL NFR BLD AUTO: 1.5 %
ERYTHROCYTE [DISTWIDTH] IN BLOOD BY AUTOMATED COUNT: 13.9 % (ref 10–15)
HCT VFR BLD AUTO: 36.3 % (ref 35–47)
HETEROPH AB SER QL: NEGATIVE
HGB BLD-MCNC: 11.8 G/DL (ref 11.7–15.7)
LYMPHOCYTES # BLD AUTO: 1.5 10E9/L (ref 0.8–5.3)
LYMPHOCYTES NFR BLD AUTO: 28 %
MCH RBC QN AUTO: 26.6 PG (ref 26.5–33)
MCHC RBC AUTO-ENTMCNC: 32.5 G/DL (ref 31.5–36.5)
MCV RBC AUTO: 82 FL (ref 78–100)
MONOCYTES # BLD AUTO: 0.3 10E9/L (ref 0–1.3)
MONOCYTES NFR BLD AUTO: 5.2 %
NEUTROPHILS # BLD AUTO: 3.5 10E9/L (ref 1.6–8.3)
NEUTROPHILS NFR BLD AUTO: 64.9 %
PLATELET # BLD AUTO: 239 10E9/L (ref 150–450)
RBC # BLD AUTO: 4.44 10E12/L (ref 3.8–5.2)
WBC # BLD AUTO: 5.4 10E9/L (ref 4–11)

## 2019-12-16 PROCEDURE — 86308 HETEROPHILE ANTIBODY SCREEN: CPT | Performed by: NURSE PRACTITIONER

## 2019-12-16 PROCEDURE — 71046 X-RAY EXAM CHEST 2 VIEWS: CPT

## 2019-12-16 PROCEDURE — 36415 COLL VENOUS BLD VENIPUNCTURE: CPT | Performed by: NURSE PRACTITIONER

## 2019-12-16 PROCEDURE — 85025 COMPLETE CBC W/AUTO DIFF WBC: CPT | Performed by: NURSE PRACTITIONER

## 2019-12-16 PROCEDURE — 99214 OFFICE O/P EST MOD 30 MIN: CPT | Performed by: NURSE PRACTITIONER

## 2019-12-16 ASSESSMENT — MIFFLIN-ST. JEOR: SCORE: 1793.99

## 2019-12-16 NOTE — PROGRESS NOTES
Subjective     Kristel Martinez is a 39 year old female who presents to clinic today for the following health issues:    HPI   Acute Illness   Acute illness concerns: swollen lymph nodes   Onset: October 2019    Fever: no - in past with illness that started this     Chills/Sweats: no    Headache (location?): no    Sinus Pressure:YES    Conjunctivitis:  no    Ear Pain: YES: both    Rhinorrhea: no     Congestion: no     Sore Throat: no      Cough: no    Wheeze: no    Decreased Appetite: no    Nausea: YES    Vomiting: no    Diarrhea:  YES- with constipation - this has improved some     Dysuria/Freq.: no    Fatigue/Achiness: YES    Sick/Strep Exposure: no     Swelling - lymph nodes neck and armpits      Therapies Tried and outcome: ibu/ cold medications     Patient Active Problem List   Diagnosis     Major depressive disorder, recurrent episode, moderate (H)     Papillary adenocarcinoma of thyroid (H)     Postoperative hypothyroidism     Vitiligo     Vitamin D deficiency     IgA deficiency (H)     Past Surgical History:   Procedure Laterality Date     COLONOSCOPY  8-27/18     THYROIDECTOMY      2015     TONSILLECTOMY       TUBAL LIGATION         Social History     Tobacco Use     Smoking status: Former Smoker     Smokeless tobacco: Never Used   Substance Use Topics     Alcohol use: Yes     Comment: 1 drink per wk     Family History   Problem Relation Age of Onset     Skin Cancer Mother      Hypertension Father      Arrhythmia Father      Lymphoma Maternal Grandmother      Diabetes Paternal Grandfather         type 1      Asthma Son      Arthritis Daughter          Current Outpatient Medications   Medication Sig Dispense Refill     hydrocortisone (ANUSOL-HC) 2.5 % cream Place rectally 2 times daily as needed for hemorrhoids 30 g 0     levothyroxine (SYNTHROID/LEVOTHROID) 150 MCG tablet TAKE 1 TABLET BY MOUTH  DAILY 30 tablet 1     metroNIDAZOLE (METROLOTION) 0.75 % external lotion APPLY TO AFFECTED AREA(S)  TOPICALLY DAILY  "59 mL 1     minocycline (MINOCIN/DYNACIN) 100 MG capsule TAKE 1 CAPSULE BY MOUTH TWO TIMES DAILY 60 capsule 0     ondansetron (ZOFRAN-ODT) 4 MG ODT tab Take 1 tablet (4 mg) by mouth every 8 hours as needed for nausea 30 tablet 3     sertraline (ZOLOFT) 100 MG tablet TAKE 2 TABLETS BY MOUTH  DAILY 180 tablet 0     SUMAtriptan (IMITREX) 25 MG tablet TAKE 1 TO 2 TABLETS BY  MOUTH AT ONSET OF HEADACHE  FOR MIGRAINE. MAY REPEAT IN 2 HOURS. MAX OF 8 TABLETS  IN 24 HOURS. 18 tablet 0     topiramate (TOPAMAX) 25 MG tablet Take 1 tablet (25 mg) by mouth 2 times daily 60 tablet 3     tretinoin (RETIN-A) 0.025 % external cream Use every night 45 g 0     Allergies   Allergen Reactions     Cephalosporins Rash     Cephalexin     Sulfa Drugs Rash       Reviewed and updated as needed this visit by Provider  Tobacco  Allergies  Meds  Problems  Med Hx  Surg Hx  Fam Hx         Review of Systems   ROS COMP: Constitutional, HEENT, cardiovascular, pulmonary, gi and gu systems are negative, except as otherwise noted.      Objective    /62 (Cuff Size: Adult Large)   Pulse 68   Temp 97.9  F (36.6  C) (Tympanic)   Resp 16   Ht 1.778 m (5' 10\")   Wt 103.9 kg (229 lb)   BMI 32.86 kg/m    Body mass index is 32.86 kg/m .  Physical Exam   GENERAL: healthy, alert and no distress  HENT: ear canals and TM's normal, nose and mouth without ulcers or lesions  NECK: bilateral anterior cervical adenopathy, no asymmetry, masses, or scars and thyroid normal to palpation  RESP: lungs clear to auscultation - no rales, rhonchi or wheezes  BREAST: axillary findings: enlarged lymph nodes and 0.5 cm tender mass left breast at 1 o'clock  CV: regular rate and rhythm, normal S1 S2, no S3 or S4, no murmur, click or rub  PSYCH: mentation appears normal, affect normal/bright    Diagnostic Test Results:  Results for orders placed or performed in visit on 12/16/19   CBC with platelets and differential     Status: None   Result Value Ref Range    WBC " 5.4 4.0 - 11.0 10e9/L    RBC Count 4.44 3.8 - 5.2 10e12/L    Hemoglobin 11.8 11.7 - 15.7 g/dL    Hematocrit 36.3 35.0 - 47.0 %    MCV 82 78 - 100 fl    MCH 26.6 26.5 - 33.0 pg    MCHC 32.5 31.5 - 36.5 g/dL    RDW 13.9 10.0 - 15.0 %    Platelet Count 239 150 - 450 10e9/L    % Neutrophils 64.9 %    % Lymphocytes 28.0 %    % Monocytes 5.2 %    % Eosinophils 1.5 %    % Basophils 0.4 %    Absolute Neutrophil 3.5 1.6 - 8.3 10e9/L    Absolute Lymphocytes 1.5 0.8 - 5.3 10e9/L    Absolute Monocytes 0.3 0.0 - 1.3 10e9/L    Absolute Eosinophils 0.1 0.0 - 0.7 10e9/L    Absolute Basophils 0.0 0.0 - 0.2 10e9/L    Diff Method Automated Method    Mononucleosis screen     Status: None   Result Value Ref Range    Mononucleosis Screen Negative NEG^Negative           Assessment & Plan     1. Lymphadenopathy  Labs and chest x ray unremarkable.  Will do ultrasound with breast lump and enlarged lymph nodes. Symptomatic care and follow up discussed.  - CBC with platelets and differential  - Mononucleosis screen  - XR Chest 2 Views; Future    2. Breast lump on left side at 1 o'clock position  Per above .  - US Breast Left Limited 1-3 Quadrants; Future    3. Upper respiratory tract infection, unspecified type  Per above.   - XR Chest 2 Views; Future     Home care instructions were reviewed with the patient. The risks, benefits and treatment options of prescribed medications or other treatments have been discussed with the patient. The patient verbalized their understanding and should call or follow up if no improvement or if they develop further problems.    Patient Instructions   Please call [phone] to schedule your mammogram and ultrasound    Chest x ray today      Return in about 1 week (around 12/23/2019), or if symptoms worsen or fail to improve.    DAVID Lazo [facility]

## 2019-12-17 ENCOUNTER — MYC MEDICAL ADVICE (OUTPATIENT)
Dept: FAMILY MEDICINE | Facility: CLINIC | Age: 39
End: 2019-12-17

## 2019-12-19 ENCOUNTER — TELEPHONE (OUTPATIENT)
Dept: DERMATOLOGY | Facility: CLINIC | Age: 39
End: 2019-12-19

## 2019-12-19 NOTE — TELEPHONE ENCOUNTER
PA Initiation    Medication: tretinoin (RETIN-A) 0.025 % cream -   Insurance Company: [employer] ([facility]) - Phone [phone] Fax [phone]  Pharmacy Filling the Rx: [id number] - [place], [place] - [address] [place] [address] AT [facility] & [address]  Filling Pharmacy Phone: [phone]  Filling Pharmacy Fax: [phone]  Start Date: 12/19/2019

## 2019-12-19 NOTE — TELEPHONE ENCOUNTER
Prior Authorization Retail Medication Request    Medication/Dose: tretinoin 0.025% Cream   ICD code (if different than what is on RX): L70.0  Previously Tried and Failed:    Rationale:     Insurance Name: [facility]  Insurance ID:[id number]      Pharmacy Information (if different than what is on RX)  Name:    Phone:

## 2019-12-23 ENCOUNTER — ANCILLARY PROCEDURE (OUTPATIENT)
Dept: MAMMOGRAPHY | Facility: CLINIC | Age: 39
End: 2019-12-23
Attending: NURSE PRACTITIONER
Payer: COMMERCIAL

## 2019-12-23 DIAGNOSIS — N63.21 BREAST LUMP ON LEFT SIDE AT 1 O'CLOCK POSITION: ICD-10-CM

## 2019-12-24 NOTE — TELEPHONE ENCOUNTER
Prior Authorization Approval    Medication: tretinoin (RETIN-A) 0.025 % cream - APPROVED was approved on 12/19/2019  Effective:   to 12/19/2020  Reference #:    Approved Dose/Quantity:   Insurance Company: [employer] ([facility]) - Phone [phone] Fax [phone]  Expected CoPay:    Pharmacy Filling the Rx: [id number] - [place], [place] - [address] [place] [address] [place] AT [facility] & [address]  Pharmacy Notified: Yes  Patient Notified: Comment:  **Instructed pharmacy to notify patient when script is ready to /ship.**

## 2020-01-02 ENCOUNTER — OFFICE VISIT (OUTPATIENT)
Dept: URGENT CARE | Facility: URGENT CARE | Age: 40
End: 2020-01-02
Payer: COMMERCIAL

## 2020-01-02 VITALS
HEIGHT: 70 IN | HEART RATE: 88 BPM | BODY MASS INDEX: 32.96 KG/M2 | DIASTOLIC BLOOD PRESSURE: 64 MMHG | RESPIRATION RATE: 17 BRPM | TEMPERATURE: 98.9 F | OXYGEN SATURATION: 100 % | SYSTOLIC BLOOD PRESSURE: 116 MMHG | WEIGHT: 230.2 LBS

## 2020-01-02 DIAGNOSIS — J01.01 ACUTE RECURRENT MAXILLARY SINUSITIS: Primary | ICD-10-CM

## 2020-01-02 PROCEDURE — 99213 OFFICE O/P EST LOW 20 MIN: CPT | Performed by: NURSE PRACTITIONER

## 2020-01-02 RX ORDER — DOXYCYCLINE 100 MG/1
100 CAPSULE ORAL 2 TIMES DAILY
Qty: 20 CAPSULE | Refills: 0 | Status: SHIPPED | OUTPATIENT
Start: 2020-01-02 | End: 2020-01-23

## 2020-01-02 ASSESSMENT — MIFFLIN-ST. JEOR: SCORE: 1799.43

## 2020-01-03 NOTE — PATIENT INSTRUCTIONS
Increase rest and fluids. Tylenol and/or Ibuprofen for comfort. Cool mist vaporizer. If your symptoms worsen or do not resolve follow up with your primary care provider in 1 week and sooner if needed.      Mucinex 600 mg 12 hour formula for ear, head and chest congestion.  It can also thin post nasal drip which can cause a cough and sore throat.    Indications for emergent return to emergency department discussed with patient, who verbalized good understanding and agreement.  Patient understands the limitations of today's evaluation.           Patient Education     Sinusitis (Antibiotic Treatment)    The sinuses are air-filled spaces within the bones of the face. They connect to the inside of the nose. Sinusitis is an inflammation of the tissue that lines the sinuses. Sinusitis can occur during a cold. It can also happen due to allergies to pollens and other particles in the air. Sinusitis can cause symptoms of sinus congestion and a feeling of fullness. A sinus infection causes fever, headache, and facial pain. There is often green or yellow fluid draining from the nose or into the back of the throat (post-nasal drip). You have been given antibiotics to treat this condition.  Home care    Take the full course of antibiotics as instructed. Do not stop taking them, even when you feel better.    Drink plenty of water, hot tea, and other liquids. This may help thin nasal mucus. It also may help your sinuses drain fluids.    Heat may help soothe painful areas of your face. Use a towel soaked in hot water. Or,  the shower and direct the warm spray onto your face. Using a vaporizer along with a menthol rub at night may also help soothe symptoms.     An expectorant with guaifenesin may help thin nasal mucus and help your sinuses drain fluids.    You can use an over-the-counter decongestant, unless a similar medicine was prescribed to you. Nasal sprays work the fastest. Use one that contains phenylephrine or  oxymetazoline. First blow your nose gently. Then use the spray. Do not use these medicines more often than directed on the label. If you do, your symptoms may get worse. You may also take pills that contain pseudoephedrine. Don t use products that combine multiple medicines. This is because side effects may be increased. Read labels. You can also ask the pharmacist for help. (People with high blood pressure should not use decongestants. They can raise blood pressure.)    Over-the-counter antihistamines may help if allergies contributed to your sinusitis.      Do not use nasal rinses or irrigation during an acute sinus infection, unless your healthcare provider tells you to. Rinsing may spread the infection to other areas in your sinuses.    Use acetaminophen or ibuprofen to control pain, unless another pain medicine was prescribed to you. If you have chronic liver or kidney disease or ever had a stomach ulcer, talk with your healthcare provider before using these medicines. (Aspirin should never be taken by anyone under age 18 who is ill with a fever. It may cause severe liver damage.)    Don't smoke. This can make symptoms worse.  Follow-up care  Follow up with your healthcare provider or our staff if you are not better in 1 week.  When to seek medical advice  Call your healthcare provider if any of these occur:    Facial pain or headache that gets worse    Stiff neck    Unusual drowsiness or confusion    Swelling of your forehead or eyelids    Vision problems, such as blurred or double vision    Fever of 100.4 F (38 C) or higher, or as directed by your healthcare provider    Seizure    Breathing problems    Symptoms don't go away in 10 days  Prevention  Here are steps you can take to help prevent an infection:    Keep good hand washing habits.    Don t have close contact with people who have sore throats, colds, or other upper respiratory infections.    Don t smoke, and stay away from secondhand smoke.    Stay up  to date with of your vaccines.  Date Last Reviewed: 11/1/2017 2000-2018 [employer] [address] [place] [address], [place] [address]. All rights reserved. This information is not intended as a substitute for professional medical care. Always follow your healthcare professional's instructions.

## 2020-01-03 NOTE — PROGRESS NOTES
Subjective     Kristel Martinez is a 39 year old female who presents to clinic today for the following health issues:    HPI     Chief Complaint   Patient presents with     URI     6 days ear pain, sinus pressure and pain, headache, fever 99.6 today, little cough, body aches. Taking Mucinex, tylenol cold and flu, vicks.         Patient Active Problem List   Diagnosis     Major depressive disorder, recurrent episode, moderate (H)     Papillary adenocarcinoma of thyroid (H)     Postoperative hypothyroidism     Vitiligo     Vitamin D deficiency     IgA deficiency (H)     Past Surgical History:   Procedure Laterality Date     COLONOSCOPY  8-27/18     THYROIDECTOMY      2015     TONSILLECTOMY       TUBAL LIGATION         Social History     Tobacco Use     Smoking status: Former Smoker     Smokeless tobacco: Never Used   Substance Use Topics     Alcohol use: Yes     Comment: 1 drink per wk     Family History   Problem Relation Age of Onset     Skin Cancer Mother      Hypertension Father      Arrhythmia Father      Lymphoma Maternal Grandmother      Diabetes Paternal Grandfather         type 1      Asthma Son      Arthritis Daughter          Current Outpatient Medications   Medication Sig Dispense Refill     doxycycline monohydrate (MONODOX) 100 MG capsule Take 1 capsule (100 mg) by mouth 2 times daily for 10 days 20 capsule 0     hydrocortisone (ANUSOL-HC) 2.5 % cream Place rectally 2 times daily as needed for hemorrhoids 30 g 0     levothyroxine (SYNTHROID/LEVOTHROID) 150 MCG tablet TAKE 1 TABLET BY MOUTH  DAILY 30 tablet 1     metroNIDAZOLE (METROLOTION) 0.75 % external lotion APPLY TO AFFECTED AREA(S)  TOPICALLY DAILY 59 mL 1     minocycline (MINOCIN/DYNACIN) 100 MG capsule TAKE 1 CAPSULE BY MOUTH TWO TIMES DAILY 60 capsule 0     sertraline (ZOLOFT) 100 MG tablet TAKE 2 TABLETS BY MOUTH  DAILY 180 tablet 0     tretinoin (RETIN-A) 0.025 % external cream Use every night 45 g 0     ondansetron (ZOFRAN-ODT) 4 MG ODT tab Take 1  "tablet (4 mg) by mouth every 8 hours as needed for nausea (Patient not taking: Reported on 1/2/2020) 30 tablet 3     SUMAtriptan (IMITREX) 25 MG tablet TAKE 1 TO 2 TABLETS BY  MOUTH AT ONSET OF HEADACHE  FOR MIGRAINE. MAY REPEAT IN 2 HOURS. MAX OF 8 TABLETS  IN 24 HOURS. (Patient not taking: Reported on 1/2/2020) 18 tablet 0     topiramate (TOPAMAX) 25 MG tablet Take 1 tablet (25 mg) by mouth 2 times daily (Patient not taking: Reported on 1/2/2020) 60 tablet 3     Allergies   Allergen Reactions     Augmentin Cramps, Diarrhea, Nausea and Nausea and Vomiting     Cephalosporins Rash     Cephalexin     Sulfa Drugs Rash         Reviewed and updated as needed this visit by Provider  Tobacco  Allergies  Meds  Problems  Med Hx  Surg Hx  Fam Hx         Review of Systems   ROS COMP: Constitutional, HEENT, cardiovascular, pulmonary, GI, , musculoskeletal, neuro, skin, endocrine and psych systems are negative, except as otherwise noted.      Objective    /64 (BP Location: Right arm, Patient Position: Sitting, Cuff Size: Adult Regular)   Pulse 88   Temp 98.9  F (37.2  C) (Tympanic)   Resp 17   Ht 1.778 m (5' 10\")   Wt 104.4 kg (230 lb 3.2 oz)   LMP 12/10/2019   SpO2 100%   BMI 33.03 kg/m    Body mass index is 33.03 kg/m .  Physical Exam   GENERAL: healthy, alert and no distress, nontoxic in appearance  EYES: Eyes grossly normal to inspection, PERRL and conjunctivae and sclerae normal  HENT: ear canals and TM's normal, nose and mouth without ulcers or lesions  NECK: no adenopathy, supple with full ROM  RESP: lungs clear to auscultation - no rales, rhonchi or wheezes  CV: regular rate and rhythm, normal S1 S2, no S3 or S4, no murmur, click or rub, no peripheral edema   ABDOMEN: soft, nontender, no hepatosplenomegaly, no masses and bowel sounds normal  MS: no gross musculoskeletal defects noted, no edema  No rash    Diagnostic Test Results:  Labs reviewed in Epic  No results found for this or any previous " visit (from the past 24 hour(s)).        Assessment & Plan  Hold minocycline and retin-A while on doxy for sinus infection.  Problem List Items Addressed This Visit     None      Visit Diagnoses     Acute recurrent maxillary sinusitis    -  Primary    Relevant Medications    doxycycline monohydrate (MONODOX) 100 MG capsule               Patient Instructions   Increase rest and fluids. Tylenol and/or Ibuprofen for comfort. Cool mist vaporizer. If your symptoms worsen or do not resolve follow up with your primary care provider in 1 week and sooner if needed.      Mucinex 600 mg 12 hour formula for ear, head and chest congestion.  It can also thin post nasal drip which can cause a cough and sore throat.    Indications for emergent return to emergency department discussed with patient, who verbalized good understanding and agreement.  Patient understands the limitations of today's evaluation.           Patient Education     Sinusitis (Antibiotic Treatment)    The sinuses are air-filled spaces within the bones of the face. They connect to the inside of the nose. Sinusitis is an inflammation of the tissue that lines the sinuses. Sinusitis can occur during a cold. It can also happen due to allergies to pollens and other particles in the air. Sinusitis can cause symptoms of sinus congestion and a feeling of fullness. A sinus infection causes fever, headache, and facial pain. There is often green or yellow fluid draining from the nose or into the back of the throat (post-nasal drip). You have been given antibiotics to treat this condition.  Home care    Take the full course of antibiotics as instructed. Do not stop taking them, even when you feel better.    Drink plenty of water, hot tea, and other liquids. This may help thin nasal mucus. It also may help your sinuses drain fluids.    Heat may help soothe painful areas of your face. Use a towel soaked in hot water. Or,  the shower and direct the warm spray onto your  face. Using a vaporizer along with a menthol rub at night may also help soothe symptoms.     An expectorant with guaifenesin may help thin nasal mucus and help your sinuses drain fluids.    You can use an over-the-counter decongestant, unless a similar medicine was prescribed to you. Nasal sprays work the fastest. Use one that contains phenylephrine or oxymetazoline. First blow your nose gently. Then use the spray. Do not use these medicines more often than directed on the label. If you do, your symptoms may get worse. You may also take pills that contain pseudoephedrine. Don t use products that combine multiple medicines. This is because side effects may be increased. Read labels. You can also ask the pharmacist for help. (People with high blood pressure should not use decongestants. They can raise blood pressure.)    Over-the-counter antihistamines may help if allergies contributed to your sinusitis.      Do not use nasal rinses or irrigation during an acute sinus infection, unless your healthcare provider tells you to. Rinsing may spread the infection to other areas in your sinuses.    Use acetaminophen or ibuprofen to control pain, unless another pain medicine was prescribed to you. If you have chronic liver or kidney disease or ever had a stomach ulcer, talk with your healthcare provider before using these medicines. (Aspirin should never be taken by anyone under age 18 who is ill with a fever. It may cause severe liver damage.)    Don't smoke. This can make symptoms worse.  Follow-up care  Follow up with your healthcare provider or our staff if you are not better in 1 week.  When to seek medical advice  Call your healthcare provider if any of these occur:    Facial pain or headache that gets worse    Stiff neck    Unusual drowsiness or confusion    Swelling of your forehead or eyelids    Vision problems, such as blurred or double vision    Fever of 100.4 F (38 C) or higher, or as directed by your healthcare  provider    Seizure    Breathing problems    Symptoms don't go away in 10 days  Prevention  Here are steps you can take to help prevent an infection:    Keep good hand washing habits.    Don t have close contact with people who have sore throats, colds, or other upper respiratory infections.    Don t smoke, and stay away from secondhand smoke.    Stay up to date with of your vaccines.  Date Last Reviewed: 11/1/2017 2000-2018 [employer]. [address] [place] [address], [place] [address]. All rights reserved. This information is not intended as a substitute for professional medical care. Always follow your healthcare professional's instructions.             Return in about 10 days (around 1/12/2020) for Follow up with your primary care provider.    DAVID Cortes CNP  [facility]

## 2020-01-22 ENCOUNTER — MYC MEDICAL ADVICE (OUTPATIENT)
Dept: FAMILY MEDICINE | Facility: CLINIC | Age: 40
End: 2020-01-22

## 2020-01-22 DIAGNOSIS — F33.1 MAJOR DEPRESSIVE DISORDER, RECURRENT EPISODE, MODERATE (H): Primary | ICD-10-CM

## 2020-01-23 ENCOUNTER — OFFICE VISIT (OUTPATIENT)
Dept: FAMILY MEDICINE | Facility: CLINIC | Age: 40
End: 2020-01-23
Payer: COMMERCIAL

## 2020-01-23 VITALS
RESPIRATION RATE: 18 BRPM | HEART RATE: 64 BPM | BODY MASS INDEX: 32.21 KG/M2 | SYSTOLIC BLOOD PRESSURE: 112 MMHG | TEMPERATURE: 98.1 F | HEIGHT: 70 IN | WEIGHT: 225 LBS | DIASTOLIC BLOOD PRESSURE: 80 MMHG

## 2020-01-23 DIAGNOSIS — F33.2 SEVERE EPISODE OF RECURRENT MAJOR DEPRESSIVE DISORDER, WITHOUT PSYCHOTIC FEATURES (H): Primary | ICD-10-CM

## 2020-01-23 DIAGNOSIS — E89.0 POSTOPERATIVE HYPOTHYROIDISM: ICD-10-CM

## 2020-01-23 LAB — TSH SERPL DL<=0.005 MIU/L-ACNC: 6.44 MU/L (ref 0.4–4)

## 2020-01-23 PROCEDURE — 84443 ASSAY THYROID STIM HORMONE: CPT | Performed by: FAMILY MEDICINE

## 2020-01-23 PROCEDURE — 36415 COLL VENOUS BLD VENIPUNCTURE: CPT | Performed by: FAMILY MEDICINE

## 2020-01-23 PROCEDURE — 99214 OFFICE O/P EST MOD 30 MIN: CPT | Performed by: FAMILY MEDICINE

## 2020-01-23 RX ORDER — ESCITALOPRAM OXALATE 10 MG/1
TABLET ORAL
Qty: 90 TABLET | Refills: 0 | Status: SHIPPED | OUTPATIENT
Start: 2020-01-23 | End: 2020-03-18

## 2020-01-23 ASSESSMENT — ANXIETY QUESTIONNAIRES
7. FEELING AFRAID AS IF SOMETHING AWFUL MIGHT HAPPEN: NOT AT ALL
GAD7 TOTAL SCORE: 9
GAD7 TOTAL SCORE: 9
6. BECOMING EASILY ANNOYED OR IRRITABLE: MORE THAN HALF THE DAYS
GAD7 TOTAL SCORE: 9
3. WORRYING TOO MUCH ABOUT DIFFERENT THINGS: SEVERAL DAYS
4. TROUBLE RELAXING: SEVERAL DAYS
7. FEELING AFRAID AS IF SOMETHING AWFUL MIGHT HAPPEN: NOT AT ALL
1. FEELING NERVOUS, ANXIOUS, OR ON EDGE: MORE THAN HALF THE DAYS
5. BEING SO RESTLESS THAT IT IS HARD TO SIT STILL: SEVERAL DAYS
2. NOT BEING ABLE TO STOP OR CONTROL WORRYING: MORE THAN HALF THE DAYS

## 2020-01-23 ASSESSMENT — PATIENT HEALTH QUESTIONNAIRE - PHQ9
SUM OF ALL RESPONSES TO PHQ QUESTIONS 1-9: 17
10. IF YOU CHECKED OFF ANY PROBLEMS, HOW DIFFICULT HAVE THESE PROBLEMS MADE IT FOR YOU TO DO YOUR WORK, TAKE CARE OF THINGS AT HOME, OR GET ALONG WITH OTHER PEOPLE: EXTREMELY DIFFICULT
SUM OF ALL RESPONSES TO PHQ QUESTIONS 1-9: 17

## 2020-01-23 ASSESSMENT — MIFFLIN-ST. JEOR: SCORE: 1775.84

## 2020-01-23 NOTE — LETTER
January 23, 2020      Kristel Martinez  [address] [place] [address]        To Whom It May Concern:        Kristel Martinez was seen in our clinic. Kindly excuse her work absence for yesterday and today.           Sincerely,          Kai Mueller MD

## 2020-01-23 NOTE — NURSING NOTE
"Chief Complaint   Patient presents with     Depression     /80 (Cuff Size: Adult Regular)   Pulse 64   Temp 98.1  F (36.7  C) (Tympanic)   Resp 18   Ht 1.778 m (5' 10\")   Wt 102.1 kg (225 lb)   LMP 01/02/2020   Breastfeeding No   BMI 32.28 kg/m   Estimated body mass index is 32.28 kg/m  as calculated from the following:    Height as of this encounter: 1.778 m (5' 10\").    Weight as of this encounter: 102.1 kg (225 lb).  Patient presents to the clinic using No DME      Health Maintenance that is potentially due pending provider review:    Health Maintenance Due   Topic Date Due     HIV SCREENING  08/20/1995     PNEUMOCOCCAL IMMUNIZATION 19-64 HIGHEST RISK (1 of 3 - PCV13) 08/20/1999     DTAP/TDAP/TD IMMUNIZATION (2 - Td) 10/19/2018     PREVENTIVE CARE VISIT  03/23/2019     INFLUENZA VACCINE (1) 09/01/2019                "

## 2020-01-23 NOTE — PATIENT INSTRUCTIONS
Patient Education     Depression  Depression is one of the most common mental health problems today. It is not just a state of unhappiness or sadness. It is a true disease. The cause seems to be related to a decrease in chemicals that transmit signals in the brain. Having a family history of depression, alcoholism, or suicide increases the risk. Chronic illness, chronic pain, migraine headaches, and high emotional stress also increase the risk.  Depression is something we tend to recognize in others, but may have a hard time seeing in ourselves. It can show in many physical and emotional ways:    Loss of appetite    Overeating    Not being able to sleep    Sleeping too much    Tiredness not related to physical exertion    Restlessness or irritability    Slowness of movement or speech    Feeling depressed or withdrawn    Loss of interest in things you once enjoyed    Trouble concentrating, poor memory, trouble making decisions    Thoughts of harming or killing oneself, or thoughts that life is not worth living    Low self-esteem  The treatment for depression may include both medicine and psychotherapy. Antidepressants can reduce suffering and can improve the ability to function during the depressed period. Therapy can offer emotional support and help you understand emotional factors that may be causing the depression.  Home care    Ongoing care and support help people manage this disease. Find a healthcare provider and therapist who meet your needs. Seek help when you feel like you may be getting ill.    Be kind to yourself. Make it a point to do things that you enjoy (gardening, walking in nature, going to a movie). Reward yourself for small successes.    Take care of your physical body. Eat a balanced diet (low in saturated fat and high in fruits and vegetables). Exercise at least 3 times a week for 30 minutes. Even mild-moderate exercise (like brisk walking) can make you feel better.    Don't drink alcohol,  which can make depression worse.    Take medicine as prescribed.    Tell each of your healthcare providers about all of the prescription and over-the-counter medicines, vitamins, and supplements you take. Certain supplements interact with medicines and can result in dangerous side effects. Ask your pharmacist when you have questions about medicine interactions.    Talk with your family and trusted friends about your feelings and thoughts. Ask them to help you recognize behavior changes early so you can get help and, if needed, medicine can be adjusted.    Follow-up care  Follow up with your healthcare provider, or as advised.  Call 911  Call 911 if you:    Have suicidal thoughts, a suicide plan, and the means to carry out the plan; or serious thoughts of hurting someone else     Have trouble breathing    Are very confused    Feel very drowsy or have trouble awakening    Faint or lose consciousness    Have new chest pain that becomes more severe, lasts longer, or spreads into your shoulder, arm, neck, jaw, or back  When to seek medical advice  Call your healthcare provider right away if any of these happen:    Feeling extreme depression, fear, anxiety, or anger toward yourself or others    Feeling out of control    Feeling that you may try to harm yourself or another    Hearing voices that others do not hear    Seeing things that others do not see    Can t sleep or eat for 3 days in a row    Friends or family express concern over your behavior and ask you to seek help  Date Last Reviewed: 10/1/2017    5468-8160 [employer]. [address] [place] [address], [place] [address]. All rights reserved. This information is not intended as a substitute for professional medical care. Always follow your healthcare professional's instructions.

## 2020-01-23 NOTE — PROGRESS NOTES
SUBJECTIVE   Kristel Martinez is a 39 year old female who presents with     Depression and Anxiety Follow-Up    How are you doing with your depression since your last visit? Worsened     How are you doing with your anxiety since your last visit?  Worsened     Are you having other symptoms that might be associated with depression or anxiety? No    Have you had a significant life event? OTHER:     Do you have any concerns with your use of alcohol or other drugs? No    Social History     Tobacco Use     Smoking status: Former Smoker     Smokeless tobacco: Never Used   Substance Use Topics     Alcohol use: Yes     Comment: 1 drink per wk     Drug use: No     PHQ 7/1/2019 10/30/2019 1/23/2020   PHQ-9 Total Score 10 9 17   Q9: Thoughts of better off dead/self-harm past 2 weeks Not at all Not at all Not at all     MALCOM-7 SCORE 7/1/2019 10/30/2019 1/23/2020   Total Score 4 (minimal anxiety) - 9 (mild anxiety)   Total Score 4 4 9     Last PHQ-9 1/23/2020   1.  Little interest or pleasure in doing things 2   2.  Feeling down, depressed, or hopeless 3   3.  Trouble falling or staying asleep, or sleeping too much 2   4.  Feeling tired or having little energy 3   5.  Poor appetite or overeating 3   6.  Feeling bad about yourself 3   7.  Trouble concentrating 1   8.  Moving slowly or restless 0   Q9: Thoughts of better off dead/self-harm past 2 weeks 0   PHQ-9 Total Score 17   Difficulty at work, home, or with people -     MALCOM-7  1/23/2020   1. Feeling nervous, anxious, or on edge 2   2. Not being able to stop or control worrying 2   3. Worrying too much about different things 1   4. Trouble relaxing 1   5. Being so restless that it is hard to sit still 1   6. Becoming easily annoyed or irritable 2   7. Feeling afraid, as if something awful might happen 0   MALCOM-7 Total Score 9   If you checked any problems, how difficult have they made it for you to do your work, take care of things at home, or get along with other people? -     In  the past two weeks have you had thoughts of suicide or self-harm?  No.    Do you have concerns about your personal safety or the safety of others?   No    Suicide Assessment Five-step Evaluation and Treatment (SAFE-T)      PCP   DAVID Lazo [facility] [phone]    Health Maintenance        Health Maintenance Due   Topic Date Due     HIV SCREENING  08/20/1995     PNEUMOCOCCAL IMMUNIZATION 19-64 HIGHEST RISK (1 of 3 - PCV13) 08/20/1999     DTAP/TDAP/TD IMMUNIZATION (2 - Td) 10/19/2018     PREVENTIVE CARE VISIT  03/23/2019     INFLUENZA VACCINE (1) 09/01/2019       HPI        Patient Active Problem List   Diagnosis     Major depressive disorder, recurrent episode, moderate (H)     Papillary adenocarcinoma of thyroid (H)     Postoperative hypothyroidism     Vitiligo     Vitamin D deficiency     IgA deficiency (H)     Current Outpatient Medications   Medication     levothyroxine (SYNTHROID/LEVOTHROID) 150 MCG tablet     metroNIDAZOLE (METROLOTION) 0.75 % external lotion     sertraline (ZOLOFT) 100 MG tablet     tretinoin (RETIN-A) 0.025 % external cream     hydrocortisone (ANUSOL-HC) 2.5 % cream     ondansetron (ZOFRAN-ODT) 4 MG ODT tab     SUMAtriptan (IMITREX) 25 MG tablet     topiramate (TOPAMAX) 25 MG tablet     No current facility-administered medications for this visit.        Patient Active Problem List   Diagnosis     Major depressive disorder, recurrent episode, moderate (H)     Papillary adenocarcinoma of thyroid (H)     Postoperative hypothyroidism     Vitiligo     Vitamin D deficiency     IgA deficiency (H)     Past Surgical History:   Procedure Laterality Date     COLONOSCOPY  8-27/18     THYROIDECTOMY      2015     TONSILLECTOMY       TUBAL LIGATION         Social History     Tobacco Use     Smoking status: Former Smoker     Smokeless tobacco: Never Used   Substance Use Topics     Alcohol use: Yes     Comment: 1 drink per wk     Family History   Problem Relation Age of Onset     Skin Cancer Mother       Hypertension Father      Arrhythmia Father      Lymphoma Maternal Grandmother      Diabetes Paternal Grandfather         type 1      Asthma Son      Arthritis Daughter          Current Outpatient Medications   Medication Sig Dispense Refill     levothyroxine (SYNTHROID/LEVOTHROID) 150 MCG tablet TAKE 1 TABLET BY MOUTH  DAILY 30 tablet 1     metroNIDAZOLE (METROLOTION) 0.75 % external lotion APPLY TO AFFECTED AREA(S)  TOPICALLY DAILY 59 mL 1     sertraline (ZOLOFT) 100 MG tablet TAKE 2 TABLETS BY MOUTH  DAILY 180 tablet 0     tretinoin (RETIN-A) 0.025 % external cream Use every night 45 g 0     hydrocortisone (ANUSOL-HC) 2.5 % cream Place rectally 2 times daily as needed for hemorrhoids (Patient not taking: Reported on 1/23/2020) 30 g 0     ondansetron (ZOFRAN-ODT) 4 MG ODT tab Take 1 tablet (4 mg) by mouth every 8 hours as needed for nausea (Patient not taking: Reported on 1/2/2020) 30 tablet 3     SUMAtriptan (IMITREX) 25 MG tablet TAKE 1 TO 2 TABLETS BY  MOUTH AT ONSET OF HEADACHE  FOR MIGRAINE. MAY REPEAT IN 2 HOURS. MAX OF 8 TABLETS  IN 24 HOURS. (Patient not taking: Reported on 1/2/2020) 18 tablet 0     topiramate (TOPAMAX) 25 MG tablet Take 1 tablet (25 mg) by mouth 2 times daily (Patient not taking: Reported on 1/2/2020) 60 tablet 3     Allergies   Allergen Reactions     Augmentin Cramps, Diarrhea, Nausea and Nausea and Vomiting     Cephalosporins Rash     Cephalexin     Sulfa Drugs Rash     Recent Labs   Lab Test 11/19/19  0015 10/21/19  1630 07/10/19  1123 10/30/18  0937 08/21/18  1533   ALT 15  --   --  15 18   CR 0.68  --  0.62 0.75 0.67   GFRESTIMATED >90  --  >90 87 >90   GFRESTBLACK >90  --  >90 >90 >90   POTASSIUM 3.7  --  4.0 4.2 3.5   TSH  --  0.37*  --  0.79  --       BP Readings from Last 3 Encounters:   01/23/20 112/80   01/02/20 116/64   12/16/19 106/62    Wt Readings from Last 3 Encounters:   01/23/20 102.1 kg (225 lb)   01/02/20 104.4 kg (230 lb 3.2 oz)   12/16/19 103.9 kg (229 lb)           "          Reviewed and updated:  Tobacco  Allergies  Meds  Med Hx  Surg Hx  Fam Hx  Soc Hx     ROS:  Constitutional, neuro, ENT, endocrine, pulmonary, cardiac, gastrointestinal, genitourinary, musculoskeletal, integument and psychiatric systems are negative, except as otherwise noted.    PHYSICAL EXAM   /80 (Cuff Size: Adult Regular)   Pulse 64   Temp 98.1  F (36.7  C) (Tympanic)   Resp 18   Ht 1.778 m (5' 10\")   Wt 102.1 kg (225 lb)   LMP 01/02/2020   Breastfeeding No   BMI 32.28 kg/m    Body mass index is 32.28 kg/m .  GENERAL: alert, no distress and obese  EYES: Eyes grossly normal to inspection, PERRL and conjunctivae and sclerae normal  HENT: normal cephalic/atraumatic, nose and mouth without ulcers or lesions, oropharynx clear and oral mucous membranes moist  NECK: no adenopathy, no asymmetry, masses, or scars and thyroid normal to palpation  RESP: lungs clear to auscultation - no rales, rhonchi or wheezes  CV: regular rate and rhythm, normal S1 S2, no S3 or S4, no murmur, click or rub, no peripheral edema and peripheral pulses strong  ABDOMEN: soft, nontender, no hepatosplenomegaly, no masses and bowel sounds normal  MS: no gross musculoskeletal defects noted, no edema  NEURO: Normal strength and tone, mentation intact and speech normal  PSYCH: mentation appears normal, anxious, judgement and insight intact and appearance well groomed      Assessment & Plan     (F33.2) Severe episode of recurrent major depressive disorder, without psychotic features (H)  (primary encounter diagnosis)  Comment: Patient complains of worsening mood symptoms, known to have depression with anxiety.  Using Zoloft for several years, has not tolerated Wellbutrin well in the past.  Treatment options discussed.  Will switch Zoloft to Lexapro, suggested to taper off Zoloft gradually.  Patient will continue counseling.  TSH ordered to monitor thyroid function.  Follow-up in 4 weeks or earlier if needed.  Patient " understood and in agreement with above plan.  All questions answered.  Plan: escitalopram (LEXAPRO) 10 MG tablet          Patient Instructions     Patient Education     Depression  Depression is one of the most common mental health problems today. It is not just a state of unhappiness or sadness. It is a true disease. The cause seems to be related to a decrease in chemicals that transmit signals in the brain. Having a family history of depression, alcoholism, or suicide increases the risk. Chronic illness, chronic pain, migraine headaches, and high emotional stress also increase the risk.  Depression is something we tend to recognize in others, but may have a hard time seeing in ourselves. It can show in many physical and emotional ways:    Loss of appetite    Overeating    Not being able to sleep    Sleeping too much    Tiredness not related to physical exertion    Restlessness or irritability    Slowness of movement or speech    Feeling depressed or withdrawn    Loss of interest in things you once enjoyed    Trouble concentrating, poor memory, trouble making decisions    Thoughts of harming or killing oneself, or thoughts that life is not worth living    Low self-esteem  The treatment for depression may include both medicine and psychotherapy. Antidepressants can reduce suffering and can improve the ability to function during the depressed period. Therapy can offer emotional support and help you understand emotional factors that may be causing the depression.  Home care    Ongoing care and support help people manage this disease. Find a healthcare provider and therapist who meet your needs. Seek help when you feel like you may be getting ill.    Be kind to yourself. Make it a point to do things that you enjoy (gardening, walking in nature, going to a movie). Reward yourself for small successes.    Take care of your physical body. Eat a balanced diet (low in saturated fat and high in fruits and vegetables).  Exercise at least 3 times a week for 30 minutes. Even mild-moderate exercise (like brisk walking) can make you feel better.    Don't drink alcohol, which can make depression worse.    Take medicine as prescribed.    Tell each of your healthcare providers about all of the prescription and over-the-counter medicines, vitamins, and supplements you take. Certain supplements interact with medicines and can result in dangerous side effects. Ask your pharmacist when you have questions about medicine interactions.    Talk with your family and trusted friends about your feelings and thoughts. Ask them to help you recognize behavior changes early so you can get help and, if needed, medicine can be adjusted.    Follow-up care  Follow up with your healthcare provider, or as advised.  Call 911  Call 911 if you:    Have suicidal thoughts, a suicide plan, and the means to carry out the plan; or serious thoughts of hurting someone else     Have trouble breathing    Are very confused    Feel very drowsy or have trouble awakening    Faint or lose consciousness    Have new chest pain that becomes more severe, lasts longer, or spreads into your shoulder, arm, neck, jaw, or back  When to seek medical advice  Call your healthcare provider right away if any of these happen:    Feeling extreme depression, fear, anxiety, or anger toward yourself or others    Feeling out of control    Feeling that you may try to harm yourself or another    Hearing voices that others do not hear    Seeing things that others do not see    Can t sleep or eat for 3 days in a row    Friends or family express concern over your behavior and ask you to seek help  Date Last Reviewed: 10/1/2017    [id number] [employer]. [address] [place] [address], [place] [address]. All rights reserved. This information is not intended as a substitute for professional medical care. Always follow your healthcare professional's instructions.                 Kai Mueller,  MD  [facility]

## 2020-01-23 NOTE — TELEPHONE ENCOUNTER
Pt called. Agreed to appointment with Dr Mueller today. I will route to pcp as well. Sandra Cohen RN

## 2020-01-24 ENCOUNTER — DOCUMENTATION ONLY (OUTPATIENT)
Dept: FAMILY MEDICINE | Facility: CLINIC | Age: 40
End: 2020-01-24

## 2020-01-24 DIAGNOSIS — E89.0 POSTOPERATIVE HYPOTHYROIDISM: Primary | ICD-10-CM

## 2020-01-24 RX ORDER — LEVOTHYROXINE SODIUM 112 UG/1
112 TABLET ORAL DAILY
Qty: 90 TABLET | Refills: 1 | Status: SHIPPED | OUTPATIENT
Start: 2020-01-24 | End: 2020-03-19

## 2020-01-24 RX ORDER — LEVOTHYROXINE SODIUM 50 UG/1
50 TABLET ORAL DAILY
Qty: 90 TABLET | Refills: 1 | Status: SHIPPED | OUTPATIENT
Start: 2020-01-24 | End: 2020-04-21

## 2020-01-24 ASSESSMENT — ANXIETY QUESTIONNAIRES: GAD7 TOTAL SCORE: 9

## 2020-01-28 ENCOUNTER — ANCILLARY PROCEDURE (OUTPATIENT)
Dept: GENERAL RADIOLOGY | Facility: CLINIC | Age: 40
End: 2020-01-28
Attending: PHYSICIAN ASSISTANT

## 2020-01-28 ENCOUNTER — OFFICE VISIT (OUTPATIENT)
Dept: FAMILY MEDICINE | Facility: CLINIC | Age: 40
End: 2020-01-28
Payer: OTHER MISCELLANEOUS

## 2020-01-28 VITALS
DIASTOLIC BLOOD PRESSURE: 74 MMHG | SYSTOLIC BLOOD PRESSURE: 110 MMHG | TEMPERATURE: 98.7 F | HEART RATE: 74 BPM | RESPIRATION RATE: 18 BRPM | HEIGHT: 70 IN | WEIGHT: 227.4 LBS | BODY MASS INDEX: 32.56 KG/M2

## 2020-01-28 DIAGNOSIS — M25.511 ACUTE PAIN OF RIGHT SHOULDER: Primary | ICD-10-CM

## 2020-01-28 DIAGNOSIS — M25.511 ACUTE PAIN OF RIGHT SHOULDER: ICD-10-CM

## 2020-01-28 PROCEDURE — 99214 OFFICE O/P EST MOD 30 MIN: CPT | Performed by: PHYSICIAN ASSISTANT

## 2020-01-28 PROCEDURE — 73030 X-RAY EXAM OF SHOULDER: CPT | Mod: RT

## 2020-01-28 RX ORDER — CYCLOBENZAPRINE HCL 10 MG
10 TABLET ORAL
Qty: 20 TABLET | Refills: 0 | Status: SHIPPED | OUTPATIENT
Start: 2020-01-28 | End: 2020-02-07

## 2020-01-28 ASSESSMENT — PAIN SCALES - GENERAL: PAINLEVEL: SEVERE PAIN (7)

## 2020-01-28 ASSESSMENT — MIFFLIN-ST. JEOR: SCORE: 1786.73

## 2020-01-28 NOTE — PATIENT INSTRUCTIONS
The majority of swelling comes in the first 48 hours after an injury.  You can reduce the effects of this by applying cold to the injury immediately after an injury and for at least 20 minutes of every hour as able.  Rest and elevation of the injured area (if possible) and anti-inflammatory medications such as ibuprofen or naproxen will also be helpful during this time.    As a general rule, the body heals itself in response to the forces that are applied to it.  In other words, if you just rest, you'll never fully recover. After the initial rest period, gently moving the injured joint (if appropriate) will also help speed ultimate recovery.  If injuries are mild to moderate, you can progress to full range of motion without resistance.  As this becomes easier and more comfortable over the course of days, this area can then begin to be tested carefully with controlled weight-bearing or resistance activities.      If you have additional questions about specific rehabilitation over time, please contact me.

## 2020-01-28 NOTE — NURSING NOTE
"Chief Complaint   Patient presents with     Shoulder     Musculoskeletal Problem       Initial /74 (BP Location: Left arm, Patient Position: Sitting, Cuff Size: Adult Large)   Pulse 74   Temp 98.7  F (37.1  C) (Tympanic)   Resp 18   Ht 1.778 m (5' 10\")   Wt 103.1 kg (227 lb 6.4 oz)   LMP 01/02/2020   BMI 32.63 kg/m   Estimated body mass index is 32.63 kg/m  as calculated from the following:    Height as of this encounter: 1.778 m (5' 10\").    Weight as of this encounter: 103.1 kg (227 lb 6.4 oz).    Patient presents to the clinic using No DME    Health Maintenance that is potentially due pending provider review:  NONE    n/a    Is there anyone who you would like to be able to receive your results? No  If yes have patient fill out GIL[facility]ERT    Bev Rizvi CMA    "

## 2020-01-28 NOTE — PROGRESS NOTES
Subjective     Kristel Martinez is a 39 year old female who presents to clinic today for the following health issues:    HPI   Musculoskeletal problem/pain  R handed.     Duration: Since 1/14/20     Description  Location: Right shoulder arm-Patient states that she fell at work. Saw the chiropractor 1/15/20 and 1/27/20.     Intensity:  Moderate, constant worse with motion    Accompanying signs and symptoms: Intermittent tingling numbness into the fourth and fifth digits of the hand    History  Previous similar problem: no.  No prior surgeries on the shoulder  Previous evaluation:  none    Precipitating or alleviating factors:  Trauma or overuse: YES, fell directly onto the right shoulder at work 2 weeks ago  Aggravating factors include: overuse    Therapies tried and outcome: Trialed ibuprofen intermittently.  No improvements with chiropractic adjustments    Denies any neck pain, chest pain, weakness in the right upper extremity.      Patient Active Problem List   Diagnosis     Major depressive disorder, recurrent episode, moderate (H)     Papillary adenocarcinoma of thyroid (H)     Postoperative hypothyroidism     Vitiligo     Vitamin D deficiency     IgA deficiency (H)     Past Surgical History:   Procedure Laterality Date     COLONOSCOPY  8-27/18     THYROIDECTOMY      2015     TONSILLECTOMY       TUBAL LIGATION         Social History     Tobacco Use     Smoking status: Former Smoker     Smokeless tobacco: Never Used   Substance Use Topics     Alcohol use: Yes     Comment: 1 drink per wk     Family History   Problem Relation Age of Onset     Skin Cancer Mother      Hypertension Father      Arrhythmia Father      Lymphoma Maternal Grandmother      Diabetes Paternal Grandfather         type 1      Asthma Son      Arthritis Daughter          Current Outpatient Medications   Medication Sig Dispense Refill     cyclobenzaprine (FLEXERIL) 10 MG tablet Take 1 tablet (10 mg) by mouth nightly as needed for muscle spasms (pain)  "20 tablet 0     escitalopram (LEXAPRO) 10 MG tablet Take 1 tab (10 mg) for 5 days and then 2 tabs (20 mg) thereafter 90 tablet 0     hydrocortisone (ANUSOL-HC) 2.5 % cream Place rectally 2 times daily as needed for hemorrhoids 30 g 0     levothyroxine (SYNTHROID/LEVOTHROID) 112 MCG tablet Take 1 tablet (112 mcg) by mouth daily total of 162 mg daily. 90 tablet 1     levothyroxine (SYNTHROID/LEVOTHROID) 50 MCG tablet Take 1 tablet (50 mcg) by mouth daily total of 162 mg daily. 90 tablet 1     metroNIDAZOLE (METROLOTION) 0.75 % external lotion APPLY TO AFFECTED AREA(S)  TOPICALLY DAILY 59 mL 1     ondansetron (ZOFRAN-ODT) 4 MG ODT tab Take 1 tablet (4 mg) by mouth every 8 hours as needed for nausea 30 tablet 3     SUMAtriptan (IMITREX) 25 MG tablet TAKE 1 TO 2 TABLETS BY  MOUTH AT ONSET OF HEADACHE  FOR MIGRAINE. MAY REPEAT IN 2 HOURS. MAX OF 8 TABLETS  IN 24 HOURS. 18 tablet 0     topiramate (TOPAMAX) 25 MG tablet Take 1 tablet (25 mg) by mouth 2 times daily 60 tablet 3     tretinoin (RETIN-A) 0.025 % external cream Use every night 45 g 0     Allergies   Allergen Reactions     Augmentin Cramps, Diarrhea, Nausea and Nausea and Vomiting     Cephalosporins Rash     Cephalexin     Sulfa Drugs Rash     Reviewed and updated as needed this visit by Provider  Tobacco  Allergies  Meds  Problems  Med Hx  Surg Hx  Fam Hx         Review of Systems   ROS COMP: CONSTITUTIONAL: NEGATIVE for fever, chills, change in weight  RESP: NEGATIVE for significant cough or SOB  CV: NEGATIVE for chest pain, palpitations or peripheral edema  MUSCULOSKELETAL: NEGATIVE for significant arthralgias or myalgia other than what was stated in the HPI  NEURO: Positive for paresthesias NEGATIVE for weakness  ROS otherwise negative      Objective    /74 (BP Location: Left arm, Patient Position: Sitting, Cuff Size: Adult Large)   Pulse 74   Temp 98.7  F (37.1  C) (Tympanic)   Resp 18   Ht 1.778 m (5' 10\")   Wt 103.1 kg (227 lb 6.4 oz)   " [facility] 01/02/2020   BMI 32.63 kg/m    Body mass index is 32.63 kg/m .  Physical Exam   GENERAL: healthy, alert and no distress  EYES: Eyes grossly normal to inspection, conjunctivae and sclerae normal  NECK: no asymmetry, central horizontal well-healed surgical scar on anterior neck, no neck or trapezius tenderness.  RESP: No respiratory distress  MS: There is no swelling or ecchymosis of the right shoulder.  Full range of motion of the right shoulder except in terminal flexion and extension of the shoulder due to pain. No tenderness over the right trap.  There is tenderness over the AC joint.  There is no tenderness over the anterior biceps.  There is no tenderness over the humerus, elbow, forearm.  Neuro: 5 out of 5 strength in right biceps and triceps.  Neer test negative.  Wheeler test negative.  Internal rotation lag test negative. Sensation, and strength intact to the median ulnar and radial distributions of the right hand.  She is able to give a thumbs up.  She is able to make an okay sign.  no gross musculoskeletal defects noted, no edema.   SKIN: no suspicious lesions or rashes    Xray -3 view of the right shoulder per my read was without obvious fracture dislocation, or AC joint separation.        Assessment & Plan     (M25.511) Acute pain of right shoulder  (primary encounter diagnosis)  Comment: Patient with 2 weeks of traumatic right shoulder pain.  Associated intermittent paresthesias into the fourth and fifth digits of the right hand.  Her exam today was largely unremarkable except for some tenderness over the right AC joint.  Her range of motion and strength in the right shoulder and right upper extremity strength are largely normal.  Do not suspect a rotator cuff injury, dislocation, frozen shoulder at this time.  An x-ray was taken and we will call her with the results once it is read by the radiologist.  We will treat conservatively with ibuprofen/Tylenol and she was given a prescription for  Flexeril to help her sleep at night.  She will also try gentle range of motion and shoulder exercises once her pain starts to improve.  Plan: XR Shoulder Right G/E 3 Views        Return to clinic in 4 weeks if symptoms not improved or sooner for any worsening or new symptoms.    Return in about 4 weeks (around 2/25/2020), or if symptoms worsen or fail to improve.    Dimitrios Bauer PA-C  [facility]

## 2020-02-07 ENCOUNTER — MYC MEDICAL ADVICE (OUTPATIENT)
Dept: FAMILY MEDICINE | Facility: CLINIC | Age: 40
End: 2020-02-07

## 2020-02-07 ENCOUNTER — OFFICE VISIT (OUTPATIENT)
Dept: FAMILY MEDICINE | Facility: CLINIC | Age: 40
End: 2020-02-07
Payer: COMMERCIAL

## 2020-02-07 VITALS
HEIGHT: 70 IN | RESPIRATION RATE: 16 BRPM | HEART RATE: 84 BPM | DIASTOLIC BLOOD PRESSURE: 62 MMHG | WEIGHT: 226 LBS | TEMPERATURE: 97.5 F | SYSTOLIC BLOOD PRESSURE: 110 MMHG | BODY MASS INDEX: 32.35 KG/M2

## 2020-02-07 DIAGNOSIS — F33.1 MAJOR DEPRESSIVE DISORDER, RECURRENT EPISODE, MODERATE (H): Primary | ICD-10-CM

## 2020-02-07 PROCEDURE — 99213 OFFICE O/P EST LOW 20 MIN: CPT | Performed by: NURSE PRACTITIONER

## 2020-02-07 ASSESSMENT — ANXIETY QUESTIONNAIRES
1. FEELING NERVOUS, ANXIOUS, OR ON EDGE: MORE THAN HALF THE DAYS
2. NOT BEING ABLE TO STOP OR CONTROL WORRYING: NEARLY EVERY DAY
6. BECOMING EASILY ANNOYED OR IRRITABLE: NEARLY EVERY DAY
7. FEELING AFRAID AS IF SOMETHING AWFUL MIGHT HAPPEN: SEVERAL DAYS
5. BEING SO RESTLESS THAT IT IS HARD TO SIT STILL: SEVERAL DAYS
3. WORRYING TOO MUCH ABOUT DIFFERENT THINGS: NEARLY EVERY DAY
GAD7 TOTAL SCORE: 15

## 2020-02-07 ASSESSMENT — MIFFLIN-ST. JEOR: SCORE: 1780.38

## 2020-02-07 ASSESSMENT — PATIENT HEALTH QUESTIONNAIRE - PHQ9
5. POOR APPETITE OR OVEREATING: MORE THAN HALF THE DAYS
SUM OF ALL RESPONSES TO PHQ QUESTIONS 1-9: 18

## 2020-02-07 NOTE — PROGRESS NOTES
Subjective     Kristel Martinez is a 39 year old female who presents to clinic today for the following health issues:    HPI   Depression Followup    How are you doing with your depression since your last visit? Has yet to see difference with change in med     Are you having other symptoms that might be associated with depression? Yes:  .     Have you had a significant life event?  No     Are you feeling anxious or having panic attacks?   No    Do you have any concerns with your use of alcohol or other drugs? No     Has done a slow taper off the sertraline and started the Lexapro  So far no improvement in symptoms  Working with counselor for herself and marriage counseling with her    Deciding whether it is worth it to stay in her marriage at this point- has done some things that are difficult to forgive  Has had a lot of medical issues over the past year-feels that they were caused by her mental health    Social History     Tobacco Use     Smoking status: Former Smoker     Smokeless tobacco: Never Used   Substance Use Topics     Alcohol use: Yes     Comment: 1 drink per wk     Drug use: No     PHQ 10/30/2019 1/23/2020 2/7/2020   PHQ-9 Total Score 9 17 18   Q9: Thoughts of better off dead/self-harm past 2 weeks Not at all Not at all Not at all     MALCOM-7 SCORE 10/30/2019 1/23/2020 2/7/2020   Total Score - 9 (mild anxiety) -   Total Score 4 9 15       Suicide Assessment Five-step Evaluation and Treatment (SAFE-T)      Patient Active Problem List   Diagnosis     Major depressive disorder, recurrent episode, moderate (H)     Papillary adenocarcinoma of thyroid (H)     Postoperative hypothyroidism     Vitiligo     Vitamin D deficiency     IgA deficiency (H)     Past Surgical History:   Procedure Laterality Date     COLONOSCOPY  8-27/18     THYROIDECTOMY      2015     TONSILLECTOMY       TUBAL LIGATION         Social History     Tobacco Use     Smoking status: Former Smoker     Smokeless tobacco: Never Used    Substance Use Topics     Alcohol use: Yes     Comment: 1 drink per wk     Family History   Problem Relation Age of Onset     Skin Cancer Mother      Hypertension Father      Arrhythmia Father      Lymphoma Maternal Grandmother      Diabetes Paternal Grandfather         type 1      Asthma Son      Arthritis Daughter          Current Outpatient Medications   Medication Sig Dispense Refill     escitalopram (LEXAPRO) 10 MG tablet Take 1 tab (10 mg) for 5 days and then 2 tabs (20 mg) thereafter 90 tablet 0     hydrocortisone (ANUSOL-HC) 2.5 % cream Place rectally 2 times daily as needed for hemorrhoids 30 g 0     levothyroxine (SYNTHROID/LEVOTHROID) 112 MCG tablet Take 1 tablet (112 mcg) by mouth daily total of 162 mg daily. 90 tablet 1     levothyroxine (SYNTHROID/LEVOTHROID) 50 MCG tablet Take 1 tablet (50 mcg) by mouth daily total of 162 mg daily. 90 tablet 1     metroNIDAZOLE (METROLOTION) 0.75 % external lotion APPLY TO AFFECTED AREA(S)  TOPICALLY DAILY 59 mL 1     ondansetron (ZOFRAN-ODT) 4 MG ODT tab Take 1 tablet (4 mg) by mouth every 8 hours as needed for nausea 30 tablet 3     SUMAtriptan (IMITREX) 25 MG tablet TAKE 1 TO 2 TABLETS BY  MOUTH AT ONSET OF HEADACHE  FOR MIGRAINE. MAY REPEAT IN 2 HOURS. MAX OF 8 TABLETS  IN 24 HOURS. 18 tablet 0     topiramate (TOPAMAX) 25 MG tablet Take 1 tablet (25 mg) by mouth 2 times daily 60 tablet 3     tretinoin (RETIN-A) 0.025 % external cream Use every night 45 g 0     Allergies   Allergen Reactions     Augmentin Cramps, Diarrhea, Nausea and Nausea and Vomiting     Cephalosporins Rash     Cephalexin     Sulfa Drugs Rash       Reviewed and updated as needed this visit by Provider  Tobacco  Allergies  Meds  Problems  Med Hx  Surg Hx  Fam Hx         Review of Systems   ROS COMP: Constitutional, HEENT, cardiovascular, pulmonary, gi and gu systems are negative, except as otherwise noted.      Objective    /62 (Cuff Size: Adult Regular)   Pulse 84   Temp 97.5  F  "(36.4  C) (Tympanic)   Resp 16   Ht 1.778 m (5' 10\")   Wt 102.5 kg (226 lb)   BMI 32.43 kg/m    Body mass index is 32.43 kg/m .  Physical Exam   GENERAL: healthy, alert and no distress  PSYCH: mentation appears normal, affect flat and tearful    Diagnostic Test Results:  Labs reviewed in Epic        Assessment & Plan     1. Major depressive disorder, recurrent episode, moderate (H)  Not controlled.  Will continue with current medication as taper off sertraline was just completed and Lexapro started.  Will complete [facility] paperwork so Kristel can complete her therapies and appointments. Recheck in 1 month.     Home care instructions were reviewed with the patient. The risks, benefits and treatment options of prescribed medications or other treatments have been discussed with the patient. The patient verbalized their understanding and should call or follow up if no improvement or if they develop further problems.    Return in about 4 weeks (around 3/6/2020).    DAVID Lazo [facility]      "

## 2020-02-08 ASSESSMENT — ANXIETY QUESTIONNAIRES: GAD7 TOTAL SCORE: 15

## 2020-02-12 ENCOUNTER — MYC MEDICAL ADVICE (OUTPATIENT)
Dept: FAMILY MEDICINE | Facility: CLINIC | Age: 40
End: 2020-02-12

## 2020-02-24 ENCOUNTER — OFFICE VISIT (OUTPATIENT)
Dept: FAMILY MEDICINE | Facility: CLINIC | Age: 40
End: 2020-02-24
Payer: COMMERCIAL

## 2020-02-24 VITALS
WEIGHT: 228 LBS | TEMPERATURE: 98.6 F | SYSTOLIC BLOOD PRESSURE: 110 MMHG | DIASTOLIC BLOOD PRESSURE: 72 MMHG | OXYGEN SATURATION: 98 % | HEIGHT: 70 IN | HEART RATE: 90 BPM | RESPIRATION RATE: 16 BRPM | BODY MASS INDEX: 32.64 KG/M2

## 2020-02-24 DIAGNOSIS — J06.9 VIRAL URI WITH COUGH: ICD-10-CM

## 2020-02-24 DIAGNOSIS — R09.82 POST-NASAL DRAINAGE: Primary | ICD-10-CM

## 2020-02-24 PROCEDURE — 99213 OFFICE O/P EST LOW 20 MIN: CPT | Performed by: NURSE PRACTITIONER

## 2020-02-24 RX ORDER — FLUTICASONE PROPIONATE 50 MCG
1 SPRAY, SUSPENSION (ML) NASAL DAILY
Qty: 16 G | Refills: 1 | Status: SHIPPED | OUTPATIENT
Start: 2020-02-24 | End: 2021-07-09

## 2020-02-24 ASSESSMENT — MIFFLIN-ST. JEOR: SCORE: 1789.45

## 2020-02-24 NOTE — PROGRESS NOTES
Subjective     Kristel Martinez is a 39 year old female who presents to clinic today for the following health issues:    HPI   ENT Symptoms             Symptoms: cc Present Absent Comment   Fever/Chills   x    Fatigue  x     Muscle Aches   x    Eye Irritation   x    Sneezing  x     Nasal Thanh/Drg  x     Sinus Pressure/Pain  x     Loss of smell  x     Dental pain   x    Sore Throat  x  Scratchy    Swollen Glands   x    Ear Pain/Fullness  x  Full    Cough  x  Mild    Wheeze   x    Chest Pain   x    Shortness of breath   x    Rash   x    Other   x      Symptom duration:  1 week    Symptom severity:  mild   Treatments tried:  mucinex    Contacts:  none        Patient Active Problem List   Diagnosis     Major depressive disorder, recurrent episode, moderate (H)     Papillary adenocarcinoma of thyroid (H)     Postoperative hypothyroidism     Vitiligo     Vitamin D deficiency     IgA deficiency (H)     Past Surgical History:   Procedure Laterality Date     COLONOSCOPY  8-27/18     THYROIDECTOMY      2015     TONSILLECTOMY       TUBAL LIGATION         Social History     Tobacco Use     Smoking status: Former Smoker     Smokeless tobacco: Never Used   Substance Use Topics     Alcohol use: Yes     Comment: 1 drink per wk     Family History   Problem Relation Age of Onset     Skin Cancer Mother      Hypertension Father      Arrhythmia Father      Lymphoma Maternal Grandmother      Diabetes Paternal Grandfather         type 1      Asthma Son      Arthritis Daughter          Current Outpatient Medications   Medication Sig Dispense Refill     escitalopram (LEXAPRO) 10 MG tablet Take 1 tab (10 mg) for 5 days and then 2 tabs (20 mg) thereafter 90 tablet 0     fluticasone (FLONASE) 50 MCG/ACT nasal spray Spray 1 spray into both nostrils daily 16 g 1     hydrocortisone (ANUSOL-HC) 2.5 % cream Place rectally 2 times daily as needed for hemorrhoids 30 g 0     levothyroxine (SYNTHROID/LEVOTHROID) 112 MCG tablet Take 1 tablet (112 mcg) by  "mouth daily total of 162 mg daily. 90 tablet 1     levothyroxine (SYNTHROID/LEVOTHROID) 50 MCG tablet Take 1 tablet (50 mcg) by mouth daily total of 162 mg daily. 90 tablet 1     metroNIDAZOLE (METROLOTION) 0.75 % external lotion APPLY TO AFFECTED AREA(S)  TOPICALLY DAILY 59 mL 1     ondansetron (ZOFRAN-ODT) 4 MG ODT tab Take 1 tablet (4 mg) by mouth every 8 hours as needed for nausea 30 tablet 3     SUMAtriptan (IMITREX) 25 MG tablet TAKE 1 TO 2 TABLETS BY  MOUTH AT ONSET OF HEADACHE  FOR MIGRAINE. MAY REPEAT IN 2 HOURS. MAX OF 8 TABLETS  IN 24 HOURS. 18 tablet 0     topiramate (TOPAMAX) 25 MG tablet Take 1 tablet (25 mg) by mouth 2 times daily 60 tablet 3     tretinoin (RETIN-A) 0.025 % external cream Use every night 45 g 0     Allergies   Allergen Reactions     Augmentin Cramps, Diarrhea, Nausea and Nausea and Vomiting     Cephalosporins Rash     Cephalexin     Sulfa Drugs Rash       Reviewed and updated as needed this visit by Provider  Tobacco  Allergies  Meds  Problems  Med Hx  Surg Hx  Fam Hx         Review of Systems   ROS COMP: Constitutional, HEENT, cardiovascular, pulmonary, gi and gu systems are negative, except as otherwise noted.      Objective    /72 (Cuff Size: Adult Large)   Pulse 90   Temp 98.6  F (37  C) (Tympanic)   Resp 16   Ht 1.778 m (5' 10\")   Wt 103.4 kg (228 lb)   SpO2 98%   BMI 32.71 kg/m    Body mass index is 32.71 kg/m .  Physical Exam   GENERAL: healthy, alert and no distress  HENT: normal cephalic/atraumatic, both ears: clear effusion, nose and mouth without ulcers or lesions, oropharynx clear and oral mucous membranes moist  NECK: no adenopathy  RESP: lungs clear to auscultation - no rales, rhonchi or wheezes  CV: regular rate and rhythm, normal S1 S2, no S3 or S4, no murmur, click or rub  MS: no gross musculoskeletal defects noted, no edema  PSYCH: mentation appears normal, affect normal/bright    Diagnostic Test Results:  Labs reviewed in Epic        Assessment & " Plan     1. Viral URI with cough  No acute distress.  Will start Flonase for symptoms.  If not improving with symptomatic care, will consider antibiotics.  Symptomatic care and follow up discussed.    2. Post-nasal drainage  Per above  - fluticasone (FLONASE) 50 MCG/ACT nasal spray; Spray 1 spray into both nostrils daily  Dispense: 16 g; Refill: 1     Home care instructions were reviewed with the patient. The risks, benefits and treatment options of prescribed medications or other treatments have been discussed with the patient. The patient verbalized their understanding and should call or follow up if no improvement or if they develop further problems.    Return in about 1 week (around 3/2/2020), or if symptoms worsen or fail to improve.    DAVID Lazo [facility]

## 2020-03-19 DIAGNOSIS — E89.0 POSTOPERATIVE HYPOTHYROIDISM: ICD-10-CM

## 2020-03-19 RX ORDER — LEVOTHYROXINE SODIUM 112 UG/1
112 TABLET ORAL DAILY
Qty: 90 TABLET | Refills: 1 | Status: SHIPPED | OUTPATIENT
Start: 2020-03-19 | End: 2020-04-21

## 2020-03-19 NOTE — TELEPHONE ENCOUNTER
"Requested Prescriptions   Pending Prescriptions Disp Refills     levothyroxine (SYNTHROID/LEVOTHROID) 112 MCG tablet 90 tablet 1     Sig: Take 1 tablet (112 mcg) by mouth daily total of 162 mg daily.       Thyroid Protocol Failed - 3/19/2020  8:27 AM        Failed - Normal TSH on file in past 12 months     Recent Labs   Lab Test 01/23/20  1613   TSH 6.44*              Passed - Patient is 12 years or older        Passed - Recent (12 mo) or future (30 days) visit within the authorizing provider's specialty     Patient has had an office visit with the authorizing provider or a provider within the authorizing providers department within the previous 12 mos or has a future within next 30 days. See \"Patient Info\" tab in inbasket, or \"Choose Columns\" in Meds & Orders section of the refill encounter.              Passed - Medication is active on med list        Passed - No active pregnancy on record     If patient is pregnant or has had a positive pregnancy test, please check TSH.          Passed - No positive pregnancy test in past 12 months     If patient is pregnant or has had a positive pregnancy test, please check TSH.             Last Written Prescription Date:  1/24/20  Last Fill Quantity: 90,  # refills: 1   Last office visit: 2/24/2020 with prescribing provider:     Future Office Visit:      "

## 2020-03-19 NOTE — TELEPHONE ENCOUNTER
TSH   Date Value Ref Range Status   01/23/2020 6.44 (H) 0.40 - 4.00 mU/L Final   10/21/2019 0.37 (L) 0.40 - 4.00 mU/L Final   10/30/2018 0.79 0.40 - 4.00 mU/L Final   05/25/2018 0.36 (L) 0.40 - 4.00 mU/L Final   01/25/2009 1.41 0.4 - 5.0 mU/L Final     Due for 2 month lab F/U.  Spoke re: lab appt. Denies any current cough, fever, no recent travel, no exposure to COVID19.  Please advise lab necessary now or extend refill.  SHERYL Matos RN

## 2020-03-24 ENCOUNTER — E-VISIT (OUTPATIENT)
Dept: FAMILY MEDICINE | Facility: CLINIC | Age: 40
End: 2020-03-24
Payer: COMMERCIAL

## 2020-03-24 DIAGNOSIS — N30.00 ACUTE CYSTITIS WITHOUT HEMATURIA: Primary | ICD-10-CM

## 2020-03-24 PROCEDURE — 99421 OL DIG E/M SVC 5-10 MIN: CPT | Performed by: NURSE PRACTITIONER

## 2020-03-24 RX ORDER — NITROFURANTOIN 25; 75 MG/1; MG/1
100 CAPSULE ORAL 2 TIMES DAILY
Qty: 10 CAPSULE | Refills: 0 | Status: SHIPPED | OUTPATIENT
Start: 2020-03-24 | End: 2020-04-01

## 2020-03-24 NOTE — PATIENT INSTRUCTIONS
Thank you for choosing us for your care. I have placed an order for a prescription so that you can start treatment. View your full visit summary for details by clicking on the link below. Your pharmacist will able to address any questions you may have about the medication.     If you re not feeling better within 2-3 days, please schedule an appointment.  You can schedule an appointment right here in [facility], or call [phone]  If the visit is for the same symptoms as your e-visit, we ll refund the cost of your e-visit if seen within seven days.

## 2020-03-25 ENCOUNTER — TELEPHONE (OUTPATIENT)
Dept: FAMILY MEDICINE | Facility: CLINIC | Age: 40
End: 2020-03-25

## 2020-03-25 ENCOUNTER — MYC MEDICAL ADVICE (OUTPATIENT)
Dept: FAMILY MEDICINE | Facility: CLINIC | Age: 40
End: 2020-03-25

## 2020-03-25 DIAGNOSIS — B00.1 RECURRENT COLD SORES: Primary | ICD-10-CM

## 2020-03-25 DIAGNOSIS — B00.1 RECURRENT COLD SORES: ICD-10-CM

## 2020-03-25 RX ORDER — VALACYCLOVIR HYDROCHLORIDE 1 G/1
2000 TABLET, FILM COATED ORAL 2 TIMES DAILY
Qty: 4 TABLET | Refills: 1 | Status: SHIPPED | OUTPATIENT
Start: 2020-03-25 | End: 2020-03-25

## 2020-03-25 RX ORDER — ACYCLOVIR 400 MG/1
400 TABLET ORAL EVERY 8 HOURS
Qty: 15 TABLET | Refills: 0 | Status: SHIPPED | OUTPATIENT
Start: 2020-03-25 | End: 2020-04-01

## 2020-03-25 NOTE — TELEPHONE ENCOUNTER
Glenda Schaffer says Valacyclovir 1 GM tablets are not covered by her insurance.  The preferred alternative is Acyclovir   Please send new Rx with strength, directions, quantity and refills.

## 2020-03-30 ENCOUNTER — MYC MEDICAL ADVICE (OUTPATIENT)
Dept: FAMILY MEDICINE | Facility: CLINIC | Age: 40
End: 2020-03-30

## 2020-04-01 ENCOUNTER — VIRTUAL VISIT (OUTPATIENT)
Dept: FAMILY MEDICINE | Facility: CLINIC | Age: 40
End: 2020-04-01
Payer: COMMERCIAL

## 2020-04-01 DIAGNOSIS — H66.002 ACUTE SUPPURATIVE OTITIS MEDIA OF LEFT EAR WITHOUT SPONTANEOUS RUPTURE OF TYMPANIC MEMBRANE, RECURRENCE NOT SPECIFIED: Primary | ICD-10-CM

## 2020-04-01 DIAGNOSIS — B37.31 CANDIDIASIS OF VAGINA: ICD-10-CM

## 2020-04-01 PROCEDURE — 99214 OFFICE O/P EST MOD 30 MIN: CPT | Mod: TEL | Performed by: NURSE PRACTITIONER

## 2020-04-01 RX ORDER — FLUCONAZOLE 150 MG/1
150 TABLET ORAL ONCE
Qty: 1 TABLET | Refills: 0 | Status: SHIPPED | OUTPATIENT
Start: 2020-04-01 | End: 2020-04-29

## 2020-04-01 RX ORDER — AMOXICILLIN 875 MG
875 TABLET ORAL 2 TIMES DAILY
Qty: 20 TABLET | Refills: 0 | Status: SHIPPED | OUTPATIENT
Start: 2020-04-01 | End: 2020-04-29

## 2020-04-01 NOTE — PROGRESS NOTES
"Subjective     Kristel Martinez is a 39 year old female who is being evaluated via a billable telephone visit.      The patient has been notified of following:     \"This telephone visit will be conducted via a call between you and your physician/provider. We have found that certain health care needs can be provided without the need for a physical exam.  This service lets us provide the care you need with a short phone conversation.  If a prescription is necessary we can send it directly to your pharmacy.  If lab work is needed we can place an order for that and you can then stop by our lab to have the test done at a later time.    If during the course of the call the physician/provider feels a telephone visit is not appropriate, you will not be charged for this service.\"     Patient has given verbal consent for Telephone visit?  Yes    Kristel Martinez complains of   Chief Complaint   Patient presents with     Otalgia       ALLERGIES  Augmentin; Cephalosporins; and Sulfa drugs    ENT Symptoms             Symptoms: cc Present Absent Comment   Fever/Chills   x    Fatigue   x    Muscle Aches   x    Eye Irritation   x    Sneezing   x    Nasal Thanh/Drg   x    Sinus Pressure/Pain   x    Loss of smell   x    Dental pain   x    Sore Throat   x    Swollen Glands x   Left side    Ear Pain/Fullness x   Left side    Cough   x    Wheeze   x    Chest Pain   x    Shortness of breath   x    Rash   x    Other x   Jaw pain left      Symptom duration:  1.5 weeks    Symptom severity:  mild   Treatments tried:  heat pack, tylenol    Contacts:  none      At first felt like one sided TMJ then lymph nodes started to have significant swelling and tenderness  No fevers  Getting worse, woke her up last night  Tylenol not helping    Patient Active Problem List   Diagnosis     Major depressive disorder, recurrent episode, moderate (H)     Papillary adenocarcinoma of thyroid (H)     Postoperative hypothyroidism     Vitiligo     Vitamin D deficiency     " IgA deficiency (H)     Past Surgical History:   Procedure Laterality Date     COLONOSCOPY  8-27/18     THYROIDECTOMY      2015     TONSILLECTOMY       TUBAL LIGATION         Social History     Tobacco Use     Smoking status: Former Smoker     Smokeless tobacco: Never Used   Substance Use Topics     Alcohol use: Yes     Comment: 1 drink per wk     Family History   Problem Relation Age of Onset     Skin Cancer Mother      Hypertension Father      Arrhythmia Father      Lymphoma Maternal Grandmother      Diabetes Paternal Grandfather         type 1      Asthma Son      Arthritis Daughter          Current Outpatient Medications   Medication Sig Dispense Refill     amoxicillin (AMOXIL) 875 MG tablet Take 1 tablet (875 mg) by mouth 2 times daily for 10 days 20 tablet 0     escitalopram (LEXAPRO) 10 MG tablet TAKE ONE TABLET BY MOUTH DAILY FOR 5 DAYS THEN TAKE TWO TABLETS THEREAFTER 90 tablet 0     fluconazole (DIFLUCAN) 150 MG tablet Take 1 tablet (150 mg) by mouth once for 1 dose 1 tablet 0     fluticasone (FLONASE) 50 MCG/ACT nasal spray Spray 1 spray into both nostrils daily 16 g 1     hydrocortisone (ANUSOL-HC) 2.5 % cream Place rectally 2 times daily as needed for hemorrhoids 30 g 0     levothyroxine (SYNTHROID/LEVOTHROID) 112 MCG tablet Take 1 tablet (112 mcg) by mouth daily total of 162 mg daily. 90 tablet 1     levothyroxine (SYNTHROID/LEVOTHROID) 50 MCG tablet Take 1 tablet (50 mcg) by mouth daily total of 162 mg daily. 90 tablet 1     metroNIDAZOLE (METROLOTION) 0.75 % external lotion APPLY TO AFFECTED AREA(S)  TOPICALLY DAILY 59 mL 1     ondansetron (ZOFRAN-ODT) 4 MG ODT tab Take 1 tablet (4 mg) by mouth every 8 hours as needed for nausea 30 tablet 3     SUMAtriptan (IMITREX) 25 MG tablet TAKE 1 TO 2 TABLETS BY  MOUTH AT ONSET OF HEADACHE  FOR MIGRAINE. MAY REPEAT IN 2 HOURS. MAX OF 8 TABLETS  IN 24 HOURS. 18 tablet 0     topiramate (TOPAMAX) 25 MG tablet Take 1 tablet (25 mg) by mouth 2 times daily 60 tablet 3      tretinoin (RETIN-A) 0.025 % external cream Use every night 45 g 0     Allergies   Allergen Reactions     Augmentin Cramps, Diarrhea, Nausea and Nausea and Vomiting     Cephalosporins Rash     Cephalexin     Sulfa Drugs Rash       Reviewed and updated as needed this visit by Provider  Tobacco  Allergies  Meds  Problems  Med Hx  Surg Hx  Fam Hx         Review of Systems   ROS COMP: Constitutional, HEENT, cardiovascular, pulmonary, gi and gu systems are negative, except as otherwise noted.       Objective   Reported vitals:  There were no vitals taken for this visit.   Psych: Alert and oriented times 3; coherent speech, normal   rate and volume, able to articulate logical thoughts, able   to abstract reason, no tangential thoughts, no hallucinations   or delusions  Her affect is normal     Diagnostic Test Results:  Labs reviewed in Epic        Assessment/Plan:  1. Acute suppurative otitis media of left ear without spontaneous rupture of tympanic membrane, recurrence not specified  With ongoing and worsening symptoms will start Amoxicillin.  Symptomatic care and follow up discussed.  - amoxicillin (AMOXIL) 875 MG tablet; Take 1 tablet (875 mg) by mouth 2 times daily for 10 days  Dispense: 20 tablet; Refill: 0    2. Candidiasis of vagina  History of candida infections with antibiotics, Diflucan sent to the pharmacy.    - fluconazole (DIFLUCAN) 150 MG tablet; Take 1 tablet (150 mg) by mouth once for 1 dose  Dispense: 1 tablet; Refill: 0    Return in about 1 week (around 4/8/2020), or if symptoms worsen or fail to improve.     Home care instructions were reviewed with the patient. The risks, benefits and treatment options of prescribed medications or other treatments have been discussed with the patient. The patient verbalized their understanding and should call or follow up if no improvement or if they develop further problems.    Phone call duration:  7 minutes    DAVID Lazo CNP

## 2020-04-21 DIAGNOSIS — F33.2 SEVERE EPISODE OF RECURRENT MAJOR DEPRESSIVE DISORDER, WITHOUT PSYCHOTIC FEATURES (H): ICD-10-CM

## 2020-04-21 DIAGNOSIS — E89.0 POSTOPERATIVE HYPOTHYROIDISM: ICD-10-CM

## 2020-04-21 RX ORDER — LEVOTHYROXINE SODIUM 50 UG/1
50 TABLET ORAL DAILY
Qty: 90 TABLET | Refills: 0 | Status: SHIPPED | OUTPATIENT
Start: 2020-04-21 | End: 2020-08-11

## 2020-04-21 RX ORDER — ESCITALOPRAM OXALATE 20 MG/1
20 TABLET ORAL DAILY
Qty: 90 TABLET | Refills: 0 | Status: SHIPPED | OUTPATIENT
Start: 2020-04-21 | End: 2020-06-05

## 2020-04-21 RX ORDER — LEVOTHYROXINE SODIUM 112 UG/1
112 TABLET ORAL DAILY
Qty: 90 TABLET | Refills: 0 | Status: SHIPPED | OUTPATIENT
Start: 2020-04-21 | End: 2020-09-16

## 2020-04-21 NOTE — TELEPHONE ENCOUNTER
"Requested Prescriptions   Pending Prescriptions Disp Refills     levothyroxine (SYNTHROID/LEVOTHROID) 112 MCG tablet       Sig: Take 1 tablet (112 mcg) by mouth daily total of 162 mg daily.       Thyroid Protocol Failed - 4/21/2020 10:47 AM        Failed - Normal TSH on file in past 12 months     Recent Labs   Lab Test 01/23/20  1613   TSH 6.44*              Passed - Patient is 12 years or older        Passed - Recent (12 mo) or future (30 days) visit within the authorizing provider's specialty     Patient has had an office visit with the authorizing provider or a provider within the authorizing providers department within the previous 12 mos or has a future within next 30 days. See \"Patient Info\" tab in inbasket, or \"Choose Columns\" in Meds & Orders section of the refill encounter.              Passed - Medication is active on med list        Passed - No active pregnancy on record     If patient is pregnant or has had a positive pregnancy test, please check TSH.          Passed - No positive pregnancy test in past 12 months     If patient is pregnant or has had a positive pregnancy test, please check TSH.             levothyroxine (SYNTHROID/LEVOTHROID) 50 MCG tablet       Sig: Take 1 tablet (50 mcg) by mouth daily total of 162 mg daily.       Thyroid Protocol Failed - 4/21/2020 10:47 AM        Failed - Normal TSH on file in past 12 months     Recent Labs   Lab Test 01/23/20  1613   TSH 6.44*              Passed - Patient is 12 years or older        Passed - Recent (12 mo) or future (30 days) visit within the authorizing provider's specialty     Patient has had an office visit with the authorizing provider or a provider within the authorizing providers department within the previous 12 mos or has a future within next 30 days. See \"Patient Info\" tab in inbasket, or \"Choose Columns\" in Meds & Orders section of the refill encounter.              Passed - Medication is active on med list        Passed - No active " pregnancy on record     If patient is pregnant or has had a positive pregnancy test, please check TSH.          Passed - No positive pregnancy test in past 12 months     If patient is pregnant or has had a positive pregnancy test, please check TSH.

## 2020-04-21 NOTE — TELEPHONE ENCOUNTER
Reason for Call:  Medication or medication refill:    Do you use a [facility]?  Name of the pharmacy and phone number for the current request:  [facility]     Name of the medication requested: Pt is requesting Escitalopram  20 mg. & Levothyroxine     Pt is requesting two scripts [facility] days for [facility] and 90 day to [facility]    Pt was given 10 mg and now taking 2 Tablets daily.    Can we leave a detailed message on this number? YES    Phone number patient can be reached at: Home number on file [phone] (home)    Best Time: Any Time    Also requesting her Levothyroxine 50 mcg & 112 mcg       Call taken on 4/21/2020 at 10:40 AM by Julissa Bradley

## 2020-04-29 ENCOUNTER — VIRTUAL VISIT (OUTPATIENT)
Dept: FAMILY MEDICINE | Facility: CLINIC | Age: 40
End: 2020-04-29
Payer: OTHER MISCELLANEOUS

## 2020-04-29 DIAGNOSIS — G89.29 CHRONIC PAIN IN RIGHT SHOULDER: Primary | ICD-10-CM

## 2020-04-29 DIAGNOSIS — M25.511 CHRONIC PAIN IN RIGHT SHOULDER: Primary | ICD-10-CM

## 2020-04-29 PROCEDURE — 99213 OFFICE O/P EST LOW 20 MIN: CPT | Mod: 95 | Performed by: PHYSICIAN ASSISTANT

## 2020-04-29 NOTE — PROGRESS NOTES
"Kristel Martinez is a 39 year old female who is being evaluated via a billable video visit.      The patient has been notified of following:     \"This video visit will be conducted via a call between you and your physician/provider. We have found that certain health care needs can be provided without the need for an in-person physical exam.  This service lets us provide the care you need with a video conversation.  If a prescription is necessary we can send it directly to your pharmacy.  If lab work is needed we can place an order for that and you can then stop by our lab to have the test done at a later time.    Video visits are billed at different rates depending on your insurance coverage.  Please reach out to your insurance provider with any questions.    If during the course of the call the physician/provider feels a video visit is not appropriate, you will not be charged for this service.\"    Patient has given verbal consent for Video visit? Yes    How would you like to obtain your AVS? [employer]    Patient would like the video invitation sent by: Text to cell phone: [phone]    Will anyone else be joining your video visit? No    Subjective   Kristel Martinez is a 39 year old female who presents to clinic today for the following health issues:    HPI  Musculoskeletal problem/pain      Duration: Jan     Description  Location: right shoulder     Intensity:  moderate, worsening, constant but made worse with movement.    Accompanying signs and symptoms: Pain radiates down into right hand.  Patient reports mostly to the middle 2 digits, third and fourth.    History  Previous similar problem: YES  Previous evaluation:  none    Precipitating or alleviating factors:  Trauma or overuse: YES- fell at work  Aggravating factors include: driving, sleeping on the right shoulder at night, movement of the right shoulder.  There is no alleviating or exacerbating symptoms with movement of the head or neck.     Therapies tried and " outcome: muscle relaxer - didn't help      No new injury or trauma since January.    Video Start Time: 4:16 PM    Patient Active Problem List   Diagnosis     Major depressive disorder, recurrent episode, moderate (H)     Papillary adenocarcinoma of thyroid (H)     Postoperative hypothyroidism     Vitiligo     Vitamin D deficiency     IgA deficiency (H)     Past Surgical History:   Procedure Laterality Date     COLONOSCOPY  8-27/18     THYROIDECTOMY      2015     TONSILLECTOMY       TUBAL LIGATION         Social History     Tobacco Use     Smoking status: Former Smoker     Smokeless tobacco: Never Used   Substance Use Topics     Alcohol use: Yes     Comment: 1 drink per wk     Family History   Problem Relation Age of Onset     Skin Cancer Mother      Hypertension Father      Arrhythmia Father      Lymphoma Maternal Grandmother      Diabetes Paternal Grandfather         type 1      Asthma Son      Arthritis Daughter          Current Outpatient Medications   Medication Sig Dispense Refill     escitalopram (LEXAPRO) 20 MG tablet Take 1 tablet (20 mg) by mouth daily 90 tablet 0     fluticasone (FLONASE) 50 MCG/ACT nasal spray Spray 1 spray into both nostrils daily 16 g 1     hydrocortisone (ANUSOL-HC) 2.5 % cream Place rectally 2 times daily as needed for hemorrhoids 30 g 0     levothyroxine (SYNTHROID/LEVOTHROID) 112 MCG tablet Take 1 tablet (112 mcg) by mouth daily total of 162 mg daily. 90 tablet 0     levothyroxine (SYNTHROID/LEVOTHROID) 50 MCG tablet Take 1 tablet (50 mcg) by mouth daily total of 162 mg daily. 90 tablet 0     metroNIDAZOLE (METROLOTION) 0.75 % external lotion APPLY TO AFFECTED AREA(S)  TOPICALLY DAILY 59 mL 1     ondansetron (ZOFRAN-ODT) 4 MG ODT tab Take 1 tablet (4 mg) by mouth every 8 hours as needed for nausea 30 tablet 3     SUMAtriptan (IMITREX) 25 MG tablet TAKE 1 TO 2 TABLETS BY  MOUTH AT ONSET OF HEADACHE  FOR MIGRAINE. MAY REPEAT IN 2 HOURS. MAX OF 8 TABLETS  IN 24 HOURS. 18 tablet 0      "tiZANidine (ZANAFLEX) 4 MG tablet Take 1 tablet (4 mg) by mouth 3 times daily 30 tablet 0     tretinoin (RETIN-A) 0.025 % external cream Use every night 45 g 0     topiramate (TOPAMAX) 25 MG tablet Take 1 tablet (25 mg) by mouth 2 times daily (Patient not taking: Reported on 4/29/2020) 60 tablet 3     Allergies   Allergen Reactions     Augmentin Cramps, Diarrhea, Nausea and Nausea and Vomiting     Cephalosporins Rash     Cephalexin     Sulfa Drugs Rash       Reviewed and updated as needed this visit by Provider  Tobacco  Allergies  Meds  Problems  Med Hx  Surg Hx  Fam Hx         Review of Systems   ROS COMP: Constitutional, msk, neuro, skin, systems are negative, except as otherwise noted.      Objective    There were no vitals taken for this visit.  Estimated body mass index is 32.71 kg/m  as calculated from the following:    Height as of 2/24/20: 1.778 m (5' 10\").    Weight as of 2/24/20: 103.4 kg (228 lb).  Physical Exam   GENERAL: healthy, alert and no distress  EYES: Eyes grossly normal to inspection, conjunctivae and sclerae normal  HENT: normal cephalic/atraumatic.  External ears, nose and mouth without ulcers or lesions.  NECK: no asymmetry, masses or scars.  Full range of motion of the neck.  Patient palpates her C-spine and paraspinal muscles without any tenderness.  RESP: no audible wheeze, cough, or visible cyanosis.  No visible retractions or increased work of breathing.  Able to speak fully in complete sentences.  MS: no gross musculoskeletal defects noted.  Normal range of motion of the right shoulder but there is reproducible pain with full abduction and flexion of the right shoulder.  NEURO: Cranial nerves grossly intact, mentation intact and speech normal  PSYCH: mentation appears normal, affect normal/bright, judgement and insight intact, normal speech and appearance well-groomed    Diagnostic Test Results:  none       Assessment & Plan   (M25.511,  G89.29) Chronic pain in right shoulder  " (primary encounter diagnosis)  Comment: Patient now with 4 months of right shoulder pain after a slip and fall at work.  Her pain is progressively worsened and conservative measures with exercises and muscle relaxers and ibuprofen have not been helpful.  Based on my evaluation today via a video visit and her persistent pain we should move forward with a MRI of the right shoulder for further evaluation.  If this is negative given her radiation of pain into her hand this could be a neck issue and I recommend that she come in to clinic for a full exam at that time.  I did prescribe her a different muscle relaxer to see if this would help her sleep at night and help with pain.  Otherwise continue ibuprofen and Tylenol and to keep the shoulder active as best as possible during this time.  Plan: tiZANidine (ZANAFLEX) 4 MG tablet, MR Shoulder         Right w/o Contrast    Return in about 4 weeks (around 5/27/2020).    Dimitrios Bauer PA-C  [facility]    Video-Visit Details    Type of service:  Video Visit    Video End Time: 4:23 PM    Originating Location (pt. Location): Home    Distant Location (provider location):  [facility]     Mode of Communication:  Video Conference via [employer]    Dimitrios Bauer PA-C

## 2020-05-13 ENCOUNTER — MYC MEDICAL ADVICE (OUTPATIENT)
Dept: FAMILY MEDICINE | Facility: CLINIC | Age: 40
End: 2020-05-13

## 2020-06-05 ENCOUNTER — VIRTUAL VISIT (OUTPATIENT)
Dept: FAMILY MEDICINE | Facility: CLINIC | Age: 40
End: 2020-06-05
Payer: COMMERCIAL

## 2020-06-05 DIAGNOSIS — F33.1 MAJOR DEPRESSIVE DISORDER, RECURRENT EPISODE, MODERATE (H): Primary | ICD-10-CM

## 2020-06-05 DIAGNOSIS — F41.1 GAD (GENERALIZED ANXIETY DISORDER): ICD-10-CM

## 2020-06-05 PROCEDURE — 99214 OFFICE O/P EST MOD 30 MIN: CPT | Mod: 95 | Performed by: NURSE PRACTITIONER

## 2020-06-05 RX ORDER — ESCITALOPRAM OXALATE 10 MG/1
10 TABLET ORAL DAILY
Qty: 90 TABLET | Refills: 1 | Status: SHIPPED | OUTPATIENT
Start: 2020-06-05 | End: 2020-11-10

## 2020-06-05 RX ORDER — HYDROXYZINE HYDROCHLORIDE 25 MG/1
25-50 TABLET, FILM COATED ORAL 3 TIMES DAILY PRN
Qty: 30 TABLET | Refills: 3 | Status: SHIPPED | OUTPATIENT
Start: 2020-06-05 | End: 2020-08-11

## 2020-06-05 RX ORDER — BUPROPION HYDROCHLORIDE 150 MG/1
150 TABLET ORAL EVERY MORNING
Qty: 90 TABLET | Refills: 1 | Status: SHIPPED | OUTPATIENT
Start: 2020-06-05 | End: 2020-11-10

## 2020-06-05 ASSESSMENT — ANXIETY QUESTIONNAIRES
6. BECOMING EASILY ANNOYED OR IRRITABLE: SEVERAL DAYS
5. BEING SO RESTLESS THAT IT IS HARD TO SIT STILL: NOT AT ALL
3. WORRYING TOO MUCH ABOUT DIFFERENT THINGS: SEVERAL DAYS
1. FEELING NERVOUS, ANXIOUS, OR ON EDGE: NEARLY EVERY DAY
GAD7 TOTAL SCORE: 6
7. FEELING AFRAID AS IF SOMETHING AWFUL MIGHT HAPPEN: NOT AT ALL
2. NOT BEING ABLE TO STOP OR CONTROL WORRYING: SEVERAL DAYS

## 2020-06-05 ASSESSMENT — PATIENT HEALTH QUESTIONNAIRE - PHQ9
5. POOR APPETITE OR OVEREATING: NOT AT ALL
SUM OF ALL RESPONSES TO PHQ QUESTIONS 1-9: 18

## 2020-06-05 NOTE — PROGRESS NOTES
"Kristel Martinez is a 39 year old female who is being evaluated via a billable video visit.      The patient has been notified of following:     \"This video visit will be conducted via a call between you and your physician/provider. We have found that certain health care needs can be provided without the need for an in-person physical exam.  This service lets us provide the care you need with a video conversation.  If a prescription is necessary we can send it directly to your pharmacy.  If lab work is needed we can place an order for that and you can then stop by our lab to have the test done at a later time.    Video visits are billed at different rates depending on your insurance coverage.  Please reach out to your insurance provider with any questions.    If during the course of the call the physician/provider feels a video visit is not appropriate, you will not be charged for this service.\"    Patient has given verbal consent for Video visit? Yes    How would you like to obtain your AVS? [facility]    Patient would like the video invitation sent by: Text to cell phone: [phone]     Will anyone else be joining your video visit? No    Subjective     Kristel Martinez is a 39 year old female who presents today via video visit for the following health issues:    HPI  Depression Followup    How are you doing with your depression since your last visit? Worsened - not having any emotions     Are you having other symptoms that might be associated with depression? Yes:  see above     Have you had a significant life event?  No     Are you feeling anxious or having panic attacks?   Yes:  .    Do you have any concerns with your use of alcohol or other drugs? No     Went from an emotional basket case to being numb. \"Some of that is from healing and feeling better, but it just feels different\".   Having panic attacks. Having them since about 3 months ago. Ex came to the house to get some things and went in full on panic mode. " "\"Couldn't catch my breath, racing heart, and anxious\"     Happening at least once per week-panic attacks    Social History     Tobacco Use     Smoking status: Former Smoker     Smokeless tobacco: Never Used   Substance Use Topics     Alcohol use: Yes     Comment: 1 drink per wk     Drug use: No     PHQ 1/23/2020 2/7/2020 6/5/2020   PHQ-9 Total Score 17 18 18   Q9: Thoughts of better off dead/self-harm past 2 weeks Not at all Not at all Not at all     MALCOM-7 SCORE 1/23/2020 2/7/2020 6/5/2020   Total Score 9 (mild anxiety) - -   Total Score 9 15 6     Suicide Assessment Five-step Evaluation and Treatment (SAFE-T)    Video Start Time: 1002    Patient Active Problem List   Diagnosis     Major depressive disorder, recurrent episode, moderate (H)     Papillary adenocarcinoma of thyroid (H)     Postoperative hypothyroidism     Vitiligo     Vitamin D deficiency     IgA deficiency (H)     Past Surgical History:   Procedure Laterality Date     COLONOSCOPY  8-27/18     THYROIDECTOMY      2015     TONSILLECTOMY       TUBAL LIGATION         Social History     Tobacco Use     Smoking status: Former Smoker     Smokeless tobacco: Never Used   Substance Use Topics     Alcohol use: Yes     Comment: 1 drink per wk     Family History   Problem Relation Age of Onset     Skin Cancer Mother      Hypertension Father      Arrhythmia Father      Lymphoma Maternal Grandmother      Diabetes Paternal Grandfather         type 1      Asthma Son      Arthritis Daughter          Current Outpatient Medications   Medication Sig Dispense Refill     buPROPion (WELLBUTRIN XL) 150 MG 24 hr tablet Take 1 tablet (150 mg) by mouth every morning 90 tablet 1     escitalopram (LEXAPRO) 10 MG tablet Take 1 tablet (10 mg) by mouth daily 90 tablet 1     fluticasone (FLONASE) 50 MCG/ACT nasal spray Spray 1 spray into both nostrils daily 16 g 1     hydrocortisone (ANUSOL-HC) 2.5 % cream Place rectally 2 times daily as needed for hemorrhoids 30 g 0     hydrOXYzine " "(ATARAX) 25 MG tablet Take 1-2 tablets (25-50 mg) by mouth 3 times daily as needed for anxiety 30 tablet 3     levothyroxine (SYNTHROID/LEVOTHROID) 112 MCG tablet Take 1 tablet (112 mcg) by mouth daily total of 162 mg daily. 90 tablet 0     levothyroxine (SYNTHROID/LEVOTHROID) 50 MCG tablet Take 1 tablet (50 mcg) by mouth daily total of 162 mg daily. 90 tablet 0     metroNIDAZOLE (METROLOTION) 0.75 % external lotion APPLY TO AFFECTED AREA(S)  TOPICALLY DAILY 59 mL 1     ondansetron (ZOFRAN-ODT) 4 MG ODT tab Take 1 tablet (4 mg) by mouth every 8 hours as needed for nausea 30 tablet 3     SUMAtriptan (IMITREX) 25 MG tablet TAKE 1 TO 2 TABLETS BY  MOUTH AT ONSET OF HEADACHE  FOR MIGRAINE. MAY REPEAT IN 2 HOURS. MAX OF 8 TABLETS  IN 24 HOURS. 18 tablet 0     tiZANidine (ZANAFLEX) 4 MG tablet Take 1 tablet (4 mg) by mouth 3 times daily 30 tablet 0     topiramate (TOPAMAX) 25 MG tablet Take 1 tablet (25 mg) by mouth 2 times daily 60 tablet 3     tretinoin (RETIN-A) 0.025 % external cream Use every night 45 g 0     Allergies   Allergen Reactions     Augmentin Cramps, Diarrhea, Nausea and Nausea and Vomiting     Cephalosporins Rash     Cephalexin     Sulfa Drugs Rash       Reviewed and updated as needed this visit by Provider  Tobacco  Allergies  Meds  Problems  Med Hx  Surg Hx  Fam Hx         Review of Systems   Constitutional, HEENT, cardiovascular, pulmonary, gi and gu systems are negative, except as otherwise noted.      Objective    There were no vitals taken for this visit.  Estimated body mass index is 32.71 kg/m  as calculated from the following:    Height as of 2/24/20: 1.778 m (5' 10\").    Weight as of 2/24/20: 103.4 kg (228 lb).  Physical Exam     GENERAL: Healthy, alert and no distress  EYES: Eyes grossly normal to inspection.  No discharge or erythema, or obvious scleral/conjunctival abnormalities.  RESP: No audible wheeze, cough, or visible cyanosis.  No visible retractions or increased work of " breathing.    SKIN: Visible skin clear. No significant rash, abnormal pigmentation or lesions.  NEURO: Cranial nerves grossly intact.  Mentation and speech appropriate for age.  PSYCH: Mentation appears normal, affect normal/bright, judgement and insight intact, normal speech and appearance well-groomed.      Diagnostic Test Results:  Labs reviewed in Epic        Assessment & Plan     1. Major depressive disorder, recurrent episode, moderate (H)  Not controlled.  Will decrease the Lexapro back down to 10 mg and add Wellbutrin.  Potential side effects discussed including but not limited to increased risk of self harm or suicide.  Kristel verbalized understanding of risks.  Recheck in 1-2 months sooner if needed.   - buPROPion (WELLBUTRIN XL) 150 MG 24 hr tablet; Take 1 tablet (150 mg) by mouth every morning  Dispense: 90 tablet; Refill: 1  - escitalopram (LEXAPRO) 10 MG tablet; Take 1 tablet (10 mg) by mouth daily  Dispense: 90 tablet; Refill: 1    2. MALCOM (generalized anxiety disorder)  Not controlled per above.  Atarax as needed for anxiety.  Symptomatic care and follow up discussed.  - escitalopram (LEXAPRO) 10 MG tablet; Take 1 tablet (10 mg) by mouth daily  Dispense: 90 tablet; Refill: 1  - hydrOXYzine (ATARAX) 25 MG tablet; Take 1-2 tablets (25-50 mg) by mouth 3 times daily as needed for anxiety  Dispense: 30 tablet; Refill: 3     Home care instructions were reviewed with the patient. The risks, benefits and treatment options of prescribed medications or other treatments have been discussed with the patient. The patient verbalized their understanding and should call or follow up if no improvement or if they develop further problems.    Return in about 4 weeks (around 7/3/2020) for Depression check.    DAVID Lazo [facility]      Video-Visit Details    Type of service:  Video Visit    Video End Time:10:16 AM    Originating Location (pt. Location): Home    Distant Location (provider  location):  [facility]     Platform used for Video Visit: Doximity    Return in about 4 weeks (around 7/3/2020) for Depression check.       DAVID Lazo CNP

## 2020-06-06 ASSESSMENT — ANXIETY QUESTIONNAIRES: GAD7 TOTAL SCORE: 6

## 2020-07-10 ENCOUNTER — HOSPITAL ENCOUNTER (OUTPATIENT)
Dept: MRI IMAGING | Facility: CLINIC | Age: 40
Discharge: HOME | End: 2020-07-10
Attending: PHYSICIAN ASSISTANT | Admitting: PHYSICIAN ASSISTANT
Payer: COMMERCIAL

## 2020-07-10 DIAGNOSIS — G89.29 CHRONIC PAIN IN RIGHT SHOULDER: ICD-10-CM

## 2020-07-10 DIAGNOSIS — M25.511 CHRONIC PAIN IN RIGHT SHOULDER: ICD-10-CM

## 2020-07-10 PROCEDURE — 73221 MRI JOINT UPR EXTREM W/O DYE: CPT | Mod: RT

## 2020-07-13 DIAGNOSIS — G89.29 CHRONIC PAIN IN RIGHT SHOULDER: Primary | ICD-10-CM

## 2020-07-13 DIAGNOSIS — M25.511 CHRONIC PAIN IN RIGHT SHOULDER: Primary | ICD-10-CM

## 2020-07-20 ENCOUNTER — MYC MEDICAL ADVICE (OUTPATIENT)
Dept: FAMILY MEDICINE | Facility: CLINIC | Age: 40
End: 2020-07-20

## 2020-07-20 ENCOUNTER — VIRTUAL VISIT (OUTPATIENT)
Dept: DERMATOLOGY | Facility: CLINIC | Age: 40
End: 2020-07-20
Payer: COMMERCIAL

## 2020-07-20 DIAGNOSIS — L70.0 ACNE VULGARIS: Primary | ICD-10-CM

## 2020-07-20 RX ORDER — MINOCYCLINE HYDROCHLORIDE 50 MG/1
100 CAPSULE ORAL DAILY
Qty: 30 CAPSULE | Refills: 0 | Status: SHIPPED | OUTPATIENT
Start: 2020-07-20 | End: 2020-08-17

## 2020-07-20 ASSESSMENT — PAIN SCALES - GENERAL: PAINLEVEL: NO PAIN (0)

## 2020-07-20 NOTE — PROGRESS NOTES
[facility]  Store and Forward and Video ( Invitation sent by:  [employer] and text to cell phone ) [phone]      Impression and Recommendations (Patient Counseled on the Following):  1. Acne Vulgaris vs POD flare  -previously stable on metronidazole 0.75% lotion and topical tretinoin however with recent lower face flare over the past 1mo  -Continue above mentioned topicals  -Start Panoxyl BPO cleanser once daily - edu on bleaching effects of products containing BPO  -Start minocycline 100mg daily, if not resolved in 1mo will continue with minocycline for an additional month.     Follow-up:   Follow-up with dermatology in approximately 1mo. Earlier for new or changing lesions or rash.      Staff only    All risks, benefits and alternatives were discussed with patient.  Patient is in agreement and understands the assessment and plan.  All questions were answered.    Rissa Corona PA-C, MPAS  [facility] [place]: Phone: [phone], Fax: [phone]  [facility]: Phone: [phone],  Fax: [phone]  _____________________________________________________________________________    Dermatology Problem List:  1. Hx of vitiligo - hands/feet  2. Hx of thyroid cancer - has since had thyroid removed  3. Acne Vulgaris  -Current Tx: tretinoin 0.025% cream, metronidazole 0.75% lotion  -Previous Tx: Spironolactone (discontinued due to constipation, patient also has IBS) , minocycline 100 mg   4. Hx of perioral dermatitis    Encounter Date: Jul 20, 2020    CC:   Chief Complaint   Patient presents with     Acne     Kristel is following up on acne.        History of Present Illness:  I have reviewed the teledermatology information and the nursing intake corresponding to this issue. Kristel Martinez is a 39 year old female who presents via teledermatology for acne. She has continued to use just topical tretinoin 0.025% and the  metronidazole 0.75% lotion. Currently washing face with cetaphil cleanser. Reports lower face eruption with small pimples and then they turn to a more flaky scab appearance. This has been ongoing for 1mo. She has a hx of POD in the past. Would like a recommendation for an acne fighting cleanser. She is otherwise doing well and has no other concerns.     ROS: Patient is generally feeling well today   -Constitutional: no unintended weight loss/gain, no night sweats or fevers  -Skin as per HPI  -GI: no nausea, abdominal pain, vomiting, diarrhea or blood in the stool    Physical Examination:  General: Well-appearing, appropriately-developed individual.  Skin: Focused examination within the teledermatology photograph(s) including face was performed.   -video quality was poor, difficulty to appreciate active acne flare on lower face, but some additional erythema was noted     Past Medical History:   Patient Active Problem List   Diagnosis     Major depressive disorder, recurrent episode, moderate (H)     Papillary adenocarcinoma of thyroid (H)     Postoperative hypothyroidism     Vitiligo     Vitamin D deficiency     IgA deficiency (H)     Past Medical History:   Diagnosis Date     Depressive disorder      Past Surgical History:   Procedure Laterality Date     COLONOSCOPY  8-27/18     THYROIDECTOMY      2015     TONSILLECTOMY       TUBAL LIGATION         Social History:  Patient reports that she has quit smoking. She has never used smokeless tobacco. She reports current alcohol use. She reports that she does not use drugs.    Family History:  Family History   Problem Relation Age of Onset     Skin Cancer Mother      Hypertension Father      Arrhythmia Father      Lymphoma Maternal Grandmother      Diabetes Paternal Grandfather         type 1      Asthma Son      Arthritis Daughter        Medications:  Current Outpatient Medications   Medication     buPROPion (WELLBUTRIN XL) 150 MG 24 hr tablet     escitalopram (LEXAPRO)  10 MG tablet     fluticasone (FLONASE) 50 MCG/ACT nasal spray     hydrocortisone (ANUSOL-HC) 2.5 % cream     hydrOXYzine (ATARAX) 25 MG tablet     levothyroxine (SYNTHROID/LEVOTHROID) 112 MCG tablet     levothyroxine (SYNTHROID/LEVOTHROID) 50 MCG tablet     metroNIDAZOLE (METROLOTION) 0.75 % external lotion     ondansetron (ZOFRAN-ODT) 4 MG ODT tab     SUMAtriptan (IMITREX) 25 MG tablet     tiZANidine (ZANAFLEX) 4 MG tablet     topiramate (TOPAMAX) 25 MG tablet     tretinoin (RETIN-A) 0.025 % external cream     No current facility-administered medications for this visit.           Allergies   Allergen Reactions     Augmentin Cramps, Diarrhea, Nausea and Nausea and Vomiting     Cephalosporins Rash     Cephalexin     Sulfa Drugs Rash         _____________________________________________________________________________    Teledermatology information:  - Location of patient: [place]  - Patient presented as: return  - Location of teledermatologist:  [facility] DERMATOLOGY )  - Reason teledermatology is appropriate:  of National Emergency Regarding Coronavirus disease (COVID 19) Outbreak  - Image quality and interpretability: acceptable  - Physician has received verbal consent for a Video/Photos Visit from the patient? Yes  - In-person dermatology visit recommendation: no  - Date of images: 7/20/20  - Length of call: 8min  - Date of report: 7/20/20

## 2020-07-20 NOTE — PATIENT INSTRUCTIONS
[facility] Teledermatology Visit    Thank you for allowing us to participate in your care. Your findings, instructions and follow-up plan are as follows:    Acne flare  -continue tretinoin and metronidazole lotion as you have been  -start minocycline 100mg daily.     When should I call my doctor?    If you are worsening or not improving, please, contact us or seek urgent care as noted below.     Who should I call with questions (adults)?    [facility] (adult and pediatric): [phone]     [facility] (adult): [phone]    For urgent needs outside of business hours call the [facility] at [phone] and ask for the dermatology resident on call    If this is a medical emergency and you are unable to reach an ER, Call 101      Who should I call with questions (pediatric)?  [facility]- Pediatric Dermatology  Dr. Sandee Bingham, Dr. Tono Foy, Dr. Marisol Crowell, Heidi Yoo, PA  Dr. Mandi Titus, Dr. Lillian Murillo & Dr. Savage Muñoz  Non Urgent  Nurse Triage Line; [phone]- Akua and Moira RN Care Coordinators   Deisy (/Complex ) [phone]    If you need a prescription refill, please contact your pharmacy. Refills are approved or denied by our Physicians during normal business hours, Monday through Fridays  Per office policy, refills will not be granted if you have not been seen within the past year (or sooner depending on your child's condition)    Scheduling Information:  Pediatric Appointment Scheduling and Call Center [phone]  Radiology Scheduling- [phone]  Sedation Unit Scheduling- [phone]  [place] Scheduling- General [phone]; Pediatric Dermatology [phone]  Main  Services: [phone]  Portuguese: [phone]  Ethiopian: [phone]  Hmong/Greenlandic/Togolese: [phone]  Preadmission Nursing Department Fax Number:  [phone] (Fax all pre-operative paperwork to this number)    For urgent matters arising during evenings, weekends, or holidays that cannot wait for normal business hours please call [phone] and ask for the Dermatology Resident On-Call to be paged.

## 2020-07-20 NOTE — NURSING NOTE
Dermatology Rooming Note    Kristel Martinez's goals for this visit include:   Chief Complaint   Patient presents with     Acne     Kristel is following up on acne.      GALINA Mcknight

## 2020-07-20 NOTE — LETTER
7/20/2020       RE: Kristel Martinez  [address] [facility] [facility] [address]     Dear Colleague,    Thank you for referring your patient, Kristel Martinez, to the [facility] DERMATOLOGY at [facility] acute [facility]. Please see a copy of my visit note below.    [facility]  Store and Forward and Video ( Invitation sent by:  [employer] and text to cell phone ) [phone]      Impression and Recommendations (Patient Counseled on the Following):  1. Acne Vulgaris vs POD flare  -previously stable on metronidazole 0.75% lotion and topical tretinoin however with recent lower face flare over the past 1mo  -Continue above mentioned topicals  -Start Panoxyl BPO cleanser once daily - edu on bleaching effects of products containing BPO  -Start minocycline 100mg daily, if not resolved in 1mo will continue with minocycline for an additional month.     Follow-up:   Follow-up with dermatology in approximately 1mo. Earlier for new or changing lesions or rash.      Staff only    All risks, benefits and alternatives were discussed with patient.  Patient is in agreement and understands the assessment and plan.  All questions were answered.    Rissa Corona PA-C, MPAS  [facility]: Phone: [phone], Fax: [phone]  [facility]: Phone: [phone],  Fax: [phone]  _____________________________________________________________________________    Dermatology Problem List:  1. Hx of vitiligo - hands/feet  2. Hx of thyroid cancer - has since had thyroid removed  3. Acne Vulgaris  -Current Tx: tretinoin 0.025% cream, metronidazole 0.75% lotion  -Previous Tx: Spironolactone (discontinued due to constipation, patient also has IBS) , minocycline 100 mg   4. Hx of perioral dermatitis    Encounter Date: Jul 20, 2020    CC:   Chief Complaint   Patient presents with     Acne     Kristel is following up on acne.         History of Present Illness:  I have reviewed the teledermatology information and the nursing intake corresponding to this issue. Kristel Martinez is a 39 year old female who presents via teledermatology for acne. She has continued to use just topical tretinoin 0.025% and the metronidazole 0.75% lotion. Currently washing face with cetaphil cleanser. Reports lower face eruption with small pimples and then they turn to a more flaky scab appearance. This has been ongoing for 1mo. She has a hx of POD in the past. Would like a recommendation for an acne fighting cleanser. She is otherwise doing well and has no other concerns.     ROS: Patient is generally feeling well today   -Constitutional: no unintended weight loss/gain, no night sweats or fevers  -Skin as per HPI  -GI: no nausea, abdominal pain, vomiting, diarrhea or blood in the stool    Physical Examination:  General: Well-appearing, appropriately-developed individual.  Skin: Focused examination within the teledermatology photograph(s) including face was performed.   -video quality was poor, difficulty to appreciate active acne flare on lower face, but some additional erythema was noted     Past Medical History:   Patient Active Problem List   Diagnosis     Major depressive disorder, recurrent episode, moderate (H)     Papillary adenocarcinoma of thyroid (H)     Postoperative hypothyroidism     Vitiligo     Vitamin D deficiency     IgA deficiency (H)     Past Medical History:   Diagnosis Date     Depressive disorder      Past Surgical History:   Procedure Laterality Date     COLONOSCOPY  8-27/18     THYROIDECTOMY      2015     TONSILLECTOMY       TUBAL LIGATION         Social History:  Patient reports that she has quit smoking. She has never used smokeless tobacco. She reports current alcohol use. She reports that she does not use drugs.    Family History:  Family History   Problem Relation Age of Onset     Skin Cancer Mother      Hypertension Father       Arrhythmia Father      Lymphoma Maternal Grandmother      Diabetes Paternal Grandfather         type 1      Asthma Son      Arthritis Daughter        Medications:  Current Outpatient Medications   Medication     buPROPion (WELLBUTRIN XL) 150 MG 24 hr tablet     escitalopram (LEXAPRO) 10 MG tablet     fluticasone (FLONASE) 50 MCG/ACT nasal spray     hydrocortisone (ANUSOL-HC) 2.5 % cream     hydrOXYzine (ATARAX) 25 MG tablet     levothyroxine (SYNTHROID/LEVOTHROID) 112 MCG tablet     levothyroxine (SYNTHROID/LEVOTHROID) 50 MCG tablet     metroNIDAZOLE (METROLOTION) 0.75 % external lotion     ondansetron (ZOFRAN-ODT) 4 MG ODT tab     SUMAtriptan (IMITREX) 25 MG tablet     tiZANidine (ZANAFLEX) 4 MG tablet     topiramate (TOPAMAX) 25 MG tablet     tretinoin (RETIN-A) 0.025 % external cream     No current facility-administered medications for this visit.           Allergies   Allergen Reactions     Augmentin Cramps, Diarrhea, Nausea and Nausea and Vomiting     Cephalosporins Rash     Cephalexin     Sulfa Drugs Rash         _____________________________________________________________________________    Teledermatology information:  - Location of patient: [place]  - Patient presented as: return  - Location of teledermatologist:  [facility] DERMATOLOGY )  - Reason teledermatology is appropriate:  of National Emergency Regarding Coronavirus disease (COVID 19) Outbreak  - Image quality and interpretability: acceptable  - Physician has received verbal consent for a Video/Photos Visit from the patient? Yes  - In-person dermatology visit recommendation: no  - Date of images: 7/20/20  - Length of call: 8min  - Date of report: 7/20/20    Again, thank you for allowing me to participate in the care of your patient.      Sincerely,    Rissa Corona PA-C

## 2020-07-23 ENCOUNTER — ANCILLARY PROCEDURE (OUTPATIENT)
Dept: GENERAL RADIOLOGY | Facility: CLINIC | Age: 40
End: 2020-07-23
Attending: ORTHOPAEDIC SURGERY
Payer: COMMERCIAL

## 2020-07-23 ENCOUNTER — OFFICE VISIT (OUTPATIENT)
Dept: ORTHOPEDICS | Facility: CLINIC | Age: 40
End: 2020-07-23
Payer: COMMERCIAL

## 2020-07-23 VITALS
WEIGHT: 233.3 LBS | DIASTOLIC BLOOD PRESSURE: 79 MMHG | SYSTOLIC BLOOD PRESSURE: 115 MMHG | HEIGHT: 70 IN | BODY MASS INDEX: 33.4 KG/M2

## 2020-07-23 DIAGNOSIS — M25.511 RIGHT SHOULDER PAIN: ICD-10-CM

## 2020-07-23 DIAGNOSIS — S46.011A TRAUMATIC INCOMPLETE TEAR OF RIGHT ROTATOR CUFF, INITIAL ENCOUNTER: Primary | ICD-10-CM

## 2020-07-23 DIAGNOSIS — M75.21 BICIPITAL TENDONITIS OF RIGHT SHOULDER: ICD-10-CM

## 2020-07-23 DIAGNOSIS — M75.41 SUBACROMIAL IMPINGEMENT OF RIGHT SHOULDER: ICD-10-CM

## 2020-07-23 PROCEDURE — 99203 OFFICE O/P NEW LOW 30 MIN: CPT | Mod: 25 | Performed by: ORTHOPAEDIC SURGERY

## 2020-07-23 PROCEDURE — 73030 X-RAY EXAM OF SHOULDER: CPT | Mod: TC

## 2020-07-23 PROCEDURE — 20610 DRAIN/INJ JOINT/BURSA W/O US: CPT | Mod: RT | Performed by: ORTHOPAEDIC SURGERY

## 2020-07-23 RX ORDER — TRIAMCINOLONE ACETONIDE 40 MG/ML
40 INJECTION, SUSPENSION INTRA-ARTICULAR; INTRAMUSCULAR ONCE
Status: COMPLETED | OUTPATIENT
Start: 2020-07-23 | End: 2020-07-23

## 2020-07-23 RX ADMIN — TRIAMCINOLONE ACETONIDE 40 MG: 40 INJECTION, SUSPENSION INTRA-ARTICULAR; INTRAMUSCULAR at 13:54

## 2020-07-23 ASSESSMENT — MIFFLIN-ST. JEOR: SCORE: 1813.49

## 2020-07-23 ASSESSMENT — PAIN SCALES - GENERAL: PAINLEVEL: MILD PAIN (3)

## 2020-07-23 NOTE — LETTER
7/23/2020         RE: Kristel Martinez  [address] [facility] [address]        Dear Colleague,    Thank you for referring your patient, Kristel Martinez, to the [facility]. Please see a copy of my visit note below.    ORTHOPEDIC CONSULT      Chief Complaint: Kristel Martinez is a 39 year old female who is being seen for   Chief Complaint   Patient presents with     Shoulder Pain     right shoulder pain- W/C DOI: 1/14/2020     Consult     ref: Dimitrios Arguelles       History of Present Illness:   Kristel Martinez is a 39 year old female who is seen in consultation at the request of HASMUKH Gonzáles for evaluation of right shoulder chronic pain.  Mechanism of Injury: January 14th, reports at work, slipped on ice, fell backwards from standing height jarring her buttock and elbow, questionable if had shoulder pain but by the next day was having quite a bit of shoulder and was seen.  Was told she was ok, given muscle relaxers.  Over the past 6 months the pain has not improved much. Pain is anterior/lateral shoulder pain. Non radiating. Continues to struggles with use of the shoulder especially with overhead, reaching away from body and ADLs like brushing teeth and hair.    Better with: ibuprofen, rest, activity modification.   Treatments tried: ibuprofen, rest, tylenol, activity modification  Prior history of related problems:   No previous shoulder pain or issues prior to the fall.   No previous surgeries or significant trauma.       She also reports she has pain that radiates down to her fingers.  Right middle finger wakes up numb and tingly.  Typically goes away shortly after.  No neck pain.  Does not feel the symptoms during the day  Patient's past medical, surgical, social and family histories reviewed.     Past Medical History:   Diagnosis Date     Depressive disorder        Past Surgical History:   Procedure Laterality Date     COLONOSCOPY  8-27/18     THYROIDECTOMY      2015     TONSILLECTOMY        TUBAL LIGATION         Medications:  buPROPion (WELLBUTRIN XL) 150 MG 24 hr tablet, Take 1 tablet (150 mg) by mouth every morning  escitalopram (LEXAPRO) 10 MG tablet, Take 1 tablet (10 mg) by mouth daily  fluticasone (FLONASE) 50 MCG/ACT nasal spray, Spray 1 spray into both nostrils daily  hydrocortisone (ANUSOL-HC) 2.5 % cream, Place rectally 2 times daily as needed for hemorrhoids  hydrOXYzine (ATARAX) 25 MG tablet, Take 1-2 tablets (25-50 mg) by mouth 3 times daily as needed for anxiety  levothyroxine (SYNTHROID/LEVOTHROID) 112 MCG tablet, Take 1 tablet (112 mcg) by mouth daily total of 162 mg daily.  levothyroxine (SYNTHROID/LEVOTHROID) 50 MCG tablet, Take 1 tablet (50 mcg) by mouth daily total of 162 mg daily.  metroNIDAZOLE (METROLOTION) 0.75 % external lotion, APPLY TO AFFECTED AREA(S)  TOPICALLY DAILY  minocycline (MINOCIN) 50 MG capsule, Take 2 capsules (100 mg) by mouth daily  topiramate (TOPAMAX) 25 MG tablet, Take 1 tablet (25 mg) by mouth 2 times daily  tretinoin (RETIN-A) 0.025 % external cream, Use every night  ondansetron (ZOFRAN-ODT) 4 MG ODT tab, Take 1 tablet (4 mg) by mouth every 8 hours as needed for nausea (Patient not taking: Reported on 7/23/2020)  SUMAtriptan (IMITREX) 25 MG tablet, TAKE 1 TO 2 TABLETS BY  MOUTH AT ONSET OF HEADACHE  FOR MIGRAINE. MAY REPEAT IN 2 HOURS. MAX OF 8 TABLETS  IN 24 HOURS. (Patient not taking: Reported on 7/23/2020)  tiZANidine (ZANAFLEX) 4 MG tablet, Take 1 tablet (4 mg) by mouth 3 times daily (Patient not taking: Reported on 7/23/2020)    No current facility-administered medications on file prior to visit.       Allergies   Allergen Reactions     Augmentin Cramps, Diarrhea, Nausea and Nausea and Vomiting     Cephalosporins Rash     Cephalexin     Sulfa Drugs Rash       Social History     Occupational History     Not on file   Tobacco Use     Smoking status: Former Smoker     Smokeless tobacco: Never Used   Substance and Sexual Activity     Alcohol use: Yes      "Comment: 1 drink per wk     Drug use: No     Sexual activity: Yes     Partners: Male       Family History   Problem Relation Age of Onset     Skin Cancer Mother      Hypertension Father      Arrhythmia Father      Lymphoma Maternal Grandmother      Diabetes Paternal Grandfather         type 1      Asthma Son      Arthritis Daughter        REVIEW OF SYSTEMS  10 point review systems performed otherwise negative as noted as per history of present illness.    Physical Exam:  Vitals: /79   Ht 1.778 m (5' 10\")   Wt 105.8 kg (233 lb 4.8 oz)   BMI 33.48 kg/m    BMI= Body mass index is 33.48 kg/m .  Constitutional: healthy, alert and no acute distress   Psychiatric: mentation appears normal and affect normal/bright  NEURO: no focal deficits  RESP: Normal with easy respirations and no use of accessory muscles to breathe, no audible wheezing or retractions  CV: RUE:  no edema         Regular rate and rhythm by palpation  SKIN: No erythema, rashes, excoriation, or breakdown. No evidence of infection.   JOINT/EXTREMITIES:right shoulder: no effusion, deformity, atrophy. AROM 180/170/60/L5. PROM 180/180.  Pain especially with internal rotation. +Wheeler. +Speeds. No weakness with bicep testing. Pain but no significant weakness with supraspinatus test +Empty can test. No pain or weakness with infraspinatus. Some pain but no weakness with subscapularis. +Lift off test. Negative cross body. Focally tender along proximal bicipital grove. No other bony or focal tenderness including AC joint.    Lymph: no appreciated lymphedema  GAIT: not tested     Diagnostic Modalities:  right shoulder X-ray: Well preserved glenohumeral articular space. No fracture, dislocation and or lesion.   Right shoulder MRI: tendinosis with low to moderate grade intrasubstance tear of supraspinatus just proximal to footprint. No full thickness tear.  No other tearing to the rotator cuff.  No biceps pathology seen. No glenohumeral chondromalacia. "   Independent visualization of the images was performed.      Impression: right shoulder partial rotator cuff tear  Right shoulder subacromial impingement  Right shoulder proximal bicipital tendonitis.     Plan:  All of the above pertinent physical exam and imaging modalities findings was reviewed with Kristel. Discussed with patient has a partial tearing of the supraspinatus.  Recommend starting with conservative care including physical therapy and injection, if this does not resolve symptoms would consider shoulder arthroscopy in the future. Referral for physical therapy placed.     The patient was counseled about an  injection, including discussion of risks (including infection), contents of the injection, rationale for performing the injection, and expected benefits of the injection. The skin was prepped with alcohol and betadine and then utilizing sterile technique an injection of the right shoulder subacromial space from the posterolateral approach  was performed. The injection consisted 1ml of Kenalog (40mg per 1 ml) mixed with 3ml of 0.5% Marcaine. The patient tolerated the injection well, and there were no complications. The injection site was covered with a Band-Aid. The injection was performed by DAVID Grimaldo, CNP, DNP    She also describes some numbness and tingling to her middle finger.  Mainly present upon waking then goes away.  Recommend closely following this as well.  Hopefully physical therapy will be able to assist.  Return to clinic 4-6 weeks if not improving, or sooner as needed for changes.  Re-x-ray on return: No    Scribed by:  DAVID Grimaldo, CNP  1:08 PM  7/23/2020  The information in this document, created by a scribe for me, accurately reflects the services I personally performed and the decisions made by me. I have reviewed and approved this document for accuracy    Bigg Henderson DO         Clinic Administered Medication Documentation      Injection Documentation     Injection  was administered by provider (please see MAR for given by information). Please see MAR and medication order for additional information.     Site: Joint injection   Medication Used: Kenalog 40mg   Expiration Date:  11/2021  The following medication was given by DAVID Friend, CNP, DNP:     MEDICATION: Kenalog 40mg/1ml  ROUTE: Joint Injection  SITE: right shoulder  DOSE: 1 mL  LOT #: [id number]  : [employer]  EXPIRATION DATE:  11/2021  NDC: [address]-5                            Again, thank you for allowing me to participate in the care of your patient.        Sincerely,        Bigg Henderson, DO

## 2020-07-23 NOTE — PROGRESS NOTES
Clinic Administered Medication Documentation      Injection Documentation     Injection was administered by provider (please see MAR for given by information). Please see MAR and medication order for additional information.     Site: Joint injection   Medication Used: Kenalog 40mg   Expiration Date:  11/2021  The following medication was given by DAVID Friend, CNP, DNP:     MEDICATION: Kenalog 40mg/1ml  ROUTE: Joint Injection  SITE: right shoulder  DOSE: 1 mL  LOT #: [id number]  : [employer]  EXPIRATION DATE:  11/2021  NDC: [address]-0

## 2020-07-23 NOTE — PROGRESS NOTES
ORTHOPEDIC CONSULT      Chief Complaint: Kristel Martinez is a 39 year old female who is being seen for   Chief Complaint   Patient presents with     Shoulder Pain     right shoulder pain- W/C DOI: 1/14/2020     Consult     ref: Dimitrios Arguelles       History of Present Illness:   Kristel Martinez is a 39 year old female who is seen in consultation at the request of HASMUKH Gonzáles for evaluation of right shoulder chronic pain.  Mechanism of Injury: January 14th, reports at work, slipped on ice, fell backwards from standing height jarring her buttock and elbow, questionable if had shoulder pain but by the next day was having quite a bit of shoulder and was seen.  Was told she was ok, given muscle relaxers.  Over the past 6 months the pain has not improved much. Pain is anterior/lateral shoulder pain. Non radiating. Continues to struggles with use of the shoulder especially with overhead, reaching away from body and ADLs like brushing teeth and hair.    Better with: ibuprofen, rest, activity modification.   Treatments tried: ibuprofen, rest, tylenol, activity modification  Prior history of related problems:   No previous shoulder pain or issues prior to the fall.   No previous surgeries or significant trauma.       She also reports she has pain that radiates down to her fingers.  Right middle finger wakes up numb and tingly.  Typically goes away shortly after.  No neck pain.  Does not feel the symptoms during the day  Patient's past medical, surgical, social and family histories reviewed.     Past Medical History:   Diagnosis Date     Depressive disorder        Past Surgical History:   Procedure Laterality Date     COLONOSCOPY  8-27/18     THYROIDECTOMY      2015     TONSILLECTOMY       TUBAL LIGATION         Medications:  buPROPion (WELLBUTRIN XL) 150 MG 24 hr tablet, Take 1 tablet (150 mg) by mouth every morning  escitalopram (LEXAPRO) 10 MG tablet, Take 1 tablet (10 mg) by mouth daily  fluticasone (FLONASE) 50 MCG/ACT  nasal spray, Spray 1 spray into both nostrils daily  hydrocortisone (ANUSOL-HC) 2.5 % cream, Place rectally 2 times daily as needed for hemorrhoids  hydrOXYzine (ATARAX) 25 MG tablet, Take 1-2 tablets (25-50 mg) by mouth 3 times daily as needed for anxiety  levothyroxine (SYNTHROID/LEVOTHROID) 112 MCG tablet, Take 1 tablet (112 mcg) by mouth daily total of 162 mg daily.  levothyroxine (SYNTHROID/LEVOTHROID) 50 MCG tablet, Take 1 tablet (50 mcg) by mouth daily total of 162 mg daily.  metroNIDAZOLE (METROLOTION) 0.75 % external lotion, APPLY TO AFFECTED AREA(S)  TOPICALLY DAILY  minocycline (MINOCIN) 50 MG capsule, Take 2 capsules (100 mg) by mouth daily  topiramate (TOPAMAX) 25 MG tablet, Take 1 tablet (25 mg) by mouth 2 times daily  tretinoin (RETIN-A) 0.025 % external cream, Use every night  ondansetron (ZOFRAN-ODT) 4 MG ODT tab, Take 1 tablet (4 mg) by mouth every 8 hours as needed for nausea (Patient not taking: Reported on 7/23/2020)  SUMAtriptan (IMITREX) 25 MG tablet, TAKE 1 TO 2 TABLETS BY  MOUTH AT ONSET OF HEADACHE  FOR MIGRAINE. MAY REPEAT IN 2 HOURS. MAX OF 8 TABLETS  IN 24 HOURS. (Patient not taking: Reported on 7/23/2020)  tiZANidine (ZANAFLEX) 4 MG tablet, Take 1 tablet (4 mg) by mouth 3 times daily (Patient not taking: Reported on 7/23/2020)    No current facility-administered medications on file prior to visit.       Allergies   Allergen Reactions     Augmentin Cramps, Diarrhea, Nausea and Nausea and Vomiting     Cephalosporins Rash     Cephalexin     Sulfa Drugs Rash       Social History     Occupational History     Not on file   Tobacco Use     Smoking status: Former Smoker     Smokeless tobacco: Never Used   Substance and Sexual Activity     Alcohol use: Yes     Comment: 1 drink per wk     Drug use: No     Sexual activity: Yes     Partners: Male       Family History   Problem Relation Age of Onset     Skin Cancer Mother      Hypertension Father      Arrhythmia Father      Lymphoma Maternal  "Grandmother      Diabetes Paternal Grandfather         type 1      Asthma Son      Arthritis Daughter        REVIEW OF SYSTEMS  10 point review systems performed otherwise negative as noted as per history of present illness.    Physical Exam:  Vitals: /79   Ht 1.778 m (5' 10\")   Wt 105.8 kg (233 lb 4.8 oz)   BMI 33.48 kg/m    BMI= Body mass index is 33.48 kg/m .  Constitutional: healthy, alert and no acute distress   Psychiatric: mentation appears normal and affect normal/bright  NEURO: no focal deficits  RESP: Normal with easy respirations and no use of accessory muscles to breathe, no audible wheezing or retractions  CV: RUE:  no edema         Regular rate and rhythm by palpation  SKIN: No erythema, rashes, excoriation, or breakdown. No evidence of infection.   JOINT/EXTREMITIES:right shoulder: no effusion, deformity, atrophy. AROM 180/170/60/L5. PROM 180/180.  Pain especially with internal rotation. +Wheeler. +Speeds. No weakness with bicep testing. Pain but no significant weakness with supraspinatus test +Empty can test. No pain or weakness with infraspinatus. Some pain but no weakness with subscapularis. +Lift off test. Negative cross body. Focally tender along proximal bicipital grove. No other bony or focal tenderness including AC joint.    Lymph: no appreciated lymphedema  GAIT: not tested     Diagnostic Modalities:  right shoulder X-ray: Well preserved glenohumeral articular space. No fracture, dislocation and or lesion.   Right shoulder MRI: tendinosis with low to moderate grade intrasubstance tear of supraspinatus just proximal to footprint. No full thickness tear.  No other tearing to the rotator cuff.  No biceps pathology seen. No glenohumeral chondromalacia.   Independent visualization of the images was performed.      Impression: right shoulder partial rotator cuff tear  Right shoulder subacromial impingement  Right shoulder proximal bicipital tendonitis.     Plan:  All of the above pertinent " physical exam and imaging modalities findings was reviewed with Kristel. Discussed with patient has a partial tearing of the supraspinatus.  Recommend starting with conservative care including physical therapy and injection, if this does not resolve symptoms would consider shoulder arthroscopy in the future. Referral for physical therapy placed.     The patient was counseled about an  injection, including discussion of risks (including infection), contents of the injection, rationale for performing the injection, and expected benefits of the injection. The skin was prepped with alcohol and betadine and then utilizing sterile technique an injection of the right shoulder subacromial space from the posterolateral approach  was performed. The injection consisted 1ml of Kenalog (40mg per 1 ml) mixed with 3ml of 0.5% Marcaine. The patient tolerated the injection well, and there were no complications. The injection site was covered with a Band-Aid. The injection was performed by DAVID Grimaldo, CNP, DNP    She also describes some numbness and tingling to her middle finger.  Mainly present upon waking then goes away.  Recommend closely following this as well.  Hopefully physical therapy will be able to assist.  Return to clinic 4-6 weeks if not improving, or sooner as needed for changes.  Re-x-ray on return: No    Scribed by:  DAVID Grimaldo CNP  1:08 PM  7/23/2020  The information in this document, created by a scribe for me, accurately reflects the services I personally performed and the decisions made by me. I have reviewed and approved this document for accuracy    Bigg Henderson DO

## 2020-08-04 ENCOUNTER — MYC MEDICAL ADVICE (OUTPATIENT)
Dept: ORTHOPEDICS | Facility: CLINIC | Age: 40
End: 2020-08-04

## 2020-08-04 DIAGNOSIS — M75.41 SUBACROMIAL IMPINGEMENT OF RIGHT SHOULDER: ICD-10-CM

## 2020-08-04 DIAGNOSIS — M75.21 BICIPITAL TENDONITIS OF RIGHT SHOULDER: ICD-10-CM

## 2020-08-04 DIAGNOSIS — S46.011A TRAUMATIC INCOMPLETE TEAR OF RIGHT ROTATOR CUFF, INITIAL ENCOUNTER: Primary | ICD-10-CM

## 2020-08-04 NOTE — LETTER
[address] [address]  Phone: [phone]  Fax: [phone]    August 6, 2020        Kristel Martinez  [address] [place] [address] [address] [facility] [place] [address]          To whom it may concern:    RE: Kristel Martinez    Patient is under our professional care.  Patient may continue to work but with the following restrictions:  -No overhead work  -No overhead lifting  -limit lifting to 10 pounds or less  -no repetitive motion with the right arm.    Next evaluation 3 weeks.     Please contact me for questions or concerns.      Sincerely,        DAVID Friend, CNP  Orthopedic Surgery

## 2020-08-05 ENCOUNTER — VIRTUAL VISIT (OUTPATIENT)
Dept: URGENT CARE | Facility: CLINIC | Age: 40
End: 2020-08-05
Payer: COMMERCIAL

## 2020-08-05 DIAGNOSIS — K58.0 IRRITABLE BOWEL SYNDROME WITH DIARRHEA: ICD-10-CM

## 2020-08-05 DIAGNOSIS — K21.00 GASTROESOPHAGEAL REFLUX DISEASE WITH ESOPHAGITIS: Primary | ICD-10-CM

## 2020-08-05 PROCEDURE — 99214 OFFICE O/P EST MOD 30 MIN: CPT | Mod: 95 | Performed by: EMERGENCY MEDICINE

## 2020-08-05 RX ORDER — DICYCLOMINE HCL 20 MG
20 TABLET ORAL 4 TIMES DAILY PRN
Qty: 28 TABLET | Refills: 0 | Status: SHIPPED | OUTPATIENT
Start: 2020-08-05 | End: 2023-02-01

## 2020-08-06 ENCOUNTER — MYC MEDICAL ADVICE (OUTPATIENT)
Dept: ORTHOPEDICS | Facility: CLINIC | Age: 40
End: 2020-08-06

## 2020-08-06 NOTE — PROGRESS NOTES
HPI: Patient is a 39-year-old female who has concerns regarding several issues:    1.  IBS- patient has a history of IBS and for a month and a half she has been having daily diarrhea.  It has been attributed to stress in the past but she does not feel that stress is been excessive.  She is not aware of any changes in her diet which have triggered this.  Patient had a colonoscopy several years ago with no other abnormal findings other than minor findings suggestive of IBS.    2.  GERD- patient has a history of reflux and for 2 to 3 weeks she has had dyspepsia and a sense of fullness in the lower part of her esophagus.  Patient had EGD several years ago which was negative.    3: Irregular menses- for a month and a half, patient is had daily menstrual bleeding.  She describes it light at this time.  She has history of tubal ligation and has not been on any hormonal therapy.    ROS: See HPI otherwise negative.    Allergies   Allergen Reactions     Augmentin Cramps, Diarrhea, Nausea and Nausea and Vomiting     Cephalosporins Rash     Cephalexin     Sulfa Drugs Rash      Current Outpatient Medications   Medication Sig Dispense Refill     dicyclomine (BENTYL) 20 MG tablet Take 1 tablet (20 mg) by mouth 4 times daily as needed (IBS symptoms) 28 tablet 0     omeprazole (PRILOSEC) 20 MG DR capsule Take 1 capsule (20 mg) by mouth daily for 10 days 10 capsule 0     buPROPion (WELLBUTRIN XL) 150 MG 24 hr tablet Take 1 tablet (150 mg) by mouth every morning 90 tablet 1     escitalopram (LEXAPRO) 10 MG tablet Take 1 tablet (10 mg) by mouth daily 90 tablet 1     fluticasone (FLONASE) 50 MCG/ACT nasal spray Spray 1 spray into both nostrils daily 16 g 1     hydrocortisone (ANUSOL-HC) 2.5 % cream Place rectally 2 times daily as needed for hemorrhoids 30 g 0     hydrOXYzine (ATARAX) 25 MG tablet Take 1-2 tablets (25-50 mg) by mouth 3 times daily as needed for anxiety 30 tablet 3     levothyroxine (SYNTHROID/LEVOTHROID) 112 MCG tablet  Take 1 tablet (112 mcg) by mouth daily total of 162 mg daily. 90 tablet 0     levothyroxine (SYNTHROID/LEVOTHROID) 50 MCG tablet Take 1 tablet (50 mcg) by mouth daily total of 162 mg daily. 90 tablet 0     metroNIDAZOLE (METROLOTION) 0.75 % external lotion APPLY TO AFFECTED AREA(S)  TOPICALLY DAILY 59 mL 1     minocycline (MINOCIN) 50 MG capsule Take 2 capsules (100 mg) by mouth daily 30 capsule 0     ondansetron (ZOFRAN-ODT) 4 MG ODT tab Take 1 tablet (4 mg) by mouth every 8 hours as needed for nausea (Patient not taking: Reported on 7/23/2020) 30 tablet 3     SUMAtriptan (IMITREX) 25 MG tablet TAKE 1 TO 2 TABLETS BY  MOUTH AT ONSET OF HEADACHE  FOR MIGRAINE. MAY REPEAT IN 2 HOURS. MAX OF 8 TABLETS  IN 24 HOURS. (Patient not taking: Reported on 7/23/2020) 18 tablet 0     tiZANidine (ZANAFLEX) 4 MG tablet Take 1 tablet (4 mg) by mouth 3 times daily (Patient not taking: Reported on 7/23/2020) 30 tablet 0     topiramate (TOPAMAX) 25 MG tablet Take 1 tablet (25 mg) by mouth 2 times daily 60 tablet 3     tretinoin (RETIN-A) 0.025 % external cream Use every night 45 g 0         PE: No acute distress on the phone.        TREATMENT: None      ASSESSMENT: Patient has had exacerbation of her IBS and GERD.  No acute concerns at this time.  Her daily menses are not heavy and I do not have concerned that she may be anemic or the cause is anything significant knowing that she has an appointment next week to see her primary care provider.      DIAGNOSIS: 1. Diarrhea, secondary to exacerbation of IBS.  Dyspepsia, secondary to exacerbation of GERD.  3.  Menstrual irregularity of uncertain etiology but perimenopausal consideration knowing patient is 39 disease must be considered.  This will require further assessment by her primary care provider at their appointment next week.      PLAN: Patient is asked to research both IBS and GERD to do self-assessment for triggers of her symptoms.  She will be given a short course of Bentyl for  her IBS and Prilosec for her GERD (patient told in the future she can get Prilosec without a prescription).  Patient is to monitor her menstrual bleeding and only be seen for heavy bleeding otherwise discuss with Yulissa Nogueira NP, next week at her appointment.    Time: 20 minutes

## 2020-08-06 NOTE — TELEPHONE ENCOUNTER
Called patient to discuss directly.  Unable to leave voicemail as mailbox is full.  Will send mychart message

## 2020-08-06 NOTE — TELEPHONE ENCOUNTER
I am sorry for the delay.  Steroid injections are typically kicking in by now.  Did you get any relief?  Are you taking any medications currently?    I would recommend avoiding a sling as this can lead to further issues.

## 2020-08-07 NOTE — TELEPHONE ENCOUNTER
Letter faxed to [facility] [phone] as requested by patient.  Ivy Farfan RN on 8/7/2020 at 8:36 AM

## 2020-08-09 DIAGNOSIS — E89.0 POSTOPERATIVE HYPOTHYROIDISM: ICD-10-CM

## 2020-08-09 DIAGNOSIS — F41.1 GAD (GENERALIZED ANXIETY DISORDER): ICD-10-CM

## 2020-08-09 DIAGNOSIS — R51.9 HEADACHE DISORDER: ICD-10-CM

## 2020-08-10 DIAGNOSIS — Z11.59 ENCOUNTER FOR SCREENING FOR OTHER VIRAL DISEASES: Primary | ICD-10-CM

## 2020-08-11 RX ORDER — LEVOTHYROXINE SODIUM 50 UG/1
TABLET ORAL
Qty: 90 TABLET | Refills: 3 | Status: SHIPPED | OUTPATIENT
Start: 2020-08-11 | End: 2021-03-23

## 2020-08-11 RX ORDER — HYDROXYZINE HYDROCHLORIDE 25 MG/1
TABLET, FILM COATED ORAL
Qty: 120 TABLET | Refills: 0 | Status: SHIPPED | OUTPATIENT
Start: 2020-08-11 | End: 2020-09-16

## 2020-08-11 RX ORDER — SUMATRIPTAN 25 MG/1
TABLET, FILM COATED ORAL
Qty: 18 TABLET | Refills: 0 | Status: SHIPPED | OUTPATIENT
Start: 2020-08-11 | End: 2020-09-16

## 2020-08-11 NOTE — TELEPHONE ENCOUNTER
"Requested Prescriptions   Pending Prescriptions Disp Refills     SUMAtriptan (IMITREX) 25 MG tablet [Pharmacy Med Name: SUMATRIPTAN  25MG  TAB] 18 tablet 0     Sig: TAKE 1 TO 2 TABLETS BY  MOUTH AT ONSET OF HEADACHE  FOR MIGRAINE. MAY REPEAT IN 2 HOURS. MAX OF 8 TABLETS  IN 24 HOURS.       Serotonin Agonists Failed - 8/9/2020 11:25 PM        Failed - Serotonin Agonist request needs review.     Please review patient's record. If patient has had 8 or more treatments in the past month, please forward to provider.          Passed - Blood pressure under 140/90 in past 12 months     BP Readings from Last 3 Encounters:   07/23/20 115/79   02/24/20 110/72   02/07/20 110/62                 Passed - Recent (12 mo) or future (30 days) visit within the authorizing provider's specialty     Patient has had an office visit with the authorizing provider or a provider within the authorizing providers department within the previous 12 mos or has a future within next 30 days. See \"Patient Info\" tab in [employer] [facility], or \"Choose Columns\" in Meds & Orders section of the refill encounter.              Passed - Medication is active on med list        Passed - Patient is age 18 or older        Passed - No active pregnancy on record        Passed - No positive pregnancy test in past 12 months           hydrOXYzine (ATARAX) 25 MG tablet [Pharmacy Med Name: HYDROXYZINE HYDROCHLORIDE  25MG  TAB] 120 tablet      Sig: TAKE 1 TO 2 TABLETS BY  MOUTH 3 TIMES DAILY AS  NEEDED FOR ANXIETY       Antihistamines Protocol Passed - 8/9/2020 11:25 PM        Passed - Recent (12 mo) or future (30 days) visit within the authorizing provider's specialty     Patient has had an office visit with the authorizing provider or a provider within the authorizing providers department within the previous 12 mos or has a future within next 30 days. See \"Patient Info\" tab in inbasket, or \"Choose Columns\" in Meds & Orders section of the refill encounter.              Passed - " "Patient is age 3 or older     Apply age and/or weight-based dosing for peds patients age 3 and older.    Forward request to provider for patients under the age of 3.          Passed - Medication is active on med list           levothyroxine (SYNTHROID/LEVOTHROID) 50 MCG tablet [Pharmacy Med Name: LEVOTHYROXINE  50MCG  TAB] 90 tablet 3     Sig: TAKE 1 TABLET BY MOUTH  DAILY ALONG WITH 112MCG  TABLET FOR A TOTAL DAILY  DOSE OF 162MCG.       Thyroid Protocol Failed - 8/9/2020 11:25 PM        Failed - Normal TSH on file in past 12 months     Recent Labs   Lab Test 01/23/20  1613   TSH 6.44*              Passed - Patient is 12 years or older        Passed - Recent (12 mo) or future (30 days) visit within the authorizing provider's specialty     Patient has had an office visit with the authorizing provider or a provider within the authorizing providers department within the previous 12 mos or has a future within next 30 days. See \"Patient Info\" tab in inbasket, or \"Choose Columns\" in Meds & Orders section of the refill encounter.              Passed - Medication is active on med list        Passed - No active pregnancy on record     If patient is pregnant or has had a positive pregnancy test, please check TSH.          Passed - No positive pregnancy test in past 12 months     If patient is pregnant or has had a positive pregnancy test, please check TSH.               "

## 2020-08-14 ENCOUNTER — OFFICE VISIT (OUTPATIENT)
Dept: FAMILY MEDICINE | Facility: CLINIC | Age: 40
End: 2020-08-14
Payer: COMMERCIAL

## 2020-08-14 VITALS
HEIGHT: 70 IN | SYSTOLIC BLOOD PRESSURE: 110 MMHG | DIASTOLIC BLOOD PRESSURE: 80 MMHG | TEMPERATURE: 98.3 F | RESPIRATION RATE: 16 BRPM | WEIGHT: 236 LBS | HEART RATE: 72 BPM | BODY MASS INDEX: 33.79 KG/M2 | OXYGEN SATURATION: 99 %

## 2020-08-14 DIAGNOSIS — N93.9 ABNORMAL VAGINAL BLEEDING: ICD-10-CM

## 2020-08-14 DIAGNOSIS — Z23 IMMUNIZATION DUE: ICD-10-CM

## 2020-08-14 DIAGNOSIS — K58.2 IRRITABLE BOWEL SYNDROME WITH BOTH CONSTIPATION AND DIARRHEA: ICD-10-CM

## 2020-08-14 DIAGNOSIS — E89.0 POSTOPERATIVE HYPOTHYROIDISM: Primary | ICD-10-CM

## 2020-08-14 LAB
T4 FREE SERPL-MCNC: 1.09 NG/DL (ref 0.76–1.46)
TSH SERPL DL<=0.005 MIU/L-ACNC: 0.26 MU/L (ref 0.4–4)

## 2020-08-14 PROCEDURE — 36415 COLL VENOUS BLD VENIPUNCTURE: CPT | Performed by: NURSE PRACTITIONER

## 2020-08-14 PROCEDURE — 84443 ASSAY THYROID STIM HORMONE: CPT | Performed by: NURSE PRACTITIONER

## 2020-08-14 PROCEDURE — 90715 TDAP VACCINE 7 YRS/> IM: CPT | Performed by: NURSE PRACTITIONER

## 2020-08-14 PROCEDURE — 90471 IMMUNIZATION ADMIN: CPT | Performed by: NURSE PRACTITIONER

## 2020-08-14 PROCEDURE — 84439 ASSAY OF FREE THYROXINE: CPT | Performed by: NURSE PRACTITIONER

## 2020-08-14 PROCEDURE — 99214 OFFICE O/P EST MOD 30 MIN: CPT | Mod: 25 | Performed by: NURSE PRACTITIONER

## 2020-08-14 ASSESSMENT — MIFFLIN-ST. JEOR: SCORE: 1825.74

## 2020-08-14 NOTE — NURSING NOTE
"Chief Complaint   Patient presents with     Abdominal Pain     Abnormal Bleeding Problem     had period for 1 month straight       Initial /80 (BP Location: Right arm)   Pulse 72   Temp 98.3  F (36.8  C) (Tympanic)   Resp 16   Ht 1.778 m (5' 10\")   Wt 107 kg (236 lb)   LMP 07/19/2020   SpO2 99%   BMI 33.86 kg/m   Estimated body mass index is 33.86 kg/m  as calculated from the following:    Height as of this encounter: 1.778 m (5' 10\").    Weight as of this encounter: 107 kg (236 lb).    Patient presents to the clinic using No DME    Health Maintenance that is potentially due pending provider review:  NONE    n/a    Is there anyone who you would like to be able to receive your results? No  If yes have patient fill out [facility]IL[facility]    "

## 2020-08-14 NOTE — PROGRESS NOTES
Subjective     Kristel Martinez is a 39 year old female who presents to clinic today for the following health issues:    HPI       Hypothyroidism Follow-up      Since last visit, patient describes the following symptoms: Weight stable, no hair loss, no skin changes, no constipation, no loose stools    Menstrual issues - bled for 1 month straight  Moderate bleeding-stringy   No pain  No concern for STI  No change in discharge    IBS is worse  Just started food journals  Thought alcohol but had symptoms without alcohol  Stress and moods stable  Diarrhea and constipation mixed  Ongoing for years     Patient Active Problem List   Diagnosis     Major depressive disorder, recurrent episode, moderate (H)     Papillary adenocarcinoma of thyroid (H)     Postoperative hypothyroidism     Vitiligo     Vitamin D deficiency     IgA deficiency (H)     Traumatic incomplete tear of right rotator cuff, initial encounter     Bicipital tendonitis of right shoulder     Subacromial impingement of right shoulder     Past Surgical History:   Procedure Laterality Date     COLONOSCOPY  8-27/18     THYROIDECTOMY      2015     TONSILLECTOMY       TUBAL LIGATION         Social History     Tobacco Use     Smoking status: Former Smoker     Smokeless tobacco: Never Used   Substance Use Topics     Alcohol use: Yes     Comment: 1 drink per wk     Family History   Problem Relation Age of Onset     Skin Cancer Mother      Hypertension Father      Arrhythmia Father      Lymphoma Maternal Grandmother      Diabetes Paternal Grandfather         type 1      Asthma Son      Arthritis Daughter          Current Outpatient Medications   Medication Sig Dispense Refill     buPROPion (WELLBUTRIN XL) 150 MG 24 hr tablet Take 1 tablet (150 mg) by mouth every morning 90 tablet 1     dicyclomine (BENTYL) 20 MG tablet Take 1 tablet (20 mg) by mouth 4 times daily as needed (IBS symptoms) 28 tablet 0     escitalopram (LEXAPRO) 10 MG tablet Take 1 tablet (10 mg) by mouth  "daily 90 tablet 1     fluticasone (FLONASE) 50 MCG/ACT nasal spray Spray 1 spray into both nostrils daily 16 g 1     hydrocortisone (ANUSOL-HC) 2.5 % cream Place rectally 2 times daily as needed for hemorrhoids 30 g 0     hydrOXYzine (ATARAX) 25 MG tablet TAKE 1 TO 2 TABLETS BY  MOUTH 3 TIMES DAILY AS  NEEDED FOR ANXIETY 120 tablet 0     levothyroxine (SYNTHROID/LEVOTHROID) 112 MCG tablet Take 1 tablet (112 mcg) by mouth daily total of 162 mg daily. 90 tablet 0     levothyroxine (SYNTHROID/LEVOTHROID) 50 MCG tablet TAKE 1 TABLET BY MOUTH  DAILY ALONG WITH 112MCG  TABLET FOR A TOTAL DAILY  DOSE OF 162MCG. 90 tablet 3     metroNIDAZOLE (METROLOTION) 0.75 % external lotion APPLY TO AFFECTED AREA(S)  TOPICALLY DAILY 59 mL 1     minocycline (MINOCIN) 50 MG capsule Take 2 capsules (100 mg) by mouth daily 30 capsule 0     SUMAtriptan (IMITREX) 25 MG tablet TAKE 1 TO 2 TABLETS BY  MOUTH AT ONSET OF HEADACHE  FOR MIGRAINE. MAY REPEAT IN 2 HOURS. MAX OF 8 TABLETS  IN 24 HOURS. 18 tablet 0     tiZANidine (ZANAFLEX) 4 MG tablet Take 1 tablet (4 mg) by mouth 2 times daily as needed for muscle spasms Can cause sedation. Do not take and work. 30 tablet 0     topiramate (TOPAMAX) 25 MG tablet Take 1 tablet (25 mg) by mouth 2 times daily 60 tablet 3     tretinoin (RETIN-A) 0.025 % external cream Use every night 45 g 0     Allergies   Allergen Reactions     Augmentin Cramps, Diarrhea, Nausea and Nausea and Vomiting     Cephalosporins Rash     Cephalexin     Sulfa Drugs Rash       Reviewed and updated as needed this visit by Provider  Tobacco  Allergies  Meds  Problems  Med Hx  Surg Hx  Fam Hx         Review of Systems   Constitutional, HEENT, cardiovascular, pulmonary, gi and gu systems are negative, except as otherwise noted.      Objective    /80 (BP Location: Right arm)   Pulse 72   Temp 98.3  F (36.8  C) (Tympanic)   Resp 16   Ht 1.778 m (5' 10\")   Wt 107 kg (236 lb)   LMP 07/19/2020   SpO2 99%   BMI 33.86 " kg/m    Body mass index is 33.86 kg/m .  Physical Exam   GENERAL: healthy, alert and no distress  NECK: no adenopathy, no asymmetry, masses, or scars and thyroid normal to palpation  RESP: lungs clear to auscultation - no rales, rhonchi or wheezes  CV: regular rate and rhythm, normal S1 S2, no S3 or S4, no murmur, click or rub  ABDOMEN: soft, nontender, no hepatosplenomegaly, no masses and bowel sounds normal  PSYCH: mentation appears normal, affect normal/bright    Diagnostic Test Results:  Labs reviewed in Epic        Assessment & Plan     1. Postoperative hypothyroidism  Stable with current dose of levothyroxine.   - TSH with free T4 reflex  - T4 free    2. Abnormal vaginal bleeding  With abnormal vaginal bleeding will do pelvic ultrasound for further evaluation.   - US Pelvic Complete with Transvaginal; Future    3. Irritable bowel syndrome with both constipation and diarrhea  Keep the food journal to identify triggers-then consider trial off gluten and dairy at different times.  If no improvement in symptoms consider GI referral.     4. Immunization due    - THER/PROPH/DIAG INJ, SC/IM     Home care instructions were reviewed with the patient. The risks, benefits and treatment options of prescribed medications or other treatments have been discussed with the patient. The patient verbalized their understanding and should call or follow up if no improvement or if they develop further problems.      Return in about 4 weeks (around 9/11/2020), or if symptoms worsen or fail to improve.    DAVID Lazo [facility]

## 2020-08-14 NOTE — PATIENT INSTRUCTIONS
Please call [phone] to schedule your ultrasound    Labs today-we will notify you with those results    Keep the food journal to identify triggers-then consider trial off gluten and dairy at different times    Follow up if symptoms do not improve or worsen.

## 2020-08-15 DIAGNOSIS — Z11.59 ENCOUNTER FOR SCREENING FOR OTHER VIRAL DISEASES: ICD-10-CM

## 2020-08-15 PROCEDURE — U0003 INFECTIOUS AGENT DETECTION BY NUCLEIC ACID (DNA OR RNA); SEVERE ACUTE RESPIRATORY SYNDROME CORONAVIRUS 2 (SARS-COV-2) (CORONAVIRUS DISEASE [COVID-19]), AMPLIFIED PROBE TECHNIQUE, MAKING USE OF HIGH THROUGHPUT TECHNOLOGIES AS DESCRIBED BY CMS-2020-01-R: HCPCS | Performed by: NURSE PRACTITIONER

## 2020-08-17 ENCOUNTER — TELEPHONE (OUTPATIENT)
Dept: FAMILY MEDICINE | Facility: CLINIC | Age: 40
End: 2020-08-17

## 2020-08-17 ENCOUNTER — VIRTUAL VISIT (OUTPATIENT)
Dept: DERMATOLOGY | Facility: CLINIC | Age: 40
End: 2020-08-17
Payer: COMMERCIAL

## 2020-08-17 DIAGNOSIS — L70.0 ACNE VULGARIS: ICD-10-CM

## 2020-08-17 LAB
SARS-COV-2 RNA SPEC QL NAA+PROBE: NOT DETECTED
SPECIMEN SOURCE: NORMAL

## 2020-08-17 RX ORDER — MINOCYCLINE HYDROCHLORIDE 50 MG/1
100 CAPSULE ORAL DAILY
Qty: 60 CAPSULE | Refills: 0 | Status: SHIPPED | OUTPATIENT
Start: 2020-08-17 | End: 2020-09-21

## 2020-08-17 ASSESSMENT — PAIN SCALES - GENERAL: PAINLEVEL: NO PAIN (0)

## 2020-08-17 NOTE — NURSING NOTE
"Dermatology Rooming Note    Kristel Martinez's goals for this visit include:   Chief Complaint   Patient presents with     Acne     Kristel is following up on acne- Kristel states \"it's a little better\".      Guera Smith, RMMAREK     "

## 2020-08-17 NOTE — PROGRESS NOTES
"[facility] Dermatology Record:  Video: ( Invitation sent by:  [employer] and text to cell phone ) [phone]      Dermatology Problem List:  1. Hx of vitiligo - hands/feet  2. Hx of thyroid cancer - has since had thyroid removed  3. Acne Vulgaris/POD  -Current Tx: tretinoin 0.025% cream, metronidazole 0.75% lotion, minocycline 100 mg   -Previous Tx: Spironolactone (discontinued due to constipation, patient also has IBS)    Encounter Date: Aug 17, 2020    CC:   Chief Complaint   Patient presents with     Acne     Kristel is following up on acne- Kristel states \"it's a little better\".        History of Present Illness:  Kristel Martinez is a 39 year old female who presents for acne/POD. She notes things are a little better. Breakouts are not as bad and not as itchy. No nausea on the minocycline 100mg daily. She has continued with the tretinoin 0.025% cream and the metronidazole 0.75% lotion topically. She previously was on spironolactone, however discontinued this due to issues with constipation (has IBS). She has had a tubal ligation. Not currently on OCPs. Notes however that she is seeing her OBGYN next week to discuss options because she is \"having issues with her period\".     ROS: Patient is generally feeling well today   -GI: no nausea, abdominal pain, vomiting, diarrhea or blood in the stool  -Skin: as per HPI  -Constitutional: no unintended weight loss/gain, no night sweats or fevers    Physical Examination:  General: Well-appearing, appropriately-developed individual.  Skin: Focused examination including face was performed.   -scattered comedones/papules on the lower face. No erythematous papules today. Some hyperpigmented macules on the chin from previous acneiform outbreaks    Past Medical History:   Patient Active Problem List   Diagnosis     Major depressive disorder, recurrent episode, moderate (H)     Papillary adenocarcinoma of thyroid (H)     Postoperative hypothyroidism     Vitiligo     Vitamin D deficiency     " IgA deficiency (H)     Traumatic incomplete tear of right rotator cuff, initial encounter     Bicipital tendonitis of right shoulder     Subacromial impingement of right shoulder     Past Medical History:   Diagnosis Date     Depressive disorder      Past Surgical History:   Procedure Laterality Date     COLONOSCOPY  8-27/18     THYROIDECTOMY      2015     TONSILLECTOMY       TUBAL LIGATION         Social History:  Patient reports that she has quit smoking. She has never used smokeless tobacco. She reports current alcohol use. She reports that she does not use drugs.    Family History:  Family History   Problem Relation Age of Onset     Skin Cancer Mother      Hypertension Father      Arrhythmia Father      Lymphoma Maternal Grandmother      Diabetes Paternal Grandfather         type 1      Asthma Son      Arthritis Daughter        Medications:  Current Outpatient Medications   Medication     buPROPion (WELLBUTRIN XL) 150 MG 24 hr tablet     dicyclomine (BENTYL) 20 MG tablet     escitalopram (LEXAPRO) 10 MG tablet     fluticasone (FLONASE) 50 MCG/ACT nasal spray     hydrocortisone (ANUSOL-HC) 2.5 % cream     hydrOXYzine (ATARAX) 25 MG tablet     levothyroxine (SYNTHROID/LEVOTHROID) 112 MCG tablet     levothyroxine (SYNTHROID/LEVOTHROID) 50 MCG tablet     metroNIDAZOLE (METROLOTION) 0.75 % external lotion     minocycline (MINOCIN) 50 MG capsule     SUMAtriptan (IMITREX) 25 MG tablet     tiZANidine (ZANAFLEX) 4 MG tablet     topiramate (TOPAMAX) 25 MG tablet     tretinoin (RETIN-A) 0.025 % external cream     No current facility-administered medications for this visit.      Allergies   Allergen Reactions     Augmentin Cramps, Diarrhea, Nausea and Nausea and Vomiting     Cephalosporins Rash     Cephalexin     Sulfa Drugs Rash       Impression and Recommendations (Patient Counseled on the Following):  1. Acne Vulgaris - hormonal component - previously diagnosed as perioral dermatitis, however not responsive to typical  POD treatment. Would like to pursue isotretinoin potentially or spironolactone. Patient would like to wait one month before initiating one of these as she is meeting with her OBGYN next week and thinks she may be starting OCPs  -For now continue minocycline 100mg po every day, tretinoin 0.025% cream nightly and metronidazole 0.75% lotion QAM  -Discussed spironolactone as an option - patient would like to avoid for now due to previous issues with constipation  -Briefly discussed isotretinoin, patient would like to consider, but wants to wait until after her follow-up with her OBGYN  -OCPs may also be a helpful option for this patient if she in facts starts them with her doctor, this may be the ultimate treatment for her acne should she respond to this.     Follow-up:   Follow-up with dermatology in approximately 1mo. Earlier for new or changing lesions or rash.   CC Dr. Murillo on close of this encounter.     Staff only    All risks, benefits and alternatives were discussed with patient.  Patient is in agreement and understands the assessment and plan.  All questions were answered.    Rissa Corona PA-C, MPAS  [facility] [place]: Phone: [phone], Fax: [phone]  [facility]: Phone: [phone],  Fax: [phone]  _____________________________________________________________________________    Teledermatology information:  - Location of patient: [place]  - Patient presented as: return  - Location of teledermatologist:  ([facility]ER[facility] )  - Reason teledermatology is appropriate:  of National Emergency Regarding Coronavirus disease (COVID 19) Outbreak  - Image quality and interpretability: acceptable  - Physician has received verbal consent for a Video/Photos Visit from the patient? Yes  - In-person dermatology visit recommendation: no  - Date of images: 8/17/20  - Length of call: 6min  - Date of report: 8/17/2020

## 2020-08-17 NOTE — LETTER
"8/17/2020       RE: Kristel Martinez  [address] [facility] [address]     Dear Colleague,    Thank you for referring your patient, Kristel Martinez, to the [facility] DERMATOLOGY at [facility]. Please see a copy of my visit note below.    [facility] Dermatology Record:  Video: ( Invitation sent by:  [employer] and text to cell phone ) [phone]      Dermatology Problem List:  1. Hx of vitiligo - hands/feet  2. Hx of thyroid cancer - has since had thyroid removed  3. Acne Vulgaris/POD  -Current Tx: tretinoin 0.025% cream, metronidazole 0.75% lotion, minocycline 100 mg   -Previous Tx: Spironolactone (discontinued due to constipation, patient also has IBS)    Encounter Date: Aug 17, 2020    CC:   Chief Complaint   Patient presents with     Acne     Kristel is following up on acne- Kristel states \"it's a little better\".        History of Present Illness:  Kristel Martinez is a 39 year old female who presents for acne/POD. She notes things are a little better. Breakouts are not as bad and not as itchy. No nausea on the minocycline 100mg daily. She has continued with the tretinoin 0.025% cream and the metronidazole 0.75% lotion topically. She previously was on spironolactone, however discontinued this due to issues with constipation (has IBS). She has had a tubal ligation. Not currently on OCPs. Notes however that she is seeing her OBGYN next week to discuss options because she is \"having issues with her period\".     ROS: Patient is generally feeling well today   -GI: no nausea, abdominal pain, vomiting, diarrhea or blood in the stool  -Skin: as per HPI  -Constitutional: no unintended weight loss/gain, no night sweats or fevers    Physical Examination:  General: Well-appearing, appropriately-developed individual.  Skin: Focused examination including face was performed.   -scattered comedones/papules on the lower face. No erythematous papules today. Some hyperpigmented macules on the chin from " previous acneiform outbreaks    Past Medical History:   Patient Active Problem List   Diagnosis     Major depressive disorder, recurrent episode, moderate (H)     Papillary adenocarcinoma of thyroid (H)     Postoperative hypothyroidism     Vitiligo     Vitamin D deficiency     IgA deficiency (H)     Traumatic incomplete tear of right rotator cuff, initial encounter     Bicipital tendonitis of right shoulder     Subacromial impingement of right shoulder     Past Medical History:   Diagnosis Date     Depressive disorder      Past Surgical History:   Procedure Laterality Date     COLONOSCOPY  8-27/18     THYROIDECTOMY      2015     TONSILLECTOMY       TUBAL LIGATION         Social History:  Patient reports that she has quit smoking. She has never used smokeless tobacco. She reports current alcohol use. She reports that she does not use drugs.    Family History:  Family History   Problem Relation Age of Onset     Skin Cancer Mother      Hypertension Father      Arrhythmia Father      Lymphoma Maternal Grandmother      Diabetes Paternal Grandfather         type 1      Asthma Son      Arthritis Daughter        Medications:  Current Outpatient Medications   Medication     buPROPion (WELLBUTRIN XL) 150 MG 24 hr tablet     dicyclomine (BENTYL) 20 MG tablet     escitalopram (LEXAPRO) 10 MG tablet     fluticasone (FLONASE) 50 MCG/ACT nasal spray     hydrocortisone (ANUSOL-HC) 2.5 % cream     hydrOXYzine (ATARAX) 25 MG tablet     levothyroxine (SYNTHROID/LEVOTHROID) 112 MCG tablet     levothyroxine (SYNTHROID/LEVOTHROID) 50 MCG tablet     metroNIDAZOLE (METROLOTION) 0.75 % external lotion     minocycline (MINOCIN) 50 MG capsule     SUMAtriptan (IMITREX) 25 MG tablet     tiZANidine (ZANAFLEX) 4 MG tablet     topiramate (TOPAMAX) 25 MG tablet     tretinoin (RETIN-A) 0.025 % external cream     No current facility-administered medications for this visit.      Allergies   Allergen Reactions     Augmentin Cramps, Diarrhea, Nausea and  Nausea and Vomiting     Cephalosporins Rash     Cephalexin     Sulfa Drugs Rash       Impression and Recommendations (Patient Counseled on the Following):  1. Acne Vulgaris - hormonal component - previously diagnosed as perioral dermatitis, however not responsive to typical POD treatment. Would like to pursue isotretinoin potentially or spironolactone. Patient would like to wait one month before initiating one of these as she is meeting with her OBGYN next week and thinks she may be starting OCPs  -For now continue minocycline 100mg po every day, tretinoin 0.025% cream nightly and metronidazole 0.75% lotion QAM  -Discussed spironolactone as an option - patient would like to avoid for now due to previous issues with constipation  -Briefly discussed isotretinoin, patient would like to consider, but wants to wait until after her follow-up with her OBGYN  -OCPs may also be a helpful option for this patient if she in facts starts them with her doctor, this may be the ultimate treatment for her acne should she respond to this.     Follow-up:   Follow-up with dermatology in approximately 1mo. Earlier for new or changing lesions or rash.   CC Dr. Murillo on close of this encounter.     Staff only    All risks, benefits and alternatives were discussed with patient.  Patient is in agreement and understands the assessment and plan.  All questions were answered.    Rissa Corona PA-C, MPAS  [facility] [place]: Phone: [phone], Fax: [phone]  [facility]: Phone: [phone],  Fax: [phone]  _____________________________________________________________________________    Teledermatology information:  - Location of patient: [place]  - Patient presented as: return  - Location of teledermatologist:  ([facility] DERMATOLOGY )  - Reason teledermatology is appropriate:  of National Emergency Regarding Coronavirus disease (COVID 19)  Outbreak  - Image quality and interpretability: acceptable  - Physician has received verbal consent for a Video/Photos Visit from the patient? Yes  - In-person dermatology visit recommendation: no  - Date of images: 8/17/20  - Length of call: 6min  - Date of report: 8/17/2020     Again, thank you for allowing me to participate in the care of your patient.      Sincerely,    Rissa Corona PA-C

## 2020-08-17 NOTE — PATIENT INSTRUCTIONS
[facility] Dermatology Visit    Thank you for allowing us to participate in your care. Your findings, instructions and follow-up plan are as follows:     -Continue current treatment, will reevaluate in 1mo pending follow up with OBGYN    When should I call my doctor?    If you are worsening or not improving, please, contact us or seek urgent care as noted below.     Who should I call with questions (adults)?    [facility] (adult and pediatric): [phone]     [facility] (adult): [phone]    For urgent needs outside of business hours call the [facility] at [phone] and ask for the dermatology resident on call    If this is a medical emergency and you are unable to reach an ER, Call 911      Who should I call with questions (pediatric)?  [facility]- Pediatric Dermatology  Dr. Sandee Bingham, Dr. Tono Foy, Dr. Marisol Crowell, Heidi Yoo, PA  Dr. Mandi Titus, Dr. Lillian Murillo & Dr. Savage Muñoz  Non Urgent  Nurse Triage Line; [phone]- Akua and Moira HURTADO Care Coordinators   Deisy (/Complex ) [phone]    If you need a prescription refill, please contact your pharmacy. Refills are approved or denied by our Physicians during normal business hours, Monday through Fridays  Per office policy, refills will not be granted if you have not been seen within the past year (or sooner depending on your child's condition)    Scheduling Information:  Pediatric Appointment Scheduling and Call Center [phone]  Radiology Scheduling- [phone]  Sedation Unit Scheduling- [phone]  [place] Scheduling- General [phone]; Pediatric Dermatology [phone]  Main  Services: [phone]  Slovak: [phone]  Welsh: [phone]  Hmong/Suresh/Uzbek: [phone]  Preadmission Nursing Department Fax Number: [phone] (Fax all  pre-operative paperwork to this number)    For urgent matters arising during evenings, weekends, or holidays that cannot wait for normal business hours please call [phone] and ask for the Dermatology Resident On-Call to be paged.

## 2020-08-18 ENCOUNTER — HOSPITAL ENCOUNTER (OUTPATIENT)
Dept: GENERAL RADIOLOGY | Facility: CLINIC | Age: 40
Discharge: HOME | End: 2020-08-18
Attending: NURSE PRACTITIONER | Admitting: NURSE PRACTITIONER
Payer: COMMERCIAL

## 2020-08-18 DIAGNOSIS — M75.41 SUBACROMIAL IMPINGEMENT OF RIGHT SHOULDER: ICD-10-CM

## 2020-08-18 DIAGNOSIS — M75.21 BICIPITAL TENDONITIS OF RIGHT SHOULDER: ICD-10-CM

## 2020-08-18 DIAGNOSIS — S46.011A TRAUMATIC INCOMPLETE TEAR OF RIGHT ROTATOR CUFF, INITIAL ENCOUNTER: ICD-10-CM

## 2020-08-18 PROCEDURE — 25000125 ZZHC RX 250: Performed by: RADIOLOGY

## 2020-08-18 PROCEDURE — 25000128 H RX IP 250 OP 636: Performed by: RADIOLOGY

## 2020-08-18 PROCEDURE — 25500064 ZZH RX 255 OP 636: Performed by: RADIOLOGY

## 2020-08-18 PROCEDURE — 20610 DRAIN/INJ JOINT/BURSA W/O US: CPT | Mod: RT

## 2020-08-18 RX ORDER — BUPIVACAINE HYDROCHLORIDE 2.5 MG/ML
10 INJECTION, SOLUTION EPIDURAL; INFILTRATION; INTRACAUDAL ONCE
Status: COMPLETED | OUTPATIENT
Start: 2020-08-18 | End: 2020-08-18

## 2020-08-18 RX ORDER — IOPAMIDOL 408 MG/ML
50 INJECTION, SOLUTION INTRAVASCULAR ONCE
Status: COMPLETED | OUTPATIENT
Start: 2020-08-18 | End: 2020-08-18

## 2020-08-18 RX ORDER — TRIAMCINOLONE ACETONIDE 40 MG/ML
40 INJECTION, SUSPENSION INTRA-ARTICULAR; INTRAMUSCULAR ONCE
Status: COMPLETED | OUTPATIENT
Start: 2020-08-18 | End: 2020-08-18

## 2020-08-18 RX ORDER — LIDOCAINE HYDROCHLORIDE 10 MG/ML
10 INJECTION, SOLUTION EPIDURAL; INFILTRATION; INTRACAUDAL; PERINEURAL ONCE
Status: COMPLETED | OUTPATIENT
Start: 2020-08-18 | End: 2020-08-18

## 2020-08-18 RX ADMIN — LIDOCAINE HYDROCHLORIDE 2 ML: 10 INJECTION, SOLUTION EPIDURAL; INFILTRATION; INTRACAUDAL; PERINEURAL at 14:15

## 2020-08-18 RX ADMIN — IOPAMIDOL 1 ML: 408 INJECTION, SOLUTION INTRAVASCULAR at 14:21

## 2020-08-18 RX ADMIN — BUPIVACAINE HYDROCHLORIDE 4 ML: 2.5 INJECTION, SOLUTION EPIDURAL; INFILTRATION; INTRACAUDAL at 14:21

## 2020-08-18 RX ADMIN — TRIAMCINOLONE ACETONIDE 40 MG: 40 INJECTION, SUSPENSION INTRA-ARTICULAR; INTRAMUSCULAR at 14:21

## 2020-08-19 ENCOUNTER — TRANSFERRED RECORDS (OUTPATIENT)
Dept: HEALTH INFORMATION MANAGEMENT | Facility: CLINIC | Age: 40
End: 2020-08-19

## 2020-08-24 ENCOUNTER — MYC MEDICAL ADVICE (OUTPATIENT)
Dept: FAMILY MEDICINE | Facility: CLINIC | Age: 40
End: 2020-08-24

## 2020-08-25 ENCOUNTER — MYC MEDICAL ADVICE (OUTPATIENT)
Dept: FAMILY MEDICINE | Facility: CLINIC | Age: 40
End: 2020-08-25

## 2020-08-25 DIAGNOSIS — B00.1 RECURRENT COLD SORES: ICD-10-CM

## 2020-08-27 ENCOUNTER — TELEPHONE (OUTPATIENT)
Dept: FAMILY MEDICINE | Facility: CLINIC | Age: 40
End: 2020-08-27

## 2020-08-27 ENCOUNTER — MYC MEDICAL ADVICE (OUTPATIENT)
Dept: ORTHOPEDICS | Facility: CLINIC | Age: 40
End: 2020-08-27

## 2020-08-27 DIAGNOSIS — B00.1 RECURRENT COLD SORES: Primary | ICD-10-CM

## 2020-08-27 RX ORDER — VALACYCLOVIR HYDROCHLORIDE 1 G/1
2000 TABLET, FILM COATED ORAL 2 TIMES DAILY
Qty: 4 TABLET | Refills: 1 | Status: SHIPPED | OUTPATIENT
Start: 2020-08-27 | End: 2021-07-12

## 2020-08-27 NOTE — TELEPHONE ENCOUNTER
Reason for Call:  Other prescription    Detailed comments: Valacyclovir is not covered by Ins. The preferred Alternative is Acyclovirt tab mg  Per Pharmacy [facility]     Call taken on 8/27/2020 at 3:04 PM by Julissa Bradley

## 2020-08-28 RX ORDER — ACYCLOVIR 400 MG/1
400 TABLET ORAL EVERY 8 HOURS
Qty: 15 TABLET | Refills: 0 | Status: SHIPPED | OUTPATIENT
Start: 2020-08-28 | End: 2021-01-20

## 2020-08-31 ENCOUNTER — TRANSFERRED RECORDS (OUTPATIENT)
Dept: HEALTH INFORMATION MANAGEMENT | Facility: CLINIC | Age: 40
End: 2020-08-31

## 2020-08-31 ENCOUNTER — ANCILLARY PROCEDURE (OUTPATIENT)
Dept: ULTRASOUND IMAGING | Facility: CLINIC | Age: 40
End: 2020-08-31
Attending: NURSE PRACTITIONER
Payer: COMMERCIAL

## 2020-08-31 DIAGNOSIS — N93.9 ABNORMAL VAGINAL BLEEDING: ICD-10-CM

## 2020-08-31 PROCEDURE — 76830 TRANSVAGINAL US NON-OB: CPT | Performed by: STUDENT IN AN ORGANIZED HEALTH CARE EDUCATION/TRAINING PROGRAM

## 2020-08-31 PROCEDURE — 76856 US EXAM PELVIC COMPLETE: CPT | Mod: 59 | Performed by: STUDENT IN AN ORGANIZED HEALTH CARE EDUCATION/TRAINING PROGRAM

## 2020-09-01 LAB — RADIOLOGIST FLAGS: NORMAL

## 2020-09-02 ENCOUNTER — MYC MEDICAL ADVICE (OUTPATIENT)
Dept: FAMILY MEDICINE | Facility: CLINIC | Age: 40
End: 2020-09-02

## 2020-09-02 ENCOUNTER — TELEPHONE (OUTPATIENT)
Dept: DERMATOLOGY | Facility: CLINIC | Age: 40
End: 2020-09-02

## 2020-09-02 DIAGNOSIS — N93.9 ABNORMAL VAGINAL BLEEDING: Primary | ICD-10-CM

## 2020-09-02 NOTE — TELEPHONE ENCOUNTER
Attempted to call pt and see if she could come in to see Rissa on 9/21/20 instead of having a virtual visit. However, pts mailbox was full. Will send pt [employer] message.   GALINA Mcknight

## 2020-09-02 NOTE — TELEPHONE ENCOUNTER
----- Message from Rissa Corona PA-C sent at 9/2/2020 12:44 PM CDT -----  Regarding: needs an in person visit for potential bx  Hi!    Can someone please call this patient of mine and have her schedule for an in person visit with me sometime in the next 1-2mo (nothing urgent) - she may be needed a potential tiny biopsy on the face. Spot check. :)    Thanks!Chloe

## 2020-09-03 ENCOUNTER — MYC MEDICAL ADVICE (OUTPATIENT)
Dept: FAMILY MEDICINE | Facility: CLINIC | Age: 40
End: 2020-09-03

## 2020-09-03 DIAGNOSIS — Z80.41 FAMILY HISTORY OF MALIGNANT NEOPLASM OF OVARY: Primary | ICD-10-CM

## 2020-09-03 NOTE — TELEPHONE ENCOUNTER
ONCOLOGY INTAKE: Records Information      APPT INFORMATION:  Referring provider:  Yulissa HERNANDEZ CNP  Referring provider s clinic:  [facility]  Reason for visit/diagnosis:  Family history of malignant neoplasm of ovary   Has patient been notified of appointment date and time?: Yes    RECORDS INFORMATION:  Were the records received with the referral (via [employer])? No,Internal Referral      Has patient been seen for any external appt for this diagnosis? No    If yes, where? NA      ADDITIONAL INFORMATION:  None

## 2020-09-09 ENCOUNTER — OFFICE VISIT (OUTPATIENT)
Dept: ORTHOPEDICS | Facility: CLINIC | Age: 40
End: 2020-09-09
Payer: OTHER MISCELLANEOUS

## 2020-09-09 ENCOUNTER — TRANSFERRED RECORDS (OUTPATIENT)
Dept: HEALTH INFORMATION MANAGEMENT | Facility: CLINIC | Age: 40
End: 2020-09-09

## 2020-09-09 VITALS
WEIGHT: 236 LBS | BODY MASS INDEX: 33.79 KG/M2 | DIASTOLIC BLOOD PRESSURE: 82 MMHG | HEIGHT: 70 IN | SYSTOLIC BLOOD PRESSURE: 110 MMHG

## 2020-09-09 DIAGNOSIS — M54.12 CERVICAL RADICULOPATHY: Primary | ICD-10-CM

## 2020-09-09 PROCEDURE — 99214 OFFICE O/P EST MOD 30 MIN: CPT | Performed by: ORTHOPAEDIC SURGERY

## 2020-09-09 ASSESSMENT — PAIN SCALES - GENERAL: PAINLEVEL: MODERATE PAIN (5)

## 2020-09-09 ASSESSMENT — MIFFLIN-ST. JEOR: SCORE: 1820.74

## 2020-09-09 NOTE — PROGRESS NOTES
Office Visit-Follow up    Chief Complaint: Kristel Martinez is a 40 year old female who is being seen for   Chief Complaint   Patient presents with     MECHE     - CHRISTEL/DREW DOI: 1/14/2020-right shoulder partial rotator cuff tear, Right shoulder subacromial impingement,   Right shoulder proximal bicipital tendonitis.          History of Present Illness:   Today's visit:  Returns for her right shoulder.  She reports on her last visit she received a subacromial steroid injection which provided absolutely no relief including a diagnostic response.  She was then scheduled for a fluoroscopically guided intra-articular steroid injection which only provided about 30% improvement in symptoms for a few hours.  She has been having pain radiate down her arm into her hand.  It is associate with some numbness tingling that goes into her middle finger.  Worse with reaching overhead activities.  Although has pain at rest.  She does not feel like she has made improvements with physical therapy.    July 23, 2020 visit:  Kristel Martinez is a 39 year old female who is seen in consultation at the request of HASMUKH Gonzáles for evaluation of right shoulder chronic pain.  Mechanism of Injury: January 14th, reports at work, slipped on ice, fell backwards from standing height jarring her buttock and elbow, questionable if had shoulder pain but by the next day was having quite a bit of shoulder and was seen.  Was told she was ok, given muscle relaxers.  Over the past 6 months the pain has not improved much. Pain is anterior/lateral shoulder pain. Non radiating. Continues to struggles with use of the shoulder especially with overhead, reaching away from body and ADLs like brushing teeth and hair.    Better with: ibuprofen, rest, activity modification.   Treatments tried: ibuprofen, rest, tylenol, activity modification  Prior history of related problems:   No previous shoulder pain or issues prior to the fall.   No previous surgeries or significant  "trauma.     Social History     Occupational History     Not on file   Tobacco Use     Smoking status: Former Smoker     Smokeless tobacco: Never Used   Substance and Sexual Activity     Alcohol use: Yes     Comment: 1 drink per wk     Drug use: No     Sexual activity: Yes     Partners: Male       REVIEW OF SYSTEMS  General: negative for, night sweats, dizziness, fatigue  Resp: No shortness of breath and no cough  CV: negative for chest pain, syncope or near-syncope  GI: negative for nausea, vomiting and diarrhea  : negative for dysuria and hematuria  Musculoskeletal: as above  Neurologic: negative for syncope   Hematologic: negative for bleeding disorder    Physical Exam:  Vitals: /82   Ht 1.778 m (5' 10\")   Wt 107 kg (236 lb)   BMI 33.86 kg/m    BMI= Body mass index is 33.86 kg/m .  Constitutional: healthy, alert and no acute distress   Psychiatric: mentation appears normal and affect normal/bright  NEURO: no focal deficits  RESP: Normal with easy respirations and no use of accessory muscles to breathe, no audible wheezing or retractions  CV: RUE:  no edema         Regular rate and rhythm by palpation  SKIN: No erythema, rashes, excoriation, or breakdown. No evidence of infection.   JOINT/EXTREMITIES:right shoulder: She does have some pain and weakness with supraspinatus testing.  She feels like she has a pulling pain with resisted bicep flexion.  No focal areas of neurologic deficit.  Motor is intact although some weakness.  Sensation is intact to light touch.  GAIT: not tested   No peripheral lymphedema          Diagnostic Modalities:  Previous imaging reviewed.  Independent visualization of the images was performed.      Impression: right shoulder partial-thickness rotator cuff tendon tear, biceps tendinitis  Right-sided cervical radicular type pain    Plan:  All of the above pertinent physical exam and imaging modalities findings was reviewed with Kristel.    Unfortunately her fluoroscopically guided " injection only provided a diagnostic response of approximately 30%.  She now endorses symptoms that go down her arm associated with some numbness and tingling to her middle finger that have been intermittent.  Given the symptoms and lack of improvement into the shoulder I recommended a MRI of her cervical spine to rule out a herniated disc.  Also recommended a EMG for full evaluation.    Work note was provided.    Her QRC was updated in her presence    She also reports after her fluoroscopically guided injection she missed some days due to the shoulder pain.  She asked for a excuse note for work.  I am okay providing her a note as she reports it was all shoulder issues that stop her from going to work.  She is going to MyChart us back the specific days.  We can provider notes that covers her.    Return to clinic to discuss test results, or sooner as needed for changes.  Re-x-ray on return: No    Geovanny Henderson D.O.

## 2020-09-09 NOTE — LETTER
9/9/2020         RE: Kristel Martinez  [address] [facility] [address]        Dear Colleague,    Thank you for referring your patient, Kristel Martinez, to the [facility]. Please see a copy of my visit note below.    Office Visit-Follow up    Chief Complaint: Kristel Martinez is a 40 year old female who is being seen for   Chief Complaint   Patient presents with     RECHECK     - W/C DOI: 1/14/2020-right shoulder partial rotator cuff tear, Right shoulder subacromial impingement,   Right shoulder proximal bicipital tendonitis.          History of Present Illness:   Today's visit:  Returns for her right shoulder.  She reports on her last visit she received a subacromial steroid injection which provided absolutely no relief including a diagnostic response.  She was then scheduled for a fluoroscopically guided intra-articular steroid injection which only provided about 30% improvement in symptoms for a few hours.  She has been having pain radiate down her arm into her hand.  It is associate with some numbness tingling that goes into her middle finger.  Worse with reaching overhead activities.  Although has pain at rest.  She does not feel like she has made improvements with physical therapy.    July 23, 2020 visit:  Kristel Martinez is a 39 year old female who is seen in consultation at the request of HASMUKH Gonzáles for evaluation of right shoulder chronic pain.  Mechanism of Injury: January 14th, reports at work, slipped on ice, fell backwards from standing height jarring her buttock and elbow, questionable if had shoulder pain but by the next day was having quite a bit of shoulder and was seen.  Was told she was ok, given muscle relaxers.  Over the past 6 months the pain has not improved much. Pain is anterior/lateral shoulder pain. Non radiating. Continues to struggles with use of the shoulder especially with overhead, reaching away from body and ADLs like brushing teeth and hair.    Better with:  "ibuprofen, rest, activity modification.   Treatments tried: ibuprofen, rest, tylenol, activity modification  Prior history of related problems:   No previous shoulder pain or issues prior to the fall.   No previous surgeries or significant trauma.     Social History     Occupational History     Not on file   Tobacco Use     Smoking status: Former Smoker     Smokeless tobacco: Never Used   Substance and Sexual Activity     Alcohol use: Yes     Comment: 1 drink per wk     Drug use: No     Sexual activity: Yes     Partners: Male       REVIEW OF SYSTEMS  General: negative for, night sweats, dizziness, fatigue  Resp: No shortness of breath and no cough  CV: negative for chest pain, syncope or near-syncope  GI: negative for nausea, vomiting and diarrhea  : negative for dysuria and hematuria  Musculoskeletal: as above  Neurologic: negative for syncope   Hematologic: negative for bleeding disorder    Physical Exam:  Vitals: /82   Ht 1.778 m (5' 10\")   Wt 107 kg (236 lb)   BMI 33.86 kg/m    BMI= Body mass index is 33.86 kg/m .  Constitutional: healthy, alert and no acute distress   Psychiatric: mentation appears normal and affect normal/bright  NEURO: no focal deficits  RESP: Normal with easy respirations and no use of accessory muscles to breathe, no audible wheezing or retractions  CV: RUE:  no edema         Regular rate and rhythm by palpation  SKIN: No erythema, rashes, excoriation, or breakdown. No evidence of infection.   JOINT/EXTREMITIES:right shoulder: She does have some pain and weakness with supraspinatus testing.  She feels like she has a pulling pain with resisted bicep flexion.  No focal areas of neurologic deficit.  Motor is intact although some weakness.  Sensation is intact to light touch.  GAIT: not tested   No peripheral lymphedema          Diagnostic Modalities:  Previous imaging reviewed.  Independent visualization of the images was performed.      Impression: right shoulder partial-thickness " rotator cuff tendon tear, biceps tendinitis  Right-sided cervical radicular type pain    Plan:  All of the above pertinent physical exam and imaging modalities findings was reviewed with Kristel.    Unfortunately her fluoroscopically guided injection only provided a diagnostic response of approximately 30%.  She now endorses symptoms that go down her arm associated with some numbness and tingling to her middle finger that have been intermittent.  Given the symptoms and lack of improvement into the shoulder I recommended a MRI of her cervical spine to rule out a herniated disc.  Also recommended a EMG for full evaluation.    Work note was provided.    Her QRC was updated in her presence    She also reports after her fluoroscopically guided injection she missed some days due to the shoulder pain.  She asked for a excuse note for work.  I am okay providing her a note as she reports it was all shoulder issues that stop her from going to work.  She is going to MyChart us back the specific days.  We can provider notes that covers her.    Return to clinic to discuss test results, or sooner as needed for changes.  Re-x-ray on return: No    Geovanny Henderson D.O.          Again, thank you for allowing me to participate in the care of your patient.        Sincerely,        Bigg Henderson, DO

## 2020-09-09 NOTE — LETTER
[address] [address]  Phone: [phone]  Fax: [phone]    September 9, 2020        Kristel Martinez  [address] [place] [address] [address]  [facility] [address]          To whom it may concern:    RE: Kristel Martinez    Patient was seen and treated today at our clinic.  Patient may return to work with the following: with right upper extremity-  No push, pull or lift greater than 5lbs below chest level.  No push, pull or lift overhead.  Unable to restrain.  All this is until follow up.    Please contact me for questions or concerns.      Sincerely,        Bigg Henderson, DO

## 2020-09-11 ENCOUNTER — MYC MEDICAL ADVICE (OUTPATIENT)
Dept: ORTHOPEDICS | Facility: CLINIC | Age: 40
End: 2020-09-11

## 2020-09-11 NOTE — TELEPHONE ENCOUNTER
See [employer] message to patient.   Jaycee Hopkins, JOCELINE on 9/11/2020 at 1:39 PM

## 2020-09-11 NOTE — LETTER
[address] [address]  [phone]          September 15, 2020    RE: Kristel Martinez  : 1980      To whom it may concern:     Please excuse the above patient from work on 2020, 2020, and 2020 due to shoulder pain secondary to a procedure she had on 2020.  If you have any questions or concerns please reach out to us at the number provided above.       Sincerely,             Dr. Henderson

## 2020-09-14 ENCOUNTER — TRANSFERRED RECORDS (OUTPATIENT)
Dept: HEALTH INFORMATION MANAGEMENT | Facility: CLINIC | Age: 40
End: 2020-09-14

## 2020-09-14 ENCOUNTER — HOSPITAL ENCOUNTER (OUTPATIENT)
Dept: MRI IMAGING | Facility: CLINIC | Age: 40
Discharge: HOME | End: 2020-09-14
Attending: ORTHOPAEDIC SURGERY | Admitting: ORTHOPAEDIC SURGERY
Payer: COMMERCIAL

## 2020-09-14 DIAGNOSIS — M54.12 CERVICAL RADICULOPATHY: ICD-10-CM

## 2020-09-14 PROCEDURE — 72141 MRI NECK SPINE W/O DYE: CPT

## 2020-09-14 NOTE — TELEPHONE ENCOUNTER
Dr. Henderson,  Patient would like work notes excusing her from work these days.  Tuesday August 18th Friday August 21st Thursday August 27th    Please advise...

## 2020-09-15 NOTE — TELEPHONE ENCOUNTER
Per last OV okay to write this letter per Dr. Henderson.     Nieves BENSON, Lead RN, BSN. . .  9/15/2020, 9:44 AM

## 2020-09-18 ENCOUNTER — MYC MEDICAL ADVICE (OUTPATIENT)
Dept: ORTHOPEDICS | Facility: CLINIC | Age: 40
End: 2020-09-18

## 2020-09-18 ENCOUNTER — TRANSFERRED RECORDS (OUTPATIENT)
Dept: HEALTH INFORMATION MANAGEMENT | Facility: CLINIC | Age: 40
End: 2020-09-18

## 2020-09-21 ENCOUNTER — VIRTUAL VISIT (OUTPATIENT)
Dept: DERMATOLOGY | Facility: CLINIC | Age: 40
End: 2020-09-21
Payer: COMMERCIAL

## 2020-09-21 DIAGNOSIS — L70.0 ACNE VULGARIS: ICD-10-CM

## 2020-09-21 RX ORDER — MINOCYCLINE HYDROCHLORIDE 50 MG/1
50 CAPSULE ORAL DAILY
Qty: 180 CAPSULE | Refills: 0 | Status: SHIPPED | OUTPATIENT
Start: 2020-09-21 | End: 2020-12-29

## 2020-09-21 ASSESSMENT — PAIN SCALES - GENERAL: PAINLEVEL: NO PAIN (0)

## 2020-09-21 NOTE — PATIENT INSTRUCTIONS
[facility] Dermatology Visit    Thank you for allowing us to participate in your care. Your findings, instructions and follow-up plan are as follows:    Continue current treatment plan pending next steps with OBGYN    When should I call my doctor?    If you are worsening or not improving, please, contact us or seek urgent care as noted below.     Who should I call with questions (adults)?    [facility] (adult and pediatric): [phone]     [facility] (adult): [phone]    For urgent needs outside of business hours call the [facility] at [phone] and ask for the dermatology resident on call    If this is a medical emergency and you are unable to reach an ER, Call [phone]      Who should I call with questions (pediatric)?  [facility]- Pediatric Dermatology  Dr. Sandee Binhgam, Dr. Tono Foy, Dr. Marisol Crowell, Heidi Yoo, PA  Dr. Mandi Titus, Dr. Lillian Murillo & Dr. Savage Muñoz  Non Urgent  Nurse Triage Line; [phone]- Akua and Moira HURTADO Care Coordinators   Deisy (/Complex ) [phone]    If you need a prescription refill, please contact your pharmacy. Refills are approved or denied by our Physicians during normal business hours, Monday through Fridays  Per office policy, refills will not be granted if you have not been seen within the past year (or sooner depending on your child's condition)    Scheduling Information:  Pediatric Appointment Scheduling and Call Center [phone]  Radiology Scheduling- [phone]  Sedation Unit Scheduling- [phone]  [place] Scheduling- General [phone]; Pediatric Dermatology [phone]  Main  Services: [phone]  Korean: [phone]  Guatemalan: [phone]  Hmong/Suresh/Mauritian: [phone]  Preadmission Nursing Department Fax Number: [phone] (Fax all pre-operative paperwork to  this number)    For urgent matters arising during evenings, weekends, or holidays that cannot wait for normal business hours please call [phone] and ask for the Dermatology Resident On-Call to be paged.

## 2020-09-21 NOTE — PROGRESS NOTES
" [facility]:  Mychart Connected    Dermatology Problem List:  1. Hx of vitiligo - hands/feet  2. Hx of thyroid cancer - has since had thyroid removed  3. Acne Vulgaris/POD  -Current Tx: tretinoin 0.025% cream, metronidazole 0.75% lotion, minocycline 100 mg   -Previous Tx: Spironolactone (discontinued due to constipation, patient also has IBS)    Encounter Date: Sep 21, 2020    CC:   Chief Complaint   Patient presents with     Derm Problem     Acne vulgaris - Kristel states she has been stable      History of Present Illness:  Kristel Martinez is a 40 year old female who presents for acne follow up. She reports today things have been stable to good. She has continued with minocycline 100mg daily, tretinoin 0.025% cream nightly and metronidazole 0.75% lotion in the morning. She was supposed to have met with her OBGYN regarding \"period issues\" last month. Notes she has a thickened endometrium and has a follow up US in 1mo. We considered restarting spironolactone, OCPs or possibly a course on isotretinoin. She is doing well otherwise and has no other concerns today.    ROS: Patient is generally feeling well today   -GI: no nausea, abdominal pain, vomiting, diarrhea or blood in the stool  -Constitutional: no unintended weight loss/gain, no night sweats or fevers  -Skin: as per HPI    Physical Examination:  General: Well-appearing, appropriately-developed individual.  Skin: Focused examination including face was performed.   -overall clear, one small erythematous papule on the L nasal ala    Labs:  none    Past Medical History:   Patient Active Problem List   Diagnosis     Major depressive disorder, recurrent episode, moderate (H)     Papillary adenocarcinoma of thyroid (H)     Postoperative hypothyroidism     Vitiligo     Vitamin D deficiency     IgA deficiency (H)     Traumatic incomplete tear of right rotator cuff, initial encounter     Bicipital tendonitis of right shoulder     Subacromial impingement of " right shoulder     Past Medical History:   Diagnosis Date     Depressive disorder      Past Surgical History:   Procedure Laterality Date     COLONOSCOPY  8-27/18     THYROIDECTOMY      2015     TONSILLECTOMY       TUBAL LIGATION       Social History:  Patient reports that she has quit smoking. She has never used smokeless tobacco. She reports current alcohol use. She reports that she does not use drugs.    Family History:  Family History   Problem Relation Age of Onset     Skin Cancer Mother      Hypertension Father      Arrhythmia Father      Lymphoma Maternal Grandmother      Diabetes Paternal Grandfather         type 1      Asthma Son      Arthritis Daughter        Medications:  Current Outpatient Medications   Medication     buPROPion (WELLBUTRIN XL) 150 MG 24 hr tablet     dicyclomine (BENTYL) 20 MG tablet     escitalopram (LEXAPRO) 10 MG tablet     fluticasone (FLONASE) 50 MCG/ACT nasal spray     hydrocortisone (ANUSOL-HC) 2.5 % cream     hydrOXYzine (ATARAX) 25 MG tablet     levothyroxine (SYNTHROID/LEVOTHROID) 112 MCG tablet     levothyroxine (SYNTHROID/LEVOTHROID) 50 MCG tablet     metroNIDAZOLE (METROLOTION) 0.75 % external lotion     minocycline (MINOCIN) 50 MG capsule     SUMAtriptan (IMITREX) 25 MG tablet     tiZANidine (ZANAFLEX) 4 MG tablet     topiramate (TOPAMAX) 25 MG tablet     tretinoin (RETIN-A) 0.025 % external cream     valACYclovir (VALTREX) 1000 mg tablet     acyclovir (ZOVIRAX) 400 MG tablet     No current facility-administered medications for this visit.           Allergies   Allergen Reactions     Augmentin Cramps, Diarrhea, Nausea and Nausea and Vomiting     Cephalosporins Rash     Cephalexin     Sulfa Drugs Rash     Impression and Recommendations (Patient Counseled on the Following):  1. Acne Vulgaris - hormonal component - previously diagnosed as perioral dermatitis, however not responsive to typical POD treatment. Would like to pursue isotretinoin potentially or spironolactone.  Patient would like to wait one-two more months before initiating one of these as she is meeting with her OBGYN in October and thinks she may be starting OCPs. She is currently being evaluated for a thickened endometrium she says and is awaiting US results.  -For now continue minocycline 100mg po every day, tretinoin 0.025% cream nightly and metronidazole 0.75% lotion QAM  -Discussed spironolactone as an option - patient would like to avoid for now due to previous issues with constipation  -Briefly discussed isotretinoin, patient would like to consider, but wants to wait until after her follow-up with her OBGYN  -OCPs may also be a helpful option for this patient if she in facts starts them with her doctor, this may be the ultimate treatment for her acne should she respond to this.     Follow-up:   Follow-up with dermatology in approximately 3mo. Earlier for new or changing lesions or rash.   CC Dr. Murillo on close of this encounter.      Staff only    All risks, benefits and alternatives were discussed with patient.  Patient is in agreement and understands the assessment and plan.  All questions were answered.    Rissa Corona PA-C, MPAS  [facility] [place]: Phone: [phone], Fax: [phone]  [facility]: Phone: [phone],  Fax: [phone]  _____________________________________________________________________________    Teledermatology information:  - Location of patient: [place]  - Patient presented as: return  - Location of teledermatologist:  ([facility] DERMATOLOGY )  - Reason teledermatology is appropriate:  of National Emergency Regarding Coronavirus disease (COVID 19) Outbreak  - Image quality and interpretability: acceptable  - Physician has received verbal consent for a Video/Photos Visit from the patient? Yes  - In-person dermatology visit recommendation: no  - Date of images: 9/21/20  - Length of call: 6min  -  Date of report: 9/21/2020

## 2020-09-21 NOTE — NURSING NOTE
Dermatology Rooming Note    Kristel Martinez's goals for this visit include:   Chief Complaint   Patient presents with     Derm Problem     Acne vulgaris - Kristel states she has been stable      Jluis Preciado, CMA

## 2020-09-21 NOTE — LETTER
"9/21/2020       RE: Kristel Martinez  [address] [place] [address]     Dear Colleague,    Thank you for referring your patient, Kristel Martinez, to the [facility] DERMATOLOGY at [facility]. Please see a copy of my visit note below.    [facility] Record:  Mychart Connected    Dermatology Problem List:  1. Hx of vitiligo - hands/feet  2. Hx of thyroid cancer - has since had thyroid removed  3. Acne Vulgaris/POD  -Current Tx: tretinoin 0.025% cream, metronidazole 0.75% lotion, minocycline 100 mg   -Previous Tx: Spironolactone (discontinued due to constipation, patient also has IBS)    Encounter Date: Sep 21, 2020    CC:   Chief Complaint   Patient presents with     Derm Problem     Acne vulgaris - Kristel states she has been stable      History of Present Illness:  Kristel Martinez is a 40 year old female who presents for acne follow up. She reports today things have been stable to good. She has continued with minocycline 100mg daily, tretinoin 0.025% cream nightly and metronidazole 0.75% lotion in the morning. She was supposed to have met with her OBGYN regarding \"period issues\" last month. Notes she has a thickened endometrium and has a follow up US in 1mo. We considered restarting spironolactone, OCPs or possibly a course on isotretinoin. She is doing well otherwise and has no other concerns today.    ROS: Patient is generally feeling well today   -GI: no nausea, abdominal pain, vomiting, diarrhea or blood in the stool  -Constitutional: no unintended weight loss/gain, no night sweats or fevers  -Skin: as per HPI    Physical Examination:  General: Well-appearing, appropriately-developed individual.  Skin: Focused examination including face was performed.   -overall clear, one small erythematous papule on the L nasal ala    Labs:  none    Past Medical History:   Patient Active Problem List   Diagnosis     Major depressive disorder, recurrent episode, moderate (H)     Papillary " adenocarcinoma of thyroid (H)     Postoperative hypothyroidism     Vitiligo     Vitamin D deficiency     IgA deficiency (H)     Traumatic incomplete tear of right rotator cuff, initial encounter     Bicipital tendonitis of right shoulder     Subacromial impingement of right shoulder     Past Medical History:   Diagnosis Date     Depressive disorder      Past Surgical History:   Procedure Laterality Date     COLONOSCOPY  8-27/18     THYROIDECTOMY      2015     TONSILLECTOMY       TUBAL LIGATION       Social History:  Patient reports that she has quit smoking. She has never used smokeless tobacco. She reports current alcohol use. She reports that she does not use drugs.    Family History:  Family History   Problem Relation Age of Onset     Skin Cancer Mother      Hypertension Father      Arrhythmia Father      Lymphoma Maternal Grandmother      Diabetes Paternal Grandfather         type 1      Asthma Son      Arthritis Daughter        Medications:  Current Outpatient Medications   Medication     buPROPion (WELLBUTRIN XL) 150 MG 24 hr tablet     dicyclomine (BENTYL) 20 MG tablet     escitalopram (LEXAPRO) 10 MG tablet     fluticasone (FLONASE) 50 MCG/ACT nasal spray     hydrocortisone (ANUSOL-HC) 2.5 % cream     hydrOXYzine (ATARAX) 25 MG tablet     levothyroxine (SYNTHROID/LEVOTHROID) 112 MCG tablet     levothyroxine (SYNTHROID/LEVOTHROID) 50 MCG tablet     metroNIDAZOLE (METROLOTION) 0.75 % external lotion     minocycline (MINOCIN) 50 MG capsule     SUMAtriptan (IMITREX) 25 MG tablet     tiZANidine (ZANAFLEX) 4 MG tablet     topiramate (TOPAMAX) 25 MG tablet     tretinoin (RETIN-A) 0.025 % external cream     valACYclovir (VALTREX) 1000 mg tablet     acyclovir (ZOVIRAX) 400 MG tablet     No current facility-administered medications for this visit.           Allergies   Allergen Reactions     Augmentin Cramps, Diarrhea, Nausea and Nausea and Vomiting     Cephalosporins Rash     Cephalexin     Sulfa Drugs Rash      Impression and Recommendations (Patient Counseled on the Following):  1. Acne Vulgaris - hormonal component - previously diagnosed as perioral dermatitis, however not responsive to typical POD treatment. Would like to pursue isotretinoin potentially or spironolactone. Patient would like to wait one-two more months before initiating one of these as she is meeting with her OBGYN in October and thinks she may be starting OCPs. She is currently being evaluated for a thickened endometrium she says and is awaiting US results.  -For now continue minocycline 100mg po every day, tretinoin 0.025% cream nightly and metronidazole 0.75% lotion QAM  -Discussed spironolactone as an option - patient would like to avoid for now due to previous issues with constipation  -Briefly discussed isotretinoin, patient would like to consider, but wants to wait until after her follow-up with her OBGYN  -OCPs may also be a helpful option for this patient if she in facts starts them with her doctor, this may be the ultimate treatment for her acne should she respond to this.     Follow-up:   Follow-up with dermatology in approximately 3mo. Earlier for new or changing lesions or rash.   CC Dr. Murillo on close of this encounter.      Staff only    All risks, benefits and alternatives were discussed with patient.  Patient is in agreement and understands the assessment and plan.  All questions were answered.    Rissa Corona PA-C, MPAS  [facility] [place]: Phone: [phone], Fax: [phone]  [facility]: Phone: [phone],  Fax: [phone]  _____________________________________________________________________________    Teledermatology information:  - Location of patient: [place]  - Patient presented as: return  - Location of teledermatologist:  ([facility] DERMATOLOGY )  - Reason teledermatology is appropriate:  of National Emergency Regarding  Coronavirus disease (COVID 19) Outbreak  - Image quality and interpretability: acceptable  - Physician has received verbal consent for a Video/Photos Visit from the patient? Yes  - In-person dermatology visit recommendation: no  - Date of images: 9/21/20  - Length of call: 6min  - Date of report: 9/21/2020

## 2020-09-24 ENCOUNTER — VIRTUAL VISIT (OUTPATIENT)
Dept: FAMILY MEDICINE | Facility: OTHER | Age: 40
End: 2020-09-24
Payer: COMMERCIAL

## 2020-09-24 DIAGNOSIS — Z20.822 SUSPECTED COVID-19 VIRUS INFECTION: Primary | ICD-10-CM

## 2020-09-24 PROCEDURE — 99421 OL DIG E/M SVC 5-10 MIN: CPT | Performed by: PHYSICIAN ASSISTANT

## 2020-09-24 NOTE — PROGRESS NOTES
"Date: 2020 16:11:10  Clinician: Arvind Estrada  Clinician NPI: [id number]  Patient: Kristel Martinez  Patient : 1980  Patient Address: [address] [place], [place] [address]  Patient Phone: [phone]  Visit Protocol: URI  Patient Summary:  Kristel is a 40 year old ( : 1980 ) female who initiated a [facility] Visit for COVID-19 (Coronavirus) evaluation and screening. When asked the question \"Please sign me up to receive news, health information and promotions from [facility].\", Kristel responded \"No\".    Kristel states her symptoms started gradually 3-4 days ago.   Her symptoms consist of ear pain, rhinitis, nausea, myalgia, malaise, and diarrhea.   Symptom details   Nasal secretions: The color of her mucus is clear.   Kristel denies having cough, nasal congestion, headache, anosmia, vomiting, wheezing, facial pain or pressure, fever, chills, sore throat, teeth pain, and ageusia. She also denies double sickening (worsening symptoms after initial improvement) and having recent facial or sinus surgery in the past 60 days. She is not experiencing dyspnea.   Precipitating events  She has not recently been exposed to someone with influenza. Kristel has been in close contact with the following high risk individuals: people with asthma, heart disease or diabetes.   Pertinent COVID-19 (Coronavirus) information  In the past 14 days, Kristel has not worked in a congregate living setting.   She does not work or volunteer as healthcare worker or a  and does not work or volunteer in a healthcare facility.   Kristel also has not lived in a congregate living setting in the past 14 days. She does not live with a healthcare worker.   Kristel has not had a close contact with a laboratory-confirmed COVID-19 patient within 14 days of symptom onset.   Since 2019, Kristel and has had upper respiratory infection (URI) or influenza-like illness. Has not been diagnosed with lab-confirmed COVID-19 test      Date(s) of " previous URI or influenza-like illness (free-text): Jan 2020     Symptoms Kristel experienced during previous URI or influenza-like illness as reported by the patient (free-text): Fever, body aches, head ache, lethargy        Pertinent medical history  Kristel has taken an antibiotic medication in the past month. Antibiotic details as reported by the patient (free text): Minocycline taken for acne over the last few months   Kristel typically gets a yeast infection when she takes antibiotics. She has used fluconazole (Diflucan) to treat previous yeast infections. 2 doses of fluconazole (Diflucan) has typically been needed for symptoms to resolve in the past.  Kristel needs a return to work/school note.   Weight: 236 lbs   Kristel does not smoke or use smokeless tobacco.   She denies pregnancy and denies breastfeeding. She has menstruated in the past month.   Weight: 236 lbs    MEDICATIONS: escitalopram oxalate oral, bupropion HCl oral, levothyroxine oral, minocycline oral, ALLERGIES: Septra, Keflex, escitalopram  Clinician Response:  Dear Kristel,   Your symptoms show that you may have coronavirus (COVID-19). This illness can cause fever, cough and trouble breathing. Many people get a mild case and get better on their own. Some people can get very sick.  What should I do?  We would like to test you for this virus.   1. Please call [phone] to schedule your visit. Explain that you were referred by [facility] to have a COVID-19 test. Be ready to share your [facility] visit ID number.  The following will serve as your written order for this COVID Test, ordered by me, for the indication of suspected COVID [Z20.828]: The test will be ordered in [facility], our electronic health record, after you are scheduled. It will show as ordered and authorized by Devin Lundberg MD.  Order: COVID-19 (Coronavirus) PCR for SYMPTOMATIC testing from [facility].      2. When it's time for your COVID test:  Stay at least 6 feet away from others. (If someone will drive  "you to your test, stay in the backseat, as far away from the  as you can.)   Cover your mouth and nose with a mask, tissue or washcloth.  Go straight to the testing site. Don't make any stops on the way there or back.      3.Starting now: Stay home and away from others (self-isolate) until:   You've had no fever---and no medicine that reduces fever---for one full day (24 hours). And...   Your other symptoms have gotten better. For example, your cough or breathing has improved. And...   At least 10 days have passed since your symptoms started.       During this time, don't leave the house except for testing or medical care.   Stay in your own room, even for meals. Use your own bathroom if you can.   Stay away from others in your home. No hugging, kissing or shaking hands. No visitors.  Don't go to work, school or anywhere else.    Clean \"high touch\" surfaces often (doorknobs, counters, handles, etc.). Use a household cleaning spray or wipes. You'll find a full list of  on the EPA website: www.epa.gov/pesticide-registration/list-n-disinfectants-use-against-sars-cov-2.   Cover your mouth and nose with a mask, tissue or washcloth to avoid spreading germs.  Wash your hands and face often. Use soap and water.  Caregivers in these groups are at risk for severe illness due to COVID-19:  o People 65 years and older  o People who live in a nursing home or long-term care facility  o People with chronic disease (lung, heart, cancer, diabetes, kidney, liver, immunologic)  o People who have a weakened immune system, including those who:   Are in cancer treatment  Take medicine that weakens the immune system, such as corticosteroids  Had a bone marrow or organ transplant  Have an immune deficiency  Have poorly controlled HIV or AIDS  Are obese (body mass index of 40 or higher)  Smoke regularly   o Caregivers should wear gloves while washing dishes, handling laundry and cleaning bedrooms and bathrooms.  o Use caution " when washing and drying laundry: Don't shake dirty laundry, and use the warmest water setting that you can.  o For more tips, go to www.cdc.gov/coronavirus/2019-ncov/downloads/10Things.pdf.    How can I take care of myself?    Get lots of rest. Drink extra fluids (unless a doctor has told you not to).   Take Tylenol (acetaminophen) for fever or pain. If you have liver or kidney problems, ask your family doctor if it's okay to take Tylenol.   Adults can take either:    650 mg (two 325 mg pills) every 4 to 6 hours, or...   1,000 mg (two 500 mg pills) every 8 hours as needed.    Note: Don't take more than 3,000 mg in one day. Acetaminophen is found in many medicines (both prescribed and over-the-counter medicines). Read all labels to be sure you don't take too much.   For children, check the Tylenol bottle for the right dose. The dose is based on the child's age or weight.    If you have other health problems (like cancer, heart failure, an organ transplant or severe kidney disease): Call your specialty clinic if you don't feel better in the next 2 days.       Know when to call 911. Emergency warning signs include:    Trouble breathing or shortness of breath Pain or pressure in the chest that doesn't go away Feeling confused like you haven't felt before, or not being able to wake up Bluish-colored lips or face.  Where can I get more information?   [facility] -- About COVID-19: [email or web address]   CDC -- What to Do If You're Sick: www.cdc.gov/coronavirus/2019-ncov/about/steps-when-sick.html   CDC -- Ending Home Isolation: www.cdc.gov/coronavirus/2019-ncov/hcp/disposition-in-home-patients.html   CDC -- Caring for Someone: www.cdc.gov/coronavirus/2019-ncov/if-you-are-sick/care-for-someone.html   [facility] -- Interim Guidance for Hospital Discharge to Home: www.health.[facility].[place]/diseases/coronavirus/hcp/hospdischarge.pdf   [facility] clinical trials (COVID-19 research studies):  clinicalaffairs.[facility]-clinical-trials    Below are the COVID-19 hotlines at the [place] Department of Health ([facility]). Interpreters are available.    For health questions: Call 805-167-1958 or 1-773.971.3198 (7 a.m. to 7 p.m.) For questions about schools and childcare: Call 038-726-7627 or 1-101.971.5239 (7 a.m. to 7 p.m.)    Diagnosis: Other malaise  Diagnosis ICD: R53.81

## 2020-09-25 ENCOUNTER — MYC MEDICAL ADVICE (OUTPATIENT)
Dept: ORTHOPEDICS | Facility: CLINIC | Age: 40
End: 2020-09-25

## 2020-09-25 DIAGNOSIS — Z20.822 SUSPECTED COVID-19 VIRUS INFECTION: ICD-10-CM

## 2020-09-25 PROCEDURE — U0003 INFECTIOUS AGENT DETECTION BY NUCLEIC ACID (DNA OR RNA); SEVERE ACUTE RESPIRATORY SYNDROME CORONAVIRUS 2 (SARS-COV-2) (CORONAVIRUS DISEASE [COVID-19]), AMPLIFIED PROBE TECHNIQUE, MAKING USE OF HIGH THROUGHPUT TECHNOLOGIES AS DESCRIBED BY CMS-2020-01-R: HCPCS | Performed by: FAMILY MEDICINE

## 2020-09-25 NOTE — TELEPHONE ENCOUNTER
Forms attached, print and complete when able.     Nieves BENSON, Lead RN, BSN. . .  9/25/2020, 3:52 PM

## 2020-09-26 LAB
SARS-COV-2 RNA SPEC QL NAA+PROBE: NOT DETECTED
SPECIMEN SOURCE: NORMAL

## 2020-09-28 NOTE — TELEPHONE ENCOUNTER
Patient has a scheduled appt Wednesday this week with Dr Henderson , please address FMLA paperwork with patient at appointment.    Forms printed, note from patient printed and placed in Dr Dixon box downstairs. Spoke to JOCELINE and will address with patient at visit          Kristen HINOOJSA 9/28/2020 1:51 PM\

## 2020-09-30 ENCOUNTER — MYC MEDICAL ADVICE (OUTPATIENT)
Dept: ORTHOPEDICS | Facility: CLINIC | Age: 40
End: 2020-09-30

## 2020-09-30 ENCOUNTER — OFFICE VISIT (OUTPATIENT)
Dept: ORTHOPEDICS | Facility: CLINIC | Age: 40
End: 2020-09-30
Payer: COMMERCIAL

## 2020-09-30 VITALS
DIASTOLIC BLOOD PRESSURE: 71 MMHG | BODY MASS INDEX: 33.79 KG/M2 | HEIGHT: 70 IN | SYSTOLIC BLOOD PRESSURE: 123 MMHG | WEIGHT: 236 LBS

## 2020-09-30 DIAGNOSIS — S46.011A TRAUMATIC INCOMPLETE TEAR OF RIGHT ROTATOR CUFF, INITIAL ENCOUNTER: Primary | ICD-10-CM

## 2020-09-30 PROCEDURE — 99213 OFFICE O/P EST LOW 20 MIN: CPT | Performed by: ORTHOPAEDIC SURGERY

## 2020-09-30 ASSESSMENT — MIFFLIN-ST. JEOR: SCORE: 1820.74

## 2020-09-30 NOTE — LETTER
[address] [address]  [phone]          September 15, 2020    RE: Kristel Martinez  : 1980      To whom it may concern:     Please excuse the above patient from work on 2020, 2020, and 2020 due to shoulder pain secondary to a procedure she had on 2020.  If you have any questions or concerns please reach out to us at the number provided above.       Sincerely,             Dr. Henderson

## 2020-09-30 NOTE — PROGRESS NOTES
Office Visit-Follow up    Chief Complaint: Kristel Martinez is a 40 year old female who is being seen for   Chief Complaint   Patient presents with     RECHECK     mri results of right shoulder W/C DOI: 1/14/2020       History of Present Illness:   Today's visit:  Returns for cervical spine and EMG results.  Continues to have anterior posterior shoulder pain.  Pain will also radiate down her arm into her hands associated with numbness and tingling.    September 9, 2020 visit:  Returns for her right shoulder.  She reports on her last visit she received a subacromial steroid injection which provided absolutely no relief including a diagnostic response.  She was then scheduled for a fluoroscopically guided intra-articular steroid injection which only provided about 30% improvement in symptoms for a few hours.  She has been having pain radiate down her arm into her hand.  It is associate with some numbness tingling that goes into her middle finger.  Worse with reaching overhead activities.  Although has pain at rest.  She does not feel like she has made improvements with physical therapy.     July 23, 2020 visit:  Kristel Martinez is a 39 year old female who is seen in consultation at the request of HASMUKH Gonzáles for evaluation of right shoulder chronic pain.  Mechanism of Injury: January 14th, reports at work, slipped on ice, fell backwards from standing height jarring her buttock and elbow, questionable if had shoulder pain but by the next day was having quite a bit of shoulder and was seen.  Was told she was ok, given muscle relaxers.  Over the past 6 months the pain has not improved much. Pain is anterior/lateral shoulder pain. Non radiating. Continues to struggles with use of the shoulder especially with overhead, reaching away from body and ADLs like brushing teeth and hair.    Better with: ibuprofen, rest, activity modification.   Treatments tried: ibuprofen, rest, tylenol, activity modification  Prior history of  related problems:   No previous shoulder pain or issues prior to the fall.   No previous surgeries or significant trauma.       Social History     Occupational History     Not on file   Tobacco Use     Smoking status: Former Smoker     Smokeless tobacco: Never Used   Substance and Sexual Activity     Alcohol use: Yes     Comment: 1 drink per wk     Drug use: No     Sexual activity: Yes     Partners: Male       REVIEW OF SYSTEMS  General: negative for, night sweats, dizziness, fatigue  Resp: No shortness of breath and no cough  CV: negative for chest pain, syncope or near-syncope  GI: negative for nausea, vomiting and diarrhea  : negative for dysuria and hematuria  Musculoskeletal: as above  Neurologic: negative for syncope   Hematologic: negative for bleeding disorder    Physical Exam:  Vitals: There were no vitals taken for this visit.  BMI= There is no height or weight on file to calculate BMI.  Constitutional: healthy, alert and no acute distress   Psychiatric: mentation appears normal and affect normal/bright  NEURO: no focal deficits  RESP: Normal with easy respirations and no use of accessory muscles to breathe, no audible wheezing or retractions  CV: RUE:  no edema         Regular rate and rhythm by palpation  SKIN: No erythema, rashes, excoriation, or breakdown. No evidence of infection.   JOINT/EXTREMITIES:right shoulder: Radiation of symptoms can be reproduced with forward elevation of the arm.  With this she gets numbness and tingling into the hand.  GAIT: not tested             Diagnostic Modalities:  The radiologist report was reviewed of the cervical spine which is consistent with mild degenerative changes most at C5-6 with no high-grade spinal canal or foraminal stenosis.  Right upper extremity EMG:      Impression: right arm/shoulder pain    Plan:  All of the above pertinent physical exam and imaging modalities findings was reviewed with Kristel.    I reviewed her findings.  EMG and cervical spine  MRI was unremarkable.  Previous injections which includes a subacromial and a fluoroscopically guided intra-articular injection only provided a partial diagnostic response.  I am unable to explain her symptoms at this point.  Potential thoracic outlet in the differential.  However at this time I have recommended a second opinion.  This certainly could be another orthopedic surgeon.  I filled in the QRC.  Unfortunately, I have nothing further to add at this time to her care.    Restrictions as before we did provide a work note restrictions.    Return to clinic PRN, or sooner as needed for changes.  Re-x-ray on return: No    Geovanny Henderson D.O.

## 2020-09-30 NOTE — LETTER
[address] [address]  Phone: [phone]  Fax: [phone]    September 30, 2020        Kristel Martinez  [address] [place] [address] [address]  [facility] [address]          To whom it may concern:    RE: Kristel Martinez    Patient was seen today and  placed on the following restrictions:  with right upper extremity-  No push, pull or lift greater than 5lbs below chest level.  No push, pull or lift overhead.  Unable to restrain.   You were also excused from work 8/18/20, 8/21, 8/27 from work.        Please contact me for questions or concerns.      Sincerely,        Bigg Henderson, DO

## 2020-09-30 NOTE — TELEPHONE ENCOUNTER
If physical therapy is helping, continue with it. If it is causing more symptoms/pain and causing you to miss work then hold off until you get the second opinion.     DAVID Friend, CNP  Orthopedic Surgery

## 2020-09-30 NOTE — LETTER
[address] [address]  Phone: [phone]  Fax: [phone]    September 30, 2020        Kristel Martinez  [address] [place] [address] [address]  [facility] [address]          To whom it may concern:    RE: Kristel Martinez    Patient was seen today and  placed on the following restrictions:  with right upper extremity-  No push, pull or lift greater than 5lbs below chest level.  No push, pull or lift overhead.  Unable to restrain.   You were also excused from work 8/18/20, 8/21, 8/27 from work.                                Please excuse patient from work on Sept 24, 25, 29 & 30. Due to                                         shoulder pain.    Please contact me for questions or concerns.      Sincerely,        Bigg Henderson, DO

## 2020-09-30 NOTE — LETTER
9/30/2020         RE: Kristel Martinez  [address] [facility] [address] [facility] [address]        Dear Colleague,    Thank you for referring your patient, Kristel Martinez, to the [facility]. Please see a copy of my visit note below.    Office Visit-Follow up    Chief Complaint: Kristel Martinez is a 40 year old female who is being seen for   Chief Complaint   Patient presents with     RECHECK     mri results of right shoulder W/C DOI: 1/14/2020       History of Present Illness:   Today's visit:  Returns for cervical spine and EMG results.  Continues to have anterior posterior shoulder pain.  Pain will also radiate down her arm into her hands associated with numbness and tingling.    September 9, 2020 visit:  Returns for her right shoulder.  She reports on her last visit she received a subacromial steroid injection which provided absolutely no relief including a diagnostic response.  She was then scheduled for a fluoroscopically guided intra-articular steroid injection which only provided about 30% improvement in symptoms for a few hours.  She has been having pain radiate down her arm into her hand.  It is associate with some numbness tingling that goes into her middle finger.  Worse with reaching overhead activities.  Although has pain at rest.  She does not feel like she has made improvements with physical therapy.     July 23, 2020 visit:  Kristel Martinez is a 39 year old female who is seen in consultation at the request of HASMUKH Gonzáles for evaluation of right shoulder chronic pain.  Mechanism of Injury: January 14th, reports at work, slipped on ice, fell backwards from standing height jarring her buttock and elbow, questionable if had shoulder pain but by the next day was having quite a bit of shoulder and was seen.  Was told she was ok, given muscle relaxers.  Over the past 6 months the pain has not improved much. Pain is anterior/lateral shoulder pain. Non radiating. Continues to struggles with use of the  shoulder especially with overhead, reaching away from body and ADLs like brushing teeth and hair.    Better with: ibuprofen, rest, activity modification.   Treatments tried: ibuprofen, rest, tylenol, activity modification  Prior history of related problems:   No previous shoulder pain or issues prior to the fall.   No previous surgeries or significant trauma.       Social History     Occupational History     Not on file   Tobacco Use     Smoking status: Former Smoker     Smokeless tobacco: Never Used   Substance and Sexual Activity     Alcohol use: Yes     Comment: 1 drink per wk     Drug use: No     Sexual activity: Yes     Partners: Male       REVIEW OF SYSTEMS  General: negative for, night sweats, dizziness, fatigue  Resp: No shortness of breath and no cough  CV: negative for chest pain, syncope or near-syncope  GI: negative for nausea, vomiting and diarrhea  : negative for dysuria and hematuria  Musculoskeletal: as above  Neurologic: negative for syncope   Hematologic: negative for bleeding disorder    Physical Exam:  Vitals: There were no vitals taken for this visit.  BMI= There is no height or weight on file to calculate BMI.  Constitutional: healthy, alert and no acute distress   Psychiatric: mentation appears normal and affect normal/bright  NEURO: no focal deficits  RESP: Normal with easy respirations and no use of accessory muscles to breathe, no audible wheezing or retractions  CV: RUE:  no edema         Regular rate and rhythm by palpation  SKIN: No erythema, rashes, excoriation, or breakdown. No evidence of infection.   JOINT/EXTREMITIES:right shoulder: Radiation of symptoms can be reproduced with forward elevation of the arm.  With this she gets numbness and tingling into the hand.  GAIT: not tested             Diagnostic Modalities:  The radiologist report was reviewed of the cervical spine which is consistent with mild degenerative changes most at C5-6 with no high-grade spinal canal or foraminal  stenosis.  Right upper extremity EMG:      Impression: right arm/shoulder pain    Plan:  All of the above pertinent physical exam and imaging modalities findings was reviewed with Kristel.    I reviewed her findings.  EMG and cervical spine MRI was unremarkable.  Previous injections which includes a subacromial and a fluoroscopically guided intra-articular injection only provided a partial diagnostic response.  I am unable to explain her symptoms at this point.  Potential thoracic outlet in the differential.  However at this time I have recommended a second opinion.  This certainly could be another orthopedic surgeon.  I filled in the QRC.  Unfortunately, I have nothing further to add at this time to her care.    Restrictions as before we did provide a work note restrictions.    Return to clinic PRN, or sooner as needed for changes.  Re-x-ray on return: No    Geovanny Henderson D.O.          Again, thank you for allowing me to participate in the care of your patient.        Sincerely,        Bigg Henderson, DO

## 2020-10-05 ENCOUNTER — MYC MEDICAL ADVICE (OUTPATIENT)
Dept: ORTHOPEDICS | Facility: CLINIC | Age: 40
End: 2020-10-05

## 2020-10-06 ENCOUNTER — MYC MEDICAL ADVICE (OUTPATIENT)
Dept: FAMILY MEDICINE | Facility: CLINIC | Age: 40
End: 2020-10-06

## 2020-10-06 DIAGNOSIS — S46.011A TRAUMATIC INCOMPLETE TEAR OF RIGHT ROTATOR CUFF, INITIAL ENCOUNTER: ICD-10-CM

## 2020-10-06 DIAGNOSIS — M75.21 BICIPITAL TENDONITIS OF RIGHT SHOULDER: ICD-10-CM

## 2020-10-06 DIAGNOSIS — M75.41 SUBACROMIAL IMPINGEMENT OF RIGHT SHOULDER: ICD-10-CM

## 2020-10-08 ENCOUNTER — MYC MEDICAL ADVICE (OUTPATIENT)
Dept: FAMILY MEDICINE | Facility: CLINIC | Age: 40
End: 2020-10-08

## 2020-10-08 ENCOUNTER — MYC MEDICAL ADVICE (OUTPATIENT)
Dept: ORTHOPEDICS | Facility: CLINIC | Age: 40
End: 2020-10-08

## 2020-10-08 DIAGNOSIS — F33.1 MAJOR DEPRESSIVE DISORDER, RECURRENT EPISODE, MODERATE (H): Primary | ICD-10-CM

## 2020-10-08 DIAGNOSIS — S46.011A TRAUMATIC INCOMPLETE TEAR OF RIGHT ROTATOR CUFF, INITIAL ENCOUNTER: Primary | ICD-10-CM

## 2020-10-08 NOTE — TELEPHONE ENCOUNTER
Done. They should be reaching out to her to schedule.    DAVID Friend, CNP  Orthopedic Surgery

## 2020-10-12 ENCOUNTER — MYC MEDICAL ADVICE (OUTPATIENT)
Dept: ORTHOPEDICS | Facility: CLINIC | Age: 40
End: 2020-10-12

## 2020-10-12 DIAGNOSIS — S46.011A TRAUMATIC INCOMPLETE TEAR OF RIGHT ROTATOR CUFF, INITIAL ENCOUNTER: Primary | ICD-10-CM

## 2020-10-12 DIAGNOSIS — M75.41 SUBACROMIAL IMPINGEMENT OF RIGHT SHOULDER: ICD-10-CM

## 2020-10-12 NOTE — TELEPHONE ENCOUNTER
Dr. Henderson please review and sign possible referral to [facility] (referral has been pended).........................

## 2020-10-13 NOTE — TELEPHONE ENCOUNTER
We would rather the patient go to the [facility], but if she really wants to go to [facility] for a second opinion we would not stop her.  I do not believe she needs a referral from us but I will fill out the referral below .

## 2020-10-16 ENCOUNTER — PRE VISIT (OUTPATIENT)
Dept: ONCOLOGY | Facility: CLINIC | Age: 40
End: 2020-10-16

## 2020-10-16 ENCOUNTER — VIRTUAL VISIT (OUTPATIENT)
Dept: ONCOLOGY | Facility: CLINIC | Age: 40
End: 2020-10-16
Attending: NURSE PRACTITIONER
Payer: COMMERCIAL

## 2020-10-16 DIAGNOSIS — Z80.41 FAMILY HISTORY OF MALIGNANT NEOPLASM OF OVARY: ICD-10-CM

## 2020-10-16 DIAGNOSIS — C73 PAPILLARY ADENOCARCINOMA OF THYROID (H): Primary | ICD-10-CM

## 2020-10-16 PROCEDURE — 96040 HC GENETIC COUNSELING, EACH 30 MINUTES: CPT | Performed by: GENETIC COUNSELOR, MS

## 2020-10-16 PROCEDURE — 99207 PR NO CHARGE LOS: CPT | Performed by: GENETIC COUNSELOR, MS

## 2020-10-16 NOTE — LETTER
10/16/2020         RE: Kristel Martinez  [address] [facility] [address] [facility] [address]        Dear Colleague,    Thank you for referring your patient, Kristel Martinez, to the [facility]. Please see a copy of my visit note below.    10/16/2020    Referring Provider: DAVID Lazo CNP    Presenting Information:   I met with Kristel Martinez today for genetic counseling as part of the Cancer Risk Management Program (video visit completed due to the ongoing COVID-19 pandemic) to discuss her personal and family history of cancer.  She is here today to review this history, cancer screening recommendations, and available genetic testing options.    Personal History:  Kristel is a 40 year old female. She has a personal history of papillary thyroid cancer, follicular variant, diagnosed in 2013.   Baseline mammogram imaging from 12/23/2019 was benign.  Her ovaries, fallopian tubes and uterus are in place and she completed transvaginal pelvic ultrasound 8/31/2020.  Colonoscopy from 8/27/2018 was normal, and routine follow up at age 50 was recommended.      Family History: (Please see scanned pedigree for detailed family history information)    Her mother has a history of non-melanoma skin cancer     Her maternal grandmother is 82 and has a history of non hodgkin lymphoma    Her paternal aunt had a history of ovarian cancer diagnosed in her late 50's    Her ethnicity is Romanian/. There is no known Ashkenazi Jew ancestry on either side of her family.    Discussion:    Kristel's family history of ovarian cancer may be suggestive of a possible hereditary cancer syndrome.    We reviewed the features of sporadic, familial, and hereditary cancers.  Approximately 1-2% of women are diagnosed with ovarian cancer in their lifetime.  For some families, genetic testing may help to explain why a cancer developed, provide tailored management options, and clarify the risk of developing cancer in the future.    The  vast majority of cancers are considered sporadic and not primarily due to an inherited factor. Individuals can develop cancer due to aging, chance events, environmental exposures or lifestyles.      Many of the genes we are born with help prevent cancer.  When one of these genes is not working properly due to a mistake (or  mutation ), this may lead to an increased risk of cancer.  Approximately 10% of all cancers are thought to be caused by these inherited mutations.       More than one in five ovarian, peritoneal, or fallopian tube carcinomas appear to be associated with inherited risk.  Germline mutations in the BRCA1 and BRCA2 genes are the most common hereditary predisposition to ovarian cancer, attributing to approximately 13-15% of all diagnoses.     A detailed handout regarding hereditary ovarian cancer and the information we discussed was provided to Kristel at the end of our appointment today and can be found in the after visit summary. Topics included: inheritance pattern, cancer risks, cancer screening recommendations, and also risks, benefits and limitations of testing.    Based on her family history, Kristel meets current National Comprehensive Cancer Network (NCCN) criteria for genetic testing of high penetrance breast and/or ovarian cancer susceptibility genes.      We discussed that there are additional genes that could cause increased risk for ovarian and/or possibly thyroid cancer. As many of these genes present with overlapping features in a family and accurate cancer risk cannot always be established based upon the pedigree analysis alone, it would be reasonable for Kristel to consider panel genetic testing to analyze multiple genes at once.  Kristel elected to proceed with a prior authorization today for genetic testing to first determine insurance coverage and cost.      Kristel expressed interest in expanded genetic testing today, and elected to first proceed with an insurance authorization to  determine coverage and cost of testing.  A prior authorization will be submitted through [employer] for an expanded panel of genes related to hereditary breast, gynecologic, gastrointestinal, thyroid, and related cancers: APC, VASILIY, AXIN2, BARD1, BMPR1A, BRCA1, BRCA2, BRIP1, CDH1, CDK4, CDKN2A, CHEK2, DICER1, EPCAM, GREM1, HOXB13, MLH1, MSH2, MSH3, MSH6, MUTYH, NBN, NF1, NTHL1, PALB2, PMS2, POLD1, POLE, PTEN, SUBPA1D, RAD51C, RAD51D, RECQL, RET, SMAD4, SMARCA4, STK11, and TP53 (38-gene CustomNext-Cancer panel)    Medical Management: For Kristel, we reviewed that the information from genetic testing may determine:    additional cancer screening for which Kristel may qualify (i.e. mammogram and breast MRI, more frequent colonoscopies, more frequent dermatologic exams, etc.),    options for risk reducing surgeries Kristel could consider (i.e. bilateral mastectomy, surgery to remove her ovaries and/or uterus, etc.),      and targeted chemotherapies for certain cancers in the future (i.e. immunotherapy for individuals with Trevino syndrome, PARP inhibitors, etc.).     These recommendations will be discussed in detail once genetic testing is completed.     Plan:  1) Today Kristel elected to pursue a benefits analysis through [employer] to better understand her out of pocket cost for the 38 gene CustomNext-Cancer panel.  2) Kristel will be contacted with the expected out of pocket cost when available.      Time spent (Video visit): 18 minutes    Elsy Casanova MS, [facility]  Licensed, Certified Genetic Counselor  [facility]  Phone: [phone]

## 2020-10-16 NOTE — LETTER
Cancer Risk Management  Program Locations    [facility]  [facility]  Mailing Address  Cancer Risk Management Program  [facility]  [address]  [place] [address]    New patient appointments  [phone]  October 19, 2020    Kristel Martinez  [address] [facility] [address] [facility] [address]      Dear Kristel,    It was a pleasure speaking with you on 10/16/2020. Here is a copy of the progress note from your recent genetic counseling visit to the Cancer Risk Management Program. If you have any additional questions, please feel free to call.      Referring Provider: DAVID Lazo CNP    Presenting Information:   I met with Kristel Martinez today for genetic counseling as part of the Cancer Risk Management Program (video visit completed due to the ongoing COVID-19 pandemic) to discuss her personal and family history of cancer.  She is here today to review this history, cancer screening recommendations, and available genetic testing options.    Personal History:  Kristel is a 40 year old female. She has a personal history of papillary thyroid cancer, follicular variant, diagnosed in 2013.   Baseline mammogram imaging from 12/23/2019 was benign.  Her ovaries, fallopian tubes and uterus are in place and she completed transvaginal pelvic ultrasound 8/31/2020.  Colonoscopy from 8/27/2018 was normal, and routine follow up at age 50 was recommended.      Family History: (Please see scanned pedigree for detailed family history information)    Her mother has a history of non-melanoma skin cancer     Her maternal grandmother is 82 and has a history of non hodgkin lymphoma    Her paternal aunt had a history of ovarian cancer diagnosed in her late 50's    Her ethnicity is Syriac/. There is no known Ashkenazi Protestant ancestry on either side  of her family.    Discussion:    Kristel's family history of ovarian cancer may be suggestive of a possible hereditary cancer syndrome.    We reviewed the features of sporadic, familial, and hereditary cancers.  Approximately 1-2% of women are diagnosed with ovarian cancer in their lifetime.  For some families, genetic testing may help to explain why a cancer developed, provide tailored management options, and clarify the risk of developing cancer in the future.    The vast majority of cancers are considered sporadic and not primarily due to an inherited factor. Individuals can develop cancer due to aging, chance events, environmental exposures or lifestyles.      Many of the genes we are born with help prevent cancer.  When one of these genes is not working properly due to a mistake (or  mutation ), this may lead to an increased risk of cancer.  Approximately 10% of all cancers are thought to be caused by these inherited mutations.       More than one in five ovarian, peritoneal, or fallopian tube carcinomas appear to be associated with inherited risk.  Germline mutations in the BRCA1 and BRCA2 genes are the most common hereditary predisposition to ovarian cancer, attributing to approximately 13-15% of all diagnoses.     A detailed handout regarding hereditary ovarian cancer and the information we discussed was provided to Kristel at the end of our appointment today and can be found in the after visit summary. Topics included: inheritance pattern, cancer risks, cancer screening recommendations, and also risks, benefits and limitations of testing.    Based on her family history, Kristel meets current National Comprehensive Cancer Network (NCCN) criteria for genetic testing of high penetrance breast and/or ovarian cancer susceptibility genes.      We discussed that there are additional genes that could cause increased risk for ovarian and/or possibly thyroid cancer. As many of these genes present with overlapping features  in a family and accurate cancer risk cannot always be established based upon the pedigree analysis alone, it would be reasonable for Kristel to consider panel genetic testing to analyze multiple genes at once.  Kristel elected to proceed with a prior authorization today for genetic testing to first determine insurance coverage and cost.      Kristel expressed interest in expanded genetic testing today, and elected to first proceed with an insurance authorization to determine coverage and cost of testing.  A prior authorization will be submitted through [employer] for an expanded panel of genes related to hereditary breast, gynecologic, gastrointestinal, thyroid, and related cancers: APC, VASILIY, AXIN2, BARD1, BMPR1A, BRCA1, BRCA2, BRIP1, CDH1, CDK4, CDKN2A, CHEK2, DICER1, EPCAM, GREM1, HOXB13, MLH1, MSH2, MSH3, MSH6, MUTYH, NBN, NF1, NTHL1, PALB2, PMS2, POLD1, POLE, PTEN, HYSWT4N, RAD51C, RAD51D, RECQL, RET, SMAD4, SMARCA4, STK11, and TP53 (38-gene CustomNext-Cancer panel)    Medical Management: For Kristel, we reviewed that the information from genetic testing may determine:    additional cancer screening for which Kristel may qualify (i.e. mammogram and breast MRI, more frequent colonoscopies, more frequent dermatologic exams, etc.),    options for risk reducing surgeries Kristel could consider (i.e. bilateral mastectomy, surgery to remove her ovaries and/or uterus, etc.),      and targeted chemotherapies for certain cancers in the future (i.e. immunotherapy for individuals with Trevino syndrome, PARP inhibitors, etc.).     These recommendations will be discussed in detail once genetic testing is completed.     Plan:  1) Today Kristel elected to pursue a benefits analysis through [employer] to better understand her out of pocket cost for the 38 gene CustomNext-Cancer panel.  2) Kristel will be contacted with the expected out of pocket cost when available.      Elsy Casanova MS, [facility]  Licensed, Certified Genetic Counselor  [facility]  [facility]  Phone: [phone]

## 2020-10-16 NOTE — PROGRESS NOTES
10/16/2020    Referring Provider: DAVID Lazo CNP    Presenting Information:   I met with Kristel Martinez today for genetic counseling as part of the Cancer Risk Management Program (video visit completed due to the ongoing COVID-19 pandemic) to discuss her personal and family history of cancer.  She is here today to review this history, cancer screening recommendations, and available genetic testing options.    Personal History:  Kristel is a 40 year old female. She has a personal history of papillary thyroid cancer, follicular variant, diagnosed in 2013.   Baseline mammogram imaging from 12/23/2019 was benign.  Her ovaries, fallopian tubes and uterus are in place and she completed transvaginal pelvic ultrasound 8/31/2020.  Colonoscopy from 8/27/2018 was normal, and routine follow up at age 50 was recommended.      Family History: (Please see scanned pedigree for detailed family history information)    Her mother has a history of non-melanoma skin cancer     Her maternal grandmother is 82 and has a history of non hodgkin lymphoma    Her paternal aunt had a history of ovarian cancer diagnosed in her late 50's    Her ethnicity is Welsh/. There is no known Ashkenazi Sikh ancestry on either side of her family.    Discussion:    Kristel's family history of ovarian cancer may be suggestive of a possible hereditary cancer syndrome.    We reviewed the features of sporadic, familial, and hereditary cancers.  Approximately 1-2% of women are diagnosed with ovarian cancer in their lifetime.  For some families, genetic testing may help to explain why a cancer developed, provide tailored management options, and clarify the risk of developing cancer in the future.    The vast majority of cancers are considered sporadic and not primarily due to an inherited factor. Individuals can develop cancer due to aging, chance events, environmental exposures or lifestyles.      Many of the genes we are born with help prevent  cancer.  When one of these genes is not working properly due to a mistake (or  mutation ), this may lead to an increased risk of cancer.  Approximately 10% of all cancers are thought to be caused by these inherited mutations.       More than one in five ovarian, peritoneal, or fallopian tube carcinomas appear to be associated with inherited risk.  Germline mutations in the BRCA1 and BRCA2 genes are the most common hereditary predisposition to ovarian cancer, attributing to approximately 13-15% of all diagnoses.     A detailed handout regarding hereditary ovarian cancer and the information we discussed was provided to Kristel at the end of our appointment today and can be found in the after visit summary. Topics included: inheritance pattern, cancer risks, cancer screening recommendations, and also risks, benefits and limitations of testing.    Based on her family history, Kristel meets current National Comprehensive Cancer Network (NCCN) criteria for genetic testing of high penetrance breast and/or ovarian cancer susceptibility genes.      We discussed that there are additional genes that could cause increased risk for ovarian and/or possibly thyroid cancer. As many of these genes present with overlapping features in a family and accurate cancer risk cannot always be established based upon the pedigree analysis alone, it would be reasonable for Kristel to consider panel genetic testing to analyze multiple genes at once.  Kristel elected to proceed with a prior authorization today for genetic testing to first determine insurance coverage and cost.      Kristel expressed interest in expanded genetic testing today, and elected to first proceed with an insurance authorization to determine coverage and cost of testing.  A prior authorization will be submitted through [employer] for an expanded panel of genes related to hereditary breast, gynecologic, gastrointestinal, thyroid, and related cancers: APC, VASILIY, AXIN2, BARD1,  BMPR1A, BRCA1, BRCA2, BRIP1, CDH1, CDK4, CDKN2A, CHEK2, DICER1, EPCAM, GREM1, HOXB13, MLH1, MSH2, MSH3, MSH6, MUTYH, NBN, NF1, NTHL1, PALB2, PMS2, POLD1, POLE, PTEN, UJZGF9X, RAD51C, RAD51D, RECQL, RET, SMAD4, SMARCA4, STK11, and TP53 (38-gene CustomNext-Cancer panel)    Medical Management: For Kristel, we reviewed that the information from genetic testing may determine:    additional cancer screening for which Kristel may qualify (i.e. mammogram and breast MRI, more frequent colonoscopies, more frequent dermatologic exams, etc.),    options for risk reducing surgeries Kristel could consider (i.e. bilateral mastectomy, surgery to remove her ovaries and/or uterus, etc.),      and targeted chemotherapies for certain cancers in the future (i.e. immunotherapy for individuals with Trevino syndrome, PARP inhibitors, etc.).     These recommendations will be discussed in detail once genetic testing is completed.     Plan:  1) Today Kristel elected to pursue a benefits analysis through [employer] to better understand her out of pocket cost for the 38 gene CustomNext-Cancer panel.  2) Kristel will be contacted with the expected out of pocket cost when available.      Time spent (Video visit): 18 minutes    Elsy Casanova MS, [facility]  Licensed, Certified Genetic Counselor  [facility]  Phone: [phone]

## 2020-10-19 NOTE — PATIENT INSTRUCTIONS
Assessing Cancer Risk  Only about 5-10% of cancers are thought to be due to an inherited cancer susceptibility gene.    These families often have:    Several people with the same or related types of cancer    Cancers diagnosed at a young age (before age 50)    Individuals with more than one primary cancer    Multiple generations of the family affected with cancer    Some people may be candidates for genetic testing of more than one gene.  For these families, genetic testing using a cancer panel may be offered.  These panels will test different genes known to increase the risk for breast, ovarian, uterine, and/or other cancers. All of the genes discussed below have published clinical management guidelines for individuals who are found to carry a mutation. The purpose of this handout is to serve as a brief summary of the genes analyzed by the panels used to inquire about hereditary breast and gynecologic cancer:  VASILIY, BRCA1, BRCA2, BRIP1, CDH1, CHEK2, MLH1, MSH2, MSH6, PMS2, EPCAM, PTEN, PALB2, RAD51C, RAD51D, and TP53.  ______________________________________________________________________________  Hereditary Breast and Ovarian Cancer Syndrome   (BRCA1 and BRCA2)  A single mutation in one of the copies of BRCA1 or BRCA2 increases the risk for breast and ovarian cancer, among others.  The risk for pancreatic cancer and melanoma may also be slightly increased in some families.  The chart below shows the chance that someone with a BRCA mutation would develop cancer in his or her lifetime1,2,3,4.        A person s ethnic background is also important to consider, as individuals of Ashkenazi Zoroastrianism ancestry have a higher chance of having a BRCA gene mutation.  There are three BRCA mutations that occur more frequently in this population.    Trevino Syndrome   (MLH1, MSH2, MSH6, PMS2, and EPCAM)  Currently five genes are known to cause Trevino Syndrome: MLH1, MSH2, MSH6, PMS2, and EPCAM.  A single mutation in one of the  Trevino Syndrome genes increases the risk for colon, endometrial, ovarian, and stomach cancers.  Other cancers that occur less commonly in Trevino Syndrome include urinary tract, skin, and brain cancers.  The chart below shows the chance that a person with Trevino syndrome would develop cancer in his or her lifetime5.      *Cancer risk varies depending on Trevino syndrome gene found    Cowden Syndrome   (PTEN)  Cowden syndrome is a hereditary condition that increases the risk for breast, thyroid, endometrial, colon, and kidney cancer.  Cowden syndrome is caused by a mutation in the PTEN gene.  A single mutation in one of the copies of PTEN causes Cowden syndrome and increases cancer risk.  The chart below shows the chance that someone with a PTEN mutation would develop cancer in their lifetime6,7.  Other benign features seen in some individuals with Cowden syndrome include benign skin lesions (facial papules, keratoses, lipomas), learning disability, autism, thyroid nodules, colon polyps, and larger head size.      *One recent study found breast cancer risk to be increased to 85%    Li-Fraumeni Syndrome   (TP53)  Li-Fraumeni Syndrome (LFS) is a cancer predisposition syndrome caused by a mutation in the TP53 gene. A single mutation in one of the copies of TP53 increases the risk for multiple cancers. Individuals with LFS are at an increased risk for developing cancer at a young age. The lifetime risk for development of a LFS-associated cancer is 50% by age 30 and 90% by age 60.   Core Cancers: Sarcomas, Breast, Brain, Lung, Leukemias/Lymphomas, Adrenocortical carcinomas  Other Cancers: Gastrointestinal, Thyroid, Skin, Genitourinary    Hereditary Diffuse Gastric Cancer   (CDH1)  Currently, one gene is known to cause hereditary diffuse gastric cancer (HDGC): CDH1.  Individuals with HDGC are at increased risk for diffuse gastric cancer and lobular breast cancer. Of people diagnosed with HDGC, 30-50% have a mutation in the CDH1  gene.  This suggests there are likely other genes that may cause HDGC that have not been identified yet.      Lifetime Cancer Risks    General Population HDGC    Diffuse Gastric  <1% ~80%   Breast 12% 39-52%         Additional Genes  VASILIY  VASILIY is a moderate-risk breast cancer gene. Women who have a mutation in VASILIY can have between a 2-4 fold increased risk for breast cancer compared to the general population8. VASILIY mutations have also been associated with increased risk for pancreatic cancer, however an estimate of this cancer risk is not well understood9. Individuals who inherit two VASILIY mutations have a condition called ataxia-telangiectasia (AT).  This rare autosomal recessive condition affects the nervous system and immune system, and is associated with progressive cerebellar ataxia beginning in childhood.  Individuals with ataxia-telangiectasia often have a weakened immune system and have an increased risk for childhood cancers.    PALB2  Mutations in PALB2 have been shown to increase the risk of breast cancer up to 33-58% in some families; where individuals fall within this risk range is dependent upon family [id number]. PALB2 mutations have also been associated with increased risk for pancreatic cancer, although this risk has not been quantified yet.  Individuals who inherit two PALB2 mutations--one from their mother and one from their father--have a condition called Fanconi Anemia.  This rare autosomal recessive condition is associated with short stature, developmental delay, bone marrow failure, and increased risk for childhood cancers.    CHEK2   CHEK2 is a moderate-risk breast cancer gene.  Women who have a mutation in CHEK2 have around a 2-fold increased risk for breast cancer compared to the general population, and this risk may be higher depending upon family history.11,12,13 Mutations in CHEK2 have also been shown to increase the risk of a number of other cancers, including colon and prostate, however  these cancer risks are currently not well understood.    BRIP1, RAD51C and RAD51D  Mutations in BRIP1, RAD51C, and RAD51D have been shown to increase the risk of ovarian cancer and possibly female breast cancer as well14,15 .       Lifetime Cancer Risk    General Population BRIP1 RAD51C RAD51D   Ovarian 1-2% ~5-8% ~5-9% ~7-15%           Inheritance  All of the cancer syndromes reviewed above are inherited in an autosomal dominant pattern.  This means that if a parent has a mutation, each of his or her children will have a 50% chance of inheriting that same mutation.  Therefore, each child--male or female--would have a 50% chance of being at increased risk for developing cancer.      Image obtained from Genetics Home Reference, 2013     Mutations in some genes can occur de ac, which means that a person s mutation occurred for the first time in them and was not inherited from a parent.  Now that they have the mutation, however, it can be passed on to future generations.    Genetic Testing  Genetic testing involves a blood test and will look at the genetic information in the VASILIY, BRCA1, BRCA2, BRIP1, CDH1, CHEK2, MLH1, MSH2, MSH6, PMS2, EPCAM, PTEN, PALB2, RAD51C, RAD51D, and TP53 genes for any harmful mutations that are associated with increased cancer risk.  If possible, it is recommended that the person(s) who has had cancer be tested before other family members.  That person will give us the most useful information about whether or not a specific gene is associated with the cancer in the family.    Results  There are three possible results of genetic testing:    Positive--a harmful mutation was identified in one or more of the genes    Negative--no mutation was identified in any of the genes on this panel    Variant of unknown significance--a variation in one of the genes was identified, but it is unclear how this impacts cancer risk in the family    Advantages and Disadvantages   There are advantages and  disadvantages to genetic testing.    Advantages    May clarify your cancer risk    Can help you make medical decisions    May explain the cancers in your family    May give useful information to your family members (if you share your results)    Disadvantages    Possible negative emotional impact of learning about inherited cancer risk    Uncertainty in interpreting a negative test result in some situations    Possible genetic discrimination concerns (see below)    Genetic Information Nondiscrimination Act (POPPY)  POPPY is a federal law that protects individuals from health insurance or employment discrimination based on a genetic test result alone.  Although rare, there are currently no legal discrimination protections in terms of life insurance, long term care, or disability insurances.  Visit the [employer] [place] website to learn more.    Reducing Cancer Risk  All of the genes described above have nationally recognized cancer screening guidelines that would be recommended for individuals who test positive.  In addition to increased cancer screening, surgeries may be offered or recommended to reduce cancer risk.  Recommendations are based upon an individual s genetic test result as well as their personal and family history of cancer.    Questions to Think About Regarding Genetic Testing:    What effect will the test result have on me and my relationship with my family members if I have an inherited gene mutation?  If I don t have a gene mutation?    Should I share my test results, and how will my family react to this news, which may also affect them?    Are my children ready to learn new information that may one day affect their own health?    Hereditary Cancer Resources    FORCE: Facing Our Risk of Cancer Empowered facingourrisk.org   Bright Pink bebrightpink.org   Li-Fraumeni Syndrome Association lfsassociation.org   PTEN World PTENworld.com   No stomach for cancer, Inc.  nostomachforcancer.org   Stomach cancer relief network Scrnet.org   Collaborative Group of the Americas on Inherited Colorectal Cancer (CGA) cgaicc.com    Cancer Care cancercare.org   American Cancer Society (ACS) cancer.org   National Cancer [place] (NCI) cancer.gov     Please call us if you have any questions or concerns.   Cancer Risk Management Program [phone]-CANCER ([phone])  ? Timothy Matos, MS, [facility] [phone]  ? Mary Cavanaugh, MS, [facility]  [phone]  ? Elsy Casanova, MS, [facility]  [phone]  ? Juju Ratliff, MS, [facility] [phone]  ? Elida Zulma, MS, [facility] [phone]  ? Sharad Lundberg, MS, [facility]  [phone]  ? Graciela Melvin, MS, [facility]  [phone]    References  1. Michelle JARA, Cb PDP, Jim S, Maury OSORIO, Chad JE, Ayanna JL, Nilda N, Justin H, Vinicius O, Cooper A, Jeronimoini B, Radisirena P, Mantamarakimarylu S, Doris DM, Mckeon N, Pedro E, Keegan H, Yang E, Bharath J, Gronalvin J, Owen B, Danilous H, Thorlacius S, Eerola H, Nevmarylulinna H, Nikole K, Devin OP. Average risks of breast and ovarian cancer associated with BRCA1 or BRCA2 mutations detected in case series unselected for family history: a combined analysis of 222 studies. Am J Hum Verena. 2003;72:1117-30.  2. Zachary N, Ricarda M, Luna G.  BRCA1 and BRCA2 Hereditary Breast and Ovarian Cancer. Gene Reviews online. 2013.  3. Case YC, Elena S, Shahnaz G, Ng S. Breast cancer risk among male BRCA1 and BRCA2 mutation carriers. J Natl Cancer Inst. 2007;99:1811-4.  4. Feliciano LINDSAY, Puneet I, Pelon J, Nery E, Emma ER, Wojciech F. Risk of breast cancer in male BRCA2 carriers. J Med Verena. 2010;47:710-1.  5. National Comprehensive Cancer Network. Clinical practice guidelines in oncology, colorectal cancer screening. Available online (registration required). 2015.  6. Jono FRENCH, Caesar J, Geo J, Mary LA, Marc MONTERO, Cher C. Lifetime cancer risks in individuals with germline PTEN mutations. Clin Cancer Res. 2012;18:400-7.  7. Pilarski R. Cowden  Syndrome: A Critical Review of the Clinical Literature. J Verena . 2009:18:13-27.  8. Michael A, Antonio D, Leandro S, Anel P, Alex T, Antwan M, Guicho B, Yaw H, Janet R, Jermaine K, Jose Francisco L, Feliciano DG, Doris D, Manuel DF, Tisha MR, The Breast Cancer Susceptibility Collaboration (UK) & Margarette URBANO. VASILIY mutations that cause ataxia-telangiectasia are breast cancer susceptibility alleles. Nature Genetics. 2006;38:873-875  9. Yan N , Shantel Y, Blanca J, Yoseph L, Rita GM , Sherry ML, Gallinger S, Daugherty AG, Syngal S, Shaye ML, Crow J , Kris R, Anamika SZ, Viki JR, Corin VE, Michael M, Vojose B, Bharath N, Mayra RH, Italia KW, and Mark AP. VASILIY mutations in patients with hereditary pancreatic cancer. Cancer Discover. 2012;2:41-46  10. Michelle RAMEY, et al. Breast-Cancer Risk in Families with Mutations in PALB2. NEJM. 2014; 371(6):497-506.  11. CHEK2 Breast Cancer Case-Control Consortium. CHEK2*1100delC and susceptibility to breast cancer: A collaborative analysis involving 10,860 breast cancer cases and 9,065 controls from 10 studies. Am J Hum Verena, 74 (2004), pp. 5951-3779  12. Davonte T, Bre S, Cheryl K, et al. Spectrum of Mutations in BRCA1, BRCA2, CHEK2, and TP53 in Families at High Risk of Breast Cancer. KRISTI. 2006;295(12):9778-0029.   13. Sean C, Bryson D, Ivette A, et al. Risk of breast cancer in women with a CHEK2 mutation with and without a family history of breast cancer. J Clin Oncol. 2011;29:7381-6518.  14. Geo H, Heraclio E, Melodie SJ, et al. Contribution of germline mutations in the RAD51B, RAD51C, and RAD51D genes to ovarian cancer in the population. J Clin Oncol. 2015;33(26):5715-4157. Doi:10.1200/JCO.2015.61.2408.  15. Ry T, Dolly SOLITARIO, Henry P, et al. Mutations in BRIP1 confer high risk of ovarian cancer. Adelaide Verena. 2011;43(11):5515-3934. doi:10.1038/ng.955.

## 2020-10-20 ENCOUNTER — MYC MEDICAL ADVICE (OUTPATIENT)
Dept: ORTHOPEDICS | Facility: CLINIC | Age: 40
End: 2020-10-20

## 2020-10-20 NOTE — TELEPHONE ENCOUNTER
Darrell Humphries, here is a copy of your latest letter. You should be able to find this in the letters tab in your my chart as well. I did resend the fax to [facility] in [place] for your referral. They should be calling you to schedule. If you have not heard from them in the next day or two, here is their number to call and schedule.....  [phone]. Please let us know if you need further assistance.   Thank you,  Jimenez Sesay, CMA    Letter by Yuliet Land DO on 9/30/2020           [address] [address]  Phone: [phone]  Fax: [phone]         [address] [address]  Phone: [phone]  Fax: [phone]     September 30, 2020           Kristel Martinez  [address] [place] [address] [address] [facility] [address]              To whom it may concern:     RE: Kristel Martinez     Patient was seen today and  placed on the following restrictions:  with right upper extremity-  No push, pull or lift greater than 5lbs below chest level.  No push, pull or lift overhead.  Unable to restrain.   You were also excused from work 8/18/20, 8/21, 8/27 from work.                                Please excuse patient from work on Sept 24, 25, 29 & 30. Due to                                               shoulder pain.     Please contact me for questions or concerns.        Sincerely,           Yuliet Land DO   Communication Managment Recipients       Letter on: 9/30/2020 by: YULIET LAND   Recipients: No Communication Management recipients.

## 2020-10-26 ENCOUNTER — TRANSFERRED RECORDS (OUTPATIENT)
Dept: HEALTH INFORMATION MANAGEMENT | Facility: CLINIC | Age: 40
End: 2020-10-26

## 2020-10-30 ENCOUNTER — TRANSFERRED RECORDS (OUTPATIENT)
Dept: HEALTH INFORMATION MANAGEMENT | Facility: CLINIC | Age: 40
End: 2020-10-30

## 2020-11-04 ENCOUNTER — VIRTUAL VISIT (OUTPATIENT)
Dept: FAMILY MEDICINE | Facility: OTHER | Age: 40
End: 2020-11-04

## 2020-11-04 NOTE — PROGRESS NOTES
"Date: 2020 14:34:24  Clinician: Tanisha Werner  Clinician NPI: [id number]  Patient: Kristel Martinez  Patient : 1980  Patient Address: [address] [place] [address] [address], [place] [address]  Patient Phone: [phone]  Visit Protocol: URI  Patient Summary:  Kristel is a 40 year old ( : 1980 ) female who initiated a [facility] Visit for COVID-19 (Coronavirus) evaluation and screening. When asked the question \"Please sign me up to receive news, health information and promotions. \", Kristel responded \"No\".    Kristel states her symptoms started 1-2 days ago.   Her symptoms consist of rhinitis, myalgia, chills, malaise, a sore throat, ear pain, enlarged lymph nodes, a cough, and nausea. She is experiencing mild difficulty breathing with activities but can speak normally in full sentences. Kristel also feels feverish.   Symptom details     Nasal secretions: The color of her mucus is yellow and clear.    Cough: Kristel coughs a few times an hour and her cough is more bothersome at night. Phlegm does not come into her throat when she coughs. She does not believe her cough is caused by post-nasal drip.     Sore throat: Kristel reports having mild throat pain (1-3 on a 10 point pain scale), does not have exudate on her tonsils, and can swallow liquids. The lymph nodes in her neck are enlarged. A rash has not appeared on the skin since the sore throat started.     Temperature: Her current temperature is 100.2 degrees Fahrenheit. Kristel has had a temperature over 100 degrees Fahrenheit for 1-2 days.      Kristel denies having vomiting, facial pain or pressure, teeth pain, ageusia, diarrhea, headache, wheezing, nasal congestion, and anosmia. She also denies taking antibiotic medication in the past month, having recent facial or sinus surgery in the past 60 days, and having a sinus infection within the past year.   Precipitating events  Kristel is not sure if she has been exposed to someone with strep throat. She has not recently been exposed to " someone with influenza. Kristel has been in close contact with the following high risk individuals: people with asthma, heart disease or diabetes.   Pertinent COVID-19 (Coronavirus) information  Kristel does not work or volunteer as healthcare worker or a . In the past 14 days, Kristel has not worked or volunteered at a healthcare facility or group living setting.   In the past 14 days, she also has not lived in a congregate living setting.   Kristel has not had a close contact with a laboratory-confirmed COVID-19 patient within 14 days of symptom onset.    Since December 2019, Kristel has been tested for COVID-19 and has not had upper respiratory infection or influenza-like illness.      Result of COVID-19 test: Negative     Date of her COVID-19 test: 08/08/2020      Pertinent medical history  Kristel typically gets a yeast infection when she takes antibiotics. She has used fluconazole (Diflucan) to treat previous yeast infections. 2 doses of fluconazole (Diflucan) has typically been needed for symptoms to resolve in the past.  Kristel needs a return to work/school note.   Weight: 240 lbs   Kristel does not smoke or use smokeless tobacco.   She denies pregnancy and denies breastfeeding. She has menstruated in the past month.   Weight: 240 lbs    MEDICATIONS: escitalopram oxalate oral, bupropion HCl oral, levothyroxine oral, minocycline oral, ALLERGIES: Keflex, Septra  Clinician Response:  Dear Kristel,         Your symptoms show that you may have coronavirus (COVID-19). This&nbsp;illness can cause fever, cough and trouble breathing. Many people get a mild case and get better on their own. Some people can get very sick.  What should I do?  We would like to test you for this virus.  1. Please call [phone] to schedule your visit. Explain that you were referred by [facility] to have a COVID-19 test. Be ready to share your [facility] visit ID number. Do not schedule your appointment until you have had at least 2 days of  "symptoms or you may receive a false negative result.  The following will serve as your written order for this COVID Test, ordered by me, for the indication of suspected COVID [Z20.828]: The test will be ordered in [employer], our electronic health record, after you are scheduled. It will show as ordered and authorized by Devin Lundberg MD.  Order: COVID-19 (Coronavirus) PCR for SYMPTOMATIC testing from [facility].    2. When it's time for your COVID test:  Stay at least 6 feet away from others. (If someone will drive you to your test, stay in the backseat, as far away from the  as you can.)  Cover your mouth and nose with a mask, tissue or washcloth.  Go straight to the testing site. Don't make any stops on the way there or back.    3.Starting now:&nbsp;Stay home and away from others (self-isolate) until:   You've had&nbsp;no&nbsp;fever---and no medicine that reduces fever---for one full day (24 hours).&nbsp;And...  Your other symptoms have gotten better. For example, your cough or breathing has improved.&nbsp;And...  At least&nbsp;10 days&nbsp;have passed since your symptoms started.    During this time, don't leave the house except for testing or medical care.   Stay in your own room, even for meals. Use your own bathroom if you can.  Stay away from others in your home. No hugging, kissing or shaking hands. No visitors.  Don't go to work, school or anywhere else.   Clean \"high touch\" surfaces often (doorknobs, counters, handles, etc.). Use a household cleaning spray or wipes. You'll find a full list of  on the EPA website:&nbsp;www.epa.gov/pesticide-registration/list-n-disinfectants-use-against-sars-cov-2.   Cover your mouth and nose with a mask, tissue or washcloth to avoid spreading germs.  Wash your hands and face often. Use soap and water.  Caregivers in these groups are at risk for severe illness due to COVID-19:  o People 65 years and older  o People who live in a nursing home or long-term care facility  o " People with chronic disease (lung, heart, cancer, diabetes, kidney, liver, immunologic)  o People who have a weakened immune system, including those who:   Are in cancer treatment  Take medicine that weakens the immune system, such as corticosteroids  Had a bone marrow or organ transplant  Have an immune deficiency  Have poorly controlled HIV or AIDS  Are obese (body mass index of 40 or higher)  Smoke regularly   o Caregivers should wear gloves while washing dishes, handling laundry and cleaning bedrooms and bathrooms.  o Use caution when washing and drying laundry: Don't shake dirty laundry, and use the warmest water setting that you can.  o For more tips, go to&nbsp;www.cdc.gov/coronavirus/2019-ncov/downloads/10Things.pdf.   How can I take care of myself?    Get lots of rest. Drink extra fluids&nbsp;(unless a doctor has told you not to).  Take Tylenol (acetaminophen) for fever or pain.&nbsp;If you have liver or kidney problems, ask your family doctor if it's okay to take Tylenol.   Adults can take either:   650 mg (two 325 mg pills) every 4 to 6 hours,&nbsp;or...  1,000 mg (two 500 mg pills) every 8 hours as needed.  Note:&nbsp;Don't take more than 3,000 mg in one day. Acetaminophen is found in many medicines (both prescribed and over-the-counter medicines). Read all labels to be sure you don't take too much.   For children, check the Tylenol bottle for the right dose. The dose is based on the child's age or weight.   If you have other health problems (like cancer, heart failure, an organ transplant or severe kidney disease):&nbsp;Call your specialty clinic if you don't feel better in the next 2 days.    Know when to call 911.&nbsp;Emergency warning signs include:   Trouble breathing or shortness of breath Pain or pressure in the chest that doesn't go away Feeling confused like you haven't felt before, or not being able to wake up Bluish-colored lips or face.  Where can I get more information?   [facility]  -- About COVID-19:&nbsp;[email or web address]  CDC -- What to Do If You're Sick:&nbsp;www.cdc.gov/coronavirus/2019-ncov/about/steps-when-sick.html  [facility] -- Ending Home Isolation:&nbsp;www.cdc.gov/coronavirus/2019-ncov/hcp/disposition-in-home-patients.html  [facility] -- Caring for Someone:&nbsp;www.cdc.gov/coronavirus/2019-ncov/if-you-are-sick/care-for-someone.html  [facility] -- Interim Guidance for Hospital Discharge to Home:&nbsp;www.[facility].[place]./diseases/coronavirus/hcp/hospdischarge.pdf  [facility] clinical trials (COVID-19 research studies):&nbsp;clinicalaffairs.[facility]-clinical-trials  Below are the COVID-19 hotlines at the [facility] ([facility]). Interpreters are available.   For health questions: Call [phone] or 1-296.145.2686 (7 a.m. to 7 p.m.) For questions about schools and childcare: Call 136-917-3910 or [phone] (7 a.m. to 7 p.m.)           Diagnosis: Contact with and (suspected) exposure to other viral communicable diseases  Diagnosis ICD: Z20.828

## 2020-11-06 DIAGNOSIS — F41.1 GAD (GENERALIZED ANXIETY DISORDER): ICD-10-CM

## 2020-11-06 DIAGNOSIS — F33.1 MAJOR DEPRESSIVE DISORDER, RECURRENT EPISODE, MODERATE (H): ICD-10-CM

## 2020-11-09 ENCOUNTER — VIRTUAL VISIT (OUTPATIENT)
Dept: FAMILY MEDICINE | Facility: CLINIC | Age: 40
End: 2020-11-09
Payer: COMMERCIAL

## 2020-11-09 ENCOUNTER — MYC MEDICAL ADVICE (OUTPATIENT)
Dept: FAMILY MEDICINE | Facility: CLINIC | Age: 40
End: 2020-11-09

## 2020-11-09 DIAGNOSIS — U07.1 COVID-19 VIRUS INFECTION: Primary | ICD-10-CM

## 2020-11-09 PROCEDURE — 99213 OFFICE O/P EST LOW 20 MIN: CPT | Mod: 95 | Performed by: FAMILY MEDICINE

## 2020-11-09 NOTE — PROGRESS NOTES
"Kristel Martinez is a 40 year old female who is being evaluated via a billable video visit.      The patient has been notified of following:     \"This video visit will be conducted via a call between you and your physician/provider. We have found that certain health care needs can be provided without the need for an in-person physical exam.  This service lets us provide the care you need with a video conversation.  If a prescription is necessary we can send it directly to your pharmacy.  If lab work is needed we can place an order for that and you can then stop by our lab to have the test done at a later time.    Video visits are billed at different rates depending on your insurance coverage.  Please reach out to your insurance provider with any questions.    If during the course of the call the physician/provider feels a video visit is not appropriate, you will not be charged for this service.\"    Patient has given verbal consent for Video visit? Yes  How would you like to obtain your AVS? MyChart  If you are dropped from the video visit, the video invite should be resent to: Text to cell phone: [phone]  Will anyone else be joining your video visit? No    Subjective     Kristel Martinez is a 40 year old female who presents today via video visit for the following health issues:    HPI     Pt had positive Covid test - continues to have symptoms  I tested positive for covid. Took the test Wednesday. Results yesterday morning. A few miserable days with body aches & fever. I've had shortness of breath since a couple days before my fever. What can I do for the shortness of breath. It doesn't seem to be getting better, but worse. Also, the burning in my nostrils/sinuses.....  and my ears. I can feel the fluid in my right ear and it's feels full. Ugh this sucks!!!!!    OTC meds - mucinex, cold and flu dayquil      Video Start Time: 1:20 PM        Review of Systems   Constitutional, HEENT, cardiovascular, pulmonary, gi and gu " systems are negative, except as otherwise noted.      Objective           Vitals:  No vitals were obtained today due to virtual visit.    Physical Exam     GENERAL: Healthy, alert and no distress  EYES: Eyes grossly normal to inspection.  No discharge or erythema, or obvious scleral/conjunctival abnormalities.  RESP: No audible wheeze, cough, or visible cyanosis.  No visible retractions or increased work of breathing.    SKIN: Visible skin clear. No significant rash, abnormal pigmentation or lesions.  NEURO: Cranial nerves grossly intact.  Mentation and speech appropriate for age.  PSYCH: Mentation appears normal, affect normal/bright, judgement and insight intact, normal speech and appearance well-groomed.       Assessment & Plan     COVID-19 virus infection  Patient was diagnosed with COVID-19 infection yesterday, having symptoms for about 1 week.  No fever last few days, oxygen saturation 97% this morning, denies any fast heartbeat.  Physical examination as described above.  Differentials discussed in detail including COVID-19 related lower respiratory tract infection.  Shared decision made to continue conservative management.  Recommended well hydration, warm fluids, over-the-counter antitussive and Tylenol.  Instructed to go ER if symptoms persist or worsen.  Patient understood and in agreement with above plan.  All questions were answered.        Patient Instructions     Patient Education     Coronavirus Disease 2019 (COVID-19): Caring for Yourself or Others   If you or a household member have symptoms of COVID-19, follow the guidelines below. This will help you manage symptoms and keep the virus from spreading.  If you have symptoms of COVID-19    Stay home and contact your care team. They will tell you what to do.    Don t panic. Keep in mind that other illnesses can cause similar symptoms.    Stay away from work, school, and public places.    Limit physical contact with others in your home. Limit visitors.  No kissing.  Clean surfaces you touch with disinfectant.  If you need to cough or sneeze, do it into a tissue. Then throw the tissue into the trash. If you don't have tissues, cough or sneeze into the bend of your elbow.  Don t share food or personal items with people in your household. This includes items like eating and drinking utensils, towels, and bedding.  Wear a cloth face mask around other people. During a public health emergency, medical face masks may be reserved for healthcare workers. You may need to make a cloth face mask of your own. You can do this using a bandana, T-shirt, or other cloth. The [facility] has instructions on how to make a face mask. Wear the mask so that it covers both your nose and mouth.  If you need to go to a hospital or clinic, call ahead to let them know. Expect the care team to wear masks, gowns, gloves, and eye protection. You may be put in a separate room.  Follow all instructions from your care team.    If you ve been diagnosed with COVID-19    Stay home and away from others, including other people in your home. (This is called self-isolation.) Don t leave home unless you need to get medical care. Don t go to work, school, or public places. Don t use buses, taxis, or other public transportation.    Follow all instructions from your care team.    If you need to go to a hospital or clinic, call ahead to let them know. Expect the care team to wear masks, gowns, gloves, and eye protection. You may be put in a separate room.    Wear a face mask over your nose and mouth. This is to protect others from your germs. If you can t wear a mask, your caregivers should wear one. You may need to make your own mask using a bandana, T-shirt, or other cloth. See the [facility] s instructions on how to make a face mask.    Have no contact with pets and other animals.    Don t share food or personal items with people in your household. This includes items like eating and drinking utensils, towels, and  bedding.    If you need to cough or sneeze, do it into a tissue. Then throw the tissue into the trash. If you don't have tissues, cough or sneeze into the bend of your elbow.    Wash your hands often.    Self-care at home   At this time, there is no medicine approved to prevent or treat COVID-19. Experts are testing different medicines, trying to find one that works.  So far, the most proven treatment is to support your body while it fights the virus.    Getting extra rest.    Drink extra fluids 6 to 8 glasses of liquids each day), unless a doctor has told you not to. Ask your care team which fluids are best for you. Avoid fluids that contain caffeine or alcohol.    Taking over-the-counter (OTC) medicine to reduce pain and fever. Your care team will tell you which medicine to use.  If you ve been in the hospital for COVID-19, follow your care team s instructions. They will tell you when to stop self-isolation. They may also have you change positions to help your breathing (such as lying on your belly).  If a test showed that you have COVID-19, you may be asked to donate plasma after you ve recovered. (This is called COVID-19 convalescent plasma donation.) The plasma may contain antibodies to help fight the virus in others. Scientists are testing whether this might be a treatment in the future. For more information, talk to your care team.  Caring for a sick person     Follow all instructions from the care team.    Wash your hands often.    Wear protective clothing as advised.    Make sure the sick person wears a mask. If they can't wear a mask, don t stay in the same room with them. If you must be in the same room, wear a face mask. Make sure the mask covers both the nose and mouth.    Keep track of the sick person s symptoms.    Clean surfaces often with disinfectant. This includes phones, kitchen counters, fridge door handles, bathroom surfaces, and others.    Don t let anyone share household items with the sick  person. This includes eating and drinking tools, towels, sheets, or blankets.    Clean fabrics and laundry well.    Keep other people and pets away from the sick person.    When you can stop self-isolation  When you are sick with COVID-19, you should stay away from other people. This is called self-isolation. The rules for ending self-isolation depend on your health in general.  If you are normally healthy  You can stop self-isolation when all 3 of these are true:    You ve had no fever--and no medicine that reduces fever--for at least 24 hours. And     Your symptoms are better, such as cough or trouble breathing. And     At least 10 days have passed since your symptoms first started.  Talk with your care team before you leave home. They may tell you it s okay to leave, or they may give you different advice.  If you have a weak immune system  If you re being treated for cancer, have an immune disorder (such as HIV), or have had a transplant &$40;organ or bone marrow), follow your care team s instructions. You may be asked to stay home from 10 days to 20 days after your symptoms first started. Your care team may want to re-test you for COVID-19.  When you return to public settings  When you are well enough to go outside your home, follow the CDC s guidance on cloth face masks.    Anyone over age 2 should wear a face mask in public, especially when it's hard to socially distance. This includes public transit, public protests and marches, and crowded stores, bars, and restaurants.    Face masks are most likely to reduce the spread of COVID-19 when they are widely used by people who are out in the public.  Certain people should not wear a face covering. These include:    Children under 2 years old    Anyone with a health, developmental, or mental health condition that can be made worse by wearing a mask    Anyone who is unconscious or unable to remove the face covering without help. See the CDC's guidance on who should  "not wear a face mask.  When to call your care team  Call your care team right away if a sick person has any of these:    Trouble breathing    Pain or pressure in chest  If a sick person has any of these, call 911:    Trouble breathing that gets worse    Pain or pressure in chest that gets worse    Blue tint to lips or face    Fast or irregular heartbeat    Confusion or trouble waking    Fainting or loss of consciousness    Coughing up blood  Going home from the hospital   If you have COVID-19 and were recently in the hospital:    Follow the instructions above for self-care and isolation.    Follow the hospital care team s instructions.    Ask questions if anything is unclear to you. Write down answers so you remember them.  Date last modified: 10/13/2020  [employer] last reviewed this educational content on 4/1/2020  This information has been modified by your health care provider with permission from the publisher.    7035-2570 [employer]. [address] [place] [address]. All rights reserved. This information is not intended as a substitute for professional medical care. Always follow your healthcare professional's instructions.           Patient Education   Coping with Life after COVID-19  Being in the hospital because of COVID-19 is scary. Going home can be scary, too. You may face changes to your life, the way you work or what you can eat.   It's hard to adjust to change, and it's normal to feel afraid, frustrated or even angry. These feelings usually go away over time. If your feelings don't start to get better, it's called \"adjustment disorder.\"   What signs should I look out for?  Adjustment disorder can happen to anyone in a time of stress. It makes it hard to cope with daily life. You may feel lonely or fight with loved ones, even if you're glad to be home.   Watch for these signs:    Fear or worry    Hard time focusing    Sadness or anger    Trouble talking to family or " "friends    Feeling like you don't fit in or isolating yourself    Problems with sleep    Drinking alcohol or taking drugs to cope  What can I do?  You can help yourself get better. Feeling you have control helps you move forward. You may wonder if you'll be able to do things you did before. Be patient. Do your best to make the most of every day. Try to build relationships, be as active as you can, eat right and keep a sense of humor. Avoid smoking and drinking too much alcohol.   Call your family doctor or clinic if you're not sure what to do. They can guide you to care or other services.  When should I get help?  Think about getting counseling if your sadness or frustration gets worse. Together with a trained counselor, you can talk about your problems adjusting to life after your hospital stay. You can come up with new ways to handle changes that give you more control.   Get help if you're thinking about hurting yourself. If you need help right away, call 911 or go to the nearest emergency room. You can also try the Crisis Text Line.  Crisis Text Line: 463-425 (http://www.crisistextline.org)  The Crisis Text Line serves anyone, in any crisis. It gives free, 24/7 support. Here's how it works:  1. Text HOME to 297457 from anywhere in the [place], anytime, about any type of crisis.  2. A live, trained Crisis Counselor will text you back quickly.  3. The volunteer Crisis Counselor can help you move from a \"hot moment\" to a \"cool moment.\" They can also help you work out a safety plan.  For informational purposes only. Not to replace the advice of your health care provider. Copyright   2020 [facility]. All rights reserved. Clinically reviewed by the Ambulatory COVID-19 Care Map Team. IES [id number] - 04/20.             Kai Mueller MD  [facility]      Video-Visit Details    Type of service:  Video Visit    Video End Time:1:32 PM    Originating Location (pt. Location): " Home    Distant Location (provider location):  [facility]     Platform used for Video Visit: Karishma

## 2020-11-09 NOTE — TELEPHONE ENCOUNTER
11-04-20 VV, os COVID results yest.  Reports chest heaviness, SOA, with minimal exertion. Slight wheezing, beginning chest congestion. Denies acute rest distress. Burning sensation sinuses, nostrils.  Afebrile since Thurs.   Has been using Micines, OTC day/night cold sinus med- ineffective.  VV with Dr. Mueller today.  SHERYL Matos RN

## 2020-11-09 NOTE — PATIENT INSTRUCTIONS
Patient Education     Coronavirus Disease 2019 (COVID-19): Caring for Yourself or Others   If you or a household member have symptoms of COVID-19, follow the guidelines below. This will help you manage symptoms and keep the virus from spreading.  If you have symptoms of COVID-19    Stay home and contact your care team. They will tell you what to do.    Don t panic. Keep in mind that other illnesses can cause similar symptoms.    Stay away from work, school, and public places.    Limit physical contact with others in your home. Limit visitors. No kissing.  Clean surfaces you touch with disinfectant.  If you need to cough or sneeze, do it into a tissue. Then throw the tissue into the trash. If you don't have tissues, cough or sneeze into the bend of your elbow.  Don t share food or personal items with people in your household. This includes items like eating and drinking utensils, towels, and bedding.  Wear a cloth face mask around other people. During a public health emergency, medical face masks may be reserved for healthcare workers. You may need to make a cloth face mask of your own. You can do this using a bandana, T-shirt, or other cloth. The CDC has instructions on how to make a face mask. Wear the mask so that it covers both your nose and mouth.  If you need to go to a hospital or clinic, call ahead to let them know. Expect the care team to wear masks, gowns, gloves, and eye protection. You may be put in a separate room.  Follow all instructions from your care team.    If you ve been diagnosed with COVID-19    Stay home and away from others, including other people in your home. (This is called self-isolation.) Don t leave home unless you need to get medical care. Don t go to work, school, or public places. Don t use buses, taxis, or other public transportation.    Follow all instructions from your care team.    If you need to go to a hospital or clinic, call ahead to let them know. Expect the care team to  wear masks, gowns, gloves, and eye protection. You may be put in a separate room.    Wear a face mask over your nose and mouth. This is to protect others from your germs. If you can t wear a mask, your caregivers should wear one. You may need to make your own mask using a bandana, T-shirt, or other cloth. See the [facility] s instructions on how to make a face mask.    Have no contact with pets and other animals.    Don t share food or personal items with people in your household. This includes items like eating and drinking utensils, towels, and bedding.    If you need to cough or sneeze, do it into a tissue. Then throw the tissue into the trash. If you don't have tissues, cough or sneeze into the bend of your elbow.    Wash your hands often.    Self-care at home   At this time, there is no medicine approved to prevent or treat COVID-19. Experts are testing different medicines, trying to find one that works.  So far, the most proven treatment is to support your body while it fights the virus.    Getting extra rest.    Drink extra fluids 6 to 8 glasses of liquids each day), unless a doctor has told you not to. Ask your care team which fluids are best for you. Avoid fluids that contain caffeine or alcohol.    Taking over-the-counter (OTC) medicine to reduce pain and fever. Your care team will tell you which medicine to use.  If you ve been in the hospital for COVID-19, follow your care team s instructions. They will tell you when to stop self-isolation. They may also have you change positions to help your breathing (such as lying on your belly).  If a test showed that you have COVID-19, you may be asked to donate plasma after you ve recovered. (This is called COVID-19 convalescent plasma donation.) The plasma may contain antibodies to help fight the virus in others. Scientists are testing whether this might be a treatment in the future. For more information, talk to your care team.  Caring for a sick person     Follow all  instructions from the care team.    Wash your hands often.    Wear protective clothing as advised.    Make sure the sick person wears a mask. If they can't wear a mask, don t stay in the same room with them. If you must be in the same room, wear a face mask. Make sure the mask covers both the nose and mouth.    Keep track of the sick person s symptoms.    Clean surfaces often with disinfectant. This includes phones, kitchen counters, fridge door handles, bathroom surfaces, and others.    Don t let anyone share household items with the sick person. This includes eating and drinking tools, towels, sheets, or blankets.    Clean fabrics and laundry well.    Keep other people and pets away from the sick person.    When you can stop self-isolation  When you are sick with COVID-19, you should stay away from other people. This is called self-isolation. The rules for ending self-isolation depend on your health in general.  If you are normally healthy  You can stop self-isolation when all 3 of these are true:    You ve had no fever--and no medicine that reduces fever--for at least 24 hours. And     Your symptoms are better, such as cough or trouble breathing. And     At least 10 days have passed since your symptoms first started.  Talk with your care team before you leave home. They may tell you it s okay to leave, or they may give you different advice.  If you have a weak immune system  If you re being treated for cancer, have an immune disorder (such as HIV), or have had a transplant &$40;organ or bone marrow), follow your care team s instructions. You may be asked to stay home from 10 days to 20 days after your symptoms first started. Your care team may want to re-test you for COVID-19.  When you return to public settings  When you are well enough to go outside your home, follow the CDC s guidance on cloth face masks.    Anyone over age 2 should wear a face mask in public, especially when it's hard to socially distance.  This includes public transit, public protests and marches, and crowded stores, bars, and restaurants.    Face masks are most likely to reduce the spread of COVID-19 when they are widely used by people who are out in the public.  Certain people should not wear a face covering. These include:    Children under 2 years old    Anyone with a health, developmental, or mental health condition that can be made worse by wearing a mask    Anyone who is unconscious or unable to remove the face covering without help. See the CDC's guidance on who should not wear a face mask.  When to call your care team  Call your care team right away if a sick person has any of these:    Trouble breathing    Pain or pressure in chest  If a sick person has any of these, call 911:    Trouble breathing that gets worse    Pain or pressure in chest that gets worse    Blue tint to lips or face    Fast or irregular heartbeat    Confusion or trouble waking    Fainting or loss of consciousness    Coughing up blood  Going home from the hospital   If you have COVID-19 and were recently in the hospital:    Follow the instructions above for self-care and isolation.    Follow the hospital care team s instructions.    Ask questions if anything is unclear to you. Write down answers so you remember them.  Date last modified: 10/13/2020  [employer] last reviewed this educational content on 4/1/2020  This information has been modified by your health care provider with permission from the publisher.    9535-6600 [employer]. [address] [place] [address]. All rights reserved. This information is not intended as a substitute for professional medical care. Always follow your healthcare professional's instructions.           Patient Education   Coping with Life after COVID-19  Being in the hospital because of COVID-19 is scary. Going home can be scary, too. You may face changes to your life, the way you work or what you can eat.   It's hard  "to adjust to change, and it's normal to feel afraid, frustrated or even angry. These feelings usually go away over time. If your feelings don't start to get better, it's called \"adjustment disorder.\"   What signs should I look out for?  Adjustment disorder can happen to anyone in a time of stress. It makes it hard to cope with daily life. You may feel lonely or fight with loved ones, even if you're glad to be home.   Watch for these signs:    Fear or worry    Hard time focusing    Sadness or anger    Trouble talking to family or friends    Feeling like you don't fit in or isolating yourself    Problems with sleep    Drinking alcohol or taking drugs to cope  What can I do?  You can help yourself get better. Feeling you have control helps you move forward. You may wonder if you'll be able to do things you did before. Be patient. Do your best to make the most of every day. Try to build relationships, be as active as you can, eat right and keep a sense of humor. Avoid smoking and drinking too much alcohol.   Call your family doctor or clinic if you're not sure what to do. They can guide you to care or other services.  When should I get help?  Think about getting counseling if your sadness or frustration gets worse. Together with a trained counselor, you can talk about your problems adjusting to life after your hospital stay. You can come up with new ways to handle changes that give you more control.   Get help if you're thinking about hurting yourself. If you need help right away, call 911 or go to the nearest emergency room. You can also try the Crisis Text Line.  Crisis Text Line: 685-235 (http://www.crisistextline.org)  The Crisis Text Line serves anyone, in any crisis. It gives free, 24/7 support. Here's how it works:  1. Text HOME to 062095 from anywhere in the [place], anytime, about any type of crisis.  2. A live, trained Crisis Counselor will text you back quickly.  3. The volunteer Crisis Counselor can help you " "move from a \"hot moment\" to a \"cool moment.\" They can also help you work out a safety plan.  For informational purposes only. Not to replace the advice of your health care provider. Copyright   2020 [employer]. All rights reserved. Clinically reviewed by the Ambulatory COVID-19 Care Map Team. [employer] 153168 - 04/20.       "

## 2020-11-10 RX ORDER — ESCITALOPRAM OXALATE 10 MG/1
TABLET ORAL
Qty: 90 TABLET | Refills: 0 | Status: SHIPPED | OUTPATIENT
Start: 2020-11-10 | End: 2021-01-27

## 2020-11-10 RX ORDER — BUPROPION HYDROCHLORIDE 150 MG/1
TABLET ORAL
Qty: 90 TABLET | Refills: 0 | Status: SHIPPED | OUTPATIENT
Start: 2020-11-10 | End: 2020-12-29

## 2020-11-17 ENCOUNTER — MYC MEDICAL ADVICE (OUTPATIENT)
Dept: FAMILY MEDICINE | Facility: CLINIC | Age: 40
End: 2020-11-17

## 2020-11-17 ENCOUNTER — OFFICE VISIT (OUTPATIENT)
Dept: URGENT CARE | Facility: URGENT CARE | Age: 40
End: 2020-11-17
Payer: COMMERCIAL

## 2020-11-17 ENCOUNTER — ANCILLARY PROCEDURE (OUTPATIENT)
Dept: GENERAL RADIOLOGY | Facility: CLINIC | Age: 40
End: 2020-11-17
Attending: PHYSICIAN ASSISTANT
Payer: COMMERCIAL

## 2020-11-17 VITALS
OXYGEN SATURATION: 100 % | SYSTOLIC BLOOD PRESSURE: 108 MMHG | TEMPERATURE: 99.3 F | HEART RATE: 98 BPM | DIASTOLIC BLOOD PRESSURE: 82 MMHG

## 2020-11-17 DIAGNOSIS — R06.02 SOB (SHORTNESS OF BREATH): ICD-10-CM

## 2020-11-17 DIAGNOSIS — J01.90 ACUTE SINUSITIS WITH SYMPTOMS > 10 DAYS: Primary | ICD-10-CM

## 2020-11-17 DIAGNOSIS — U07.1 CLINICAL DIAGNOSIS OF COVID-19: ICD-10-CM

## 2020-11-17 DIAGNOSIS — R05.9 COUGH: ICD-10-CM

## 2020-11-17 DIAGNOSIS — B37.31 CANDIDIASIS OF VAGINA: Primary | ICD-10-CM

## 2020-11-17 PROCEDURE — 99214 OFFICE O/P EST MOD 30 MIN: CPT | Performed by: PHYSICIAN ASSISTANT

## 2020-11-17 PROCEDURE — 71046 X-RAY EXAM CHEST 2 VIEWS: CPT | Performed by: RADIOLOGY

## 2020-11-17 RX ORDER — DOXYCYCLINE 100 MG/1
100 CAPSULE ORAL 2 TIMES DAILY
Qty: 20 CAPSULE | Refills: 0 | Status: SHIPPED | OUTPATIENT
Start: 2020-11-17 | End: 2020-11-27

## 2020-11-17 RX ORDER — PREDNISONE 20 MG/1
40 TABLET ORAL DAILY
Qty: 10 TABLET | Refills: 0 | Status: SHIPPED | OUTPATIENT
Start: 2020-11-17 | End: 2020-11-22

## 2020-11-17 RX ORDER — ALBUTEROL SULFATE 90 UG/1
AEROSOL, METERED RESPIRATORY (INHALATION)
Qty: 18 G | Refills: 0 | Status: SHIPPED | OUTPATIENT
Start: 2020-11-17 | End: 2021-07-09

## 2020-11-17 ASSESSMENT — ENCOUNTER SYMPTOMS
ARTHRALGIAS: 0
COUGH: 1
RHINORRHEA: 0
FATIGUE: 0
SINUS PAIN: 1
FEVER: 0
SHORTNESS OF BREATH: 1
EYE PAIN: 0
CHILLS: 0
VOMITING: 0
WHEEZING: 0
SINUS PRESSURE: 1
TROUBLE SWALLOWING: 0
PALPITATIONS: 0
ABDOMINAL PAIN: 0
CHEST TIGHTNESS: 0
UNEXPECTED WEIGHT CHANGE: 0
BACK PAIN: 0
DIARRHEA: 0
EYE REDNESS: 0
NAUSEA: 0
MYALGIAS: 0
SORE THROAT: 0

## 2020-11-17 NOTE — PROGRESS NOTES
SUBJECTIVE:   Kristel Martinez is a 40 year old female presenting with a chief complaint of   Chief Complaint   Patient presents with     Shortness of Breath     11/7/2020 Covid positive, having sob and ear pain in both.     Otalgia       She is an established patient of [facility].    URI Adult    Onset of symptoms was 2 weeks.  Course of illness is worsening.    Severity moderate  Current and Associated symptoms: shortness of breath, cough, sinus pain/pressure, teeth hurt  Treatment measures tried include Tylenol/Ibuprofen and OTC Cough med.  Predisposing factors include positive COVID.        Review of Systems   Constitutional: Negative for chills, fatigue, fever and unexpected weight change.   HENT: Positive for sinus pressure and sinus pain. Negative for congestion, ear pain, postnasal drip, rhinorrhea, sore throat and trouble swallowing.    Eyes: Negative for pain, redness and visual disturbance.   Respiratory: Positive for cough and shortness of breath. Negative for chest tightness and wheezing.    Cardiovascular: Negative for chest pain and palpitations.   Gastrointestinal: Negative for abdominal pain, diarrhea, nausea and vomiting.   Musculoskeletal: Negative for arthralgias, back pain and myalgias.   Skin: Negative for rash.       Past Medical History:   Diagnosis Date     Depressive disorder      Family History   Problem Relation Age of Onset     Skin Cancer Mother      Hypertension Father      Arrhythmia Father      Lymphoma Maternal Grandmother      Diabetes Paternal Grandfather         type 1      Asthma Son      Arthritis Daughter      Current Outpatient Medications   Medication Sig Dispense Refill     albuterol (PROAIR HFA/PROVENTIL HFA/VENTOLIN HFA) 108 (90 Base) MCG/ACT inhaler Inhale 2 puffs every 4-6 hours as needed for cough, wheezing, or shortness of breath 18 g 0     buPROPion (WELLBUTRIN XL) 150 MG 24 hr tablet TAKE 1 TABLET BY MOUTH IN  THE MORNING 90 tablet 0     dicyclomine (BENTYL) 20 MG  tablet Take 1 tablet (20 mg) by mouth 4 times daily as needed (IBS symptoms) 28 tablet 0     doxycycline monohydrate (MONODOX) 100 MG capsule Take 1 capsule (100 mg) by mouth 2 times daily for 10 days 20 capsule 0     escitalopram (LEXAPRO) 10 MG tablet TAKE 1 TABLET BY MOUTH  DAILY 90 tablet 0     fluticasone (FLONASE) 50 MCG/ACT nasal spray Spray 1 spray into both nostrils daily 16 g 1     levothyroxine (SYNTHROID/LEVOTHROID) 112 MCG tablet TAKE 1 TABLET BY MOUTH  DAILY; TOTAL  MCG DAILY. 90 tablet 3     levothyroxine (SYNTHROID/LEVOTHROID) 50 MCG tablet TAKE 1 TABLET BY MOUTH  DAILY ALONG WITH 112MCG  TABLET FOR A TOTAL DAILY  DOSE OF 162MCG. 90 tablet 3     metroNIDAZOLE (METROLOTION) 0.75 % external lotion APPLY TO AFFECTED AREA(S)  TOPICALLY DAILY 59 mL 1     minocycline (MINOCIN) 50 MG capsule Take 1 capsule (50 mg) by mouth daily 180 capsule 0     predniSONE (DELTASONE) 20 MG tablet Take 2 tablets (40 mg) by mouth daily for 5 days 10 tablet 0     tiZANidine (ZANAFLEX) 4 MG tablet Take 1 tablet (4 mg) by mouth 2 times daily as needed for muscle spasms Can cause sedation. Do not take and work. 30 tablet 0     tretinoin (RETIN-A) 0.025 % external cream Use every night 45 g 0     acyclovir (ZOVIRAX) 400 MG tablet Take 1 tablet (400 mg) by mouth every 8 hours for 5 days 15 tablet 0     hydrocortisone (ANUSOL-HC) 2.5 % cream Place rectally 2 times daily as needed for hemorrhoids (Patient not taking: Reported on 11/17/2020) 30 g 0     hydrOXYzine (ATARAX) 25 MG tablet TAKE 1 TO 2 TABLETS BY  MOUTH 3 TIMES DAILY AS  NEEDED FOR ANXIETY (Patient not taking: Reported on 11/17/2020) 120 tablet 0     SUMAtriptan (IMITREX) 25 MG tablet TAKE 1 TO 2 TABLETS BY  MOUTH AT ONSET OF HEADACHE  FOR MIGRAINE. MAY REPEAT IN 2 HOURS. MAX OF 8 TABLETS  BY MOUTH IN 24 HOURS (Patient not taking: Reported on 11/17/2020) 18 tablet 3     topiramate (TOPAMAX) 25 MG tablet Take 1 tablet (25 mg) by mouth 2 times daily (Patient not  taking: Reported on 11/17/2020) 60 tablet 3     valACYclovir (VALTREX) 1000 mg tablet Take 2 tablets (2,000 mg) by mouth 2 times daily (Patient not taking: Reported on 11/17/2020) 4 tablet 1     Social History     Tobacco Use     Smoking status: Former Smoker     Smokeless tobacco: Never Used   Substance Use Topics     Alcohol use: Yes     Comment: 1 drink per wk       OBJECTIVE  /82   Pulse 98   Temp 99.3  F (37.4  C)   SpO2 100%     Physical Exam  Constitutional:       Appearance: She is well-developed.   HENT:      Head: Normocephalic.      Right Ear: Tympanic membrane and ear canal normal.      Left Ear: Tympanic membrane and ear canal normal.   Eyes:      Conjunctiva/sclera: Conjunctivae normal.      Pupils: Pupils are equal, round, and reactive to light.   Cardiovascular:      Rate and Rhythm: Normal rate.      Heart sounds: Normal heart sounds.   Pulmonary:      Effort: Pulmonary effort is normal.      Breath sounds: Normal breath sounds.   Skin:     General: Skin is warm and dry.      Findings: No rash.   Psychiatric:         Behavior: Behavior normal.         Labs:  No results found for this or any previous visit (from the past 24 hour(s)).    X-Ray was done, my findings are: no acute consolidation     ASSESSMENT:      ICD-10-CM    1. Acute sinusitis with symptoms > 10 days  J01.90 doxycycline monohydrate (MONODOX) 100 MG capsule   2. SOB (shortness of breath)  R06.02 XR Chest 2 Views     predniSONE (DELTASONE) 20 MG tablet     albuterol (PROAIR HFA/PROVENTIL HFA/VENTOLIN HFA) 108 (90 Base) MCG/ACT inhaler   3. Clinical diagnosis of COVID-19  U07.1 XR Chest 2 Views   4. Cough  R05 XR Chest 2 Views     predniSONE (DELTASONE) 20 MG tablet     albuterol (PROAIR HFA/PROVENTIL HFA/VENTOLIN HFA) 108 (90 Base) MCG/ACT inhaler        Medical Decision Making:    Differential Diagnosis:  URI Adult/Peds:  Acute right otitis media, Acute left otitis media, Bronchitis-viral, Bronchospasm, Pneumonia, Sinusitis,  Viral syndrome, Viral upper respiratory illness and ongoing COVID-19     Serious Comorbid Conditions:  Adult:  None    PLAN:    Sinusitis: will treat with doxycyline two times daily x 10 days. Can also take antihistamine, decongestant, and expectorant as needed. Return to clinic if symptoms worsen or do not improve; otherwise follow up as needed      Cough/SOB: chest x ray is clear. Will treat with prednisone 40mg daily x 5 days. Also prescribed albuterol 2 puffs every 4-6 hrs as needed. Return to clinic if symptoms worsen or do not improve; otherwise follow up as needed      Followup:    If not improving or if condition worsens, follow up with your Primary Care Provider    There are no Patient Instructions on file for this visit.

## 2020-11-17 NOTE — LETTER
[facility] [place] [id number] [address]  Phone: [phone]  Fax: [phone]    November 17, 2020        Kristel Martinez  [address] [place] [address] [address] [facility] [address]          To whom it may concern:    RE: Kristel Martinez    Patient was seen and treated today at our clinic and missed work 11/16/20 through 11/20/20    Please contact me for questions or concerns.      Sincerely,        Tess Laboy PA-C

## 2020-11-18 RX ORDER — FLUCONAZOLE 150 MG/1
150 TABLET ORAL ONCE
Qty: 1 TABLET | Refills: 0 | Status: SHIPPED | OUTPATIENT
Start: 2020-11-18 | End: 2020-11-18

## 2020-11-22 ENCOUNTER — HEALTH MAINTENANCE LETTER (OUTPATIENT)
Age: 40
End: 2020-11-22

## 2020-12-01 ENCOUNTER — MYC MEDICAL ADVICE (OUTPATIENT)
Dept: FAMILY MEDICINE | Facility: CLINIC | Age: 40
End: 2020-12-01

## 2020-12-07 ENCOUNTER — TRANSFERRED RECORDS (OUTPATIENT)
Dept: HEALTH INFORMATION MANAGEMENT | Facility: CLINIC | Age: 40
End: 2020-12-07

## 2020-12-18 ENCOUNTER — APPOINTMENT (OUTPATIENT)
Dept: ONCOLOGY | Facility: CLINIC | Age: 40
End: 2020-12-18
Attending: GENETIC COUNSELOR, MS
Payer: COMMERCIAL

## 2020-12-18 ENCOUNTER — TELEPHONE (OUTPATIENT)
Dept: ONCOLOGY | Facility: CLINIC | Age: 40
End: 2020-12-18

## 2020-12-18 NOTE — TELEPHONE ENCOUNTER
Attempted to reach Kristel by phone for her 10:15AM video visit (genetic counseling).  Insurance has approved the genetic testing (see clinic note from 10/16/2020), and per [employer], her expected out of pocket cost for this test will be $0.  Kristel is encouraged to schedule a return visit to coordinate testing.  A voicemail message with my contact information was left as Kristel was not available.

## 2020-12-29 ENCOUNTER — VIRTUAL VISIT (OUTPATIENT)
Dept: ONCOLOGY | Facility: CLINIC | Age: 40
End: 2020-12-29
Attending: GENETIC COUNSELOR, MS
Payer: COMMERCIAL

## 2020-12-29 ENCOUNTER — OFFICE VISIT (OUTPATIENT)
Dept: FAMILY MEDICINE | Facility: CLINIC | Age: 40
End: 2020-12-29
Payer: COMMERCIAL

## 2020-12-29 VITALS
WEIGHT: 235 LBS | HEIGHT: 70 IN | TEMPERATURE: 97.5 F | RESPIRATION RATE: 14 BRPM | SYSTOLIC BLOOD PRESSURE: 110 MMHG | OXYGEN SATURATION: 98 % | HEART RATE: 96 BPM | BODY MASS INDEX: 33.64 KG/M2 | DIASTOLIC BLOOD PRESSURE: 78 MMHG

## 2020-12-29 DIAGNOSIS — C73 PAPILLARY ADENOCARCINOMA OF THYROID (H): Primary | ICD-10-CM

## 2020-12-29 DIAGNOSIS — M25.511 CHRONIC PAIN IN RIGHT SHOULDER: ICD-10-CM

## 2020-12-29 DIAGNOSIS — G89.29 CHRONIC PAIN IN RIGHT SHOULDER: ICD-10-CM

## 2020-12-29 DIAGNOSIS — Z80.41 FAMILY HISTORY OF MALIGNANT NEOPLASM OF OVARY: ICD-10-CM

## 2020-12-29 DIAGNOSIS — Z01.818 PREOP GENERAL PHYSICAL EXAM: Primary | ICD-10-CM

## 2020-12-29 DIAGNOSIS — E89.0 POSTOPERATIVE HYPOTHYROIDISM: ICD-10-CM

## 2020-12-29 LAB
ERYTHROCYTE [DISTWIDTH] IN BLOOD BY AUTOMATED COUNT: 13.9 % (ref 10–15)
HCT VFR BLD AUTO: 39.3 % (ref 35–47)
HGB BLD-MCNC: 12.6 G/DL (ref 11.7–15.7)
MCH RBC QN AUTO: 26.9 PG (ref 26.5–33)
MCHC RBC AUTO-ENTMCNC: 32.1 G/DL (ref 31.5–36.5)
MCV RBC AUTO: 84 FL (ref 78–100)
PLATELET # BLD AUTO: 251 10E9/L (ref 150–450)
RBC # BLD AUTO: 4.68 10E12/L (ref 3.8–5.2)
T4 FREE SERPL-MCNC: 1.43 NG/DL (ref 0.76–1.46)
TSH SERPL DL<=0.005 MIU/L-ACNC: 0.27 MU/L (ref 0.4–4)
WBC # BLD AUTO: 5.1 10E9/L (ref 4–11)

## 2020-12-29 PROCEDURE — 999N000069 HC STATISTIC GENETIC COUNSELING, < 16 MIN: Mod: GT | Performed by: GENETIC COUNSELOR, MS

## 2020-12-29 PROCEDURE — 99214 OFFICE O/P EST MOD 30 MIN: CPT | Performed by: NURSE PRACTITIONER

## 2020-12-29 PROCEDURE — 85027 COMPLETE CBC AUTOMATED: CPT | Performed by: NURSE PRACTITIONER

## 2020-12-29 PROCEDURE — 84439 ASSAY OF FREE THYROXINE: CPT | Performed by: NURSE PRACTITIONER

## 2020-12-29 PROCEDURE — 36415 COLL VENOUS BLD VENIPUNCTURE: CPT | Performed by: NURSE PRACTITIONER

## 2020-12-29 PROCEDURE — 84443 ASSAY THYROID STIM HORMONE: CPT | Performed by: NURSE PRACTITIONER

## 2020-12-29 ASSESSMENT — MIFFLIN-ST. JEOR: SCORE: 1816.2

## 2020-12-29 NOTE — NURSING NOTE
"Chief Complaint   Patient presents with     Pre-Op Exam       Initial /78   Pulse 96   Temp 97.5  F (36.4  C) (Tympanic)   Resp 14   Ht 1.778 m (5' 10\")   Wt 106.6 kg (235 lb)   LMP 12/21/2020 (Approximate)   Breastfeeding No   BMI 33.72 kg/m   Estimated body mass index is 33.72 kg/m  as calculated from the following:    Height as of this encounter: 1.778 m (5' 10\").    Weight as of this encounter: 106.6 kg (235 lb).    Patient presents to the clinic using     Health Maintenance that is potentially due pending provider review:          Is there anyone who you would like to be able to receive your results?   If yes have patient fill out [facility]    "

## 2020-12-29 NOTE — PROGRESS NOTES
I met with Kristel today in order to discuss the pre-authorization for genetic testing that we had submitted through [employer].  Insurance has approved the genetic testing (see clinic note from 10/16/2020).  Kristel elected to proceed with genetic testing today.      Verbal consent was obtained today over video visit for an expanded panel of genes related to hereditary breast, gynecologic, gastrointestinal, thyroid, and related cancers: APC, VASILIY, AXIN2, BARD1, BMPR1A, BRCA1, BRCA2, BRIP1, CDH1, CDK4, CDKN2A, CHEK2, DICER1, EPCAM, GREM1, HOXB13, MLH1, MSH2, MSH3, MSH6, MUTYH, NBN, NF1, NTHL1, PALB2, PMS2, POLD1, POLE, PTEN, BLPTC7Q, RAD51C, RAD51D, RECQL, RET, SMAD4, SMARCA4, STK11, and TP53 (38-gene CustomNext-Cancer panel).  A saliva kit will be requested through [employer].  Turn around time: 3-4 weeks.      Elsy Casanova MS, [facility]  Licensed Genetic Counselor    Time spent: 5 minutes

## 2020-12-29 NOTE — LETTER
12/29/2020      RE: Kristel Martinez  [address] [facility] [address]       I met with Kristel today in order to discuss the pre-authorization for genetic testing that we had submitted through [employer].  Insurance has approved the genetic testing (see clinic note from 10/16/2020).  Kristel elected to proceed with genetic testing today.      Verbal consent was obtained today over video visit for an expanded panel of genes related to hereditary breast, gynecologic, gastrointestinal, thyroid, and related cancers: APC, VASILIY, AXIN2, BARD1, BMPR1A, BRCA1, BRCA2, BRIP1, CDH1, CDK4, CDKN2A, CHEK2, DICER1, EPCAM, GREM1, HOXB13, MLH1, MSH2, MSH3, MSH6, MUTYH, NBN, NF1, NTHL1, PALB2, PMS2, POLD1, POLE, PTEN, LTAYB0F, RAD51C, RAD51D, RECQL, RET, SMAD4, SMARCA4, STK11, and TP53 (38-gene CustomNext-Cancer panel).  A saliva kit will be requested through [employer].  Turn around time: 3-4 weeks.      Elsy Casanova MS, [facility]  Licensed Genetic Counselor    Time spent: 5 minutes                        Elsy Casanova GC

## 2020-12-29 NOTE — PATIENT INSTRUCTIONS

## 2020-12-29 NOTE — LETTER
12/29/2020         RE: Kristel Martinez  [address] [facility] [place] [address]        Dear Colleague,    Thank you for referring your patient, Kristel Martinez, to the [facility]. Please see a copy of my visit note below.    I met with Kristel today in order to discuss the pre-authorization for genetic testing that we had submitted through [employer].  Insurance has approved the genetic testing (see clinic note from 10/16/2020).  Kristel elected to proceed with genetic testing today.      Verbal consent was obtained today over video visit for an expanded panel of genes related to hereditary breast, gynecologic, gastrointestinal, thyroid, and related cancers: APC, VASILIY, AXIN2, BARD1, BMPR1A, BRCA1, BRCA2, BRIP1, CDH1, CDK4, CDKN2A, CHEK2, DICER1, EPCAM, GREM1, HOXB13, MLH1, MSH2, MSH3, MSH6, MUTYH, NBN, NF1, NTHL1, PALB2, PMS2, POLD1, POLE, PTEN, XDIYS7R, RAD51C, RAD51D, RECQL, RET, SMAD4, SMARCA4, STK11, and TP53 (38-gene CustomNext-Cancer panel).  A saliva kit will be requested through [employer].  Turn around time: 3-4 weeks.      Elsy Casanova MS, [facility]  Licensed Genetic Counselor    Time spent: 5 minutes                        Again, thank you for allowing me to participate in the care of your patient.        Sincerely,        Elsy Casanova, GC

## 2020-12-29 NOTE — PROGRESS NOTES
[facility]  [address] [place] [id number]  Phone: [phone]  Fax: [phone]  Primary Provider: Mena Nogueira  Pre-op Performing Provider: MENA NOGUEIRA    PREOPERATIVE EVALUATION:  Today's date: 12/29/2020    Kristel Martinez is a 40 year old female who presents for a preoperative evaluation.    Surgical Information:  Surgery/Procedure: shoulder procedure rt side  Surgery Location: [facility]  Surgeon: Dr. Rogers  Surgery Date: 01/06/21  Time of Surgery:   Where patient plans to recover: At home with family  Fax number for surgical facility: [phone]    Type of Anesthesia Anticipated: to be determined    Subjective     HPI related to upcoming procedure: chronic right shoulder pain    Preop Questions 12/29/2020   1. Have you ever had a heart attack or stroke? No   2. Have you ever had surgery on your heart or blood vessels, such as a stent placement, a coronary artery bypass, or surgery on an artery in your head, neck, heart, or legs? No   3. Do you have chest pain with activity? No   4. Do you have a history of  heart failure? No   5. Do you currently have a cold, bronchitis or symptoms of other infection? No   6. Do you have a cough, shortness of breath, or wheezing? YES - shortness of breath following COVID 19, now improved   7. Do you or anyone in your family have previous history of blood clots? No   8. Do you or does anyone in your family have a serious bleeding problem such as prolonged bleeding following surgeries or cuts? No   9. Have you ever had problems with anemia or been told to take iron pills? No   10. Have you had any abnormal blood loss such as black, tarry or bloody stools, or abnormal vaginal bleeding? YES - abnormal vaginal bleeding   11. Have you ever had a blood transfusion? No   12. Are you willing to have a blood transfusion if it is medically needed before, during, or after your surgery? Yes   13. Have you or any of your  relatives ever had problems with anesthesia? No   14. Do you have sleep apnea, excessive snoring or daytime drowsiness? No   15. Do you have any artifical heart valves or other implanted medical devices like a pacemaker, defibrillator, or continuous glucose monitor? No   16. Do you have artificial joints? No   17. Are you allergic to latex? No   18. Is there any chance that you may be pregnant? No       Health Care Directive:  Patient does not have a Health Care Directive or Living Will: Discussed advance care planning with patient; information given to patient to review.    Preoperative Review of :   reviewed - no record of controlled substances prescribed.      Review of Systems  CONSTITUTIONAL: NEGATIVE for fever, chills, change in weight  INTEGUMENTARY/SKIN: NEGATIVE for worrisome rashes, moles or lesions  EYES: NEGATIVE for vision changes or irritation  ENT/MOUTH: NEGATIVE for ear, mouth and throat problems  RESP: NEGATIVE for significant cough or SOB  CV: NEGATIVE for chest pain, palpitations or peripheral edema  GI: NEGATIVE for nausea, abdominal pain, heartburn, or change in bowel habits  : NEGATIVE for frequency, dysuria, or hematuria  MUSCULOSKELETAL:POSITIVE  for joint pain right shoulder  NEURO: NEGATIVE for weakness, dizziness or paresthesias  ENDOCRINE: NEGATIVE for temperature intolerance, skin/hair changes  HEME: NEGATIVE for bleeding problems  PSYCHIATRIC: NEGATIVE for changes in mood or affect    Patient Active Problem List    Diagnosis Date Noted     Traumatic incomplete tear of right rotator cuff, initial encounter 07/23/2020     Priority: Medium     Bicipital tendonitis of right shoulder 07/23/2020     Priority: Medium     Subacromial impingement of right shoulder 07/23/2020     Priority: Medium     IgA deficiency (H) 09/12/2018     Priority: Medium     Postoperative hypothyroidism 03/23/2018     Priority: Medium     Vitiligo 04/09/2015     Priority: Medium     Papillary adenocarcinoma  of thyroid (H) 03/01/2014     Priority: Medium     Overview:   12/2013: R thyroid lobectomy 1.7 cm follicular variant papillary cancer, MACIS 3.6  03/2014: Completion Thyroidectomy 0.3 cm incidental papillary cancer left lobe. Iodine ablation with 53 mCi.   07/2016, 07/2017: Neck US neg.       Vitamin D deficiency 07/06/2009     Priority: Medium     Last Assessment & Plan:   7/09  29.4  vit  d   on 1000units  daily  since  7/09       Major depressive disorder, recurrent episode, moderate (H) 11/16/2007     Priority: Medium      Past Medical History:   Diagnosis Date     Depressive disorder      Past Surgical History:   Procedure Laterality Date     COLONOSCOPY  8-27/18     THYROIDECTOMY      2015     TONSILLECTOMY       TUBAL LIGATION       Current Outpatient Medications   Medication Sig Dispense Refill     acyclovir (ZOVIRAX) 400 MG tablet Take 1 tablet (400 mg) by mouth every 8 hours for 5 days 15 tablet 0     albuterol (PROAIR HFA/PROVENTIL HFA/VENTOLIN HFA) 108 (90 Base) MCG/ACT inhaler Inhale 2 puffs every 4-6 hours as needed for cough, wheezing, or shortness of breath 18 g 0     dicyclomine (BENTYL) 20 MG tablet Take 1 tablet (20 mg) by mouth 4 times daily as needed (IBS symptoms) 28 tablet 0     escitalopram (LEXAPRO) 10 MG tablet TAKE 1 TABLET BY MOUTH  DAILY 90 tablet 0     fluticasone (FLONASE) 50 MCG/ACT nasal spray Spray 1 spray into both nostrils daily 16 g 1     levothyroxine (SYNTHROID/LEVOTHROID) 112 MCG tablet TAKE 1 TABLET BY MOUTH  DAILY; TOTAL  MCG DAILY. 90 tablet 3     levothyroxine (SYNTHROID/LEVOTHROID) 50 MCG tablet TAKE 1 TABLET BY MOUTH  DAILY ALONG WITH 112MCG  TABLET FOR A TOTAL DAILY  DOSE OF 162MCG. 90 tablet 3     metroNIDAZOLE (METROLOTION) 0.75 % external lotion APPLY TO AFFECTED AREA(S)  TOPICALLY DAILY 59 mL 1     SUMAtriptan (IMITREX) 25 MG tablet TAKE 1 TO 2 TABLETS BY  MOUTH AT ONSET OF HEADACHE  FOR MIGRAINE. MAY REPEAT IN 2 HOURS. MAX OF 8 TABLETS  BY MOUTH IN 24 HOURS  "(Patient not taking: Reported on 11/17/2020) 18 tablet 3     tiZANidine (ZANAFLEX) 4 MG tablet Take 1 tablet (4 mg) by mouth 2 times daily as needed for muscle spasms Can cause sedation. Do not take and work. 30 tablet 0     tretinoin (RETIN-A) 0.025 % external cream Use every night 45 g 0     valACYclovir (VALTREX) 1000 mg tablet Take 2 tablets (2,000 mg) by mouth 2 times daily (Patient not taking: Reported on 11/17/2020) 4 tablet 1       Allergies   Allergen Reactions     Augmentin Cramps, Diarrhea, Nausea and Nausea and Vomiting     Cephalosporins Rash     Cephalexin     Sulfa Drugs Rash        Social History     Tobacco Use     Smoking status: Former Smoker     Smokeless tobacco: Never Used   Substance Use Topics     Alcohol use: Yes     Comment: 1 drink per wk     History   Drug Use No         Objective     /78   Pulse 96   Temp 97.5  F (36.4  C) (Tympanic)   Resp 14   Ht 1.778 m (5' 10\")   Wt 106.6 kg (235 lb)   LMP 12/21/2020 (Approximate)   SpO2 98%   Breastfeeding No   BMI 33.72 kg/m      Physical Exam    GENERAL APPEARANCE: healthy, alert and no distress     EYES: EOMI, PERRL     HENT: ear canals and TM's normal and nose and mouth without ulcers or lesions     NECK: no adenopathy, no asymmetry, masses, or scars and thyroid normal to palpation     RESP: lungs clear to auscultation - no rales, rhonchi or wheezes     CV: regular rates and rhythm, normal S1 S2, no S3 or S4 and no murmur, click or rub     ABDOMEN:  soft, nontender, no HSM or masses and bowel sounds normal     MS: extremities normal- no gross deformities noted     SKIN: no suspicious lesions or rashes     NEURO: Normal strength and tone, sensory exam grossly normal, mentation intact and speech normal     PSYCH: mentation appears normal. and affect normal/bright     LYMPHATICS: No cervical adenopathy    Recent Labs   Lab Test 12/16/19  1604 11/19/19  0015 07/10/19  1123   HGB 11.8 13.1  --     231  --    NA  --  143  --  "   POTASSIUM  --  3.7 4.0   CR  --  0.68 0.62        Diagnostics:  Recent Results (from the past 24 hour(s))   CBC with platelets    Collection Time: 12/29/20 12:21 PM   Result Value Ref Range    WBC 5.1 4.0 - 11.0 10e9/L    RBC Count 4.68 3.8 - 5.2 10e12/L    Hemoglobin 12.6 11.7 - 15.7 g/dL    Hematocrit 39.3 35.0 - 47.0 %    MCV 84 78 - 100 fl    MCH 26.9 26.5 - 33.0 pg    MCHC 32.1 31.5 - 36.5 g/dL    RDW 13.9 10.0 - 15.0 %    Platelet Count 251 150 - 450 10e9/L   TSH with free T4 reflex    Collection Time: 12/29/20 12:21 PM   Result Value Ref Range    TSH 0.27 (L) 0.40 - 4.00 mU/L      No EKG required, no history of coronary heart disease, significant arrhythmia, peripheral arterial disease or other structural heart disease.    Revised Cardiac Risk Index (RCRI):  The patient has the following serious cardiovascular risks for perioperative complications:   - No serious cardiac risks = 0 points     RCRI Interpretation: 0 points: Class I (very low risk - 0.4% complication rate)     Assessment & Plan   The proposed surgical procedure is considered INTERMEDIATE risk.    Preop general physical exam  - CBC with platelets  - TSH with free T4 reflex    Chronic pain in right shoulder    Postoperative hypothyroidism    - TSH with free T4 reflex       Risks and Recommendations:  The patient has the following additional risks and recommendations for perioperative complications:   - No identified additional risk factors other than previously addressed    Medication Instructions:  Patient is to take all scheduled medications on the day of surgery    RECOMMENDATION:  APPROVAL GIVEN to proceed with proposed procedure, without further diagnostic evaluation.    Signed Electronically by: DAVID Lazo CNP    Copy of this evaluation report is provided to requesting physician.    Preop [facility] Preop Guidelines    Revised Cardiac Risk Index

## 2020-12-31 DIAGNOSIS — Z80.41 FAMILY HISTORY OF MALIGNANT NEOPLASM OF OVARY: ICD-10-CM

## 2020-12-31 DIAGNOSIS — C73 PAPILLARY ADENOCARCINOMA OF THYROID (H): ICD-10-CM

## 2021-01-18 ENCOUNTER — TRANSFERRED RECORDS (OUTPATIENT)
Dept: HEALTH INFORMATION MANAGEMENT | Facility: CLINIC | Age: 41
End: 2021-01-18

## 2021-01-20 ENCOUNTER — VIRTUAL VISIT (OUTPATIENT)
Dept: FAMILY MEDICINE | Facility: CLINIC | Age: 41
End: 2021-01-20
Payer: MEDICAID

## 2021-01-20 ENCOUNTER — ALLIED HEALTH/NURSE VISIT (OUTPATIENT)
Dept: FAMILY MEDICINE | Facility: CLINIC | Age: 41
End: 2021-01-20
Payer: MEDICAID

## 2021-01-20 DIAGNOSIS — J06.9 VIRAL UPPER RESPIRATORY TRACT INFECTION: Primary | ICD-10-CM

## 2021-01-20 DIAGNOSIS — J06.9 VIRAL UPPER RESPIRATORY TRACT INFECTION: ICD-10-CM

## 2021-01-20 PROCEDURE — 99213 OFFICE O/P EST LOW 20 MIN: CPT | Mod: 95 | Performed by: NURSE PRACTITIONER

## 2021-01-20 PROCEDURE — U0005 INFEC AGEN DETEC AMPLI PROBE: HCPCS | Performed by: NURSE PRACTITIONER

## 2021-01-20 PROCEDURE — 99207 PR NO CHARGE LOS: CPT

## 2021-01-20 PROCEDURE — U0003 INFECTIOUS AGENT DETECTION BY NUCLEIC ACID (DNA OR RNA); SEVERE ACUTE RESPIRATORY SYNDROME CORONAVIRUS 2 (SARS-COV-2) (CORONAVIRUS DISEASE [COVID-19]), AMPLIFIED PROBE TECHNIQUE, MAKING USE OF HIGH THROUGHPUT TECHNOLOGIES AS DESCRIBED BY CMS-2020-01-R: HCPCS | Performed by: NURSE PRACTITIONER

## 2021-01-20 RX ORDER — BUPROPION HYDROCHLORIDE 150 MG/1
TABLET ORAL
COMMUNITY
Start: 2020-12-06 | End: 2021-01-29

## 2021-01-20 ASSESSMENT — ANXIETY QUESTIONNAIRES
3. WORRYING TOO MUCH ABOUT DIFFERENT THINGS: MORE THAN HALF THE DAYS
GAD7 TOTAL SCORE: 9
6. BECOMING EASILY ANNOYED OR IRRITABLE: MORE THAN HALF THE DAYS
1. FEELING NERVOUS, ANXIOUS, OR ON EDGE: SEVERAL DAYS
5. BEING SO RESTLESS THAT IT IS HARD TO SIT STILL: NOT AT ALL
2. NOT BEING ABLE TO STOP OR CONTROL WORRYING: MORE THAN HALF THE DAYS
7. FEELING AFRAID AS IF SOMETHING AWFUL MIGHT HAPPEN: SEVERAL DAYS

## 2021-01-20 ASSESSMENT — PATIENT HEALTH QUESTIONNAIRE - PHQ9
SUM OF ALL RESPONSES TO PHQ QUESTIONS 1-9: 12
5. POOR APPETITE OR OVEREATING: SEVERAL DAYS

## 2021-01-20 NOTE — LETTER
January 20, 2021      Kristel Martinez  [address] [place] [address]        To Whom It May Concern:    Kristel Martinez was seen on 1/20/2021.  Please excuse her until at least 1/22/2021 due to illness and pending COVID 19 test.        Sincerely,        DAVID Lazo CNP

## 2021-01-20 NOTE — PROGRESS NOTES
Kristel is a 40 year old who is being evaluated via a billable video visit.      How would you like to obtain your AVS? MyChart  If the video visit is dropped, the invitation should be resent by: Text to cell phone: [phone]  Will anyone else be joining your video visit? No    Video Start Time: 9:10 AM  Assessment & Plan     Viral upper respiratory tract infection  No acute distress.  With symptoms will do Covid testing as Kristel is right around her 90 days since initial COVID-19 diagnosis.  With sore throat resolving recommend monitoring at this time will defer strep evaluation.  Symptomatic care and follow up discussed.  - Symptomatic COVID-19 Virus (Coronavirus) by PCR; Future      Return in about 1 week (around 1/27/2021), or if symptoms worsen or fail to improve.    DAVID Lazo CNP  [facility]     Kristel is a 40 year old who presents to clinic today for the following health issues     HPI     Acute Illness  Acute illness concerns: body aches, Sore throat   Onset/Duration: 3 days   Symptoms:  Fever: no 99.8 yesterday   Chills/Sweats: no  Headache (location?): YES  Sinus Pressure: no  Conjunctivitis:  YES  Ear Pain: YES- under ears   Rhinorrhea: no  Congestion: no  Sore Throat: YES  Cough: no  Wheeze: no  Decreased Appetite: YES - still has taste and smell   Nausea: no  Vomiting: no  Diarrhea: no  Dysuria/Freq.: no  Dysuria or Hematuria: no  Fatigue/Achiness: YES  Sick/Strep Exposure: no  Therapies tried and outcome: OTC medication     Feeling better today, sore throat resolved  Diagnosed with COVID 19 in early November-[facility] site-afebrile today  Did have a negative COVID screening since testing positive.     Review of Systems   Constitutional, HEENT, cardiovascular, pulmonary, gi and gu systems are negative, except as otherwise noted.      Objective         Vitals:  No vitals were obtained today due to virtual visit.    Physical Exam    GENERAL: Healthy, alert and no distress  EYES: Eyes grossly normal to inspection.  No discharge or erythema, or obvious scleral/conjunctival abnormalities.  RESP: No audible wheeze, cough, or visible cyanosis.  No visible retractions or increased work of breathing.    SKIN: Visible skin clear. No significant rash, abnormal pigmentation or lesions.  NEURO: Cranial nerves grossly intact.  Mentation and speech appropriate for age.  PSYCH: Mentation appears normal, affect normal/bright, judgement and insight intact, normal speech and appearance well-groomed.      Video-Visit Details    Type of service:  Video Visit    Video End Time:9:20 AM    Originating Location (pt. Location): Home    Distant Location (provider location):  [facility]     Platform used for Video Visit: MaryHuitongda

## 2021-01-21 LAB
SARS-COV-2 RNA RESP QL NAA+PROBE: NOT DETECTED
SPECIMEN SOURCE: NORMAL

## 2021-01-21 ASSESSMENT — ANXIETY QUESTIONNAIRES: GAD7 TOTAL SCORE: 9

## 2021-01-22 LAB
LAB SCANNED RESULT: NORMAL
MISCELLANEOUS TEST: NORMAL

## 2021-01-25 ENCOUNTER — MYC MEDICAL ADVICE (OUTPATIENT)
Dept: FAMILY MEDICINE | Facility: CLINIC | Age: 41
End: 2021-01-25

## 2021-01-26 DIAGNOSIS — F41.1 GAD (GENERALIZED ANXIETY DISORDER): ICD-10-CM

## 2021-01-26 DIAGNOSIS — F33.1 MAJOR DEPRESSIVE DISORDER, RECURRENT EPISODE, MODERATE (H): ICD-10-CM

## 2021-01-27 ENCOUNTER — OFFICE VISIT (OUTPATIENT)
Dept: FAMILY MEDICINE | Facility: CLINIC | Age: 41
End: 2021-01-27
Payer: MEDICAID

## 2021-01-27 VITALS
OXYGEN SATURATION: 100 % | TEMPERATURE: 97.8 F | SYSTOLIC BLOOD PRESSURE: 118 MMHG | WEIGHT: 235 LBS | RESPIRATION RATE: 16 BRPM | BODY MASS INDEX: 33.64 KG/M2 | HEIGHT: 70 IN | HEART RATE: 78 BPM | DIASTOLIC BLOOD PRESSURE: 62 MMHG

## 2021-01-27 DIAGNOSIS — R06.2 WHEEZING: Primary | ICD-10-CM

## 2021-01-27 DIAGNOSIS — F41.1 GAD (GENERALIZED ANXIETY DISORDER): ICD-10-CM

## 2021-01-27 DIAGNOSIS — R06.02 SHORTNESS OF BREATH: ICD-10-CM

## 2021-01-27 DIAGNOSIS — F33.1 MAJOR DEPRESSIVE DISORDER, RECURRENT EPISODE, MODERATE (H): ICD-10-CM

## 2021-01-27 PROCEDURE — 99214 OFFICE O/P EST MOD 30 MIN: CPT | Performed by: NURSE PRACTITIONER

## 2021-01-27 RX ORDER — ESCITALOPRAM OXALATE 20 MG/1
20 TABLET ORAL DAILY
Qty: 90 TABLET | Refills: 1 | Status: SHIPPED | OUTPATIENT
Start: 2021-01-27 | End: 2021-04-26

## 2021-01-27 ASSESSMENT — ANXIETY QUESTIONNAIRES
3. WORRYING TOO MUCH ABOUT DIFFERENT THINGS: NEARLY EVERY DAY
7. FEELING AFRAID AS IF SOMETHING AWFUL MIGHT HAPPEN: NEARLY EVERY DAY
5. BEING SO RESTLESS THAT IT IS HARD TO SIT STILL: NOT AT ALL
2. NOT BEING ABLE TO STOP OR CONTROL WORRYING: NEARLY EVERY DAY
6. BECOMING EASILY ANNOYED OR IRRITABLE: NEARLY EVERY DAY
1. FEELING NERVOUS, ANXIOUS, OR ON EDGE: NEARLY EVERY DAY
GAD7 TOTAL SCORE: 16

## 2021-01-27 ASSESSMENT — PATIENT HEALTH QUESTIONNAIRE - PHQ9
SUM OF ALL RESPONSES TO PHQ QUESTIONS 1-9: 20
5. POOR APPETITE OR OVEREATING: SEVERAL DAYS

## 2021-01-27 ASSESSMENT — MIFFLIN-ST. JEOR: SCORE: 1816.2

## 2021-01-27 NOTE — PATIENT INSTRUCTIONS
Start supplementing with vitamin D3 2000 international unit(s) daily     Increase the Lexapro to 20 mg daily     Pulmonary function testing [phone]    Work on increasing physical activity slowly as tolerated    Recheck in 1-2 months, sooner if needed

## 2021-01-27 NOTE — PROGRESS NOTES
Assessment & Plan     Wheezing  No acute distress, oxygen 100%.  With ongoing wheezing and shortness of breath will do pulmonary function testing for further evaluation..   - General PFT Lab (Please always keep checked); Future  - Pulmonary Function Test; Future    Shortness of breath  Per above.   - General PFT Lab (Please always keep checked); Future  - Pulmonary Function Test; Future    Major depressive disorder, recurrent episode, moderate (H)  Not controlled.  Will increase the Lexapro to 20 mg daily for better control.  Risks and benefits of medication discussed. Recheck in 1-2 months, sooner if needed.   - escitalopram (LEXAPRO) 20 MG tablet; Take 1 tablet (20 mg) by mouth daily    MALCOM (generalized anxiety disorder)  Not controlled. Per above.   - escitalopram (LEXAPRO) 20 MG tablet; Take 1 tablet (20 mg) by mouth daily      Return in about 4 weeks (around 2/24/2021) for Depression check.    DAVID Lazo [facility]    Kamini Humphries is a 40 year old who presents to clinic today for the following health issues     HPI       Depression Followup    How are you doing with your depression since your last visit? Worsened     Are you having other symptoms that might be associated with depression? Yes:  .     Have you had a significant life event?  OTHER: .  divorce     Are you feeling anxious or having panic attacks?   Yes:  anxiety     Do you have any concerns with your use of alcohol or other drugs? No    Social History     Tobacco Use     Smoking status: Former Smoker     Smokeless tobacco: Never Used   Substance Use Topics     Alcohol use: Yes     Comment: 1 drink per wk     Drug use: No     PHQ 6/5/2020 1/20/2021 1/27/2021   PHQ-9 Total Score 18 12 20   Q9: Thoughts of better off dead/self-harm past 2 weeks Not at all Not at all Several days     MALCOM-7 SCORE 6/5/2020 1/20/2021 1/27/2021   Total Score - - -   Total Score 6 9 16       Suicide Assessment Five-step  "Evaluation and Treatment (SAFE-T)      How many servings of fruits and vegetables do you eat daily?  0-1    On average, how many sweetened beverages do you drink each day (Examples: soda, juice, sweet tea, etc.  Do NOT count diet or artificially sweetened beverages)?   1    How many days per week do you exercise enough to make your heart beat faster? 3 or less    How many minutes a day do you exercise enough to make your heart beat faster? 9 or less    How many days per week do you miss taking your medication? 0    RESPIRATORY SYMPTOMS      Duration: Nov 2021    Description  SOB, wheezing in am     Severity: moderate    Accompanying signs and symptoms: None    History (predisposing factors):  COVID nov 2019    Precipitating or alleviating factors: None    Therapies tried and outcome:  none     Short of breath with activity and in the mornings-has not been exercising due to shoulder surgery and COVID 19 infection    Review of Systems   Constitutional, HEENT, cardiovascular, pulmonary, gi and gu systems are negative, except as otherwise noted.      Objective    /62 (Cuff Size: Adult Large)   Pulse 78   Temp 97.8  F (36.6  C) (Tympanic)   Resp 16   Ht 1.778 m (5' 10\")   Wt 106.6 kg (235 lb)   SpO2 100%   BMI 33.72 kg/m    Body mass index is 33.72 kg/m .  Physical Exam   GENERAL: healthy, alert and no distress  NECK: no adenopathy, no asymmetry, masses, or scars and thyroid normal to palpation  RESP: lungs clear to auscultation - no rales, rhonchi or wheezes  CV: regular rate and rhythm, normal S1 S2, no S3 or S4, no murmur, click or rub, no peripheral edema and peripheral pulses strong  PSYCH: mentation appears normal, affect normal/bright            "

## 2021-01-28 ASSESSMENT — ANXIETY QUESTIONNAIRES: GAD7 TOTAL SCORE: 16

## 2021-01-29 RX ORDER — ESCITALOPRAM OXALATE 10 MG/1
TABLET ORAL
Qty: 90 TABLET | Refills: 3 | OUTPATIENT
Start: 2021-01-29

## 2021-01-29 RX ORDER — BUPROPION HYDROCHLORIDE 150 MG/1
TABLET ORAL
Qty: 90 TABLET | Refills: 1 | Status: SHIPPED | OUTPATIENT
Start: 2021-01-29 | End: 2021-04-26

## 2021-02-01 ENCOUNTER — VIRTUAL VISIT (OUTPATIENT)
Dept: ONCOLOGY | Facility: CLINIC | Age: 41
End: 2021-02-01
Attending: GENETIC COUNSELOR, MS
Payer: MEDICAID

## 2021-02-01 DIAGNOSIS — Z80.41 FAMILY HISTORY OF MALIGNANT NEOPLASM OF OVARY: ICD-10-CM

## 2021-02-01 DIAGNOSIS — C73 PAPILLARY ADENOCARCINOMA OF THYROID (H): Primary | ICD-10-CM

## 2021-02-01 PROCEDURE — 999N000069 HC STATISTIC GENETIC COUNSELING, < 16 MIN: Mod: GT | Performed by: GENETIC COUNSELOR, MS

## 2021-02-01 NOTE — PROGRESS NOTES
"2/1/2021    Referring Provider: DAVID Lazo CNP    Presenting Information:  I spoke to Kristel by phone today to discuss her genetic testing results as part of the Cancer Risk Management Program.     Genetic Testing Result: NEGATIVE  No pathogenic mutations, variants of unknown significance, or gross deletions or duplications were detected.       Genes Analyzed (38 total): APC, VASILIY, AXIN2, BARD1, BMPR1A, BRCA1, BRCA2, BRIP1, CDH1, CDK4, CDKN2A, CHEK2, DICER1, MLH1, MSH2, MSH3, MSH6, MUTYH, NBN, NF1, NTHL1, PALB2, PMS2, SFVUX9W, PTEN, RAD51C, RAD51D, RECQL, RET, SMAD4, SMARCA4, STK11 and TP53 (sequencing and deletion/duplication); HOXB13, POLD1 and POLE (sequencing only); EPCAM and  GREM1 (deletion/duplication only)    A copy of the test report can be found in the Laboratory tab, dated 12/21/2020, and named \"SEND OUTS MISC TEST\". The report is scanned in as a linked document.    Interpretation:  We discussed several different interpretations of this negative test result.    1. One explanation may be that there is a different gene or combination of genes and environment that are associated with the cancers in this family.  2. It is possible that a cancer susceptibility gene is present in Kristels family which she did not inherit.  Her paternal aunt's ovarian cancer diagnosis could be suggestive of a cancer susceptibility gene in the family, and close relatives may consider genetic counseling/testing.    3. There is also a small possibility that there is a mutation in one of these genes, and the testing laboratory could not find it with their current testing methods.       Screening:  Based on this negative test result, it is important for Kristel and her relatives to refer back to the family history for appropriate cancer screening.      We discussed available ovarian cancer screening (pelvic exams, CA-125 blood tests, and transvaginal ultrasounds) as well as the significant limitations of this screening. As such, " this screening is not typically recommended. That being said, women in this family should discuss this screening and the signs and symptoms of ovarian cancer with their primary OB/GYN provider, as they may have individualized recommendations.      Other population cancer screening options, such as those recommended by the American Cancer Society and the National Comprehensive Cancer Network (NCCN), are also appropriate for Kristel and her family. These screening recommendations may change if there are changes to Kristel's personal and/or family history of cancer. Final screening recommendations should be made by each individual's managing physician.    Inheritance:  We reviewed the autosomal dominant inheritance of the genes tested. We discussed that Kristel cannot/did not pass on an identifiable mutation in these genes to her children based on this test result.  Mutations in these genes do not skip generations.      Summary:  We do not have an explanation for Kristel's personal or family history cancer. While no genetic changes were identified, Kristel may still be at risk for certain cancers due to family history, environmental factors, or other genetic causes not identified by this test.  Because of that, it is important that she continue with cancer screening based on her personal and family history as discussed above.    Genetic testing is rapidly advancing, and new cancer susceptibility genes will most likely be identified in the future.  Therefore, I encouraged Kristel to contact me annually or if there are changes in her personal or family history.  This may change how we assess her cancer risk, screening, and the testing we would offer.    Plan:  1. Genetic test results and screening recommendations were discussed today for Kristel and her family.  2. She is encouraged to follow the recommendations of her managing physicians.  3. She should contact me annually, or sooner if her family history changes.    If Kristel has any  further questions, I encouraged her to contact me at  [phone].    Time spent on the phone: 3 minutes.    Elsy Casanova MS, [facility]  Licensed, Certified Genetic Counselor  [facility]  Phone: [phone]

## 2021-02-01 NOTE — LETTER
"    2/1/2021         RE: Kristel Martinez  [address] [place] [facility] [place] [facility] [address]        Dear Colleague,    Thank you for referring your patient, Kristel Martinez, to the [facility]. Please see a copy of my visit note below.    2/1/2021    Referring Provider: DAVID Lazo CNP    Presenting Information:  I spoke to Kristel by phone today to discuss her genetic testing results as part of the Cancer Risk Management Program.     Genetic Testing Result: NEGATIVE  No pathogenic mutations, variants of unknown significance, or gross deletions or duplications were detected.       Genes Analyzed (38 total): APC, VASILIY, AXIN2, BARD1, BMPR1A, BRCA1, BRCA2, BRIP1, CDH1, CDK4, CDKN2A, CHEK2, DICER1, MLH1, MSH2, MSH3, MSH6, MUTYH, NBN, NF1, NTHL1, PALB2, PMS2, LXFEQ3C, PTEN, RAD51C, RAD51D, RECQL, RET, SMAD4, SMARCA4, STK11 and TP53 (sequencing and deletion/duplication); HOXB13, POLD1 and POLE (sequencing only); EPCAM and  GREM1 (deletion/duplication only)    A copy of the test report can be found in the Laboratory tab, dated 12/21/2020, and named \"SEND OUTS MISC TEST\". The report is scanned in as a linked document.    Interpretation:  We discussed several different interpretations of this negative test result.    1. One explanation may be that there is a different gene or combination of genes and environment that are associated with the cancers in this family.  2. It is possible that a cancer susceptibility gene is present in Marcie family which she did not inherit.  Her paternal aunt's ovarian cancer diagnosis could be suggestive of a cancer susceptibility gene in the family, and close relatives may consider genetic counseling/testing.    3. There is also a small possibility that there is a mutation in one of these genes, and the testing laboratory could not find it with their current testing methods.       Screening:  Based on this negative test result, it is important for Kristel and her relatives to " refer back to the family history for appropriate cancer screening.      We discussed available ovarian cancer screening (pelvic exams, CA-125 blood tests, and transvaginal ultrasounds) as well as the significant limitations of this screening. As such, this screening is not typically recommended. That being said, women in this family should discuss this screening and the signs and symptoms of ovarian cancer with their primary OB/GYN provider, as they may have individualized recommendations.      Other population cancer screening options, such as those recommended by the American Cancer Society and the National Comprehensive Cancer Network (NCCN), are also appropriate for Kristel and her family. These screening recommendations may change if there are changes to Kristel's personal and/or family history of cancer. Final screening recommendations should be made by each individual's managing physician.    Inheritance:  We reviewed the autosomal dominant inheritance of the genes tested. We discussed that Kristel cannot/did not pass on an identifiable mutation in these genes to her children based on this test result.  Mutations in these genes do not skip generations.      Summary:  We do not have an explanation for Kristel's personal or family history cancer. While no genetic changes were identified, Kristel may still be at risk for certain cancers due to family history, environmental factors, or other genetic causes not identified by this test.  Because of that, it is important that she continue with cancer screening based on her personal and family history as discussed above.    Genetic testing is rapidly advancing, and new cancer susceptibility genes will most likely be identified in the future.  Therefore, I encouraged Kristel to contact me annually or if there are changes in her personal or family history.  This may change how we assess her cancer risk, screening, and the testing we would offer.    Plan:  1. Genetic test results  and screening recommendations were discussed today for Kristel and her family.  2. She is encouraged to follow the recommendations of her managing physicians.  3. She should contact me annually, or sooner if her family history changes.    If Kristel has any further questions, I encouraged her to contact me at  [phone].    Time spent on the phone: 3 minutes.    Elsy Casanova MS, [facility]  Licensed, Certified Genetic Counselor  [facility]  Phone: [phone]

## 2021-02-03 ENCOUNTER — HOSPITAL ENCOUNTER (OUTPATIENT)
Dept: RESPIRATORY THERAPY | Facility: CLINIC | Age: 41
Discharge: HOME | End: 2021-02-03
Attending: INTERNAL MEDICINE | Admitting: INTERNAL MEDICINE
Payer: MEDICAID

## 2021-02-03 DIAGNOSIS — R06.2 WHEEZING: ICD-10-CM

## 2021-02-03 DIAGNOSIS — R06.02 SHORTNESS OF BREATH: ICD-10-CM

## 2021-02-03 PROCEDURE — 94726 PLETHYSMOGRAPHY LUNG VOLUMES: CPT

## 2021-02-03 PROCEDURE — 94060 EVALUATION OF WHEEZING: CPT

## 2021-02-03 PROCEDURE — 94060 EVALUATION OF WHEEZING: CPT | Mod: 26 | Performed by: INTERNAL MEDICINE

## 2021-02-03 PROCEDURE — 94729 DIFFUSING CAPACITY: CPT | Mod: 26 | Performed by: INTERNAL MEDICINE

## 2021-02-03 PROCEDURE — 94726 PLETHYSMOGRAPHY LUNG VOLUMES: CPT | Mod: 26 | Performed by: INTERNAL MEDICINE

## 2021-02-03 PROCEDURE — 250N000009 HC RX 250: Performed by: NURSE PRACTITIONER

## 2021-02-03 PROCEDURE — 94729 DIFFUSING CAPACITY: CPT

## 2021-02-03 RX ORDER — ALBUTEROL SULFATE 0.83 MG/ML
2.5 SOLUTION RESPIRATORY (INHALATION) ONCE
Status: COMPLETED | OUTPATIENT
Start: 2021-02-03 | End: 2021-02-03

## 2021-02-03 RX ADMIN — ALBUTEROL SULFATE 2.5 MG: 2.5 SOLUTION RESPIRATORY (INHALATION) at 15:05

## 2021-02-04 LAB
DLCOUNC-%PRED-PRE: 104 %
DLCOUNC-PRE: 27.86 ML/MIN/MMHG
DLCOUNC-PRED: 26.56 ML/MIN/MMHG
ERV-%PRED-PRE: 70 %
ERV-PRE: 0.66 L
ERV-PRED: 0.93 L
EXPTIME-PRE: 5.83 SEC
FEF2575-%PRED-POST: 118 %
FEF2575-%PRED-PRE: 92 %
FEF2575-POST: 4.28 L/SEC
FEF2575-PRE: 3.35 L/SEC
FEF2575-PRED: 3.61 L/SEC
FEFMAX-%PRED-PRE: 91 %
FEFMAX-PRE: 7.26 L/SEC
FEFMAX-PRED: 7.94 L/SEC
FEV1-%PRED-PRE: 92 %
FEV1-PRE: 3.37 L
FEV1FEV6-PRE: 81 %
FEV1FEV6-PRED: 84 %
FEV1FVC-PRE: 81 %
FEV1FVC-PRED: 82 %
FEV1SVC-PRE: 82 %
FEV1SVC-PRED: 82 %
FIFMAX-PRE: 4.16 L/SEC
FRCPLETH-%PRED-PRE: 100 %
FRCPLETH-PRE: 3.02 L
FRCPLETH-PRED: 3.02 L
FVC-%PRED-PRE: 91 %
FVC-PRE: 4.14 L
FVC-PRED: 4.51 L
IC-%PRED-PRE: 98 %
IC-PRE: 3.46 L
IC-PRED: 3.52 L
RVPLETH-%PRED-PRE: 127 %
RVPLETH-PRE: 2.37 L
RVPLETH-PRED: 1.86 L
TLCPLETH-%PRED-PRE: 109 %
TLCPLETH-PRE: 6.49 L
TLCPLETH-PRED: 5.94 L
VA-%PRED-PRE: 86 %
VA-PRE: 5.39 L
VC-%PRED-PRE: 92 %
VC-PRE: 4.12 L
VC-PRED: 4.45 L

## 2021-02-12 RX ORDER — ESCITALOPRAM OXALATE 10 MG/1
TABLET ORAL
Qty: 90 TABLET | Refills: 3 | OUTPATIENT
Start: 2021-02-12

## 2021-02-12 NOTE — TELEPHONE ENCOUNTER
Medication has been increased to 20 mg, see office visit on 1-27-21, script for Lexapro 20 mg sent to [facility] with one refill on 1-27-21. Denied this refill due to adjustment in therapy.    GENESIS Shrestha

## 2021-03-08 ENCOUNTER — HOSPITAL ENCOUNTER (OUTPATIENT)
Dept: ULTRASOUND IMAGING | Facility: CLINIC | Age: 41
Discharge: HOME | End: 2021-03-08
Attending: NURSE PRACTITIONER | Admitting: NURSE PRACTITIONER
Payer: COMMERCIAL

## 2021-03-08 DIAGNOSIS — N93.9 ABNORMAL VAGINAL BLEEDING: ICD-10-CM

## 2021-03-08 PROCEDURE — 76830 TRANSVAGINAL US NON-OB: CPT

## 2021-03-16 DIAGNOSIS — E89.0 POSTOPERATIVE HYPOTHYROIDISM: ICD-10-CM

## 2021-03-17 RX ORDER — LEVOTHYROXINE SODIUM 112 UG/1
TABLET ORAL
Qty: 90 TABLET | Refills: 3 | Status: SHIPPED | OUTPATIENT
Start: 2021-03-17 | End: 2021-04-26

## 2021-03-18 ENCOUNTER — MYC MEDICAL ADVICE (OUTPATIENT)
Dept: FAMILY MEDICINE | Facility: CLINIC | Age: 41
End: 2021-03-18

## 2021-03-19 ENCOUNTER — OFFICE VISIT (OUTPATIENT)
Dept: URGENT CARE | Facility: URGENT CARE | Age: 41
End: 2021-03-19
Payer: COMMERCIAL

## 2021-03-19 VITALS
WEIGHT: 229 LBS | DIASTOLIC BLOOD PRESSURE: 77 MMHG | OXYGEN SATURATION: 99 % | RESPIRATION RATE: 20 BRPM | SYSTOLIC BLOOD PRESSURE: 113 MMHG | HEART RATE: 80 BPM | BODY MASS INDEX: 32.86 KG/M2 | TEMPERATURE: 97.4 F

## 2021-03-19 DIAGNOSIS — N89.8 VAGINAL DISCHARGE: Primary | ICD-10-CM

## 2021-03-19 DIAGNOSIS — R30.0 DYSURIA: ICD-10-CM

## 2021-03-19 DIAGNOSIS — L29.9 ITCHING: ICD-10-CM

## 2021-03-19 LAB
ALBUMIN UR-MCNC: NEGATIVE MG/DL
APPEARANCE UR: CLEAR
BASOPHILS # BLD AUTO: 0 10E9/L (ref 0–0.2)
BASOPHILS NFR BLD AUTO: 0.6 %
BILIRUB UR QL STRIP: NEGATIVE
COLOR UR AUTO: YELLOW
DIFFERENTIAL METHOD BLD: NORMAL
EOSINOPHIL # BLD AUTO: 0.1 10E9/L (ref 0–0.7)
EOSINOPHIL NFR BLD AUTO: 0.7 %
ERYTHROCYTE [DISTWIDTH] IN BLOOD BY AUTOMATED COUNT: 12.2 % (ref 10–15)
GLUCOSE UR STRIP-MCNC: NEGATIVE MG/DL
HCT VFR BLD AUTO: 39.6 % (ref 35–47)
HGB BLD-MCNC: 12.9 G/DL (ref 11.7–15.7)
HGB UR QL STRIP: NEGATIVE
KETONES UR STRIP-MCNC: NEGATIVE MG/DL
LEUKOCYTE ESTERASE UR QL STRIP: NEGATIVE
LYMPHOCYTES # BLD AUTO: 1.7 10E9/L (ref 0.8–5.3)
LYMPHOCYTES NFR BLD AUTO: 25 %
MCH RBC QN AUTO: 27.1 PG (ref 26.5–33)
MCHC RBC AUTO-ENTMCNC: 32.6 G/DL (ref 31.5–36.5)
MCV RBC AUTO: 83 FL (ref 78–100)
MONOCYTES # BLD AUTO: 0.4 10E9/L (ref 0–1.3)
MONOCYTES NFR BLD AUTO: 5.2 %
NEUTROPHILS # BLD AUTO: 4.6 10E9/L (ref 1.6–8.3)
NEUTROPHILS NFR BLD AUTO: 68.5 %
NITRATE UR QL: NEGATIVE
PH UR STRIP: 6.5 PH (ref 5–7)
PLATELET # BLD AUTO: 259 10E9/L (ref 150–450)
RBC # BLD AUTO: 4.76 10E12/L (ref 3.8–5.2)
SOURCE: NORMAL
SP GR UR STRIP: <=1.005 (ref 1–1.03)
SPECIMEN SOURCE: NORMAL
UROBILINOGEN UR STRIP-ACNC: 0.2 EU/DL (ref 0.2–1)
WBC # BLD AUTO: 6.8 10E9/L (ref 4–11)
WET PREP SPEC: NORMAL

## 2021-03-19 PROCEDURE — 36415 COLL VENOUS BLD VENIPUNCTURE: CPT | Performed by: NURSE PRACTITIONER

## 2021-03-19 PROCEDURE — 81003 URINALYSIS AUTO W/O SCOPE: CPT | Performed by: NURSE PRACTITIONER

## 2021-03-19 PROCEDURE — 80053 COMPREHEN METABOLIC PANEL: CPT | Performed by: NURSE PRACTITIONER

## 2021-03-19 PROCEDURE — 99214 OFFICE O/P EST MOD 30 MIN: CPT | Performed by: NURSE PRACTITIONER

## 2021-03-19 PROCEDURE — 85025 COMPLETE CBC W/AUTO DIFF WBC: CPT | Performed by: NURSE PRACTITIONER

## 2021-03-19 PROCEDURE — 82248 BILIRUBIN DIRECT: CPT | Performed by: NURSE PRACTITIONER

## 2021-03-19 PROCEDURE — 87210 SMEAR WET MOUNT SALINE/INK: CPT | Performed by: NURSE PRACTITIONER

## 2021-03-19 RX ORDER — PREDNISONE 20 MG/1
TABLET ORAL
Qty: 20 TABLET | Refills: 0 | Status: SHIPPED | OUTPATIENT
Start: 2021-03-19 | End: 2021-06-22

## 2021-03-19 RX ORDER — HYDROXYZINE HYDROCHLORIDE 25 MG/1
25 TABLET, FILM COATED ORAL EVERY 8 HOURS PRN
Qty: 21 TABLET | Refills: 0 | Status: SHIPPED | OUTPATIENT
Start: 2021-03-19 | End: 2021-03-26

## 2021-03-19 ASSESSMENT — ENCOUNTER SYMPTOMS
SHORTNESS OF BREATH: 0
FEVER: 0
CHILLS: 0
DYSURIA: 1
NAUSEA: 0
FREQUENCY: 1
DIARRHEA: 0
RHINORRHEA: 0
HEADACHES: 0
VOMITING: 0
SORE THROAT: 0
COUGH: 0

## 2021-03-19 NOTE — PROGRESS NOTES
ASSESSMENT:      ICD-10-CM    1. Vaginal discharge  N89.8 Wet prep     Bilirubin direct   2. Dysuria  R30.0 UA reflex to Microscopic and Culture   3. Itching  L29.9 predniSONE (DELTASONE) 20 MG tablet     hydrOXYzine (ATARAX) 25 MG tablet     Comprehensive metabolic panel (BMP + Alb, Alk Phos, ALT, AST, Total. Bili, TP)     CBC with platelets and differential     CANCELED: Hepatic panel (Albumin, ALT, AST, Bili, Alk Phos, TP)          PLAN:  I discussed lab results with the patient.  Nothing on UA or wet prep  Will order labs and have patient follow up with her PCP  Patient educational/instructional material provided including reasons for follow-up    The patient indicates understanding of these issues and agrees with the plan.        SUBJECTIVE:   Kristel Martinez is a 40 year old female presenting with a chief complaint of   Chief Complaint   Patient presents with     UTI     Odor in urine for couple of weeks, burning when urinating and vaginal discharge     Derm Problem     Bodyache itche for over a week.       She is an established patient of [place].    UTI    Onset of symptoms was 2week(s).  Course of illness is improving  Severity getting better  Current and associated symptoms mild dysuria, frequency and vaginal discharge  Treatment and measures tried None  Predisposing factors include none  Patient denies rigors, flank pain, temperature > 101 degrees F. and vomiting        ITCHY skin-all over the skin and scalp    Onset of itching was 1 week(s) ago.   Course of illness is worsening.  Severity moderate  Current and Associated symptoms: itching   Previous history of a similar itch? no   Recent exposure history: none known  Denies exposure to: none known  Associated symptoms include: nothing.  Treatment measures tried include: none      Review of Systems   Constitutional: Negative for chills and fever.   HENT: Negative for congestion, ear pain, rhinorrhea and sore throat.    Respiratory: Negative for cough  and shortness of breath.    Gastrointestinal: Negative for diarrhea, nausea and vomiting.   Genitourinary: Positive for dysuria, frequency and vaginal discharge.   Skin:        Itchy skin with no rash   Neurological: Negative for headaches.   All other systems reviewed and are negative.      Past Medical History:   Diagnosis Date     Depressive disorder      Family History   Problem Relation Age of Onset     Skin Cancer Mother      Hypertension Father      Arrhythmia Father      Lymphoma Maternal Grandmother      Diabetes Paternal Grandfather         type 1      Asthma Son      Arthritis Daughter      Current Outpatient Medications   Medication Sig Dispense Refill     albuterol (PROAIR HFA/PROVENTIL HFA/VENTOLIN HFA) 108 (90 Base) MCG/ACT inhaler Inhale 2 puffs every 4-6 hours as needed for cough, wheezing, or shortness of breath 18 g 0     buPROPion (WELLBUTRIN XL) 150 MG 24 hr tablet TAKE 1 TABLET BY MOUTH IN  THE MORNING 90 tablet 1     dicyclomine (BENTYL) 20 MG tablet Take 1 tablet (20 mg) by mouth 4 times daily as needed (IBS symptoms) 28 tablet 0     escitalopram (LEXAPRO) 20 MG tablet Take 1 tablet (20 mg) by mouth daily 90 tablet 1     fluticasone (FLONASE) 50 MCG/ACT nasal spray Spray 1 spray into both nostrils daily 16 g 1     hydrOXYzine (ATARAX) 25 MG tablet Take 1 tablet (25 mg) by mouth every 8 hours as needed for itching 21 tablet 0     levothyroxine (SYNTHROID/LEVOTHROID) 112 MCG tablet TAKE 1 TABLET BY MOUTH DAILY TOTAL  MG DAILY 90 tablet 3     levothyroxine (SYNTHROID/LEVOTHROID) 50 MCG tablet TAKE 1 TABLET BY MOUTH  DAILY ALONG WITH 112MCG  TABLET FOR A TOTAL DAILY  DOSE OF 162MCG. 90 tablet 3     metroNIDAZOLE (METROLOTION) 0.75 % external lotion APPLY TO AFFECTED AREA(S)  TOPICALLY DAILY 59 mL 1     predniSONE (DELTASONE) 20 MG tablet Take 3 tabs by mouth daily x 3 days, then 2 tabs daily x 3 days, then 1 tab daily x 3 days, then 1/2 tab daily x 3 days. 20 tablet 0     SUMAtriptan  (IMITREX) 25 MG tablet TAKE 1 TO 2 TABLETS BY  MOUTH AT ONSET OF HEADACHE  FOR MIGRAINE. MAY REPEAT IN 2 HOURS. MAX OF 8 TABLETS  BY MOUTH IN 24 HOURS 18 tablet 3     tiZANidine (ZANAFLEX) 4 MG tablet Take 1 tablet (4 mg) by mouth 2 times daily as needed for muscle spasms Can cause sedation. Do not take and work. 30 tablet 0     tretinoin (RETIN-A) 0.025 % external cream Use every night 45 g 0     valACYclovir (VALTREX) 1000 mg tablet Take 2 tablets (2,000 mg) by mouth 2 times daily 4 tablet 1     Social History     Tobacco Use     Smoking status: Former Smoker     Smokeless tobacco: Never Used   Substance Use Topics     Alcohol use: Yes     Comment: 1 drink per wk       OBJECTIVE  /77 (BP Location: Left arm, Patient Position: Sitting, Cuff Size: Adult Large)   Pulse 80   Temp 97.4  F (36.3  C) (Tympanic)   Resp 20   Wt 103.9 kg (229 lb)   LMP 03/05/2021   SpO2 99%   Breastfeeding No   BMI 32.86 kg/m      Physical Exam  Vitals signs and nursing note reviewed.   Constitutional:       General: She is not in acute distress.     Appearance: She is well-developed. She is not diaphoretic.   HENT:      Head: Normocephalic and atraumatic.      Right Ear: Tympanic membrane and external ear normal.      Left Ear: Tympanic membrane and external ear normal.   Eyes:      Pupils: Pupils are equal, round, and reactive to light.   Neck:      Musculoskeletal: Normal range of motion and neck supple.   Pulmonary:      Effort: Pulmonary effort is normal. No respiratory distress.      Breath sounds: Normal breath sounds.   Lymphadenopathy:      Cervical: No cervical adenopathy.   Skin:     General: Skin is warm and dry.      Comments: No noted rash   Neurological:      Mental Status: She is alert.      Cranial Nerves: No cranial nerve deficit.         Labs:  Results for orders placed or performed in visit on 03/19/21 (from the past 24 hour(s))   Wet prep    Specimen: Vagina   Result Value Ref Range    Specimen Description  Vagina     Wet Prep No Trichomonas seen     Wet Prep No clue cells seen     Wet Prep No yeast seen     Wet Prep WBC'S seen  Rare      UA reflex to Microscopic and Culture    Specimen: Midstream Urine   Result Value Ref Range    Color Urine Yellow     Appearance Urine Clear     Glucose Urine Negative NEG^Negative mg/dL    Bilirubin Urine Negative NEG^Negative    Ketones Urine Negative NEG^Negative mg/dL    Specific Gravity Urine <=1.005 1.003 - 1.035    Blood Urine Negative NEG^Negative    pH Urine 6.5 5.0 - 7.0 pH    Protein Albumin Urine Negative NEG^Negative mg/dL    Urobilinogen Urine 0.2 0.2 - 1.0 EU/dL    Nitrite Urine Negative NEG^Negative    Leukocyte Esterase Urine Negative NEG^Negative    Source Midstream Urine    CBC with platelets and differential   Result Value Ref Range    WBC 6.8 4.0 - 11.0 10e9/L    RBC Count 4.76 3.8 - 5.2 10e12/L    Hemoglobin 12.9 11.7 - 15.7 g/dL    Hematocrit 39.6 35.0 - 47.0 %    MCV 83 78 - 100 fl    MCH 27.1 26.5 - 33.0 pg    MCHC 32.6 31.5 - 36.5 g/dL    RDW 12.2 10.0 - 15.0 %    Platelet Count 259 150 - 450 10e9/L    % Neutrophils 68.5 %    % Lymphocytes 25.0 %    % Monocytes 5.2 %    % Eosinophils 0.7 %    % Basophils 0.6 %    Absolute Neutrophil 4.6 1.6 - 8.3 10e9/L    Absolute Lymphocytes 1.7 0.8 - 5.3 10e9/L    Absolute Monocytes 0.4 0.0 - 1.3 10e9/L    Absolute Eosinophils 0.1 0.0 - 0.7 10e9/L    Absolute Basophils 0.0 0.0 - 0.2 10e9/L    Diff Method Automated Method           There are no Patient Instructions on file for this visit.

## 2021-03-20 LAB
ALBUMIN SERPL-MCNC: 4.1 G/DL (ref 3.4–5)
ALP SERPL-CCNC: 56 U/L (ref 40–150)
ALT SERPL W P-5'-P-CCNC: 16 U/L (ref 0–50)
ANION GAP SERPL CALCULATED.3IONS-SCNC: 5 MMOL/L (ref 3–14)
AST SERPL W P-5'-P-CCNC: 7 U/L (ref 0–45)
BILIRUB DIRECT SERPL-MCNC: <0.1 MG/DL (ref 0–0.2)
BILIRUB SERPL-MCNC: 0.2 MG/DL (ref 0.2–1.3)
BUN SERPL-MCNC: 12 MG/DL (ref 7–30)
CALCIUM SERPL-MCNC: 8.8 MG/DL (ref 8.5–10.1)
CHLORIDE SERPL-SCNC: 106 MMOL/L (ref 94–109)
CO2 SERPL-SCNC: 26 MMOL/L (ref 20–32)
CREAT SERPL-MCNC: 0.82 MG/DL (ref 0.52–1.04)
GFR SERPL CREATININE-BSD FRML MDRD: 90 ML/MIN/{1.73_M2}
GLUCOSE SERPL-MCNC: 82 MG/DL (ref 70–99)
POTASSIUM SERPL-SCNC: 3.9 MMOL/L (ref 3.4–5.3)
PROT SERPL-MCNC: 7.2 G/DL (ref 6.8–8.8)
SODIUM SERPL-SCNC: 137 MMOL/L (ref 133–144)

## 2021-03-22 DIAGNOSIS — E89.0 POSTOPERATIVE HYPOTHYROIDISM: ICD-10-CM

## 2021-03-23 ENCOUNTER — OFFICE VISIT (OUTPATIENT)
Dept: OBGYN | Facility: CLINIC | Age: 41
End: 2021-03-23
Payer: COMMERCIAL

## 2021-03-23 VITALS
TEMPERATURE: 97.2 F | DIASTOLIC BLOOD PRESSURE: 80 MMHG | BODY MASS INDEX: 33.39 KG/M2 | HEART RATE: 71 BPM | OXYGEN SATURATION: 99 % | SYSTOLIC BLOOD PRESSURE: 118 MMHG | WEIGHT: 232.7 LBS

## 2021-03-23 DIAGNOSIS — Z12.31 ENCOUNTER FOR SCREENING MAMMOGRAM FOR BREAST CANCER: ICD-10-CM

## 2021-03-23 DIAGNOSIS — Z11.3 SCREEN FOR STD (SEXUALLY TRANSMITTED DISEASE): ICD-10-CM

## 2021-03-23 DIAGNOSIS — Z30.430 ENCOUNTER FOR INSERTION OF INTRAUTERINE CONTRACEPTIVE DEVICE: ICD-10-CM

## 2021-03-23 DIAGNOSIS — N92.0 MENORRHAGIA WITH REGULAR CYCLE: Primary | ICD-10-CM

## 2021-03-23 PROCEDURE — 87491 CHLMYD TRACH DNA AMP PROBE: CPT | Performed by: OBSTETRICS & GYNECOLOGY

## 2021-03-23 PROCEDURE — 58100 BIOPSY OF UTERUS LINING: CPT | Performed by: OBSTETRICS & GYNECOLOGY

## 2021-03-23 PROCEDURE — 99203 OFFICE O/P NEW LOW 30 MIN: CPT | Mod: 25 | Performed by: OBSTETRICS & GYNECOLOGY

## 2021-03-23 PROCEDURE — 87591 N.GONORRHOEAE DNA AMP PROB: CPT | Performed by: OBSTETRICS & GYNECOLOGY

## 2021-03-23 PROCEDURE — 88305 TISSUE EXAM BY PATHOLOGIST: CPT | Performed by: PATHOLOGY

## 2021-03-23 PROCEDURE — 58300 INSERT INTRAUTERINE DEVICE: CPT | Performed by: OBSTETRICS & GYNECOLOGY

## 2021-03-23 RX ORDER — LEVOTHYROXINE SODIUM 50 UG/1
TABLET ORAL
Qty: 90 TABLET | Refills: 2 | Status: SHIPPED | OUTPATIENT
Start: 2021-03-23 | End: 2021-04-26

## 2021-03-23 NOTE — PROGRESS NOTES
Clinic Note    CC:    Chief Complaint   Patient presents with     Consult      HPI:  Kristel Martinez is a 40 year old  woman with history of papillary adenocarcinoma of the thyroid and post-operative hypothyroidism who presents for evaluation of increased menstrual pain and bleeding.    Kristel has noted increase in menstrual bleeding, menstrual pain, and ovulatory pain since . These symptoms have gradually progressively worsened without any acute changes. Before menstruation she will experience abdominal cramping, and during menstruation the cramping involves her pelvis and lower back. During ovulation she has noticed a sharp, shooting quick pain that has increased in duration and severity. Menses usually last 5 days with heavy bleeding, clotting, and cramping the first 3 days. She will soak through Ultra tampons and overnight pads. Other menstrual symptoms include migraines with aura. She will try Midol to help her symptoms with variable relief. She denies any inter-menstrual bleeding. She has not used any hormonal menstrual suppression in many years, she is s/p prior surgical sterilization.     Recent ultrasound showed endometrial thickening, possible polyp, and possible cyst. Kristel is hoping to alleviate some of her symptoms.      Ob Hx:  s/p tubal ligation ().    Gyn Hx: Patient's last menstrual period was 2021.  Menses are regular, every 29-30 days, lasting 5 days, with first 3 days heavy with clots, and otherwise painful.     Last pap was NIL 3/2018, remote history of abnormal paps   STI history negative   Using tubal ligation for birth control  PMH:   Past Medical History:   Diagnosis Date     Depressive disorder      SurgHx:   Past Surgical History:   Procedure Laterality Date     COLONOSCOPY  2018     THYROIDECTOMY      2015     TONSILLECTOMY       TUBAL LIGATION       FamHx:   Family History   Problem Relation Age of Onset     Skin Cancer Mother      Hypertension Father       Arrhythmia Father      Lymphoma Maternal Grandmother      Diabetes Paternal Grandfather         type 1      Asthma Son      Arthritis Daughter      SocHx:   Social History     Socioeconomic History     Marital status:      Spouse name: None     Number of children: None     Years of education: None     Highest education level: None   Occupational History     None   Social Needs     Financial resource strain: None     Food insecurity     Worry: None     Inability: None     Transportation needs     Medical: None     Non-medical: None   Tobacco Use     Smoking status: Former Smoker     Smokeless tobacco: Never Used   Substance and Sexual Activity     Alcohol use: Yes     Comment: 1 drink per wk     Drug use: No     Sexual activity: Yes     Partners: Male     Birth control/protection: Female Surgical     Comment: tubal 2206   Lifestyle     Physical activity     Days per week: None     Minutes per session: None     Stress: None   Relationships     Social connections     Talks on phone: None     Gets together: None     Attends [employer] service: None     Active member of club or organization: None     Attends meetings of clubs or organizations: None     Relationship status: None     Intimate partner violence     Fear of current or ex partner: None     Emotionally abused: None     Physically abused: None     Forced sexual activity: None   Other Topics Concern     Parent/sibling w/ CABG, MI or angioplasty before 65F 55M? Not Asked   Social History Narrative     None     Allergies:   Augmentin, Cephalosporins, and Sulfa drugs  Current Medications:   Current Outpatient Medications   Medication Sig Dispense Refill     albuterol (PROAIR HFA/PROVENTIL HFA/VENTOLIN HFA) 108 (90 Base) MCG/ACT inhaler Inhale 2 puffs every 4-6 hours as needed for cough, wheezing, or shortness of breath 18 g 0     buPROPion (WELLBUTRIN XL) 150 MG 24 hr tablet TAKE 1 TABLET BY MOUTH IN  THE MORNING 90 tablet 1     dicyclomine (BENTYL) 20 MG  tablet Take 1 tablet (20 mg) by mouth 4 times daily as needed (IBS symptoms) 28 tablet 0     escitalopram (LEXAPRO) 20 MG tablet Take 1 tablet (20 mg) by mouth daily 90 tablet 1     fluticasone (FLONASE) 50 MCG/ACT nasal spray Spray 1 spray into both nostrils daily 16 g 1     hydrOXYzine (ATARAX) 25 MG tablet Take 1 tablet (25 mg) by mouth every 8 hours as needed for itching 21 tablet 0     levothyroxine (SYNTHROID/LEVOTHROID) 112 MCG tablet TAKE 1 TABLET BY MOUTH DAILY TOTAL  MG DAILY 90 tablet 3     levothyroxine (SYNTHROID/LEVOTHROID) 50 MCG tablet TAKE 1 TABLET BY MOUTH  DAILY ALONG WITH 112MCG  TABLET FOR A TOTAL DAILY  DOSE OF 162MCG. 90 tablet 2     metroNIDAZOLE (METROLOTION) 0.75 % external lotion APPLY TO AFFECTED AREA(S)  TOPICALLY DAILY 59 mL 1     predniSONE (DELTASONE) 20 MG tablet Take 3 tabs by mouth daily x 3 days, then 2 tabs daily x 3 days, then 1 tab daily x 3 days, then 1/2 tab daily x 3 days. 20 tablet 0     SUMAtriptan (IMITREX) 25 MG tablet TAKE 1 TO 2 TABLETS BY  MOUTH AT ONSET OF HEADACHE  FOR MIGRAINE. MAY REPEAT IN 2 HOURS. MAX OF 8 TABLETS  BY MOUTH IN 24 HOURS 18 tablet 3     tiZANidine (ZANAFLEX) 4 MG tablet Take 1 tablet (4 mg) by mouth 2 times daily as needed for muscle spasms Can cause sedation. Do not take and work. 30 tablet 0     tretinoin (RETIN-A) 0.025 % external cream Use every night 45 g 0     valACYclovir (VALTREX) 1000 mg tablet Take 2 tablets (2,000 mg) by mouth 2 times daily 4 tablet 1     ROS: 10 point ROS negative other than as noted in the HPI    EXAM:  Vitals:    03/23/21 1600   BP: 118/80   BP Location: Right arm   Patient Position: Chair   Cuff Size: Adult Regular   Pulse: 71   Temp: 97.2  F (36.2  C)   TempSrc: Temporal   SpO2: 99%   Weight: 105.6 kg (232 lb 11.2 oz)    Body mass index is 33.39 kg/m .    Gen: Alert, oriented, appropriately interactive, NAD  Breasts: no axillary adenopathy, no dominant masses, no skin changes, no nipple discharge,  nontender  Abdomen: soft, non tender, non distended, no masses, no hernias. No inguinal lymphadenopathy.   Perineum: no lesions; normal appearing external genitalia, bartholins glands, urethra, skenes glands  Vagina: no masses or lesions or discharge, normally rugated.  Cervix: no masses or lesions or discharge   MSK: normal gait, symmetric movements UE & LE  Lower extremities: non-tender, no edema    Labs & Imaging:  No results found for this or any previous visit (from the past 24 hour(s)).    Lab Results   Component Value Date    PAP NIL 2018       ASSESSMENT/PLAN: Kristel Martinez is a 40 year old  woman with history of papillary adenocarcinoma of the thyroid and post-operative hypothyroidism who presents for evaluation of increased menstrual pain and bleeding in the setting of regular cycles.    Menorrhagia with regular cycle  Discussed potential causes with pt. Bleeding heavy if not irregular. Not likely caused by intrauterine polyp. Discussed potential treatments. COCs contraindicated due to migraines with aura. Ablation and hysterectomy not desired by the pt. Pt agreed to IUD trial. Discussed +/- EMB, not strictly indicated though she is worried about cancer, would like the biopsy.   - ENDOMETRIAL BIOPSY W/O CERVICAL DILATION  - Surgical pathology exam  - levonorgestrel (MIRENA) 20 MCG/24HR IUD; 1 each (20 mcg) by Intrauterine route once  Dispense:    - levonorgestrel (MIRENA) 20 MCG/24HR IUD 20 mcg  - INSERTION INTRAUTERINE DEVICE    Encounter for insertion of intrauterine contraceptive device  Pt tolerated well.     Encounter for screening mammogram for breast cancer  - Normal breast exam today  Due this year.  - *MA Screening Digital Bilateral; Future    Screen for STD (sexually transmitted disease)  Routine for insertion of IUD.  - Neisseria gonorrhoeae PCR  - Chlamydia trachomatis PCR    JOSE ARMANDO Garcia  3/23/2021 4:05 PM    I was present with the student or resident who participated in  service and in the documentation of the note. I have verified the history and have personally performed the physical exam and medical decision making. I agree with the assessment and plan as documented in the note.     Carlos Jaime MD  OB/GYN  March 23, 2021, 7:25 PM

## 2021-03-23 NOTE — PROGRESS NOTES
"Endometrial Biopsy                                                                       Time Out - \"Pause for the Cause\"  Just before the procedure begins, through verbal and active participation of team members, verify:    Initials   Patient Name    JCB   Patient Date of Birth JCB   Procedure to be performed  JCB   Implants, special equipment or special requirements        JCB     Consent:  Written consent signed and scanned into medical record.    Indication: menorrhagia    Using a India speculum, the cervix was visualized. The cervix was prepped with Betadine.  A single tooth tenaculum was applied to the anterior lip of the cervix. The endometrial pipelle was advanced through the cervix without difficulty and a sample collected. The tenaculum was removed from the cervix and the tenaculum site hemostatic.    EBL: minimal   Complications: none  Pathology: EMB sample was sent to pathology.  Tolerance of Procedure:  Patient tolerated the procedure well.     Patient was instructed to call if she experiences any heavy bleeding, severe cramping, or abnormal vaginal discharge.  May take ibuprofen 400-800 mg PO TID PRN for cramping.    Will notify patient of results.    Carlos Jaime MD  OB/GYN  March 23, 2021, 5:10 PM   "

## 2021-03-23 NOTE — PROGRESS NOTES
IUD Insertion:  CONSULT:    Was a consent obtained?  Yes    Subjective: Kristel Martinez is a 40 year old  presents for heavy periods with prior surgical sterilization and desires Mirena type IUD.    Patient has been given the opportunity to ask questions about all forms of birth control, including all options appropriate for Kristel Martinez. Discussed that no method of birth control, except abstinence is 100% effective against pregnancy or sexually transmitted infection.     Kristel Martinez understands she may have the IUD removed at any time. IUD should be removed by a health care provider.    The entire insertion procedure was reviewed with the patient, including care after placement.    Patient's last menstrual period was 2021. No allergy to betadine or shellfish.   HCG Qual Urine   Date Value Ref Range Status   2019 Negative NEG^Negative Final     Comment:     This test is for screening purposes.  Results should be interpreted along with   the clinical picture.  Confirmation testing is available if warranted by   ordering OWV111, HCG Quantitative Pregnancy.       /80 (BP Location: Right arm, Patient Position: Chair, Cuff Size: Adult Regular)   Pulse 71   Temp 97.2  F (36.2  C) (Temporal)   Wt 105.6 kg (232 lb 11.2 oz)   LMP 2021   SpO2 99%   BMI 33.39 kg/m      Pelvic Exam:   EG/BUS: normal genital architecture without lesions, erythema or abnormal secretions.   Vagina: moist, pink, rugae with physiologic discharge and secretions  Cervix: multiparous no lesions and pink, moist, closed, without lesion or CMT  Uterus: retroverted position, mobile, no pain  Adnexa: within normal limits and no masses, nodularity, tenderness    PROCEDURE NOTE: -- IUD Insertion    Reason for Insertion: abnormal uterine bleeding     Under sterile technique, cervix was visualized with speculum and prepped with Betadine solution swab x 3. Tenaculum was placed for stability. The uterus was gently  straightened and sounded to 7.5 cm. IUD prepared for placement, and IUD inserted according to 's instructions without difficulty or significant resitance, and deployed at the fundus. The strings were visualized and trimmed to 2.5 cm from the external os. Tenaculum was removed and hemostasis noted. Speculum removed.  Patient tolerated procedure well.    Lot # [id number]  Exp: Jun 2023    EBL: minimal    Complications: none    ASSESSMENT:     ICD-10-CM    1. Menorrhagia with regular cycle  N92.0 ENDOMETRIAL BIOPSY W/O CERVICAL DILATION     Surgical pathology exam   2. Encounter for screening mammogram for breast cancer  Z12.31 *MA Screening Digital Bilateral   3. Screen for STD (sexually transmitted disease)  Z11.3 Neisseria gonorrhoeae PCR     Chlamydia trachomatis PCR        PLAN:    Given 's handouts, including when to have IUD removed, list of danger s/sx, side effects and follow up recommended. Encouraged condom use for prevention of STD. Back up contraception advised for 7 days if progestin method. Advised to call for any fever, for prolonged or severe pain or bleeding, abnormal vaginal discharge, or unable to palpate strings. She was advised to use pain medications (ibuprofen) as needed for mild to moderate pain. Advised to follow-up in clinic in 4-6 weeks for IUD string check if unable to find strings or as directed by provider.     Carlos Jaime MD  March 23, 2021 5:10 PM

## 2021-03-24 ENCOUNTER — OFFICE VISIT (OUTPATIENT)
Dept: FAMILY MEDICINE | Facility: CLINIC | Age: 41
End: 2021-03-24
Payer: COMMERCIAL

## 2021-03-24 VITALS
HEIGHT: 70 IN | BODY MASS INDEX: 33.36 KG/M2 | SYSTOLIC BLOOD PRESSURE: 128 MMHG | DIASTOLIC BLOOD PRESSURE: 82 MMHG | TEMPERATURE: 99 F | RESPIRATION RATE: 16 BRPM | WEIGHT: 233 LBS | HEART RATE: 88 BPM

## 2021-03-24 DIAGNOSIS — B96.89 BACTERIAL VAGINOSIS: ICD-10-CM

## 2021-03-24 DIAGNOSIS — N76.0 BACTERIAL VAGINOSIS: ICD-10-CM

## 2021-03-24 DIAGNOSIS — L75.0 ABNORMAL BODY ODOR: Primary | ICD-10-CM

## 2021-03-24 DIAGNOSIS — E89.0 POSTOPERATIVE HYPOTHYROIDISM: ICD-10-CM

## 2021-03-24 LAB
ALBUMIN UR-MCNC: NEGATIVE MG/DL
APPEARANCE UR: CLEAR
BILIRUB UR QL STRIP: NEGATIVE
C TRACH DNA SPEC QL NAA+PROBE: NEGATIVE
COLOR UR AUTO: YELLOW
GLUCOSE UR STRIP-MCNC: NEGATIVE MG/DL
HGB UR QL STRIP: ABNORMAL
KETONES UR STRIP-MCNC: NEGATIVE MG/DL
LEUKOCYTE ESTERASE UR QL STRIP: NEGATIVE
N GONORRHOEA DNA SPEC QL NAA+PROBE: NEGATIVE
NITRATE UR QL: NEGATIVE
PH UR STRIP: 6.5 PH (ref 5–7)
RBC #/AREA URNS AUTO: NORMAL /HPF
SOURCE: ABNORMAL
SP GR UR STRIP: 1.02 (ref 1–1.03)
SPECIMEN SOURCE: ABNORMAL
SPECIMEN SOURCE: NORMAL
SPECIMEN SOURCE: NORMAL
T4 FREE SERPL-MCNC: 1.01 NG/DL (ref 0.76–1.46)
TSH SERPL DL<=0.005 MIU/L-ACNC: 0.02 MU/L (ref 0.4–4)
UROBILINOGEN UR STRIP-ACNC: 0.2 EU/DL (ref 0.2–1)
WBC #/AREA URNS AUTO: NORMAL /HPF
WET PREP SPEC: ABNORMAL

## 2021-03-24 PROCEDURE — 84443 ASSAY THYROID STIM HORMONE: CPT | Performed by: NURSE PRACTITIONER

## 2021-03-24 PROCEDURE — 36415 COLL VENOUS BLD VENIPUNCTURE: CPT | Performed by: NURSE PRACTITIONER

## 2021-03-24 PROCEDURE — 87210 SMEAR WET MOUNT SALINE/INK: CPT | Performed by: NURSE PRACTITIONER

## 2021-03-24 PROCEDURE — 99214 OFFICE O/P EST MOD 30 MIN: CPT | Performed by: NURSE PRACTITIONER

## 2021-03-24 PROCEDURE — 81001 URINALYSIS AUTO W/SCOPE: CPT | Performed by: NURSE PRACTITIONER

## 2021-03-24 PROCEDURE — 84439 ASSAY OF FREE THYROXINE: CPT | Performed by: NURSE PRACTITIONER

## 2021-03-24 ASSESSMENT — MIFFLIN-ST. JEOR: SCORE: 1807.13

## 2021-03-24 NOTE — PROGRESS NOTES
"    Assessment & Plan     Abnormal body odor  UA and odor unremarkable, no noticeable odor present on exam. Previous labs unremarkable for cause.  Wet prep today with BV which is likely causing vaginal symptoms.  Flagyl sent to the pharmacy.  Possible that ongoing odor unrelated to BV is from broken tooth, dentist appointment planned.  Reassurance provided.  Symptomatic care and follow up discussed.  - *UA reflex to Microscopic and Culture ([place] and [facility] (except [place] and [place])  - Urine Microscopic  - Wet prep  - T4 free    Postoperative hypothyroidism  Labs stable   - TSH with free T4 reflex    Bacterial vaginosis  Per above.   - metroNIDAZOLE (FLAGYL) 500 MG tablet; Take 1 tablet (500 mg) by mouth 2 times daily for 7 days    30 minutes spent on the date of the encounter doing chart review, history and exam, documentation and further activities per the note      Return in about 1 week (around 3/31/2021), or if symptoms worsen or fail to improve.    DAVID Lazo CNP  [facility]    Kamini Humphries is a 40 year old who presents for the following health issues     HPI   ED/UC Followup:    Facility:  [facility]   Date of visit: 3/19/2021  Reason for visit: Vaginal discharge, dysuria, itching  Current Status: still has urinary odor. Itching all over body. Still taking prednisone. Hydroxyzine not helping        Review of Systems   Constitutional, HEENT, cardiovascular, pulmonary, gi and gu systems are negative, except as otherwise noted.      Objective    /82 (Cuff Size: Adult Regular)   Pulse 88   Temp 99  F (37.2  C) (Tympanic)   Resp 16   Ht 1.778 m (5' 10\")   Wt 105.7 kg (233 lb)   LMP 03/05/2021   BMI 33.43 kg/m    Body mass index is 33.43 kg/m .  Physical Exam   GENERAL: healthy, alert and no distress  NECK: no adenopathy, no asymmetry, masses, or scars and thyroid normal to palpation  RESP: lungs clear to auscultation - no " rales, rhonchi or wheezes  CV: regular rate and rhythm, normal S1 S2, no S3 or S4, no murmur, click or rub, no peripheral edema and peripheral pulses strong  ABDOMEN: soft, nontender, no hepatosplenomegaly, no masses and bowel sounds normal  PSYCH: mentation appears normal, affect normal/bright    Results for orders placed or performed in visit on 03/24/21   *UA reflex to Microscopic and Culture ([place] and [facility] (except [place] and [place])     Status: Abnormal    Specimen: Midstream Urine   Result Value Ref Range    Color Urine Yellow     Appearance Urine Clear     Glucose Urine Negative NEG^Negative mg/dL    Bilirubin Urine Negative NEG^Negative    Ketones Urine Negative NEG^Negative mg/dL    Specific Gravity Urine 1.020 1.003 - 1.035    Blood Urine Trace (A) NEG^Negative    pH Urine 6.5 5.0 - 7.0 pH    Protein Albumin Urine Negative NEG^Negative mg/dL    Urobilinogen Urine 0.2 0.2 - 1.0 EU/dL    Nitrite Urine Negative NEG^Negative    Leukocyte Esterase Urine Negative NEG^Negative    Source Midstream Urine    Urine Microscopic     Status: None   Result Value Ref Range    WBC Urine 0 - 5 OTO5^0 - 5 /HPF    RBC Urine O - 2 OTO2^O - 2 /HPF   TSH with free T4 reflex     Status: Abnormal   Result Value Ref Range    TSH 0.02 (L) 0.40 - 4.00 mU/L   T4 free     Status: None   Result Value Ref Range    T4 Free 1.01 0.76 - 1.46 ng/dL   Wet prep     Status: Abnormal    Specimen: Vagina   Result Value Ref Range    Specimen Description Vagina     Wet Prep No Trichomonas seen     Wet Prep Clue cells seen  Moderate   (A)     Wet Prep No yeast seen     Wet Prep No WBC's seen

## 2021-03-24 NOTE — PATIENT INSTRUCTIONS
Start Zyrtec or cetirizine 10 mg twice daily     Recheck thyroid today    Recheck UA and wet prep    Follow up with dentist as planned, let me know if symptoms get worse prior and we will get you on antibiotics    Follow up if symptoms do not improve or worsen.

## 2021-03-25 LAB — COPATH REPORT: NORMAL

## 2021-03-25 RX ORDER — METRONIDAZOLE 500 MG/1
500 TABLET ORAL 2 TIMES DAILY
Qty: 14 TABLET | Refills: 0 | Status: SHIPPED | OUTPATIENT
Start: 2021-03-25 | End: 2021-04-01

## 2021-03-30 ENCOUNTER — MYC MEDICAL ADVICE (OUTPATIENT)
Dept: FAMILY MEDICINE | Facility: CLINIC | Age: 41
End: 2021-03-30

## 2021-03-31 DIAGNOSIS — N30.00 ACUTE CYSTITIS WITHOUT HEMATURIA: Primary | ICD-10-CM

## 2021-03-31 RX ORDER — NITROFURANTOIN 25; 75 MG/1; MG/1
100 CAPSULE ORAL 2 TIMES DAILY
Qty: 14 CAPSULE | Refills: 0 | Status: SHIPPED | OUTPATIENT
Start: 2021-03-31 | End: 2021-04-07

## 2021-04-07 ENCOUNTER — MYC MEDICAL ADVICE (OUTPATIENT)
Dept: FAMILY MEDICINE | Facility: CLINIC | Age: 41
End: 2021-04-07

## 2021-04-07 NOTE — TELEPHONE ENCOUNTER
S-(situation): Spoke with patient. Hasn't experienced leg swelling since during pregnancy.     B-(background): On vacation in [place], temperature is warm and humid.     A-(assessment): Both legs, feet and ankles swollen. Patient spoke with pharmacist yesterday who advised against Donta socks.     R-(recommendations): Do frequent ankle pumps and move legs while on plane. Continue to elevate legs once returns home. Offered to schedule appt. Patient states she will wait until after she is home to see if swelling improves. Will try tylenol or ibuprofen as needed for pain control.   Kayla TEJEDA RN

## 2021-04-09 ENCOUNTER — MYC MEDICAL ADVICE (OUTPATIENT)
Dept: FAMILY MEDICINE | Facility: CLINIC | Age: 41
End: 2021-04-09

## 2021-04-09 DIAGNOSIS — B37.31 CANDIDIASIS OF VAGINA: Primary | ICD-10-CM

## 2021-04-09 RX ORDER — FLUCONAZOLE 150 MG/1
150 TABLET ORAL ONCE
Qty: 1 TABLET | Refills: 0 | Status: SHIPPED | OUTPATIENT
Start: 2021-04-09 | End: 2021-04-09

## 2021-04-12 ENCOUNTER — MYC MEDICAL ADVICE (OUTPATIENT)
Dept: FAMILY MEDICINE | Facility: CLINIC | Age: 41
End: 2021-04-12

## 2021-04-17 ENCOUNTER — HOSPITAL ENCOUNTER (EMERGENCY)
Facility: CLINIC | Age: 41
Discharge: HOME | End: 2021-04-17
Attending: PHYSICIAN ASSISTANT | Admitting: PHYSICIAN ASSISTANT
Payer: COMMERCIAL

## 2021-04-17 ENCOUNTER — MYC MEDICAL ADVICE (OUTPATIENT)
Dept: FAMILY MEDICINE | Facility: CLINIC | Age: 41
End: 2021-04-17

## 2021-04-17 ENCOUNTER — E-VISIT (OUTPATIENT)
Dept: URGENT CARE | Facility: URGENT CARE | Age: 41
End: 2021-04-17
Payer: COMMERCIAL

## 2021-04-17 VITALS
OXYGEN SATURATION: 99 % | HEART RATE: 79 BPM | HEIGHT: 70 IN | DIASTOLIC BLOOD PRESSURE: 76 MMHG | RESPIRATION RATE: 16 BRPM | WEIGHT: 235 LBS | TEMPERATURE: 97.2 F | BODY MASS INDEX: 33.64 KG/M2 | SYSTOLIC BLOOD PRESSURE: 129 MMHG

## 2021-04-17 DIAGNOSIS — R39.9 LOWER URINARY TRACT SYMPTOMS: ICD-10-CM

## 2021-04-17 DIAGNOSIS — N89.8 VAGINAL DISCHARGE: ICD-10-CM

## 2021-04-17 DIAGNOSIS — N39.0 ACUTE UTI (URINARY TRACT INFECTION): Primary | ICD-10-CM

## 2021-04-17 LAB
ALBUMIN UR-MCNC: NEGATIVE MG/DL
APPEARANCE UR: CLEAR
BILIRUB UR QL STRIP: NEGATIVE
COLOR UR AUTO: ABNORMAL
GLUCOSE UR STRIP-MCNC: NEGATIVE MG/DL
HGB UR QL STRIP: ABNORMAL
KETONES UR STRIP-MCNC: NEGATIVE MG/DL
LEUKOCYTE ESTERASE UR QL STRIP: NEGATIVE
NITRATE UR QL: NEGATIVE
PH UR STRIP: 6 PH (ref 5–7)
RBC #/AREA URNS AUTO: 1 /HPF (ref 0–2)
SOURCE: ABNORMAL
SP GR UR STRIP: 1 (ref 1–1.03)
SPECIMEN SOURCE: NORMAL
SQUAMOUS #/AREA URNS AUTO: 1 /HPF (ref 0–1)
UROBILINOGEN UR STRIP-MCNC: 0 MG/DL (ref 0–2)
WBC #/AREA URNS AUTO: 2 /HPF (ref 0–5)
WET PREP SPEC: NORMAL

## 2021-04-17 PROCEDURE — 99207 PR NON-BILLABLE SERV PER CHARTING: CPT | Performed by: PHYSICIAN ASSISTANT

## 2021-04-17 PROCEDURE — 81001 URINALYSIS AUTO W/SCOPE: CPT | Performed by: PHYSICIAN ASSISTANT

## 2021-04-17 PROCEDURE — 87210 SMEAR WET MOUNT SALINE/INK: CPT | Performed by: PHYSICIAN ASSISTANT

## 2021-04-17 PROCEDURE — 99213 OFFICE O/P EST LOW 20 MIN: CPT | Performed by: PHYSICIAN ASSISTANT

## 2021-04-17 PROCEDURE — 87086 URINE CULTURE/COLONY COUNT: CPT | Performed by: PHYSICIAN ASSISTANT

## 2021-04-17 PROCEDURE — G0463 HOSPITAL OUTPT CLINIC VISIT: HCPCS | Performed by: PHYSICIAN ASSISTANT

## 2021-04-17 ASSESSMENT — MIFFLIN-ST. JEOR: SCORE: 1816.2

## 2021-04-17 NOTE — PATIENT INSTRUCTIONS
Dear Kristel Martinez,    We are sorry you are not feeling well. Based on the responses you provided, it is recommended that you be seen in-person in urgent care so we can better evaluate your symptoms. Please click here to find the nearest urgent care location to you.   You will not be charged for this Visit. Thank you for trusting us with your care.    Kera Rosen PA-C

## 2021-04-17 NOTE — ED TRIAGE NOTES
States she was antibiotics 2 weeks ago for bacterial infection. Then put on antibiotics for uti. Now has blood in urine.

## 2021-04-18 NOTE — DISCHARGE INSTRUCTIONS
Urine culture is pending.  We will follow-up on this and notify you of any positive results.  Return for any fevers, abdominal or flank pain, vomiting, or any other symptoms of concern.

## 2021-04-18 NOTE — ED PROVIDER NOTES
"  History     Chief Complaint   Patient presents with     UTI     HPI  Kristel Martinez is a 40 year old female who presents with complaints of ongoing dysuria and burning as well as discharge.  Denies fevers, chills, nausea, vomiting, or abdominal pain.  Pt has an IUD in place and states the continues to spot.  She reports history of having to have her \"bladder stretched\" when she experienced similar symptoms in the past.    Patient was evaluated in clinic for vaginal discharge and dysuria on 3/19/2021 and had a negative urinalysis and wet prep at that time.    She was then seen again in clinic on 3/24/2021 for the same symptoms and had a wet prep that showed clue cells.  She was prescribed Flagyl daily.    Patient was then diagnosed with acute cystitis on 3/31/2021 via a virtual visit and completed a course of Macrobid.    Patient subsequently developed yeast infection and was subsequently prescribed Diflucan.    Patient states her symptoms have not resolved.  She continues to have vaginal discharge and itching as well as dysuria and \"pink\" when she is wiping.  She also has associated incomplete bladder emptying and urinary frequency.        Allergies:  Allergies   Allergen Reactions     Augmentin Cramps, Diarrhea, Nausea and Nausea and Vomiting     Cephalosporins Rash     Cephalexin     Sulfa Drugs Rash       Problem List:    Patient Active Problem List    Diagnosis Date Noted     Traumatic incomplete tear of right rotator cuff, initial encounter 07/23/2020     Priority: Medium     Bicipital tendonitis of right shoulder 07/23/2020     Priority: Medium     Subacromial impingement of right shoulder 07/23/2020     Priority: Medium     IgA deficiency (H) 09/12/2018     Priority: Medium     Postoperative hypothyroidism 03/23/2018     Priority: Medium     Vitiligo 04/09/2015     Priority: Medium     Papillary adenocarcinoma of thyroid (H) 03/01/2014     Priority: Medium     Overview:   12/2013: R thyroid lobectomy 1.7 cm " follicular variant papillary cancer, MACIS 3.6  03/2014: Completion Thyroidectomy 0.3 cm incidental papillary cancer left lobe. Iodine ablation with 53 mCi.   07/2016, 07/2017: Neck US neg.       Vitamin D deficiency 07/06/2009     Priority: Medium     Last Assessment & Plan:   7/09  29.4  vit  d   on 1000units  daily  since  7/09       Major depressive disorder, recurrent episode, moderate (H) 11/16/2007     Priority: Medium        Past Medical History:    Past Medical History:   Diagnosis Date     Depressive disorder        Past Surgical History:    Past Surgical History:   Procedure Laterality Date     COLONOSCOPY  08/27/2018     THYROIDECTOMY      2015     TONSILLECTOMY       TUBAL LIGATION  2006       Family History:    Family History   Problem Relation Age of Onset     Skin Cancer Mother      Hypertension Father      Arrhythmia Father      Lymphoma Maternal Grandmother      Diabetes Paternal Grandfather         type 1      Asthma Son      Arthritis Daughter        Social History:  Marital Status:   [4]  Social History     Tobacco Use     Smoking status: Former Smoker     Smokeless tobacco: Never Used   Substance Use Topics     Alcohol use: Yes     Comment: 1 drink per wk     Drug use: No        Medications:    albuterol (PROAIR HFA/PROVENTIL HFA/VENTOLIN HFA) 108 (90 Base) MCG/ACT inhaler  buPROPion (WELLBUTRIN XL) 150 MG 24 hr tablet  dicyclomine (BENTYL) 20 MG tablet  escitalopram (LEXAPRO) 20 MG tablet  fluticasone (FLONASE) 50 MCG/ACT nasal spray  levonorgestrel (MIRENA) 20 MCG/24HR IUD  levothyroxine (SYNTHROID/LEVOTHROID) 112 MCG tablet  levothyroxine (SYNTHROID/LEVOTHROID) 50 MCG tablet  metroNIDAZOLE (METROLOTION) 0.75 % external lotion  predniSONE (DELTASONE) 20 MG tablet  SUMAtriptan (IMITREX) 25 MG tablet  tiZANidine (ZANAFLEX) 4 MG tablet  tretinoin (RETIN-A) 0.025 % external cream  valACYclovir (VALTREX) 1000 mg tablet          Review of Systems   Constitutional: Negative.   "  Gastrointestinal: Negative.    Genitourinary: Positive for dysuria and vaginal discharge.   Skin: Negative.    All other systems reviewed and are negative.      Physical Exam   BP: 129/76  Pulse: 79  Temp: 97.2  F (36.2  C)  Resp: 16  Height: 177.8 cm (5' 10\")  Weight: 106.6 kg (235 lb)  SpO2: 99 %      Physical Exam  Constitutional:       General: She is not in acute distress.     Appearance: Normal appearance. She is not ill-appearing, toxic-appearing or diaphoretic.   HENT:      Head: Normocephalic and atraumatic.   Neck:      Musculoskeletal: Neck supple.   Pulmonary:      Effort: Pulmonary effort is normal.   Abdominal:      General: There is no distension.      Palpations: Abdomen is soft.      Tenderness: There is no abdominal tenderness. There is no guarding or rebound.   Genitourinary:     Labia:         Right: No rash, tenderness or lesion.         Left: No rash, tenderness or lesion.       Vagina: Vaginal discharge present. No erythema, tenderness, bleeding or lesions.      Cervix: No cervical motion tenderness, erythema or cervical bleeding.      Uterus: Normal.       Adnexa: Right adnexa normal and left adnexa normal.   Skin:     General: Skin is warm and dry.      Findings: No rash.   Neurological:      Mental Status: She is alert.         ED Course        Procedures      No results found for this or any previous visit (from the past 24 hour(s)).     Results for orders placed or performed during the hospital encounter of 04/17/21   UA reflex to Microscopic     Status: Abnormal   Result Value Ref Range    Color Urine Straw     Appearance Urine Clear     Glucose Urine Negative NEG^Negative mg/dL    Bilirubin Urine Negative NEG^Negative    Ketones Urine Negative NEG^Negative mg/dL    Specific Gravity Urine 1.004 1.003 - 1.035    Blood Urine Moderate (A) NEG^Negative    pH Urine 6.0 5.0 - 7.0 pH    Protein Albumin Urine Negative NEG^Negative mg/dL    Urobilinogen mg/dL 0.0 0.0 - 2.0 mg/dL    Nitrite " "Urine Negative NEG^Negative    Leukocyte Esterase Urine Negative NEG^Negative    Source Midstream Urine     RBC Urine 1 0 - 2 /HPF    WBC Urine 2 0 - 5 /HPF    Squamous Epithelial /HPF Urine 1 0 - 1 /HPF   Urine Culture     Status: None    Specimen: Midstream Urine   Result Value Ref Range    Specimen Description Midstream Urine     Special Requests Specimen received in preservative     Culture Micro       10,000 to 50,000 colonies/mL  mixed urogenital azra      Culture Micro Susceptibility testing not routinely done    Wet prep     Status: None    Specimen: Vagina   Result Value Ref Range    Specimen Description Vagina     Wet Prep No yeast seen     Wet Prep No Trichomonas seen     Wet Prep No clue cells seen     Wet Prep Few  WBC'S seen            Medications - No data to display    Assessments & Plan (with Medical Decision Making)     Pt is a 40 year old female who presents with complaints of ongoing dysuria and burning as well as discharge.     Patient was evaluated in clinic for vaginal discharge and dysuria on 3/19/2021 and had a negative urinalysis and wet prep at that time.    She was then seen again in clinic on 3/24/2021 for the same symptoms and had a wet prep that showed clue cells.  She was prescribed Flagyl.    Patient was then diagnosed with acute cystitis on 3/31/2021 via a virtual visit and completed a course of Macrobid.    Patient subsequently developed yeast infection and was subsequently prescribed Diflucan.    Patient states her symptoms have not resolved.  She continues to have vaginal discharge and itching as well as dysuria and \"pink\" when she is wiping.  She also has associated incomplete bladder emptying and urinary frequency.    Pt is afebrile on arrival.  Exam as above.  UA was negative for infection or hematuria.  Urine culture is pending.  Wet prep was negative.  Discussed results with patient.  Encouraged symptomatic treatments at home.  Return precautions were reviewed.  Hand-outs " were provided.    Patient was instructed to follow-up with PCP in 3-5 days for continued care and management or sooner if new or worsening symptoms.  She is to return to the ED for persistent and/or worsening symptoms.  Patient expressed understanding of the diagnosis and plan and was discharged home in good condition.    I have reviewed the nursing notes.    I have reviewed the findings, diagnosis, plan and need for follow up with the patient.    Discharge Medication List as of 4/17/2021  8:19 PM          Final diagnoses:   Lower urinary tract symptoms   Vaginal discharge       4/17/2021   [facility] EMERGENCY DEPT      Disclaimer:  This note consists of symbols derived from keyboarding, dictation and/or voice recognition software.  As a result, there may be errors in the script that have gone undetected.  Please consider this when interpreting information found in this chart.     Yennifer Olmos PA-C  04/19/21 1029

## 2021-04-19 ENCOUNTER — MYC MEDICAL ADVICE (OUTPATIENT)
Dept: FAMILY MEDICINE | Facility: CLINIC | Age: 41
End: 2021-04-19

## 2021-04-19 DIAGNOSIS — R10.2 PELVIC PAIN IN FEMALE: Primary | ICD-10-CM

## 2021-04-19 LAB
BACTERIA SPEC CULT: NORMAL
BACTERIA SPEC CULT: NORMAL
Lab: NORMAL
SPECIMEN SOURCE: NORMAL

## 2021-04-19 ASSESSMENT — ENCOUNTER SYMPTOMS
CONSTITUTIONAL NEGATIVE: 1
DYSURIA: 1
GASTROINTESTINAL NEGATIVE: 1

## 2021-04-19 NOTE — RESULT ENCOUNTER NOTE
Final urine culture report is negative.  Adult Negative Urine culture parameters: Any # Urogenital single or mixed organism, <10,000 col/ml single organism (cath specimen), and <50,000 col/ml single organism (midstream or cath specimen).  Treatment recommendations per [facility] ED Lab Result Urine Culture protocol.

## 2021-04-26 ENCOUNTER — MYC MEDICAL ADVICE (OUTPATIENT)
Dept: FAMILY MEDICINE | Facility: CLINIC | Age: 41
End: 2021-04-26

## 2021-04-26 DIAGNOSIS — N89.8 VAGINAL DISCHARGE: ICD-10-CM

## 2021-04-26 DIAGNOSIS — E89.0 POSTOPERATIVE HYPOTHYROIDISM: ICD-10-CM

## 2021-04-26 DIAGNOSIS — F41.1 GAD (GENERALIZED ANXIETY DISORDER): ICD-10-CM

## 2021-04-26 DIAGNOSIS — R35.0 URINARY FREQUENCY: Primary | ICD-10-CM

## 2021-04-26 DIAGNOSIS — F33.1 MAJOR DEPRESSIVE DISORDER, RECURRENT EPISODE, MODERATE (H): ICD-10-CM

## 2021-04-26 RX ORDER — LEVOTHYROXINE SODIUM 50 UG/1
TABLET ORAL
Qty: 90 TABLET | Refills: 1 | Status: SHIPPED | OUTPATIENT
Start: 2021-04-26 | End: 2021-07-12

## 2021-04-26 RX ORDER — LEVOTHYROXINE SODIUM 112 UG/1
TABLET ORAL
Qty: 90 TABLET | Refills: 3 | Status: SHIPPED | OUTPATIENT
Start: 2021-04-26 | End: 2022-04-18

## 2021-04-26 RX ORDER — BUPROPION HYDROCHLORIDE 150 MG/1
TABLET ORAL
Qty: 90 TABLET | Refills: 1 | Status: SHIPPED | OUTPATIENT
Start: 2021-04-26 | End: 2021-10-20

## 2021-04-26 RX ORDER — ESCITALOPRAM OXALATE 20 MG/1
20 TABLET ORAL DAILY
Qty: 90 TABLET | Refills: 1 | Status: SHIPPED | OUTPATIENT
Start: 2021-04-26 | End: 2021-11-08

## 2021-04-27 DIAGNOSIS — N89.8 VAGINAL DISCHARGE: ICD-10-CM

## 2021-04-27 LAB
SPECIMEN SOURCE: ABNORMAL
WET PREP SPEC: ABNORMAL

## 2021-04-27 PROCEDURE — 87210 SMEAR WET MOUNT SALINE/INK: CPT | Performed by: NURSE PRACTITIONER

## 2021-04-28 ENCOUNTER — TELEPHONE (OUTPATIENT)
Dept: UROLOGY | Facility: CLINIC | Age: 41
End: 2021-04-28

## 2021-04-28 DIAGNOSIS — B96.89 BV (BACTERIAL VAGINOSIS): Primary | ICD-10-CM

## 2021-04-28 DIAGNOSIS — N76.0 BV (BACTERIAL VAGINOSIS): Primary | ICD-10-CM

## 2021-04-28 RX ORDER — METRONIDAZOLE 500 MG/1
500 TABLET ORAL 2 TIMES DAILY
Qty: 14 TABLET | Refills: 0 | Status: SHIPPED | OUTPATIENT
Start: 2021-04-28 | End: 2021-05-05

## 2021-04-28 NOTE — TELEPHONE ENCOUNTER
Reason for Call:  Other call back    Detailed comments: patient is calling would like to be fit into provider schedule for a sooner appointment. Please call to discuss. Thank  You.       Phone Number Patient can be reached at: Home number on file [phone] (home)    Best Time:     Can we leave a detailed message on this number? YES    Call taken on 4/28/2021 at 2:46 PM by Senia Lynch

## 2021-05-10 ENCOUNTER — E-VISIT (OUTPATIENT)
Dept: FAMILY MEDICINE | Facility: CLINIC | Age: 41
End: 2021-05-10
Payer: COMMERCIAL

## 2021-05-10 ENCOUNTER — OFFICE VISIT (OUTPATIENT)
Dept: URGENT CARE | Facility: URGENT CARE | Age: 41
End: 2021-05-10
Payer: COMMERCIAL

## 2021-05-10 ENCOUNTER — MYC MEDICAL ADVICE (OUTPATIENT)
Dept: FAMILY MEDICINE | Facility: CLINIC | Age: 41
End: 2021-05-10

## 2021-05-10 VITALS
BODY MASS INDEX: 33.64 KG/M2 | HEIGHT: 70 IN | TEMPERATURE: 98 F | OXYGEN SATURATION: 99 % | RESPIRATION RATE: 16 BRPM | SYSTOLIC BLOOD PRESSURE: 124 MMHG | DIASTOLIC BLOOD PRESSURE: 72 MMHG | WEIGHT: 235 LBS | HEART RATE: 85 BPM

## 2021-05-10 DIAGNOSIS — J02.9 SORE THROAT: Primary | ICD-10-CM

## 2021-05-10 LAB
DEPRECATED S PYO AG THROAT QL EIA: NEGATIVE
SPECIMEN SOURCE: NORMAL
SPECIMEN SOURCE: NORMAL
STREP GROUP A PCR: NOT DETECTED

## 2021-05-10 PROCEDURE — 87651 STREP A DNA AMP PROBE: CPT | Performed by: PHYSICIAN ASSISTANT

## 2021-05-10 PROCEDURE — 99N1174 PR STATISTIC STREP A RAPID: Performed by: PHYSICIAN ASSISTANT

## 2021-05-10 PROCEDURE — 99213 OFFICE O/P EST LOW 20 MIN: CPT | Performed by: PHYSICIAN ASSISTANT

## 2021-05-10 ASSESSMENT — ENCOUNTER SYMPTOMS
SINUS PRESSURE: 0
VOMITING: 0
COUGH: 0
WHEEZING: 0
EYE PAIN: 0
FATIGUE: 0
SHORTNESS OF BREATH: 0
MYALGIAS: 0
RHINORRHEA: 0
EYE REDNESS: 0
BACK PAIN: 0
CONSTIPATION: 0
HEMATOCHEZIA: 0
SINUS PAIN: 0
ARTHRALGIAS: 0
ABDOMINAL PAIN: 0
NAUSEA: 0
FEVER: 0
PALPITATIONS: 0
DIARRHEA: 0
HEARTBURN: 0
EYE DISCHARGE: 0
CHILLS: 0

## 2021-05-10 ASSESSMENT — MIFFLIN-ST. JEOR: SCORE: 1816.2

## 2021-05-10 NOTE — PROGRESS NOTES
"    Assessment & Plan     Sore throat  Rapid strep negative. This is likely viral. Continue with supportive care. Get plenty of rest and push fluids. Can use Tylenol and/or ibuprofen as needed for pain and/or fever control. Return to clinic if symptoms worsen or do not improve; otherwise follow up as needed       - Streptococcus A Rapid Scr w Reflx to PCR  - Group A Streptococcus PCR Throat Swab             BMI:   Estimated body mass index is 33.72 kg/m  as calculated from the following:    Height as of this encounter: 1.778 m (5' 10\").    Weight as of this encounter: 106.6 kg (235 lb).           Return in about 1 week (around 5/17/2021), or if symptoms worsen or fail to improve.    EL[facility] URG[facility] [place]    Subjective   Chief Complaint   Patient presents with     Pharyngitis     5/8/2021 Patiernt has sinus pressure, balance is off, sore throat burns when she eats and felt like it gets stuck in her throat, both ears hurt, headache comes and goes. Taking cold and flu.     Headache         HPI     URI Adult  Onset of symptoms was 2 day(s) ago.  Course of illness is same.    Severity moderate  Current and Associated symptoms: sore throat   Treatment measures tried include Tylenol/Ibuprofen.  Predisposing factors include None.              Review of Systems   Constitutional: Negative for chills, fatigue and fever.   HENT: Positive for sore throat. Negative for congestion, ear pain, rhinorrhea, sinus pressure and sinus pain.    Eyes: Negative for pain, discharge and redness.   Respiratory: Negative for cough, shortness of breath and wheezing.    Cardiovascular: Negative for palpitations.   Gastrointestinal: Negative for abdominal pain, constipation, diarrhea, heartburn, hematochezia, nausea and vomiting.   Musculoskeletal: Negative for arthralgias, back pain and myalgias.   Skin: Negative for rash.            Objective    /72 (BP Location: Right arm, Patient Position: " "Sitting, Cuff Size: Adult Large)   Pulse 85   Temp 98  F (36.7  C) (Tympanic)   Resp 16   Ht 1.778 m (5' 10\")   Wt 106.6 kg (235 lb)   SpO2 99%   BMI 33.72 kg/m    Body mass index is 33.72 kg/m .  Physical Exam  Constitutional:       Appearance: She is well-developed.   HENT:      Head: Normocephalic.      Right Ear: Tympanic membrane and ear canal normal.      Left Ear: Tympanic membrane and ear canal normal.      Mouth/Throat:      Pharynx: Posterior oropharyngeal erythema present. No oropharyngeal exudate.   Eyes:      Conjunctiva/sclera: Conjunctivae normal.      Pupils: Pupils are equal, round, and reactive to light.   Cardiovascular:      Rate and Rhythm: Normal rate.      Heart sounds: Normal heart sounds.   Pulmonary:      Effort: Pulmonary effort is normal.      Breath sounds: Normal breath sounds.   Skin:     General: Skin is warm and dry.      Findings: No rash.   Psychiatric:         Behavior: Behavior normal.            Results for orders placed or performed in visit on 05/10/21 (from the past 24 hour(s))   Streptococcus A Rapid Scr w Reflx to PCR    Specimen: Throat   Result Value Ref Range    Strep Specimen Description Throat     Streptococcus Group A Rapid Screen Negative NEG^Negative   Group A Streptococcus PCR Throat Swab    Specimen: Throat   Result Value Ref Range    Specimen Description Throat     Strep Group A PCR Not Detected NDET^Not Detected               "

## 2021-05-10 NOTE — LETTER
[facility] URGENT CARE [place] [id number] [address]  Phone: [phone]  Fax: [phone]    May 10, 2021        Kristel Martinez  [address] [place] [id number]          To whom it may concern:    RE: Kristel Martinez    Patient was seen and treated today at our clinic and missed work 05/10/21.    Please contact me for questions or concerns.      Sincerely,        Tess Laboy PA-C

## 2021-05-11 ASSESSMENT — ENCOUNTER SYMPTOMS: SORE THROAT: 1

## 2021-05-11 NOTE — RESULT ENCOUNTER NOTE
Group A Streptococcus PCR is NEGATIVE   No treatment or change in treatment [facility] ED lab result Strep Group A protocol.

## 2021-05-13 NOTE — TELEPHONE ENCOUNTER
Left message for pt to call and make appt at her discretion.   Ria IZAGUIRRE RN BSN PHN  Specialty Clinics

## 2021-05-16 ENCOUNTER — MYC MEDICAL ADVICE (OUTPATIENT)
Dept: FAMILY MEDICINE | Facility: CLINIC | Age: 41
End: 2021-05-16

## 2021-05-16 DIAGNOSIS — B37.31 CANDIDIASIS OF VAGINA: Primary | ICD-10-CM

## 2021-05-17 RX ORDER — FLUCONAZOLE 150 MG/1
150 TABLET ORAL ONCE
Qty: 1 TABLET | Refills: 0 | Status: SHIPPED | OUTPATIENT
Start: 2021-05-17 | End: 2021-05-17

## 2021-06-15 ENCOUNTER — OFFICE VISIT (OUTPATIENT)
Dept: OBGYN | Facility: CLINIC | Age: 41
End: 2021-06-15
Payer: COMMERCIAL

## 2021-06-15 VITALS
DIASTOLIC BLOOD PRESSURE: 80 MMHG | WEIGHT: 230.6 LBS | HEART RATE: 84 BPM | SYSTOLIC BLOOD PRESSURE: 121 MMHG | BODY MASS INDEX: 33.09 KG/M2

## 2021-06-15 DIAGNOSIS — Z30.431 CHECKING OF INTRAUTERINE DEVICE: Primary | ICD-10-CM

## 2021-06-15 DIAGNOSIS — Z12.31 ENCOUNTER FOR SCREENING MAMMOGRAM FOR BREAST CANCER: ICD-10-CM

## 2021-06-15 PROCEDURE — 99213 OFFICE O/P EST LOW 20 MIN: CPT | Performed by: OBSTETRICS & GYNECOLOGY

## 2021-06-15 NOTE — PROGRESS NOTES
"  SUBJECTIVE:       HPI: Kristel Martinez is a 40 year old  who presents today for presents today for consultation regarding pelvic pain after IUD insertion.    Mirena IUD placed 3/2021 for AUB. Was having pelvic pain, \"irritation\" from IUD initially. Pain improved after treated for BV. No further pain. Bleeding every irregularly. Patient is sexually active. One male partner. She is using TL for contraception.    She denies vaginal discharge, dyspareunia. She denies fevers/chills, nausea/vomiting, abdominal pain or bloating. Denies dysuria, hematuria, constipation or diarrhea.         Ob Hx:      Gyn Hx: No LMP recorded.     Last pap was 3/2018 NIL, neg HPV   STI history denies  STD testing offered?  Declined  Last Mammogram dx  birads 2         reports that she has quit smoking. She has never used smokeless tobacco.      Today's PHQ-2 Score:   PHQ-2 (  [employer]) 3/23/2018   Q1: Little interest or pleasure in doing things 1   Q2: Feeling down, depressed or hopeless 1   PHQ-2 Score 2   Q1: Little interest or pleasure in doing things Several days   Q2: Feeling down, depressed or hopeless Several days   PHQ-2 Score 2     Today's PHQ-9 Score:   PHQ-9 SCORE 2021   PHQ-9 Total Score MyChart -   PHQ-9 Total Score 20     Today's MALCOM-7 Score:   MALCOM-7 SCORE 2021   Total Score -   Total Score 16       Problem list and histories reviewed & adjusted, as indicated.  Additional history: as documented.    Patient Active Problem List   Diagnosis     Major depressive disorder, recurrent episode, moderate (H)     Papillary adenocarcinoma of thyroid (H)     Postoperative hypothyroidism     Vitiligo     Vitamin D deficiency     IgA deficiency (H)     Traumatic incomplete tear of right rotator cuff, initial encounter     Bicipital tendonitis of right shoulder     Subacromial impingement of right shoulder     Past Surgical History:   Procedure Laterality Date     COLONOSCOPY  2018     THYROIDECTOMY      " 2015     TONSILLECTOMY       TUBAL LIGATION  2006      Social History     Tobacco Use     Smoking status: Former Smoker     Smokeless tobacco: Never Used   Substance Use Topics     Alcohol use: Yes     Comment: 1 drink per wk      Problem (# of Occurrences) Relation (Name,Age of Onset)    Arrhythmia (1) Father    Arthritis (1) Daughter    Asthma (1) Son    Diabetes (1) Paternal Grandfather: type 1     Hypertension (1) Father    Lymphoma (1) Maternal Grandmother    Skin Cancer (1) Mother            buPROPion (WELLBUTRIN XL) 150 MG 24 hr tablet, TAKE 1 TABLET BY MOUTH IN  THE MORNING  dicyclomine (BENTYL) 20 MG tablet, Take 1 tablet (20 mg) by mouth 4 times daily as needed (IBS symptoms)  escitalopram (LEXAPRO) 20 MG tablet, Take 1 tablet (20 mg) by mouth daily  levonorgestrel (MIRENA) 20 MCG/24HR IUD, 1 each (20 mcg) by Intrauterine route once  levothyroxine (SYNTHROID/LEVOTHROID) 112 MCG tablet, TAKE 1 TABLET BY MOUTH DAILY TOTAL  MG DAILY  metroNIDAZOLE (METROLOTION) 0.75 % external lotion, APPLY TO AFFECTED AREA(S)  TOPICALLY DAILY  tiZANidine (ZANAFLEX) 4 MG tablet, Take 1 tablet (4 mg) by mouth 2 times daily as needed for muscle spasms Can cause sedation. Do not take and work.  tretinoin (RETIN-A) 0.025 % external cream, Use every night  albuterol (PROAIR HFA/PROVENTIL HFA/VENTOLIN HFA) 108 (90 Base) MCG/ACT inhaler, Inhale 2 puffs every 4-6 hours as needed for cough, wheezing, or shortness of breath (Patient not taking: Reported on 5/10/2021)  fluticasone (FLONASE) 50 MCG/ACT nasal spray, Spray 1 spray into both nostrils daily (Patient not taking: Reported on 5/10/2021)  levothyroxine (SYNTHROID/LEVOTHROID) 50 MCG tablet, TAKE 1 TABLET BY MOUTH  DAILY ALONG WITH 112MCG  TABLET FOR A TOTAL DAILY  DOSE OF 162MCG.  predniSONE (DELTASONE) 20 MG tablet, Take 3 tabs by mouth daily x 3 days, then 2 tabs daily x 3 days, then 1 tab daily x 3 days, then 1/2 tab daily x 3 days. (Patient not taking: Reported on  5/10/2021)  SUMAtriptan (IMITREX) 25 MG tablet, TAKE 1 TO 2 TABLETS BY  MOUTH AT ONSET OF HEADACHE  FOR MIGRAINE. MAY REPEAT IN 2 HOURS. MAX OF 8 TABLETS  BY MOUTH IN 24 HOURS (Patient not taking: Reported on 5/10/2021)  valACYclovir (VALTREX) 1000 mg tablet, Take 2 tablets (2,000 mg) by mouth 2 times daily (Patient not taking: Reported on 5/10/2021)    No current facility-administered medications on file prior to visit.     Allergies   Allergen Reactions     Augmentin Cramps, Diarrhea, Nausea and Nausea and Vomiting     Cephalosporins Rash     Cephalexin     Sulfa Drugs Rash       ROS:  10 Point review of systems negative other noted above in HPI    OBJECTIVE:     /80   Pulse 84   Wt 104.6 kg (230 lb 9.6 oz)   BMI 33.09 kg/m    Body mass index is 33.09 kg/m .        Gen: Alert, oriented, appropriately interactive, NAD  CV: No edema  Resp: no audible wheeze, cough, or visible cyanosis.  No visible retractions or increased work of breathing.  Able to speak fully in complete sentences.  Abdomen: soft, non tender, non distended, no masses, no hernias. No inguinal lymphadenopathy.   External genitalia: no lesions; normal appearing external genitalia, bartholins glands, urethra, skenes glands  Vagina: no masses or lesions or discharge, normally rugated.  Cervix: no masses or lesions or discharge +IUD strings  MSK: normal gait, symmetric movements UE & LE  Lower extremities: non-tender, no edema  Neuro: Cranial nerves grossly intact, mentation intact and speech normal  Psych: mentation appears normal, affect normal/bright, judgement and insight intact, normal speech and appearance well-groomed          In-Clinic Test Results:  No results found for this or any previous visit (from the past 24 hour(s)).    ASSESSMENT/PLAN:                                                      Kristel Martinez is a 40 year old  who presents today pelvic pain after IUD, now improved      ICD-10-CM    1. Checking of intrauterine  device  Z30.431    2. Encounter for screening mammogram for breast cancer  Z12.31 *MA Screening Digital Bilateral       IUD strings visualized and appropriate length. Pelvic pain improved with treatment of BV. Patient doing well, encouraged annual exams, follow-up as needed for recurrent pain or concerns.    Screening mammogram ordered.    I personally reviewed her prior progress notes from gyn.    20 minutes spent on the date of the encounter doing chart review, history and exam, documentation and further activities as noted above    Yulissa Vidal [facility]

## 2021-06-18 ENCOUNTER — OFFICE VISIT (OUTPATIENT)
Dept: DERMATOLOGY | Facility: CLINIC | Age: 41
End: 2021-06-18
Payer: COMMERCIAL

## 2021-06-18 ENCOUNTER — MYC MEDICAL ADVICE (OUTPATIENT)
Dept: FAMILY MEDICINE | Facility: CLINIC | Age: 41
End: 2021-06-18

## 2021-06-18 DIAGNOSIS — D48.5 NEOPLASM OF UNCERTAIN BEHAVIOR OF SKIN: Primary | ICD-10-CM

## 2021-06-18 PROCEDURE — 88305 TISSUE EXAM BY PATHOLOGIST: CPT | Mod: 26 | Performed by: DERMATOLOGY

## 2021-06-18 PROCEDURE — 11102 TANGNTL BX SKIN SINGLE LES: CPT | Performed by: PHYSICIAN ASSISTANT

## 2021-06-18 RX ORDER — LIDOCAINE HYDROCHLORIDE AND EPINEPHRINE 10; 10 MG/ML; UG/ML
3 INJECTION, SOLUTION INFILTRATION; PERINEURAL ONCE
Status: DISCONTINUED | OUTPATIENT
Start: 2021-06-18 | End: 2021-07-09

## 2021-06-18 ASSESSMENT — PAIN SCALES - GENERAL
PAINLEVEL: NO PAIN (0)
PAINLEVEL: NO PAIN (0)

## 2021-06-18 NOTE — NURSING NOTE
Dermatology Rooming Note    Kristel Martinez's goals for this visit include:   Chief Complaint   Patient presents with     Derm Problem     Kristel is here for a spot check. She has a bump on her nose that she says will not stop bleeding.     Frieda Lowe, CMA

## 2021-06-18 NOTE — LETTER
6/18/2021       RE: Kristel Martinze  [address] [facility] [address]     Dear Colleague,    Thank you for referring your patient, Kristel Martinez, to the [facility] [place] [facility]. Please see a copy of my visit note below.    [facility] Dermatology Note  Encounter Date: Jun 18, 2021  Office Visit      Dermatology Problem List:  1. Hx of vitiligo - hands/feet  2. Hx of thyroid cancer - has since had thyroid removed  3. Acne Vulgaris/POD  -Current Tx: tretinoin 0.025% cream, metronidazole 0.75% lotion, minocycline 100 mg   -Previous Tx: Spironolactone (discontinued due to constipation, patient also has IBS)  ____________________________________________    Assessment & Plan:  # Neoplasm of uncertain behavior on the L nare. The differential diagnosis includes fibrous papule vs BCC vs angioma vs pyogenic granuloma.   - Shave biopsy performed today (see procedure note(s) below).     Procedures Performed:   - Shave biopsy procedure note, location(s): L nare. After discussion of benefits and risks including but not limited to bleeding, infection, scar, incomplete removal, recurrence, and non-diagnostic biopsy, written consent and photographs were obtained. The area was cleaned with isopropyl alcohol. 0.5mL of 1% lidocaine with epinephrine was injected to obtain adequate anesthesia of lesion(s). Shave biopsy at site(s) performed. Hemostasis was achieved with aluminium chloride. Petrolatum ointment and a sterile dressing were applied. The patient tolerated the procedure and no complications were noted. The patient was provided with verbal and written post care instructions.      Follow-up: pending path results    Staff:     All risks, benefits and alternatives were discussed with patient.  Patient is in agreement and understands the assessment and plan.  All questions were answered.    Rissa Corona PA-C,  MPAS  [facility]: Phone: [phone], Fax: [phone]  [facility]: Phone: [phone],  Fax: [phone]  ____________________________________________    CC: Derm Problem (Kristel is here for a spot check. She has a bump on her nose that she says will not stop bleeding.)      HPI:  Ms. Kristel Martinez is a 40 year old female who presents today as a return patient for a spot of concern on the nose, notes it is a bump and it wont stop bleeding. Bump has been present for a while, but more recently started randomly bleeding, possibly from irriation from the masks. No hx skin cancer. Uses pressure to get it to stop bleeding. Site does not hurt or ich.     Patient is otherwise feeling well, without additional concerns.    Labs:  none    Physical Exam:  Vitals: There were no vitals taken for this visit.  SKIN: Focused examination of head and neck was performed.   - 1mm bleeding pink papule on the L nare  - No other lesions of concern on areas examined.     Medications:  Current Outpatient Medications   Medication     albuterol (PROAIR HFA/PROVENTIL HFA/VENTOLIN HFA) 108 (90 Base) MCG/ACT inhaler     buPROPion (WELLBUTRIN XL) 150 MG 24 hr tablet     dicyclomine (BENTYL) 20 MG tablet     escitalopram (LEXAPRO) 20 MG tablet     fluticasone (FLONASE) 50 MCG/ACT nasal spray     levonorgestrel (MIRENA) 20 MCG/24HR IUD     levothyroxine (SYNTHROID/LEVOTHROID) 112 MCG tablet     levothyroxine (SYNTHROID/LEVOTHROID) 50 MCG tablet     metroNIDAZOLE (METROLOTION) 0.75 % external lotion     SUMAtriptan (IMITREX) 25 MG tablet     tiZANidine (ZANAFLEX) 4 MG tablet     tretinoin (RETIN-A) 0.025 % external cream     valACYclovir (VALTREX) 1000 mg tablet     predniSONE (DELTASONE) 20 MG tablet     No current facility-administered medications for this visit.       Past Medical/Surgical History:   Patient Active Problem List   Diagnosis     Major  depressive disorder, recurrent episode, moderate (H)     Papillary adenocarcinoma of thyroid (H)     Postoperative hypothyroidism     Vitiligo     Vitamin D deficiency     IgA deficiency (H)     Traumatic incomplete tear of right rotator cuff, initial encounter     Bicipital tendonitis of right shoulder     Subacromial impingement of right shoulder     Past Medical History:   Diagnosis Date     Depressive disorder        CC Dr. Mcallister on close of this encounter.

## 2021-06-18 NOTE — PROGRESS NOTES
[facility] Dermatology Note  Encounter Date: Jun 18, 2021  Office Visit      Dermatology Problem List:  1. Hx of vitiligo - hands/feet  2. Hx of thyroid cancer - has since had thyroid removed  3. Acne Vulgaris/POD  -Current Tx: tretinoin 0.025% cream, metronidazole 0.75% lotion, minocycline 100 mg   -Previous Tx: Spironolactone (discontinued due to constipation, patient also has IBS)  ____________________________________________    Assessment & Plan:  # Neoplasm of uncertain behavior on the L nare. The differential diagnosis includes fibrous papule vs BCC vs angioma vs pyogenic granuloma.   - Shave biopsy performed today (see procedure note(s) below).     Procedures Performed:   - Shave biopsy procedure note, location(s): L nare. After discussion of benefits and risks including but not limited to bleeding, infection, scar, incomplete removal, recurrence, and non-diagnostic biopsy, written consent and photographs were obtained. The area was cleaned with isopropyl alcohol. 0.5mL of 1% lidocaine with epinephrine was injected to obtain adequate anesthesia of lesion(s). Shave biopsy at site(s) performed. Hemostasis was achieved with aluminium chloride. Petrolatum ointment and a sterile dressing were applied. The patient tolerated the procedure and no complications were noted. The patient was provided with verbal and written post care instructions.      Follow-up: pending path results    Staff:     All risks, benefits and alternatives were discussed with patient.  Patient is in agreement and understands the assessment and plan.  All questions were answered.    Rissa Corona PA-C, MPAS  [facility] [place]: Phone: [phone], Fax: [phone]  [facility]: Phone: [phone],  Fax: [phone]  ____________________________________________    CC: Derm Problem (Kristel is here for a spot check. She has a bump on her nose  that she says will not stop bleeding.)      HPI:  Ms. Kristel Martinez is a 40 year old female who presents today as a return patient for a spot of concern on the nose, notes it is a bump and it wont stop bleeding. Bump has been present for a while, but more recently started randomly bleeding, possibly from irriation from the masks. No hx skin cancer. Uses pressure to get it to stop bleeding. Site does not hurt or ich.     Patient is otherwise feeling well, without additional concerns.    Labs:  none    Physical Exam:  Vitals: There were no vitals taken for this visit.  SKIN: Focused examination of head and neck was performed.   - 1mm bleeding pink papule on the L nare  - No other lesions of concern on areas examined.     Medications:  Current Outpatient Medications   Medication     albuterol (PROAIR HFA/PROVENTIL HFA/VENTOLIN HFA) 108 (90 Base) MCG/ACT inhaler     buPROPion (WELLBUTRIN XL) 150 MG 24 hr tablet     dicyclomine (BENTYL) 20 MG tablet     escitalopram (LEXAPRO) 20 MG tablet     fluticasone (FLONASE) 50 MCG/ACT nasal spray     levonorgestrel (MIRENA) 20 MCG/24HR IUD     levothyroxine (SYNTHROID/LEVOTHROID) 112 MCG tablet     levothyroxine (SYNTHROID/LEVOTHROID) 50 MCG tablet     metroNIDAZOLE (METROLOTION) 0.75 % external lotion     SUMAtriptan (IMITREX) 25 MG tablet     tiZANidine (ZANAFLEX) 4 MG tablet     tretinoin (RETIN-A) 0.025 % external cream     valACYclovir (VALTREX) 1000 mg tablet     predniSONE (DELTASONE) 20 MG tablet     No current facility-administered medications for this visit.       Past Medical/Surgical History:   Patient Active Problem List   Diagnosis     Major depressive disorder, recurrent episode, moderate (H)     Papillary adenocarcinoma of thyroid (H)     Postoperative hypothyroidism     Vitiligo     Vitamin D deficiency     IgA deficiency (H)     Traumatic incomplete tear of right rotator cuff, initial encounter     Bicipital tendonitis of right shoulder     Subacromial  impingement of right shoulder     Past Medical History:   Diagnosis Date     Depressive disorder        CC Dr. Mcallister on close of this encounter.

## 2021-06-21 LAB — COPATH REPORT: NORMAL

## 2021-06-22 ENCOUNTER — OFFICE VISIT (OUTPATIENT)
Dept: URGENT CARE | Facility: URGENT CARE | Age: 41
End: 2021-06-22
Payer: COMMERCIAL

## 2021-06-22 VITALS
RESPIRATION RATE: 16 BRPM | SYSTOLIC BLOOD PRESSURE: 114 MMHG | OXYGEN SATURATION: 100 % | TEMPERATURE: 97.3 F | BODY MASS INDEX: 32.9 KG/M2 | HEART RATE: 96 BPM | WEIGHT: 229.8 LBS | DIASTOLIC BLOOD PRESSURE: 70 MMHG | HEIGHT: 70 IN

## 2021-06-22 DIAGNOSIS — K11.8 PAROTID GLAND PAIN: Primary | ICD-10-CM

## 2021-06-22 PROCEDURE — 99213 OFFICE O/P EST LOW 20 MIN: CPT | Performed by: PHYSICIAN ASSISTANT

## 2021-06-22 RX ORDER — NAPROXEN 500 MG/1
500 TABLET ORAL 2 TIMES DAILY WITH MEALS
Qty: 60 TABLET | Refills: 1 | Status: SHIPPED | OUTPATIENT
Start: 2021-06-22 | End: 2023-02-01

## 2021-06-22 ASSESSMENT — PAIN SCALES - GENERAL: PAINLEVEL: SEVERE PAIN (6)

## 2021-06-22 ASSESSMENT — MIFFLIN-ST. JEOR: SCORE: 1792.62

## 2021-06-22 NOTE — PROGRESS NOTES
"    Assessment & Plan     Parotid gland pain  Will treat with naproxen (NAPROSYN) 500 MG tablet; Take 1 tablet (500 mg) by mouth 2 times daily (with meals). Would recommend lemon in water or sour candy to encourage salivation. Return to clinic if symptoms worsen or do not improve; otherwise follow up as needed               BMI:   Estimated body mass index is 32.97 kg/m  as calculated from the following:    Height as of this encounter: 1.778 m (5' 10\").    Weight as of this encounter: 104.2 kg (229 lb 12.8 oz).           Return in about 1 week (around 6/29/2021), or if symptoms worsen or fail to improve.    Tess Laboy PA-C  [facility] [place]            Subjective   Chief Complaint   Patient presents with     Neck Pain     5 days Patient is here with left side jaw pain and now it hasmoved into the neck having pain and swelling.       HPI     Left jaw pain    Onset of symptoms was 5 day(s) ago.  Course of illness is same.    Severity moderate  Current and Associated symptoms: pain and swelling around left jaw  Treatment measures tried include None tried.  Predisposing factors include None.              Review of Systems   Constitutional, HEENT, cardiovascular, pulmonary, gi and gu systems are negative, except as otherwise noted.      Objective    /70 (BP Location: Right arm, Patient Position: Sitting, Cuff Size: Adult Large)   Pulse 96   Temp 97.3  F (36.3  C) (Tympanic)   Resp 16   Ht 1.778 m (5' 10\")   Wt 104.2 kg (229 lb 12.8 oz)   SpO2 100%   BMI 32.97 kg/m    Body mass index is 32.97 kg/m .  Physical Exam  Constitutional:       Appearance: She is well-developed.   HENT:      Head: Normocephalic.      Comments: There is mild swelling and tenderness with palpation of left parotid gland. No redness or warmth.      Right Ear: Tympanic membrane and ear canal normal.      Left Ear: Tympanic membrane and ear canal normal.   Eyes:      Conjunctiva/sclera: Conjunctivae normal. "      Pupils: Pupils are equal, round, and reactive to light.   Cardiovascular:      Rate and Rhythm: Normal rate.      Heart sounds: Normal heart sounds.   Pulmonary:      Effort: Pulmonary effort is normal.      Breath sounds: Normal breath sounds.   Skin:     General: Skin is warm and dry.      Findings: No rash.   Psychiatric:         Behavior: Behavior normal.

## 2021-06-23 ENCOUNTER — OFFICE VISIT (OUTPATIENT)
Dept: UROLOGY | Facility: CLINIC | Age: 41
End: 2021-06-23
Payer: COMMERCIAL

## 2021-06-23 VITALS
SYSTOLIC BLOOD PRESSURE: 114 MMHG | BODY MASS INDEX: 33.05 KG/M2 | TEMPERATURE: 98 F | DIASTOLIC BLOOD PRESSURE: 66 MMHG | WEIGHT: 230.9 LBS | HEIGHT: 70 IN

## 2021-06-23 DIAGNOSIS — N30.10 INTERSTITIAL CYSTITIS: ICD-10-CM

## 2021-06-23 DIAGNOSIS — N39.3 FEMALE STRESS INCONTINENCE: ICD-10-CM

## 2021-06-23 DIAGNOSIS — R30.0 DYSURIA: Primary | ICD-10-CM

## 2021-06-23 LAB
ALBUMIN UR-MCNC: 30 MG/DL
APPEARANCE UR: ABNORMAL
BILIRUB UR QL STRIP: ABNORMAL
COLOR UR AUTO: ABNORMAL
GLUCOSE UR STRIP-MCNC: NEGATIVE MG/DL
HGB UR QL STRIP: NEGATIVE
KETONES UR STRIP-MCNC: NEGATIVE MG/DL
LEUKOCYTE ESTERASE UR QL STRIP: NEGATIVE
MUCOUS THREADS #/AREA URNS LPF: PRESENT /LPF
NITRATE UR QL: NEGATIVE
PH UR STRIP: 5 PH (ref 5–7)
RBC #/AREA URNS AUTO: 1 /HPF (ref 0–2)
SOURCE: ABNORMAL
SP GR UR STRIP: 1.03 (ref 1–1.03)
SQUAMOUS #/AREA URNS AUTO: 3 /HPF (ref 0–1)
UROBILINOGEN UR STRIP-MCNC: 4 MG/DL (ref 0–2)
WBC #/AREA URNS AUTO: 2 /HPF (ref 0–5)

## 2021-06-23 PROCEDURE — 81001 URINALYSIS AUTO W/SCOPE: CPT | Performed by: UROLOGY

## 2021-06-23 PROCEDURE — 99203 OFFICE O/P NEW LOW 30 MIN: CPT | Mod: 25 | Performed by: UROLOGY

## 2021-06-23 PROCEDURE — 51798 US URINE CAPACITY MEASURE: CPT | Performed by: UROLOGY

## 2021-06-23 ASSESSMENT — MIFFLIN-ST. JEOR: SCORE: 1797.61

## 2021-06-23 NOTE — PROGRESS NOTES
S: Patient is a pleasant 40-year-old  female who was requested to be seen by Adriana Nogueira for a consultation with regard to patient's urinary symptoms.  Patient reported history of interstitial cystitis in the past with hydrodistention previously.  She complains of dysuria that has been gone for several months.  She voids every 2-3 hours and has nocturia x1 only.  She has no significant urgency.  She has no hematuria.  Recent urinalysis has not shown anything abnormal.  She has history of irritable bowel syndrome also.  She denies any sexual issues.  She does have an IUD in place.  Lately, she also complains of more stress induced incontinence.  However, it only happens about once a week.  It happens with laughing sneezing or in physical activities.  Current Outpatient Medications   Medication Sig Dispense Refill     albuterol (PROAIR HFA/PROVENTIL HFA/VENTOLIN HFA) 108 (90 Base) MCG/ACT inhaler Inhale 2 puffs every 4-6 hours as needed for cough, wheezing, or shortness of breath (Patient not taking: Reported on 6/22/2021) 18 g 0     buPROPion (WELLBUTRIN XL) 150 MG 24 hr tablet TAKE 1 TABLET BY MOUTH IN  THE MORNING 90 tablet 1     dicyclomine (BENTYL) 20 MG tablet Take 1 tablet (20 mg) by mouth 4 times daily as needed (IBS symptoms) (Patient not taking: Reported on 6/22/2021) 28 tablet 0     escitalopram (LEXAPRO) 20 MG tablet Take 1 tablet (20 mg) by mouth daily 90 tablet 1     fluticasone (FLONASE) 50 MCG/ACT nasal spray Spray 1 spray into both nostrils daily 16 g 1     levonorgestrel (MIRENA) 20 MCG/24HR IUD 1 each (20 mcg) by Intrauterine route once       levothyroxine (SYNTHROID/LEVOTHROID) 112 MCG tablet TAKE 1 TABLET BY MOUTH DAILY TOTAL  MG DAILY 90 tablet 3     levothyroxine (SYNTHROID/LEVOTHROID) 50 MCG tablet TAKE 1 TABLET BY MOUTH  DAILY ALONG WITH 112MCG  TABLET FOR A TOTAL DAILY  DOSE OF 162MCG. 90 tablet 1     metroNIDAZOLE (METROLOTION) 0.75 % external lotion APPLY TO AFFECTED  AREA(S)  TOPICALLY DAILY 59 mL 1     naproxen (NAPROSYN) 500 MG tablet Take 1 tablet (500 mg) by mouth 2 times daily (with meals) 60 tablet 1     SUMAtriptan (IMITREX) 25 MG tablet TAKE 1 TO 2 TABLETS BY  MOUTH AT ONSET OF HEADACHE  FOR MIGRAINE. MAY REPEAT IN 2 HOURS. MAX OF 8 TABLETS  BY MOUTH IN 24 HOURS (Patient not taking: Reported on 6/22/2021) 18 tablet 3     tiZANidine (ZANAFLEX) 4 MG tablet Take 1 tablet (4 mg) by mouth 2 times daily as needed for muscle spasms Can cause sedation. Do not take and work. 30 tablet 0     tretinoin (RETIN-A) 0.025 % external cream Use every night 45 g 0     valACYclovir (VALTREX) 1000 mg tablet Take 2 tablets (2,000 mg) by mouth 2 times daily 4 tablet 1     Allergies   Allergen Reactions     Augmentin Cramps, Diarrhea, Nausea and Nausea and Vomiting     Cephalosporins Rash     Cephalexin     Sulfa Drugs Rash     Past Medical History:   Diagnosis Date     Depressive disorder      Past Surgical History:   Procedure Laterality Date     COLONOSCOPY  08/27/2018     THYROIDECTOMY      2015     TONSILLECTOMY       TUBAL LIGATION  2006      Family History   Problem Relation Age of Onset     Skin Cancer Mother      Hypertension Father      Arrhythmia Father      Lymphoma Maternal Grandmother      Diabetes Paternal Grandfather         type 1      Asthma Son      Arthritis Daughter      Social History     Socioeconomic History     Marital status:      Spouse name: None     Number of children: None     Years of education: None     Highest education level: None   Occupational History     None   Social Needs     Financial resource strain: None     Food insecurity     Worry: None     Inability: None     Transportation needs     Medical: None     Non-medical: None   Tobacco Use     Smoking status: Former Smoker     Smokeless tobacco: Never Used   Substance and Sexual Activity     Alcohol use: Yes     Comment: 1 drink per wk     Drug use: No     Sexual activity: Yes     Partners: Male  "    Birth control/protection: Female Surgical     Comment: tubal 2206   Lifestyle     Physical activity     Days per week: None     Minutes per session: None     Stress: None   Relationships     Social connections     Talks on phone: None     Gets together: None     Attends Christian service: None     Active member of club or organization: None     Attends meetings of clubs or organizations: None     Relationship status: None     Intimate partner violence     Fear of current or ex partner: None     Emotionally abused: None     Physically abused: None     Forced sexual activity: None   Other Topics Concern     Parent/sibling w/ CABG, MI or angioplasty before 65F 55M? Not Asked   Social History Narrative     None       REVIEW OF SYSTEMS  =================  C: NEGATIVE for fever, chills, change in weight  I: NEGATIVE for worrisome rashes, moles or lesions  E/M: NEGATIVE for ear, mouth and throat problems  R: NEGATIVE for significant cough or SHORTNESS OF BREATH  CV:  NEGATIVE for chest pain, palpitations or peripheral edema  GI: NEGATIVE for nausea, abdominal pain, heartburn, or change in bowel habits  NEURO: NEGATIVE numbness/weakness  : see HPI  PSYCH: NEGATIVE depression/anxiety  LYmph: no new enlarged lymph nodes  Ortho: no new trauma/movements      Physical Exam:  /66   Temp 98  F (36.7  C) (Temporal)   Ht 1.778 m (5' 10\")   Wt 104.7 kg (230 lb 14.4 oz)   BMI 33.13 kg/m     Patient is pleasant, in no acute distress, good general condition.  Heart:  negative, PMI normal  Lung: no evidence of respiratory distress    Abdomen: Soft, nondistended, non tender. No masses. No rebound or guarding.   Exam: Normal female  Skin: Warm and dry.  No redness.  Neuro: grossly normal  Musculaskeletal: moving all extremities  Psych normal mood and affect  Musculoskeletal  moving all extremities  Hematologic/Lymphatic/Immunologic: normal ant/post cervical, axillary, supraclavicular and inguinal nodes    Assessment/Plan: "     Pleasant 40-year-old  female with history of interstitial cystitis and hydrodistention in the past complains of chronic dysuria.  We will schedule patient for a pelvic MRI and cystoscopy next look for urethral diverticulum.  With regard to her stress-induced incontinence, continues with Kegel exercises.

## 2021-06-23 NOTE — PATIENT INSTRUCTIONS
Please call [phone] to schedule an appointment for a pelvic MRI.   Your next cystoscopy is scheduled on 8/18/2021 at 8:15 am. Please arrive 15 minutes prior to the time listed. Please call [phone] if you need to reschedule this appointment.          Patient Education     Cystoscopy    Cystoscopy is a procedure that lets your healthcare provider look directly inside your urethra and bladder. It can be used to:     Help diagnose a problem with your urethra, bladder, or kidneys.    Take a sample (biopsy) of bladder or urethral tissue.    Treat certain problems (such as removing kidney stones).    Place a tube (stent) to bypass a blockage.    Take special X-rays of the kidneys.  Based on the findings, your provider may advise other tests or treatments.   What is a cystoscope?  A cystoscope is a telescope-like tube that contains lenses and small glass wires that make bright light (fiberoptics). The cystoscope may be straight and rigid. Or it may be flexible to bend around curves in the urethra. The healthcare provider may look directly into the cystoscope, or project the image onto a screen.   Getting ready    Ask your healthcare provider if you should stop taking any medicines before the procedure.    Follow any directions you are given for not eating or drinking before the procedure.    Follow any other instructions your healthcare provider gives you.    Tell your healthcare provider before the exam if you:     Take any medicines, such as aspirin or blood thinners. This also includes any over-the-counter and prescription medicines, vitamins, herbs, and other supplements.    Have allergies to any medicines    Are pregnant or think you may be pregnant    During the procedure  Cystoscopy is done in the healthcare provider s office, surgery center, or hospital. The doctor and a nurse are present during the procedure. It takes only a few minutes. It takes longer if a biopsy, X-ray, or treatment needs to be done.    During the procedure:    You lie on an exam table on your back, knees bent and legs apart. You are covered with a drape.    Your urethra and the area around it are washed. Anesthetic jelly may be applied to numb the urethra. Other pain medicine is often not needed. In some cases, you may be offered a mild sedative to help you relax. If a more extensive procedure is to be done, such as a biopsy or kidney stone removal, general anesthesia may be needed. Often a one-time antibiotic is given.    The cystoscope is inserted. A sterile fluid is put into the bladder to expand it. You may feel pressure from this fluid.    When the procedure is done, the cystoscope is removed.  After the procedure  If you had a sedative, general anesthesia, or spinal anesthesia, you must have someone drive you home. Once you re home:     Drink plenty of fluids.    You may have burning or light bleeding when you urinate. This is normal.    Medicines may be prescribed to ease any mild pain or prevent infection. Take these as directed.    Call your healthcare provider if you have heavy bleeding or blood clots, burning that lasts more than a day, a fever over  100  F  ( 38 C ), or trouble urinating.  [email or web address] last reviewed this educational content on 10/1/2019    5335-2032 [employer]. All rights reserved. This information is not intended as a substitute for professional medical care. Always follow your healthcare professional's instructions.

## 2021-07-01 ENCOUNTER — VIRTUAL VISIT (OUTPATIENT)
Dept: FAMILY MEDICINE | Facility: CLINIC | Age: 41
End: 2021-07-01
Payer: COMMERCIAL

## 2021-07-01 DIAGNOSIS — R53.83 FATIGUE, UNSPECIFIED TYPE: Primary | ICD-10-CM

## 2021-07-01 PROCEDURE — 99213 OFFICE O/P EST LOW 20 MIN: CPT | Mod: 95 | Performed by: FAMILY MEDICINE

## 2021-07-01 ASSESSMENT — PATIENT HEALTH QUESTIONNAIRE - PHQ9: SUM OF ALL RESPONSES TO PHQ QUESTIONS 1-9: 11

## 2021-07-01 NOTE — PROGRESS NOTES
"A: chronic headaches, nos       Possible fibromyalgia        Depression        Fatigue    P :she wanted to check some labs, reasonable.  Previous labs and MRI normal as above     Told her she needs dedicated visit for HA/fibro type picture.  TCA at night would be reasonable step, or possibly topamax or other preventive medication for Ha.  TCA might help fibro type picture as well.     Should have comprehensive neuro exam for HA, fundoscopic exam, etc.  MRI in past reassuring, but only so much I can assess virutally.  She agrees and plans on seeing primary to discuss medications and have more comprehensive evaluation.          Kristel is a 40 year old who is being evaluated via a billable video visit.      How would you like to obtain your AVS? MyChart  If the video visit is dropped, the invitation should be resent by: Text to cell phone: [phone]  Will anyone else be joining your video visit? No    Video Start Time: 1120        Subjective   Kristel is a 40 year old who presents for the following health issues    Headaches.  Tension, starts in neck.  Off/on for last several years.  Has migraines too, \"this doesn't feel like that\"  No trauma or injury or fever.     MRI for \"chronic headache\" normal 2 years ago    Not on preventive HA medication    Also wonders about fibromyalgia.  \"everywhere hurts\" doesn't like massage she's had because it all hurt.    More  Tired , depression hx. Psychiatric medications as well     Sometimes hard to focus with the headache, vision bothers her with close up at times.  No visual loss or acute changes.      HPI     Concern - HEadaches   Onset: 3 weeks   Description: Headaches fatigue   Intensity: mild  Progression of Symptoms:  worsening  Accompanying Signs & Symptoms: vision chances   Previous history of similar problem: Migraine   Precipitating factors:        Worsened by: none  Alleviating factors:        Improved by: none   Therapies tried and outcome: IBU and tylenol "         Review of Systems         Objective      There were no vitals taken for this visit.  Well groomed, dressed appropriately. Good eye contact.  No abnormal motor movements, oriented x3, speaks clearly with a normal rate and volume.  Thoughts are coherent and linear.  No tangential responses or loose associatons.  No obsessive behaviors discussed or observed, no suicidal thoughts.   Responds to questions appropriately, gives detailed answers.   Attention and concentration normal.  Affect WNL.  Insight and judgment appropriate.         Vitals:  No vitals were obtained today due to virtual visit.    Physical Exam           Video-Visit Details    Type of service:  Video Visit    Video End Time:1135    Originating Location (pt. Location): Home    Distant Location (provider location):  [facility]     Platform used for Video Visit: [employer]

## 2021-07-08 ENCOUNTER — OFFICE VISIT (OUTPATIENT)
Dept: URGENT CARE | Facility: URGENT CARE | Age: 41
End: 2021-07-08
Payer: COMMERCIAL

## 2021-07-08 VITALS
TEMPERATURE: 98.2 F | DIASTOLIC BLOOD PRESSURE: 76 MMHG | BODY MASS INDEX: 32.86 KG/M2 | OXYGEN SATURATION: 98 % | SYSTOLIC BLOOD PRESSURE: 120 MMHG | HEART RATE: 85 BPM | WEIGHT: 229 LBS

## 2021-07-08 DIAGNOSIS — R30.0 DYSURIA: Primary | ICD-10-CM

## 2021-07-08 LAB
BACTERIA #/AREA URNS HPF: ABNORMAL /HPF
NON-SQ EPI CELLS #/AREA URNS LPF: ABNORMAL /LPF
RBC #/AREA URNS AUTO: ABNORMAL /HPF
SPECIMEN SOURCE: NORMAL
WBC #/AREA URNS AUTO: ABNORMAL /HPF
WET PREP SPEC: NORMAL

## 2021-07-08 PROCEDURE — 87186 SC STD MICRODIL/AGAR DIL: CPT | Performed by: NURSE PRACTITIONER

## 2021-07-08 PROCEDURE — 81015 MICROSCOPIC EXAM OF URINE: CPT | Performed by: NURSE PRACTITIONER

## 2021-07-08 PROCEDURE — 87210 SMEAR WET MOUNT SALINE/INK: CPT | Performed by: NURSE PRACTITIONER

## 2021-07-08 PROCEDURE — 87088 URINE BACTERIA CULTURE: CPT | Performed by: NURSE PRACTITIONER

## 2021-07-08 PROCEDURE — 99213 OFFICE O/P EST LOW 20 MIN: CPT | Performed by: NURSE PRACTITIONER

## 2021-07-08 PROCEDURE — 87086 URINE CULTURE/COLONY COUNT: CPT | Performed by: NURSE PRACTITIONER

## 2021-07-08 ASSESSMENT — PAIN SCALES - GENERAL: PAINLEVEL: SEVERE PAIN (6)

## 2021-07-08 NOTE — PROGRESS NOTES
"    Assessment & Plan   Problem List Items Addressed This Visit     None      Visit Diagnoses     Dysuria    -  Primary    Relevant Orders    Urine Microscopic (Completed)    Wet prep (Completed)    Urine Culture Aerobic Bacterial (Completed)             15 minutes spent on the date of the encounter doing chart review, history and exam, documentation and further activities per the note       BMI:   Estimated body mass index is 32.86 kg/m  as calculated from the following:    Height as of 6/23/21: 1.778 m (5' 10\").    Weight as of this encounter: 103.9 kg (229 lb).   Weight management plan: Patient was referred to their PCP to discuss a diet and exercise plan.    Patient Instructions   Kristel, we will culture your urine and see if any bacteria grows that needs treatment. Your wet prep was normal as well.    Follow-up with your primary care provider next week and as needed.    Indications for emergent return to emergency department discussed with patient, who verbalized good understanding and agreement.  Patient understands the limitations of today's evaluation.         Patient Education     Dysuria with Uncertain Cause (Adult)    The urethra is the tube that allows urine to pass out of the body. In a woman, the urethra is the opening above the vagina. In men, the urethra is the opening on the tip of the penis. Dysuria is the feeling of pain or burning in the urethra when passing urine.   Dysuria can be caused by anything that irritates or inflames the urethra. An infection or chemical irritation can cause this reaction. A bladder infection is the most common cause of dysuria in adults. A urine test can diagnose this. A bladder infection needs antibiotic treatment.   Soaps, lotions, colognes, and feminine hygiene products can cause dysuria. So can birth control jellies, creams, and foams. It will go away 1 to 3 days after using these irritants.   Sexually transmitted infections (STIs) such as chlamydia or gonorrhea can " cause dysuria. Your healthcare provider may take a culture sample. Your provider may start you on antibiotic medicine before the culture test returns.   In women who have gone through menopause, dysuria can be from dryness in the lining of the urethra. This can be treated with hormones. Dysuria becomes long-term (chronic) when it lasts for weeks or months. You may need to see a specialist (urologist) to diagnose and treat chronic dysuria.   Home care  These home care tips may help:    Don't use any chemicals or products that you think may be causing your symptoms.    If you were given a prescription medicine, take as directed. Take it until it is all used up.    If a culture was taken, don't have sex until you have been told that it is negative. A negative culture means you don't have an infection. Then follow your healthcare provider's advice to treat your condition.  If a culture was done and it is positive:     Both you and your sexual partner may need to be treated. This is true even if your partner has no symptoms.    Contact your healthcare provider or go to an urgent care clinic or the public health department to be looked at and treated.    Don't have sex until both you and your partner have finished all antibiotics and your healthcare provider says you are no longer contagious.    Learn about and use safe sex practices. The safest sex is with a partner who has tested negative and only has sex with you. Condoms can prevent STIs from spreading, but they aren't a guarantee.  Follow-up care  Follow up with your healthcare provider, or as advised. If a culture was taken, you may call as directed for the results. If you have an STI, follow up with your provider or the public health department for a complete STI screening, including HIV testing. For more information, contact CDC-INFO at 551-578-0833.   When to get medical advice  Call your healthcare provider right away if any of these occur:    You aren't  better after 3 days of treatment    Fever of 100.4 F (38 C) or higher, or as directed by your provider    Back or belly pain that gets worse    You can't urinate because of pain    New discharge from the urethra, vagina, or penis    Painful sores on the penis    Rash or joint pain    Painful lumps (lymph nodes) in the groin    Testicle pain or swelling of the scrotum  [employer] last reviewed this educational content on 10/1/2019    6270-1562 [employer]. All rights reserved. This information is not intended as a substitute for professional medical care. Always follow your healthcare professional's instructions.               No follow-ups on file.    Mandi Gong, DAVID CNP  [facility]    Kamini Humphries is a 40 year old who presents for the following health issues     HPI     Chief Complaint   Patient presents with     UTI     Symptoms started yesterday morning- burning, urgency, pain-lower abdomen on both sides. No fever           Review of Systems   Constitutional, HEENT, cardiovascular, pulmonary, GI, , musculoskeletal, neuro, skin, endocrine and psych systems are negative, except as otherwise noted.      Objective    /76 (BP Location: Left arm, Patient Position: Sitting)   Pulse 85   Temp 98.2  F (36.8  C) (Tympanic)   Wt 103.9 kg (229 lb)   LMP 07/01/2021   SpO2 98%   BMI 32.86 kg/m    Body mass index is 32.86 kg/m .  Physical Exam   GENERAL: healthy, alert and no distress,nnontoxic in appearance  EYES: Eyes grossly normal to inspection, PERRL and conjunctivae and sclerae normal  HENT: normocephalic  NECK: supple with full ROM  RESP: lungs clear to auscultation - no rales, rhonchi or wheezes  CV: regular rate and rhythm, normal S1 S2, no S3 or S4, no murmur, click or rub, no peripheral edema   ABDOMEN: soft, mild suprapubic tenderness  MS: no gross musculoskeletal defects noted, no edema  Neg CVA tenderness    Results for orders placed or  performed in visit on 07/08/21 (from the past 24 hour(s))   Urine Microscopic   Result Value Ref Range    WBC Urine 0 - 5 OTO5^0 - 5 /HPF    RBC Urine O - 2 OTO2^O - 2 /HPF    Squamous Epithelial /LPF Urine Few FEW^Few /LPF    Bacteria Urine Moderate (A) NEG^Negative /HPF   Wet prep    Specimen: Cervix   Result Value Ref Range    Specimen Description Cervix     Wet Prep No Trichomonas seen     Wet Prep No clue cells seen     Wet Prep No yeast seen     Wet Prep No WBC's seen

## 2021-07-08 NOTE — PATIENT INSTRUCTIONS
Kristel, we will culture your urine and see if any bacteria grows that needs treatment. Your wet prep was normal as well.    Follow-up with your primary care provider next week and as needed.    Indications for emergent return to emergency department discussed with patient, who verbalized good understanding and agreement.  Patient understands the limitations of today's evaluation.         Patient Education     Dysuria with Uncertain Cause (Adult)    The urethra is the tube that allows urine to pass out of the body. In a woman, the urethra is the opening above the vagina. In men, the urethra is the opening on the tip of the penis. Dysuria is the feeling of pain or burning in the urethra when passing urine.   Dysuria can be caused by anything that irritates or inflames the urethra. An infection or chemical irritation can cause this reaction. A bladder infection is the most common cause of dysuria in adults. A urine test can diagnose this. A bladder infection needs antibiotic treatment.   Soaps, lotions, colognes, and feminine hygiene products can cause dysuria. So can birth control jellies, creams, and foams. It will go away 1 to 3 days after using these irritants.   Sexually transmitted infections (STIs) such as chlamydia or gonorrhea can cause dysuria. Your healthcare provider may take a culture sample. Your provider may start you on antibiotic medicine before the culture test returns.   In women who have gone through menopause, dysuria can be from dryness in the lining of the urethra. This can be treated with hormones. Dysuria becomes long-term (chronic) when it lasts for weeks or months. You may need to see a specialist (urologist) to diagnose and treat chronic dysuria.   Home care  These home care tips may help:    Don't use any chemicals or products that you think may be causing your symptoms.    If you were given a prescription medicine, take as directed. Take it until it is all used up.    If a culture was  taken, don't have sex until you have been told that it is negative. A negative culture means you don't have an infection. Then follow your healthcare provider's advice to treat your condition.  If a culture was done and it is positive:     Both you and your sexual partner may need to be treated. This is true even if your partner has no symptoms.    Contact your healthcare provider or go to an urgent care clinic or the public health department to be looked at and treated.    Don't have sex until both you and your partner have finished all antibiotics and your healthcare provider says you are no longer contagious.    Learn about and use safe sex practices. The safest sex is with a partner who has tested negative and only has sex with you. Condoms can prevent STIs from spreading, but they aren't a guarantee.  Follow-up care  Follow up with your healthcare provider, or as advised. If a culture was taken, you may call as directed for the results. If you have an STI, follow up with your provider or the public health department for a complete STI screening, including HIV testing. For more information, contact CDC-INFO at 060-558-3333.   When to get medical advice  Call your healthcare provider right away if any of these occur:    You aren't better after 3 days of treatment    Fever of 100.4 F (38 C) or higher, or as directed by your provider    Back or belly pain that gets worse    You can't urinate because of pain    New discharge from the urethra, vagina, or penis    Painful sores on the penis    Rash or joint pain    Painful lumps (lymph nodes) in the groin    Testicle pain or swelling of the scrotum  [employer] last reviewed this educational content on 10/1/2019    7576-6215 [employer]. All rights reserved. This information is not intended as a substitute for professional medical care. Always follow your healthcare professional's instructions.

## 2021-07-09 ENCOUNTER — OFFICE VISIT (OUTPATIENT)
Dept: OBGYN | Facility: CLINIC | Age: 41
End: 2021-07-09
Payer: COMMERCIAL

## 2021-07-09 ENCOUNTER — MYC MEDICAL ADVICE (OUTPATIENT)
Dept: FAMILY MEDICINE | Facility: CLINIC | Age: 41
End: 2021-07-09

## 2021-07-09 ENCOUNTER — TELEPHONE (OUTPATIENT)
Dept: URGENT CARE | Facility: URGENT CARE | Age: 41
End: 2021-07-09

## 2021-07-09 VITALS — OXYGEN SATURATION: 100 % | SYSTOLIC BLOOD PRESSURE: 119 MMHG | DIASTOLIC BLOOD PRESSURE: 81 MMHG | HEART RATE: 102 BPM

## 2021-07-09 DIAGNOSIS — Z30.432 ENCOUNTER FOR IUD REMOVAL: ICD-10-CM

## 2021-07-09 DIAGNOSIS — R10.2 VAGINAL PAIN: Primary | ICD-10-CM

## 2021-07-09 DIAGNOSIS — Z29.9 PREVENTIVE MEASURE: ICD-10-CM

## 2021-07-09 DIAGNOSIS — R53.83 FATIGUE, UNSPECIFIED TYPE: ICD-10-CM

## 2021-07-09 DIAGNOSIS — B00.1 RECURRENT COLD SORES: ICD-10-CM

## 2021-07-09 DIAGNOSIS — N39.0 ACUTE UTI (URINARY TRACT INFECTION): ICD-10-CM

## 2021-07-09 PROBLEM — N30.10 INTERSTITIAL CYSTITIS: Status: ACTIVE | Noted: 2021-07-09

## 2021-07-09 LAB
BACTERIA SPEC CULT: ABNORMAL
ERYTHROCYTE [DISTWIDTH] IN BLOOD BY AUTOMATED COUNT: 12.8 % (ref 10–15)
HCT VFR BLD AUTO: 42.5 % (ref 35–47)
HGB BLD-MCNC: 13.6 G/DL (ref 11.7–15.7)
Lab: ABNORMAL
MCH RBC QN AUTO: 26.8 PG (ref 26.5–33)
MCHC RBC AUTO-ENTMCNC: 32 G/DL (ref 31.5–36.5)
MCV RBC AUTO: 84 FL (ref 78–100)
PLATELET # BLD AUTO: 303 10E9/L (ref 150–450)
RBC # BLD AUTO: 5.07 10E12/L (ref 3.8–5.2)
SPECIMEN SOURCE: ABNORMAL
T4 FREE SERPL-MCNC: 1.27 NG/DL (ref 0.76–1.46)
TSH SERPL DL<=0.005 MIU/L-ACNC: <0.01 MU/L (ref 0.4–4)
WBC # BLD AUTO: 5.7 10E9/L (ref 4–11)

## 2021-07-09 PROCEDURE — 84443 ASSAY THYROID STIM HORMONE: CPT | Performed by: FAMILY MEDICINE

## 2021-07-09 PROCEDURE — 36415 COLL VENOUS BLD VENIPUNCTURE: CPT | Performed by: FAMILY MEDICINE

## 2021-07-09 PROCEDURE — 58301 REMOVE INTRAUTERINE DEVICE: CPT | Performed by: OBSTETRICS & GYNECOLOGY

## 2021-07-09 PROCEDURE — 84439 ASSAY OF FREE THYROXINE: CPT | Performed by: FAMILY MEDICINE

## 2021-07-09 PROCEDURE — 99213 OFFICE O/P EST LOW 20 MIN: CPT | Mod: 25 | Performed by: OBSTETRICS & GYNECOLOGY

## 2021-07-09 PROCEDURE — 85027 COMPLETE CBC AUTOMATED: CPT | Performed by: FAMILY MEDICINE

## 2021-07-09 RX ORDER — NITROFURANTOIN 25; 75 MG/1; MG/1
100 CAPSULE ORAL 2 TIMES DAILY
Qty: 10 CAPSULE | Refills: 0 | Status: SHIPPED | OUTPATIENT
Start: 2021-07-09 | End: 2021-07-14

## 2021-07-09 RX ORDER — FLUCONAZOLE 150 MG/1
150 TABLET ORAL ONCE
Qty: 2 TABLET | Refills: 0 | Status: SHIPPED | OUTPATIENT
Start: 2021-07-09 | End: 2021-07-09

## 2021-07-09 SDOH — ECONOMIC STABILITY: TRANSPORTATION INSECURITY
IN THE PAST 12 MONTHS, HAS LACK OF TRANSPORTATION KEPT YOU FROM MEETINGS, WORK, OR FROM GETTING THINGS NEEDED FOR DAILY LIVING?: NOT ASKED

## 2021-07-09 SDOH — ECONOMIC STABILITY: FOOD INSECURITY: WITHIN THE PAST 12 MONTHS, THE FOOD YOU BOUGHT JUST DIDN'T LAST AND YOU DIDN'T HAVE MONEY TO GET MORE.: NOT ASKED

## 2021-07-09 SDOH — ECONOMIC STABILITY: TRANSPORTATION INSECURITY
IN THE PAST 12 MONTHS, HAS THE LACK OF TRANSPORTATION KEPT YOU FROM MEDICAL APPOINTMENTS OR FROM GETTING MEDICATIONS?: NOT ASKED

## 2021-07-09 SDOH — ECONOMIC STABILITY: INCOME INSECURITY: HOW HARD IS IT FOR YOU TO PAY FOR THE VERY BASICS LIKE FOOD, HOUSING, MEDICAL CARE, AND HEATING?: NOT ASKED

## 2021-07-09 SDOH — ECONOMIC STABILITY: FOOD INSECURITY: WITHIN THE PAST 12 MONTHS, YOU WORRIED THAT YOUR FOOD WOULD RUN OUT BEFORE YOU GOT MONEY TO BUY MORE.: NOT ASKED

## 2021-07-09 NOTE — PATIENT INSTRUCTIONS
If you have any questions regarding your visit, Please contact your care team.     [employer]: [phone]  Women s Health CLINIC HOURS TELEPHONE NUMBER       Bigg Tomlinson M.D.    Maricruz-GENESIS Murphy-GENESIS Cruz-Medical Assistant    Kristin-  Agnes-     Monday-[place]  8:00a.m-4:45 p.m  Tuesday-[place]  9:00a.m-4:00 p.m  Wednesday-[place] 8:00a.m-4:45 p.m.  Thursday-[place]  8:00a.m-4:45 p.m.  Friday-[place]  8:00a.m-4:45 p.m. [facility]  [address] [place] [id number] [phone] ask for [facility]  [phone] Fax  Imaging [phone]    [facility]  [address]  [place], [place] [address] [phone]    [facility]  [address]  [place] [address] [phone] ask [facility]  [phone] Fax  Imaging [phone]     Urgent Care locations:    [place]        [place] Monday-Friday  5 pm - 9 pm  Saturday and Sunday   9 am - 5 pm  Monday-Friday   11 am - 9 pm  Saturday and Sunday   9 am - 5 pm   [phone]   If you need a medication refill, please contact your pharmacy. Please allow 3 business days for your refill to be completed.  As always, Thank you for trusting us with your healthcare needs!

## 2021-07-09 NOTE — TELEPHONE ENCOUNTER
[employer] [facility] Urgent Care Lab result notification [Adult-Female]    [place] ED lab result protocol used  Urine culture    Reason for call  Notify of lab results, assess symptoms,  review ED providers recommendations/discharge instructions (if necessary) and advise per ED lab result f/u protocol    Lab Result (including Rx patient on, if applicable)  Preliminary urine culture report on 7/9/21 shows the presence of bacteria(s): >100,000 colonies/mL Escherichia coli   Emergency Dept/Urgent Care discharge antibiotic: None  Recommendations per [facility] ED Lab result Urine culture protocol.  Information table from Emergency Dept Provider visit on 7/9/21  Symptoms reported at ED visit (Chief complaint, HPI)        Visit Diagnoses      Dysuria    -  Primary     Relevant Orders     Urine Microscopic (Completed)     Wet prep (Completed)     Urine Culture Aerobic Bacterial (Completed)      Significant Medical hx, if applicable (i.e. CKD, diabetes) Hx uti's and vag yeast infections   Allergies Allergies   Allergen Reactions     Cephalexin Rash     Augmentin Cramps, Diarrhea, Nausea, Nausea and Vomiting and Unknown     Cephalosporins Rash     Cephalexin     Sulfa Drugs Rash and Unknown      Weight, if applicable Wt Readings from Last 2 Encounters:   07/08/21 103.9 kg (229 lb)   06/23/21 104.7 kg (230 lb 14.4 oz)      Coumadin/Warfarin [Yes /No] Denies   Creatinine Level (mg/dl) Creatinine   Date Value Ref Range Status   03/19/2021 0.82 0.52 - 1.04 mg/dL Final      Creatinine clearance (ml/min), if applicable Creatinine clearance cannot be calculated (Patient's most recent lab result is older than the maximum 30 days allowed.)   Pregnant (Yes/No/NA) Denies   Breastfeeding (Yes/No/NA) Denies   ED providers Impression and Plan (applicable information) Kristel, we will culture your urine and see if any bacteria grows that needs treatment. Your wet prep was normal as well.     Follow-up with your primary care  provider next week and as needed.    Indications for emergent return to emergency department discussed with patient, who verbalized good understanding and agreement.  Patient understands the limitations of today's evaluation.    ED diagnosis Patient presents with     UTI       Symptoms started yesterday morning- burning, urgency, pain-lower abdomen on both sides. No fever      ED provider DAVID Cortes CNP  [facility] [place]      RN Assessment (Patient s current Symptoms), include time called.  [Insert Left message here if message left]  Pt has dysuria and frequency of urine. Requesting Diflucan and pyridium also.     RN Recommendations/Instructions per [facility] ED lab result protocol  Patient notified of lab result and treatment recommendations.  Rx for Nitrofurantoin Macrocrystal-Monohydrate (Macrobid) 100 mg PO capsule, 1 capsule (100 mg) by mouth 2 times daily for 5 days and Fluconazole (DIFLUCAN) 150 MG tablet, Take 1 tablet (150 mg) by mouth once for 1 dose If on antibiotic's repeat dose in 7 days. Sent to [Pharmacy - [facility]].  RN Advised to finish full course of antibiotics as prescribed and drink plenty of fluids. Eat yogurt, cottage cheese or take probiotics as needed. And follow up with your PCP as the ED provider recommended. To try OTC Urostat or ask pharmacist what OTC med they recommend. Also, can call PCP or Dr Tomlinson office where she was seen today.     Please Contact your PCP clinic or return to the Emergency department if your:    Symptoms return.    Symptoms do not improve after 3 days on antibiotic.    Symptoms do not resolve after completing antibiotic.    Symptoms worsen or other concerning symptom's.    PCP follow-up Questions asked: YES       Hope Feliz RN  [facility] [place]  Emergency Dept Lab Result RN  Ph# [phone]

## 2021-07-10 ENCOUNTER — MYC MEDICAL ADVICE (OUTPATIENT)
Dept: FAMILY MEDICINE | Facility: CLINIC | Age: 41
End: 2021-07-10

## 2021-07-10 NOTE — PROGRESS NOTES
OB-GYN Problem-Oriented Visit or Consultation      Kristel Martinez is a 40 year old year old P 2 who presents with a chief complaint of vaginal discomfort with IUD.  Referred by self.  Patient's last menstrual period was 07/01/2021.    Assessment:   Encounter Diagnoses   Name Primary?     Vaginal pain Yes     Fatigue, unspecified type      Encounter for IUD removal          Plan and Recommendations: Observe symptoms following IUD removal. UC pending. Desires follow-up thyroid testing today- orders released.   I reviewed the condition, causes, differential diagnosis, prognosis, evaluation and management considerations and options.  Questions answered and information given. See orders.   30 minutes spent on the date of encounter doing chart review, history, examination, discussion with patient, and documentation, and further activities as noted, and review of appropriateness of decision-making for care, and review of test results. This includes evaluation and management time spent beyond specific discussion of and performance of the procedure itself.        A/P:  Kristel was seen today for follow up.    Diagnoses and all orders for this visit:    Vaginal pain    Fatigue, unspecified type  -     CBC with platelets  -     TSH with free T4 reflex  -     T4 free    Encounter for IUD removal        Bigg Tomlinson MD    HPI:     See message. Had Mirena IUD placed several months ago for dysfunctional uterine bleeding and menses are lighter now but irregular. Has had BV and yeast infections and some on-going vaginal discomfort with the IUD. Recent wet prep and UA neg but UC pending. Desires IUD removal. History of posterior uterus. Contraceptive method is TL.     Past medical, obstetrical, surgical, family and social history reviewed and as noted or updated in chart.     Allergies, meds and supplements are as noted or updated in chart.      ROS:   Systems reviewed include:  constitutional, genitourinary, integumentary,  psychological, hematologic/lymphatic and endocrine.    These systems were negative for significant symptoms except for the following additional: none; see HPI.    EXAM:  VS as noted. /81 (BP Location: Left arm, Patient Position: Chair, Cuff Size: Adult Regular)   Pulse 102   LMP 07/01/2021   SpO2 100%   Breastfeeding No   Constitutionally normal.     Pelvis is  normal or negative except for, or in particular noting, the following  pertinent findings: Vulvar vitiligo, vaginal mucus without suspicious discharge or lesions, IUD strings in normal position, uterus non-tender.      PROCEDURE: IUD Removal    I discussed the removal procedure, objectives, risks, complications and future contraceptive methods as indicated.  Patient understood and desired to proceed.    After appropriate preparation, the Mirena IUD was removed intact. No complications. Patient tolerated the procedure well. Stable at discharge. Instructions and information were given.          Bigg Tomlinson MD

## 2021-07-11 ENCOUNTER — NURSE TRIAGE (OUTPATIENT)
Dept: NURSING | Facility: CLINIC | Age: 41
End: 2021-07-11

## 2021-07-11 NOTE — TELEPHONE ENCOUNTER
"\"I have a bladder infection. I began antibiotic night before last. I have had 3 doses of macrobid and it doesn't feel like it is getting any better\". Seen in [facility]: Friday. SX: burning, pain, frequency. No fever noted. Pain currently=\"5\". She is already taking phenazopyridine OTC.     Triaged to a disposition of Home Care. Given per guidelines.     Nelly Romero RN Triage Nurse Advisor 4:53 PM 7/11/2021    Reason for Disposition    [1] Taking antibiotic < 72 hours (3 days) for UTI AND [2] painful urination or frequency not improved    Additional Information    Negative: Shock suspected (e.g., cold/pale/clammy skin, too weak to stand, low BP, rapid pulse)    Negative: Sounds like a life-threatening emergency to the triager    Negative: Urinary tract infection suspected, but not taking antibiotics    Negative: [1] Unable to urinate (or only a few drops) > 4 hours AND     [2] bladder feels very full (e.g., palpable bladder or strong urge to urinate)    Negative: Passing pure blood or large blood clots (i.e., size > a dime)  (Exceptions: flecks, small strands, or pinkish-red color)    Negative: Fever > 103 F (39.4 C)    Negative: Patient sounds very sick or weak to the triager    Negative: [1] SEVERE pain (e.g., excruciating) AND [2] no improvement 2 hours after pain medications    Negative: [1] Fever > 100.0 F (37.8 C) AND [2] new onset since starting antibiotics    Negative: [1] Side (flank) or lower back pain AND [2] new onset since starting antibiotics    Negative: [1] Taking antibiotic > 24 hours for UTI AND [2] flank or lower back pain worsening    Negative: [1] Vomiting 2 or more times AND [2] interferes with taking oral antibiotic    Negative: [1] Taking antibiotic > 24 hours for UTI (urinary tract or bladder infection) AND [2] fever persists    Negative: [1] Taking antibiotic > 72 hours (3 days) for UTI AND [2] painful urination or frequency not improved    Negative: [1] Taking antibiotic > 72 hours " (3 days) for UTI AND [2] flank or lower back pain not improved    Negative: Diabetes mellitus or weak immune system (e.g., HIV positive, cancer chemo, splenectomy, organ transplant, chronic steroids)    Negative: Sickle cell disease    Negative: [1] Taking antibiotic < 24 hours for UTI AND [2] fever persists    Protocols used: URINARY TRACT INFECTION ON ANTIBIOTIC FOLLOW-UP CALL - FEMALE-A-  COVID 19 Nurse Triage Plan/Patient Instructions    Please be aware that novel coronavirus (COVID-19) may be circulating in the community. If you develop symptoms such as fever, cough, or SOB or if you have concerns about the presence of another infection including coronavirus (COVID-19), please contact your health care provider or visit [email or web address].     Disposition/Instructions    Home care recommended. Follow home care protocol based instructions.    Thank you for taking steps to prevent the spread of this virus.  o Limit your contact with others.  o Wear a simple mask to cover your cough.  o Wash your hands well and often.    Resources    [facility]: About COVID-19: [email or web address]    CDC: What to Do If You're Sick: www.cdc.gov/coronavirus/2019-ncov/about/steps-when-sick.html    CDC: Ending Home Isolation: www.cdc.gov/coronavirus/2019-ncov/hcp/disposition-in-home-patients.html     CDC: Caring for Someone: www.cdc.gov/coronavirus/2019-ncov/if-you-are-sick/care-for-someone.html     [facility]: Interim Guidance for Hospital Discharge to Home: www.health.[facility].[place]/diseases/coronavirus/hcp/hospdischarge.pdf    [facility] clinical trials (COVID-19 research studies): [email or web address]/umn-clinical-trials     Below are the COVID-19 hotlines at the [place] Department of Health ([facility]). Interpreters are available.   o For health questions: Call 711-084-2843 or 1-459.804.2489 (7 a.m. to 7 p.m.)  o For questions about schools and childcare: Call 633-599-8518 or 1-758.556.6565 (7  a.m. to 7 p.m.)

## 2021-07-12 ENCOUNTER — MYC MEDICAL ADVICE (OUTPATIENT)
Dept: FAMILY MEDICINE | Facility: CLINIC | Age: 41
End: 2021-07-12

## 2021-07-12 DIAGNOSIS — E89.0 POSTOPERATIVE HYPOTHYROIDISM: Primary | ICD-10-CM

## 2021-07-12 RX ORDER — VALACYCLOVIR HYDROCHLORIDE 1 G/1
2000 TABLET, FILM COATED ORAL 2 TIMES DAILY
Qty: 4 TABLET | Refills: 1 | Status: SHIPPED | OUTPATIENT
Start: 2021-07-12 | End: 2022-01-25

## 2021-07-12 RX ORDER — LEVOTHYROXINE SODIUM 50 UG/1
TABLET ORAL
Qty: 90 TABLET | Refills: 1 | COMMUNITY
Start: 2021-07-12 | End: 2021-12-08

## 2021-07-13 ENCOUNTER — NURSE TRIAGE (OUTPATIENT)
Dept: NURSING | Facility: CLINIC | Age: 41
End: 2021-07-13

## 2021-07-14 ENCOUNTER — OFFICE VISIT (OUTPATIENT)
Dept: FAMILY MEDICINE | Facility: CLINIC | Age: 41
End: 2021-07-14
Payer: COMMERCIAL

## 2021-07-14 VITALS
TEMPERATURE: 98.5 F | SYSTOLIC BLOOD PRESSURE: 109 MMHG | WEIGHT: 226 LBS | HEART RATE: 91 BPM | BODY MASS INDEX: 32.43 KG/M2 | DIASTOLIC BLOOD PRESSURE: 73 MMHG

## 2021-07-14 DIAGNOSIS — N30.00 ACUTE CYSTITIS WITHOUT HEMATURIA: Primary | ICD-10-CM

## 2021-07-14 DIAGNOSIS — R30.0 DYSURIA: ICD-10-CM

## 2021-07-14 DIAGNOSIS — Z86.19 HISTORY OF CANDIDIASIS OF VAGINA: ICD-10-CM

## 2021-07-14 LAB
RBC #/AREA URNS AUTO: ABNORMAL /HPF
SQUAMOUS #/AREA URNS AUTO: ABNORMAL /LPF
WBC #/AREA URNS AUTO: ABNORMAL /HPF

## 2021-07-14 PROCEDURE — 81015 MICROSCOPIC EXAM OF URINE: CPT | Performed by: NURSE PRACTITIONER

## 2021-07-14 PROCEDURE — 99213 OFFICE O/P EST LOW 20 MIN: CPT | Performed by: NURSE PRACTITIONER

## 2021-07-14 RX ORDER — FLUCONAZOLE 150 MG/1
150 TABLET ORAL ONCE
Qty: 1 TABLET | Refills: 0 | Status: SHIPPED | OUTPATIENT
Start: 2021-07-14 | End: 2021-07-14

## 2021-07-14 RX ORDER — LEVOFLOXACIN 500 MG/1
500 TABLET, FILM COATED ORAL DAILY
Status: CANCELLED | OUTPATIENT
Start: 2021-07-14

## 2021-07-14 RX ORDER — LEVOFLOXACIN 750 MG/1
750 TABLET, FILM COATED ORAL DAILY
Qty: 5 TABLET | Refills: 0 | Status: SHIPPED | OUTPATIENT
Start: 2021-07-14 | End: 2021-07-19

## 2021-07-14 ASSESSMENT — ENCOUNTER SYMPTOMS
DIARRHEA: 0
HEMATURIA: 0
ABDOMINAL PAIN: 1
VOMITING: 0
FREQUENCY: 1
FLANK PAIN: 1
FEVER: 0
NAUSEA: 0
DYSURIA: 1

## 2021-07-14 NOTE — PROGRESS NOTES
Assessment & Plan     Acute cystitis without hematuria  Persistent symptoms. Only able to do micro due to medications she is taking. UA improved. Given persistent symptoms, changed abx. Has allergy to sulfa and cephalosporins. Levaquin prescribed. Side effects, risks (including but not limited to tendon rupture) and benefits of medication were discussed with patient. Discussed how and when to take medication. She has appt with urologist in a couple weeks. Follow-up if symptoms worsen or do not improve.     - levofloxacin (LEVAQUIN) 750 MG tablet; Take 1 tablet (750 mg) by mouth daily for 5 days    History of candidiasis of vagina  History of candidiasis after abx. She has 1 diflucan left at home, will prescribe 1 more dose.     - fluconazole (DIFLUCAN) 150 MG tablet; Take 1 tablet (150 mg) by mouth once for 1 dose    Dysuria    - Urine Microscopic             Patient Instructions   You have a urinary tract infection. Urine has cleared up, but given your continued symptoms, we will switch you over to a different antibiotic. Stop taking Macrobid and start Levaquin.   1. Take antibiotics as prescribed.  2. Take a probiotic and/or eat yogurt daily while on antibiotics and 10 days after to prevent GI upset.  3. Increase fluid intake to 6-8 glasses of water a day.  4. To prevent infection avoid douching, avoid bubble baths and perineal sprays. Always urinate after having intercourse to flush out any harmful bacteria.  5. Wipe the perineum from front to back after urinating to prevent the spread of bacteria from the rectum.  6. Take Diflucan as prescribed to prevent yeast infection.        Return if symptoms worsen or fail to improve.    DAVID [facility] AKOSUA Humphries is a 40 year old who presents for the following health issues     HPI     Vaginal Symptoms  Onset/Duration: Since last week  Description:  Vaginal Discharge: none   Itching (Pruritis): no  Burning sensation:   YES  Odor: no  Accompanying Signs & Symptoms:  Urinary symptoms: YES  Abdominal pain: YES  Fever: no  Therapies tried and outcome: Patient has been taking the macrobid      Additional provider notes: Was seen in [facility] on Friday (5 days ago) and was treated for UTI with macrobid and pyridium. States yesterday symptoms were improved and she stopped taking pyridium, but symptoms came back. Is back on pyridium.     Hx of cystitis. Has appointment with urologist in August and scope planned.       Allergies   Allergen Reactions     Cephalexin Rash     Augmentin Cramps, Diarrhea, Nausea, Nausea and Vomiting and Unknown     Cephalosporins Rash     Cephalexin     Sulfa Drugs Rash and Unknown     Current Outpatient Medications   Medication     buPROPion (WELLBUTRIN XL) 150 MG 24 hr tablet     dicyclomine (BENTYL) 20 MG tablet     escitalopram (LEXAPRO) 20 MG tablet     levothyroxine (SYNTHROID/LEVOTHROID) 112 MCG tablet     levothyroxine (SYNTHROID/LEVOTHROID) 50 MCG tablet     naproxen (NAPROSYN) 500 MG tablet     nitroFURantoin macrocrystal-monohydrate (MACROBID) 100 MG capsule     valACYclovir (VALTREX) 1000 mg tablet     No current facility-administered medications for this visit.     Past Medical History:   Diagnosis Date     Depressive disorder      Interstitial cystitis 7/9/2021     Major depressive disorder, recurrent episode, moderate (H) 11/16/2007     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 Iodine ablation with 53 mCi.  07/2016, 07/2017: Neck US neg.     Postoperative hypothyroidism 3/23/2018         Review of Systems   Constitutional: Negative for fever.   Gastrointestinal: Positive for abdominal pain. Negative for diarrhea, nausea and vomiting.   Genitourinary: Positive for dysuria, flank pain (bilat), frequency, pelvic pain  and urgency. Negative for hematuria, vaginal bleeding, vaginal discharge and vaginal pain.            Objective    /73 (BP Location: Right arm, Patient Position: Sitting, Cuff Size: Adult Large)   Pulse 91   Temp 98.5  F (36.9  C) (Tympanic)   Wt 102.5 kg (226 lb)   LMP 07/01/2021   BMI 32.43 kg/m    Body mass index is 32.43 kg/m .  Physical Exam  Vitals and nursing note reviewed.   Constitutional:       General: She is not in acute distress.     Appearance: Normal appearance. She is not ill-appearing or toxic-appearing.   Cardiovascular:      Rate and Rhythm: Normal rate and regular rhythm.      Pulses: Normal pulses.      Heart sounds: Normal heart sounds.   Pulmonary:      Effort: Pulmonary effort is normal.      Breath sounds: Normal breath sounds.   Abdominal:      General: Bowel sounds are normal.      Palpations: Abdomen is soft.      Tenderness: There is no abdominal tenderness.   Skin:     General: Skin is warm and dry.   Neurological:      Mental Status: She is alert and oriented to person, place, and time.   Psychiatric:         Behavior: Behavior normal.            Results for orders placed or performed in visit on 07/14/21 (from the past 24 hour(s))   Urine Microscopic   Result Value Ref Range    RBC Urine 0-2 0-2 /HPF /HPF    WBC Urine 0-5 0-5 /HPF /HPF    Squamous Epithelials Urine Few (A) None Seen /LPF    Narrative    Urine Culture not indicated

## 2021-07-14 NOTE — PATIENT INSTRUCTIONS
You have a urinary tract infection. Urine has cleared up, but given your continued symptoms, we will switch you over to a different antibiotic. Stop taking Macrobid and start Levaquin.   1. Take antibiotics as prescribed.  2. Take a probiotic and/or eat yogurt daily while on antibiotics and 10 days after to prevent GI upset.  3. Increase fluid intake to 6-8 glasses of water a day.  4. To prevent infection avoid douching, avoid bubble baths and perineal sprays. Always urinate after having intercourse to flush out any harmful bacteria.  5. Wipe the perineum from front to back after urinating to prevent the spread of bacteria from the rectum.  6. Take Diflucan as prescribed to prevent yeast infection.

## 2021-07-14 NOTE — TELEPHONE ENCOUNTER
Kristel is on her  3rd or 4th day for UTI    She was starting to feel improvement but today she feels worse - the same as when she was first seen in [facility]    She has been seen by a urologist, Dr. Hadley Poe, for interstitial cystitis. She is scheduled for a cystoscopy in August    Per protocol, advised to see Physician within 24 hours  Care Advice reviewed  Also reviewed possible bladder irritants to avoid: Caffein, acidic foods and beverages, carbonated beverages and artificial sweeteners    Transferred to scheduling    COVID 19 Nurse Triage Plan/Patient Instructions    Please be aware that novel coronavirus (COVID-19) may be circulating in the community. If you develop symptoms such as fever, cough, or SOB or if you have concerns about the presence of another infection including coronavirus (COVID-19), please contact your health care provider or visit [email or web address].     Disposition/Instructions    In-Person Visit with provider recommended. Reference Visit Selection Guide.    Thank you for taking steps to prevent the spread of this virus.  o Limit your contact with others.  o Wear a simple mask to cover your cough.  o Wash your hands well and often.    Resources    [facility]: About COVID-19: [email or web address]    CDC: What to Do If You're Sick: www.cdc.gov/coronavirus/2019-ncov/about/steps-when-sick.html    CDC: Ending Home Isolation: www.cdc.gov/coronavirus/2019-ncov/hcp/disposition-in-home-patients.html     CDC: Caring for Someone: www.cdc.gov/coronavirus/2019-ncov/if-you-are-sick/care-for-someone.html     [facility]: Interim Guidance for Hospital Discharge to Home: www.health.state.[place]/diseases/coronavirus/hcp/hospdischarge.pdf    [facility] clinical trials (COVID-19 research studies): [email or web address]-[email or web address]     Below are the COVID-19 hotlines at the [place] Department of Health ([facility]). Interpreters are available.   o For health questions: Call  667.663.4186 or 1-478.870.4645 (7 a.m. to 7 p.m.)  o For questions about schools and childcare: Call 511-651-1514 or 1-479.466.3565 (7 a.m. to 7 p.m.)     Keely Fontana RN  [facility] Nurse Advisors      Reason for Disposition    [1] Taking antibiotic > 72 hours (3 days) for UTI AND [2] painful urination or frequency not improved    Additional Information    Negative: Shock suspected (e.g., cold/pale/clammy skin, too weak to stand, low BP, rapid pulse)    Negative: Sounds like a life-threatening emergency to the triager    Negative: Urinary tract infection suspected, but not taking antibiotics    Negative: Passing pure blood or large blood clots (i.e., size > a dime)  (Exceptions: flecks, small strands, or pinkish-red color)    Negative: Fever > 103 F (39.4 C)    Negative: Patient sounds very sick or weak to the triager    Negative: [1] Unable to urinate (or only a few drops) > 4 hours AND     [2] bladder feels very full (e.g., palpable bladder or strong urge to urinate)    Negative: [1] SEVERE pain (e.g., excruciating) AND [2] no improvement 2 hours after pain medications    Negative: [1] Fever > 100.0 F (37.8 C) AND [2] new onset since starting antibiotics    Negative: [1] Side (flank) or lower back pain AND [2] new onset since starting antibiotics    Negative: [1] Taking antibiotic > 24 hours for UTI AND [2] flank or lower back pain worsening    Negative: [1] Vomiting 2 or more times AND [2] interferes with taking oral antibiotic    Negative: [1] Taking antibiotic > 24 hours for UTI (urinary tract or bladder infection) AND [2] fever persists    Protocols used: URINARY TRACT INFECTION ON ANTIBIOTIC FOLLOW-UP CALL - FEMALE-A-AH

## 2021-07-19 ENCOUNTER — NURSE TRIAGE (OUTPATIENT)
Dept: NURSING | Facility: CLINIC | Age: 41
End: 2021-07-19

## 2021-07-19 NOTE — TELEPHONE ENCOUNTER
Pt called in states she is on the second round of antibiotic.  The Pt states she still has UTI symptoms.  The symptom is are burning pain, frequency and urgency.  The symptom is still the same.  The second dose of the medication is levofloxacin (LEVAQUIN) 750 MG tablet and Pt has 1 tablet left to finished the dose.  No fever.  The disposition is to be seen with in 24 hours.  Care advice given per protocol.  Patient agrees with care advice given.   Agreed to call back if he has additional symptoms or questions.    [facility] Nurse Advisor 7/19/2021 7:01 PM      Reason for Disposition    [1] Urinary tract  infection (e.g., cystitis, pyelonephritis, urethritis) AND [2] female AND [3] taking an antibiotic    [1] Taking antibiotic > 72 hours (3 days) for UTI AND [2] flank or lower back pain not improved    Additional Information    Negative: Severe difficulty breathing (e.g., struggling for each breath, speaks in single words)    Negative: Sounds like a life-threatening emergency to the triager    Negative: [1] Recent hospitalization for pneumonia AND [2] taking an antibiotic    Negative: [1] Animal bite infection AND [2] taking an antibiotic    Negative: [1] Cellulitis AND [2] taking an antibiotic    Negative: [1] Ear  infection (Otitis Media) AND [2] taking an antibiotic    Negative: [1] Ear  infection (Swimmer's Ear) AND [2] taking an antibiotic    Negative: [1] Sinus infection AND [2] taking an antibiotic    Negative: [1] Strep throat AND [2] taking an antibiotic    Negative: [1] Urinary tract  infection (e.g., cystitis, pyelonephritis, urethritis, epididymitis) AND [2] male AND [3] taking an antibiotic    Negative: [1] Wound infection AND [2] taking an antibiotic    Negative: Urinary tract infection suspected, but not taking antibiotics    Negative: Shock suspected (e.g., cold/pale/clammy skin, too weak to stand, low BP, rapid pulse)    Negative: Sounds like a life-threatening emergency to the triager     Negative: [1] Unable to urinate (or only a few drops) > 4 hours AND     [2] bladder feels very full (e.g., palpable bladder or strong urge to urinate)    Negative: Passing pure blood or large blood clots (i.e., size > a dime)  (Exceptions: flecks, small strands, or pinkish-red color)    Negative: Fever > 103 F (39.4 C)    Negative: Patient sounds very sick or weak to the triager    Negative: [1] SEVERE pain (e.g., excruciating) AND [2] no improvement 2 hours after pain medications    Negative: [1] Fever > 100.0 F (37.8 C) AND [2] new onset since starting antibiotics    Negative: [1] Side (flank) or lower back pain AND [2] new onset since starting antibiotics    Negative: [1] Taking antibiotic > 24 hours for UTI AND [2] flank or lower back pain worsening    Negative: [1] Vomiting 2 or more times AND [2] interferes with taking oral antibiotic    Negative: [1] Taking antibiotic > 24 hours for UTI (urinary tract or bladder infection) AND [2] fever persists    Negative: [1] Taking antibiotic > 72 hours (3 days) for UTI AND [2] painful urination or frequency not improved    Protocols used: INFECTION ON ANTIBIOTIC FOLLOW-UP CALL-A-, URINARY TRACT INFECTION ON ANTIBIOTIC FOLLOW-UP CALL - FEMALE-A-

## 2021-07-20 ENCOUNTER — LAB (OUTPATIENT)
Dept: LAB | Facility: CLINIC | Age: 41
End: 2021-07-20
Payer: COMMERCIAL

## 2021-07-20 DIAGNOSIS — R30.0 DYSURIA: ICD-10-CM

## 2021-07-20 LAB
ALBUMIN UR-MCNC: NEGATIVE MG/DL
APPEARANCE UR: CLEAR
BILIRUB UR QL STRIP: NEGATIVE
COLOR UR AUTO: YELLOW
GLUCOSE UR STRIP-MCNC: NEGATIVE MG/DL
HGB UR QL STRIP: NEGATIVE
KETONES UR STRIP-MCNC: NEGATIVE MG/DL
LEUKOCYTE ESTERASE UR QL STRIP: NEGATIVE
NITRATE UR QL: NEGATIVE
PH UR STRIP: 5 [PH] (ref 5–7)
SP GR UR STRIP: 1.01 (ref 1–1.03)
UROBILINOGEN UR STRIP-MCNC: NORMAL MG/DL

## 2021-07-20 PROCEDURE — 87086 URINE CULTURE/COLONY COUNT: CPT

## 2021-07-20 PROCEDURE — 81003 URINALYSIS AUTO W/O SCOPE: CPT

## 2021-07-21 LAB — BACTERIA UR CULT: NO GROWTH

## 2021-07-23 ENCOUNTER — MYC MEDICAL ADVICE (OUTPATIENT)
Dept: FAMILY MEDICINE | Facility: CLINIC | Age: 41
End: 2021-07-23

## 2021-07-27 ENCOUNTER — OFFICE VISIT (OUTPATIENT)
Dept: OBGYN | Facility: CLINIC | Age: 41
End: 2021-07-27
Payer: COMMERCIAL

## 2021-07-27 VITALS
HEART RATE: 95 BPM | DIASTOLIC BLOOD PRESSURE: 77 MMHG | BODY MASS INDEX: 33.06 KG/M2 | SYSTOLIC BLOOD PRESSURE: 112 MMHG | WEIGHT: 230.4 LBS

## 2021-07-27 DIAGNOSIS — N89.8 VAGINAL IRRITATION: Primary | ICD-10-CM

## 2021-07-27 DIAGNOSIS — R30.0 DYSURIA: ICD-10-CM

## 2021-07-27 LAB
CLUE CELLS: ABNORMAL
TRICHOMONAS, WET PREP: ABNORMAL
WBC'S/HIGH POWER FIELD, WET PREP: ABNORMAL
YEAST, WET PREP: ABNORMAL

## 2021-07-27 PROCEDURE — 87591 N.GONORRHOEAE DNA AMP PROB: CPT | Performed by: OBSTETRICS & GYNECOLOGY

## 2021-07-27 PROCEDURE — 87491 CHLMYD TRACH DNA AMP PROBE: CPT | Performed by: OBSTETRICS & GYNECOLOGY

## 2021-07-27 PROCEDURE — 87210 SMEAR WET MOUNT SALINE/INK: CPT | Performed by: OBSTETRICS & GYNECOLOGY

## 2021-07-27 PROCEDURE — 99213 OFFICE O/P EST LOW 20 MIN: CPT | Performed by: OBSTETRICS & GYNECOLOGY

## 2021-07-27 NOTE — PROGRESS NOTES
SUBJECTIVE:       HPI: Kristel Martinez is a 40 year old  who presents today for possible BV vs UTI.     Recent UTI +e. Coli, treated with abx. Having burning with urination at this time as well as vaginal dryness and irritation. Concerned about BV. IUD recently removed for yeast/BV infections.    Vulvar and vaginal hygeine practices:  -Washes with water and body soap  -Does not use feminine sprays/cleaning products  -Does not douche      Ob Hx:     Gyn Hx: Patient's last menstrual period was 2021 (exact date).    Patient is sexually active. One male partner   Last pap was 3/2018 NIL, neg HPV   STI history denies             reports that she has quit smoking. She has never used smokeless tobacco.      Today's PHQ-2 Score:   PHQ-2 (  [employer]) 3/23/2018   Q1: Little interest or pleasure in doing things 1   Q2: Feeling down, depressed or hopeless 1   PHQ-2 Score 2   Q1: Little interest or pleasure in doing things Several days   Q2: Feeling down, depressed or hopeless Several days   PHQ-2 Score 2     Today's PHQ-9 Score:   PHQ-9 SCORE 2021   PHQ-9 Total Score MyChart -   PHQ-9 Total Score 11     Today's MALCOM-7 Score:   MALCOM-7 SCORE 2021   Total Score -   Total Score 16       Problem list and histories reviewed & adjusted, as indicated.  Additional history: as documented.    Patient Active Problem List   Diagnosis     Major depressive disorder, recurrent episode, moderate (H)     Papillary adenocarcinoma of thyroid (H)     Postoperative hypothyroidism     Vitiligo     Vitamin D deficiency     IgA deficiency (H)     Traumatic incomplete tear of right rotator cuff, initial encounter     Bicipital tendonitis of right shoulder     Subacromial impingement of right shoulder     Interstitial cystitis     Past Surgical History:   Procedure Laterality Date     COLONOSCOPY  2018     HC INSERTION INTRAUTERINE DEVICE       HC REMOVE INTRAUTERINE DEVICE       THYROIDECTOMY            TONSILLECTOMY       TUBAL LIGATION  2006      Social History     Tobacco Use     Smoking status: Former Smoker     Smokeless tobacco: Never Used   Substance Use Topics     Alcohol use: Yes     Comment: 1 drink per wk      Problem (# of Occurrences) Relation (Name,Age of Onset)    Arrhythmia (1) Father    Arthritis (1) Daughter    Asthma (1) Son    Diabetes (1) Paternal Grandfather: type 1     Hypertension (1) Father    Lymphoma (1) Maternal Grandmother    Skin Cancer (1) Mother            buPROPion (WELLBUTRIN XL) 150 MG 24 hr tablet, TAKE 1 TABLET BY MOUTH IN  THE MORNING  dicyclomine (BENTYL) 20 MG tablet, Take 1 tablet (20 mg) by mouth 4 times daily as needed (IBS symptoms)  escitalopram (LEXAPRO) 20 MG tablet, Take 1 tablet (20 mg) by mouth daily  levothyroxine (SYNTHROID/LEVOTHROID) 112 MCG tablet, TAKE 1 TABLET BY MOUTH DAILY TOTAL  MG DAILY  levothyroxine (SYNTHROID/LEVOTHROID) 50 MCG tablet, TAKE 1/2 TABLET BY MOUTH  DAILY ALONG WITH 112MCG  TABLET FOR A TOTAL DAILY  DOSE OF 137MCG.  naproxen (NAPROSYN) 500 MG tablet, Take 1 tablet (500 mg) by mouth 2 times daily (with meals)  valACYclovir (VALTREX) 1000 mg tablet, Take 2 tablets (2,000 mg) by mouth 2 times daily    No current facility-administered medications on file prior to visit.    Allergies   Allergen Reactions     Cephalexin Rash     Augmentin Cramps, Diarrhea, Nausea, Nausea and Vomiting and Unknown     Cephalosporins Rash     Cephalexin     Sulfa Drugs Rash and Unknown       ROS:  10 Point review of systems negative other noted above in HPI    OBJECTIVE:     /77   Pulse 95   Wt 104.5 kg (230 lb 6.4 oz)   LMP 07/24/2021 (Exact Date)   Breastfeeding No   BMI 33.06 kg/m    Body mass index is 33.06 kg/m .      Gen: Alert, oriented, appropriately interactive, NAD  Resp: no audible wheeze, cough, or visible cyanosis.  No visible retractions or increased work of breathing.  Able to speak fully in complete sentences.  Abdomen: soft, non  "tender, non distended, no masses, no hernias. No inguinal lymphadenopathy.   External genitalia: no lesions; normal appearing external genitalia, bartholins glands, urethra, skenes glands  Vagina: no masses or lesions or discharge, normally rugated. Moderate amount brown discharge present  Cervix: no masses or lesions or discharge   MSK: normal gait, symmetric movements UE & LE  Lower extremities: non-tender, no edema  Neuro: Cranial nerves grossly intact, mentation intact and speech normal  Psych: mentation appears normal, affect normal/bright, judgement and insight intact, normal speech and appearance well-groomed        In-Clinic Test Results:  No results found for this or any previous visit (from the past 24 hour(s)).    ASSESSMENT/PLAN:                                                      Kristel Martinez is a 40 year old  who presents today for possible BV vs UTI.       ICD-10-CM    1. Vaginal irritation  N89.8 Wet preparation     NEISSERIA GONORRHOEA PCR     CHLAMYDIA TRACHOMATIS PCR   2. Dysuria  R30.0 UA Macro with Reflex to Micro and Culture - lab collect       UA/UCx, wet prep and Gc/Ct. Treat as appropriate.  Discussed vulvar and vaginal hygiene practices. Advised to avoid Over the counter \"feminine\" soaps/sprays. Instead, recommend Cetaphil cleanser or other gentle ph-neutral cleanser. No need to use any products inside of the vagina.       Yulissa Vidal, [facility]  "

## 2021-07-28 LAB
C TRACH DNA SPEC QL NAA+PROBE: NEGATIVE
N GONORRHOEA DNA SPEC QL NAA+PROBE: NEGATIVE

## 2021-08-05 ENCOUNTER — TRANSFERRED RECORDS (OUTPATIENT)
Dept: HEALTH INFORMATION MANAGEMENT | Facility: CLINIC | Age: 41
End: 2021-08-05

## 2021-08-05 ENCOUNTER — TELEPHONE (OUTPATIENT)
Dept: FAMILY MEDICINE | Facility: CLINIC | Age: 41
End: 2021-08-05

## 2021-08-05 ENCOUNTER — OFFICE VISIT (OUTPATIENT)
Dept: ENDOCRINOLOGY | Facility: CLINIC | Age: 41
End: 2021-08-05
Attending: FAMILY MEDICINE
Payer: COMMERCIAL

## 2021-08-05 VITALS
TEMPERATURE: 98.2 F | BODY MASS INDEX: 32.64 KG/M2 | WEIGHT: 228 LBS | DIASTOLIC BLOOD PRESSURE: 73 MMHG | HEART RATE: 87 BPM | HEIGHT: 70 IN | SYSTOLIC BLOOD PRESSURE: 117 MMHG

## 2021-08-05 DIAGNOSIS — C73 MALIGNANT NEOPLASM OF THYROID GLAND (H): ICD-10-CM

## 2021-08-05 DIAGNOSIS — R06.83 SNORING: Primary | ICD-10-CM

## 2021-08-05 DIAGNOSIS — E89.0 POSTOPERATIVE HYPOTHYROIDISM: ICD-10-CM

## 2021-08-05 PROCEDURE — 99214 OFFICE O/P EST MOD 30 MIN: CPT | Performed by: INTERNAL MEDICINE

## 2021-08-05 ASSESSMENT — MIFFLIN-ST. JEOR: SCORE: 1784.45

## 2021-08-05 NOTE — NURSING NOTE
"Initial /73 (BP Location: Right arm, Patient Position: Sitting, Cuff Size: Adult Large)   Pulse 87   Temp 98.2  F (36.8  C) (Tympanic)   Ht 1.778 m (5' 10\")   Wt 103.4 kg (228 lb)   LMP 07/24/2021 (Exact Date)   BMI 32.71 kg/m   Estimated body mass index is 32.71 kg/m  as calculated from the following:    Height as of this encounter: 1.778 m (5' 10\").    Weight as of this encounter: 103.4 kg (228 lb). .    Patrizia Magaña MA    "

## 2021-08-05 NOTE — LETTER
8/5/2021         RE: Kristel Martinez  [address] [place] [address]        Dear Colleague,    Thank you for referring your patient, Kristel Martinez, to the [facility]. Please see a copy of my visit note below.    S: Pt being seen in f/u for PTC.  Previously seen by Dr Jorgensen.    Surgery 3/5/14:  DIAGNOSIS  A) SUPERIOR THYROID LYMPH NODE, EXCISION:  1. Benign lymph node  2. Negative for metastatic carcinoma    B) THYROID, COMPLETION THYROIDECTOMY:  1. Papillary thyroid carcinoma, 0.3 cm focus  2. Surgical margins free of tumor  3. See staging parameters for additional information    2013 THYROID CANCER STAGING PARAMETERS  Case number: [id number]    Patient name: Kristel Martinez  Staging parameters include data from the following case(s): [id number]    Final TNM: [id number]  2013 stage: I    MACROSCOPIC     SPECIMEN TYPE          Total thyroidectomy     DOMINANT TUMOR LOCATION AND SIZE          Location: Right lobe          Greatest dimension: 1.7 cm     ADDITIONAL TUMOR SIZE(S)          Left lobe: 0.3 cm     TUMOR FOCALITY          Multifocal - Bilateral    MICROSCOPIC     HISTOLOGIC TYPE          Papillary carcinoma, follicular variant, encapsulated     MARGINS          Uninvolved by invasive carcinoma     LYMPHATIC VASCULAR INVASION          Not identified     EXTRATHYROIDAL EXTENSION          Not identified    PATHOLOGIC STAGING     EXTENT OF INVASION          pT1b.  (Tumor more than 1 cm but not more than 2 cm in greatest            dimension, limited to the thyroid)     REGIONAL LYMPH NODES          pN0.  (No regional lymph node metastasis)            Number of lymph nodes examined:     1            Number of lymph nodes involved:     0     DISTANT METASTASIS          pM0. (Not applicable)     MACIS SCORE          3.6     PATHOLOGIC STAGE Summary          Final TNM: [id number]          2013 stage: I          **  The pathologic stage presumes no distant metastasis.    No plans  "for more children. She has a tubal ligation.   Tried using an IUD to control heavy menses which helped a little but stopped due to bacterial infection.   Considering a uterine ablation.     Taking 162 mcg levothyroxine daily (112+50 mcg).  Two weeks ago she was told to reduce to 137 mcg daily (112+25).     More fatigue recently. Sleeps 7 hours a night.   Not refreshed in the AM. Snores in her sleep.   Needs to be tested for TERRELL.     Alternating diarrhea and constipation. Tends to have diarrhea more often. No recent change in pattern.     ROS: 10 point ROS neg other than the symptoms noted above in the HPI.    O:  Vital signs:  Temp: 98.2  F (36.8  C) Temp src: Tympanic BP: 117/73 Pulse: 87           Height: 177.8 cm (5' 10\") Weight: 103.4 kg (228 lb)  Estimated body mass index is 32.71 kg/m  as calculated from the following:    Height as of this encounter: 1.778 m (5' 10\").    Weight as of this encounter: 103.4 kg (228 lb).  Gen: In NAD.   HEENT: no proptosis or lid lag, EOMI, no palpable thyroid tissue. No LAD.   Card: S1 S2 RRR no m/r/g.  Pulm: CTA b/l.   GI: NT ND +BS.   Ext: no LE edema.   MSK: no gross deformities.  Neuro: no tremor, +2 DTR's.     A/P:   Papillary thyroid cancer - Original surgery in 2014. Follicular variant. PT1b, N0. 50 mCi I 131 given in 6/2014.   US - negative in 6/2017.    Repeat thyroid ultrasound.    Tg - negative with negative Ab in 5/2018 ([facility] lab assay).    Negative with negative Ab in 10/2019.    Repeat with next labs.   TSH - at goal in 10/2018.    Subclinical hyperthyroidism in 7/2021.    Reduced from 162 to 137 mcg every day.    Labs in 4 weeks.     Snoring - Refer to sleep medicine.     Hadley Santillan MD on 8/6/2021 at 7:10 AM              Again, thank you for allowing me to participate in the care of your patient.        Sincerely,        Hadley Santillan MD    "

## 2021-08-05 NOTE — PROGRESS NOTES
S: Pt being seen in f/u for PTC.  Previously seen by Dr Jorgensen.    Surgery 3/5/14:  DIAGNOSIS  A) SUPERIOR THYROID LYMPH NODE, EXCISION:  1. Benign lymph node  2. Negative for metastatic carcinoma    B) THYROID, COMPLETION THYROIDECTOMY:  1. Papillary thyroid carcinoma, 0.3 cm focus  2. Surgical margins free of tumor  3. See staging parameters for additional information    2013 THYROID CANCER STAGING PARAMETERS  Case number: [id number]    Patient name: Kristel Martinez  Staging parameters include data from the following case(s): [id number]    Final TNM: [id number]  2013 stage: I    MACROSCOPIC     SPECIMEN TYPE          Total thyroidectomy     DOMINANT TUMOR LOCATION AND SIZE          Location: Right lobe          Greatest dimension: 1.7 cm     ADDITIONAL TUMOR SIZE(S)          Left lobe: 0.3 cm     TUMOR FOCALITY          Multifocal - Bilateral    MICROSCOPIC     HISTOLOGIC TYPE          Papillary carcinoma, follicular variant, encapsulated     MARGINS          Uninvolved by invasive carcinoma     LYMPHATIC VASCULAR INVASION          Not identified     EXTRATHYROIDAL EXTENSION          Not identified    PATHOLOGIC STAGING     EXTENT OF INVASION          pT1b.  (Tumor more than 1 cm but not more than 2 cm in greatest            dimension, limited to the thyroid)     REGIONAL LYMPH NODES          pN0.  (No regional lymph node metastasis)            Number of lymph nodes examined:     1            Number of lymph nodes involved:     0     DISTANT METASTASIS          pM0. (Not applicable)     MACIS SCORE          3.6     PATHOLOGIC STAGE Summary          Final TNM: [id number]          2013 stage: I          **  The pathologic stage presumes no distant metastasis.    No plans for more children. She has a tubal ligation.   Tried using an IUD to control heavy menses which helped a little but stopped due to bacterial infection.   Considering a uterine ablation.     Taking 162 mcg levothyroxine daily (112+50 mcg).  Two weeks ago  "she was told to reduce to 137 mcg daily (112+25).     More fatigue recently. Sleeps 7 hours a night.   Not refreshed in the AM. Snores in her sleep.   Needs to be tested for TERRELL.     Alternating diarrhea and constipation. Tends to have diarrhea more often. No recent change in pattern.     ROS: 10 point ROS neg other than the symptoms noted above in the HPI.    O:  Vital signs:  Temp: 98.2  F (36.8  C) Temp src: Tympanic BP: 117/73 Pulse: 87           Height: 177.8 cm (5' 10\") Weight: 103.4 kg (228 lb)  Estimated body mass index is 32.71 kg/m  as calculated from the following:    Height as of this encounter: 1.778 m (5' 10\").    Weight as of this encounter: 103.4 kg (228 lb).  Gen: In NAD.   HEENT: no proptosis or lid lag, EOMI, no palpable thyroid tissue. No LAD.   Card: S1 S2 RRR no m/r/g.  Pulm: CTA b/l.   GI: NT ND +BS.   Ext: no LE edema.   MSK: no gross deformities.  Neuro: no tremor, +2 DTR's.     A/P:   Papillary thyroid cancer - Original surgery in 2014. Follicular variant. PT1b, N0. 50 mCi I 131 given in 6/2014.   US - negative in 6/2017.    Repeat thyroid ultrasound.    Tg - negative with negative Ab in 5/2018 ([facility] lab assay).    Negative with negative Ab in 10/2019.    Repeat with next labs.   TSH - at goal in 10/2018.    Subclinical hyperthyroidism in 7/2021.    Reduced from 162 to 137 mcg every day.    Labs in 4 weeks.     Snoring - Refer to sleep medicine.     Hadley Santillan MD on 8/6/2021 at 7:10 AM          "

## 2021-08-05 NOTE — TELEPHONE ENCOUNTER
Patient's call transferred to author  Patient attempting to reach the [place] Care Team    Patient reports she has had persistent diarrhea over the last week   Reports approx 2 episodes of diarrhea per day   Describes the diarrhea as watery and orange / yellow in color    Patient reports a history of IBS and has her typical abdominal discomfort - no increase in abdominal discomfort     Patient recently finished 2 rounds of antibiotics and is concerned regarding the diarrhea as she has had C-Diff in the past after taking antibiotics    Reports occasional nausea  Denies vomiting  Has been eating and drinking adequately  Reporting normal urinary output    Denies fevers  Denies cold symptoms    No appointments available in [place] tomorrow   Appointment scheduled with Joanna for 8/6/2021 at 1240    No further questions/concerns    Efren Garcia RN

## 2021-08-11 ENCOUNTER — HOSPITAL ENCOUNTER (OUTPATIENT)
Dept: ULTRASOUND IMAGING | Facility: CLINIC | Age: 41
Discharge: HOME | End: 2021-08-11
Attending: INTERNAL MEDICINE | Admitting: INTERNAL MEDICINE
Payer: COMMERCIAL

## 2021-08-11 DIAGNOSIS — C73 MALIGNANT NEOPLASM OF THYROID GLAND (H): ICD-10-CM

## 2021-08-11 PROCEDURE — 76536 US EXAM OF HEAD AND NECK: CPT

## 2021-08-19 ENCOUNTER — MYC MEDICAL ADVICE (OUTPATIENT)
Dept: FAMILY MEDICINE | Facility: CLINIC | Age: 41
End: 2021-08-19

## 2021-08-19 DIAGNOSIS — K11.8 PAROTID GLAND PAIN: ICD-10-CM

## 2021-08-19 RX ORDER — NAPROXEN 500 MG/1
TABLET ORAL
Qty: 60 TABLET | Refills: 1 | OUTPATIENT
Start: 2021-08-19

## 2021-08-27 ENCOUNTER — OFFICE VISIT (OUTPATIENT)
Dept: FAMILY MEDICINE | Facility: CLINIC | Age: 41
End: 2021-08-27
Payer: COMMERCIAL

## 2021-08-27 ENCOUNTER — MYC MEDICAL ADVICE (OUTPATIENT)
Dept: FAMILY MEDICINE | Facility: CLINIC | Age: 41
End: 2021-08-27

## 2021-08-27 VITALS
TEMPERATURE: 98.2 F | SYSTOLIC BLOOD PRESSURE: 104 MMHG | RESPIRATION RATE: 16 BRPM | BODY MASS INDEX: 32.5 KG/M2 | HEART RATE: 88 BPM | DIASTOLIC BLOOD PRESSURE: 62 MMHG | WEIGHT: 227 LBS | HEIGHT: 70 IN

## 2021-08-27 DIAGNOSIS — M72.2 PLANTAR FASCIITIS: ICD-10-CM

## 2021-08-27 DIAGNOSIS — M79.605 BILATERAL LOWER EXTREMITY PAIN: ICD-10-CM

## 2021-08-27 DIAGNOSIS — Q66.70 HIGH FOOT ARCH: Primary | ICD-10-CM

## 2021-08-27 DIAGNOSIS — M79.604 BILATERAL LOWER EXTREMITY PAIN: ICD-10-CM

## 2021-08-27 PROCEDURE — 99214 OFFICE O/P EST MOD 30 MIN: CPT | Performed by: NURSE PRACTITIONER

## 2021-08-27 RX ORDER — CYCLOBENZAPRINE HCL 10 MG
5-10 TABLET ORAL
Qty: 30 TABLET | Refills: 1 | Status: SHIPPED | OUTPATIENT
Start: 2021-08-27 | End: 2023-02-01

## 2021-08-27 ASSESSMENT — MIFFLIN-ST. JEOR: SCORE: 1774.92

## 2021-08-27 NOTE — PROGRESS NOTES
"or    Assessment & Plan     High foot arch  No acute distress.  Suspect symptoms due to inadequate arch support with significant high arches.  Recommend stretching and orthotics for symptoms.  Symptomatic care and follow up discussed.  - Orthotics, Prosthetics and Custom Compression Order for DME - ONLY FOR DME    Bilateral lower extremity pain  Flexeril as needed for symptoms.  Per above.   - cyclobenzaprine (FLEXERIL) 10 MG tablet; Take 0.5-1 tablets (5-10 mg) by mouth nightly as needed for muscle spasms  - Orthotics, Prosthetics and Custom Compression Order for DME - ONLY FOR DME    Plantar fasciitis  Per above.   - Orthotics, Prosthetics and Custom Compression Order for DME - ONLY FOR DME      Return in about 2 weeks (around 9/10/2021), or if symptoms worsen or fail to improve.    DAVID Lazo CNP  [facility]    Kamini Humphries is a 41 year old who presents for the following health issues     HPI     Musculoskeletal problem/pain  Onset/Duration: Worse x 3 months   Description  Location: foot and leg - bilateral  Joint Swelling: no  Redness: no  Pain: YES  Warmth: no  Feels she bruises easy   Intensity:  moderate  Progression of Symptoms:  worsening  Accompanying signs and symptoms:   Fevers: no  Numbness/tingling/weakness: no  History  Trauma to the area: no  Recent illness:  no  Previous similar problem: YES- pain in calves in past   Previous evaluation:  no  Precipitating or alleviating factors:  Aggravating factors include: worse at night when tying to sleep. Worse after on feet for long periods of time .   Therapies tried and outcome: stretching and taping, NSIDS's       Review of Systems   Constitutional, HEENT, cardiovascular, pulmonary, gi and gu systems are negative, except as otherwise noted.      Objective    /62 (Cuff Size: Adult Large)   Pulse 88   Temp 98.2  F (36.8  C) (Tympanic)   Resp 16   Ht 1.778 m (5' 10\")   Wt 103 kg (227 lb)   BMI 32.57 kg/m  "   Body mass index is 32.57 kg/m .  Physical Exam   GENERAL: healthy, alert and no distress  MS: tenderness to palpation bilateral plantar fascia and achilles tendons, high arches present   NEURO: Normal strength and tone, mentation intact and speech normal  PSYCH: mentation appears normal, affect normal/bright

## 2021-09-19 ENCOUNTER — HEALTH MAINTENANCE LETTER (OUTPATIENT)
Age: 41
End: 2021-09-19

## 2021-10-01 ENCOUNTER — VIRTUAL VISIT (OUTPATIENT)
Dept: SLEEP MEDICINE | Facility: CLINIC | Age: 41
End: 2021-10-01
Payer: COMMERCIAL

## 2021-10-01 DIAGNOSIS — R06.83 SNORING: ICD-10-CM

## 2021-10-01 DIAGNOSIS — R29.818 SUSPECTED SLEEP APNEA: Primary | ICD-10-CM

## 2021-10-01 PROCEDURE — 99203 OFFICE O/P NEW LOW 30 MIN: CPT | Mod: 95 | Performed by: PHYSICIAN ASSISTANT

## 2021-10-01 NOTE — PATIENT INSTRUCTIONS
"      MY TREATMENT INFORMATION FOR SLEEP APNEA-  Kristel Martinez    DOCTOR : HASMUKH Glaser-DREW    Am I having a sleep study at a sleep center?  --->Due to insurance clearance delays, you will be contacted within 2-4 weeks to schedule    Am I having a home sleep study?  --->Watch the video for the device you are using:    -/drop off device-   https://www.Newl[employer].com/watch?v=yGGFBdELGhk    -Disposable device sent out require phone/computer application-   https://www.[employer].com/watch?v=BCce_vbiwxE      Frequently asked questions:  1. What is Obstructive Sleep Apnea (TERRELL)? TERRELL is the most common type of sleep apnea. Apnea means, \"without breath.\"  Apnea is most often caused by narrowing or collapse of the upper airway as muscles relax during sleep.   Almost everyone has occasional apneas. Most people with sleep apnea have had brief interruptions at night frequently for many years.  The severity of sleep apnea is related to how frequent and severe the events are.   2. What are the consequences of TERRELL? Symptoms include: feeling sleepy during the day, snoring loudly, gasping or stopping of breathing, trouble sleeping, and occasionally morning headaches or heartburn at night.  Sleepiness can be serious and even increase the risk of falling asleep while driving. Other health consequences may include development of high blood pressure and other cardiovascular disease in persons who are susceptible. Untreated TERRELL  can contribute to heart disease, stroke and diabetes.   3. What are the treatment options? In most situations, sleep apnea is a lifelong disease that must be managed with daily therapy. Medications are not effective for sleep apnea and surgery is generally not considered until other therapies have been tried. Your treatment is your choice . Continuous Positive Airway (CPAP) works right away and is the therapy that is effective in nearly everyone. An oral device to hold your jaw forward is usually the next " most reliable option. Other options include postioning devices (to keep you off your back), weight loss, and surgery including a tongue pacing device. There is more detail about some of these options below.  4. Are my sleep studies covered by insurance? Although we will request verification of coverage, we advise you also check in advance of the study to ensure there is coverage.    Important tips for those choosing CPAP and similar devices   Know your equipment:  CPAP is continuous positive airway pressure that prevents obstructive sleep apnea by keeping the throat from collapsing while you are sleeping. In most cases, the device is  smart  and can slowly self-adjusts if your throat collapses and keeps a record every day of how well you are treated-this information is available to you and your care team.  BPAP is bilevel positive airway pressure that keeps your throat open and also assists each breath with a pressure boost to maintain adequate breathing.  Special kinds of BPAP are used in patients who have inadequate breathing from lung or heart disease. In most cases, the device is  smart  and can slowly self-adjusts to assist breathing. Like CPAP, the device keeps a record of how well you are treated.  Your mask is your connection to the device. You get to choose what feels most comfortable and the staff will help to make sure if fits. Here: are some examples of the different masks that are available:       Key points to remember on your journey with sleep apnea:  1. Sleep study.  PAP devices often need to be adjusted during a sleep study to show that they are effective and adjusted right.  2. Good tips to remember: Try wearing just the mask during a quiet time during the day so your body adapts to wearing it. A humidifier is recommended for comfort in most cases to prevent drying of your nose and throat. Allergy medication from your provider may help you if you are having nasal congestion.  3. Getting  settled-in. It takes more than one night for most of us to get used to wearing a mask. Try wearing just the mask during a quiet time during the day so your body adapts to wearing it. A humidifier is recommended for comfort in most cases. Our team will work with you carefully on the first day and will be in contact within 4 days and again at 2 and 4 weeks for advice and remote device adjustments. Your therapy is evaluated by the device each day.   4. Use it every night. The more you are able to sleep naturally for 7-8 hours, the more likely you will have good sleep and to prevent health risks or symptoms from sleep apnea. Even if you use it 4 hours it helps. Occasionally all of us are unable to use a medical therapy, in sleep apnea, it is not dangerous to miss one night.   5. Communicate. Call our skilled team on the number provided on the first day if your visit for problems that make it difficult to wear the device. Over 2 out of 3 patients can learn to wear the device long-term with help from our team. Remember to call our team or your sleep providers if you are unable to wear the device as we may have other solutions for those who cannot adapt to mask CPAP therapy. It is recommended that you sleep your sleep provider within the first 3 months and yearly after that if you are not having problems.   6. Use it for your health. We encourage use of CPAP masks during daytime quiet periods to allow your face and brain to adapt to the sensation of CPAP so that it will be a more natural sensation to awaken to at night or during naps. This can be very useful during the first few weeks or months of adapting to CPAP though it does not help medically to wear CPAP during wakefulness and  should not be used as a strategy just to meet guidelines.  7. Take care of your equipment. Make sure you clean your mask and tubing using directions every day and that your filter and mask are replaced as recommended or if they are not  working.     BESIDES CPAP, WHAT OTHER THERAPIES ARE THERE?    Positioning Device  Positioning devices are generally used when sleep apnea is mild and only occurs on your back.This example shows a pillow that straps around the waist. It may be appropriate for those whose sleep study shows milder sleep apnea that occurs primarily when lying flat on one's back. Preliminary studies have shown benefit but effectiveness at home may need to be verified by a home sleep test. These devices are generally not covered by medical insurance.  Examples of devices that maintain sleeping on the back to prevent snoring and mild sleep apnea.    Belt type body positioner  http://[employer]/    Electronic reminder  http://nightshifttherapy.com/  http://www.mobile mum.[employer].au/      Oral Appliance  What is oral appliance therapy?  An oral appliance device fits on your teeth at night like a retainer used after having braces. The device is made by a specialized dentist and requires several visits over 1-2 months before a manufactured device is made to fit your teeth and is adjusted to prevent your sleep apnea. Once an oral device is working properly, snoring should be improved. A home sleep test may be recommended at that time if to determine whether the sleep apnea is adequately treated.       Some things to remember:  -Oral devices are often, but not always, covered by your medical insurance. Be sure to check with your insurance provider.   -If you are referred for oral therapy, you will be given a list of specialized dentists to consider or you may choose to visit the Web site of the American Academy of Dental Sleep Medicine  -Oral devices are less likely to work if you have severe sleep apnea or are extremely overweight.     More detailed information  An oral appliance is a small acrylic device that fits over the upper and lower teeth  (similar to a retainer or a mouth guard). This device slightly moves jaw forward, which moves the base  of the tongue forward, opens the airway, improves breathing for effective treat snoring and obstructive sleep apnea in perhaps 7 out of 10 people .  The best working devices are custom-made by a dental device  after a mold is made of the teeth 1, 2, 3.  When is an oral appliance indicated?  Oral appliance therapy is recommended as a first-line treatment for patients with primary snoring, mild sleep apnea, and for patients with moderate sleep apnea who prefer appliance therapy to use of CPAP4, 5. Severity of sleep apnea is determined by sleep testing and is based on the number of respiratory events per hour of sleep.   How successful is oral appliance therapy?  The success rate of oral appliance therapy in patients with mild sleep apnea is 75-80% while in patients with moderate sleep apnea it is 50-70%. The chance of success in patients with severe sleep apnea is 40-50%. The research also shows that oral appliances have a beneficial effect on the cardiovascular health of TERRELL patients at the same magnitude as CPAP therapy7.  Oral appliances should be a second-line treatment in cases of severe sleep apnea, but if not completely successful then a combination therapy utilizing CPAP plus oral appliance therapy may be effective. Oral appliances tend to be effective in a broad range of patients although studies show that the patients who have the highest success are females, younger patients, those with milder disease, and less severe obesity. 3, 6.   Finding a dentist that practices dental sleep medicine  Specific training is available through the American Academy of Dental Sleep Medicine for dentists interested in working in the field of sleep. To find a dentist who is educated in the field of sleep and the use of oral appliances, near you, visit the Web site of the American Academy of Dental Sleep Medicine.    References  1. chelo Son al. Objectively measured vs self-reported compliance during oral  appliance therapy for sleep-disordered breathing. Chest 2013; 144(5): 3531-9351.  2. Loy, et al. Objective measurement of compliance during oral appliance therapy for sleep-disordered breathing. Thorax 2013; 68(1): 91-96.  3. Clayton et al. Mandibular advancement devices in 620 men and women with TERRELL and snoring: tolerability and predictors of treatment success. Chest 2004; 125: 8739-9094.  4. Red, et al. Oral appliances for snoring and TERRELL: a review. Sleep 2006; 29: 244-262.  5. Abhishek et al. Oral appliance treatment for TERRELL: an update. J Clin Sleep Med 2014; 10(2): 215-227.  6. Sherita et al. Predictors of OSAH treatment outcome. J Dent Res 2007; 86: 7999-9190.      Weight Loss:    Weight loss is a long-term strategy that may improve sleep apnea in some patients.    Weight management is a personal decision and the decision should be based on your interest and the potential benefits.  If you are interested in exploring weight loss strategies, the following discussion covers the impact on weight loss on sleep apnea and the approaches that may be successful.    Being overweight does not necessarily mean you will have health consequences.  Those who have BMI over 35 or over 27 with existing medical conditions carries greater risk.   Weight loss decreases severity of sleep apnea in most people with obesity. For those with mild obesity who have developed snoring with weight gain, even 15-30 pound weight loss can improve and occasionally eliminate sleep apnea.  Structured and life-long dietary and health habits are necessary to lose weight and keep healthier weight levels.     Though there may be significant health benefits from weight loss, long-term weight loss is very difficult to achieve- studies show success with dietary management in less than 10% of people. In addition, substantial weight loss may require years of dietary control and may be difficult if patients have severe obesity. In these  cases, surgical management may be considered.  Finally, older individuals who have tolerated obesity without health complications may be less likely to benefit from weight loss strategies.        Your BMI is There is no height or weight on file to calculate BMI.  Weight management is a personal decision.  If you are interested in exploring weight loss strategies, the following discussion covers the approaches that may be successful. Body mass index (BMI) is one way to tell whether you are at a healthy weight, overweight, or obese. It measures your weight in relation to your height.  A BMI of 18.5 to 24.9 is in the healthy range. A person with a BMI of 25 to 29.9 is considered overweight, and someone with a BMI of 30 or greater is considered obese. More than two-thirds of American adults are considered overweight or obese.  Being overweight or obese increases the risk for further weight gain. Excess weight may lead to heart disease and diabetes.  Creating and following plans for healthy eating and physical activity may help you improve your health.  Weight control is part of healthy lifestyle and includes exercise, emotional health, and healthy eating habits. Careful eating habits lifelong are the mainstay of weight control. Though there are significant health benefits from weight loss, long-term weight loss with diet alone may be very difficult to achieve- studies show long-term success with dietary management in less than 10% of people. Attaining a healthy weight may be especially difficult to achieve in those with severe obesity. In some cases, medications, devices and surgical management might be considered.  What can you do?  If you are overweight or obese and are interested in methods for weight loss, you should discuss this with your provider.     Consider reducing daily calorie intake by 500 calories.     Keep a food journal.     Avoiding skipping meals, consider cutting portions instead.    Diet combined  with exercise helps maintain muscle while optimizing fat loss. Strength training is particularly important for building and maintaining muscle mass. Exercise helps reduce stress, increase energy, and improves fitness. Increasing exercise without diet control, however, may not burn enough calories to loose weight.       Start walking three days a week 10-20 minutes at a time    Work towards walking thirty minutes five days a week     Eventually, increase the speed of your walking for 1-2 minutes at time    In addition, we recommend that you review healthy lifestyles and methods for weight loss available through the National Institutes of Health patient information sites:  http://win.niddk.nih.gov/publications/index.htm    And look into health and wellness programs that may be available through your health insurance provider, employer, local community center, or pam club.          Surgery:    Surgery for obstructive sleep apnea is considered generally only when other therapies fail to work. Surgery may be discussed with you if you are having a difficult time tolerating CPAP and or when there is an abnormal structure that requires surgical correction.  Nose and throat surgeries often enlarge the airway to prevent collapse.  Most of these surgeries create pain for 1-2 weeks and up to half of the most common surgeries are not effective throughout life.  You should carefully discuss the benefits and drawbacks to surgery with your sleep provider and surgeon to determine if it is the best solution for you.   More information  Surgery for TERRELL is directed at areas that are responsible for narrowing or complete obstruction of the airway during sleep.  There are a wide range of procedures available to enlarge and/or stabilize the airway to prevent blockage of breathing in the three major areas where it can occur: the palate, tongue, and nasal regions.  Successful surgical treatment depends on the accurate identification of  the factors responsible for obstructive sleep apnea in each person.  A personalized approach is required because there is no single treatment that works well for everyone.  Because of anatomic variation, consultation with an examination by a sleep surgeon is a critical first step in determining what surgical options are best for each patient.  In some cases, examination during sedation may be recommended in order to guide the selection of procedures.  Patients will be counseled about risks and benefits as well as the typical recovery course after surgery. Surgery is typically not a cure for a person s TERRELL.  However, surgery will often significantly improve one s TERRELL severity (termed  success rate ).  Even in the absence of a cure, surgery will decrease the cardiovascular risk associated with OSA7; improve overall quality of life8 (sleepiness, functionality, sleep quality, etc).      Palate Procedures:  Patients with TERRELL often have narrowing of their airway in the region of their tonsils and uvula.  The goals of palate procedures are to widen the airway in this region as well as to help the tissues resist collapse.  Modern palate procedure techniques focus on tissue conservation and soft tissue rearrangement, rather than tissue removal.  Often the uvula is preserved in this procedure. Residual sleep apnea is common in patient after pharyngoplasty with an average reduction in sleep apnea events of 33%2.      Tongue Procedures:  ExamWhile patients are awake, the muscles that surround the throat are active and keep this region open for breathing. These muscles relax during sleep, allowing the tongue and other structures to collapse and block breathing.  There are several different tongue procedures available.  Selection of a tongue base procedure depends on characteristics seen on physical exam.  Generally, procedures are aimed at removing bulky tissues in this area or preventing the back of the tongue from falling back  during sleep.  Success rates for tongue surgery range from 50-62%3.    Hypoglossal Nerve Stimulation:  Hypoglossal nerve stimulation has recently received approval from the [place] Food and Drug Administration for the treatment of obstructive sleep apnea.  This is based on research showing that the system was safe and effective in treating sleep apnea6.  Results showed that the median AHI score decreased 68%, from 29.3 to 9.0. This therapy uses an implant system that senses breathing patterns and delivers mild stimulation to airway muscles, which keeps the airway open during sleep.  The system consists of three fully implanted components: a small generator (similar in size to a pacemaker), a breathing sensor, and a stimulation lead.  Using a small handheld remote, a patient turns the therapy on before bed and off upon awakening.    Candidates for this device must be greater than 22 years of age, have moderate to severe TERRELL (AHI between 20-65), BMI less than 32, have tried CPAP/oral appliance without success, and have appropriate upper airway anatomy (determined by a sleep endoscopy performed by Dr. Poon).    Hypoglossal Nerve Stimulation Pathway:    The sleep surgeon s office will work with the patient through the insurance prior-authorization process (including communications and appeals).    Nasal Procedures:  Nasal obstruction can interfere with nasal breathing during the day and night.  Studies have shown that relief of nasal obstruction can improve the ability of some patients to tolerate positive airway pressure therapy for obstructive sleep apnea1.  Treatment options include medications such as nasal saline, topical corticosteroid and antihistamine sprays, and oral medications such as antihistamines or decongestants. Non-surgical treatments can include external nasal dilators for selected patients. If these are not successful by themselves, surgery can improve the nasal airway either alone or in  combination with these other options.      Combination Procedures:  Combination of surgical procedures and other treatments may be recommended, particularly if patients have more than one area of narrowing or persistent positional disease.  The success rate of combination surgery ranges from 66-80%2,3.    References  1. Nica RUSSELL. The Role of the Nose in Snoring and Obstructive Sleep Apnoea: An Update.  Eur Arch Otorhinolaryngol. 2011; 268: 1365-73.  2.  Doris SM; Harrison JA; Kalyan JR; Pallanch JF; Shilo MB; Anurag SG; Geovanna BERMAN. Surgical modifications of the upper airway for obstructive sleep apnea in adults: a systematic review and meta-analysis. SLEEP 2010;33(10):6157-6594. Luis M BENITEZ. Hypopharyngeal surgery in obstructive sleep apnea: an evidence-based medicine review.  Arch Otolaryngol Head Neck Surg. 2006 Feb;132(2):206-13.  3. Alvino YH1, Jose G Y, Toño RONEN. The efficacy of anatomically based multilevel surgery for obstructive sleep apnea. Otolaryngol Head Neck Surg. 2003 Oct;129(4):327-35.  4. Luis M BENITEZ, Goldberg A. Hypopharyngeal Surgery in Obstructive Sleep Apnea: An Evidence-Based Medicine Review. Arch Otolaryngol Head Neck Surg. 2006 Feb;132(2):206-13.  5. Leeroy NASH et al. Upper-Airway Stimulation for Obstructive Sleep Apnea.  N Engl J Med. 2014 Jan 9;370(2):139-49.  6. Talat Y et al. Increased Incidence of Cardiovascular Disease in Middle-aged Men with Obstructive Sleep Apnea. Am J Respir Crit Care Med; 2002 166: 159-165  7. Trejo EM et al. Studying Life Effects and Effectiveness of Palatopharyngoplasty (SLEEP) study: Subjective Outcomes of Isolated Uvulopalatopharyngoplasty. Otolaryngol Head Neck Surg. 2011; 144: 623-631.    Drive Safe... Drive Alive     Sleep health profoundly affects your health, mood, and your safety.  Thirty three percent of the population (one in three of us) is not getting enough sleep and many have a sleep disorder. Not getting enough sleep or having an untreated /  undertreated sleep condition may make us sleepy without even knowing it. In fact, our driving could be dramatically impaired due to our sleep health. As your provider, here are some things I would like you to know about driving:     Here are some warning signs for impairment and dangerous drowsy driving:              -Having been awake more than 16 hours               -Looking tired               -Eyelid drooping              -Head nodding (it could be too late at this point)              -Driving for more than 30 minutes     Some things you could do to make the driving safer if you are experiencing some drowsiness:              -Stop driving and rest              -Call for transportation              -Make sure your sleep disorder is adequately treated     Some things that have been shown NOT to work when experiencing drowsiness while driving:              -Turning on the radio              -Opening windows              -Eating any  distracting  /  entertaining  foods (e.g., sunflower seeds, candy, or any other)              -Talking on the phone      Your decision may not only impact your life, but also the life of others. Please, remember to drive safe for yourself and all of us.

## 2021-10-01 NOTE — PROGRESS NOTES
Does Kristel have a CPAP/Bipap?  No    [place] Sleep Scale: 5    Kristel is a 41 year old who is being evaluated via a billable video visit.      How would you like to obtain your AVS? MyChart  If the video visit is dropped, the invitation should be resent by: Text to cell phone: [phone]  Will anyone else be joining your video visit? No      Video Start Time: 3:02 PM        Subjective   Kristel is a 41 year old who presents for the following health issues snoring.     Vitals:  No vitals were obtained today due to virtual visit.      Video-Visit Details    Type of service:  Video Visit    Video End Time:3:22 PM    Originating Location (pt. Location): Home    Distant Location (provider location):  [facility]     Platform used for Video Visit: [email or web address]         Sleep Consultation:    Date on this visit: 10/1/2021    Kristel Martinez is sent by Hadley Santillan for a sleep consultation regarding snoring, fatigue.     Primary Physician: Yulissa Nogueira KASSANDRA Martinez reports nightly snoring and frequent poor quality of sleep for 1 year.     Kristel goes to sleep at 11:00 PM during the week. She wakes up at 6:00 AM with an alarm. She falls asleep in 5 minutes.  Kristel denies difficulty falling asleep.  She wakes up 1-2 times a night for 30 minutes before falling back to sleep.  Kristel wakes up to go to the bathroom and uncertain reasons.  On weekends, Kristel goes to sleep at 12:00 AM.  She wakes up at 9:30 AM without an alarm. She falls asleep in 5 minutes.  Patient gets an average of 6-7 hours of sleep per night.     Patient does use electronics in bed and watch TV in bed.     Kristel does not do shift work.  She works day shifts, some evenings.      Kristel does snore every night. Patient does have a regular bed partner. There is report of snoring.  She does not have witnessed apneas. They never sleep separately.  Patient sleeps on her back and side. She has occasional snort arousals,. Kristel has  occasional bruxism, sleep talking and cataplexy.    She denies sleep walking, sleep talking, enuresis and sleep terrors as a child.  Kristel has claustrophobia, depression and anxiety.      Kristel has gained 5-10 pounds in the last year.  Patient describes themself as neither a morning or night person.  She would prefer to go to sleep at 10:00 PM and wake up at 7:00 AM.  Patient's [place] Sleepiness score 5/24 inconsistent with excessive  daytime sleepiness.      Kristel naps 5-6 times per week for  minutes, feels refreshed after naps. She takes some inadvertant naps.  She admits dozing while driving. The most recent episode was 2 month(s) ago.  Patient was counseled on the importance of driving while alert, to pull over if drowsy, or nap before getting into the vehicle if sleepy.  She uses rare caffeine.     Allergies:    Allergies   Allergen Reactions     Cephalexin Rash     Augmentin Cramps, Diarrhea, Nausea, Nausea and Vomiting and Unknown     Cephalosporins Rash     Cephalexin     Sulfa Drugs Rash and Unknown       Medications:    Current Outpatient Medications   Medication Sig Dispense Refill     buPROPion (WELLBUTRIN XL) 150 MG 24 hr tablet TAKE 1 TABLET BY MOUTH IN  THE MORNING 90 tablet 1     cyclobenzaprine (FLEXERIL) 10 MG tablet Take 0.5-1 tablets (5-10 mg) by mouth nightly as needed for muscle spasms 30 tablet 1     dicyclomine (BENTYL) 20 MG tablet Take 1 tablet (20 mg) by mouth 4 times daily as needed (IBS symptoms) 28 tablet 0     escitalopram (LEXAPRO) 20 MG tablet Take 1 tablet (20 mg) by mouth daily 90 tablet 1     levothyroxine (SYNTHROID/LEVOTHROID) 112 MCG tablet TAKE 1 TABLET BY MOUTH DAILY TOTAL  MG DAILY 90 tablet 3     levothyroxine (SYNTHROID/LEVOTHROID) 50 MCG tablet TAKE 1/2 TABLET BY MOUTH  DAILY ALONG WITH 112MCG  TABLET FOR A TOTAL DAILY  DOSE OF 137MCG. 90 tablet 1     naproxen (NAPROSYN) 500 MG tablet Take 1 tablet (500 mg) by mouth 2 times daily (with meals) 60 tablet 1      valACYclovir (VALTREX) 1000 mg tablet Take 2 tablets (2,000 mg) by mouth 2 times daily 4 tablet 1       Problem List:  Patient Active Problem List    Diagnosis Date Noted     Interstitial cystitis 07/09/2021     Priority: Medium     Traumatic incomplete tear of right rotator cuff, initial encounter 07/23/2020     Priority: Medium     Bicipital tendonitis of right shoulder 07/23/2020     Priority: Medium     Subacromial impingement of right shoulder 07/23/2020     Priority: Medium     IgA deficiency (H) 09/12/2018     Priority: Medium     Postoperative hypothyroidism 03/23/2018     Priority: Medium     Vitiligo 04/09/2015     Priority: Medium     Papillary adenocarcinoma of thyroid (H) 03/01/2014     Priority: Medium     Overview:   12/2013: R thyroid lobectomy 1.7 cm follicular variant papillary cancer, MACIS 3.6  03/2014: Completion Thyroidectomy 0.3 cm incidental papillary cancer left lobe. Iodine ablation with 53 mCi.   07/2016, 07/2017: Neck US neg.       Vitamin D deficiency 07/06/2009     Priority: Medium     Last Assessment & Plan:   7/09  29.4  vit  d   on 1000units  daily  since  7/09       Major depressive disorder, recurrent episode, moderate (H) 11/16/2007     Priority: Medium        Past Medical/Surgical History:  Past Medical History:   Diagnosis Date     Depressive disorder      Interstitial cystitis 7/9/2021     Major depressive disorder, recurrent episode, moderate (H) 11/16/2007     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 Iodine ablation with 53 mCi.  07/2016, 07/2017: Neck US neg.     Postoperative hypothyroidism 3/23/2018     Past Surgical History:   Procedure Laterality Date     COLONOSCOPY  08/27/2018     HC INSERTION INTRAUTERINE DEVICE  2021     HC REMOVE INTRAUTERINE DEVICE  2021     THYROIDECTOMY      2015      TONSILLECTOMY       TUBAL LIGATION  2006       Social History:  Social History     Socioeconomic History     Marital status:      Spouse name: partner- Flaco     Number of children: 2     Years of education: Not on file     Highest education level: Not on file   Occupational History     Occupation: school paraprofessional   Tobacco Use     Smoking status: Former Smoker     Smokeless tobacco: Never Used   Vaping Use     Vaping Use: Never used   Substance and Sexual Activity     Alcohol use: Yes     Comment: 1 drink per wk     Drug use: No     Sexual activity: Yes     Partners: Male     Birth control/protection: Female Surgical   Other Topics Concern     Parent/sibling w/ CABG, MI or angioplasty before 65F 55M? Not Asked   Social History Narrative     Not on file     Social Determinants of Health     Financial Resource Strain:      Difficulty of Paying Living Expenses:    Food Insecurity:      Worried About Running Out of Food in the Last Year:      Ran Out of Food in the Last Year:    Transportation Needs:      Lack of Transportation (Medical):      Lack of Transportation (Non-Medical):    Physical Activity:      Days of Exercise per Week:      Minutes of Exercise per Session:    Stress:      Feeling of Stress :    Social Connections:      Frequency of Communication with Friends and Family:      Frequency of Social Gatherings with Friends and Family:      Attends Religion Services:      Active Member of Clubs or Organizations:      Attends Club or Organization Meetings:      Marital Status:    Intimate Partner Violence:      Fear of Current or Ex-Partner:      Emotionally Abused:      Physically Abused:      Sexually Abused:        Family History:  Family History   Problem Relation Age of Onset     Skin Cancer Mother      Hypertension Father      Arrhythmia Father      Lymphoma Maternal Grandmother      Diabetes Paternal Grandfather         type 1      Asthma Son      Arthritis Daughter               No  flowsheet data found.             Impression/Plan:  Snoring, daytime fatigue in a patient with hx depression/anxiety. Will request a lab study for suspected sleep apnea.   Literature provided regarding sleep apnea.      She will follow up with me in approximately two weeks after her sleep study has been competed to review the results and discuss plan of care.       Polysomnography reviewed.  Obstructive sleep apnea reviewed.  Complications of untreated sleep apnea were reviewed.        CC: Hadley Santillan

## 2021-10-04 ENCOUNTER — ANCILLARY PROCEDURE (OUTPATIENT)
Dept: GENERAL RADIOLOGY | Facility: CLINIC | Age: 41
End: 2021-10-04
Attending: NURSE PRACTITIONER
Payer: COMMERCIAL

## 2021-10-04 ENCOUNTER — OFFICE VISIT (OUTPATIENT)
Dept: URGENT CARE | Facility: URGENT CARE | Age: 41
End: 2021-10-04
Payer: COMMERCIAL

## 2021-10-04 ENCOUNTER — NURSE TRIAGE (OUTPATIENT)
Dept: NURSING | Facility: CLINIC | Age: 41
End: 2021-10-04

## 2021-10-04 VITALS
HEART RATE: 76 BPM | SYSTOLIC BLOOD PRESSURE: 127 MMHG | RESPIRATION RATE: 16 BRPM | WEIGHT: 229.6 LBS | TEMPERATURE: 98.2 F | DIASTOLIC BLOOD PRESSURE: 81 MMHG | OXYGEN SATURATION: 99 % | BODY MASS INDEX: 32.94 KG/M2

## 2021-10-04 DIAGNOSIS — R10.32 LLQ ABDOMINAL PAIN: ICD-10-CM

## 2021-10-04 DIAGNOSIS — K59.00 CONSTIPATION, UNSPECIFIED CONSTIPATION TYPE: Primary | ICD-10-CM

## 2021-10-04 LAB
ALBUMIN UR-MCNC: NEGATIVE MG/DL
APPEARANCE UR: CLEAR
BACTERIA #/AREA URNS HPF: ABNORMAL /HPF
BILIRUB UR QL STRIP: NEGATIVE
COLOR UR AUTO: YELLOW
ERYTHROCYTE [DISTWIDTH] IN BLOOD BY AUTOMATED COUNT: 13.1 % (ref 10–15)
GLUCOSE UR STRIP-MCNC: NEGATIVE MG/DL
HCT VFR BLD AUTO: 38 % (ref 35–47)
HGB BLD-MCNC: 12.6 G/DL (ref 11.7–15.7)
HGB UR QL STRIP: NEGATIVE
KETONES UR STRIP-MCNC: NEGATIVE MG/DL
LEUKOCYTE ESTERASE UR QL STRIP: NEGATIVE
MCH RBC QN AUTO: 27.4 PG (ref 26.5–33)
MCHC RBC AUTO-ENTMCNC: 33.2 G/DL (ref 31.5–36.5)
MCV RBC AUTO: 83 FL (ref 78–100)
NITRATE UR QL: NEGATIVE
PH UR STRIP: 7 [PH] (ref 5–7)
PLATELET # BLD AUTO: 260 10E3/UL (ref 150–450)
RBC # BLD AUTO: 4.6 10E6/UL (ref 3.8–5.2)
RBC #/AREA URNS AUTO: ABNORMAL /HPF
SP GR UR STRIP: 1.01 (ref 1–1.03)
SQUAMOUS #/AREA URNS AUTO: ABNORMAL /LPF
UROBILINOGEN UR STRIP-ACNC: 0.2 E.U./DL
WBC # BLD AUTO: 5.4 10E3/UL (ref 4–11)
WBC #/AREA URNS AUTO: ABNORMAL /HPF

## 2021-10-04 PROCEDURE — 74019 RADEX ABDOMEN 2 VIEWS: CPT | Performed by: RADIOLOGY

## 2021-10-04 PROCEDURE — 99214 OFFICE O/P EST MOD 30 MIN: CPT | Performed by: NURSE PRACTITIONER

## 2021-10-04 PROCEDURE — 36415 COLL VENOUS BLD VENIPUNCTURE: CPT | Performed by: NURSE PRACTITIONER

## 2021-10-04 PROCEDURE — 81001 URINALYSIS AUTO W/SCOPE: CPT | Performed by: NURSE PRACTITIONER

## 2021-10-04 PROCEDURE — 85027 COMPLETE CBC AUTOMATED: CPT | Performed by: NURSE PRACTITIONER

## 2021-10-04 ASSESSMENT — PAIN SCALES - GENERAL: PAINLEVEL: SEVERE PAIN (6)

## 2021-10-04 NOTE — TELEPHONE ENCOUNTER
Patient calling reporting having moderate abdominal pain. States pain has been constant since last night. Denies fever or difficulty of breathing. Per guideline, advised patient to be seen within 4 hours. Caller verbalized understanding. Denies further questions.      Bjorn Sanabria RN  [facility]     COVID 19 Nurse Triage Plan/Patient Instructions    Please be aware that novel coronavirus (COVID-19) may be circulating in the community. If you develop symptoms such as fever, cough, or SOB or if you have concerns about the presence of another infection including coronavirus (COVID-19), please contact your health care provider or visit [email or web address].     Disposition/Instructions    In-Person Visit with provider recommended. Reference Visit Selection Guide.    Thank you for taking steps to prevent the spread of this virus.  o Limit your contact with others.  o Wear a simple mask to cover your cough.  o Wash your hands well and often.    Resources    [facility]: About COVID-19: [email or web address]    CDC: What to Do If You're Sick: www.cdc.gov/coronavirus/2019-ncov/about/steps-when-sick.html    CDC: Ending Home Isolation: www.cdc.gov/coronavirus/2019-ncov/hcp/disposition-in-home-patients.html     CDC: Caring for Someone: www.cdc.gov/coronavirus/2019-ncov/if-you-are-sick/care-for-someone.html     [facility]: Interim Guidance for Hospital Discharge to Home: www.health.[facility].mn.us/diseases/coronavirus/hcp/hospdischarge.pdf    [facility] clinical trials (COVID-19 research studies): [email or web address]/n-clinical-trials     Below are the COVID-19 hotlines at the [facility] ([facility]). Interpreters are available.   o For health questions: Call 497-581-4916 or 1-279.398.7707 (7 a.m. to 7 p.m.)  o For questions about schools and childcare: Call 114-244-1737 or 1-266.453.7878 (7 a.m. to 7 p.m.)                       Reason for Disposition    [1]  "MILD-MODERATE pain AND [2] constant AND [3] present > 2 hours    Additional Information    Negative: Shock suspected (e.g., cold/pale/clammy skin, too weak to stand, low BP, rapid pulse)    Negative: Difficult to awaken or acting confused (e.g., disoriented, slurred speech)    Negative: Passed out (i.e., lost consciousness, collapsed and was not responding)    Negative: Sounds like a life-threatening emergency to the triager    Negative: [1] SEVERE pain (e.g., excruciating) AND [2] present > 1 hour    Negative: [1] SEVERE pain AND [2] age > 60    Negative: [1] Vomiting AND [2] contains red blood or black (\"coffee ground\") material  (Exception: few red streaks in vomit that only happened once)    Negative: Blood in bowel movements   (Exception: blood on surface of BM with constipation)    Negative: Black or tarry bowel movements  (Exception: chronic-unchanged  black-grey bowel movements AND is taking iron pills or Pepto-bismol)    Negative: Patient sounds very sick or weak to the triager    Protocols used: ABDOMINAL PAIN - FEMALE-A-AH      "

## 2021-10-05 NOTE — PATIENT INSTRUCTIONS
Patient Education     Unknown Causes of Abdominal Pain (Female)    The exact cause of your belly (abdominal) pain is not clear. This does not mean that this is something to worry about. Everyone likes to know the exact cause of the problem. But sometimes with belly pain, there is no clear-cut cause, and this could be a good thing. The good news is that your symptoms can be treated, and you will feel better.   Your condition does not seem serious now. But sometimes the signs of a serious problem may take more time to appear. For this reason, it is important for you to watch for any new symptoms, problems, or worsening of your condition.  Over the next few days, the abdominal pain may come and go. Or it may be constant. Other common symptoms can include nausea and vomiting. Sometimes it can be difficult to tell if you feel nauseous. You may just feel bad and not connect that feeling to nausea. Constipation, diarrhea, and a fever may go along with the pain.  The pain may continue even if treated correctly over the following days. Depending on how things go, sometimes the cause can become clear and may need more or different treatment. Additional evaluations, medicines, or tests may also be needed.  Home care  Your healthcare provider may prescribe medicine for pain, symptoms, or an infection.  Follow the healthcare provider's instructions for taking these medicines.  General care    Rest as much as you can until your next exam. No strenuous activities.    Try to find positions that ease discomfort. A small pillow placed on the abdomen may help relieve pain.    Something warm on your abdomen (such as a heating pad) may help, but be careful not to burn yourself.  Diet    Don t force yourself to eat, especially if having cramps, vomiting, or diarrhea.    Water is important so you don't get dehydrated. Soup may also be good. Sports drinks may also help, especially if they are not too acidic. Don't drink sugary drinks as  this can make things worse. Take liquids in small amounts. Don t guzzle them.    Caffeine sometimes makes the pain and cramping worse.    Don t take dairy products if you have vomiting or diarrhea.    Don't eat large amounts at a time. Wait a few minutes between bites.    Eat a diet low in fiber (called a low-residue diet). Foods allowed include refined breads, white rice, fruit and vegetable juices without pulp, tender meats. These foods will pass more easily through the intestine.    Don t have whole-grain foods, whole fruits and vegetables, meats, seeds and nuts, fried or fatty foods, dairy, alcohol and spicy foods until your symptoms go away.  Follow-up care  Follow up with your healthcare provider, or as advised, if your pain does not begin to improve in the next 24 hours.  Call 911  Call 911 if any of these occur:    Trouble breathing    Confusion    Fainting or loss of consciousness    Rapid heart rate    Seizure  When to seek medical advice  Call your healthcare provider right away if any of these occur:    Pain gets worse or moves to the right lower abdomen    New or worsening vomiting or diarrhea    Swelling of the abdomen    Unable to pass stool for more than 3 days    Fever of 100.4 F (38 C) or higher, or as directed by your healthcare provider.    Blood in vomit or bowel movements (dark red or black color)    Yellow color of eyes and skin (jaundice)    Weakness, dizziness    Chest, arm, back, neck, or jaw pain    Unexpected vaginal bleeding or missed period    Can't keep down liquids or water and you are getting dehydrated  [employer] last reviewed this educational content on 6/1/2018 2000-2021 [employer]. All rights reserved. This information is not intended as a substitute for professional medical care. Always follow your healthcare professional's instructions.           Patient Education     Constipation (Adult)  Constipation means that you have bowel movements that are less frequent  than usual. Stools often become very hard and difficult to pass.  Constipation is very common. At some point in life, it affects almost everyone. Since everyone's bowel habits are different, what is constipation to one person may not be to another. Your healthcare provider may do tests to diagnose constipation. It depends on what he or she finds when evaluating you.    Symptoms of constipation include:    Abdominal pain    Bloating    Vomiting    Painful bowel movements    Itching, swelling, bleeding, or pain around the anus  Causes  Constipation can have many causes. These include:    Diet low in fiber    Too much dairy    Not drinking enough liquids    Lack of exercise or physical activity (especially true for older adults)    Changes in lifestyle or daily routine, including pregnancy, aging, work, and travel    Frequent use or misuse of laxatives    Ignoring the urge to have a bowel movement or delaying it until later    Medicines, such as certain prescription pain medicines, iron supplements, antacids, certain antidepressants, and calcium supplements    Diseases like irritable bowel syndrome, bowel obstructions, stroke, diabetes, thyroid disease, Parkinson disease, hemorrhoids, and colon cancer  Complications  Potential complications of constipation can include:    Hemorrhoids    Rectal bleeding from hemorrhoids or anal fissures (skin tears)    Hernias    Dependency on laxatives    Chronic constipation    Fecal impaction, a severe form of constipation in which a large amount of hard stool is in your rectum that you can't pass    Bowel obstruction or perforation  Home care  All treatment should be done after talking with your healthcare provider. This is especially true if you have another medical problems, are taking prescription medicines, or are an older adult. Treatment most often involves lifestyle changes. You may also need medicines. Your healthcare provider will tell you which will work best for you.  Follow the advice below to help avoid this problem in the future.  Lifestyle changes  These lifestyle changes can help prevent constipation:    Diet. Eat a high-fiber diet, with fresh fruit and vegetables, and reduce dairy intake, meats, and processed foods    Fluids. It's important to get enough fluids each day. Drink plenty of water when you eat more fiber. If you are on diet that limits the amount of fluid you can have, talk about this with your healthcare provider.    Regular exercise. Check with your healthcare provider first.  Medicines  Take any medicines as directed. Some laxatives are safe to use only every now and then. Others can be taken on a regular basis. While laxatives don't cause bowel dependence, they are treating the symptoms. So your constipation may return if you don't make other changes. Talk with your healthcare provider or pharmacist if you have questions.  Prescription pain medicines can cause constipation. If you are taking this kind of medicine, ask your healthcare provider if you should also take a stool softener.  Medicines you may take to treat constipation include:    Fiber supplements    Stool softeners    Laxatives    Enemas    Rectal suppositories  Follow-up care  Follow up with your healthcare provider if symptoms don't get better in the next few days. You may need to have more tests or see a specialist.  Call 911  Call 911 if any of these occur:    Trouble breathing    Stiff, rigid abdomen that is severely painful to touch    Confusion    Fainting or loss of consciousness    Rapid heart rate    Chest pain  When to seek medical advice  Call your healthcare provider right away if any of these occur:    Fever of 100.4 F (38 C) or higher, or as directed by your healthcare provider    Failure to resume normal bowel movements    Pain in your abdomen or back gets worse    Nausea or vomiting    Swelling in your abdomen    Blood in the stool    Black, tarry stool    Involuntary weight  loss    Weakness  [employer] last reviewed this educational content on 6/1/2018 2000-2021 [employer]. All rights reserved. This information is not intended as a substitute for professional medical care. Always follow your healthcare professional's instructions.           Patient Education     Treating Constipation  Constipation is a common and often uncomfortable problem. Constipation means you have bowel movements fewer than 3 times per week. Or that you strain to pass hard, dry stool. It can last a short time. Or it can be a problem that never seems to go away. The good news is that it can often be treated and controlled.   Eat more fiber  One of the best ways to help treat constipation is to increase your fiber intake. You can do this either through diet or by using fiber supplements. Fiber (in whole grains, fruits, and vegetables) adds bulk and absorbs water to soften the stool. This helps the stool pass through the colon more easily. When you increase your fiber intake, do it slowly to prevent side effects such as bloating. Also increase the amount of water that you drink. Eating more of these foods can add fiber to your diet:     High-fiber cereals    Whole grains, bran, and brown rice    Vegetables such as carrots, broccoli, and greens    Fresh fruits (especially apples, pears, and dried fruits such as raisins and apricots)    Nuts and legumes (especially beans such as lentils, kidney beans, and lima beans)  Get physically active  Exercise helps improve the working of your colon which helps ease constipation. Try to get some physical activity every day. If you haven t been active for a while, talk with your healthcare provider before starting again.     Laxatives  Your healthcare provider may suggest an over-the-counter product to help ease your constipation. He or she may suggest the use of bulk-forming agents or laxatives. Laxatives, if used as directed, are common and safe. Follow  directions carefully when using them. See your provider for new-onset constipation, or long-term constipation, to rule out other causes such as medicines or thyroid disease.   [employer] last reviewed this educational content on 6/1/2019 2000-2021 [employer]. All rights reserved. This information is not intended as a substitute for professional medical care. Always follow your healthcare professional's instructions.

## 2021-10-05 NOTE — PROGRESS NOTES
Assessment & Plan     Constipation, unspecified constipation type    LLQ abdominal pain    - UA with Microscopic reflex to Culture - lab collect  - CBC with platelets  - XR Abdomen 2 Views       Reviewed normal CBC and UA during visit showing no sign of UTI, kidney infection, kidney stone, appendicitis, diverticulitis. Reviewed abdominal xray images during visit showing gas and stool and discussed constipation and upcoming menstrual cycle likely contributing to symptoms. Recommend rest, fluids, fiber, physical activity, may add Miralax 1 capful daily for the next week. Abdomen not acute currently.     Follow-up with PCP if symptoms persist for 7 days, and sooner if symptoms worsen or new symptoms develop.     Discussed red flag symptoms which warrant immediate visit in emergency room    All questions were answered and patient verbalized understanding.      Shelbie Dominguez, DNP, APRN, CNP 10/4/2021 8:42 PM  [facility]            Kamini Humphries is a 41 year old female who presents to clinic today for the following health issues:  Chief Complaint   Patient presents with     Abdominal Pain     Patient started abdominal pain this morning at 3AM- has been constant all day- lower abdomin and on both sides, no back pain or flank pain- no nausea/diarrhea/vomiting     Abdominal Pain    Location: generalized   Radiation: None.    Severity: 4 -6 on a scale of 1-10.    Duration: woke her up this morning at 3am  Course of Illness: stable.  Relieved by: nothing.  Associated Symptoms: none  Denies vaginal discharge, odor, itching, nausea, vomiting, diarrhea, dysuria, frequency, hematuria, fever and chills. No concern for STD.   Surgical History: none. She had a normal BM this afternoon which didn't help the pain. She took midol which didn't seem to help. No chance of pregnancy per patient and she is expecting her period soon.   She has a history of IBS. No history of diverticulitis.     Problem  list, Medication list, Allergies, and Medical history reviewed in EPIC.    ROS:  Review of systems negative except for noted above        Objective    /81   Pulse 76   Temp 98.2  F (36.8  C) (Tympanic)   Resp 16   Wt 104.1 kg (229 lb 9.6 oz)   LMP 09/06/2021 (Approximate)   SpO2 99%   BMI 32.94 kg/m    Physical Exam  Constitutional:       General: She is not in acute distress.     Appearance: She is not toxic-appearing or diaphoretic.   Abdominal:      General: There is no distension.      Palpations: Abdomen is soft.      Tenderness: There is abdominal tenderness in the suprapubic area and left lower quadrant. There is no right CVA tenderness, left CVA tenderness, guarding or rebound.   Lymphadenopathy:      Cervical: No cervical adenopathy.   Neurological:      Mental Status: She is alert.          X-ray abdomen was performed and reviewed independently by myself showing gas and moderate stool  Labs and Radiologist impression:   Results for orders placed or performed in visit on 10/04/21   XR Abdomen 2 Views     Status: None    Narrative    EXAM: XR ABDOMEN 2VIEWS  LOCATION: [facility] [place]  DATE/TIME: 10/4/2021 8:17 PM    INDICATION: llq abd pain  COMPARISON: None.      Impression    IMPRESSION: Moderate amount stool in colon. Bowel gas pattern is normal. Nothing for obstruction or free air. No evidence for renal stones.    Results for orders placed or performed in visit on 10/04/21   UA with Microscopic reflex to Culture - lab collect     Status: Normal    Specimen: Urine, Midstream   Result Value Ref Range    Color Urine Yellow Colorless, Straw, Light Yellow, Yellow    Appearance Urine Clear Clear    Glucose Urine Negative Negative mg/dL    Bilirubin Urine Negative Negative    Ketones Urine Negative Negative mg/dL    Specific Gravity Urine 1.010 1.003 - 1.035    Blood Urine Negative Negative    pH Urine 7.0 5.0 - 7.0    Protein Albumin Urine Negative Negative mg/dL    Urobilinogen  Urine 0.2 0.2, 1.0 E.U./dL    Nitrite Urine Negative Negative    Leukocyte Esterase Urine Negative Negative   CBC with platelets     Status: Normal   Result Value Ref Range    WBC Count 5.4 4.0 - 11.0 10e3/uL    RBC Count 4.60 3.80 - 5.20 10e6/uL    Hemoglobin 12.6 11.7 - 15.7 g/dL    Hematocrit 38.0 35.0 - 47.0 %    MCV 83 78 - 100 fL    MCH 27.4 26.5 - 33.0 pg    MCHC 33.2 31.5 - 36.5 g/dL    RDW 13.1 10.0 - 15.0 %    Platelet Count 260 150 - 450 10e3/uL   Urine Microscopic     Status: Abnormal   Result Value Ref Range    Bacteria Urine Few (A) None Seen /HPF    RBC Urine 0-2 0-2 /HPF /HPF    WBC Urine 0-5 0-5 /HPF /HPF    Squamous Epithelials Urine Few (A) None Seen /LPF    Narrative    Urine Culture not indicated

## 2021-10-12 ENCOUNTER — OFFICE VISIT (OUTPATIENT)
Dept: URGENT CARE | Facility: URGENT CARE | Age: 41
End: 2021-10-12
Payer: COMMERCIAL

## 2021-10-12 ENCOUNTER — NURSE TRIAGE (OUTPATIENT)
Dept: NURSING | Facility: CLINIC | Age: 41
End: 2021-10-12

## 2021-10-12 VITALS
DIASTOLIC BLOOD PRESSURE: 83 MMHG | BODY MASS INDEX: 33 KG/M2 | RESPIRATION RATE: 18 BRPM | SYSTOLIC BLOOD PRESSURE: 119 MMHG | HEART RATE: 89 BPM | TEMPERATURE: 98.8 F | WEIGHT: 230 LBS | OXYGEN SATURATION: 100 %

## 2021-10-12 DIAGNOSIS — R06.02 SHORTNESS OF BREATH: ICD-10-CM

## 2021-10-12 DIAGNOSIS — Z20.822 EXPOSURE TO 2019 NOVEL CORONAVIRUS: Primary | ICD-10-CM

## 2021-10-12 PROCEDURE — U0005 INFEC AGEN DETEC AMPLI PROBE: HCPCS | Performed by: NURSE PRACTITIONER

## 2021-10-12 PROCEDURE — 99214 OFFICE O/P EST MOD 30 MIN: CPT | Performed by: NURSE PRACTITIONER

## 2021-10-12 PROCEDURE — U0003 INFECTIOUS AGENT DETECTION BY NUCLEIC ACID (DNA OR RNA); SEVERE ACUTE RESPIRATORY SYNDROME CORONAVIRUS 2 (SARS-COV-2) (CORONAVIRUS DISEASE [COVID-19]), AMPLIFIED PROBE TECHNIQUE, MAKING USE OF HIGH THROUGHPUT TECHNOLOGIES AS DESCRIBED BY CMS-2020-01-R: HCPCS | Performed by: NURSE PRACTITIONER

## 2021-10-12 RX ORDER — ALBUTEROL SULFATE 90 UG/1
2 AEROSOL, METERED RESPIRATORY (INHALATION) EVERY 6 HOURS PRN
Qty: 18 G | Refills: 0 | Status: SHIPPED | OUTPATIENT
Start: 2021-10-12 | End: 2021-11-30

## 2021-10-12 NOTE — LETTER
October 12, 2021      Kristel Martinez  [address] [place] [id number]        To Whom It May Concern:    Krsitel Martinez  was seen on 10/21/21.  Please excuse her until symptoms are improving for 24-hours and COVID test results are available.        Sincerely,        AKILAH Burrows

## 2021-10-12 NOTE — TELEPHONE ENCOUNTER
"Children positive for COVID--now patient is short of breath  Son tested positive 9 days ago, Daughter was quarantined until 9/30. Patient began to feel ill with a sore throat, 2 days ago. Headache began 2 days ago. Today she is short of breath, but she does not have a  cough. She notices the shortness of breath \"all the time, just sitting, worse with activity.\" This began today ~ 11 am and may be getting worse.   Vaccinated for COVID x2 doses, in Aug-Sept 2021.  She is currently parked in her car--trying to decide what to do next.     Triaged to a disposition of Go to ED now, which she agrees to do.    Nelly Romero RN Triage Nurse Advisor 6:41 PM 10/12/2021    Reason for Disposition    MODERATE difficulty breathing (e.g., speaks in phrases, SOB even at rest, pulse 100-120)    Additional Information    Negative: SEVERE difficulty breathing (e.g., struggling for each breath, speaks in single words)    Negative: Difficult to awaken or acting confused (e.g., disoriented, slurred speech)    Negative: Bluish (or gray) lips or face now    Negative: Shock suspected (e.g., cold/pale/clammy skin, too weak to stand, low BP, rapid pulse)    Negative: Sounds like a life-threatening emergency to the triager    Negative: [1] COVID-19 exposure AND [2] no symptoms    Negative: COVID-19 vaccine reaction suspected (e.g., fever, headache, muscle aches) occurring 1 to 3 days after getting vaccine    Negative: COVID-19 vaccine, questions about    Negative: [1] Lives with someone known to have influenza (flu test positive) AND [2] flu-like symptoms (e.g., cough, runny nose, sore throat, SOB; with or without fever)    Negative: [1] Adult with possible COVID-19 symptoms AND [2] triager concerned about severity of symptoms or other causes    Negative: COVID-19 and breastfeeding, questions about    Negative: SEVERE or constant chest pain or pressure (Exception: mild central chest pain, present only when coughing)    Protocols used: " CORONAVIRUS (COVID-19) DIAGNOSED OR [id number] 8.25.2021  COVID 19 Nurse Triage Plan/Patient Instructions    Please be aware that novel coronavirus (COVID-19) may be circulating in the community. If you develop symptoms such as fever, cough, or SOB or if you have concerns about the presence of another infection including coronavirus (COVID-19), please contact your health care provider or visit [email or web address].     Disposition/Instructions    ED Visit recommended. Follow protocol based instructions.     Bring Your Own Device:  Please also bring your smart device(s) (smart phones, tablets, laptops) and their charging cables for your personal use and to communicate with your care team during your visit.    Thank you for taking steps to prevent the spread of this virus.  o Limit your contact with others.  o Wear a simple mask to cover your cough.  o Wash your hands well and often.    Resources    [facility]: About COVID-19: [email or web address]    CDC: What to Do If You're Sick: www.cdc.gov/coronavirus/2019-ncov/about/steps-when-sick.html    CDC: Ending Home Isolation: www.cdc.gov/coronavirus/2019-ncov/hcp/disposition-in-home-patients.html     CDC: Caring for Someone: www.cdc.gov/coronavirus/2019-ncov/if-you-are-sick/care-for-someone.html     [facility]: Interim Guidance for Hospital Discharge to Home: www.health.[facility].mn.us/diseases/coronavirus/hcp/hospdischarge.pdf    [facility] clinical trials (COVID-19 research studies): clinicalaffairs.[facility]/n-clinical-trials     Below are the COVID-19 hotlines at the [place] Department of Health ([facility]). Interpreters are available.   o For health questions: Call 108-591-0409 or 1-178.384.4150 (7 a.m. to 7 p.m.)  o For questions about schools and childcare: Call 706-165-5539 or 1-613.403.6686 (7 a.m. to 7 p.m.)

## 2021-10-13 ENCOUNTER — PATIENT OUTREACH (OUTPATIENT)
Dept: CARE COORDINATION | Facility: CLINIC | Age: 41
End: 2021-10-13

## 2021-10-13 NOTE — TELEPHONE ENCOUNTER
Called patient. Patient started feeling ill 2 days ago. Had headache and sore throat but those have improved this morning. No cough. Had 2 COVID-19 tests yesterday and had a rapid test this morning at work that was negative. States she feels short of breath at rest. Feels like she can't take a full breath in. She is checking oxygen levels at home via oximeter with results of %, mostly upper 90's. She is reassured by negative COVID-19 result and oxygen readings and is comfortable monitoring symptoms for now. Will send patient information via [employer] for home treatment measures for management of worrisome signs/symptoms that require urgent medical evaluation and 24/7 [facility] Nurse Advisors phone number should patient have questions or concerns after hours. Patient verbalizes agreement with plan.

## 2021-10-13 NOTE — PROGRESS NOTES
Assessment & Plan     Exposure to 2019 novel coronavirus    - Symptomatic COVID-19 Virus (Coronavirus) by PCR Nose  - COVID-19 [employer] Loop Referral    Shortness of breath    - albuterol (PROAIR HFA/PROVENTIL HFA/VENTOLIN HFA) 108 (90 Base) MCG/ACT inhaler  Dispense: 18 g; Refill: 0     Oxygen 100% currently, clear lungs, patient able to talk in complete sentences. Offered further evaluation in emergency room and patient declines at this time. Discussed likely COVID which is caused by a virus so antibiotic not indicated currently. Recommend rest, fluids, self-quarantine. Letter given for work. Prescription sent to pharmacy for albuterol inhaler as needed. COVID testing in process, will notify if positive. VS stable, low suspicion for pulmonary embolism.     Follow-up with PCP if symptoms persist for 7 days, and sooner if symptoms worsen or new symptoms develop.     Discussed red flag symptoms which warrant immediate visit in emergency room    All questions were answered and patient verbalized understanding. AVS reviewed with patient.     Shelbie Dominguez, DNP, APRN, CNP 10/12/2021 7:46 PM  [facility]            Kamini Humphries is a 41 year old female who presents to clinic today for the following health issues:  Chief Complaint   Patient presents with     Breathing Problem     SOB started today. Sore throat a few days ago. headache as well.     Patient presents for evaluation of shortness of breath which started at 11am this morning. Her son tested positive for COVID 9 days ago. Patient developed a sore throat 2 days ago with a headache. She is short of breath all the time which is worse with activity. She was advised to go to emergency department for further evaluation and presents to urgent care since it was closer. Shortness of breath is moderate. Her sore throat and headache have resolved. Denies cough, fever, chills, chest pain, vision change and any other symptoms. No history of  lung disease. No tobacco use currently. She has been vaccinated for COVID. She would like a letter for work.     Problem list, Medication list, Allergies, and Medical history reviewed in EPIC.    ROS:  Review of systems negative except for noted above        Objective    /83   Pulse 89   Temp 98.8  F (37.1  C) (Tympanic)   Resp 18   Wt 104.3 kg (230 lb)   LMP 09/12/2021 (Approximate)   SpO2 100%   BMI 33.00 kg/m    Physical Exam  Constitutional:       General: She is not in acute distress.     Appearance: She is not toxic-appearing or diaphoretic.   HENT:      Head: Normocephalic and atraumatic.      Nose: Nose normal.      Mouth/Throat:      Mouth: Mucous membranes are moist.      Pharynx: Oropharynx is clear. No oropharyngeal exudate or posterior oropharyngeal erythema.   Cardiovascular:      Rate and Rhythm: Normal rate and regular rhythm.      Heart sounds: Normal heart sounds.   Pulmonary:      Effort: Pulmonary effort is normal. No respiratory distress.      Breath sounds: Normal breath sounds. No wheezing, rhonchi or rales.      Comments: Able to talk in complete sentences, no increased work of breathing currently  Lymphadenopathy:      Cervical: No cervical adenopathy.   Neurological:      Mental Status: She is alert.

## 2021-10-13 NOTE — PATIENT INSTRUCTIONS
"  Patient Education   After Your COVID-19 (Coronavirus) Test  You have been tested for COVID-19 (coronavirus).   If you'll have surgery in the next few days, we'll let you know ahead of time if you have the virus. Please call your surgeon's office with any questions.  For all other patients: Results are usually available in [employer] within 2 to 3 days.   If you do not have a [employer] account, you'll get a letter in the mail in about 7 to 10 days.   [employer] is often the fastest way to get test results. Please sign up if you do not already have a [employer] account. See the handout Getting COVID-19 Test Results in [employer] for help.  What if my test result is positive?  If your test is positive and you have not viewed your result in [employer], you'll get a phone call with your result. (A positive test means that you have the virus.)     Follow the tips under \"How do I self-isolate?\" below for 10 days (20 days if you have a weak immune system).    You don't need to be retested for COVID-19 before going back to school or work. As long as you're fever-free and feeling better, you can go back to school, work and other activities after waiting the 10 or 20 days.  What if I have questions after I get my results?  If you have questions about your results, please visit our testing website at [email or web address].   After 7 to 10 days, if you have not gotten your results:     Call [phone] ([phone]) and ask to speak with our COVID-19 results team.    If you're being treated at an infusion center: Call your infusion center directly.  What are the symptoms of COVID-19?  Cough, fever and trouble breathing are the most common signs of COVID-19.  Other symptoms can include new headaches, new muscle or body aches, new and unexplained fatigue (feeling very tired), chills, sore throat, congestion (stuffy or runny nose), diarrhea (loose poop), loss of taste or smell, belly pain, and nausea or vomiting " "(feeling sick to your stomach or throwing up).  You may already have symptoms of COVID-19, or they may show up later.  What should I do if I have symptoms?  If you're having surgery: Call your surgeon's office.  For all other patients: Stay home and away from others (self-isolate) until ...    You've had no fever--and no medicine that reduces fever--for 1 full day (24 hours), AND    Other symptoms have gotten better. For example, your cough or breathing has improved, AND    At least 10 days have passed since your symptoms first started.  How do I self-isolate?    Stay in your own room, even for meals. Use your own bathroom if you can.    Stay away from others in your home. No hugging, kissing or shaking hands. No visitors.    Don't go to work, school or anywhere else.    Clean \"high touch\" surfaces often (doorknobs, counters, handles). Use household cleaning spray or wipes. You'll find a full list of  on the EPA website: www.epa.gov/pesticide-registration/list-n-disinfectants-use-against-sars-cov-2.    Cover your mouth and nose with a mask or other face covering to avoid spreading germs.    Wash your hands and face often. Use soap and water.    Caregivers in these groups are at risk for severe illness due to COVID-19:  ? People 65 years and older  ? People who live in a nursing home or long-term care facility  ? People with chronic disease (lung, heart, cancer, diabetes, kidney, liver, immunologic)  ? People who have a weakened immune system, including those who:    Are in cancer treatment    Take medicine that weakens the immune system, such as corticosteroids    Had a bone marrow or organ transplant    Have an immune deficiency    Have poorly controlled HIV or AIDS    Are obese (body mass index of 40 or higher)    Smoke regularly    Caregivers should wear gloves while washing dishes, handling laundry and cleaning bedrooms and bathrooms.    Use caution when washing and drying laundry: Don't shake dirty " laundry and use the warmest water setting that you can.    For more tips on managing your health at home, go to www.cdc.gov/coronavirus/2019-ncov/downloads/10Things.pdf.  How can I take care of myself at home?  1. Get lots of rest. Drink extra fluids (unless a doctor has told you not to).  2. Take Tylenol (acetaminophen) for fever or pain. If you have liver or kidney problems, ask your family doctor if it's OK to take Tylenol.   Adults can take either:  ? 650 mg (two 325 mg pills) every 4 to 6 hours, or   ? 1,000 mg (two 500 mg pills) every 8 hours as needed.  ? Note: Don't take more than 3,000 mg in one day. Acetaminophen is found in many medicines (both prescribed and over-the-counter medicines). Read all labels to be sure you don't take too much.   For children, check the Tylenol bottle for the right dose. The dose is based on the child's age or weight.  3. If you have other health problems (like cancer, heart failure, an organ transplant or severe kidney disease): Call your specialty clinic if you don't feel better in the next 2 days.  4. Know when to call 911. Emergency warning signs include:  ? Trouble breathing or shortness of breath  ? Chest pain or pressure that doesn't go away  ? Feeling confused like you haven't felt before, or not being able to wake up  ? Bluish-colored lips or face  5. If your doctor prescribed a blood thinner medicine: Follow their instructions.  Where can I get more information?    [facility] - About COVID-19:   [email or web address]    CDC - If You're Sick: cdc.gov/coronavirus/2019-ncov/about/steps-when-sick.html    CDC - Ending Home Isolation: www.cdc.gov/coronavirus/2019-ncov/hcp/disposition-in-home-patients.html    CDC - Caring for Someone: www.cdc.gov/coronavirus/2019-ncov/if-you-are-sick/care-for-someone.html    [facility] - Interim Guidance for Hospital Discharge to Home: www.health.[facility].mn.us/diseases/coronavirus/hcp/hospdischarge.pdf    [facility]  clinical trials (COVID-19 research studies): clinicalaffairs.[facility]-clinical-trials    Below are the COVID-19 hotlines at the [place] Department of Health ([facility]). Interpreters are available.  ? For health questions: Call 776-535-7158 or 1-563.980.2350 (7 a.m. to 7 p.m.)  ? For questions about schools and childcare: Call 427-692-8280 or 1-609.349.6618 (7 a.m. to 7 p.m.)    For informational purposes only. Not to replace the advice of your health care provider. Clinically reviewed by Infection Prevention and the [facility] COVID-19 Clinical Team. Copyright   2020 [facility]. All rights reserved. [employer] 130329 - Rev 11/11/20.

## 2021-10-14 LAB — SARS-COV-2 RNA RESP QL NAA+PROBE: NEGATIVE

## 2021-10-15 RX ORDER — CHLORHEXIDINE GLUCONATE ORAL RINSE 1.2 MG/ML
SOLUTION DENTAL
Qty: 473 ML | OUTPATIENT
Start: 2021-10-15

## 2021-10-15 NOTE — TELEPHONE ENCOUNTER
Pt said this was to go to her Dentist to be filled.  Julissa [facility]

## 2021-10-15 NOTE — TELEPHONE ENCOUNTER
Routing refill request to provider for review/approval because:  Drug not active on patient's medication list    Has appointment 1 week

## 2021-10-18 DIAGNOSIS — F41.1 GAD (GENERALIZED ANXIETY DISORDER): ICD-10-CM

## 2021-10-18 DIAGNOSIS — F33.1 MAJOR DEPRESSIVE DISORDER, RECURRENT EPISODE, MODERATE (H): ICD-10-CM

## 2021-10-20 RX ORDER — BUPROPION HYDROCHLORIDE 150 MG/1
TABLET ORAL
Qty: 90 TABLET | Refills: 0 | Status: SHIPPED | OUTPATIENT
Start: 2021-10-20 | End: 2022-01-26

## 2021-10-20 NOTE — TELEPHONE ENCOUNTER
"Last Written Prescription Date:  04/26/21  Last Fill Quantity: 90,  # refills: 1   Last office visit: 8/27/2021 with prescribing provider:   Future Office Visit:   Next 5 appointments (look out 90 days)      Oct 25, 2021  3:00 PM  SHORT with DAVID Lazo CNP  [facility] ([facility] ) [address] [place] [id number]  [phone]     Dec 22, 2021 11:30 AM  Return Visit with Hadley Poe MD  [facility] ([facility] ) [address] [facility] [address]  [phone]          PHQ 1/20/2021 1/27/2021 7/1/2021   PHQ-9 Total Score 12 20 11   Q9: Thoughts of better off dead/self-harm past 2 weeks Not at all Several days Not at all     Requested Prescriptions   Pending Prescriptions Disp Refills    buPROPion (WELLBUTRIN XL) 150 MG 24 hr tablet [Pharmacy Med Name: BUPROPION XL 150MG TABLETS (24 H)] 90 tablet 1     Sig: TAKE 1 TABLET BY MOUTH IN  THE MORNING        SSRIs Protocol Failed - 10/18/2021  3:27 AM        Failed - PHQ-9 score less than 5 in past 6 months     Please review last PHQ-9 score.           Passed - Medication is Bupropion     If the medication is Bupropion (Wellbutrin), and the patient is taking for smoking cessation; OK to refill.          Passed - Medication is active on med list        Passed - Patient is age 18 or older        Passed - No active pregnancy on record        Passed - No positive pregnancy test in last 12 months        Passed - Recent (6 mo) or future (30 days) visit within the authorizing provider's specialty     Patient had office visit in the last 6 months or has a visit in the next 30 days with authorizing provider or within the authorizing provider's specialty.  See \"Patient Info\" tab in inbasket, or \"Choose Columns\" in Meds & Orders section of the refill encounter.                Kayla TEJEDA RN            "

## 2021-10-25 ENCOUNTER — OFFICE VISIT (OUTPATIENT)
Dept: FAMILY MEDICINE | Facility: CLINIC | Age: 41
End: 2021-10-25
Payer: COMMERCIAL

## 2021-10-25 VITALS
BODY MASS INDEX: 33 KG/M2 | HEART RATE: 79 BPM | WEIGHT: 230 LBS | OXYGEN SATURATION: 98 % | SYSTOLIC BLOOD PRESSURE: 112 MMHG | RESPIRATION RATE: 18 BRPM | DIASTOLIC BLOOD PRESSURE: 84 MMHG | TEMPERATURE: 98.2 F

## 2021-10-25 DIAGNOSIS — R22.9 LOCALIZED SUPERFICIAL SWELLING, MASS, OR LUMP: Primary | ICD-10-CM

## 2021-10-25 PROCEDURE — 99213 OFFICE O/P EST LOW 20 MIN: CPT | Performed by: NURSE PRACTITIONER

## 2021-10-25 ASSESSMENT — PAIN SCALES - GENERAL: PAINLEVEL: MILD PAIN (2)

## 2021-10-25 NOTE — PROGRESS NOTES
Assessment & Plan     Localized superficial swelling, mass, or lump  Appears to be a fatty mass however with location will do ultrasound for further evaluation.  Referral to general surgeon in [place] pending ultrasound.   - US Extremity Non Vascular Left; Future    Return in about 1 week (around 11/1/2021), or if symptoms worsen or fail to improve.    DAVID Lazo CNP  [facility]    Kamini Humphries is a 41 year old who presents for the following health issues     HPI     Concern - Lump   Onset: Years   Description: Lump in left arm pit. Patient states that she feels it is getting bigger. Painful at times.   Intensity: mild  Progression of Symptoms:  worsening  Accompanying Signs & Symptoms: none  Previous history of similar problem: no   Precipitating factors:        Worsened by: na   Alleviating factors:        Improved by: na   Therapies tried and outcome:  none     Chaffing on shirt  Gets raw and uncomfortable  Ongoing for many years, saw a surgeon at [facility] in 2015 for consult.     Review of Systems   Constitutional, HEENT, cardiovascular, pulmonary, gi and gu systems are negative, except as otherwise noted.      Objective    /84 (BP Location: Right arm, Patient Position: Sitting, Cuff Size: Adult Large)   Pulse 79   Temp 98.2  F (36.8  C) (Tympanic)   Resp 18   Wt 104.3 kg (230 lb)   SpO2 98%   BMI 33.00 kg/m    Body mass index is 33 kg/m .  Physical Exam   GENERAL: healthy, alert and no distress  SKIN: large soft, moveable mass left medial upper arm near left axilla   PSYCH: mentation appears normal, affect normal/bright

## 2021-10-25 NOTE — PATIENT INSTRUCTIONS
Please call [phone] to schedule your ultrasound     Consider surgical evaluation pending ultrasound

## 2021-10-28 ENCOUNTER — MYC MEDICAL ADVICE (OUTPATIENT)
Dept: FAMILY MEDICINE | Facility: CLINIC | Age: 41
End: 2021-10-28
Payer: COMMERCIAL

## 2021-11-01 ENCOUNTER — TELEPHONE (OUTPATIENT)
Dept: FAMILY MEDICINE | Facility: CLINIC | Age: 41
End: 2021-11-01

## 2021-11-01 DIAGNOSIS — R22.9 LOCALIZED SUPERFICIAL SWELLING, MASS, OR LUMP: Primary | ICD-10-CM

## 2021-11-01 NOTE — TELEPHONE ENCOUNTER
Reason for Call: Request for an order or referral:    Order or referral being requested: Diag  bobo Nogueira's order for [facility] needs to be changed to:  1.  Diagnostic Mammo of both breasts  AND  2.  [id number] - US of particular armpit  No need to call Diagnostics back, unless there are questions or problems.      Date needed: as soon as possible    Has the patient been seen by the PCP for this problem? YES    Additional comments:     Call taken on 11/1/2021 at 10:05 AM by Ashley Alfaro

## 2021-11-02 ENCOUNTER — MYC MEDICAL ADVICE (OUTPATIENT)
Dept: FAMILY MEDICINE | Facility: CLINIC | Age: 41
End: 2021-11-02
Payer: COMMERCIAL

## 2021-11-08 ENCOUNTER — MYC MEDICAL ADVICE (OUTPATIENT)
Dept: FAMILY MEDICINE | Facility: CLINIC | Age: 41
End: 2021-11-08
Payer: COMMERCIAL

## 2021-11-08 DIAGNOSIS — F41.1 GAD (GENERALIZED ANXIETY DISORDER): ICD-10-CM

## 2021-11-08 DIAGNOSIS — F33.1 MAJOR DEPRESSIVE DISORDER, RECURRENT EPISODE, MODERATE (H): ICD-10-CM

## 2021-11-08 RX ORDER — ESCITALOPRAM OXALATE 20 MG/1
20 TABLET ORAL DAILY
Qty: 90 TABLET | Refills: 0 | Status: SHIPPED | OUTPATIENT
Start: 2021-11-08 | End: 2022-02-03

## 2021-11-09 ENCOUNTER — E-VISIT (OUTPATIENT)
Dept: FAMILY MEDICINE | Facility: CLINIC | Age: 41
End: 2021-11-09
Payer: COMMERCIAL

## 2021-11-09 DIAGNOSIS — B96.89 BACTERIAL VAGINOSIS: Primary | ICD-10-CM

## 2021-11-09 DIAGNOSIS — N76.0 BACTERIAL VAGINOSIS: Primary | ICD-10-CM

## 2021-11-09 PROCEDURE — 99421 OL DIG E/M SVC 5-10 MIN: CPT | Performed by: NURSE PRACTITIONER

## 2021-11-09 RX ORDER — METRONIDAZOLE 500 MG/1
500 TABLET ORAL 2 TIMES DAILY
Qty: 14 TABLET | Refills: 0 | Status: SHIPPED | OUTPATIENT
Start: 2021-11-09 | End: 2021-11-16

## 2021-11-10 ENCOUNTER — MYC MEDICAL ADVICE (OUTPATIENT)
Dept: FAMILY MEDICINE | Facility: CLINIC | Age: 41
End: 2021-11-10
Payer: COMMERCIAL

## 2021-11-10 DIAGNOSIS — B37.9 CANDIDA INFECTION: Primary | ICD-10-CM

## 2021-11-10 RX ORDER — FLUCONAZOLE 150 MG/1
150 TABLET ORAL ONCE
Qty: 1 TABLET | Refills: 0 | Status: SHIPPED | OUTPATIENT
Start: 2021-11-10 | End: 2021-11-10

## 2021-11-16 ENCOUNTER — TELEPHONE (OUTPATIENT)
Dept: SLEEP MEDICINE | Facility: CLINIC | Age: 41
End: 2021-11-16
Payer: COMMERCIAL

## 2021-11-16 DIAGNOSIS — R29.818 SUSPECTED SLEEP APNEA: Primary | ICD-10-CM

## 2021-11-16 NOTE — TELEPHONE ENCOUNTER
Reason for Call:  Other call back    Detailed comments: Patient is scheduled for an overnight study this Friday, November 19th and states she is a single mom and is having a hard time arranging for this test.  Kristel is asking if she could change it to a home sleep study.  Please advise.    Phone Number Patient can be reached at: Home number on file [phone] (home) or Cell number on file:    Telephone Information:   Mobile [phone]       Best Time: any    Can we leave a detailed message on this number? YES    Call taken on 11/16/2021 at 1:49 PM by Sofia Harris

## 2021-11-29 DIAGNOSIS — R06.02 SHORTNESS OF BREATH: ICD-10-CM

## 2021-11-30 RX ORDER — ALBUTEROL SULFATE 90 UG/1
2 AEROSOL, METERED RESPIRATORY (INHALATION) EVERY 6 HOURS PRN
Qty: 18 G | Refills: 0 | Status: SHIPPED | OUTPATIENT
Start: 2021-11-30 | End: 2021-12-28

## 2021-12-07 DIAGNOSIS — E89.0 POSTOPERATIVE HYPOTHYROIDISM: ICD-10-CM

## 2021-12-08 ENCOUNTER — MYC MEDICAL ADVICE (OUTPATIENT)
Dept: FAMILY MEDICINE | Facility: CLINIC | Age: 41
End: 2021-12-08

## 2021-12-08 ENCOUNTER — E-VISIT (OUTPATIENT)
Dept: FAMILY MEDICINE | Facility: CLINIC | Age: 41
End: 2021-12-08

## 2021-12-08 DIAGNOSIS — Z20.822 SUSPECTED COVID-19 VIRUS INFECTION: ICD-10-CM

## 2021-12-08 DIAGNOSIS — J02.9 SORE THROAT: ICD-10-CM

## 2021-12-08 PROCEDURE — 99421 OL DIG E/M SVC 5-10 MIN: CPT | Performed by: NURSE PRACTITIONER

## 2021-12-08 RX ORDER — LEVOTHYROXINE SODIUM 50 UG/1
TABLET ORAL
Qty: 15 TABLET | Refills: 0 | Status: SHIPPED | OUTPATIENT
Start: 2021-12-08 | End: 2022-06-28

## 2021-12-08 NOTE — PATIENT INSTRUCTIONS
Dear Kristel Martinez,    Your symptoms show that you may have coronavirus (COVID-19). This illness can cause fever, cough and trouble breathing. Many people get a mild case and get better on their own. Some people can get very sick.    Because you also reported sore throat I would like to also test you for Strep Throat to determine if we need to treat you for that as well.    What should I do?  We would like to test you for Covid-19 virus and Strep Throat. I have placed orders for these tests.   To schedule: go to your [facility] home page and scroll down to the section that says  You have an appointment that needs to be scheduled  and click the large green button that says  Schedule Now  and follow the steps to find the next available openings. It is important that when you are asked what the reason for your appointment is that you mention you need BOTH Covid and Strep tests.    If you are unable to complete these [facility] scheduling steps, please call [phone] to schedule your testing.     Return to work/school/ guidance:   Please let your workplace manager and staffing office know when your quarantine ends     We can t give you an exact date as it depends on the above. You can calculate this on your own or work with your manager/staffing office to calculate this. (For example if you were exposed on 10/4, you would have to quarantine for 14 full days. That would be through 10/18. You could return on 10/19.)      If you receive a positive COVID-19 test result, follow the guidance of the those who are giving you the results. Usually the return to work is 10 (or in some cases 20 days from symptom onset.) If you work at [employer], you must also be cleared by Employee Occupational Health and Safety to return to work.        If you receive a negative COVID-19 test result and did not have a high risk exposure to someone with a known positive COVID-19 test, you can return to work once you're free of fever  for 24 hours without fever-reducing medication and your symptoms are improving or resolved.      If you receive a negative COVID-19 test and If you had a high risk exposure to someone who has tested positive for COVID-19 then you can return to work 14 days after your last contact with the positive individual    Note: If you have ongoing exposure to the covid positive person, this quarantine period may be more than 14 days. (For example, if you are continued to be exposed to your child who tested positive and cannot isolate from them, then the quarantine of 7-14 days can't start until your child is no longer contagious. This is typically 10 days from onset of the child's symptoms. So the total duration may be 17-24 days in this case.)    Sign up for [employer].   We know it's scary to hear that you might have COVID-19. We want to track your symptoms to make sure you're okay over the next 2 weeks. Please look for an email from [employer]--this is a free, online program that we'll use to keep in touch. To sign up, follow the link in the email you will receive. Learn more at [email or web address]    How can I take care of myself?    Get lots of rest. Drink extra fluids (unless a doctor has told you not to)    Take Tylenol (acetaminophen) or ibuprofen for fever or pain. If you have liver or kidney problems, ask your family doctor if it's okay to take Tylenol o ibuprofen    If you have other health problems (like cancer, heart failure, an organ transplant or severe kidney disease): Call your specialty clinic if you don't feel better in the next 2 days.    Know when to call 911. Emergency warning signs include:  o Trouble breathing or shortness of breath  o Pain or pressure in the chest that doesn't go away  o Feeling confused like you haven't felt before, or not being able to wake up  o Bluish-colored lips or face    Where can I get more information?  [facility] - About COVID-19:    [email or web address]    CDC - What to Do If You're Sick:   www.cdc.gov/coronavirus/2019-ncov/about/steps-when-sick.html

## 2021-12-28 DIAGNOSIS — R06.02 SHORTNESS OF BREATH: ICD-10-CM

## 2021-12-28 RX ORDER — ALBUTEROL SULFATE 90 UG/1
AEROSOL, METERED RESPIRATORY (INHALATION)
Qty: 18 G | Refills: 0 | Status: SHIPPED | OUTPATIENT
Start: 2021-12-28 | End: 2022-03-16

## 2022-01-03 ENCOUNTER — TELEPHONE (OUTPATIENT)
Dept: SLEEP MEDICINE | Facility: CLINIC | Age: 42
End: 2022-01-03
Payer: COMMERCIAL

## 2022-01-03 NOTE — TELEPHONE ENCOUNTER
Reason for Call:  Other appointment    Detailed comments: patient had an appt to  sleep stuff for at home study in [place] but she couldn't get there would like to pick it up from you.    Phone Number Patient can be reached at: Home number on file [phone] (home)    Best Time: anytime    Can we leave a detailed message on this number? YES    Call taken on 1/3/2022 at 12:43 PM by Nicole Tang

## 2022-01-06 ENCOUNTER — ANCILLARY PROCEDURE (OUTPATIENT)
Dept: MRI IMAGING | Facility: CLINIC | Age: 42
End: 2022-01-06
Attending: UROLOGY
Payer: COMMERCIAL

## 2022-01-06 DIAGNOSIS — R30.0 DYSURIA: ICD-10-CM

## 2022-01-06 PROCEDURE — 72197 MRI PELVIS W/O & W/DYE: CPT | Performed by: RADIOLOGY

## 2022-01-06 PROCEDURE — A9585 GADOBUTROL INJECTION: HCPCS | Performed by: RADIOLOGY

## 2022-01-06 RX ORDER — GADOBUTROL 604.72 MG/ML
10 INJECTION INTRAVENOUS ONCE
Status: COMPLETED | OUTPATIENT
Start: 2022-01-06 | End: 2022-01-06

## 2022-01-06 RX ADMIN — GADOBUTROL 10 ML: 604.72 INJECTION INTRAVENOUS at 17:05

## 2022-01-08 ENCOUNTER — HEALTH MAINTENANCE LETTER (OUTPATIENT)
Age: 42
End: 2022-01-08

## 2022-01-24 DIAGNOSIS — B00.1 RECURRENT COLD SORES: ICD-10-CM

## 2022-01-24 DIAGNOSIS — F33.1 MAJOR DEPRESSIVE DISORDER, RECURRENT EPISODE, MODERATE (H): ICD-10-CM

## 2022-01-24 DIAGNOSIS — F41.1 GAD (GENERALIZED ANXIETY DISORDER): ICD-10-CM

## 2022-01-25 RX ORDER — VALACYCLOVIR HYDROCHLORIDE 1 G/1
TABLET, FILM COATED ORAL
Qty: 4 TABLET | Refills: 1 | Status: SHIPPED | OUTPATIENT
Start: 2022-01-25 | End: 2022-09-08

## 2022-01-26 RX ORDER — BUPROPION HYDROCHLORIDE 150 MG/1
TABLET ORAL
Qty: 90 TABLET | Refills: 0 | Status: SHIPPED | OUTPATIENT
Start: 2022-01-26 | End: 2022-03-03

## 2022-01-26 NOTE — TELEPHONE ENCOUNTER
Routing refill request to provider for review/approval because:  PHQ 9 =11       SSRIs Protocol Failed 01/24/2022 03:26 AM   Protocol Details  PHQ-9 score less than 5 in past 6 months    Medication is Bupropion    Medication is active on med list    Patient is age 18 or older    No active pregnancy on record    No positive pregnancy test in last 12 months    Recent (6 mo) or future (30 days) visit within the authorizing provider's specialty        Pending Prescriptions:                       Disp   Refills    buPROPion (WELLBUTRIN XL) 150 MG 24 hr tab*90 tab*0        Sig: TAKE 1 TABLET BY MOUTH IN THE MORNING    Kayla Lawson RN on 1/26/2022 at 9:23 AM

## 2022-02-03 DIAGNOSIS — F41.1 GAD (GENERALIZED ANXIETY DISORDER): ICD-10-CM

## 2022-02-03 DIAGNOSIS — F33.1 MAJOR DEPRESSIVE DISORDER, RECURRENT EPISODE, MODERATE (H): ICD-10-CM

## 2022-02-03 RX ORDER — ESCITALOPRAM OXALATE 20 MG/1
TABLET ORAL
Qty: 90 TABLET | Refills: 0 | Status: SHIPPED | OUTPATIENT
Start: 2022-02-03 | End: 2022-05-27

## 2022-02-03 NOTE — TELEPHONE ENCOUNTER
"Requested Prescriptions   Pending Prescriptions Disp Refills     escitalopram (LEXAPRO) 20 MG tablet [Pharmacy Med Name: ESCITALOPRAM 20MG TABLETS] 90 tablet 0     Sig: TAKE 1 TABLET(20 MG) BY MOUTH DAILY       SSRIs Protocol Failed - 2/3/2022  3:27 AM        Failed - PHQ-9 score less than 5 in past 6 months     Please review last PHQ-9 score.           Passed - Medication is active on med list        Passed - Patient is age 18 or older        Passed - No active pregnancy on record        Passed - No positive pregnancy test in last 12 months        Passed - Recent (6 mo) or future (30 days) visit within the authorizing provider's specialty     Patient had office visit in the last 6 months or has a visit in the next 30 days with authorizing provider or within the authorizing provider's specialty.  See \"Patient Info\" tab in inbasket, or \"Choose Columns\" in Meds & Orders section of the refill encounter.                 "

## 2022-02-08 ENCOUNTER — TELEPHONE (OUTPATIENT)
Dept: SLEEP MEDICINE | Facility: CLINIC | Age: 42
End: 2022-02-08
Payer: COMMERCIAL

## 2022-02-08 NOTE — TELEPHONE ENCOUNTER
Reason for call:  Other   Patient called regarding (reason for call): appointment and call back  Additional comments: Patient has been seen through the [facility] but would like to have her HST scheduled through [facility]    Phone number to reach patient:  Cell number on file:    Telephone Information:   Mobile [phone]       Best Time:  any    Can we leave a detailed message on this number?  YES    Travel screening: Negative

## 2022-02-08 NOTE — TELEPHONE ENCOUNTER
Attempted to reach patient via phone unsuccessful left message to call back to schedule home sleep study  and return visit for results.          Erik Sebastian MAREK  [facility] [place]

## 2022-02-09 NOTE — TELEPHONE ENCOUNTER
Phone call returned to patient. Home sleep study and return visit for results has been scheduled. Home sleep study information packet/letter send via [employer].         Erik Sebastian [facility]

## 2022-02-15 ENCOUNTER — E-VISIT (OUTPATIENT)
Dept: FAMILY MEDICINE | Facility: CLINIC | Age: 42
End: 2022-02-15
Payer: COMMERCIAL

## 2022-02-15 ENCOUNTER — MYC MEDICAL ADVICE (OUTPATIENT)
Dept: FAMILY MEDICINE | Facility: CLINIC | Age: 42
End: 2022-02-15
Payer: COMMERCIAL

## 2022-02-15 DIAGNOSIS — Z53.9 ERRONEOUS ENCOUNTER--DISREGARD: Primary | ICD-10-CM

## 2022-02-21 ENCOUNTER — MYC MEDICAL ADVICE (OUTPATIENT)
Dept: FAMILY MEDICINE | Facility: CLINIC | Age: 42
End: 2022-02-21
Payer: COMMERCIAL

## 2022-02-23 ENCOUNTER — OFFICE VISIT (OUTPATIENT)
Dept: SLEEP MEDICINE | Facility: CLINIC | Age: 42
End: 2022-02-23
Payer: COMMERCIAL

## 2022-02-23 DIAGNOSIS — R29.818 SUSPECTED SLEEP APNEA: ICD-10-CM

## 2022-02-23 PROCEDURE — G0399 HOME SLEEP TEST/TYPE 3 PORTA: HCPCS | Performed by: OTOLARYNGOLOGY

## 2022-02-24 ENCOUNTER — DOCUMENTATION ONLY (OUTPATIENT)
Dept: SLEEP MEDICINE | Facility: CLINIC | Age: 42
End: 2022-02-24
Payer: COMMERCIAL

## 2022-02-24 NOTE — PROGRESS NOTES
HST POST-STUDY QUESTIONNAIRE    1. What time did you go to bed?  11  2. How long do you think it took to fall asleep?  Few min  3. What time did you wake up to start the day?  610  4. Did you get up during the night at all?  y  5. If you woke up, do you remember approximately what time(s)? 3a  6. Did you have any difficulty with the equipment?  No  7. Did you us any type of treatment with this study?  None  8. Was the head of the bed elevated? No  9. Did you sleep in a recliner?  No  10. Did you stop using CPAP at least 3 days before this test?  NA  11. Any other information you'd like us to know? NA

## 2022-02-25 NOTE — PROGRESS NOTES
This HSAT was performed using a Noxturnal T3 device which recorded snore, sound, movement activity, body position, nasal pressure, oronasal thermal airflow, pulse, oximetry and both chest and abdominal respiratory effort. HSAT data was restricted to the time patient states they were in bed.     HSAT was scored using 1B 4% hypopnea rule.     AHI: 6.7. Snoring was reported as moderate.  Time with SpO2 below 89% was 0.8 minutes.   Overall signal quality was good     Pt will follow up with sleep provider to determine appropriate therapy.     Ordering Provider, Renzo Carroin PA-C Charles O. BA, [facility], RST System Clinical Specialist 2/25/2022

## 2022-03-01 DIAGNOSIS — F33.1 MAJOR DEPRESSIVE DISORDER, RECURRENT EPISODE, MODERATE (H): ICD-10-CM

## 2022-03-01 DIAGNOSIS — F41.1 GAD (GENERALIZED ANXIETY DISORDER): ICD-10-CM

## 2022-03-02 NOTE — TELEPHONE ENCOUNTER
"Requested Prescriptions   Pending Prescriptions Disp Refills     buPROPion (WELLBUTRIN XL) 150 MG 24 hr tablet [Pharmacy Med Name: BUPROPION XL 150MG TABLETS (24 H)] 90 tablet 0     Sig: TAKE 1 TABLET BY MOUTH IN THE MORNING       SSRIs Protocol Failed - 3/2/2022 12:25 PM        Failed - PHQ-9 score less than 5 in past 6 months     Please review last PHQ-9 score.           Passed - Medication is Bupropion     If the medication is Bupropion (Wellbutrin), and the patient is taking for smoking cessation; OK to refill.          Passed - Medication is active on med list        Passed - Patient is age 18 or older        Passed - No active pregnancy on record        Passed - No positive pregnancy test in last 12 months        Passed - Recent (6 mo) or future (30 days) visit within the authorizing provider's specialty     Patient had office visit in the last 6 months or has a visit in the next 30 days with authorizing provider or within the authorizing provider's specialty.  See \"Patient Info\" tab in inbasket, or \"Choose Columns\" in Meds & Orders section of the refill encounter.                 "

## 2022-03-03 RX ORDER — BUPROPION HYDROCHLORIDE 150 MG/1
TABLET ORAL
Qty: 90 TABLET | Refills: 0 | Status: SHIPPED | OUTPATIENT
Start: 2022-03-03 | End: 2022-07-07

## 2022-03-11 NOTE — PROGRESS NOTES
Does Kristel have a CPAP/Bipap?  No     [place] Sleep Scale: 8    Kristel is a 41 year old who is being evaluated via a billable video visit.      How would you like to obtain your AVS? MyChart  If the video visit is dropped, the invitation should be resent by: Text to cell phone: [phone]  Will anyone else be joining your video visit? No      Video Start Time: 3:29 PM        Subjective   Kristel is a 41 year old who presents for the following health issues study results  Vitals:  No vitals were obtained today due to virtual visit.        Video-Visit Details    Type of service:  Video Visit    Video End Time:3:39 PM    Originating Location (pt. Location): Home    Distant Location (provider location):  [place] [facility]     Platform used for Video Visit: [employer]       Sleep Study Follow-Up Visit:    Date on this visit: 3/16/2022    Kristel Martinez comes in today for follow-up of her home sleep study done on 2/23/22  for loud snoring and possible sleep apnea.     AHI was 6.7, without desaturations.Supine RDI 8, consistent with mild SUPINE TERRELL.  These findings were reviewed with patient.     Past medical/surgical history, family history, social history, medications and allergies were reviewed.      Problem List:  Patient Active Problem List    Diagnosis Date Noted     Interstitial cystitis 07/09/2021     Priority: Medium     Traumatic incomplete tear of right rotator cuff, initial encounter 07/23/2020     Priority: Medium     Bicipital tendonitis of right shoulder 07/23/2020     Priority: Medium     Subacromial impingement of right shoulder 07/23/2020     Priority: Medium     IgA deficiency (H) 09/12/2018     Priority: Medium     Postoperative hypothyroidism 03/23/2018     Priority: Medium     Vitiligo 04/09/2015     Priority: Medium     Papillary adenocarcinoma of thyroid (H) 03/01/2014     Priority: Medium     Overview:   12/2013: R thyroid lobectomy 1.7 cm follicular variant papillary cancer, CELIA  3.6  03/2014: Completion Thyroidectomy 0.3 cm incidental papillary cancer left lobe. Iodine ablation with 53 mCi.   07/2016, 07/2017: Neck US neg.       Vitamin D deficiency 07/06/2009     Priority: Medium     Last Assessment & Plan:   7/09  29.4  vit  d   on 1000units  daily  since  7/09       Major depressive disorder, recurrent episode, moderate (H) 11/16/2007     Priority: Medium        Impression/Plan:    Mild sleep apnea- discussed weight loss, sleep positioning, oral appliance, and cpap. She has a hx of TMJ so she will avoid the oral appliance. Will work on weight loss and avoiding supine sleep.   She will follow up with me as needed.    Fifteen minutes spent with patient, all of which were spent face-to-face counseling, consulting, coordinating plan of care.          CC: Yulissa Nogueira

## 2022-03-13 NOTE — PROGRESS NOTES
"HOME SLEEP STUDY INTERPRETATION    Patient: Kristel Martinez  MRN: [id number]  YOB: 1980  Study Date: 2/23/2022  PCP/Referring Provider: Yulissa Nogueira;   Ordering Provider: Renzo Carrion PA-C     Indications for Home Study: Kristel Martinez is a 41 year old female with a history of snoring, daytime fatigue,  Depression/anxiety who presents with symptoms suggestive of obstructive sleep apnea.    Estimated body mass index is 33 kg/m  as calculated from the following:    Height as of 8/27/21: 1.778 m (5' 10\").    Weight as of 10/25/21: 104.3 kg (230 lb).  Total score - [place]: 5 (10/1/2021  2:00 PM)  STOP-BANG: 3/8    Data: A full night home sleep study was performed recording the standard physiologic parameters including body position, movement, sound, nasal pressure, thermal oral airflow, chest and abdominal movements with respiratory inductance plethysmography, and oxygen saturation by pulse oximetry. Pulse rate was estimated by oximetry recording. This study was considered adequate based on > 4 hours of quality oximetry and respiratory recording. As specified by the AASM Manual for the Scoring of Sleep and Associated events, version 2.3, Rule VIII.D 1B, 4% oxygen desaturation scoring for hypopneas is used as a standard of care on all home sleep apnea testing.    Analysis Time:  415 minutes    Respiration:   Sleep Associated Hypoxemia: sustained hypoxemia was not present. Baseline oxygen saturation was 95%.  Time with saturation less than or equal to 88% was 0.6 minutes. The lowest oxygen saturation was 80%.   Snoring: Snoring was present but mild.  Respiratory events: The home study revealed a presence of 14 obstructive apneas and 7 mixed and central apneas. There were 26 hypopneas resulting in a combined apnea/hypopnea index [AHI] of 6.7 events per hour.  AHI was 8 per hour supine, 0 per hour prone, 1.5 per hour on left side, and 0 per hour on right side.   Pattern: Excluding events noted above, " respiratory rate and pattern was Normal.    Position: Percent of time spent: supine - 81%, prone - 0%, on left - 19%, on right - 0%.    Heart Rate: By pulse oximetry tachycardia was noted.     Assessment:   Mild obstructive sleep apnea.  Sleep associated hypoxemia was not present.    Recommendations:  Consider auto-CPAP at 5-10 cmH2O, oral appliance therapy or positional therapy.  Suggest optimizing sleep hygiene and avoiding sleep deprivation.  Weight management.(BMI>30).    Diagnosis Code(s): Obstructive Sleep Apnea G47.33    Riley Mendez MD,  March 13, 2022   Diplomate, American Board of Otolaryngology, Sleep Medicine

## 2022-03-16 ENCOUNTER — VIRTUAL VISIT (OUTPATIENT)
Dept: SLEEP MEDICINE | Facility: CLINIC | Age: 42
End: 2022-03-16
Payer: COMMERCIAL

## 2022-03-16 DIAGNOSIS — G47.30 MILD SLEEP APNEA: Primary | ICD-10-CM

## 2022-03-16 PROCEDURE — 99213 OFFICE O/P EST LOW 20 MIN: CPT | Mod: 95 | Performed by: PHYSICIAN ASSISTANT

## 2022-03-16 ASSESSMENT — SLEEP AND FATIGUE QUESTIONNAIRES
HOW LIKELY ARE YOU TO NOD OFF OR FALL ASLEEP WHILE SITTING INACTIVE IN A PUBLIC PLACE: WOULD NEVER DOZE
HOW LIKELY ARE YOU TO NOD OFF OR FALL ASLEEP WHILE WATCHING TV: MODERATE CHANCE OF DOZING
HOW LIKELY ARE YOU TO NOD OFF OR FALL ASLEEP WHILE SITTING AND READING: SLIGHT CHANCE OF DOZING
HOW LIKELY ARE YOU TO NOD OFF OR FALL ASLEEP WHEN YOU ARE A PASSENGER IN A CAR FOR AN HOUR WITHOUT A BREAK: SLIGHT CHANCE OF DOZING
HOW LIKELY ARE YOU TO NOD OFF OR FALL ASLEEP WHILE LYING DOWN TO REST IN THE AFTERNOON WHEN CIRCUMSTANCES PERMIT: HIGH CHANCE OF DOZING
HOW LIKELY ARE YOU TO NOD OFF OR FALL ASLEEP IN A CAR, WHILE STOPPED FOR A FEW MINUTES IN TRAFFIC: WOULD NEVER DOZE
HOW LIKELY ARE YOU TO NOD OFF OR FALL ASLEEP WHILE SITTING QUIETLY AFTER LUNCH WITHOUT ALCOHOL: SLIGHT CHANCE OF DOZING
HOW LIKELY ARE YOU TO NOD OFF OR FALL ASLEEP WHILE SITTING AND TALKING TO SOMEONE: WOULD NEVER DOZE

## 2022-04-15 ENCOUNTER — TRANSFERRED RECORDS (OUTPATIENT)
Dept: HEALTH INFORMATION MANAGEMENT | Facility: CLINIC | Age: 42
End: 2022-04-15

## 2022-05-26 DIAGNOSIS — F33.1 MAJOR DEPRESSIVE DISORDER, RECURRENT EPISODE, MODERATE (H): ICD-10-CM

## 2022-05-26 DIAGNOSIS — F41.1 GAD (GENERALIZED ANXIETY DISORDER): ICD-10-CM

## 2022-05-27 RX ORDER — ESCITALOPRAM OXALATE 20 MG/1
TABLET ORAL
Qty: 90 TABLET | Refills: 0 | Status: SHIPPED | OUTPATIENT
Start: 2022-05-27 | End: 2022-09-08

## 2022-05-27 NOTE — TELEPHONE ENCOUNTER
"Routing refill request to provider for review/approval because:  Drug interaction warning      Requested Prescriptions   Pending Prescriptions Disp Refills     escitalopram (LEXAPRO) 20 MG tablet [Pharmacy Med Name: ESCITALOPRAM 20MG TABLETS] 90 tablet 0     Sig: TAKE 1 TABLET(20 MG) BY MOUTH DAILY       SSRIs Protocol Failed - 5/26/2022  3:26 AM        Failed - PHQ-9 score less than 5 in past 6 months     Please review last PHQ-9 score.           Passed - Medication is active on med list        Passed - Patient is age 18 or older        Passed - No active pregnancy on record        Passed - No positive pregnancy test in last 12 months        Passed - Recent (6 mo) or future (30 days) visit within the authorizing provider's specialty     Patient had office visit in the last 6 months or has a visit in the next 30 days with authorizing provider or within the authorizing provider's specialty.  See \"Patient Info\" tab in inbasket, or \"Choose Columns\" in Meds & Orders section of the refill encounter.                 "

## 2022-06-07 ENCOUNTER — MYC MEDICAL ADVICE (OUTPATIENT)
Dept: FAMILY MEDICINE | Facility: CLINIC | Age: 42
End: 2022-06-07

## 2022-06-07 ENCOUNTER — OFFICE VISIT (OUTPATIENT)
Dept: URGENT CARE | Facility: URGENT CARE | Age: 42
End: 2022-06-07
Payer: COMMERCIAL

## 2022-06-07 ENCOUNTER — TELEPHONE (OUTPATIENT)
Dept: FAMILY MEDICINE | Facility: CLINIC | Age: 42
End: 2022-06-07
Payer: COMMERCIAL

## 2022-06-07 VITALS
HEART RATE: 79 BPM | BODY MASS INDEX: 31.57 KG/M2 | SYSTOLIC BLOOD PRESSURE: 113 MMHG | OXYGEN SATURATION: 98 % | DIASTOLIC BLOOD PRESSURE: 75 MMHG | WEIGHT: 220 LBS | TEMPERATURE: 98.8 F

## 2022-06-07 DIAGNOSIS — T36.95XA ANTIBIOTIC-INDUCED YEAST INFECTION: ICD-10-CM

## 2022-06-07 DIAGNOSIS — B37.9 ANTIBIOTIC-INDUCED YEAST INFECTION: ICD-10-CM

## 2022-06-07 DIAGNOSIS — N30.00 ACUTE CYSTITIS WITHOUT HEMATURIA: Primary | ICD-10-CM

## 2022-06-07 DIAGNOSIS — R30.0 DYSURIA: ICD-10-CM

## 2022-06-07 LAB
ALBUMIN UR-MCNC: NEGATIVE MG/DL
APPEARANCE UR: CLEAR
BACTERIA #/AREA URNS HPF: ABNORMAL /HPF
BILIRUB UR QL STRIP: NEGATIVE
CLUE CELLS: ABNORMAL
COLOR UR AUTO: ABNORMAL
GLUCOSE UR STRIP-MCNC: 100 MG/DL
HGB UR QL STRIP: ABNORMAL
KETONES UR STRIP-MCNC: NEGATIVE MG/DL
LEUKOCYTE ESTERASE UR QL STRIP: ABNORMAL
NITRATE UR QL: POSITIVE
PH UR STRIP: 7 [PH] (ref 5–7)
RBC #/AREA URNS AUTO: ABNORMAL /HPF
SP GR UR STRIP: 1.01 (ref 1–1.03)
SQUAMOUS #/AREA URNS AUTO: ABNORMAL /LPF
TRICHOMONAS, WET PREP: ABNORMAL
UROBILINOGEN UR STRIP-ACNC: 1 E.U./DL
WBC #/AREA URNS AUTO: ABNORMAL /HPF
WBC'S/HIGH POWER FIELD, WET PREP: ABNORMAL
YEAST, WET PREP: ABNORMAL

## 2022-06-07 PROCEDURE — 87210 SMEAR WET MOUNT SALINE/INK: CPT | Performed by: PHYSICIAN ASSISTANT

## 2022-06-07 PROCEDURE — 99213 OFFICE O/P EST LOW 20 MIN: CPT | Performed by: PHYSICIAN ASSISTANT

## 2022-06-07 PROCEDURE — 87086 URINE CULTURE/COLONY COUNT: CPT | Performed by: PHYSICIAN ASSISTANT

## 2022-06-07 PROCEDURE — 81001 URINALYSIS AUTO W/SCOPE: CPT | Performed by: PHYSICIAN ASSISTANT

## 2022-06-07 RX ORDER — NITROFURANTOIN 25; 75 MG/1; MG/1
100 CAPSULE ORAL 2 TIMES DAILY
Qty: 10 CAPSULE | Refills: 0 | Status: SHIPPED | OUTPATIENT
Start: 2022-06-07 | End: 2022-06-12

## 2022-06-07 RX ORDER — FLUCONAZOLE 150 MG/1
TABLET ORAL
Qty: 2 TABLET | Refills: 0 | Status: SHIPPED | OUTPATIENT
Start: 2022-06-07 | End: 2023-02-01

## 2022-06-07 NOTE — PROGRESS NOTES
"  Assessment & Plan     Acute cystitis without hematuria  Will treat with nitroFURantoin macrocrystal-monohydrate (MACROBID) 100 MG capsule; Take 1 capsule (100 mg) by mouth 2 times daily for 5 days. Push fluids. Return to clinic if symptoms worsen or do not improve; otherwise follow up as needed      Dysuria    - UA macro with reflex to Microscopic and Culture - Clinc Collect  - Wet prep - Clinic Collect  - Urine Microscopic Exam  - Urine Culture    Antibiotic-induced yeast infection    - fluconazole (DIFLUCAN) 150 MG tablet; Take 1 tablet by mouth x 1; May repeat in 1 week if still symptomatic             BMI:   Estimated body mass index is 31.57 kg/m  as calculated from the following:    Height as of 8/27/21: 1.778 m (5' 10\").    Weight as of this encounter: 99.8 kg (220 lb).           Return in about 2 days (around 6/9/2022), or if symptoms worsen or fail to improve.    ELLE [facility]              Subjective   Chief Complaint   Patient presents with     Urinary Problem     Burning, urgency and some low back pain, burning started today urgency and back pain started yesterday. Took OTC meds.        HPI     UTI    Onset of symptoms was today   Course of illness is same  Severity moderate  Current and associated symptoms dysuria and frequency  Treatment and measures tried None  Predisposing factors include none  Patient denies rigors, flank pain and temperature > 101 degrees F.                    Review of Systems   Constitutional, HEENT, cardiovascular, pulmonary, gi and gu systems are negative, except as otherwise noted.      Objective    /75   Pulse 79   Temp 98.8  F (37.1  C) (Tympanic)   Wt 99.8 kg (220 lb)   SpO2 98%   BMI 31.57 kg/m    Body mass index is 31.57 kg/m .  Physical Exam  Constitutional:       General: She is not in acute distress.     Appearance: She is well-developed.   Psychiatric:         Behavior: Behavior normal.                    "

## 2022-06-09 LAB — BACTERIA UR CULT: NORMAL

## 2022-06-18 ENCOUNTER — MYC REFILL (OUTPATIENT)
Dept: FAMILY MEDICINE | Facility: CLINIC | Age: 42
End: 2022-06-18
Payer: COMMERCIAL

## 2022-06-18 DIAGNOSIS — F33.1 MAJOR DEPRESSIVE DISORDER, RECURRENT EPISODE, MODERATE (H): ICD-10-CM

## 2022-06-18 DIAGNOSIS — E89.0 POSTOPERATIVE HYPOTHYROIDISM: ICD-10-CM

## 2022-06-18 DIAGNOSIS — F41.1 GAD (GENERALIZED ANXIETY DISORDER): ICD-10-CM

## 2022-06-21 RX ORDER — LEVOTHYROXINE SODIUM 112 UG/1
TABLET ORAL
Qty: 90 TABLET | Refills: 0 | OUTPATIENT
Start: 2022-06-21

## 2022-06-21 RX ORDER — ESCITALOPRAM OXALATE 20 MG/1
20 TABLET ORAL DAILY
Qty: 90 TABLET | Refills: 0 | OUTPATIENT
Start: 2022-06-21

## 2022-06-21 RX ORDER — LEVOTHYROXINE SODIUM 50 UG/1
TABLET ORAL
Qty: 15 TABLET | Refills: 0 | OUTPATIENT
Start: 2022-06-21

## 2022-06-21 NOTE — TELEPHONE ENCOUNTER
Spoke with patient, assisted to schedule lab appointment for thyroid labs. She has enough medication until her labs are resulted. She did not  her last escitalopram refill, verified with pharmacy who has it stored on file. they will fill for her today.  PHQ-9 sent via MY Chart for updating.   Kayla TEJEDA RN

## 2022-06-25 ENCOUNTER — LAB (OUTPATIENT)
Dept: LAB | Facility: CLINIC | Age: 42
End: 2022-06-25
Payer: COMMERCIAL

## 2022-06-25 DIAGNOSIS — R30.0 DYSURIA: ICD-10-CM

## 2022-06-25 DIAGNOSIS — R06.83 SNORING: ICD-10-CM

## 2022-06-25 DIAGNOSIS — C73 MALIGNANT NEOPLASM OF THYROID GLAND (H): ICD-10-CM

## 2022-06-25 DIAGNOSIS — R29.818 SUSPECTED SLEEP APNEA: ICD-10-CM

## 2022-06-25 LAB
ALBUMIN UR-MCNC: NEGATIVE MG/DL
APPEARANCE UR: CLEAR
BACTERIA #/AREA URNS HPF: ABNORMAL /HPF
BILIRUB UR QL STRIP: NEGATIVE
COLOR UR AUTO: YELLOW
GLUCOSE UR STRIP-MCNC: NEGATIVE MG/DL
HGB UR QL STRIP: ABNORMAL
KETONES UR STRIP-MCNC: NEGATIVE MG/DL
LEUKOCYTE ESTERASE UR QL STRIP: NEGATIVE
NITRATE UR QL: NEGATIVE
PH UR STRIP: 6.5 [PH] (ref 5–7)
RBC #/AREA URNS AUTO: ABNORMAL /HPF
SP GR UR STRIP: 1.02 (ref 1–1.03)
SQUAMOUS #/AREA URNS AUTO: ABNORMAL /LPF
T4 FREE SERPL-MCNC: 0.92 NG/DL (ref 0.76–1.46)
TSH SERPL DL<=0.005 MIU/L-ACNC: 12.42 MU/L (ref 0.4–4)
UROBILINOGEN UR STRIP-ACNC: 0.2 E.U./DL
WBC #/AREA URNS AUTO: ABNORMAL /HPF

## 2022-06-25 PROCEDURE — 99000 SPECIMEN HANDLING OFFICE-LAB: CPT

## 2022-06-25 PROCEDURE — 84439 ASSAY OF FREE THYROXINE: CPT

## 2022-06-25 PROCEDURE — 81001 URINALYSIS AUTO W/SCOPE: CPT

## 2022-06-25 PROCEDURE — 84443 ASSAY THYROID STIM HORMONE: CPT

## 2022-06-25 PROCEDURE — 86800 THYROGLOBULIN ANTIBODY: CPT | Mod: 90

## 2022-06-25 PROCEDURE — 84432 ASSAY OF THYROGLOBULIN: CPT | Mod: 90

## 2022-06-25 PROCEDURE — 36415 COLL VENOUS BLD VENIPUNCTURE: CPT

## 2022-06-27 ENCOUNTER — MYC MEDICAL ADVICE (OUTPATIENT)
Dept: FAMILY MEDICINE | Facility: CLINIC | Age: 42
End: 2022-06-27

## 2022-06-27 DIAGNOSIS — E89.0 POSTOPERATIVE HYPOTHYROIDISM: Primary | ICD-10-CM

## 2022-06-28 ENCOUNTER — MYC MEDICAL ADVICE (OUTPATIENT)
Dept: FAMILY MEDICINE | Facility: CLINIC | Age: 42
End: 2022-06-28

## 2022-06-28 RX ORDER — LEVOTHYROXINE SODIUM 175 UG/1
175 TABLET ORAL DAILY
Qty: 90 TABLET | Refills: 3 | Status: SHIPPED | OUTPATIENT
Start: 2022-06-28 | End: 2022-07-28

## 2022-07-07 LAB — SCANNED LAB RESULT: NORMAL

## 2022-07-25 ENCOUNTER — MYC MEDICAL ADVICE (OUTPATIENT)
Dept: FAMILY MEDICINE | Facility: CLINIC | Age: 42
End: 2022-07-25

## 2022-07-25 DIAGNOSIS — E89.0 POSTOPERATIVE HYPOTHYROIDISM: Primary | ICD-10-CM

## 2022-07-27 ENCOUNTER — LAB (OUTPATIENT)
Dept: LAB | Facility: CLINIC | Age: 42
End: 2022-07-27
Payer: COMMERCIAL

## 2022-07-27 DIAGNOSIS — E89.0 POSTOPERATIVE HYPOTHYROIDISM: ICD-10-CM

## 2022-07-27 LAB
T4 FREE SERPL-MCNC: 1.33 NG/DL (ref 0.76–1.46)
TSH SERPL DL<=0.005 MIU/L-ACNC: 0.3 MU/L (ref 0.4–4)

## 2022-07-27 PROCEDURE — 36415 COLL VENOUS BLD VENIPUNCTURE: CPT

## 2022-07-27 PROCEDURE — 84443 ASSAY THYROID STIM HORMONE: CPT

## 2022-07-27 PROCEDURE — 84439 ASSAY OF FREE THYROXINE: CPT

## 2022-07-28 DIAGNOSIS — E89.0 POSTOPERATIVE HYPOTHYROIDISM: Primary | ICD-10-CM

## 2022-07-28 RX ORDER — LEVOTHYROXINE SODIUM 112 UG/1
112 TABLET ORAL DAILY
Qty: 90 TABLET | Refills: 3 | Status: SHIPPED | OUTPATIENT
Start: 2022-07-28 | End: 2023-04-21

## 2022-07-28 RX ORDER — LEVOTHYROXINE SODIUM 50 UG/1
50 TABLET ORAL DAILY
Qty: 90 TABLET | Refills: 3 | Status: SHIPPED | OUTPATIENT
Start: 2022-07-28 | End: 2023-04-21

## 2022-08-08 ENCOUNTER — OFFICE VISIT (OUTPATIENT)
Dept: URGENT CARE | Facility: URGENT CARE | Age: 42
End: 2022-08-08
Payer: COMMERCIAL

## 2022-08-08 VITALS
RESPIRATION RATE: 16 BRPM | HEART RATE: 81 BPM | DIASTOLIC BLOOD PRESSURE: 75 MMHG | SYSTOLIC BLOOD PRESSURE: 125 MMHG | OXYGEN SATURATION: 100 % | TEMPERATURE: 99 F

## 2022-08-08 DIAGNOSIS — B96.89 BV (BACTERIAL VAGINOSIS): ICD-10-CM

## 2022-08-08 DIAGNOSIS — N76.0 BV (BACTERIAL VAGINOSIS): ICD-10-CM

## 2022-08-08 DIAGNOSIS — N34.2 INFECTIVE URETHRITIS: ICD-10-CM

## 2022-08-08 DIAGNOSIS — R30.0 DYSURIA: Primary | ICD-10-CM

## 2022-08-08 LAB
ALBUMIN UR-MCNC: NEGATIVE MG/DL
APPEARANCE UR: CLEAR
BACTERIA #/AREA URNS HPF: ABNORMAL /HPF
BILIRUB UR QL STRIP: NEGATIVE
CLUE CELLS: PRESENT
COLOR UR AUTO: YELLOW
GLUCOSE UR STRIP-MCNC: NEGATIVE MG/DL
HGB UR QL STRIP: NEGATIVE
KETONES UR STRIP-MCNC: NEGATIVE MG/DL
LEUKOCYTE ESTERASE UR QL STRIP: NEGATIVE
NITRATE UR QL: POSITIVE
PH UR STRIP: 6 [PH] (ref 5–7)
RBC #/AREA URNS AUTO: ABNORMAL /HPF
SP GR UR STRIP: 1.01 (ref 1–1.03)
SQUAMOUS #/AREA URNS AUTO: ABNORMAL /LPF
TRICHOMONAS, WET PREP: ABNORMAL
UROBILINOGEN UR STRIP-ACNC: 1 E.U./DL
WBC #/AREA URNS AUTO: ABNORMAL /HPF
WBC'S/HIGH POWER FIELD, WET PREP: ABNORMAL
YEAST, WET PREP: ABNORMAL

## 2022-08-08 PROCEDURE — 87591 N.GONORRHOEAE DNA AMP PROB: CPT | Performed by: NURSE PRACTITIONER

## 2022-08-08 PROCEDURE — 87210 SMEAR WET MOUNT SALINE/INK: CPT | Performed by: NURSE PRACTITIONER

## 2022-08-08 PROCEDURE — 99213 OFFICE O/P EST LOW 20 MIN: CPT | Performed by: NURSE PRACTITIONER

## 2022-08-08 PROCEDURE — 81001 URINALYSIS AUTO W/SCOPE: CPT | Performed by: NURSE PRACTITIONER

## 2022-08-08 PROCEDURE — 87491 CHLMYD TRACH DNA AMP PROBE: CPT | Performed by: NURSE PRACTITIONER

## 2022-08-08 PROCEDURE — 87086 URINE CULTURE/COLONY COUNT: CPT | Performed by: NURSE PRACTITIONER

## 2022-08-08 RX ORDER — METRONIDAZOLE 7.5 MG/G
1 GEL VAGINAL DAILY
Qty: 35 G | Refills: 0 | Status: SHIPPED | OUTPATIENT
Start: 2022-08-08 | End: 2022-08-15

## 2022-08-08 RX ORDER — NITROFURANTOIN 25; 75 MG/1; MG/1
100 CAPSULE ORAL 2 TIMES DAILY
Qty: 14 CAPSULE | Refills: 0 | Status: SHIPPED | OUTPATIENT
Start: 2022-08-08 | End: 2022-08-15

## 2022-08-08 NOTE — RESULT ENCOUNTER NOTE
Results discussed with patient in clinic. States understanding of these results.    Karen Chavez CNP

## 2022-08-08 NOTE — PROGRESS NOTES
Assessment & Plan      Diagnosis Comments   1. Dysuria  Wet prep - lab collect, UA with Microscopic reflex to Culture, Neisseria gonorrhoeae PCR - Clinic Collect, Chlamydia trachomatis PCR, Urine Microscopic Exam, Urine Culture    2. Infective urethritis  nitroFURantoin macrocrystal-monohydrate (MACROBID) 100 MG capsule    3. BV (bacterial vaginosis)  metroNIDAZOLE (METROGEL) 0.75 % vaginal gel        Discussed home care plan to include treatment with metronidazole gel nightly 7/8 and oral nitrofurantoin for UTI symptoms take this with food discussed side effects patient verbalized understanding also discussed that culture may indicate change of oral antibiotic.    Home care reviewed. Patient verbalized understanding; will monitor symptoms closely. Reviewed s/e to medications.   Follow up with primary care in 1 week if symptoms not improving.     Handout given from epic and reviewed.      DAVID Richards [facility] [place]    Kamini Humphries is a 41 year old female who presents to clinic today for the following health issues:  Chief Complaint   Patient presents with     Urgent Care     Vaginal Problem     Per pt requesting to get tested for bacterial vaginosis, UTI and also STD as she has new partner      HPI    UTI    Onset of symptoms was 2day(s).  Course of illness is worsening  Severity moderate  Current and associated symptoms dysuria, frequency and voiding in small amounts  Treatment and measures tried Increase fluid intake  Predisposing factors include none  Patient denies rigors, flank pain, temperature > 101 degrees F., vomiting, taking Coumadin and GFR less than 30 within the last year          GYN Complaint    Onset of symptoms was 2 day(s) ago.  Course of illness is worsening.    Severity moderate  Current and Associated symptoms: vaginal discharge described as clear, white and thin and odor  Treatment measures tried include:  None  Sexually active: yes, single  partner, contraception - tubal  Predisposing factors: None  Hx of previous symptom: none        Review of Systems  Constitutional, HEENT, cardiovascular, pulmonary, gi and gu systems are negative, except as otherwise noted.      Objective    /75   Pulse 81   Temp 99  F (37.2  C) (Tympanic)   Resp 16   SpO2 100%   Physical Exam   GENERAL: healthy, alert and no distress  NECK: no adenopathy, no asymmetry, masses, or scars and thyroid normal to palpation  RESP: lungs clear to auscultation - no rales, rhonchi or wheezes  CV: regular rate and rhythm, normal S1 S2, no S3 or S4, no murmur, click or rub, no peripheral edema and peripheral pulses strong  ABDOMEN: soft, nontender, no hepatosplenomegaly, no masses and bowel sounds normal  MS: no gross musculoskeletal defects noted, no edema    Results for orders placed or performed in visit on 08/08/22   UA with Microscopic reflex to Culture     Status: Abnormal    Specimen: Urine, Clean Catch   Result Value Ref Range    Color Urine Yellow Colorless, Straw, Light Yellow, Yellow    Appearance Urine Clear Clear    Glucose Urine Negative Negative mg/dL    Bilirubin Urine Negative Negative    Ketones Urine Negative Negative mg/dL    Specific Gravity Urine 1.015 1.003 - 1.035    Blood Urine Negative Negative    pH Urine 6.0 5.0 - 7.0    Protein Albumin Urine Negative Negative mg/dL    Urobilinogen Urine 1.0 0.2, 1.0 E.U./dL    Nitrite Urine Positive (A) Negative    Leukocyte Esterase Urine Negative Negative   Urine Microscopic Exam     Status: Abnormal   Result Value Ref Range    Bacteria Urine Few (A) None Seen /HPF    RBC Urine 0-2 0-2 /HPF /HPF    WBC Urine 0-5 0-5 /HPF /HPF    Squamous Epithelials Urine Few (A) None Seen /LPF   Wet prep - lab collect     Status: Abnormal    Specimen: Vagina; Swab   Result Value Ref Range    Trichomonas Absent Absent    Yeast Absent Absent    Clue Cells Present (A) Absent    WBCs/high power field 1+ (A) None

## 2022-08-09 LAB
C TRACH DNA SPEC QL NAA+PROBE: NEGATIVE
N GONORRHOEA DNA SPEC QL NAA+PROBE: NEGATIVE

## 2022-08-10 LAB — BACTERIA UR CULT: NO GROWTH

## 2022-08-19 ENCOUNTER — TELEPHONE (OUTPATIENT)
Dept: FAMILY MEDICINE | Facility: CLINIC | Age: 42
End: 2022-08-19

## 2022-08-19 NOTE — TELEPHONE ENCOUNTER
Patient Quality Outreach    Patient is due for the following:   Depression  -  PHQ-9 needed    Next Steps:   PHQ-9 update    Type of outreach:    Sent Con[employer] message.      Questions for provider review:    None     Perla Lawson, CMA

## 2022-08-29 ENCOUNTER — MYC MEDICAL ADVICE (OUTPATIENT)
Dept: FAMILY MEDICINE | Facility: CLINIC | Age: 42
End: 2022-08-29

## 2022-08-29 ENCOUNTER — LAB (OUTPATIENT)
Dept: LAB | Facility: CLINIC | Age: 42
End: 2022-08-29

## 2022-08-29 ENCOUNTER — E-VISIT (OUTPATIENT)
Dept: FAMILY MEDICINE | Facility: CLINIC | Age: 42
End: 2022-08-29

## 2022-08-29 DIAGNOSIS — R30.0 DIFFICULT OR PAINFUL URINATION: ICD-10-CM

## 2022-08-29 DIAGNOSIS — R30.0 DYSURIA: ICD-10-CM

## 2022-08-29 DIAGNOSIS — R30.0 DYSURIA: Primary | ICD-10-CM

## 2022-08-29 LAB
ALBUMIN UR-MCNC: NEGATIVE MG/DL
APPEARANCE UR: CLEAR
BILIRUB UR QL STRIP: NEGATIVE
CLUE CELLS: ABNORMAL
COLOR UR AUTO: YELLOW
GLUCOSE UR STRIP-MCNC: NEGATIVE MG/DL
HGB UR QL STRIP: NEGATIVE
KETONES UR STRIP-MCNC: NEGATIVE MG/DL
LEUKOCYTE ESTERASE UR QL STRIP: NEGATIVE
NITRATE UR QL: NEGATIVE
PH UR STRIP: 7 [PH] (ref 5–7)
SP GR UR STRIP: <=1.005 (ref 1–1.03)
TRICHOMONAS, WET PREP: ABNORMAL
UROBILINOGEN UR STRIP-ACNC: 0.2 E.U./DL
WBC'S/HIGH POWER FIELD, WET PREP: ABNORMAL
YEAST, WET PREP: ABNORMAL

## 2022-08-29 PROCEDURE — 99421 OL DIG E/M SVC 5-10 MIN: CPT | Performed by: NURSE PRACTITIONER

## 2022-08-29 PROCEDURE — 81003 URINALYSIS AUTO W/O SCOPE: CPT

## 2022-08-29 PROCEDURE — 87210 SMEAR WET MOUNT SALINE/INK: CPT

## 2022-08-30 NOTE — PATIENT INSTRUCTIONS
Dear Kristel Martinez,     After reviewing your responses, I would like you to come in for a urine test to make sure we treat you correctly. This urine test is to evaluate you for a possible urinary tract infection, and should be scheduled for today or tomorrow. Schedule a Lab Only appointment here.     Lab appointments are not available at most locations on the weekends. If no Lab Only appointment is available, you should be seen in any of our convenient Walk-in or Urgent Care Centers, which can be found on our website here.     You will receive instructions with your results in [employer] once they are available.     If your symptoms worsen, you develop pain in your back or stomach, develop fevers, or are not improving in 5 days, please contact your primary care provider for an appointment or visit a Walk-in or Urgent Care Center to be seen.     Thanks again for choosing us as your health care partner,     DAVID Lazo CNP

## 2022-09-06 ENCOUNTER — OFFICE VISIT (OUTPATIENT)
Dept: FAMILY MEDICINE | Facility: CLINIC | Age: 42
End: 2022-09-06
Payer: COMMERCIAL

## 2022-09-06 VITALS
HEART RATE: 105 BPM | HEIGHT: 70 IN | TEMPERATURE: 97.7 F | RESPIRATION RATE: 16 BRPM | OXYGEN SATURATION: 100 % | BODY MASS INDEX: 32.35 KG/M2 | SYSTOLIC BLOOD PRESSURE: 100 MMHG | DIASTOLIC BLOOD PRESSURE: 68 MMHG | WEIGHT: 226 LBS

## 2022-09-06 DIAGNOSIS — F41.1 GAD (GENERALIZED ANXIETY DISORDER): ICD-10-CM

## 2022-09-06 DIAGNOSIS — Z11.4 SCREENING FOR HIV (HUMAN IMMUNODEFICIENCY VIRUS): ICD-10-CM

## 2022-09-06 DIAGNOSIS — F33.1 MAJOR DEPRESSIVE DISORDER, RECURRENT EPISODE, MODERATE (H): ICD-10-CM

## 2022-09-06 DIAGNOSIS — Z20.822 SUSPECTED 2019 NOVEL CORONAVIRUS INFECTION: Primary | ICD-10-CM

## 2022-09-06 DIAGNOSIS — H65.92 OME (OTITIS MEDIA WITH EFFUSION), LEFT: ICD-10-CM

## 2022-09-06 DIAGNOSIS — C73 MALIGNANT NEOPLASM OF THYROID GLAND (H): ICD-10-CM

## 2022-09-06 DIAGNOSIS — D80.2 IGA DEFICIENCY (H): ICD-10-CM

## 2022-09-06 DIAGNOSIS — Z11.59 NEED FOR HEPATITIS C SCREENING TEST: ICD-10-CM

## 2022-09-06 LAB — SARS-COV-2 RNA RESP QL NAA+PROBE: POSITIVE

## 2022-09-06 PROCEDURE — 87635 SARS-COV-2 COVID-19 AMP PRB: CPT | Performed by: FAMILY MEDICINE

## 2022-09-06 PROCEDURE — 99214 OFFICE O/P EST MOD 30 MIN: CPT | Mod: CS | Performed by: FAMILY MEDICINE

## 2022-09-06 RX ORDER — AMOXICILLIN 500 MG/1
500 CAPSULE ORAL 3 TIMES DAILY
Qty: 30 CAPSULE | Refills: 0 | Status: SHIPPED | OUTPATIENT
Start: 2022-09-06 | End: 2022-09-16

## 2022-09-06 ASSESSMENT — PAIN SCALES - GENERAL: PAINLEVEL: MODERATE PAIN (5)

## 2022-09-06 NOTE — PROGRESS NOTES
"  Assessment & Plan     (Z20.822) Suspected 2019 novel coronavirus infection  (primary encounter diagnosis)  Comment: I suspect that the patient did have COVID-19 and her rapid test did come back positive.  Plan: Symptomatic; Yes; 9/2/2022 COVID-19 Virus         (Coronavirus) by PCR Nasopharyngeal        Patient is aware that she needs to isolate herself for 5 days and will contact for all family members that she was with during the holiday weekend.    (H65.92) OME (otitis media with effusion), left  Comment: She does have an acute otitis media on the left  Plan: amoxicillin (AMOXIL) 500 MG capsule        I did start her on antibiotics for that.    (Z11.4) Screening for HIV (human immunodeficiency virus)  Comment: She is agreeable to the CDC recommendations for HIV screening  Plan: HIV Antigen Antibody Combo        She would like to wait until she has to have her thyroid labs drawn again we will do that at the same time.    (Z11.59) Need for hepatitis C screening test  Comment: Patient is agreeable for this CDC recommendations for hepatitis C screening  Plan: Hepatitis C Screen Reflex to HCV RNA Quant and         Genotype        Patient will draw this when she has her thyroid labs drawn.    (D80.2) IgA deficiency (H)  Comment: Patient has an immune deficiency  Plan: This is followed elsewhere.    (F33.1) Major depressive disorder, recurrent episode, moderate (H)  Comment: Depression has been stable on her current medications.  Plan: Not addressed today.    (C73) Malignant neoplasm of thyroid gland (H)  Comment: She has had a total thyroidectomy  Plan: This is being followed by endocrinology    25 minutes spent on the date of the encounter doing chart review, history and exam, documentation and further activities per the note       BMI:   Estimated body mass index is 32.43 kg/m  as calculated from the following:    Height as of this encounter: 1.778 m (5' 10\").    Weight as of this encounter: 102.5 kg (226 lb). " "  Weight management plan: Not addressed today.      Return if symptoms worsen or fail to improve.    Electronically signed by:  Adolfo Rhoades M.D.  9/6/2022      Kamini Humphries is a 42 year old, presenting for the following health issues:  Pharyngitis      HPI     Acute Illness  Acute illness concerns: Sore Throat  Onset/Duration: Started Friday  Symptoms:  Fever: No  Chills/Sweats: No  Headache (location?): YES  Sinus Pressure: YES  Conjunctivitis:  No  Ear Pain: YES: bilateral  Rhinorrhea: YES  Congestion: YES  Sore Throat: YES  Cough: YES-non-productive  Wheeze: YES  Decreased Appetite: No  Nausea: No  Vomiting: No  Diarrhea: No  Dysuria/Freq.: No  Dysuria or Hematuria: No  Fatigue/Achiness: No  Sick/Strep Exposure: Work at a    Therapies tried and outcome: Sinus and Tylenol over the counter medications.   Symptoms as noted above are consistent with the new omicron or the new BA4 or 5 variant of COVID-19.  She works in a  center and has not had any cases of strep or COVID that have been reported at the center.  She denies any fevers and no other family members have been sick.  Her cough is nonproductive and her rhinorrhea is clear.    Review of Systems   Constitutional, HEENT, cardiovascular, pulmonary, gi and gu systems are negative, except as otherwise noted.      Objective    /68 (BP Location: Left arm, Patient Position: Sitting, Cuff Size: Adult Large)   Pulse 105   Temp 97.7  F (36.5  C) (Temporal)   Resp 16   Ht 1.778 m (5' 10\")   Wt 102.5 kg (226 lb)   LMP  (LMP Unknown)   SpO2 100%   BMI 32.43 kg/m    Body mass index is 32.43 kg/m .  Physical Exam   GENERAL: healthy, alert and no distress  HENT: normal cephalic/atraumatic, right ear: normal: no effusions, no erythema, normal landmarks, left ear: The whole upper half of the TM is red and bulging without normal landmarks.  She does not have a normal light reflex.  The inferior aspect is not nearly as red and there " appears to be fluid behind the TM., nose and mouth without ulcers or lesions, oropharynx clear with minimal erythema but no exudate.  Her oral mucous membranes are moist  NECK: Supple with minimal posterior cervical adenopathy.  It is nontender.  RESP: lungs clear to auscultation - no rales, rhonchi or wheezes  CV: regular rate and rhythm, normal S1 S2, no S3 or S4, no murmur, click or rub, no peripheral edema and peripheral pulses strong    Results for orders placed or performed in visit on 09/06/22 (from the past 24 hour(s))   Symptomatic; Yes; 9/2/2022 COVID-19 Virus (Coronavirus) by PCR Nose    Specimen: Nose; Swab   Result Value Ref Range    SARS CoV2 PCR Positive (A) Negative    Narrative    Testing was performed using the [employer]ena  SARS-CoV-2 & Influenza A/B Assay on the [employer]  Mai  System.  This test should be ordered for the detection of SARS-COV-2 in individuals who meet SARS-CoV-2 clinical and/or epidemiological criteria. Test performance is unknown in asymptomatic patients.  This test is for in vitro diagnostic use under the FDA EUA for laboratories certified under CLIA to perform moderate and/or high complexity testing. This test has not been FDA cleared or approved.  A negative test does not rule out the presence of PCR inhibitors in the specimen or target RNA in concentration below the limit of detection for the assay. The possibility of a false negative should be considered if the patient's recent exposure or clinical presentation suggests COVID-19.  [facility] are certified under the Clinical Laboratory Improvement Amendments of 1988 (CLIA-88) as qualified to perform moderate and/or high complexity laboratory testing.                   @KI@  [unfilled]

## 2022-09-08 DIAGNOSIS — B00.1 RECURRENT COLD SORES: ICD-10-CM

## 2022-09-08 RX ORDER — VALACYCLOVIR HYDROCHLORIDE 1 G/1
TABLET, FILM COATED ORAL
Qty: 4 TABLET | Refills: 1 | Status: SHIPPED | OUTPATIENT
Start: 2022-09-08 | End: 2023-03-06

## 2022-09-08 RX ORDER — ESCITALOPRAM OXALATE 20 MG/1
TABLET ORAL
Qty: 90 TABLET | Refills: 0 | Status: SHIPPED | OUTPATIENT
Start: 2022-09-08 | End: 2022-12-12

## 2022-09-08 NOTE — TELEPHONE ENCOUNTER
Routing refill request to provider for review/approval because:  PHQ9> 5  PHQ 7/1/2021 6/28/2022 8/19/2022   PHQ-9 Total Score 11 12 12   Q9: Thoughts of better off dead/self-harm past 2 weeks Not at all Not at all Not at all      SHERYL Matos RN

## 2022-09-29 ENCOUNTER — E-VISIT (OUTPATIENT)
Dept: FAMILY MEDICINE | Facility: CLINIC | Age: 42
End: 2022-09-29
Payer: COMMERCIAL

## 2022-09-29 DIAGNOSIS — Z53.9 ERRONEOUS ENCOUNTER--DISREGARD: Primary | ICD-10-CM

## 2022-09-30 ENCOUNTER — OFFICE VISIT (OUTPATIENT)
Dept: FAMILY MEDICINE | Facility: CLINIC | Age: 42
End: 2022-09-30
Payer: COMMERCIAL

## 2022-09-30 VITALS
WEIGHT: 226 LBS | DIASTOLIC BLOOD PRESSURE: 78 MMHG | SYSTOLIC BLOOD PRESSURE: 122 MMHG | HEART RATE: 100 BPM | OXYGEN SATURATION: 99 % | RESPIRATION RATE: 14 BRPM | TEMPERATURE: 98.1 F | BODY MASS INDEX: 32.35 KG/M2 | HEIGHT: 70 IN

## 2022-09-30 DIAGNOSIS — R09.81 NASAL CONGESTION: ICD-10-CM

## 2022-09-30 DIAGNOSIS — H65.92 OME (OTITIS MEDIA WITH EFFUSION), LEFT: ICD-10-CM

## 2022-09-30 DIAGNOSIS — B34.9 VIRAL ILLNESS: Primary | ICD-10-CM

## 2022-09-30 PROCEDURE — 99213 OFFICE O/P EST LOW 20 MIN: CPT | Performed by: STUDENT IN AN ORGANIZED HEALTH CARE EDUCATION/TRAINING PROGRAM

## 2022-09-30 RX ORDER — BENZONATATE 200 MG/1
200 CAPSULE ORAL 3 TIMES DAILY PRN
Qty: 90 CAPSULE | Refills: 1 | Status: SHIPPED | OUTPATIENT
Start: 2022-09-30 | End: 2023-01-12

## 2022-09-30 RX ORDER — LORATADINE 10 MG/1
10 TABLET ORAL DAILY
Qty: 30 TABLET | Refills: 1 | Status: SHIPPED | OUTPATIENT
Start: 2022-09-30 | End: 2023-01-12

## 2022-09-30 RX ORDER — FLUTICASONE PROPIONATE 50 MCG
1 SPRAY, SUSPENSION (ML) NASAL DAILY
Qty: 16 G | Refills: 1 | Status: SHIPPED | OUTPATIENT
Start: 2022-09-30 | End: 2023-02-02

## 2022-09-30 ASSESSMENT — ENCOUNTER SYMPTOMS
SINUS PRESSURE: 1
SINUS PAIN: 1
SORE THROAT: 1

## 2022-09-30 ASSESSMENT — PATIENT HEALTH QUESTIONNAIRE - PHQ9
SUM OF ALL RESPONSES TO PHQ QUESTIONS 1-9: 11
10. IF YOU CHECKED OFF ANY PROBLEMS, HOW DIFFICULT HAVE THESE PROBLEMS MADE IT FOR YOU TO DO YOUR WORK, TAKE CARE OF THINGS AT HOME, OR GET ALONG WITH OTHER PEOPLE: SOMEWHAT DIFFICULT
SUM OF ALL RESPONSES TO PHQ QUESTIONS 1-9: 11

## 2022-09-30 ASSESSMENT — PAIN SCALES - GENERAL: PAINLEVEL: SEVERE PAIN (7)

## 2022-09-30 NOTE — PATIENT INSTRUCTIONS
Hello! It was a pleasure seeing you today. Just some things we discussed at today's visit:     Ear Pain   Suspect due to viral illness  Ok to manage with supportive care  Drink plenty of fluids   Take flonase daily and claritin for congestion   If your symptoms worsen please make another appointment with us or if the pain becomes suddenly unbearable go to the urgent care or emergency department        Sincerely,     Nan Moreno MD

## 2022-09-30 NOTE — PROGRESS NOTES
1. Nasal congestion, suspect viral infection   2. OME (otitis media with effusion), left this can be due to recent AOM infection   > CENTOR criteria score was 0, no need to check for strep throat  > given recent illness with covid, doubt her viral infection is due to SARS-CoV-2   - encouraged pt to get bivalent vaccination for covid   - supportive management encouraged including drinking plenty of fluids, hot water with honey and lemon   - tessalon pearls ordered for cough   - ok to start loratadine (CLARITIN) 10 MG tablet; Take 1 tablet (10 mg) by mouth daily  Dispense: 30 tablet; Refill: 1  - ok to start fluticasone (FLONASE) 50 MCG/ACT nasal spray; Spray 1 spray into both nostrils daily  Dispense: 16 g; Refill: 1    follow up plan:   - if her symptoms worsen, recheck her ears as maybe she was experiencing symptoms before findings appeared on physical exam, if there are abnormal findings it could be reasonable to include bacterial infection in ddx and consider treatment with antibiotics     Kamini Humphries is a 42 year old presenting for the following health issues:  Ear Problem      History of Present Illness       Reason for visit:  Ear aches (both ears having pain 7/10 currently)  Symptom onset:  1-3 days ago  Symptoms include:  Ear pain sore throat headache low grade fever  Symptom intensity:  Moderate  Symptom progression:  Worsening  Had these symptoms before:  Yes  Has tried/received treatment for these symptoms:  Yes  Previous treatment was successful:  No  What makes it worse:  Wind on my ears. Laying on my ear. My ears wont pop  What makes it better:  Heat pack/wrap    She eats 0-1 servings of fruits and vegetables daily.She consumes 1 sweetened beverage(s) daily.She exercises with enough effort to increase her heart rate 10 to 19 minutes per day.  She exercises with enough effort to increase her heart rate 3 or less days per week. She is missing 1 dose(s) of medications per week.  She is not taking  "prescribed medications regularly due to remembering to take.    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 Negative for ear discharge and tinnitus.    Negative except for as above       Objective    /78 (BP Location: Left arm, Patient Position: Sitting, Cuff Size: Adult Large)   Pulse 100   Temp 98.1  F (36.7  C) (Tympanic)   Resp 14   Ht 1.778 m (5' 10\")   Wt 102.5 kg (226 lb)   LMP  (LMP Unknown)   SpO2 99%   Breastfeeding No   BMI 32.43 kg/m    Body mass index is 32.43 kg/m .     Physical Exam  Constitutional:       General: She is not in acute distress.  HENT:      Head: Normocephalic and atraumatic.      Right Ear: Tympanic membrane, ear canal and external ear normal. There is no impacted cerumen.      Left Ear: Ear canal and external ear normal. There is no impacted cerumen.      Ears:      Comments: Mild cerumen in right ear canal, but not impacted and right TM was still fully visualized, there was no indication of infection, cone of light was appreciated, there was no hemotympanum, bulging, or other obvious signs of infection     Left TM did show significant fluid behind the TM      Nose: " No rhinorrhea.      Mouth/Throat:      Mouth: Mucous membranes are moist.      Pharynx: Oropharynx is clear. No oropharyngeal exudate or posterior oropharyngeal erythema.      Comments: No enlarged lymph nodes   Eyes:      Extraocular Movements: Extraocular movements intact.   Cardiovascular:      Rate and Rhythm: Normal rate and regular rhythm.      Heart sounds: Normal heart sounds.   Pulmonary:      Effort: Pulmonary effort is normal. No respiratory distress.      Breath sounds: Normal breath sounds. No wheezing or rhonchi.   Musculoskeletal:         General: No deformity. Normal range of motion.      Cervical back: Normal range of motion and neck supple.   Skin:     General: Skin is warm.      Findings: No rash.   Neurological:      General: No focal deficit present.      Mental Status: She is alert and oriented to person, place, and time.   Psychiatric:         Mood and Affect: Mood normal.           Office Visit on 09/06/2022   Component Date Value Ref Range Status     SARS CoV2 PCR 09/06/2022 Positive (A) Negative Final    POSITIVE: SARS-CoV-2 (COVID-19) RNA detected, presumed positive.       Nan Moreno MD

## 2022-10-23 ENCOUNTER — MYC MEDICAL ADVICE (OUTPATIENT)
Dept: FAMILY MEDICINE | Facility: CLINIC | Age: 42
End: 2022-10-23

## 2022-10-24 ENCOUNTER — E-VISIT (OUTPATIENT)
Dept: FAMILY MEDICINE | Facility: CLINIC | Age: 42
End: 2022-10-24
Payer: COMMERCIAL

## 2022-10-24 DIAGNOSIS — Z53.9 ERRONEOUS ENCOUNTER--DISREGARD: Primary | ICD-10-CM

## 2022-11-16 DIAGNOSIS — F41.1 GAD (GENERALIZED ANXIETY DISORDER): ICD-10-CM

## 2022-11-16 DIAGNOSIS — F33.1 MAJOR DEPRESSIVE DISORDER, RECURRENT EPISODE, MODERATE (H): ICD-10-CM

## 2022-11-17 RX ORDER — BUPROPION HYDROCHLORIDE 150 MG/1
TABLET ORAL
Qty: 30 TABLET | Refills: 0 | Status: SHIPPED | OUTPATIENT
Start: 2022-11-17 | End: 2022-11-29

## 2022-11-20 ENCOUNTER — HEALTH MAINTENANCE LETTER (OUTPATIENT)
Age: 42
End: 2022-11-20

## 2022-11-29 DIAGNOSIS — F41.1 GAD (GENERALIZED ANXIETY DISORDER): ICD-10-CM

## 2022-11-29 DIAGNOSIS — F33.1 MAJOR DEPRESSIVE DISORDER, RECURRENT EPISODE, MODERATE (H): ICD-10-CM

## 2022-11-29 RX ORDER — BUPROPION HYDROCHLORIDE 150 MG/1
TABLET ORAL
Qty: 90 TABLET | Refills: 0 | Status: SHIPPED | OUTPATIENT
Start: 2022-11-29 | End: 2023-03-06

## 2022-12-05 ENCOUNTER — OFFICE VISIT (OUTPATIENT)
Dept: URGENT CARE | Facility: URGENT CARE | Age: 42
End: 2022-12-05
Payer: COMMERCIAL

## 2022-12-05 VITALS
DIASTOLIC BLOOD PRESSURE: 84 MMHG | TEMPERATURE: 98.7 F | HEART RATE: 70 BPM | OXYGEN SATURATION: 100 % | BODY MASS INDEX: 33 KG/M2 | WEIGHT: 230 LBS | SYSTOLIC BLOOD PRESSURE: 136 MMHG

## 2022-12-05 DIAGNOSIS — J32.9 SINUSITIS, UNSPECIFIED CHRONICITY, UNSPECIFIED LOCATION: Primary | ICD-10-CM

## 2022-12-05 DIAGNOSIS — R07.0 THROAT PAIN: ICD-10-CM

## 2022-12-05 LAB
DEPRECATED S PYO AG THROAT QL EIA: NEGATIVE
FLUAV AG SPEC QL IA: NEGATIVE
FLUBV AG SPEC QL IA: NEGATIVE
GROUP A STREP BY PCR: NOT DETECTED

## 2022-12-05 PROCEDURE — 87651 STREP A DNA AMP PROBE: CPT | Performed by: PHYSICIAN ASSISTANT

## 2022-12-05 PROCEDURE — 87804 INFLUENZA ASSAY W/OPTIC: CPT | Performed by: PHYSICIAN ASSISTANT

## 2022-12-05 PROCEDURE — 99213 OFFICE O/P EST LOW 20 MIN: CPT | Performed by: PHYSICIAN ASSISTANT

## 2022-12-05 RX ORDER — DOXYCYCLINE 100 MG/1
100 CAPSULE ORAL 2 TIMES DAILY
Qty: 20 CAPSULE | Refills: 0 | Status: SHIPPED | OUTPATIENT
Start: 2022-12-05 | End: 2022-12-15

## 2022-12-05 NOTE — LETTER
[facility] [place] [id number] [address]  Phone: [phone]  Fax: [phone]    December 5, 2022        Kristel Martinez  [address] [place] [address] [id number]  [facility] [address]          To whom it may concern:    RE: Kristel Martinez    Patient was seen and treated today at our clinic.    Please contact me for questions or concerns.      Sincerely,        Tess Laboy PA-C

## 2022-12-05 NOTE — PROGRESS NOTES
Assessment & Plan     Sinusitis, unspecified chronicity, unspecified location  Continue to monitor symptoms over the next week. Gave written Rx for doxycycline that can be filled if needed. Follow up as needed.     - doxycycline monohydrate (MONODOX) 100 MG capsule; Take 1 capsule (100 mg) by mouth 2 times daily for 10 days    Throat pain    - Streptococcus A Rapid Screen w/Reflex to PCR - Clinic Collect  - Influenza A & B Antigen - Clinic Collect  - Group A Streptococcus PCR Throat Swab                 Return in about 1 week (around 12/12/2022), or if symptoms worsen or fail to improve.    ELLE [facility] [place]                Subjective   Chief Complaint   Patient presents with     Pharyngitis     Ear Problem     Both ears sore for about a week, sore throat began yesterday.  Sinus pressure, headache.         HPI     URI Adult    Onset of symptoms was 1 week for ears and sinus, yesterday for throat   Course of illness is same.    Severity moderate  Current and Associated symptoms: ear and sinus pressure, sore throat   Treatment measures tried include Tylenol/Ibuprofen, Decongestants and Antihistamine.  Predisposing factors include works at .                Review of Systems   Constitutional, HEENT, cardiovascular, pulmonary, gi and gu systems are negative, except as otherwise noted.      Objective    /84   Pulse 70   Temp 98.7  F (37.1  C) (Tympanic)   Wt 104.3 kg (230 lb)   SpO2 100%   BMI 33.00 kg/m    Body mass index is 33 kg/m .  Physical Exam  Constitutional:       Appearance: She is well-developed.   HENT:      Head: Normocephalic.      Right Ear: Tympanic membrane and ear canal normal.      Left Ear: Tympanic membrane and ear canal normal.      Mouth/Throat:      Pharynx: Posterior oropharyngeal erythema present.   Eyes:      Conjunctiva/sclera: Conjunctivae normal.   Cardiovascular:      Rate and Rhythm: Normal rate.      Heart sounds: Normal  heart sounds.   Pulmonary:      Effort: Pulmonary effort is normal.      Breath sounds: Normal breath sounds.   Skin:     General: Skin is warm and dry.      Findings: No rash.   Psychiatric:         Behavior: Behavior normal.

## 2022-12-10 DIAGNOSIS — F41.1 GAD (GENERALIZED ANXIETY DISORDER): ICD-10-CM

## 2022-12-10 DIAGNOSIS — F33.1 MAJOR DEPRESSIVE DISORDER, RECURRENT EPISODE, MODERATE (H): ICD-10-CM

## 2022-12-12 NOTE — TELEPHONE ENCOUNTER
Routing refill request to provider for review/approval because:  PHQ-9 out of range:    PHQ 6/28/2022 8/19/2022 9/30/2022   PHQ-9 Total Score 12 12 11   Q9: Thoughts of better off dead/self-harm past 2 weeks Not at all Not at all Not at all     Kayla TEJEDA RN

## 2022-12-13 RX ORDER — ESCITALOPRAM OXALATE 20 MG/1
TABLET ORAL
Qty: 90 TABLET | Refills: 0 | Status: SHIPPED | OUTPATIENT
Start: 2022-12-13 | End: 2023-03-06

## 2022-12-31 NOTE — TELEPHONE ENCOUNTER
Assumed care of pt, all questions and concerns addressed. Pt oriented to room, postpartum floor and call light system. MD notified of high BP of 176/101. Pain managed with medications per MAR. Willard in place and operated properly. Call light within reach, bed in lowest and locked position.     Provider E-Visit time total (minutes): 0 - no follow up testing planned at this time.

## 2023-01-04 NOTE — NURSING NOTE
"Initial /72 (BP Location: Right arm, Patient Position: Sitting, Cuff Size: Adult Large)  Pulse 92  Ht 5' 10\" (1.778 m)  Wt 220 lb (99.8 kg)  BMI 31.57 kg/m2 Estimated body mass index is 31.57 kg/(m^2) as calculated from the following:    Height as of this encounter: 5' 10\" (1.778 m).    Weight as of this encounter: 220 lb (99.8 kg). .    Jamshid JENNINGS CMA    " Consent (Lip)/Introductory Paragraph: The rationale for Mohs was explained to the patient and consent was obtained. The risks, benefits and alternatives to therapy were discussed in detail. Specifically, the risks of lip deformity, changes in the oral aperture, infection, scarring, bleeding, prolonged wound healing, incomplete removal, allergy to anesthesia, nerve injury and recurrence were addressed. Prior to the procedure, the treatment site was clearly identified and confirmed by the patient. All components of Universal Protocol/PAUSE Rule completed.

## 2023-01-12 ENCOUNTER — OFFICE VISIT (OUTPATIENT)
Dept: FAMILY MEDICINE | Facility: CLINIC | Age: 43
End: 2023-01-12
Payer: COMMERCIAL

## 2023-01-12 VITALS
WEIGHT: 221.6 LBS | SYSTOLIC BLOOD PRESSURE: 112 MMHG | BODY MASS INDEX: 32.82 KG/M2 | HEIGHT: 69 IN | HEART RATE: 86 BPM | OXYGEN SATURATION: 100 % | TEMPERATURE: 98.9 F | DIASTOLIC BLOOD PRESSURE: 82 MMHG | RESPIRATION RATE: 18 BRPM

## 2023-01-12 DIAGNOSIS — R10.31 RLQ ABDOMINAL PAIN: Primary | ICD-10-CM

## 2023-01-12 DIAGNOSIS — E89.0 POSTOPERATIVE HYPOTHYROIDISM: ICD-10-CM

## 2023-01-12 LAB
ALBUMIN SERPL BCG-MCNC: 4.5 G/DL (ref 3.5–5.2)
ALP SERPL-CCNC: 60 U/L (ref 35–104)
ALT SERPL W P-5'-P-CCNC: 12 U/L (ref 10–35)
ANION GAP SERPL CALCULATED.3IONS-SCNC: 10 MMOL/L (ref 7–15)
AST SERPL W P-5'-P-CCNC: 16 U/L (ref 10–35)
BASOPHILS # BLD AUTO: 0 10E3/UL (ref 0–0.2)
BASOPHILS NFR BLD AUTO: 0 %
BILIRUB SERPL-MCNC: 0.2 MG/DL
BUN SERPL-MCNC: 6.2 MG/DL (ref 6–20)
CALCIUM SERPL-MCNC: 9.4 MG/DL (ref 8.6–10)
CHLORIDE SERPL-SCNC: 104 MMOL/L (ref 98–107)
CREAT SERPL-MCNC: 0.69 MG/DL (ref 0.51–0.95)
CRP SERPL-MCNC: <3 MG/L
DEPRECATED HCO3 PLAS-SCNC: 25 MMOL/L (ref 22–29)
EOSINOPHIL # BLD AUTO: 0.1 10E3/UL (ref 0–0.7)
EOSINOPHIL NFR BLD AUTO: 1 %
ERYTHROCYTE [DISTWIDTH] IN BLOOD BY AUTOMATED COUNT: 13 % (ref 10–15)
ERYTHROCYTE [SEDIMENTATION RATE] IN BLOOD BY WESTERGREN METHOD: 7 MM/HR (ref 0–20)
GFR SERPL CREATININE-BSD FRML MDRD: >90 ML/MIN/1.73M2
GLUCOSE SERPL-MCNC: 98 MG/DL (ref 70–99)
HCT VFR BLD AUTO: 39.7 % (ref 35–47)
HGB BLD-MCNC: 13 G/DL (ref 11.7–15.7)
IMM GRANULOCYTES # BLD: 0 10E3/UL
IMM GRANULOCYTES NFR BLD: 0 %
LYMPHOCYTES # BLD AUTO: 1.3 10E3/UL (ref 0.8–5.3)
LYMPHOCYTES NFR BLD AUTO: 23 %
MCH RBC QN AUTO: 27.4 PG (ref 26.5–33)
MCHC RBC AUTO-ENTMCNC: 32.7 G/DL (ref 31.5–36.5)
MCV RBC AUTO: 84 FL (ref 78–100)
MONOCYTES # BLD AUTO: 0.3 10E3/UL (ref 0–1.3)
MONOCYTES NFR BLD AUTO: 5 %
NEUTROPHILS # BLD AUTO: 4.2 10E3/UL (ref 1.6–8.3)
NEUTROPHILS NFR BLD AUTO: 71 %
PLATELET # BLD AUTO: 267 10E3/UL (ref 150–450)
POTASSIUM SERPL-SCNC: 4.1 MMOL/L (ref 3.4–5.3)
PROT SERPL-MCNC: 7.3 G/DL (ref 6.4–8.3)
RBC # BLD AUTO: 4.75 10E6/UL (ref 3.8–5.2)
SODIUM SERPL-SCNC: 139 MMOL/L (ref 136–145)
TSH SERPL DL<=0.005 MIU/L-ACNC: 0.48 UIU/ML (ref 0.3–4.2)
WBC # BLD AUTO: 5.9 10E3/UL (ref 4–11)

## 2023-01-12 PROCEDURE — 87389 HIV-1 AG W/HIV-1&-2 AB AG IA: CPT | Performed by: PHYSICIAN ASSISTANT

## 2023-01-12 PROCEDURE — 86140 C-REACTIVE PROTEIN: CPT | Performed by: PHYSICIAN ASSISTANT

## 2023-01-12 PROCEDURE — 86803 HEPATITIS C AB TEST: CPT | Performed by: PHYSICIAN ASSISTANT

## 2023-01-12 PROCEDURE — 99214 OFFICE O/P EST MOD 30 MIN: CPT | Performed by: PHYSICIAN ASSISTANT

## 2023-01-12 PROCEDURE — 36415 COLL VENOUS BLD VENIPUNCTURE: CPT | Performed by: PHYSICIAN ASSISTANT

## 2023-01-12 PROCEDURE — 85652 RBC SED RATE AUTOMATED: CPT | Performed by: PHYSICIAN ASSISTANT

## 2023-01-12 PROCEDURE — 80050 GENERAL HEALTH PANEL: CPT | Performed by: PHYSICIAN ASSISTANT

## 2023-01-12 RX ORDER — DICYCLOMINE HCL 20 MG
20 TABLET ORAL EVERY 6 HOURS
Qty: 90 TABLET | Refills: 0 | Status: SHIPPED | OUTPATIENT
Start: 2023-01-12 | End: 2023-03-21

## 2023-01-12 ASSESSMENT — ENCOUNTER SYMPTOMS: ABDOMINAL PAIN: 1

## 2023-01-12 ASSESSMENT — PAIN SCALES - GENERAL: PAINLEVEL: MODERATE PAIN (4)

## 2023-01-12 NOTE — PATIENT INSTRUCTIONS
Patient Education   For the next two weeks, I recommend you take Ibuprofen 600 mg three times per day regardless of your pain. This is to help with the inflammation and help this heal faster. You can also take Tylenol 1000 mg three times per day as well. This is safe to all take together.     Try Bentyl as well.     Time will help us know what the cause of this is. If still struggling tomorrow, I can order a CT scan.

## 2023-01-12 NOTE — PROGRESS NOTES
Assessment & Plan   RLQ abdominal pain  Symptoms present for 1-2 days. Severe initially and now improved on its own. No other associated symptoms but has a known history of IBS type of symptoms. Vitals are wnl. Exam with mild RLQ tenderness. Did consider acute appe. CBC, ESR done in clinic today and are wnl. Since there are no fevers, chills, and normal inflammatory markers we will wait on a CT scan to eval for an acute appe. Suspect this is more likely related to her IBS. Trial for Bentyl. Pt will MyChart me based on her symptoms and if needed, will order a stat CT scan tomorrow.   - CBC with Platelets & Differential; Future  - ESR: Erythrocyte sedimentation rate; Future  - CRP, inflammation; Future  - Comprehensive metabolic panel; Future  - Comprehensive metabolic panel  - dicyclomine (BENTYL) 20 MG tablet; Take 1 tablet (20 mg) by mouth every 6 hours    Postoperative hypothyroidism  Due for recheck.   - TSH with free T4 reflex    Return if symptoms worsen or fail to improve, for In-Clinic Visit.    Dimitrios Bauer PA-C  [facility]    Kamini Humphries is a 42 year old, presenting for the following health issues:  Abdominal Pain      Abdominal Pain     History of Present Illness       Reason for visit:  Lower abdominal pain  Symptom onset:  1-3 days ago  Symptoms include:  Crampy pain  Symptom intensity:  Moderate  Symptom progression:  Staying the same  Had these symptoms before:  No  What makes it worse:  Standing. Walking. Moving. Coughing.  What makes it better:  Laying down, still. Heat    She eats 0-1 servings of fruits and vegetables daily.She consumes 1 sweetened beverage(s) daily.She exercises with enough effort to increase her heart rate 10 to 19 minutes per day.  She exercises with enough effort to increase her heart rate 3 or less days per week. She is missing 1 dose(s) of medications per week.  She is not taking prescribed medications regularly due to remembering to  "take.      Review of Systems   Gastrointestinal: Positive for abdominal pain.          Objective    /82 (BP Location: Right arm, Patient Position: Sitting, Cuff Size: Adult Large)   Pulse 86   Temp 98.9  F (37.2  C) (Tympanic)   Resp 18   Ht 1.759 m (5' 9.25\")   Wt 100.5 kg (221 lb 9.6 oz)   LMP  (LMP Unknown)   SpO2 100%   BMI 32.49 kg/m    Body mass index is 32.49 kg/m .  Physical Exam   Constitutional: healthy, alert, and no distress  Head: Normocephalic. Atraumatic  Eyes: No conjunctival injection, sclera anicteric  Neck: supple, no thyromegaly, nodules or asymmetry of the thyroid. No cervical LAD.  Cardiovascular: RRR. No murmurs, clicks, gallops, or rubs. No peripheral edema.   Respiratory: No resp distress. Lungs CTAB bilaterally.   Abdomen: soft, TTP in the RLQ, no rebound or guarding. NABS x2. No HSM or masses.   Musculoskeletal: extremities normal- no gross deformities noted, and normal muscle tone  Skin: no suspicious lesions or rashes  Neurologic: Gait normal. CN 2-12 grossly intact  Psychiatric: mentation appears normal and affect normal/bright                 "

## 2023-01-13 LAB
HCV AB SERPL QL IA: NONREACTIVE
HIV 1+2 AB+HIV1 P24 AG SERPL QL IA: NONREACTIVE

## 2023-01-23 ENCOUNTER — MYC MEDICAL ADVICE (OUTPATIENT)
Dept: FAMILY MEDICINE | Facility: CLINIC | Age: 43
End: 2023-01-23
Payer: COMMERCIAL

## 2023-02-01 ENCOUNTER — OFFICE VISIT (OUTPATIENT)
Dept: FAMILY MEDICINE | Facility: CLINIC | Age: 43
End: 2023-02-01
Payer: COMMERCIAL

## 2023-02-01 VITALS
TEMPERATURE: 97 F | HEART RATE: 88 BPM | OXYGEN SATURATION: 97 % | DIASTOLIC BLOOD PRESSURE: 82 MMHG | SYSTOLIC BLOOD PRESSURE: 132 MMHG | WEIGHT: 222 LBS | HEIGHT: 70 IN | BODY MASS INDEX: 31.78 KG/M2

## 2023-02-01 DIAGNOSIS — D80.2 IGA DEFICIENCY (H): ICD-10-CM

## 2023-02-01 DIAGNOSIS — F33.1 MAJOR DEPRESSIVE DISORDER, RECURRENT EPISODE, MODERATE (H): ICD-10-CM

## 2023-02-01 DIAGNOSIS — J06.9 UPPER RESPIRATORY TRACT INFECTION, UNSPECIFIED TYPE: ICD-10-CM

## 2023-02-01 DIAGNOSIS — H92.11 OTORRHEA OF RIGHT EAR: Primary | ICD-10-CM

## 2023-02-01 DIAGNOSIS — C73 MALIGNANT NEOPLASM OF THYROID GLAND (H): ICD-10-CM

## 2023-02-01 PROCEDURE — 99213 OFFICE O/P EST LOW 20 MIN: CPT | Performed by: FAMILY MEDICINE

## 2023-02-01 PROCEDURE — 0134A COVID-19 VACCINE BIVALENT BOOSTER 18+ (MODERNA): CPT | Performed by: FAMILY MEDICINE

## 2023-02-01 PROCEDURE — 91313 COVID-19 VACCINE BIVALENT BOOSTER 18+ (MODERNA): CPT | Performed by: FAMILY MEDICINE

## 2023-02-01 ASSESSMENT — PATIENT HEALTH QUESTIONNAIRE - PHQ9
10. IF YOU CHECKED OFF ANY PROBLEMS, HOW DIFFICULT HAVE THESE PROBLEMS MADE IT FOR YOU TO DO YOUR WORK, TAKE CARE OF THINGS AT HOME, OR GET ALONG WITH OTHER PEOPLE: SOMEWHAT DIFFICULT
SUM OF ALL RESPONSES TO PHQ QUESTIONS 1-9: 11
SUM OF ALL RESPONSES TO PHQ QUESTIONS 1-9: 11

## 2023-02-01 ASSESSMENT — PAIN SCALES - GENERAL: PAINLEVEL: MODERATE PAIN (4)

## 2023-02-01 NOTE — PROGRESS NOTES
Assessment & Plan     Otorrhea of right ear  Clear drainage from right ear for several weeks.  No signs of infection on exam.  Likely secondary to irritation from Q-tip use.  Recommended patient not use Q-tips in her ears.  If drainage continues or gets worse, may consider antibiotic or steroid eardrops.  Follow-up as needed.  Tylenol or Motrin as needed for pain.    Upper respiratory tract infection, unspecified type  Runny nose, nasal production, and sore throat starting today.  Patient concerned she may have COVID.  Swab as below to evaluate.  Recommended patient could use Flonase for nasal congestion and postnasal drip.  She has this at home and has experience with administration.    - Symptomatic COVID-19 Virus (Coronavirus) by PCR Nose    IgA deficiency (H)  Long-term IgA deficiency.  Well-controlled.  Nothing to do today.  Follow-up with PCP.    Major depressive disorder, recurrent episode, moderate (H)  Patient feels depressive disorder is well controlled today.  No changes in medications necessary.  No thoughts of harming herself or others.  Follow-up with PCP.    Malignant neoplasm of thyroid gland (H)  History of adenocarcinoma of thyroid and thyroidectomy.  Take levothyroxine as directed.  Last TSH 2 weeks ago was normal.  Follow-up with PCP.    She was recommended to get the influenza, COVID booster and pneumococcal vaccination but declined.  She was also recommended to follow-up with PCP for physical at her earliest convenience.    Madina Whelan, MS3  [employer]    Provider Disclosure:  I agree with above History, Review of Systems, Physical exam and Plan. I have reviewed the content of the documentation and have edited it as needed. I have personally performed the services documented here and the documentation accurately represents those services and the decisions I have made.    Didi Patton Mai, MD  [facility] RUEL Humphries is a 42 year old  woman presenting for the following health issues:  Otalgia    Patient has noticed clear drainage from right ear over the last several weeks.  No blood or discolored drainage on pillowcase.  No ringing in ears.  No difficulty hearing.  No recent trauma ears.  Occasionally has had crusting on the area outside of her ear.  Pain deep in the ear but no pain with touch.  Patient has been seen for this before, and was told she has eczema.  Recommended to leave it alone at that time.  Patient has also noticed increasing nasal congestion, runny nose, and sore throat today.  She is concerned she may contracted COVID for recent exposure.  No chest pain or shortness of breath.  No nausea, vomiting, constipation, or diarrhea.  No headache or dizziness.  No other concerns today.    History of Present Illness       Reason for visit:  Drainage from ear. Ear pain  Symptom onset:  1-2 weeks ago  Symptoms include:  Ear pain. Drainage  Symptom intensity:  Moderate  Symptom progression:  Worsening  Had these symptoms before:  No  What makes it better:  Warm pack. Ibuprofen    She eats 0-1 servings of fruits and vegetables daily.She consumes 1 sweetened beverage(s) daily.She exercises with enough effort to increase her heart rate 10 to 19 minutes per day.  She exercises with enough effort to increase her heart rate 3 or less days per week. She is missing 1 dose(s) of medications per week.  She is not taking prescribed medications regularly due to remembering to take.    Today's PHQ-9         PHQ-9 Total Score: 11    PHQ-9 Q9 Thoughts of better off dead/self-harm past 2 weeks :   Not at all    How difficult have these problems made it for you to do your work, take care of things at home, or get along with other people: Somewhat difficult       Review of Systems   Constitutional, HEENT, cardiovascular, pulmonary, gi and gu systems are negative, except as otherwise noted.      Objective    /82 (Cuff Size: Adult Regular)   Pulse 88    "Temp 97  F (36.1  C) (Temporal)   Ht 1.778 m (5' 10\")   Wt 100.7 kg (222 lb)   LMP 01/23/2023   SpO2 97%   BMI 31.85 kg/m    Body mass index is 31.85 kg/m .  Physical Exam   GENERAL: healthy, alert and no distress  EYES: Eyes grossly normal to inspection, PERRL and conjunctivae and sclerae normal.  No conjunctival injection  HENT: normal cephalic/atraumatic, right ear: TM normal, notable irritation at 1 o'clock position of ear canal with some clear exudate.  No signs of infection. left ear: Normal TM and canal. nose and mouth without ulcers or lesions, oropharynx clear and oral mucous membranes moist  NECK: no adenopathy, no asymmetry, masses.  Scar notable from previous thyroidectomy.  RESP: lungs clear to auscultation - no rales, rhonchi or wheezes  CV: regular rate and rhythm, normal S1 S2, no S3 or S4, no murmur, click or rub  PSYCH: mentation appears normal, affect normal/bright              "

## 2023-02-02 DIAGNOSIS — H65.92 OME (OTITIS MEDIA WITH EFFUSION), LEFT: ICD-10-CM

## 2023-02-02 DIAGNOSIS — R09.81 NASAL CONGESTION: ICD-10-CM

## 2023-02-02 RX ORDER — FLUTICASONE PROPIONATE 50 MCG
SPRAY, SUSPENSION (ML) NASAL
Qty: 16 G | Refills: 1 | Status: SHIPPED | OUTPATIENT
Start: 2023-02-02

## 2023-02-16 ENCOUNTER — VIRTUAL VISIT (OUTPATIENT)
Dept: UROLOGY | Facility: CLINIC | Age: 43
End: 2023-02-16
Attending: NURSE PRACTITIONER
Payer: COMMERCIAL

## 2023-02-16 DIAGNOSIS — N39.3 FEMALE STRESS INCONTINENCE: Primary | ICD-10-CM

## 2023-02-16 DIAGNOSIS — R30.0 DYSURIA: ICD-10-CM

## 2023-02-16 PROCEDURE — 99213 OFFICE O/P EST LOW 20 MIN: CPT | Mod: VID | Performed by: UROLOGY

## 2023-02-16 NOTE — PROGRESS NOTES
Kristel is a 42 year old who is being evaluated via a billable video visit.      How would you like to obtain your AVS? MyChart  If the video visit is dropped, the invitation should be resent by: Text to cell phone: [phone]  Will anyone else be joining your video visit? No              Subjective   Kristel is a 42 year old, presenting for the following health issues:  Video Visit      HPI     42-year-old  female with chronic dysuria which has been diagnosed with interstitial cystitis in the past with history of atorvastatin.  She continues have intermittent dysuria without any nocturia.  She does have mild frequency.  Previous urinalysis have been unremarkable.  Last year she had a pelvic MRI that was normal.  She has stress incontinence.    Review of Systems   Constitutional, HEENT, cardiovascular, pulmonary, gi and gu systems are negative, except as otherwise noted.      Objective           Vitals:  No vitals were obtained today due to virtual visit.    Physical Exam   GENERAL: Healthy, alert and no distress  EYES: Eyes grossly normal to inspection.  No discharge or erythema, or obvious scleral/conjunctival abnormalities.  RESP: No audible wheeze, cough, or visible cyanosis.  No visible retractions or increased work of breathing.    SKIN: Visible skin clear. No significant rash, abnormal pigmentation or lesions.  NEURO: Cranial nerves grossly intact.  Mentation and speech appropriate for age.  PSYCH: Mentation appears normal, affect normal/bright, judgement and insight intact, normal speech and appearance well-groomed.    #1 chronic dysuria: Schedule patient for cystoscopy next.    #2 stress incontinence: will discuss after cystoscopy             Video-Visit Details    Type of service:  Video Visit   Video Start Time: 1015  Video End Time:10:25 AM    Originating Location (pt. Location): Home    Distant Location (provider location):  Off-site  Platform used for Video Visit: Megathread

## 2023-02-20 ENCOUNTER — MYC MEDICAL ADVICE (OUTPATIENT)
Dept: FAMILY MEDICINE | Facility: CLINIC | Age: 43
End: 2023-02-20
Payer: COMMERCIAL

## 2023-02-20 DIAGNOSIS — E89.0 POSTOPERATIVE HYPOTHYROIDISM: Primary | ICD-10-CM

## 2023-02-27 ENCOUNTER — OFFICE VISIT (OUTPATIENT)
Dept: URGENT CARE | Facility: URGENT CARE | Age: 43
End: 2023-02-27
Payer: COMMERCIAL

## 2023-02-27 VITALS
HEART RATE: 68 BPM | SYSTOLIC BLOOD PRESSURE: 114 MMHG | BODY MASS INDEX: 31.85 KG/M2 | WEIGHT: 222 LBS | DIASTOLIC BLOOD PRESSURE: 76 MMHG | TEMPERATURE: 98.1 F | OXYGEN SATURATION: 100 %

## 2023-02-27 DIAGNOSIS — E89.0 POSTOPERATIVE HYPOTHYROIDISM: ICD-10-CM

## 2023-02-27 DIAGNOSIS — B96.89 BACTERIAL VAGINOSIS: ICD-10-CM

## 2023-02-27 DIAGNOSIS — H60.91 OTITIS EXTERNA OF RIGHT EAR, UNSPECIFIED CHRONICITY, UNSPECIFIED TYPE: Primary | ICD-10-CM

## 2023-02-27 DIAGNOSIS — N76.0 BACTERIAL VAGINOSIS: ICD-10-CM

## 2023-02-27 LAB
ALBUMIN UR-MCNC: NEGATIVE MG/DL
APPEARANCE UR: CLEAR
BILIRUB UR QL STRIP: NEGATIVE
CLUE CELLS: PRESENT
COLOR UR AUTO: YELLOW
GLUCOSE UR STRIP-MCNC: NEGATIVE MG/DL
HGB UR QL STRIP: NEGATIVE
KETONES UR STRIP-MCNC: NEGATIVE MG/DL
LEUKOCYTE ESTERASE UR QL STRIP: NEGATIVE
NITRATE UR QL: NEGATIVE
PH UR STRIP: 6 [PH] (ref 5–7)
SP GR UR STRIP: 1.02 (ref 1–1.03)
T4 FREE SERPL-MCNC: 1.42 NG/DL (ref 0.9–1.7)
TRICHOMONAS, WET PREP: ABNORMAL
TSH SERPL DL<=0.005 MIU/L-ACNC: 0.15 UIU/ML (ref 0.3–4.2)
UROBILINOGEN UR STRIP-ACNC: 1 E.U./DL
WBC'S/HIGH POWER FIELD, WET PREP: ABNORMAL
YEAST, WET PREP: ABNORMAL

## 2023-02-27 PROCEDURE — 84443 ASSAY THYROID STIM HORMONE: CPT | Performed by: NURSE PRACTITIONER

## 2023-02-27 PROCEDURE — 87591 N.GONORRHOEAE DNA AMP PROB: CPT | Performed by: NURSE PRACTITIONER

## 2023-02-27 PROCEDURE — 87210 SMEAR WET MOUNT SALINE/INK: CPT | Performed by: NURSE PRACTITIONER

## 2023-02-27 PROCEDURE — 99214 OFFICE O/P EST MOD 30 MIN: CPT | Performed by: NURSE PRACTITIONER

## 2023-02-27 PROCEDURE — 84439 ASSAY OF FREE THYROXINE: CPT | Performed by: NURSE PRACTITIONER

## 2023-02-27 PROCEDURE — 87491 CHLMYD TRACH DNA AMP PROBE: CPT | Performed by: NURSE PRACTITIONER

## 2023-02-27 PROCEDURE — 36415 COLL VENOUS BLD VENIPUNCTURE: CPT | Performed by: NURSE PRACTITIONER

## 2023-02-27 PROCEDURE — 81003 URINALYSIS AUTO W/O SCOPE: CPT | Performed by: NURSE PRACTITIONER

## 2023-02-27 RX ORDER — METRONIDAZOLE 500 MG/1
500 TABLET ORAL 2 TIMES DAILY
Qty: 14 TABLET | Refills: 0 | Status: SHIPPED | OUTPATIENT
Start: 2023-02-27 | End: 2023-03-06

## 2023-02-27 RX ORDER — HYDROCORTISONE AND ACETIC ACID 20.75; 10.375 MG/ML; MG/ML
4 SOLUTION AURICULAR (OTIC) 3 TIMES DAILY
Qty: 10 ML | Refills: 0 | Status: SHIPPED | OUTPATIENT
Start: 2023-02-27 | End: 2023-03-06

## 2023-02-27 ASSESSMENT — ENCOUNTER SYMPTOMS
PALPITATIONS: 0
HEARTBURN: 0
EYE PAIN: 0
DIZZINESS: 0
DIARRHEA: 1
ARTHRALGIAS: 0
MYALGIAS: 1
PARESTHESIAS: 0
FEVER: 0
COUGH: 0
SHORTNESS OF BREATH: 0
DYSURIA: 0
SINUS PAIN: 0
JOINT SWELLING: 0
SINUS PRESSURE: 0
NERVOUS/ANXIOUS: 0
ABDOMINAL PAIN: 0
HEADACHES: 1
BREAST MASS: 1
CHILLS: 0
CONSTIPATION: 1
WEAKNESS: 0
NAUSEA: 0
SORE THROAT: 0
SORE THROAT: 0
FREQUENCY: 0
HEMATURIA: 0
HEMATOCHEZIA: 0

## 2023-02-27 ASSESSMENT — PATIENT HEALTH QUESTIONNAIRE - PHQ9
10. IF YOU CHECKED OFF ANY PROBLEMS, HOW DIFFICULT HAVE THESE PROBLEMS MADE IT FOR YOU TO DO YOUR WORK, TAKE CARE OF THINGS AT HOME, OR GET ALONG WITH OTHER PEOPLE: VERY DIFFICULT
SUM OF ALL RESPONSES TO PHQ QUESTIONS 1-9: 13
SUM OF ALL RESPONSES TO PHQ QUESTIONS 1-9: 13

## 2023-02-28 LAB
C TRACH DNA SPEC QL NAA+PROBE: NEGATIVE
N GONORRHOEA DNA SPEC QL NAA+PROBE: NEGATIVE

## 2023-02-28 NOTE — PATIENT INSTRUCTIONS
Treatment of external otitis. Instill ear drops to the right ear for one week. In addition, can also apply to Qtip or cotton ball and apply to the the area of discomfort.   This is in place of a cream. I was hoping it came as a cream  but it does not.     -The results of other tests ordered today will be in MyChart. Meds have been sent for BV.

## 2023-02-28 NOTE — PROGRESS NOTES
Patient presents with:  Ear Problem: Right sided ear pain  Vaginal Problem: Odor and itching and discomfort       HPI:  Kristel Martinez is a 42 year old female who presents today complaining of right ear drainage off an on for a few months. States she was seen for this x2. Was told she has eczema and shouldn't touch it. The ear continues to drain clear fluid off and on. The right ear is swollen. No surrounding redness or pain.  Pt also reports vaginal discharge, odor and itch. Denies symptoms of bladder infection. Is ok with results going to [facility] and not waiting here for them.    History obtained from the patient.    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 Iodine ablation with 53 mCi.  07/2016, 07/2017: Neck US neg.     Postoperative hypothyroidism 3/23/2018       Social History     Tobacco Use     Smoking status: Former     Smokeless tobacco: Never   Substance Use Topics     Alcohol use: Yes     Comment: 1 drink per wk       Review of Systems   HENT: Positive for ear discharge. Negative for congestion, ear pain, sinus pressure, sinus pain and sore throat.     Genitourinary: Positive for vaginal discharge. Negative for vaginal pain.       Vitals:    02/27/23 1801   BP: 114/76   Pulse: 68   Temp: 98.1  F (36.7  C)   TempSrc: Tympanic   SpO2: 100%   Weight: 100.7 kg (222 lb)       Physical Exam  Constitutional:       Appearance: Normal appearance. She is not ill-appearing.   HENT:      Head: Normocephalic.      Right Ear: Tympanic membrane normal. There is no impacted cerumen.      Left Ear: Tympanic membrane, ear canal and external ear normal.      Ears:      Comments: Swelling of inner right tragus and surrounding canal. Dry skin with flaky appearance also present.     Nose: Nose normal.     Pt self collected wet prep, no pelvic exam was done.         Results:  Results for orders placed or performed in visit on 02/27/23   UA macro with reflex to Microscopic and Culture - Clinc Collect     Status: Normal    Specimen: Urine, Clean Catch   Result Value Ref Range    Color Urine Yellow Colorless, Straw, Light Yellow, Yellow    Appearance Urine Clear Clear    Glucose Urine Negative Negative mg/dL    Bilirubin Urine Negative Negative    Ketones Urine Negative Negative mg/dL    Specific Gravity Urine 1.025 1.003 - 1.035    Blood Urine Negative Negative    pH Urine 6.0 5.0 - 7.0    Protein Albumin Urine Negative Negative mg/dL    Urobilinogen Urine 1.0 0.2, 1.0 E.U./dL    Nitrite Urine Negative Negative    Leukocyte Esterase Urine Negative Negative    Narrative    Microscopic not indicated   Wet prep - Clinic Collect     Status: Abnormal    Specimen: Vagina; Swab   Result Value Ref Range    Trichomonas Absent Absent    Yeast Absent Absent    Clue Cells Present (A) Absent    WBCs/high power field 1+ (A) None          Clinical Decision Making:    At the end of the encounter, I discussed results, diagnosis, medications.Patient understood and agreed to plan. Patient was stable for discharge.      Comment: Wet prep returned with Clue Cells, Await results on remaining tests.  Plan: UA  macro with reflex to Microscopic and Culture        - Clinc Collect, Wet prep - Clinic Collect,         Neisseria gonorrhoeae PCR - Clinic Collect,         Chlamydia trachomatis PCR - Clinic Collect                There are no Patient Instructions on file for this visit.

## 2023-03-06 ENCOUNTER — OFFICE VISIT (OUTPATIENT)
Dept: FAMILY MEDICINE | Facility: CLINIC | Age: 43
End: 2023-03-06
Payer: COMMERCIAL

## 2023-03-06 VITALS
HEIGHT: 69 IN | TEMPERATURE: 96.8 F | HEART RATE: 74 BPM | DIASTOLIC BLOOD PRESSURE: 68 MMHG | SYSTOLIC BLOOD PRESSURE: 100 MMHG | RESPIRATION RATE: 16 BRPM | WEIGHT: 221 LBS | OXYGEN SATURATION: 98 % | BODY MASS INDEX: 32.73 KG/M2

## 2023-03-06 DIAGNOSIS — N92.0 MENORRHAGIA WITH REGULAR CYCLE: ICD-10-CM

## 2023-03-06 DIAGNOSIS — F33.1 MAJOR DEPRESSIVE DISORDER, RECURRENT EPISODE, MODERATE (H): ICD-10-CM

## 2023-03-06 DIAGNOSIS — F41.1 GAD (GENERALIZED ANXIETY DISORDER): ICD-10-CM

## 2023-03-06 DIAGNOSIS — Z12.4 CERVICAL CANCER SCREENING: ICD-10-CM

## 2023-03-06 DIAGNOSIS — Z00.00 ROUTINE GENERAL MEDICAL EXAMINATION AT A HEALTH CARE FACILITY: Primary | ICD-10-CM

## 2023-03-06 DIAGNOSIS — B00.1 RECURRENT COLD SORES: ICD-10-CM

## 2023-03-06 PROCEDURE — 99214 OFFICE O/P EST MOD 30 MIN: CPT | Mod: 25 | Performed by: NURSE PRACTITIONER

## 2023-03-06 PROCEDURE — G0145 SCR C/V CYTO,THINLAYER,RESCR: HCPCS | Performed by: NURSE PRACTITIONER

## 2023-03-06 PROCEDURE — 87624 HPV HI-RISK TYP POOLED RSLT: CPT | Performed by: NURSE PRACTITIONER

## 2023-03-06 PROCEDURE — 99396 PREV VISIT EST AGE 40-64: CPT | Performed by: NURSE PRACTITIONER

## 2023-03-06 RX ORDER — ESCITALOPRAM OXALATE 20 MG/1
20 TABLET ORAL DAILY
Qty: 90 TABLET | Refills: 3 | Status: SHIPPED | OUTPATIENT
Start: 2023-03-06 | End: 2024-01-16

## 2023-03-06 RX ORDER — BUPROPION HYDROCHLORIDE 150 MG/1
150 TABLET ORAL EVERY MORNING
Qty: 90 TABLET | Refills: 0 | Status: CANCELLED | OUTPATIENT
Start: 2023-03-06

## 2023-03-06 RX ORDER — VALACYCLOVIR HYDROCHLORIDE 1 G/1
TABLET, FILM COATED ORAL
Qty: 12 TABLET | Refills: 3 | Status: SHIPPED | OUTPATIENT
Start: 2023-03-06 | End: 2024-01-16

## 2023-03-06 ASSESSMENT — ENCOUNTER SYMPTOMS
JOINT SWELLING: 0
HEMATOCHEZIA: 0
COUGH: 0
NAUSEA: 0
FREQUENCY: 0
PALPITATIONS: 0
PARESTHESIAS: 0
ABDOMINAL PAIN: 0
CHILLS: 0
DIZZINESS: 0
EYE PAIN: 0
MYALGIAS: 1
SHORTNESS OF BREATH: 0
FEVER: 0
DIARRHEA: 1
HEMATURIA: 0
SORE THROAT: 0
DYSURIA: 0
BREAST MASS: 1
NERVOUS/ANXIOUS: 0
CONSTIPATION: 1
HEARTBURN: 0
HEADACHES: 1
ARTHRALGIAS: 0
WEAKNESS: 0

## 2023-03-06 ASSESSMENT — PAIN SCALES - GENERAL: PAINLEVEL: NO PAIN (0)

## 2023-03-06 NOTE — PROGRESS NOTES
SUBJECTIVE:   CC: Kristel is an 42 year old who presents for preventive health visit.   Patient has been advised of split billing requirements and indicates understanding: Yes     Healthy Habits:     Getting at least 3 servings of Calcium per day:  Yes    Bi-annual eye exam:  Yes    Dental care twice a year:  Yes    Sleep apnea or symptoms of sleep apnea:  Daytime drowsiness and Excessive snoring    Diet:  Regular (no restrictions)    Frequency of exercise:  1 day/week    Duration of exercise:  15-30 minutes    Taking medications regularly:  Yes    Medication side effects:  None    PHQ-2 Total Score: 3    Additional concerns today:  Yes    Heavy Cycles - tried mirena IUD and had continuous bacterial infections from this Would like to discuss next options. Pt states she is no longer wanting children.   Bleeding worsening since thyroid getting removed   Pelvic MRI 1/6/22  Heavy bleeding with clots  When heavy needing to change product every 1/2-1 hour    Answers for HPI/ROS submitted by the patient on 2/27/2023  If you checked off any problems, how difficult have these problems made it for you to do your work, take care of things at home, or get along with other people?: Very difficult  PHQ9 TOTAL SCORE: 13    Depression and Anxiety Follow-Up    How are you doing with your depression since your last visit? Same    How are you doing with your anxiety since your last visit?  Same     Are you having other symptoms that might be associated with depression or anxiety? No    Have you had a significant life event? No     Do you have any concerns with your use of alcohol or other drugs? No     Using Valtrex approximately once monthly for outbreaks    Social History     Tobacco Use     Smoking status: Former     Packs/day: 1.00     Years: 5.00     Pack years: 5.00     Types: Cigarettes     Smokeless tobacco: Never   Vaping Use     Vaping Use: Never used   Substance Use Topics     Alcohol use: Yes     Comment: 1 drink per wk      Drug use: No     PHQ 12/21/2022 2/1/2023 2/27/2023   PHQ-9 Total Score 9 11 13   Q9: Thoughts of better off dead/self-harm past 2 weeks Not at all Not at all Not at all     MALCOM-7 SCORE 1/20/2021 1/27/2021 8/19/2022   Total Score - - 12 (moderate anxiety)   Total Score 9 16 12       Today's PHQ-2 Score:   PHQ-2 ( 1999 [employer]) 2/27/2023   Q1: Little interest or pleasure in doing things 1   Q2: Feeling down, depressed or hopeless 2   PHQ-2 Score 3   Q1: Little interest or pleasure in doing things Several days   Q2: Feeling down, depressed or hopeless More than half the days   PHQ-2 Score 3       Have you ever done Advance Care Planning? (For example, a Health Directive, POLST, or a discussion with a medical provider or your loved ones about your wishes): No, advance care planning information given to patient to review.  Patient declined advance care planning discussion at this time.    Social History     Tobacco Use     Smoking status: Former     Packs/day: 1.00     Years: 5.00     Pack years: 5.00     Types: Cigarettes     Smokeless tobacco: Never   Substance Use Topics     Alcohol use: Yes     Comment: 1 drink per wk         Alcohol Use 2/27/2023   Prescreen: >3 drinks/day or >7 drinks/week? No     Reviewed orders with patient.  Reviewed health maintenance and updated orders accordingly - Yes  Labs reviewed in EPIC    Breast Cancer Screening:    FHS-7: No flowsheet data found.    Pertinent mammograms are reviewed under the imaging tab.    History of abnormal Pap smear: NO - age 30-65 PAP every 5 years with negative HPV co-testing recommended  PAP / HPV Latest Ref Rng & Units 3/23/2018   PAP (Historical) - NIL   HPV16 NEG:Negative Negative   HPV18 NEG:Negative Negative   HRHPV NEG:Negative Negative     Reviewed and updated as needed this visit by clinical staff   Tobacco  Allergies  Meds  Problems  Med Hx  Surg Hx  Fam Hx          Reviewed and updated as needed this visit by Provider   Tobacco  Allergies   "Meds  Problems  Med Hx  Surg Hx  Fam Hx             Review of Systems   Constitutional: Negative for chills and fever.   HENT: Positive for ear pain. Negative for congestion, hearing loss and sore throat.    Eyes: Negative for pain and visual disturbance.   Respiratory: Negative for cough and shortness of breath.    Cardiovascular: Negative for chest pain, palpitations and peripheral edema.   Gastrointestinal: Positive for constipation and diarrhea. Negative for abdominal pain, heartburn, hematochezia and nausea.   Breasts:  Positive for breast mass (when menses present then resolve). Negative for tenderness and discharge.   Genitourinary: Negative for dysuria, frequency, genital sores, hematuria, pelvic pain, urgency, vaginal bleeding and vaginal discharge.   Musculoskeletal: Positive for myalgias. Negative for arthralgias and joint swelling.   Skin: Negative for rash.   Neurological: Positive for headaches. Negative for dizziness, weakness and paresthesias.   Psychiatric/Behavioral: Negative for mood changes. The patient is not nervous/anxious.       OBJECTIVE:   /68 (Cuff Size: Adult Large)   Pulse 74   Temp 96.8  F (36  C) (Tympanic)   Resp 16   Ht 1.759 m (5' 9.25\")   Wt 100.2 kg (221 lb)   LMP 02/20/2023 (Approximate)   SpO2 98%   BMI 32.40 kg/m    Physical Exam  GENERAL: healthy, alert and no distress  EYES: Eyes grossly normal to inspection, PERRL and conjunctivae and sclerae normal  HENT: ear canals and TM's normal, nose and mouth without ulcers or lesions  NECK: no adenopathy, no asymmetry, masses, or scars and thyroid normal to palpation  RESP: lungs clear to auscultation - no rales, rhonchi or wheezes  BREAST: normal without masses, tenderness or nipple discharge and no palpable axillary masses or adenopathy  CV: regular rate and rhythm, normal S1 S2, no S3 or S4, no murmur, click or rub, no peripheral edema and peripheral pulses strong  ABDOMEN: soft, nontender, no hepatosplenomegaly, " "no masses and bowel sounds normal   (female): normal female external genitalia, normal urethral meatus, vaginal mucosa pink, moist, well rugated, and normal cervix/adnexa/uterus without masses or discharge  MS: no gross musculoskeletal defects noted, no edema  SKIN: no suspicious lesions or rashes  NEURO: Normal strength and tone, mentation intact and speech normal  PSYCH: mentation appears normal, affect normal/bright    Diagnostic Test Results:  Labs reviewed in Epic    ASSESSMENT/PLAN:   (Z00.00) Routine general medical examination at a health care facility  (primary encounter diagnosis)  Comment:     (Z12.4) Cervical cancer screening  Comment:   Plan: Pap Screen with HPV - recommended age 30 - 65         years         (F33.1) Major depressive disorder, recurrent episode, moderate (H)  Comment: stable with current medication  Plan: escitalopram (LEXAPRO) 20 MG tablet          (F41.1) MALCOM (generalized anxiety disorder)  Comment: stable.   Plan: escitalopram (LEXAPRO) 20 MG tablet          (N92.0) Menorrhagia with regular cycle  Comment: With ongoing menorrhagia plan OB/GYN evaluation for additional plan.  MRI and pelvic ultrasound reviewed, no additional imaging ordered today  Plan: Ob/Gyn Referral          (B00.1) Recurrent cold sores  Comment: Stable with as needed medication  Plan: valACYclovir (VALTREX) 1000 mg tablet           COUNSELING:  Reviewed preventive health counseling, as reflected in patient instructions      BMI:   Estimated body mass index is 32.4 kg/m  as calculated from the following:    Height as of this encounter: 1.759 m (5' 9.25\").    Weight as of this encounter: 100.2 kg (221 lb).         She reports that she has quit smoking. Her smoking use included cigarettes. She has a 5.00 pack-year smoking history. She has never used smokeless tobacco.        DAVID Lazo CNP  [facility]  "

## 2023-03-08 LAB
BKR LAB AP GYN ADEQUACY: NORMAL
BKR LAB AP GYN INTERPRETATION: NORMAL
BKR LAB AP HPV REFLEX: NORMAL
BKR LAB AP PREVIOUS ABNORMAL: NORMAL
PATH REPORT.COMMENTS IMP SPEC: NORMAL
PATH REPORT.COMMENTS IMP SPEC: NORMAL
PATH REPORT.RELEVANT HX SPEC: NORMAL

## 2023-03-10 LAB
HUMAN PAPILLOMA VIRUS 16 DNA: NEGATIVE
HUMAN PAPILLOMA VIRUS 18 DNA: NEGATIVE
HUMAN PAPILLOMA VIRUS FINAL DIAGNOSIS: NORMAL
HUMAN PAPILLOMA VIRUS OTHER HR: NEGATIVE

## 2023-03-13 PROBLEM — R87.619 ABNORMAL PAP SMEAR OF CERVIX: Status: ACTIVE | Noted: 2022-08-08

## 2023-03-15 ENCOUNTER — OFFICE VISIT (OUTPATIENT)
Dept: SURGERY | Facility: OTHER | Age: 43
End: 2023-03-15
Attending: PHYSICIAN ASSISTANT
Payer: COMMERCIAL

## 2023-03-15 ENCOUNTER — TELEPHONE (OUTPATIENT)
Dept: SURGERY | Facility: CLINIC | Age: 43
End: 2023-03-15

## 2023-03-15 VITALS
WEIGHT: 221 LBS | DIASTOLIC BLOOD PRESSURE: 60 MMHG | HEIGHT: 69 IN | BODY MASS INDEX: 32.73 KG/M2 | SYSTOLIC BLOOD PRESSURE: 100 MMHG | TEMPERATURE: 97.2 F

## 2023-03-15 DIAGNOSIS — K64.9 HEMORRHOIDS, UNSPECIFIED HEMORRHOID TYPE: ICD-10-CM

## 2023-03-15 PROCEDURE — 99244 OFF/OP CNSLTJ NEW/EST MOD 40: CPT | Performed by: SURGERY

## 2023-03-15 ASSESSMENT — PAIN SCALES - GENERAL: PAINLEVEL: MILD PAIN (2)

## 2023-03-15 NOTE — PROGRESS NOTES
Patient seen in consultation for hemorrhoids by Yulissa Nogueira    HPI:  Patient is a 42 year old female with many year history of hemorrhoid issues.  She states her primary symptom is pain and irritation.  Sometimes it is painful to have a bowel movement but it does not prevent her from having 1.  There is occasionally specks of blood on the toilet paper when she wipes.  She apparently has irritable bowel syndrome and alternates between loose stools and formed stools.  Her stools are never hard she says.  She does not take a fiber supplement.    Review Of Systems    Skin: negative  Ears/Nose/Throat: negative  Respiratory: No shortness of breath, dyspnea on exertion, cough, or hemoptysis  Cardiovascular: negative  Gastrointestinal: as above  Genitourinary: negative  Musculoskeletal: negative  Neurologic: negative  Hematologic/Lymphatic/Immunologic: negative  Endocrine: negative      Past Medical History:   Diagnosis Date     Depressive disorder      Interstitial cystitis 07/09/2021     Major depressive disorder, recurrent episode, moderate (H) 11/16/2007     Papillary adenocarcinoma of thyroid (H) 03/01/2014    Overview:  12/2013: R thyroid lobectomy 1.7 cm follicular variant papillary cancer, MACIS 3.6 03/2014: Completion Thyroidectomy 0.3 cm incidental papillary cancer left lobe. Iodine ablation with 53 mCi.  07/2016, 07/2017: Neck US neg.     Postoperative hypothyroidism 03/23/2018       Past Surgical History:   Procedure Laterality Date     BIOPSY       COLONOSCOPY  08/27/2018     HC INSERTION INTRAUTERINE DEVICE  2021     HC REMOVE INTRAUTERINE DEVICE  2021     THYROIDECTOMY      2015     TONSILLECTOMY       TUBAL LIGATION  2006       Family History   Problem Relation Age of Onset     Skin Cancer Mother      Other Cancer Mother         Skin cancer     Hypertension Father      Arrhythmia Father      Lymphoma Maternal Grandmother      Cerebrovascular Disease Maternal Grandmother      Diabetes Paternal Grandfather               Asthma Son      Arthritis Daughter        Social History     Socioeconomic History     Marital status:      Spouse name: partner- Flaco     Number of children: 2     Years of education: Not on file     Highest education level: Not on file   Occupational History     Occupation: school paraprofessional   Tobacco Use     Smoking status: Former     Packs/day: 1.00     Years: 5.00     Pack years: 5.00     Types: Cigarettes     Smokeless tobacco: Never   Vaping Use     Vaping Use: Never used   Substance and Sexual Activity     Alcohol use: Yes     Comment: 1 drink per wk     Drug use: No     Sexual activity: Yes     Partners: Male     Birth control/protection: Female Surgical   Other Topics Concern     Parent/sibling w/ CABG, MI or angioplasty before 65F 55M? No   Social History Narrative     Not on file     Social Determinants of Health     Financial Resource Strain: Not on file   Food Insecurity: Not on file   Transportation Needs: Not on file   Physical Activity: Not on file   Stress: Not on file   Social Connections: Not on file   Intimate Partner Violence: Not on file   Housing Stability: Not on file       Current Outpatient Medications   Medication Sig Dispense Refill     dicyclomine (BENTYL) 20 MG tablet Take 1 tablet (20 mg) by mouth every 6 hours 90 tablet 0     escitalopram (LEXAPRO) 20 MG tablet Take 1 tablet (20 mg) by mouth daily 90 tablet 3     fluticasone (FLONASE) 50 MCG/ACT nasal spray SHAKE LIQUID AND USE 1 SPRAY IN EACH NOSTRIL DAILY 16 g 1     levothyroxine (SYNTHROID/LEVOTHROID) 112 MCG tablet Take 1 tablet (112 mcg) by mouth daily In addition to 50 mcg daily for a total of 162 mcg daily 90 tablet 3     levothyroxine (SYNTHROID/LEVOTHROID) 50 MCG tablet Take 1 tablet (50 mcg) by mouth daily In addition to 112 mcg for a total of 162 mcg daily 90 tablet 3     valACYclovir (VALTREX) 1000 mg tablet TAKE 2 TABLETS(2000 MG) BY MOUTH TWICE DAILY 12 tablet 3       Medications and  "history reviewed    Physical exam:  Vitals: /60   Temp 97.2  F (36.2  C) (Temporal)   Ht 1.759 m (5' 9.25\")   Wt 100.2 kg (221 lb)   LMP 02/20/2023 (Approximate)   BMI 32.40 kg/m    BMI= Body mass index is 32.4 kg/m .    Constitutional: Healthy, alert, non-distressed   Head: Normo-cephalic, atraumatic, no lesions, masses or tenderness   Cardiovascular: RRR, no new murmurs, +S1, +S2 heart sounds, no clicks, rubs or gallops   Respiratory: CTAB, no rales, rhonchi or wheezing, equal chest rise, good respiratory effort   Gastrointestinal: Soft, non-tender, non distended, no rebound rigidity or guarding, no masses or hernias palpated  Perineal: Irritated but nonbleeding external hemorrhoids  CHEYENNE: Fullness without bleeding  Anoscopy: Small right anterior internal hemorrhoid  : Deferred  Musculoskeletal: Moves all extremities, normal  strength, no deformities noted   Skin: No suspicious lesions or rashes   Psychiatric: Mentation appears normal, affect appropriate   Hematologic/Lymphatic/Immunologic: Normal cervical and supraclavicular lymph nodes   Patient able to get up on table without difficulty.    Labs show:  No results found for this or any previous visit (from the past 24 hour(s)).    Imaging shows:  No results found for this or any previous visit (from the past 744 hour(s)).     Assessment:     ICD-10-CM    1. Hemorrhoids, unspecified hemorrhoid type  K64.9 Adult General Surg Referral        Plan: Given the severity and chronicity of her hemorrhoid issue I am offering formal hemorrhoid excision.  We discussed this procedure at length as well as the risks and benefits in addition to the typical recovery.  She understands that it can take several weeks for full healing and there is sometimes significant discomfort after surgery.  There is also the possibility of recurrence of symptomatic hemorrhoids.  She understands and wishes to proceed.  In the meantime she will continue conservative measures with " healthy bathroom habits.    45 minutes spent on the date of the encounter doing chart review, history and exam, documentation and further activities per the note    Feroz Perry, DO

## 2023-03-15 NOTE — TELEPHONE ENCOUNTER
Patient needs a preop physical prior to 3/27 surgery. PCP Yulissa Nogueira- she would like to see her- can she get worked in?  Please call patient to schedule preop appt.    Thank you

## 2023-03-15 NOTE — LETTER
3/15/2023         RE: Kristel Martinez  [id number]  [facility] [id number]        Dear Colleague,    Thank you for referring your patient, Kristel Martinez, to the [facility]. Please see a copy of my visit note below.    Patient seen in consultation for hemorrhoids by Yulissa Nogueira    HPI:  Patient is a 42 year old female with many year history of hemorrhoid issues.  She states her primary symptom is pain and irritation.  Sometimes it is painful to have a bowel movement but it does not prevent her from having 1.  There is occasionally specks of blood on the toilet paper when she wipes.  She apparently has irritable bowel syndrome and alternates between loose stools and formed stools.  Her stools are never hard she says.  She does not take a fiber supplement.    Review Of Systems    Skin: negative  Ears/Nose/Throat: negative  Respiratory: No shortness of breath, dyspnea on exertion, cough, or hemoptysis  Cardiovascular: negative  Gastrointestinal: as above  Genitourinary: negative  Musculoskeletal: negative  Neurologic: negative  Hematologic/Lymphatic/Immunologic: negative  Endocrine: negative      Past Medical History:   Diagnosis Date     Depressive disorder      Interstitial cystitis 07/09/2021     Major depressive disorder, recurrent episode, moderate (H) 11/16/2007     Papillary adenocarcinoma of thyroid (H) 03/01/2014    Overview:  12/2013: R thyroid lobectomy 1.7 cm follicular variant papillary cancer, MACIS 3.6 03/2014: Completion Thyroidectomy 0.3 cm incidental papillary cancer left lobe. Iodine ablation with 53 mCi.  07/2016, 07/2017: Neck US neg.     Postoperative hypothyroidism 03/23/2018       Past Surgical History:   Procedure Laterality Date     BIOPSY       COLONOSCOPY  08/27/2018     HC INSERTION INTRAUTERINE DEVICE  2021     HC REMOVE INTRAUTERINE DEVICE  2021     THYROIDECTOMY      2015     TONSILLECTOMY       TUBAL LIGATION  2006       Family History    Problem Relation Age of Onset     Skin Cancer Mother      Other Cancer Mother         Skin cancer     Hypertension Father      Arrhythmia Father      Lymphoma Maternal Grandmother      Cerebrovascular Disease Maternal Grandmother      Diabetes Paternal Grandfather              Asthma Son      Arthritis Daughter        Social History     Socioeconomic History     Marital status:      Spouse name: partner- Flaco     Number of children: 2     Years of education: Not on file     Highest education level: Not on file   Occupational History     Occupation: school paraprofessional   Tobacco Use     Smoking status: Former     Packs/day: 1.00     Years: 5.00     Pack years: 5.00     Types: Cigarettes     Smokeless tobacco: Never   Vaping Use     Vaping Use: Never used   Substance and Sexual Activity     Alcohol use: Yes     Comment: 1 drink per wk     Drug use: No     Sexual activity: Yes     Partners: Male     Birth control/protection: Female Surgical   Other Topics Concern     Parent/sibling w/ CABG, MI or angioplasty before 65F 55M? No   Social History Narrative     Not on file     Social Determinants of Health     Financial Resource Strain: Not on file   Food Insecurity: Not on file   Transportation Needs: Not on file   Physical Activity: Not on file   Stress: Not on file   Social Connections: Not on file   Intimate Partner Violence: Not on file   Housing Stability: Not on file       Current Outpatient Medications   Medication Sig Dispense Refill     dicyclomine (BENTYL) 20 MG tablet Take 1 tablet (20 mg) by mouth every 6 hours 90 tablet 0     escitalopram (LEXAPRO) 20 MG tablet Take 1 tablet (20 mg) by mouth daily 90 tablet 3     fluticasone (FLONASE) 50 MCG/ACT nasal spray SHAKE LIQUID AND USE 1 SPRAY IN EACH NOSTRIL DAILY 16 g 1     levothyroxine (SYNTHROID/LEVOTHROID) 112 MCG tablet Take 1 tablet (112 mcg) by mouth daily In addition to 50 mcg daily for a total of 162 mcg daily 90 tablet 3      "levothyroxine (SYNTHROID/LEVOTHROID) 50 MCG tablet Take 1 tablet (50 mcg) by mouth daily In addition to 112 mcg for a total of 162 mcg daily 90 tablet 3     valACYclovir (VALTREX) 1000 mg tablet TAKE 2 TABLETS(2000 MG) BY MOUTH TWICE DAILY 12 tablet 3       Medications and history reviewed    Physical exam:  Vitals: /60   Temp 97.2  F (36.2  C) (Temporal)   Ht 1.759 m (5' 9.25\")   Wt 100.2 kg (221 lb)   LMP 02/20/2023 (Approximate)   BMI 32.40 kg/m    BMI= Body mass index is 32.4 kg/m .    Constitutional: Healthy, alert, non-distressed   Head: Normo-cephalic, atraumatic, no lesions, masses or tenderness   Cardiovascular: RRR, no new murmurs, +S1, +S2 heart sounds, no clicks, rubs or gallops   Respiratory: CTAB, no rales, rhonchi or wheezing, equal chest rise, good respiratory effort   Gastrointestinal: Soft, non-tender, non distended, no rebound rigidity or guarding, no masses or hernias palpated  Perineal: Irritated but nonbleeding external hemorrhoids  CHEYENNE: Fullness without bleeding  Anoscopy: Small right anterior internal hemorrhoid  : Deferred  Musculoskeletal: Moves all extremities, normal  strength, no deformities noted   Skin: No suspicious lesions or rashes   Psychiatric: Mentation appears normal, affect appropriate   Hematologic/Lymphatic/Immunologic: Normal cervical and supraclavicular lymph nodes   Patient able to get up on table without difficulty.    Labs show:  No results found for this or any previous visit (from the past 24 hour(s)).    Imaging shows:  No results found for this or any previous visit (from the past 744 hour(s)).     Assessment:     ICD-10-CM    1. Hemorrhoids, unspecified hemorrhoid type  K64.9 Adult General Surg Referral        Plan: Given the severity and chronicity of her hemorrhoid issue I am offering formal hemorrhoid excision.  We discussed this procedure at length as well as the risks and benefits in addition to the typical recovery.  She understands that it " can take several weeks for full healing and there is sometimes significant discomfort after surgery.  There is also the possibility of recurrence of symptomatic hemorrhoids.  She understands and wishes to proceed.  In the meantime she will continue conservative measures with healthy bathroom habits.    45 minutes spent on the date of the encounter doing chart review, history and exam, documentation and further activities per the note    Feroz Perry, DO        Again, thank you for allowing me to participate in the care of your patient.        Sincerely,        Feroz Perry, DO

## 2023-03-15 NOTE — TELEPHONE ENCOUNTER
Type of surgery: Combined internal and external hemorrhoidectomy     Location of surgery: [facility]  Date and time of surgery: 3/27  Surgeon: [place]  Pre-Op Appt Date: message to pcp  Post-Op Appt Date: 4/5   Packet sent out: Yes  Pre-cert/Authorization completed:  Not Applicable  Date: na

## 2023-03-16 NOTE — TELEPHONE ENCOUNTER
Bev Wise CNP is out of the office until next week and we are covering her inbasket.   I will forward this to her for review upon her return  Thanks Avelina Fontenot [facility]

## 2023-03-20 ENCOUNTER — MYC MEDICAL ADVICE (OUTPATIENT)
Dept: FAMILY MEDICINE | Facility: CLINIC | Age: 43
End: 2023-03-20
Payer: COMMERCIAL

## 2023-03-20 ENCOUNTER — APPOINTMENT (OUTPATIENT)
Dept: LAB | Facility: CLINIC | Age: 43
End: 2023-03-20
Payer: COMMERCIAL

## 2023-03-20 DIAGNOSIS — J02.9 SORE THROAT: Primary | ICD-10-CM

## 2023-03-21 ENCOUNTER — OFFICE VISIT (OUTPATIENT)
Dept: FAMILY MEDICINE | Facility: CLINIC | Age: 43
End: 2023-03-21
Payer: COMMERCIAL

## 2023-03-21 ENCOUNTER — MYC MEDICAL ADVICE (OUTPATIENT)
Dept: FAMILY MEDICINE | Facility: CLINIC | Age: 43
End: 2023-03-21

## 2023-03-21 VITALS
WEIGHT: 222 LBS | BODY MASS INDEX: 32.88 KG/M2 | HEIGHT: 69 IN | HEART RATE: 66 BPM | DIASTOLIC BLOOD PRESSURE: 62 MMHG | RESPIRATION RATE: 16 BRPM | OXYGEN SATURATION: 99 % | TEMPERATURE: 97.6 F | SYSTOLIC BLOOD PRESSURE: 108 MMHG

## 2023-03-21 DIAGNOSIS — K58.1 IRRITABLE BOWEL SYNDROME WITH CONSTIPATION: Primary | ICD-10-CM

## 2023-03-21 DIAGNOSIS — Z01.818 PREOP GENERAL PHYSICAL EXAM: Primary | ICD-10-CM

## 2023-03-21 DIAGNOSIS — K64.9 HEMORRHOIDS, UNSPECIFIED HEMORRHOID TYPE: ICD-10-CM

## 2023-03-21 PROCEDURE — 99214 OFFICE O/P EST MOD 30 MIN: CPT | Performed by: NURSE PRACTITIONER

## 2023-03-21 ASSESSMENT — PAIN SCALES - GENERAL: PAINLEVEL: NO PAIN (0)

## 2023-03-21 NOTE — PATIENT INSTRUCTIONS
For informational purposes only. Not to replace the advice of your health care provider. Copyright   2003,  [facility]. All rights reserved. Clinically reviewed by Amy Knutson MD. [employer] [id number] - REV .  Preparing for Your Surgery  Getting started  A nurse will call you to review your health history and instructions. They will give you an arrival time based on your scheduled surgery time. Please be ready to share:    Your doctor's clinic name and phone number    Your medical, surgical, and anesthesia history    A list of allergies and sensitivities    A list of medicines, including herbal treatments and over-the-counter drugs    Whether the patient has a legal guardian (ask how to send us the papers in advance)  Please tell us if you're pregnant--or if there's any chance you might be pregnant. Some surgeries may injure a fetus (unborn baby), so they require a pregnancy test. Surgeries that are safe for a fetus don't always need a test, and you can choose whether to have one.   If you have a child who's having surgery, please ask for a copy of Preparing for Your Child's Surgery.    Preparing for surgery    Within 10 to 30 days of surgery: Have a pre-op exam (sometimes called an H&P, or History and Physical). This can be done at a clinic or pre-operative center.  ? If you're having a , you may not need this exam. Talk to your care team.    At your pre-op exam, talk to your care team about all medicines you take. If you need to stop any medicines before surgery, ask when to start taking them again.  ? We do this for your safety. Many medicines can make you bleed too much during surgery. Some change how well surgery (anesthesia) drugs work.    Call your insurance company to let them know you're having surgery. (If you don't have insurance, call [phone].)    Call your clinic if there's any change in your health. This includes signs of a cold or flu (sore throat, runny nose,  cough, rash, fever). It also includes a scrape or scratch near the surgery site.    If you have questions on the day of surgery, call your hospital or surgery center.  Eating and drinking guidelines  For your safety: Unless your surgeon tells you otherwise, follow the guidelines below.    Eat and drink as usual until 8 hours before you arrive for surgery. After that, no food or milk.    Drink clear liquids until 2 hours before you arrive. These are liquids you can see through, like water, Gatorade, and Propel Water. They also include plain black coffee and tea (no cream or milk), candy, and breath mints. You can spit out gum when you arrive.    If you drink alcohol: Stop drinking it the night before surgery.    If your care team tells you to take medicine on the morning of surgery, it's okay to take it with a sip of water.  Preventing infection    Shower or bathe the night before and morning of your surgery. Follow the instructions your clinic gave you. (If no instructions, use regular soap.)    Don't shave or clip hair near your surgery site. We'll remove the hair if needed.    Don't smoke or vape the morning of surgery. You may chew nicotine gum up to 2 hours before surgery. A nicotine patch is okay.  ? Note: Some surgeries require you to completely quit smoking and nicotine. Check with your surgeon.    Your care team will make every effort to keep you safe from infection. We will:  ? Clean our hands often with soap and water (or an alcohol-based hand rub).  ? Clean the skin at your surgery site with a special soap that kills germs.  ? Give you a special gown to keep you warm. (Cold raises the risk of infection.)  ? Wear special hair covers, masks, gowns and gloves during surgery.  ? Give antibiotic medicine, if prescribed. Not all surgeries need antibiotics.  What to bring on the day of surgery    Photo ID and insurance card    Copy of your health care directive, if you have one    Glasses and hearing aids (bring  cases)  ? You can't wear contacts during surgery    Inhaler and eye drops, if you use them (tell us about these when you arrive)    CPAP machine or breathing device, if you use them    A few personal items, if spending the night    If you have . . .  ? A pacemaker, ICD (cardiac defibrillator) or other implant: Bring the ID card.  ? An implanted stimulator: Bring the remote control.  ? A legal guardian: Bring a copy of the certified (court-stamped) guardianship papers.  Please remove any jewelry, including body piercings. Leave jewelry and other valuables at home.  If you're going home the day of surgery    You must have a responsible adult drive you home. They should stay with you overnight as well.    If you don't have someone to stay with you, and you aren't safe to go home alone, we may keep you overnight. Insurance often won't pay for this.  After surgery  If it's hard to control your pain or you need more pain medicine, please call your surgeon's office.  Questions?   If you have any questions for your care team, list them here: _________________________________________________________________________________________________________________________________________________________________________ ____________________________________ ____________________________________ ____________________________________

## 2023-03-21 NOTE — PROGRESS NOTES
[facility]  [address] [place] [address]  Phone: [phone]  Fax: [phone]  Primary Provider: Mena Nogueira  Pre-op Performing Provider: MENA NOGUEIRA      PREOPERATIVE EVALUATION:  Today's date: 3/21/2023    Kristel Martinez is a 42 year old female who presents for a preoperative evaluation.    Surgical Information:  Surgery/Procedure: Combined internal and external hemorrhoidectomy  Surgery Location:  OR   Surgeon: Dr. Feroz Perry  Surgery Date: 3/27/2023  Time of Surgery: 1100  Where patient plans to recover: At home with family  Fax number for surgical facility: Note does not need to be faxed, will be available electronically in Epic.    Type of Anesthesia Anticipated: General    Assessment & Plan     The proposed surgical procedure is considered LOW risk.    Preop general physical exam    Hemorrhoids, unspecified hemorrhoid type         Risks and Recommendations:  The patient has the following additional risks and recommendations for perioperative complications:   - No identified additional risk factors other than previously addressed    Medication Instructions:  Patient is to take all scheduled medications on the day of surgery    RECOMMENDATION:  APPROVAL GIVEN to proceed with proposed procedure, without further diagnostic evaluation.      Subjective     HPI related to upcoming procedure: Hemorrhoids, unspecified hemorrhoid type    Preop Questions 3/21/2023   1. Have you ever had a heart attack or stroke? No   2. Have you ever had surgery on your heart or blood vessels, such as a stent placement, a coronary artery bypass, or surgery on an artery in your head, neck, heart, or legs? No   3. Do you have chest pain with activity? No   4. Do you have a history of  heart failure? No   5. Do you currently have a cold, bronchitis or symptoms of other infection? YES - post-nasal drainage   6. Do you have a cough, shortness of breath, or wheezing? No   7. Do  you or anyone in your family have previous history of blood clots? No   8. Do you or does anyone in your family have a serious bleeding problem such as prolonged bleeding following surgeries or cuts? No   9. Have you ever had problems with anemia or been told to take iron pills? No   10. Have you had any abnormal blood loss such as black, tarry or bloody stools, or abnormal vaginal bleeding? No   11. Have you ever had a blood transfusion? No   12. Are you willing to have a blood transfusion if it is medically needed before, during, or after your surgery? Yes   13. Have you or any of your relatives ever had problems with anesthesia? No   14. Do you have sleep apnea, excessive snoring or daytime drowsiness? YES - snoring, no sleep apnea   14a. Do you have a CPAP machine? No   15. Do you have any artifical heart valves or other implanted medical devices like a pacemaker, defibrillator, or continuous glucose monitor? No   16. Do you have artificial joints? No   17. Are you allergic to latex? No   18. Is there any chance that you may be pregnant? No       Health Care Directive:  Patient does not have a Health Care Directive or Living Will: Discussed advance care planning with patient; however, patient declined at this time.    Preoperative Review of :   reviewed - no record of controlled substances prescribed.    Status of Chronic Conditions:  See problem list for active medical problems.  Problems all longstanding and stable, except as noted/documented.  See ROS for pertinent symptoms related to these conditions.      Review of Systems  CONSTITUTIONAL: NEGATIVE for fever, chills, change in weight  INTEGUMENTARY/SKIN: NEGATIVE for worrisome rashes, moles or lesions  EYES: NEGATIVE for vision changes or irritation  ENT/MOUTH: POSITIVE for postnasal drainage  RESP: NEGATIVE for significant cough or SOB  CV: NEGATIVE for chest pain, palpitations or peripheral edema  GI: NEGATIVE for nausea, abdominal pain, heartburn,  or change in bowel habits  : NEGATIVE for frequency, dysuria, or hematuria  MUSCULOSKELETAL: NEGATIVE for significant arthralgias or myalgia  NEURO: NEGATIVE for weakness, dizziness or paresthesias  ENDOCRINE: NEGATIVE for temperature intolerance, skin/hair changes  HEME: NEGATIVE for bleeding problems  PSYCHIATRIC: NEGATIVE for changes in mood or affect    Patient Active Problem List    Diagnosis Date Noted     Abnormal Pap smear of cervix 08/08/2022     Priority: Medium     Formatting of this note might be different from the original.  LSIL 2005 remote, bx fine       Interstitial cystitis 07/09/2021     Priority: Medium     Traumatic incomplete tear of right rotator cuff, initial encounter 07/23/2020     Priority: Medium     Bicipital tendonitis of right shoulder 07/23/2020     Priority: Medium     Subacromial impingement of right shoulder 07/23/2020     Priority: Medium     IgA deficiency (H) 09/12/2018     Priority: Medium     Postoperative hypothyroidism 03/23/2018     Priority: Medium     Vitiligo 04/09/2015     Priority: Medium     Papillary adenocarcinoma of thyroid (H) 03/01/2014     Priority: Medium     Overview:   12/2013: R thyroid lobectomy 1.7 cm follicular variant papillary cancer, MACIS 3.6  03/2014: Completion Thyroidectomy 0.3 cm incidental papillary cancer left lobe. Iodine ablation with 53 mCi.   07/2016, 07/2017: Neck US neg.       Vitamin D deficiency 07/06/2009     Priority: Medium     Last Assessment & Plan:   7/09  29.4  vit  d   on 1000units  daily  since  7/09       Major depressive disorder, recurrent episode, moderate (H) 11/16/2007     Priority: Medium      Past Medical History:   Diagnosis Date     Depressive disorder      Interstitial cystitis 07/09/2021     Major depressive disorder, recurrent episode, moderate (H) 11/16/2007     Papillary adenocarcinoma of thyroid (H) 03/01/2014    Overview:  12/2013: R thyroid lobectomy 1.7 cm follicular variant papillary cancer, MACIS 3.6  "03/2014: Completion Thyroidectomy 0.3 cm incidental papillary cancer left lobe. Iodine ablation with 53 mCi.  07/2016, 07/2017: Neck US neg.     Postoperative hypothyroidism 03/23/2018     Past Surgical History:   Procedure Laterality Date     BIOPSY       COLONOSCOPY  08/27/2018     HC INSERTION INTRAUTERINE DEVICE  2021     HC REMOVE INTRAUTERINE DEVICE  2021     THYROIDECTOMY      2015     TONSILLECTOMY       TUBAL LIGATION  2006     Current Outpatient Medications   Medication Sig Dispense Refill     escitalopram (LEXAPRO) 20 MG tablet Take 1 tablet (20 mg) by mouth daily 90 tablet 3     fluticasone (FLONASE) 50 MCG/ACT nasal spray SHAKE LIQUID AND USE 1 SPRAY IN EACH NOSTRIL DAILY 16 g 1     hydrocortisone, Perianal, (HYDROCORTISONE) 2.5 % cream Place rectally 2 times daily as needed for hemorrhoids Tube should last 1 month. 30 g 1     levothyroxine (SYNTHROID/LEVOTHROID) 112 MCG tablet Take 1 tablet (112 mcg) by mouth daily In addition to 50 mcg daily for a total of 162 mcg daily 90 tablet 3     levothyroxine (SYNTHROID/LEVOTHROID) 50 MCG tablet Take 1 tablet (50 mcg) by mouth daily In addition to 112 mcg for a total of 162 mcg daily 90 tablet 3     valACYclovir (VALTREX) 1000 mg tablet TAKE 2 TABLETS(2000 MG) BY MOUTH TWICE DAILY 12 tablet 3       Allergies   Allergen Reactions     Cephalexin Rash     Clavulanic Acid Diarrhea     Bad diarrhea with nausea and vomiting     Cephalosporins Rash     Cephalexin     Sulfa Drugs Rash and Unknown        Social History     Tobacco Use     Smoking status: Former     Packs/day: 1.00     Years: 5.00     Pack years: 5.00     Types: Cigarettes     Smokeless tobacco: Never   Substance Use Topics     Alcohol use: Yes     Comment: 1 drink per wk     History   Drug Use No         Objective     /62 (Cuff Size: Adult Large)   Pulse 66   Temp 97.6  F (36.4  C) (Tympanic)   Resp 16   Ht 1.759 m (5' 9.25\")   Wt 100.7 kg (222 lb)   LMP 03/20/2023 (Approximate)   SpO2 99%  "  BMI 32.55 kg/m      Physical Exam    GENERAL APPEARANCE: healthy, alert and no distress     EYES: EOMI, PERRL     HENT: ear canals and TM's normal and nose and mouth without ulcers or lesions     NECK: no adenopathy, no asymmetry, masses, or scars and thyroid normal to palpation     RESP: lungs clear to auscultation - no rales, rhonchi or wheezes     CV: regular rates and rhythm, normal S1 S2, no S3 or S4 and no murmur, click or rub     ABDOMEN:  soft, nontender, no HSM or masses and bowel sounds normal     MS: extremities normal- no gross deformities noted, no evidence of inflammation in joints, FROM in all extremities.     SKIN: no suspicious lesions or rashes     NEURO: Normal strength and tone, sensory exam grossly normal, mentation intact and speech normal     PSYCH: mentation appears normal. and affect normal/bright     LYMPHATICS: No cervical adenopathy    Recent Labs   Lab Test 01/12/23  1619 10/04/21  2005   HGB 13.0 12.6    260     --    POTASSIUM 4.1  --    CR 0.69  --         Diagnostics:  Labs pending at this time.  Results will be reviewed when available.   No EKG required, no history of coronary heart disease, significant arrhythmia, peripheral arterial disease or other structural heart disease.    Revised Cardiac Risk Index (RCRI):  The patient has the following serious cardiovascular risks for perioperative complications:   - No serious cardiac risks = 0 points     RCRI Interpretation: 0 points: Class I (very low risk - 0.4% complication rate)           Signed Electronically by: DAVID Lazo CNP  Copy of this evaluation report is provided to requesting physician.

## 2023-03-21 NOTE — H&P (VIEW-ONLY)
[facility]  [address] [place] [address]  Phone: [phone]  Fax: [phone]  Primary Provider: Mena Nogueira  Pre-op Performing Provider: MENA NOGUEIRA      PREOPERATIVE EVALUATION:  Today's date: 3/21/2023    Kristel Martinez is a 42 year old female who presents for a preoperative evaluation.    Surgical Information:  Surgery/Procedure: Combined internal and external hemorrhoidectomy  Surgery Location:  OR   Surgeon: Dr. Feroz Perry  Surgery Date: 3/27/2023  Time of Surgery: 1100  Where patient plans to recover: At home with family  Fax number for surgical facility: Note does not need to be faxed, will be available electronically in Epic.    Type of Anesthesia Anticipated: General    Assessment & Plan     The proposed surgical procedure is considered LOW risk.    Preop general physical exam    Hemorrhoids, unspecified hemorrhoid type         Risks and Recommendations:  The patient has the following additional risks and recommendations for perioperative complications:   - No identified additional risk factors other than previously addressed    Medication Instructions:  Patient is to take all scheduled medications on the day of surgery    RECOMMENDATION:  APPROVAL GIVEN to proceed with proposed procedure, without further diagnostic evaluation.      Subjective     HPI related to upcoming procedure: Hemorrhoids, unspecified hemorrhoid type    Preop Questions 3/21/2023   1. Have you ever had a heart attack or stroke? No   2. Have you ever had surgery on your heart or blood vessels, such as a stent placement, a coronary artery bypass, or surgery on an artery in your head, neck, heart, or legs? No   3. Do you have chest pain with activity? No   4. Do you have a history of  heart failure? No   5. Do you currently have a cold, bronchitis or symptoms of other infection? YES - post-nasal drainage   6. Do you have a cough, shortness of breath, or wheezing? No   7. Do  you or anyone in your family have previous history of blood clots? No   8. Do you or does anyone in your family have a serious bleeding problem such as prolonged bleeding following surgeries or cuts? No   9. Have you ever had problems with anemia or been told to take iron pills? No   10. Have you had any abnormal blood loss such as black, tarry or bloody stools, or abnormal vaginal bleeding? No   11. Have you ever had a blood transfusion? No   12. Are you willing to have a blood transfusion if it is medically needed before, during, or after your surgery? Yes   13. Have you or any of your relatives ever had problems with anesthesia? No   14. Do you have sleep apnea, excessive snoring or daytime drowsiness? YES - snoring, no sleep apnea   14a. Do you have a CPAP machine? No   15. Do you have any artifical heart valves or other implanted medical devices like a pacemaker, defibrillator, or continuous glucose monitor? No   16. Do you have artificial joints? No   17. Are you allergic to latex? No   18. Is there any chance that you may be pregnant? No       Health Care Directive:  Patient does not have a Health Care Directive or Living Will: Discussed advance care planning with patient; however, patient declined at this time.    Preoperative Review of :   reviewed - no record of controlled substances prescribed.    Status of Chronic Conditions:  See problem list for active medical problems.  Problems all longstanding and stable, except as noted/documented.  See ROS for pertinent symptoms related to these conditions.      Review of Systems  CONSTITUTIONAL: NEGATIVE for fever, chills, change in weight  INTEGUMENTARY/SKIN: NEGATIVE for worrisome rashes, moles or lesions  EYES: NEGATIVE for vision changes or irritation  ENT/MOUTH: POSITIVE for postnasal drainage  RESP: NEGATIVE for significant cough or SOB  CV: NEGATIVE for chest pain, palpitations or peripheral edema  GI: NEGATIVE for nausea, abdominal pain, heartburn,  or change in bowel habits  : NEGATIVE for frequency, dysuria, or hematuria  MUSCULOSKELETAL: NEGATIVE for significant arthralgias or myalgia  NEURO: NEGATIVE for weakness, dizziness or paresthesias  ENDOCRINE: NEGATIVE for temperature intolerance, skin/hair changes  HEME: NEGATIVE for bleeding problems  PSYCHIATRIC: NEGATIVE for changes in mood or affect    Patient Active Problem List    Diagnosis Date Noted     Abnormal Pap smear of cervix 08/08/2022     Priority: Medium     Formatting of this note might be different from the original.  LSIL 2005 remote, bx fine       Interstitial cystitis 07/09/2021     Priority: Medium     Traumatic incomplete tear of right rotator cuff, initial encounter 07/23/2020     Priority: Medium     Bicipital tendonitis of right shoulder 07/23/2020     Priority: Medium     Subacromial impingement of right shoulder 07/23/2020     Priority: Medium     IgA deficiency (H) 09/12/2018     Priority: Medium     Postoperative hypothyroidism 03/23/2018     Priority: Medium     Vitiligo 04/09/2015     Priority: Medium     Papillary adenocarcinoma of thyroid (H) 03/01/2014     Priority: Medium     Overview:   12/2013: R thyroid lobectomy 1.7 cm follicular variant papillary cancer, MACIS 3.6  03/2014: Completion Thyroidectomy 0.3 cm incidental papillary cancer left lobe. Iodine ablation with 53 mCi.   07/2016, 07/2017: Neck US neg.       Vitamin D deficiency 07/06/2009     Priority: Medium     Last Assessment & Plan:   7/09  29.4  vit  d   on 1000units  daily  since  7/09       Major depressive disorder, recurrent episode, moderate (H) 11/16/2007     Priority: Medium      Past Medical History:   Diagnosis Date     Depressive disorder      Interstitial cystitis 07/09/2021     Major depressive disorder, recurrent episode, moderate (H) 11/16/2007     Papillary adenocarcinoma of thyroid (H) 03/01/2014    Overview:  12/2013: R thyroid lobectomy 1.7 cm follicular variant papillary cancer, MACIS 3.6  "03/2014: Completion Thyroidectomy 0.3 cm incidental papillary cancer left lobe. Iodine ablation with 53 mCi.  07/2016, 07/2017: Neck US neg.     Postoperative hypothyroidism 03/23/2018     Past Surgical History:   Procedure Laterality Date     BIOPSY       COLONOSCOPY  08/27/2018     HC INSERTION INTRAUTERINE DEVICE  2021     HC REMOVE INTRAUTERINE DEVICE  2021     THYROIDECTOMY      2015     TONSILLECTOMY       TUBAL LIGATION  2006     Current Outpatient Medications   Medication Sig Dispense Refill     escitalopram (LEXAPRO) 20 MG tablet Take 1 tablet (20 mg) by mouth daily 90 tablet 3     fluticasone (FLONASE) 50 MCG/ACT nasal spray SHAKE LIQUID AND USE 1 SPRAY IN EACH NOSTRIL DAILY 16 g 1     hydrocortisone, Perianal, (HYDROCORTISONE) 2.5 % cream Place rectally 2 times daily as needed for hemorrhoids Tube should last 1 month. 30 g 1     levothyroxine (SYNTHROID/LEVOTHROID) 112 MCG tablet Take 1 tablet (112 mcg) by mouth daily In addition to 50 mcg daily for a total of 162 mcg daily 90 tablet 3     levothyroxine (SYNTHROID/LEVOTHROID) 50 MCG tablet Take 1 tablet (50 mcg) by mouth daily In addition to 112 mcg for a total of 162 mcg daily 90 tablet 3     valACYclovir (VALTREX) 1000 mg tablet TAKE 2 TABLETS(2000 MG) BY MOUTH TWICE DAILY 12 tablet 3       Allergies   Allergen Reactions     Cephalexin Rash     Clavulanic Acid Diarrhea     Bad diarrhea with nausea and vomiting     Cephalosporins Rash     Cephalexin     Sulfa Drugs Rash and Unknown        Social History     Tobacco Use     Smoking status: Former     Packs/day: 1.00     Years: 5.00     Pack years: 5.00     Types: Cigarettes     Smokeless tobacco: Never   Substance Use Topics     Alcohol use: Yes     Comment: 1 drink per wk     History   Drug Use No         Objective     /62 (Cuff Size: Adult Large)   Pulse 66   Temp 97.6  F (36.4  C) (Tympanic)   Resp 16   Ht 1.759 m (5' 9.25\")   Wt 100.7 kg (222 lb)   LMP 03/20/2023 (Approximate)   SpO2 99%  "  BMI 32.55 kg/m      Physical Exam    GENERAL APPEARANCE: healthy, alert and no distress     EYES: EOMI, PERRL     HENT: ear canals and TM's normal and nose and mouth without ulcers or lesions     NECK: no adenopathy, no asymmetry, masses, or scars and thyroid normal to palpation     RESP: lungs clear to auscultation - no rales, rhonchi or wheezes     CV: regular rates and rhythm, normal S1 S2, no S3 or S4 and no murmur, click or rub     ABDOMEN:  soft, nontender, no HSM or masses and bowel sounds normal     MS: extremities normal- no gross deformities noted, no evidence of inflammation in joints, FROM in all extremities.     SKIN: no suspicious lesions or rashes     NEURO: Normal strength and tone, sensory exam grossly normal, mentation intact and speech normal     PSYCH: mentation appears normal. and affect normal/bright     LYMPHATICS: No cervical adenopathy    Recent Labs   Lab Test 01/12/23  1619 10/04/21  2005   HGB 13.0 12.6    260     --    POTASSIUM 4.1  --    CR 0.69  --         Diagnostics:  Labs pending at this time.  Results will be reviewed when available.   No EKG required, no history of coronary heart disease, significant arrhythmia, peripheral arterial disease or other structural heart disease.    Revised Cardiac Risk Index (RCRI):  The patient has the following serious cardiovascular risks for perioperative complications:   - No serious cardiac risks = 0 points     RCRI Interpretation: 0 points: Class I (very low risk - 0.4% complication rate)           Signed Electronically by: DAVID Lazo CNP  Copy of this evaluation report is provided to requesting physician.

## 2023-03-26 ENCOUNTER — ANESTHESIA EVENT (OUTPATIENT)
Dept: SURGERY | Facility: CLINIC | Age: 43
End: 2023-03-26
Payer: COMMERCIAL

## 2023-03-26 ASSESSMENT — LIFESTYLE VARIABLES: TOBACCO_USE: 1

## 2023-03-26 NOTE — ANESTHESIA PREPROCEDURE EVALUATION
Anesthesia Pre-Procedure Evaluation    Patient: Kristel Martinez   MRN: [id number] : 1980        Procedure : Procedure(s):  Combined internal and external hemorrhoidectomy          Past Medical History:   Diagnosis Date     Depressive disorder      Interstitial cystitis 2021     Major depressive disorder, recurrent episode, moderate (H) 2007     Papillary adenocarcinoma of thyroid (H) 2014    Overview:  2013: R thyroid lobectomy 1.7 cm follicular variant papillary cancer, MACIS 3.6 2014: Completion Thyroidectomy 0.3 cm incidental papillary cancer left lobe. Iodine ablation with 53 mCi.  2016, 2017: Neck US neg.     Postoperative hypothyroidism 2018      Past Surgical History:   Procedure Laterality Date     BIOPSY       COLONOSCOPY  2018     HC INSERTION INTRAUTERINE DEVICE       HC REMOVE INTRAUTERINE DEVICE       THYROIDECTOMY      2015     TONSILLECTOMY       TUBAL LIGATION  2006      Allergies   Allergen Reactions     Cephalexin Rash     Clavulanic Acid Diarrhea     Bad diarrhea with nausea and vomiting     Cephalosporins Rash     Cephalexin     Sulfa Drugs Rash and Unknown      Social History     Tobacco Use     Smoking status: Former     Packs/day: 1.00     Years: 5.00     Pack years: 5.00     Types: Cigarettes     Smokeless tobacco: Never   Substance Use Topics     Alcohol use: Yes     Comment: 1 drink per wk      Wt Readings from Last 1 Encounters:   23 100.7 kg (222 lb)        Anesthesia Evaluation   Pt has had prior anesthetic. Type: MAC and General.        ROS/MED HX  ENT/Pulmonary:     (+) tobacco use, Past use,     Neurologic:  - neg neurologic ROS     Cardiovascular:  - neg cardiovascular ROS   (+) -----Previous cardiac testing   Echo: Date:  Results:  Interpretation Summary     1. The left ventricle is normal in structure, function and size. The visual  ejection fraction is estimated at 60%.  2. The right ventricle is normal in  structure, function and size.  3. No valve disease.     No previous echo for comparison.  ________________________  Stress Test: Date: Results:    ECG Reviewed: Date: 2018 Results:  SR  Cath: Date: Results:      METS/Exercise Tolerance:     Hematologic:  - neg hematologic  ROS     Musculoskeletal:  - neg musculoskeletal ROS     GI/Hepatic:  - neg GI/hepatic ROS     Renal/Genitourinary:  - neg Renal ROS     Endo:     (+) thyroid problem, hypothyroidism, Obesity,     Psychiatric/Substance Use:     (+) psychiatric history depression     Infectious Disease:  - neg infectious disease ROS     Malignancy: Comment: Papillary adenocarcinoma throid  (+) Malignancy, History of Other.Other CA Remission status post Surgery.    Other:  - neg other ROS          Physical Exam    Airway  airway exam normal      Mallampati: II   TM distance: > 3 FB   Neck ROM: full   Mouth opening: > 3 cm    Respiratory Devices and Support         Dental       (+) Completely normal teeth      Cardiovascular   cardiovascular exam normal       Rhythm and rate: regular and normal     Pulmonary   pulmonary exam normal        breath sounds clear to auscultation           OUTSIDE LABS:  CBC:   Lab Results   Component Value Date    WBC 5.9 01/12/2023    WBC 5.4 10/04/2021    HGB 13.0 01/12/2023    HGB 12.6 10/04/2021    HCT 39.7 01/12/2023    HCT 38.0 10/04/2021     01/12/2023     10/04/2021     BMP:   Lab Results   Component Value Date     01/12/2023     03/19/2021    POTASSIUM 4.1 01/12/2023    POTASSIUM 3.9 03/19/2021    CHLORIDE 104 01/12/2023    CHLORIDE 106 03/19/2021    CO2 25 01/12/2023    CO2 26 03/19/2021    BUN 6.2 01/12/2023    BUN 12 03/19/2021    CR 0.69 01/12/2023    CR 0.82 03/19/2021    GLC 98 01/12/2023    GLC 82 03/19/2021     COAGS: No results found for: PTT, INR, FIBR  POC:   Lab Results   Component Value Date    HCG Negative 11/19/2019     HEPATIC:   Lab Results   Component Value Date    ALBUMIN 4.5 01/12/2023     PROTTOTAL 7.3 01/12/2023    ALT 12 01/12/2023    AST 16 01/12/2023    ALKPHOS 60 01/12/2023    BILITOTAL 0.2 01/12/2023     OTHER:   Lab Results   Component Value Date    VASQUEZ 9.4 01/12/2023    MAG 2.2 10/30/2018    LIPASE 114 11/19/2019    TSH 0.15 (L) 02/27/2023    T4 1.42 02/27/2023    SED 7 01/12/2023       Anesthesia Plan    ASA Status:  2   NPO Status:  NPO Appropriate    Anesthesia Type: General.     - Airway: ETT   Induction: Intravenous, Propofol.   Maintenance: Balanced.        Consents    Anesthesia Plan(s) and associated risks, benefits, and realistic alternatives discussed. Questions answered and patient/representative(s) expressed understanding.    - Discussed:     - Discussed with:  Patient    Use of blood products discussed: No .     Postoperative Care    Pain management: IV analgesics, Oral pain medications.   PONV prophylaxis: Ondansetron (or other 5HT-3), Dexamethasone or Solumedrol     Comments:    Other Comments: The risks and benefits of anesthesia, and the alternatives where applicable, have been discussed with the patient, and they wish to proceed.            Savage Rogel, DAVID CRNA

## 2023-03-27 ENCOUNTER — TELEPHONE (OUTPATIENT)
Dept: FAMILY MEDICINE | Facility: CLINIC | Age: 43
End: 2023-03-27

## 2023-03-27 ENCOUNTER — ANESTHESIA (OUTPATIENT)
Dept: SURGERY | Facility: CLINIC | Age: 43
End: 2023-03-27
Payer: COMMERCIAL

## 2023-03-27 ENCOUNTER — HOSPITAL ENCOUNTER (OUTPATIENT)
Facility: CLINIC | Age: 43
Discharge: HOME | End: 2023-03-27
Attending: SURGERY | Admitting: SURGERY
Payer: COMMERCIAL

## 2023-03-27 VITALS
OXYGEN SATURATION: 98 % | HEART RATE: 85 BPM | DIASTOLIC BLOOD PRESSURE: 59 MMHG | RESPIRATION RATE: 18 BRPM | SYSTOLIC BLOOD PRESSURE: 108 MMHG | TEMPERATURE: 97.3 F

## 2023-03-27 DIAGNOSIS — K59.00 CONSTIPATION, UNSPECIFIED CONSTIPATION TYPE: Primary | ICD-10-CM

## 2023-03-27 DIAGNOSIS — Z98.890 S/P HEMORRHOIDECTOMY: Primary | ICD-10-CM

## 2023-03-27 DIAGNOSIS — Z87.19 S/P HEMORRHOIDECTOMY: Primary | ICD-10-CM

## 2023-03-27 PROCEDURE — 999N000141 HC STATISTIC PRE-PROCEDURE NURSING ASSESSMENT: Performed by: SURGERY

## 2023-03-27 PROCEDURE — 250N000009 HC RX 250: Performed by: NURSE ANESTHETIST, CERTIFIED REGISTERED

## 2023-03-27 PROCEDURE — 258N000003 HC RX IP 258 OP 636: Performed by: NURSE ANESTHETIST, CERTIFIED REGISTERED

## 2023-03-27 PROCEDURE — 250N000025 HC SEVOFLURANE, PER MIN: Performed by: SURGERY

## 2023-03-27 PROCEDURE — 88304 TISSUE EXAM BY PATHOLOGIST: CPT | Mod: 26 | Performed by: PATHOLOGY

## 2023-03-27 PROCEDURE — 272N000001 HC OR GENERAL SUPPLY STERILE: Performed by: SURGERY

## 2023-03-27 PROCEDURE — 370N000017 HC ANESTHESIA TECHNICAL FEE, PER MIN: Performed by: SURGERY

## 2023-03-27 PROCEDURE — 710N000012 HC RECOVERY PHASE 2, PER MINUTE: Performed by: SURGERY

## 2023-03-27 PROCEDURE — 46260 REMOVE IN/EX HEM GROUPS 2+: CPT | Performed by: SURGERY

## 2023-03-27 PROCEDURE — 360N000075 HC SURGERY LEVEL 2, PER MIN: Performed by: SURGERY

## 2023-03-27 PROCEDURE — 710N000010 HC RECOVERY PHASE 1, LEVEL 2, PER MIN: Performed by: SURGERY

## 2023-03-27 PROCEDURE — 88304 TISSUE EXAM BY PATHOLOGIST: CPT | Mod: TC | Performed by: SURGERY

## 2023-03-27 PROCEDURE — 250N000013 HC RX MED GY IP 250 OP 250 PS 637: Performed by: SURGERY

## 2023-03-27 PROCEDURE — 250N000009 HC RX 250: Performed by: SURGERY

## 2023-03-27 PROCEDURE — 250N000011 HC RX IP 250 OP 636: Performed by: NURSE ANESTHETIST, CERTIFIED REGISTERED

## 2023-03-27 RX ORDER — OXYCODONE HYDROCHLORIDE 5 MG/1
5 TABLET ORAL
Status: COMPLETED | OUTPATIENT
Start: 2023-03-27 | End: 2023-03-27

## 2023-03-27 RX ORDER — ONDANSETRON 4 MG/1
4 TABLET, ORALLY DISINTEGRATING ORAL
Status: DISCONTINUED | OUTPATIENT
Start: 2023-03-27 | End: 2023-03-27 | Stop reason: HOSPADM

## 2023-03-27 RX ORDER — HYDROMORPHONE HYDROCHLORIDE 1 MG/ML
0.2 INJECTION, SOLUTION INTRAMUSCULAR; INTRAVENOUS; SUBCUTANEOUS EVERY 5 MIN PRN
Status: DISCONTINUED | OUTPATIENT
Start: 2023-03-27 | End: 2023-03-27 | Stop reason: HOSPADM

## 2023-03-27 RX ORDER — ACETAMINOPHEN 325 MG/1
975 TABLET ORAL
Status: DISCONTINUED | OUTPATIENT
Start: 2023-03-27 | End: 2023-03-27 | Stop reason: HOSPADM

## 2023-03-27 RX ORDER — BUPIVACAINE HYDROCHLORIDE AND EPINEPHRINE 2.5; 5 MG/ML; UG/ML
INJECTION, SOLUTION INFILTRATION; PERINEURAL PRN
Status: DISCONTINUED | OUTPATIENT
Start: 2023-03-27 | End: 2023-03-27 | Stop reason: HOSPADM

## 2023-03-27 RX ORDER — SODIUM CHLORIDE, SODIUM LACTATE, POTASSIUM CHLORIDE, CALCIUM CHLORIDE 600; 310; 30; 20 MG/100ML; MG/100ML; MG/100ML; MG/100ML
INJECTION, SOLUTION INTRAVENOUS CONTINUOUS
Status: DISCONTINUED | OUTPATIENT
Start: 2023-03-27 | End: 2023-03-27 | Stop reason: HOSPADM

## 2023-03-27 RX ORDER — POLYETHYLENE GLYCOL 3350 17 G/17G
1 POWDER, FOR SOLUTION ORAL DAILY PRN
Qty: 500 G | Refills: 0 | COMMUNITY
Start: 2023-03-27 | End: 2024-09-11

## 2023-03-27 RX ORDER — ONDANSETRON 4 MG/1
4 TABLET, ORALLY DISINTEGRATING ORAL EVERY 30 MIN PRN
Status: DISCONTINUED | OUTPATIENT
Start: 2023-03-27 | End: 2023-03-27 | Stop reason: HOSPADM

## 2023-03-27 RX ORDER — FENTANYL CITRATE 50 UG/ML
25 INJECTION, SOLUTION INTRAMUSCULAR; INTRAVENOUS EVERY 5 MIN PRN
Status: DISCONTINUED | OUTPATIENT
Start: 2023-03-27 | End: 2023-03-27 | Stop reason: HOSPADM

## 2023-03-27 RX ORDER — PROPOFOL 10 MG/ML
INJECTION, EMULSION INTRAVENOUS PRN
Status: DISCONTINUED | OUTPATIENT
Start: 2023-03-27 | End: 2023-03-27

## 2023-03-27 RX ORDER — HYDROMORPHONE HYDROCHLORIDE 1 MG/ML
0.4 INJECTION, SOLUTION INTRAMUSCULAR; INTRAVENOUS; SUBCUTANEOUS EVERY 5 MIN PRN
Status: DISCONTINUED | OUTPATIENT
Start: 2023-03-27 | End: 2023-03-27 | Stop reason: HOSPADM

## 2023-03-27 RX ORDER — HALOPERIDOL 5 MG/ML
1 INJECTION INTRAMUSCULAR
Status: DISCONTINUED | OUTPATIENT
Start: 2023-03-27 | End: 2023-03-27 | Stop reason: HOSPADM

## 2023-03-27 RX ORDER — AMOXICILLIN 250 MG
1-2 CAPSULE ORAL 2 TIMES DAILY
Qty: 30 TABLET | Refills: 0 | COMMUNITY
Start: 2023-03-27 | End: 2023-06-30

## 2023-03-27 RX ORDER — FENTANYL CITRATE 50 UG/ML
50 INJECTION, SOLUTION INTRAMUSCULAR; INTRAVENOUS EVERY 5 MIN PRN
Status: DISCONTINUED | OUTPATIENT
Start: 2023-03-27 | End: 2023-03-27 | Stop reason: HOSPADM

## 2023-03-27 RX ORDER — PROPOFOL 10 MG/ML
INJECTION, EMULSION INTRAVENOUS CONTINUOUS PRN
Status: DISCONTINUED | OUTPATIENT
Start: 2023-03-27 | End: 2023-03-27

## 2023-03-27 RX ORDER — ACETAMINOPHEN 325 MG/1
650 TABLET ORAL
Status: DISCONTINUED | OUTPATIENT
Start: 2023-03-27 | End: 2023-03-27 | Stop reason: HOSPADM

## 2023-03-27 RX ORDER — OXYCODONE AND ACETAMINOPHEN 5; 325 MG/1; MG/1
1-2 TABLET ORAL EVERY 4 HOURS PRN
Qty: 12 TABLET | Refills: 0 | Status: SHIPPED | OUTPATIENT
Start: 2023-03-27 | End: 2023-04-05

## 2023-03-27 RX ORDER — ONDANSETRON 2 MG/ML
4 INJECTION INTRAMUSCULAR; INTRAVENOUS EVERY 30 MIN PRN
Status: DISCONTINUED | OUTPATIENT
Start: 2023-03-27 | End: 2023-03-27 | Stop reason: HOSPADM

## 2023-03-27 RX ORDER — ONDANSETRON 2 MG/ML
INJECTION INTRAMUSCULAR; INTRAVENOUS PRN
Status: DISCONTINUED | OUTPATIENT
Start: 2023-03-27 | End: 2023-03-27

## 2023-03-27 RX ORDER — DEXAMETHASONE SODIUM PHOSPHATE 10 MG/ML
INJECTION, SOLUTION INTRAMUSCULAR; INTRAVENOUS PRN
Status: DISCONTINUED | OUTPATIENT
Start: 2023-03-27 | End: 2023-03-27

## 2023-03-27 RX ORDER — LIDOCAINE HYDROCHLORIDE 10 MG/ML
INJECTION, SOLUTION INFILTRATION; PERINEURAL PRN
Status: DISCONTINUED | OUTPATIENT
Start: 2023-03-27 | End: 2023-03-27

## 2023-03-27 RX ORDER — FENTANYL CITRATE 50 UG/ML
INJECTION, SOLUTION INTRAMUSCULAR; INTRAVENOUS PRN
Status: DISCONTINUED | OUTPATIENT
Start: 2023-03-27 | End: 2023-03-27

## 2023-03-27 RX ADMIN — FENTANYL CITRATE 50 MCG: 50 INJECTION, SOLUTION INTRAMUSCULAR; INTRAVENOUS at 12:14

## 2023-03-27 RX ADMIN — FENTANYL CITRATE 50 MCG: 50 INJECTION, SOLUTION INTRAMUSCULAR; INTRAVENOUS at 11:58

## 2023-03-27 RX ADMIN — ONDANSETRON 4 MG: 2 INJECTION INTRAMUSCULAR; INTRAVENOUS at 11:45

## 2023-03-27 RX ADMIN — SODIUM CHLORIDE, POTASSIUM CHLORIDE, SODIUM LACTATE AND CALCIUM CHLORIDE: 600; 310; 30; 20 INJECTION, SOLUTION INTRAVENOUS at 10:22

## 2023-03-27 RX ADMIN — PROPOFOL 100 MG: 10 INJECTION, EMULSION INTRAVENOUS at 11:47

## 2023-03-27 RX ADMIN — SUGAMMADEX 200 MG: 100 INJECTION, SOLUTION INTRAVENOUS at 12:35

## 2023-03-27 RX ADMIN — OXYCODONE HYDROCHLORIDE 5 MG: 5 TABLET ORAL at 13:03

## 2023-03-27 RX ADMIN — MIDAZOLAM 2 MG: 1 INJECTION INTRAMUSCULAR; INTRAVENOUS at 11:37

## 2023-03-27 RX ADMIN — DEXAMETHASONE SODIUM PHOSPHATE 4 MG: 10 INJECTION, SOLUTION INTRAMUSCULAR; INTRAVENOUS at 11:45

## 2023-03-27 RX ADMIN — LIDOCAINE HYDROCHLORIDE 50 MG: 10 INJECTION, SOLUTION INFILTRATION; PERINEURAL at 11:45

## 2023-03-27 RX ADMIN — ROCURONIUM BROMIDE 50 MG: 50 INJECTION, SOLUTION INTRAVENOUS at 11:45

## 2023-03-27 RX ADMIN — PROPOFOL 200 MG: 10 INJECTION, EMULSION INTRAVENOUS at 11:45

## 2023-03-27 RX ADMIN — HYDROMORPHONE HYDROCHLORIDE 0.5 MG: 1 INJECTION, SOLUTION INTRAMUSCULAR; INTRAVENOUS; SUBCUTANEOUS at 12:41

## 2023-03-27 RX ADMIN — PROPOFOL 100 MCG/KG/MIN: 10 INJECTION, EMULSION INTRAVENOUS at 11:53

## 2023-03-27 ASSESSMENT — ACTIVITIES OF DAILY LIVING (ADL)
ADLS_ACUITY_SCORE: 35
ADLS_ACUITY_SCORE: 35

## 2023-03-27 NOTE — ANESTHESIA PROCEDURE NOTES
Airway       Patient location during procedure: OR       Procedure Start/Stop Times: 3/27/2023 11:48 AM  Staff -        CRNA: Lucina Delvalle APRN CRNA       Performed By: CRNA  Consent for Airway        Urgency: elective  Indications and Patient Condition       Indications for airway management: deborah-procedural       Induction type:intravenous       Mask difficulty assessment: 1 - vent by mask    Final Airway Details       Final airway type: endotracheal airway       Successful airway: ETT - single  Endotracheal Airway Details        ETT size (mm): 7.0       Cuffed: yes       Successful intubation technique: direct laryngoscopy       Grade View of Cords: 1       Adjucts: stylet       Position: Right       Measured from: lips       Secured at (cm): 22       Bite block used: None    Post intubation assessment        Placement verified by: capnometry, equal breath sounds and chest rise        Number of attempts at approach: 1       Number of other approaches attempted: 0       Secured with: silk tape       Ease of procedure: easy       Dentition: Intact and Unchanged    Medication(s) Administered   Medication Administration Time: 3/27/2023 11:48 AM    Additional Comments       Atraumatic. No apparent complications.

## 2023-03-27 NOTE — DISCHARGE INSTRUCTIONS
[facility]Day [facility]   Adult Discharge Orders & Instructions     For 24 hours after surgery    Get plenty of rest.  A responsible adult must stay with you for at least 24 hours after you leave the hospital.   Do not drive or use heavy equipment.  If you have weakness or tingling, don't drive or use heavy equipment until this feeling goes away.  Do not drink alcohol.  Avoid strenuous or risky activities.  Ask for help when climbing stairs.   You may feel lightheaded.  If so, sit for a few minutes before standing.  Have someone help you get up.   You may have a slight fever. Call the doctor if your fever is over 100 F (37.7 C) (taken under the tongue) or lasts longer than 24 hours.  You may have a dry mouth, a sore throat, muscle aches or trouble sleeping.  These should go away after 24 hours.  Do not make important or legal decisions.  We don t expect you to have any problems from the surgery or treatment you had today. Just in case, here s what to do if you have pain, upset stomach (nausea), bleeding or infection:  Pain:  Take medicines your doctor has prescribed or over-the-counter medicine they have suggested. Resting and using ice packs can help, too. For surgery on an arm or leg, raise it on a pillow to ease swelling. Call your doctor if these methods don t work.  Copyright [employer], Licensed under [employer]  Upset stomach (nausea):  Take anti-nausea medicine approved by your doctor. Drink clear liquids like apple juice, ginger ale, broth or 7-Up. Be sure to drink enough fluids. Rest can help, too. Move to normal foods when you re ready.   Bleeding:  In the first 24 hours, you may see a little blood on your dressing, about the size of a quarter. You don t need to worry about this much blood, but if the blood spot keeps getting bigger:  Put pressure on the wound if you can, AND  Call your doctor.  Copyright [employer], Licensed under [employer]  Fever/Infection: Please call  your doctor if you have any of these signs:  Redness  Swelling  Wound feels warm  Pain gets worse  Bad-smelling fluid leaks from wound  Fever or chills  Call your doctor for any of the followin.  It has been over 8 to 10 hours since surgery and you are still not able to urinate (pass water).      Nurse advice line: [facility]

## 2023-03-27 NOTE — ANESTHESIA POSTPROCEDURE EVALUATION
Patient: Kristel Martinez    Procedure: Procedure(s):  Combined internal and external hemorrhoidectomy       Anesthesia Type:  General    Note:  Disposition: Outpatient   Postop Pain Control: Uneventful            Sign Out: Well controlled pain   PONV: No   Neuro/Psych: Uneventful            Sign Out: Acceptable/Baseline neuro status   Airway/Respiratory: Uneventful            Sign Out: Acceptable/Baseline resp. status   CV/Hemodynamics: Uneventful            Sign Out: Acceptable CV status   Other NRE: NONE   DID A NON-ROUTINE EVENT OCCUR? No    Event details/Postop Comments:  Pt was happy with anesthesia care.  No complications.  I will follow up with the pt if needed.           Last vitals:  Vitals Value Taken Time   /67 03/27/23 1305   Temp 97.7  F (36.5  C) 03/27/23 1310   Pulse 85 03/27/23 1310   Resp 19 03/27/23 1310   SpO2 98 % 03/27/23 1309   Vitals shown include unvalidated device data.    Electronically Signed By: DAVID Calvin CRNA  March 27, 2023  2:35 PM

## 2023-03-27 NOTE — ANESTHESIA CARE TRANSFER NOTE
Patient: Kristel Martinez    Procedure: Procedure(s):  Combined internal and external hemorrhoidectomy       Diagnosis: Hemorrhoids, unspecified hemorrhoid type [K64.9]  Diagnosis Additional Information: No value filed.    Anesthesia Type:   General     Note:    Oropharynx: oropharynx clear of all foreign objects and spontaneously breathing  Level of Consciousness: drowsy  Oxygen Supplementation: face mask    Independent Airway: airway patency satisfactory and stable  Dentition: dentition unchanged  Vital Signs Stable: post-procedure vital signs reviewed and stable  Report to RN Given: handoff report given  Patient transferred to: PACU    Handoff Report: Identifed the Patient, Identified the Reponsible Provider, Reviewed the pertinent medical history, Discussed the surgical course, Reviewed Intra-OP anesthesia mangement and issues during anesthesia, Set expectations for post-procedure period and Allowed opportunity for questions and acknowledgement of understanding      Vitals:  Vitals Value Taken Time   /51 03/27/23 1245   Temp 98.06  F (36.7  C) 03/27/23 1250   Pulse 80 03/27/23 1250   Resp 9 03/27/23 1250   SpO2 99 % 03/27/23 1250   Vitals shown include unvalidated device data.    Electronically Signed By: DAVID Calvin CRNA  March 27, 2023  12:51 PM

## 2023-03-27 NOTE — TELEPHONE ENCOUNTER
Pt informed.  If Bev recommends, pt willing to try preferred alternate med listed.  Please advise.  SHERYL Matos RN

## 2023-03-27 NOTE — TELEPHONE ENCOUNTER
Linzess is not covered.--per covermymeds [id number]    Preferred alternatives are:    Lactulose  Movantik    Please fax new rx if OK to change.

## 2023-03-27 NOTE — INTERVAL H&P NOTE
"I have reviewed the surgical (or preoperative) H&P that is linked to this encounter, and examined the patient. There are no significant changes    Clinical Conditions Present on Arrival:  Clinically Significant Risk Factors Present on Admission                    # Obesity: Estimated body mass index is 32.55 kg/m  as calculated from the following:    Height as of 3/21/23: 1.759 m (5' 9.25\").    Weight as of 3/21/23: 100.7 kg (222 lb).       "

## 2023-03-27 NOTE — TELEPHONE ENCOUNTER
Those will only help with the constipation.  Are her current medications helpful with this?  They are safer than the alternatives.     DAVID Lazo CNP

## 2023-03-27 NOTE — OP NOTE
Date of Service: 3/27/2023     STAFF SURGEON: Feroz Perry DO     ASSISTANT:  None.     PREOPERATIVE DIAGNOSIS:   Internal and external hemorrhoids, skin tags     POSTOPERATIVE DIAGNOSIS:  Same     NAME OF PROCEDURE(S):   Internal and external hemorrhoidectomy, skin tag excision     INDICATIONS FOR PROCEDURE:  The patient is a 42-year-old female who I met in the surgical clinic with complaints of irritated and painful external hemorrhoids.  Physical exam confirmed her history and primarily external hemorrhoids with small internal hemorrhoids were found on exam.  We discussed her options and ultimately I offered hemorrhoidectomy.  We discussed the procedure in detail as well as the risks, benefits, alternatives and postop recovery and restrictions.  After our discussion we agreed to proceed with surgery     EBL: 5 cc    ANESTHESIA: General endotracheal anesthesia    COMPLICATIONS: None apparent     DRAINS:  None.     SPECIMENS:   hemorrhoids and skin tag     OPERATIVE FINDINGS:   Primarily external hemorrhoid tissue with 1 small skin tag.  Small internal hemorrhoids nonbloody.     PROCEDURE DETAIL:  Following consent, the patient was brought from the preoperative holding area to the operating suite and laid in supine position.  Anesthesia was induced.  She was then turned to prone jackknife position.  She was prepped and draped in the normal sterile fashion.  Timeout was performed.  After the correct patient and correct procedure were verified we began by doing a digital rectal exam.  No masses or significant fullness were palpated.  Speculum exam was performed.  Really minor internal hemorrhoids associated with the external hemorrhoid tissue anteriorly and posteriorly.  1 small skin tag right lateral anal area as well.  We began posteriorly by elevating the hemorrhoid tissue with a pickup and incising the skin in an ellipse fashion.  The skin and hemorrhoid tissue was dissected away from the underlying  sphincter muscle.  A small jaw LigaSure device was then used to ligate and transect the tissue.  Tissue was sent for pathology.  Small amount of oozing was controlled with electrocautery.  This incision was closed with 3-0 Vicryl suture in a running baseball stitch fashion internally to externally.  We then did the same procedure anteriorly with the external hemorrhoid tissue in this area.  Small amount of internal hemorrhoid tissue was present here and this was excised as well.  We did a small ellipse excision of a skin tag right lateral as well.  This incision was closed with 3-0 Vicryl suture as well.  Completion digital rectal exam demonstrated a patent anus without any concerns for stricture.  Quarter percent Marcaine with epinephrine for local anesthesia was instilled into the skin and subcutaneous tissue in the perianal area.  At the completion of the case all instruments, needles, and sponges were accounted for after a correct count.  The patient was then awoken from anesthesia brought to the recovery room in stable condition.       Feroz Perry DO

## 2023-03-28 ENCOUNTER — TELEPHONE (OUTPATIENT)
Dept: FAMILY MEDICINE | Facility: CLINIC | Age: 43
End: 2023-03-28

## 2023-03-28 ENCOUNTER — MYC MEDICAL ADVICE (OUTPATIENT)
Dept: SURGERY | Facility: OTHER | Age: 43
End: 2023-03-28

## 2023-03-28 DIAGNOSIS — K59.00 CONSTIPATION, UNSPECIFIED CONSTIPATION TYPE: ICD-10-CM

## 2023-03-28 RX ORDER — LACTULOSE 10 G/15ML
10-20 SOLUTION ORAL DAILY PRN
Qty: 900 ML | Refills: 1 | Status: SHIPPED | OUTPATIENT
Start: 2023-03-28 | End: 2023-06-30

## 2023-03-28 RX ORDER — LACTULOSE 10 G/10G
10-20 SOLUTION ORAL DAILY PRN
Qty: 30 EACH | Refills: 11 | Status: SHIPPED | OUTPATIENT
Start: 2023-03-28 | End: 2023-03-28

## 2023-03-28 RX ORDER — HYDROCORTISONE 25 MG/G
CREAM TOPICAL 2 TIMES DAILY PRN
Qty: 30 G | Refills: 0 | Status: SHIPPED | OUTPATIENT
Start: 2023-03-28 | End: 2023-08-23

## 2023-03-28 NOTE — TELEPHONE ENCOUNTER
Kristel says she definitely needs something for the constipation, current medications not controlling this. She would rather have loose stool than constipation

## 2023-03-28 NOTE — TELEPHONE ENCOUNTER
Medication Question or Refill    Contacts       Type Contact Phone/Fax    03/28/2023 02:41 PM CDT Phone (Incoming) Linda [phone]     [facility]          What medication are you calling about (include dose and sig)?: Lactulose powder    Preferred Pharmacy:       [facility] [place], [place] - [address]  [place] [address]  Phone: [phone] Fax: [phone]      Controlled Substance Agreement on file:   CSA -- Patient Level:    CSA: None found at the patient level.       Who prescribed the medication?: Dr. Nogueira    Do you need a refill? No, prescription sent to pharmacy, not covered by ins, but liquid is    When did you use the medication last? As needed    Patient offered an appointment? No    Do you have any questions or concerns?  Yes: Patient's insurance does not cover powder, can you substitute liquid, it is covered.    Could we send this information to you in [facility] or would you prefer to receive a phone call?:   Patient would prefer a phone call   Okay to leave a detailed message?: Yes at Other phone number:

## 2023-03-29 ENCOUNTER — MYC MEDICAL ADVICE (OUTPATIENT)
Dept: SURGERY | Facility: OTHER | Age: 43
End: 2023-03-29
Payer: COMMERCIAL

## 2023-03-29 LAB
PATH REPORT.COMMENTS IMP SPEC: NORMAL
PATH REPORT.COMMENTS IMP SPEC: NORMAL
PATH REPORT.FINAL DX SPEC: NORMAL
PATH REPORT.GROSS SPEC: NORMAL
PATH REPORT.MICROSCOPIC SPEC OTHER STN: NORMAL
PATH REPORT.RELEVANT HX SPEC: NORMAL
PHOTO IMAGE: NORMAL

## 2023-03-29 NOTE — LETTER
[facility]  [address]  [facility] [place] [facility] [address]  Phone: [phone]    March 29, 2023        Kristel Martinez  [address] [place] [address] [id number]  [facility] [address]          To whom it may concern:    RE: Kristel Martinez    When the patient returns to work, the following restrictions apply until 4/10/2023: No lifting anything greater than 15 pounds.      Please contact me for questions or concerns.      Sincerely,        Feroz Perry, DO

## 2023-04-05 ENCOUNTER — OFFICE VISIT (OUTPATIENT)
Dept: SURGERY | Facility: OTHER | Age: 43
End: 2023-04-05
Payer: COMMERCIAL

## 2023-04-05 VITALS
SYSTOLIC BLOOD PRESSURE: 112 MMHG | BODY MASS INDEX: 32.92 KG/M2 | HEIGHT: 69 IN | WEIGHT: 222.25 LBS | DIASTOLIC BLOOD PRESSURE: 68 MMHG | TEMPERATURE: 97.5 F

## 2023-04-05 DIAGNOSIS — Z87.19 S/P HEMORRHOIDECTOMY: Primary | ICD-10-CM

## 2023-04-05 DIAGNOSIS — Z98.890 S/P HEMORRHOIDECTOMY: Primary | ICD-10-CM

## 2023-04-05 PROCEDURE — 99024 POSTOP FOLLOW-UP VISIT: CPT | Performed by: SURGERY

## 2023-04-05 ASSESSMENT — PAIN SCALES - GENERAL: PAINLEVEL: MILD PAIN (2)

## 2023-04-05 NOTE — PROGRESS NOTES
General Surgery Follow Up    Pt returns for follow up visit s/p hemorrhoidectomy    HPI:  Had some problems with constipation immediately following surgery but now having normal bowel movements.  Some of the suture gets caught when wiping but no signs of infection.  Some minor drainage but this is improving      Past Medical History:   Diagnosis Date     Depressive disorder      Interstitial cystitis 07/09/2021     Major depressive disorder, recurrent episode, moderate (H) 11/16/2007     Papillary adenocarcinoma of thyroid (H) 03/01/2014    Overview:  12/2013: R thyroid lobectomy 1.7 cm follicular variant papillary cancer, MACIS 3.6 03/2014: Completion Thyroidectomy 0.3 cm incidental papillary cancer left lobe. Iodine ablation with 53 mCi.  07/2016, 07/2017: Neck US neg.     Postoperative hypothyroidism 03/23/2018       Past Surgical History:   Procedure Laterality Date     BIOPSY       COLONOSCOPY  08/27/2018     HC INSERTION INTRAUTERINE DEVICE  2021     HC REMOVE INTRAUTERINE DEVICE  2021     HEMORRHOIDECTOMY EXTERNAL N/A 3/27/2023    Procedure: Combined internal and external hemorrhoidectomy;  Surgeon: Feroz Perry DO;  Location: [facility] OR     THYROIDECTOMY      2015     TONSILLECTOMY       TUBAL LIGATION  2006       Social History     Socioeconomic History     Marital status:      Spouse name: partner- Flaco     Number of children: 2     Years of education: Not on file     Highest education level: Not on file   Occupational History     Occupation: school paraprofessional   Tobacco Use     Smoking status: Former     Packs/day: 1.00     Years: 5.00     Pack years: 5.00     Types: Cigarettes     Smokeless tobacco: Never   Vaping Use     Vaping status: Never Used   Substance and Sexual Activity     Alcohol use: Yes     Comment: 1 drink per wk     Drug use: No     Sexual activity: Yes     Partners: Male     Birth control/protection: Female Surgical   Other Topics Concern     Parent/sibling w/ CABG,  MI or angioplasty before 65F 55M? No   Social History Narrative     Not on file     Social Determinants of Health     Financial Resource Strain: Not on file   Food Insecurity: Not on file   Transportation Needs: Not on file   Physical Activity: Not on file   Stress: Not on file   Social Connections: Not on file   Intimate Partner Violence: Not on file   Housing Stability: Not on file       Current Outpatient Medications   Medication Sig Dispense Refill     escitalopram (LEXAPRO) 20 MG tablet Take 1 tablet (20 mg) by mouth daily 90 tablet 3     hydrocortisone, Perianal, (HYDROCORTISONE) 2.5 % cream Place rectally 2 times daily as needed for hemorrhoids 30 g 0     hydrocortisone, Perianal, (HYDROCORTISONE) 2.5 % cream Place rectally 2 times daily as needed for hemorrhoids Tube should last 1 month. 30 g 1     lactulose (CHRONULAC) 10 GM/15ML solution Take 15-30 mLs (10-20 g) by mouth daily as needed for constipation 900 mL 1     levothyroxine (SYNTHROID/LEVOTHROID) 112 MCG tablet Take 1 tablet (112 mcg) by mouth daily In addition to 50 mcg daily for a total of 162 mcg daily 90 tablet 3     levothyroxine (SYNTHROID/LEVOTHROID) 50 MCG tablet Take 1 tablet (50 mcg) by mouth daily In addition to 112 mcg for a total of 162 mcg daily 90 tablet 3     polyethylene glycol (MIRALAX) 17 GM/Dose powder Take 17 g (1 capful.) by mouth daily as needed for constipation (If no bowel movement in 24 hours. Mix in 8 oz of water.  May take twice daily.) 500 g 0     psyllium (METAMUCIL/KONSYL) 58.6 % powder Take 18 g (1 Tablespoonful) by mouth 2 times daily for 30 days Use for constipation. Dilute powder with fluid before taking.  Continue using for 1 month. 1040 g 0     senna-docusate (SENOKOT-S/PERICOLACE) 8.6-50 MG tablet Take 1-2 tablets by mouth 2 times daily Use to prevent or treat constipation. 30 tablet 0     fluticasone (FLONASE) 50 MCG/ACT nasal spray SHAKE LIQUID AND USE 1 SPRAY IN EACH NOSTRIL DAILY (Patient not taking: Reported  "on 4/5/2023) 16 g 1     hydrocortisone-pramoxine (PROCTOFOAM-HC) rectal foam Place 1 Applicatorful rectally 3 times daily (Patient not taking: Reported on 4/5/2023) 10 g 1     linaclotide (LINZESS) 290 MCG capsule Take 1 capsule (290 mcg) by mouth every morning (before breakfast) (Patient not taking: Reported on 4/5/2023) 30 capsule 3     valACYclovir (VALTREX) 1000 mg tablet TAKE 2 TABLETS(2000 MG) BY MOUTH TWICE DAILY (Patient not taking: Reported on 4/5/2023) 12 tablet 3       Medications and history reviewed    Physical exam:  Vitals: /68 (BP Location: Right arm, Patient Position: Sitting, Cuff Size: Adult Regular)   Temp 97.5  F (36.4  C) (Temporal)   Ht 1.759 m (5' 9.25\")   Wt 100.8 kg (222 lb 4 oz)   LMP 03/20/2023 (Approximate)   BMI 32.58 kg/m    BMI= Body mass index is 32.58 kg/m .    HEART: RRR, no new murmurs  LUNGS: CTAB, equal chest rise, good effort  ABD: soft, non tender, non distended  INCISIONS: Clean dry intact.  1 suture is sticking out without holding any tissue together this was trimmed with scissors.  No signs of infection  EXT: WEBBER, no deformities    PATHOLOGY:  Hemorrhoid tissue    Assessment:     ICD-10-CM    1. S/P hemorrhoidectomy  Z98.890     Z87.19         Plan: Doing pretty well.  We talked about how this may take several more weeks to feel back to normal.  She understands.  Pathology reviewed and questions answered.  Follow-up as needed.    Feroz Perry, DO    "

## 2023-04-05 NOTE — LETTER
4/5/2023         RE: Kristel Martinez  [address]  [place] [address]        Dear Colleague,    Thank you for referring your patient, Kristel Martinez, to the [facility]. Please see a copy of my visit note below.    General Surgery Follow Up    Pt returns for follow up visit s/p hemorrhoidectomy    HPI:  Had some problems with constipation immediately following surgery but now having normal bowel movements.  Some of the suture gets caught when wiping but no signs of infection.  Some minor drainage but this is improving      Past Medical History:   Diagnosis Date     Depressive disorder      Interstitial cystitis 07/09/2021     Major depressive disorder, recurrent episode, moderate (H) 11/16/2007     Papillary adenocarcinoma of thyroid (H) 03/01/2014    Overview:  12/2013: R thyroid lobectomy 1.7 cm follicular variant papillary cancer, MACIS 3.6 03/2014: Completion Thyroidectomy 0.3 cm incidental papillary cancer left lobe. Iodine ablation with 53 mCi.  07/2016, 07/2017: Neck US neg.     Postoperative hypothyroidism 03/23/2018       Past Surgical History:   Procedure Laterality Date     BIOPSY       COLONOSCOPY  08/27/2018     HC INSERTION INTRAUTERINE DEVICE  2021     HC REMOVE INTRAUTERINE DEVICE  2021     HEMORRHOIDECTOMY EXTERNAL N/A 3/27/2023    Procedure: Combined internal and external hemorrhoidectomy;  Surgeon: Feroz Perry DO;  Location: [facility]     THYROIDECTOMY      2015     TONSILLECTOMY       TUBAL LIGATION  2006       Social History     Socioeconomic History     Marital status:      Spouse name: partnerMercedez Sam     Number of children: 2     Years of education: Not on file     Highest education level: Not on file   Occupational History     Occupation: school paraprofessional   Tobacco Use     Smoking status: Former     Packs/day: 1.00     Years: 5.00     Pack years: 5.00     Types: Cigarettes     Smokeless tobacco: Never   Vaping Use     Vaping  status: Never Used   Substance and Sexual Activity     Alcohol use: Yes     Comment: 1 drink per wk     Drug use: No     Sexual activity: Yes     Partners: Male     Birth control/protection: Female Surgical   Other Topics Concern     Parent/sibling w/ CABG, MI or angioplasty before 65F 55M? No   Social History Narrative     Not on file     Social Determinants of Health     Financial Resource Strain: Not on file   Food Insecurity: Not on file   Transportation Needs: Not on file   Physical Activity: Not on file   Stress: Not on file   Social Connections: Not on file   Intimate Partner Violence: Not on file   Housing Stability: Not on file       Current Outpatient Medications   Medication Sig Dispense Refill     escitalopram (LEXAPRO) 20 MG tablet Take 1 tablet (20 mg) by mouth daily 90 tablet 3     hydrocortisone, Perianal, (HYDROCORTISONE) 2.5 % cream Place rectally 2 times daily as needed for hemorrhoids 30 g 0     hydrocortisone, Perianal, (HYDROCORTISONE) 2.5 % cream Place rectally 2 times daily as needed for hemorrhoids Tube should last 1 month. 30 g 1     lactulose (CHRONULAC) 10 GM/15ML solution Take 15-30 mLs (10-20 g) by mouth daily as needed for constipation 900 mL 1     levothyroxine (SYNTHROID/LEVOTHROID) 112 MCG tablet Take 1 tablet (112 mcg) by mouth daily In addition to 50 mcg daily for a total of 162 mcg daily 90 tablet 3     levothyroxine (SYNTHROID/LEVOTHROID) 50 MCG tablet Take 1 tablet (50 mcg) by mouth daily In addition to 112 mcg for a total of 162 mcg daily 90 tablet 3     polyethylene glycol (MIRALAX) 17 GM/Dose powder Take 17 g (1 capful.) by mouth daily as needed for constipation (If no bowel movement in 24 hours. Mix in 8 oz of water.  May take twice daily.) 500 g 0     psyllium (METAMUCIL/KONSYL) 58.6 % powder Take 18 g (1 Tablespoonful) by mouth 2 times daily for 30 days Use for constipation. Dilute powder with fluid before taking.  Continue using for 1 month. 1040 g 0     senna-docusate  "(SENOKOT-S/PERICOLACE) 8.6-50 MG tablet Take 1-2 tablets by mouth 2 times daily Use to prevent or treat constipation. 30 tablet 0     fluticasone (FLONASE) 50 MCG/ACT nasal spray SHAKE LIQUID AND USE 1 SPRAY IN EACH NOSTRIL DAILY (Patient not taking: Reported on 4/5/2023) 16 g 1     hydrocortisone-pramoxine (PROCTOFOAM-HC) rectal foam Place 1 Applicatorful rectally 3 times daily (Patient not taking: Reported on 4/5/2023) 10 g 1     linaclotide (LINZESS) 290 MCG capsule Take 1 capsule (290 mcg) by mouth every morning (before breakfast) (Patient not taking: Reported on 4/5/2023) 30 capsule 3     valACYclovir (VALTREX) 1000 mg tablet TAKE 2 TABLETS(2000 MG) BY MOUTH TWICE DAILY (Patient not taking: Reported on 4/5/2023) 12 tablet 3       Medications and history reviewed    Physical exam:  Vitals: /68 (BP Location: Right arm, Patient Position: Sitting, Cuff Size: Adult Regular)   Temp 97.5  F (36.4  C) (Temporal)   Ht 1.759 m (5' 9.25\")   Wt 100.8 kg (222 lb 4 oz)   LMP 03/20/2023 (Approximate)   BMI 32.58 kg/m    BMI= Body mass index is 32.58 kg/m .    HEART: RRR, no new murmurs  LUNGS: CTAB, equal chest rise, good effort  ABD: soft, non tender, non distended  INCISIONS: Clean dry intact.  1 suture is sticking out without holding any tissue together this was trimmed with scissors.  No signs of infection  EXT: WEBBER, no deformities    PATHOLOGY:  Hemorrhoid tissue    Assessment:     ICD-10-CM    1. S/P hemorrhoidectomy  Z98.890     Z87.19         Plan: Doing pretty well.  We talked about how this may take several more weeks to feel back to normal.  She understands.  Pathology reviewed and questions answered.  Follow-up as needed.    Feroz Perry, DO        Again, thank you for allowing me to participate in the care of your patient.        Sincerely,        Feroz Perry, DO    "

## 2023-04-07 ENCOUNTER — OFFICE VISIT (OUTPATIENT)
Dept: OBGYN | Facility: CLINIC | Age: 43
End: 2023-04-07
Payer: COMMERCIAL

## 2023-04-07 ENCOUNTER — TELEPHONE (OUTPATIENT)
Dept: OBGYN | Facility: CLINIC | Age: 43
End: 2023-04-07

## 2023-04-07 ENCOUNTER — PREP FOR PROCEDURE (OUTPATIENT)
Dept: OBGYN | Facility: CLINIC | Age: 43
End: 2023-04-07

## 2023-04-07 VITALS
WEIGHT: 219.4 LBS | HEIGHT: 69 IN | TEMPERATURE: 96.7 F | HEART RATE: 98 BPM | RESPIRATION RATE: 14 BRPM | DIASTOLIC BLOOD PRESSURE: 73 MMHG | SYSTOLIC BLOOD PRESSURE: 116 MMHG | BODY MASS INDEX: 32.5 KG/M2

## 2023-04-07 DIAGNOSIS — N93.9 ABNORMAL UTERINE BLEEDING (AUB): Primary | ICD-10-CM

## 2023-04-07 PROCEDURE — 88305 TISSUE EXAM BY PATHOLOGIST: CPT | Performed by: PATHOLOGY

## 2023-04-07 PROCEDURE — 58100 BIOPSY OF UTERUS LINING: CPT | Performed by: OBSTETRICS & GYNECOLOGY

## 2023-04-07 PROCEDURE — 99215 OFFICE O/P EST HI 40 MIN: CPT | Mod: 25 | Performed by: OBSTETRICS & GYNECOLOGY

## 2023-04-07 RX ORDER — ACETAMINOPHEN 325 MG/1
975 TABLET ORAL ONCE
Status: CANCELLED | OUTPATIENT
Start: 2023-04-07 | End: 2023-04-07

## 2023-04-07 NOTE — TELEPHONE ENCOUNTER
"[id number]  Kristel Martinez    You are now scheduled for surgery at [facility].  Below are the details for your surgery.  Please read the \"Preparing for Your Surgery\" instructions and let us know if you have any questions.    Type of surgery: DILATION, CERVIX, WITH ENDOMETRIAL ABLATION, hysteroscopy, dilation and curettage, possible polypectomy    Surgeon:  Mulu Orozco MD  Location of surgery: [facility] OR    Date of surgery: 4-26-23    Time: 11:15am   Arrival Time: 10:15am    Time can change, to be confirmed a couple of days prior by pre-op surgery nurse.    Pre-Op Appt Date: Patient to schedule with a PCP or Family Practice Provider within 30 days to the surgery.  Post-Op Appt Date:    Time:     Packet sent out: Yes  Pre-cert/Authorization completed:  TBD by Financial Securing Office.   MA Sterilization/Hysterectomy Acknowledgment Consent signed: Not Applicable    [facility]  [phone]    Fax: [phone]  Same Day Surgery [phone]  Fax: [phone]  [facility] [phone]    "

## 2023-04-07 NOTE — NURSING NOTE
"Initial /73 (BP Location: Right arm, Patient Position: Sitting, Cuff Size: Adult Large)   Pulse 98   Temp (!) 96.7  F (35.9  C) (Tympanic)   Resp 14   Ht 1.758 m (5' 9.23\")   Wt 99.5 kg (219 lb 6.4 oz)   LMP 03/20/2023 (Approximate)   BMI 32.19 kg/m   Estimated body mass index is 32.19 kg/m  as calculated from the following:    Height as of this encounter: 1.758 m (5' 9.23\").    Weight as of this encounter: 99.5 kg (219 lb 6.4 oz). .      "

## 2023-04-07 NOTE — PROGRESS NOTES
Gynecology Consult Note      HPI: Kristel Martinez is a 42 year old who presents for abnormal uterine bleeding.  The patient states that she is having regular, monthly periods.  Did try an IUD recently but states that it was not well-tolerated due to recurrent bacterial vaginosis.  She states when she has her period last 4 days but that they are very heavy where she has to wear a super tampon as well as an extra absorbency pad and still has trouble with leaking/overflow at night and needs to bring extra close to work.  Denies any lightheadedness or dizziness.    ROS: 10 pt ROS neg other than HPI    PMH:   Past Medical History:   Diagnosis Date     Depressive disorder      Interstitial cystitis 07/09/2021     Major depressive disorder, recurrent episode, moderate (H) 11/16/2007     Papillary adenocarcinoma of thyroid (H) 03/01/2014    Overview:  12/2013: R thyroid lobectomy 1.7 cm follicular variant papillary cancer, MACIS 3.6 03/2014: Completion Thyroidectomy 0.3 cm incidental papillary cancer left lobe. Iodine ablation with 53 mCi.  07/2016, 07/2017: Neck US neg.     Postoperative hypothyroidism 03/23/2018       PSHx:   Past Surgical History:   Procedure Laterality Date     BIOPSY       COLONOSCOPY  08/27/2018     HC INSERTION INTRAUTERINE DEVICE  2021     HC REMOVE INTRAUTERINE DEVICE  2021     HEMORRHOIDECTOMY EXTERNAL N/A 3/27/2023    Procedure: Combined internal and external hemorrhoidectomy;  Surgeon: Feroz Perry DO;  Location: [facility] OR     THYROIDECTOMY      2015     TONSILLECTOMY       TUBAL LIGATION  2006       Medications:   escitalopram (LEXAPRO) 20 MG tablet, Take 1 tablet (20 mg) by mouth daily  fluticasone (FLONASE) 50 MCG/ACT nasal spray, SHAKE LIQUID AND USE 1 SPRAY IN EACH NOSTRIL DAILY  hydrocortisone, Perianal, (HYDROCORTISONE) 2.5 % cream, Place rectally 2 times daily as needed for hemorrhoids  hydrocortisone, Perianal, (HYDROCORTISONE) 2.5 % cream, Place rectally 2 times daily as needed  for hemorrhoids Tube should last 1 month.  hydrocortisone-pramoxine (PROCTOFOAM-HC) rectal foam, Place 1 Applicatorful rectally 3 times daily (Patient taking differently: Place rectally 3 times daily)  lactulose (CHRONULAC) 10 GM/15ML solution, Take 15-30 mLs (10-20 g) by mouth daily as needed for constipation  levothyroxine (SYNTHROID/LEVOTHROID) 112 MCG tablet, Take 1 tablet (112 mcg) by mouth daily In addition to 50 mcg daily for a total of 162 mcg daily  levothyroxine (SYNTHROID/LEVOTHROID) 50 MCG tablet, Take 1 tablet (50 mcg) by mouth daily In addition to 112 mcg for a total of 162 mcg daily  linaclotide (LINZESS) 290 MCG capsule, Take 1 capsule (290 mcg) by mouth every morning (before breakfast)  polyethylene glycol (MIRALAX) 17 GM/Dose powder, Take 17 g (1 capful.) by mouth daily as needed for constipation (If no bowel movement in 24 hours. Mix in 8 oz of water.  May take twice daily.)  psyllium (METAMUCIL/KONSYL) 58.6 % powder, Take 18 g (1 Tablespoonful) by mouth 2 times daily for 30 days Use for constipation. Dilute powder with fluid before taking.  Continue using for 1 month.  senna-docusate (SENOKOT-S/PERICOLACE) 8.6-50 MG tablet, Take 1-2 tablets by mouth 2 times daily Use to prevent or treat constipation.  valACYclovir (VALTREX) 1000 mg tablet, TAKE 2 TABLETS(2000 MG) BY MOUTH TWICE DAILY    No current facility-administered medications on file prior to visit.       Allergies:      Allergies   Allergen Reactions     Cephalexin Rash     Clavulanic Acid Diarrhea     Bad diarrhea with nausea and vomiting     Cephalosporins Rash     Cephalexin     Sulfa Drugs Rash and Unknown       Social History:   Social History     Socioeconomic History     Marital status:      Spouse name: partner- Flaco     Number of children: 2     Years of education: Not on file     Highest education level: Not on file   Occupational History     Occupation: school paraprofessional   Tobacco Use     Smoking status: Former     " Packs/day: 1.00     Years: 5.00     Pack years: 5.00     Types: Cigarettes     Smokeless tobacco: Never   Vaping Use     Vaping status: Never Used   Substance and Sexual Activity     Alcohol use: Yes     Comment: 1 drink per wk     Drug use: No     Sexual activity: Yes     Partners: Male     Birth control/protection: Female Surgical   Other Topics Concern     Parent/sibling w/ CABG, MI or angioplasty before 65F 55M? No   Social History Narrative     Not on file     Social Determinants of Health     Financial Resource Strain: Not on file   Food Insecurity: Not on file   Transportation Needs: Not on file   Physical Activity: Not on file   Stress: Not on file   Social Connections: Not on file   Intimate Partner Violence: Not on file   Housing Stability: Not on file       Family History:  Family History   Problem Relation Age of Onset     Skin Cancer Mother      Other Cancer Mother         Skin cancer     Hypertension Father      Arrhythmia Father      Lymphoma Maternal Grandmother      Cerebrovascular Disease Maternal Grandmother      Diabetes Paternal Grandfather              Asthma Son      Arthritis Daughter        Physical Exam:   Vitals:    23 1410   BP: 116/73   BP Location: Right arm   Patient Position: Sitting   Cuff Size: Adult Large   Pulse: 98   Resp: 14   Temp: (!) 96.7  F (35.9  C)   TempSrc: Tympanic   Weight: 99.5 kg (219 lb 6.4 oz)   Height: 1.758 m (5' 9.23\")      Gen: lying in bed, NAD  CV: Reg rate, well perfused  Pulm: no increased work of breathing  Abd: non-tender, non-distended, no masses   Pelvis: normal appearing external genitalia, vaginal mucosa, cervix, bimanual exam with normal size and contour of uterus with no adnexal masses  Extremities: non-tender, no erythema; no edema  Psych: normal mood and affect  Neuro: no focal deficits        Imaging:    FINDINGS:     UTERUS: Uterus is retroverted and measures 9.2 x 3.7 x 6.6 cm. It is  grossly normal in size and " echogenicity.     ENDOMETRIUM: 16 mm. There is a hypoechoic structure in the posterior  uterine body/fundal endometrium measuring 1.4 x 1.0 x 1.1 cm which  could represent a polyp or other lesion. There is a small stalk of  vascularity in this area of hypoechogenicity.     RIGHT OVARY: 4.0 x 2.5 x 2.8 cm. Complex structure in the right ovary  measuring 2.4 x 1.4 x 1.9 cm could represent a hemorrhagic cyst or  collapsing cyst. This appeared to have some vascularity along  peripheral aspects. Color and spectral violation demonstrates vascular  flow in the right ovary..     LEFT OVARY: 2.6 x 1.8 x 1.7 cm. Grossly normal in appearance.     No significant free fluid.                                                                      IMPRESSION:  1.  Hypoechoic nodular density in the posterior mildly thickened  hyperechoic endometrium could represent a polyp. Hysteroscopy may be  helpful for further evaluation.  2.  Complex structure in the right ovary likely represents a  collapsing cyst.  3.  No other significant abnormalities are identified.    [facility] Endometrial Biopsy Procedure Note    Kristel Martinez  1980  [id number]    The patient was counseled on the risks (including including risk of infection, bleeding, recurrence), benefits, and alternatives of the procedure. Verbal and written consent were obtained.  Pt has had a tubal    Technique: The patient was placed in the dorsal lithotomy position.  A speculum was placed in the vagina and the cervix visualized. The cervix was cleaned with betadine swabs x3. The rocket curet was passed through the cervix to the fundus with return of scant tissue. This was placed in specimen jar and sent for permanent pathology. All instruments were removed.  The patient tolerated the procedure well.  She was given post op instructions which included activity and pelvic restrictions.           A&P:     Kristel Martinez is a 42 year old  with AUB/menorrhagia.  The  etiology of her heavy bleeding remains unclear.  Previous ultrasound was notable for possible polyp.  She states that this was not further evaluated at the time.  Discussed with patient option of completing repeat ultrasound to see if polyp is still in the differential.  Reviewed with her at length medical options versus surgical options for management.  Patient is more interested in surgical options given failure of IUD previously.  Therefore reviewed procedures for ablation and hysterectomy in detail as well as expected recovery and potential risks/benefits.  Patient is interested in ablation.  Endometrial biopsy completed as above given plan for surgery.  Orders placed for hysteroscopy, dilation and curettage, endometrial ablation, possible polypectomy.  She will schedule a preop physical.  Plan complete ultrasound prior to surgery to evaluate for possible polyp.    Patient stated understanding and agreement with plan of care.  I spent 40 minutes reviewing chart, obtaining history, counseling, examining, coordinating care, and documenting this encounter, excluding biopsy      Mulu Orozco MD   4/7/2023 2:50 PM

## 2023-04-17 ENCOUNTER — HOSPITAL ENCOUNTER (OUTPATIENT)
Dept: ULTRASOUND IMAGING | Facility: CLINIC | Age: 43
Discharge: HOME | End: 2023-04-17
Attending: OBSTETRICS & GYNECOLOGY | Admitting: OBSTETRICS & GYNECOLOGY
Payer: COMMERCIAL

## 2023-04-17 DIAGNOSIS — N93.9 ABNORMAL UTERINE BLEEDING (AUB): ICD-10-CM

## 2023-04-17 PROCEDURE — 76856 US EXAM PELVIC COMPLETE: CPT

## 2023-04-21 ENCOUNTER — OFFICE VISIT (OUTPATIENT)
Dept: FAMILY MEDICINE | Facility: CLINIC | Age: 43
End: 2023-04-21
Payer: COMMERCIAL

## 2023-04-21 VITALS
TEMPERATURE: 97.9 F | HEIGHT: 69 IN | RESPIRATION RATE: 18 BRPM | OXYGEN SATURATION: 95 % | DIASTOLIC BLOOD PRESSURE: 70 MMHG | SYSTOLIC BLOOD PRESSURE: 114 MMHG | WEIGHT: 220 LBS | BODY MASS INDEX: 32.58 KG/M2 | HEART RATE: 89 BPM

## 2023-04-21 DIAGNOSIS — E89.0 POSTOPERATIVE HYPOTHYROIDISM: ICD-10-CM

## 2023-04-21 DIAGNOSIS — Z01.818 PREOP GENERAL PHYSICAL EXAM: Primary | ICD-10-CM

## 2023-04-21 DIAGNOSIS — N92.0 MENORRHAGIA WITH REGULAR CYCLE: ICD-10-CM

## 2023-04-21 LAB
HCG UR QL: NEGATIVE
T4 FREE SERPL-MCNC: 1.77 NG/DL (ref 0.9–1.7)
TSH SERPL DL<=0.005 MIU/L-ACNC: 0.02 UIU/ML (ref 0.3–4.2)

## 2023-04-21 PROCEDURE — 84439 ASSAY OF FREE THYROXINE: CPT | Performed by: NURSE PRACTITIONER

## 2023-04-21 PROCEDURE — 36415 COLL VENOUS BLD VENIPUNCTURE: CPT | Performed by: NURSE PRACTITIONER

## 2023-04-21 PROCEDURE — 84443 ASSAY THYROID STIM HORMONE: CPT | Performed by: NURSE PRACTITIONER

## 2023-04-21 PROCEDURE — 81025 URINE PREGNANCY TEST: CPT | Performed by: NURSE PRACTITIONER

## 2023-04-21 PROCEDURE — 99214 OFFICE O/P EST MOD 30 MIN: CPT | Performed by: NURSE PRACTITIONER

## 2023-04-21 RX ORDER — LEVOTHYROXINE SODIUM 150 UG/1
150 TABLET ORAL DAILY
Qty: 90 TABLET | Refills: 1 | Status: SHIPPED | OUTPATIENT
Start: 2023-04-21 | End: 2023-11-08

## 2023-04-21 ASSESSMENT — PAIN SCALES - GENERAL: PAINLEVEL: NO PAIN (0)

## 2023-04-21 NOTE — PROGRESS NOTES
[facility]  [address] [place] [address]  Phone: [phone]  Fax: [phone]  Primary Provider: Mena Nogueira  Pre-op Performing Provider: MENA NOGUEIRA    PREOPERATIVE EVALUATION:  Today's date: 4/21/2023    Kristel Martinez is a 42 year old female who presents for a preoperative evaluation.      4/21/2023     8:49 AM   Additional Questions   Roomed by Lauren LAN CMA   Accompanied by Self     Surgical Information:  Surgery/Procedure: DILATION, CERVIX, WITH ENDOMETRIAL ABLATION, hysteroscopy, dilation and curettage, possible polypectomy  Surgery Location: [facility]  Surgeon: Dr. Orozco   Surgery Date: 4/26/2023  Time of Surgery: 1115  Where patient plans to recover: At home with family  Fax number for surgical facility: Note does not need to be faxed, will be available electronically in Epic.    Assessment & Plan     The proposed surgical procedure is considered LOW risk.    Preop general physical exam  - HCG qualitative urine; Future  - HCG qualitative urine    Menorrhagia with regular cycle    Postoperative hypothyroidism    - TSH; Future  - T4, free; Future  - T4, free  - TSH    Risks and Recommendations:  The patient has the following additional risks and recommendations for perioperative complications:   - No identified additional risk factors other than previously addressed    Medication Instructions:  Patient is to take all scheduled medications on the day of surgery    RECOMMENDATION:  APPROVAL GIVEN to proceed with proposed procedure, without further diagnostic evaluation.      Subjective     HPI related to upcoming procedure: Abnormal uterine bleeding         4/21/2023     8:48 AM   Preop Questions   1. Have you ever had a heart attack or stroke? No   2. Have you ever had surgery on your heart or blood vessels, such as a stent placement, a coronary artery bypass, or surgery on an artery in your head, neck, heart, or legs? No   3. Do you have chest pain with  activity? No   4. Do you have a history of  heart failure? No   5. Do you currently have a cold, bronchitis or symptoms of other infection? No   6. Do you have a cough, shortness of breath, or wheezing? No   7. Do you or anyone in your family have previous history of blood clots? No   8. Do you or does anyone in your family have a serious bleeding problem such as prolonged bleeding following surgeries or cuts? No   9. Have you ever had problems with anemia or been told to take iron pills? No   10. Have you had any abnormal blood loss such as black, tarry or bloody stools, or abnormal vaginal bleeding? No   11. Have you ever had a blood transfusion? No   12. Are you willing to have a blood transfusion if it is medically needed before, during, or after your surgery? YES   13. Have you or any of your relatives ever had problems with anesthesia? No   14. Do you have sleep apnea, excessive snoring or daytime drowsiness? No   15. Do you have any artifical heart valves or other implanted medical devices like a pacemaker, defibrillator, or continuous glucose monitor? No   16. Do you have artificial joints? No   17. Are you allergic to latex? No   18. Is there any chance that you may be pregnant? No       Health Care Directive:  Patient does not have a Health Care Directive or Living Will: Discussed advance care planning with patient; however, patient declined at this time.    Preoperative Review of :   reviewed - pain medication prescribed after surgery    Review of Systems  CONSTITUTIONAL: NEGATIVE for fever, chills, change in weight  INTEGUMENTARY/SKIN: NEGATIVE for worrisome rashes, moles or lesions  EYES: NEGATIVE for vision changes or irritation  ENT/MOUTH: NEGATIVE for ear, mouth and throat problems  RESP: NEGATIVE for significant cough or SOB  CV: NEGATIVE for chest pain, palpitations or peripheral edema  GI: NEGATIVE for nausea, abdominal pain, heartburn, or change in bowel habits  : NEGATIVE for frequency,  dysuria, or hematuria  MUSCULOSKELETAL: NEGATIVE for significant arthralgias or myalgia  NEURO: NEGATIVE for weakness, dizziness or paresthesias  ENDOCRINE: NEGATIVE for temperature intolerance, skin/hair changes  HEME: NEGATIVE for bleeding problems  PSYCHIATRIC: NEGATIVE for changes in mood or affect    Patient Active Problem List    Diagnosis Date Noted     Abnormal Pap smear of cervix 08/08/2022     Priority: Medium     Formatting of this note might be different from the original.  LSIL 2005 remote, bx fine       Interstitial cystitis 07/09/2021     Priority: Medium     Traumatic incomplete tear of right rotator cuff, initial encounter 07/23/2020     Priority: Medium     Bicipital tendonitis of right shoulder 07/23/2020     Priority: Medium     Subacromial impingement of right shoulder 07/23/2020     Priority: Medium     IgA deficiency (H) 09/12/2018     Priority: Medium     Postoperative hypothyroidism 03/23/2018     Priority: Medium     Vitiligo 04/09/2015     Priority: Medium     Papillary adenocarcinoma of thyroid (H) 03/01/2014     Priority: Medium     Overview:   12/2013: R thyroid lobectomy 1.7 cm follicular variant papillary cancer, MACIS 3.6  03/2014: Completion Thyroidectomy 0.3 cm incidental papillary cancer left lobe. Iodine ablation with 53 mCi.   07/2016, 07/2017: Neck US neg.       Vitamin D deficiency 07/06/2009     Priority: Medium     Last Assessment & Plan:   7/09  29.4  vit  d   on 1000units  daily  since  7/09       Major depressive disorder, recurrent episode, moderate (H) 11/16/2007     Priority: Medium      Past Medical History:   Diagnosis Date     Depressive disorder      Interstitial cystitis 07/09/2021     Major depressive disorder, recurrent episode, moderate (H) 11/16/2007     Papillary adenocarcinoma of thyroid (H) 03/01/2014    Overview:  12/2013: R thyroid lobectomy 1.7 cm follicular variant papillary cancer, MACIS 3.6 03/2014: Completion Thyroidectomy 0.3 cm incidental papillary  cancer left lobe. Iodine ablation with 53 mCi.  07/2016, 07/2017: Neck US neg.     Postoperative hypothyroidism 03/23/2018     Past Surgical History:   Procedure Laterality Date     BIOPSY       COLONOSCOPY  08/27/2018     HC INSERTION INTRAUTERINE DEVICE  2021     HC REMOVE INTRAUTERINE DEVICE  2021     HEMORRHOIDECTOMY EXTERNAL N/A 3/27/2023    Procedure: Combined internal and external hemorrhoidectomy;  Surgeon: Feroz Perry DO;  Location: [facility] OR     THYROIDECTOMY      2015     TONSILLECTOMY       TUBAL LIGATION  2006     Current Outpatient Medications   Medication Sig Dispense Refill     escitalopram (LEXAPRO) 20 MG tablet Take 1 tablet (20 mg) by mouth daily 90 tablet 3     fluticasone (FLONASE) 50 MCG/ACT nasal spray SHAKE LIQUID AND USE 1 SPRAY IN EACH NOSTRIL DAILY 16 g 1     lactulose (CHRONULAC) 10 GM/15ML solution Take 15-30 mLs (10-20 g) by mouth daily as needed for constipation 900 mL 1     levothyroxine (SYNTHROID/LEVOTHROID) 112 MCG tablet Take 1 tablet (112 mcg) by mouth daily In addition to 50 mcg daily for a total of 162 mcg daily 90 tablet 3     levothyroxine (SYNTHROID/LEVOTHROID) 50 MCG tablet Take 1 tablet (50 mcg) by mouth daily In addition to 112 mcg for a total of 162 mcg daily 90 tablet 3     polyethylene glycol (MIRALAX) 17 GM/Dose powder Take 17 g (1 capful.) by mouth daily as needed for constipation (If no bowel movement in 24 hours. Mix in 8 oz of water.  May take twice daily.) 500 g 0     psyllium (METAMUCIL/KONSYL) 58.6 % powder Take 18 g (1 Tablespoonful) by mouth 2 times daily for 30 days Use for constipation. Dilute powder with fluid before taking.  Continue using for 1 month. 1040 g 0     senna-docusate (SENOKOT-S/PERICOLACE) 8.6-50 MG tablet Take 1-2 tablets by mouth 2 times daily Use to prevent or treat constipation. 30 tablet 0     valACYclovir (VALTREX) 1000 mg tablet TAKE 2 TABLETS(2000 MG) BY MOUTH TWICE DAILY 12 tablet 3     hydrocortisone, Perianal,  "(HYDROCORTISONE) 2.5 % cream Place rectally 2 times daily as needed for hemorrhoids 30 g 0     hydrocortisone-pramoxine (PROCTOFOAM-HC) rectal foam Place 1 Applicatorful rectally 3 times daily (Patient taking differently: Place rectally 3 times daily) 10 g 1       Allergies   Allergen Reactions     Cephalexin Rash     Clavulanic Acid Diarrhea     Bad diarrhea with nausea and vomiting     Cephalosporins Rash     Cephalexin     Sulfa Drugs Rash and Unknown        Social History     Tobacco Use     Smoking status: Former     Packs/day: 1.00     Years: 5.00     Pack years: 5.00     Types: Cigarettes     Smokeless tobacco: Never   Vaping Use     Vaping status: Never Used   Substance Use Topics     Alcohol use: Yes     Comment: 1 drink per wk     History   Drug Use No         Objective     /70 (Cuff Size: Adult Large)   Pulse 89   Temp 97.9  F (36.6  C) (Tympanic)   Resp 18   Ht 1.753 m (5' 9\")   Wt 99.8 kg (220 lb)   LMP 04/14/2023 (Exact Date)   SpO2 95%   BMI 32.49 kg/m      Physical Exam    GENERAL APPEARANCE: healthy, alert and no distress     EYES: EOMI, PERRL     HENT: ear canals and TM's normal and nose and mouth without ulcers or lesions     NECK: no adenopathy, no asymmetry, masses, or scars and thyroid normal to palpation     RESP: lungs clear to auscultation - no rales, rhonchi or wheezes     CV: regular rates and rhythm, normal S1 S2, no S3 or S4 and no murmur, click or rub     ABDOMEN:  soft, nontender, no HSM or masses and bowel sounds normal     MS: extremities normal- no gross deformities noted     SKIN: no suspicious lesions or rashes     NEURO: Normal strength and tone, sensory exam grossly normal, mentation intact and speech normal     PSYCH: mentation appears normal. and affect normal/bright     LYMPHATICS: No cervical adenopathy    Recent Labs   Lab Test 01/12/23  1619 10/04/21  2005   HGB 13.0 12.6    260     --    POTASSIUM 4.1  --    CR 0.69  --     "     Diagnostics:  Recent Results (from the past 24 hour(s))   HCG qualitative urine    Collection Time: 04/21/23  9:24 AM   Result Value Ref Range    hCG Urine Qualitative Negative Negative   T4, free    Collection Time: 04/21/23  9:24 AM   Result Value Ref Range    Free T4 1.77 (H) 0.90 - 1.70 ng/dL   TSH    Collection Time: 04/21/23  9:24 AM   Result Value Ref Range    TSH 0.02 (L) 0.30 - 4.20 uIU/mL      No EKG required, no history of coronary heart disease, significant arrhythmia, peripheral arterial disease or other structural heart disease.    Revised Cardiac Risk Index (RCRI):  The patient has the following serious cardiovascular risks for perioperative complications:   - No serious cardiac risks = 0 points     RCRI Interpretation: 0 points: Class I (very low risk - 0.4% complication rate)           Signed Electronically by: DAVID Lazo CNP  Copy of this evaluation report is provided to requesting physician.      Answers for HPI/ROS submitted by the patient on 4/21/2023  If you checked off any problems, how difficult have these problems made it for you to do your work, take care of things at home, or get along with other people?: Somewhat difficult  PHQ9 TOTAL SCORE: 11

## 2023-04-21 NOTE — PATIENT INSTRUCTIONS
For informational purposes only. Not to replace the advice of your health care provider. Copyright   2003,  [facility]. All rights reserved. Clinically reviewed by Amy Knutson MD. [employer] [id number] - REV .  Preparing for Your Surgery  Getting started  A nurse will call you to review your health history and instructions. They will give you an arrival time based on your scheduled surgery time. Please be ready to share:    Your doctor's clinic name and phone number    Your medical, surgical, and anesthesia history    A list of allergies and sensitivities    A list of medicines, including herbal treatments and over-the-counter drugs    Whether the patient has a legal guardian (ask how to send us the papers in advance)  Please tell us if you're pregnant--or if there's any chance you might be pregnant. Some surgeries may injure a fetus (unborn baby), so they require a pregnancy test. Surgeries that are safe for a fetus don't always need a test, and you can choose whether to have one.   If you have a child who's having surgery, please ask for a copy of Preparing for Your Child's Surgery.    Preparing for surgery    Within 10 to 30 days of surgery: Have a pre-op exam (sometimes called an H&P, or History and Physical). This can be done at a clinic or pre-operative center.  ? If you're having a , you may not need this exam. Talk to your care team.    At your pre-op exam, talk to your care team about all medicines you take. If you need to stop any medicines before surgery, ask when to start taking them again.  ? We do this for your safety. Many medicines can make you bleed too much during surgery. Some change how well surgery (anesthesia) drugs work.    Call your insurance company to let them know you're having surgery. (If you don't have insurance, call [phone].)    Call your clinic if there's any change in your health. This includes signs of a cold or flu (sore throat, runny nose,  cough, rash, fever). It also includes a scrape or scratch near the surgery site.    If you have questions on the day of surgery, call your hospital or surgery center.  Eating and drinking guidelines  For your safety: Unless your surgeon tells you otherwise, follow the guidelines below.    Eat and drink as usual until 8 hours before you arrive for surgery. After that, no food or milk.    Drink clear liquids until 2 hours before you arrive. These are liquids you can see through, like water, Gatorade, and Propel Water. They also include plain black coffee and tea (no cream or milk), candy, and breath mints. You can spit out gum when you arrive.    If you drink alcohol: Stop drinking it the night before surgery.    If your care team tells you to take medicine on the morning of surgery, it's okay to take it with a sip of water.  Preventing infection    Shower or bathe the night before and morning of your surgery. Follow the instructions your clinic gave you. (If no instructions, use regular soap.)    Don't shave or clip hair near your surgery site. We'll remove the hair if needed.    Don't smoke or vape the morning of surgery. You may chew nicotine gum up to 2 hours before surgery. A nicotine patch is okay.  ? Note: Some surgeries require you to completely quit smoking and nicotine. Check with your surgeon.    Your care team will make every effort to keep you safe from infection. We will:  ? Clean our hands often with soap and water (or an alcohol-based hand rub).  ? Clean the skin at your surgery site with a special soap that kills germs.  ? Give you a special gown to keep you warm. (Cold raises the risk of infection.)  ? Wear special hair covers, masks, gowns and gloves during surgery.  ? Give antibiotic medicine, if prescribed. Not all surgeries need antibiotics.  What to bring on the day of surgery    Photo ID and insurance card    Copy of your health care directive, if you have one    Glasses and hearing aids (bring  cases)  ? You can't wear contacts during surgery    Inhaler and eye drops, if you use them (tell us about these when you arrive)    CPAP machine or breathing device, if you use them    A few personal items, if spending the night    If you have . . .  ? A pacemaker, ICD (cardiac defibrillator) or other implant: Bring the ID card.  ? An implanted stimulator: Bring the remote control.  ? A legal guardian: Bring a copy of the certified (court-stamped) guardianship papers.  Please remove any jewelry, including body piercings. Leave jewelry and other valuables at home.  If you're going home the day of surgery    You must have a responsible adult drive you home. They should stay with you overnight as well.    If you don't have someone to stay with you, and you aren't safe to go home alone, we may keep you overnight. Insurance often won't pay for this.  After surgery  If it's hard to control your pain or you need more pain medicine, please call your surgeon's office.  Questions?   If you have any questions for your care team, list them here: _________________________________________________________________________________________________________________________________________________________________________ ____________________________________ ____________________________________ ____________________________________

## 2023-04-25 ENCOUNTER — ANESTHESIA EVENT (OUTPATIENT)
Dept: SURGERY | Facility: CLINIC | Age: 43
End: 2023-04-25
Payer: COMMERCIAL

## 2023-04-25 ASSESSMENT — LIFESTYLE VARIABLES: TOBACCO_USE: 1

## 2023-04-25 NOTE — ANESTHESIA PREPROCEDURE EVALUATION
Anesthesia Pre-Procedure Evaluation    Patient: Kristel Martinez   MRN: [id number] : 1980        Procedure : Procedure(s):  DILATION, CERVIX, WITH ENDOMETRIAL ABLATION, hysteroscopy, dilation and curettage, possible polypectomy          Past Medical History:   Diagnosis Date     Depressive disorder      Interstitial cystitis 2021     Major depressive disorder, recurrent episode, moderate (H) 2007     Papillary adenocarcinoma of thyroid (H) 2014    Overview:  2013: R thyroid lobectomy 1.7 cm follicular variant papillary cancer, MACIS 3.6 2014: Completion Thyroidectomy 0.3 cm incidental papillary cancer left lobe. Iodine ablation with 53 mCi.  2016, 2017: Neck US neg.     Postoperative hypothyroidism 2018      Past Surgical History:   Procedure Laterality Date     BIOPSY       COLONOSCOPY  2018     HC INSERTION INTRAUTERINE DEVICE       HC REMOVE INTRAUTERINE DEVICE       HEMORRHOIDECTOMY EXTERNAL N/A 3/27/2023    Procedure: Combined internal and external hemorrhoidectomy;  Surgeon: Feroz Perry DO;  Location: [facility]     THYROIDECTOMY           TONSILLECTOMY       TUBAL LIGATION  2006      Allergies   Allergen Reactions     Cephalexin Rash     Clavulanic Acid Diarrhea     Bad diarrhea with nausea and vomiting     Cephalosporins Rash     Cephalexin     Sulfa Drugs Rash and Unknown      Social History     Tobacco Use     Smoking status: Former     Packs/day: 1.00     Years: 5.00     Pack years: 5.00     Types: Cigarettes     Smokeless tobacco: Never   Vaping Use     Vaping status: Never Used   Substance Use Topics     Alcohol use: Yes     Comment: 1 drink per wk      Wt Readings from Last 1 Encounters:   23 99.8 kg (220 lb)        Anesthesia Evaluation   Pt has had prior anesthetic.         ROS/MED HX  ENT/Pulmonary:     (+) tobacco use, Past use,     Neurologic:       Cardiovascular:       METS/Exercise Tolerance:     Hematologic:        Musculoskeletal:       GI/Hepatic:       Renal/Genitourinary:       Endo:     (+) thyroid problem, hypothyroidism,     Psychiatric/Substance Use:     (+) psychiatric history depression     Infectious Disease:       Malignancy:       Other:            Physical Exam    Airway  airway exam normal           Respiratory Devices and Support         Dental       (+) Minor Abnormalities - some fillings, tiny chips      Cardiovascular   cardiovascular exam normal          Pulmonary   pulmonary exam normal                OUTSIDE LABS:  CBC:   Lab Results   Component Value Date    WBC 5.9 01/12/2023    WBC 5.4 10/04/2021    HGB 13.0 01/12/2023    HGB 12.6 10/04/2021    HCT 39.7 01/12/2023    HCT 38.0 10/04/2021     01/12/2023     10/04/2021     BMP:   Lab Results   Component Value Date     01/12/2023     03/19/2021    POTASSIUM 4.1 01/12/2023    POTASSIUM 3.9 03/19/2021    CHLORIDE 104 01/12/2023    CHLORIDE 106 03/19/2021    CO2 25 01/12/2023    CO2 26 03/19/2021    BUN 6.2 01/12/2023    BUN 12 03/19/2021    CR 0.69 01/12/2023    CR 0.82 03/19/2021    GLC 98 01/12/2023    GLC 82 03/19/2021     COAGS: No results found for: PTT, INR, FIBR  POC:   Lab Results   Component Value Date    HCG Negative 04/21/2023     HEPATIC:   Lab Results   Component Value Date    ALBUMIN 4.5 01/12/2023    PROTTOTAL 7.3 01/12/2023    ALT 12 01/12/2023    AST 16 01/12/2023    ALKPHOS 60 01/12/2023    BILITOTAL 0.2 01/12/2023     OTHER:   Lab Results   Component Value Date    VASQUEZ 9.4 01/12/2023    MAG 2.2 10/30/2018    LIPASE 114 11/19/2019    TSH 0.02 (L) 04/21/2023    T4 1.77 (H) 04/21/2023    SED 7 01/12/2023       Anesthesia Plan    ASA Status:  3   NPO Status:  NPO Appropriate    Anesthesia Type: General.      Maintenance: Balanced.        Consents    Anesthesia Plan(s) and associated risks, benefits, and realistic alternatives discussed. Questions answered and patient/representative(s) expressed understanding.    -  Discussed:     - Discussed with:  Patient         Postoperative Care            Comments:                DAVID Monson CRNA

## 2023-04-26 ENCOUNTER — ANESTHESIA (OUTPATIENT)
Dept: SURGERY | Facility: CLINIC | Age: 43
End: 2023-04-26
Payer: COMMERCIAL

## 2023-04-26 ENCOUNTER — HOSPITAL ENCOUNTER (OUTPATIENT)
Facility: CLINIC | Age: 43
Discharge: HOME | End: 2023-04-26
Attending: OBSTETRICS & GYNECOLOGY | Admitting: OBSTETRICS & GYNECOLOGY
Payer: COMMERCIAL

## 2023-04-26 VITALS
WEIGHT: 220 LBS | DIASTOLIC BLOOD PRESSURE: 76 MMHG | BODY MASS INDEX: 32.58 KG/M2 | HEART RATE: 72 BPM | OXYGEN SATURATION: 100 % | HEIGHT: 69 IN | TEMPERATURE: 98 F | SYSTOLIC BLOOD PRESSURE: 133 MMHG | RESPIRATION RATE: 16 BRPM

## 2023-04-26 DIAGNOSIS — Z98.890 S/P ENDOMETRIAL ABLATION: Primary | ICD-10-CM

## 2023-04-26 LAB
ABO/RH(D): NORMAL
ANTIBODY SCREEN: NEGATIVE
HCG UR QL: NEGATIVE
HGB BLD-MCNC: 12 G/DL (ref 11.7–15.7)
SPECIMEN EXPIRATION DATE: NORMAL

## 2023-04-26 PROCEDURE — 250N000013 HC RX MED GY IP 250 OP 250 PS 637

## 2023-04-26 PROCEDURE — 81025 URINE PREGNANCY TEST: CPT | Performed by: OBSTETRICS & GYNECOLOGY

## 2023-04-26 PROCEDURE — 88305 TISSUE EXAM BY PATHOLOGIST: CPT | Mod: 26 | Performed by: STUDENT IN AN ORGANIZED HEALTH CARE EDUCATION/TRAINING PROGRAM

## 2023-04-26 PROCEDURE — 370N000017 HC ANESTHESIA TECHNICAL FEE, PER MIN: Performed by: OBSTETRICS & GYNECOLOGY

## 2023-04-26 PROCEDURE — 360N000076 HC SURGERY LEVEL 3, PER MIN: Performed by: OBSTETRICS & GYNECOLOGY

## 2023-04-26 PROCEDURE — 250N000011 HC RX IP 250 OP 636: Performed by: NURSE ANESTHETIST, CERTIFIED REGISTERED

## 2023-04-26 PROCEDURE — 250N000009 HC RX 250: Performed by: NURSE ANESTHETIST, CERTIFIED REGISTERED

## 2023-04-26 PROCEDURE — 272N000001 HC OR GENERAL SUPPLY STERILE: Performed by: OBSTETRICS & GYNECOLOGY

## 2023-04-26 PROCEDURE — 85018 HEMOGLOBIN: CPT | Performed by: OBSTETRICS & GYNECOLOGY

## 2023-04-26 PROCEDURE — 250N000013 HC RX MED GY IP 250 OP 250 PS 637: Performed by: OBSTETRICS & GYNECOLOGY

## 2023-04-26 PROCEDURE — C1888 ENDOVAS NON-CARDIAC ABL CATH: HCPCS | Performed by: OBSTETRICS & GYNECOLOGY

## 2023-04-26 PROCEDURE — 258N000003 HC RX IP 258 OP 636: Performed by: NURSE ANESTHETIST, CERTIFIED REGISTERED

## 2023-04-26 PROCEDURE — 250N000009 HC RX 250

## 2023-04-26 PROCEDURE — 36415 COLL VENOUS BLD VENIPUNCTURE: CPT | Performed by: OBSTETRICS & GYNECOLOGY

## 2023-04-26 PROCEDURE — 999N000141 HC STATISTIC PRE-PROCEDURE NURSING ASSESSMENT: Performed by: OBSTETRICS & GYNECOLOGY

## 2023-04-26 PROCEDURE — 250N000011 HC RX IP 250 OP 636: Performed by: OBSTETRICS & GYNECOLOGY

## 2023-04-26 PROCEDURE — 86901 BLOOD TYPING SEROLOGIC RH(D): CPT | Performed by: OBSTETRICS & GYNECOLOGY

## 2023-04-26 PROCEDURE — 710N000012 HC RECOVERY PHASE 2, PER MINUTE: Performed by: OBSTETRICS & GYNECOLOGY

## 2023-04-26 PROCEDURE — 88305 TISSUE EXAM BY PATHOLOGIST: CPT | Mod: TC | Performed by: OBSTETRICS & GYNECOLOGY

## 2023-04-26 PROCEDURE — 58563 HYSTEROSCOPY ABLATION: CPT | Performed by: OBSTETRICS & GYNECOLOGY

## 2023-04-26 PROCEDURE — 258N000003 HC RX IP 258 OP 636

## 2023-04-26 RX ORDER — NALOXONE HYDROCHLORIDE 0.4 MG/ML
0.2 INJECTION, SOLUTION INTRAMUSCULAR; INTRAVENOUS; SUBCUTANEOUS
Status: DISCONTINUED | OUTPATIENT
Start: 2023-04-26 | End: 2023-04-26 | Stop reason: HOSPADM

## 2023-04-26 RX ORDER — ACETAMINOPHEN 325 MG/1
975 TABLET ORAL ONCE
Status: DISCONTINUED | OUTPATIENT
Start: 2023-04-26 | End: 2023-04-26

## 2023-04-26 RX ORDER — NALOXONE HYDROCHLORIDE 0.4 MG/ML
0.4 INJECTION, SOLUTION INTRAMUSCULAR; INTRAVENOUS; SUBCUTANEOUS
Status: DISCONTINUED | OUTPATIENT
Start: 2023-04-26 | End: 2023-04-26 | Stop reason: HOSPADM

## 2023-04-26 RX ORDER — ONDANSETRON 4 MG/1
4 TABLET, ORALLY DISINTEGRATING ORAL EVERY 30 MIN PRN
Status: DISCONTINUED | OUTPATIENT
Start: 2023-04-26 | End: 2023-04-26 | Stop reason: HOSPADM

## 2023-04-26 RX ORDER — OXYCODONE HYDROCHLORIDE 5 MG/1
10 TABLET ORAL
Status: DISCONTINUED | OUTPATIENT
Start: 2023-04-26 | End: 2023-04-26 | Stop reason: HOSPADM

## 2023-04-26 RX ORDER — DEXAMETHASONE SODIUM PHOSPHATE 4 MG/ML
INJECTION, SOLUTION INTRA-ARTICULAR; INTRALESIONAL; INTRAMUSCULAR; INTRAVENOUS; SOFT TISSUE PRN
Status: DISCONTINUED | OUTPATIENT
Start: 2023-04-26 | End: 2023-04-26

## 2023-04-26 RX ORDER — GABAPENTIN 300 MG/1
300 CAPSULE ORAL
Status: COMPLETED | OUTPATIENT
Start: 2023-04-26 | End: 2023-04-26

## 2023-04-26 RX ORDER — OXYCODONE HYDROCHLORIDE 5 MG/1
5 TABLET ORAL
Status: DISCONTINUED | OUTPATIENT
Start: 2023-04-26 | End: 2023-04-26 | Stop reason: HOSPADM

## 2023-04-26 RX ORDER — FENTANYL CITRATE 50 UG/ML
50 INJECTION, SOLUTION INTRAMUSCULAR; INTRAVENOUS EVERY 5 MIN PRN
Status: DISCONTINUED | OUTPATIENT
Start: 2023-04-26 | End: 2023-04-26 | Stop reason: HOSPADM

## 2023-04-26 RX ORDER — HYDROMORPHONE HCL IN WATER/PF 6 MG/30 ML
0.2 PATIENT CONTROLLED ANALGESIA SYRINGE INTRAVENOUS EVERY 5 MIN PRN
Status: DISCONTINUED | OUTPATIENT
Start: 2023-04-26 | End: 2023-04-26 | Stop reason: HOSPADM

## 2023-04-26 RX ORDER — OXYCODONE HYDROCHLORIDE 5 MG/1
5-10 TABLET ORAL EVERY 4 HOURS PRN
Qty: 6 TABLET | Refills: 0 | Status: SHIPPED | OUTPATIENT
Start: 2023-04-26 | End: 2023-05-11

## 2023-04-26 RX ORDER — LIDOCAINE HYDROCHLORIDE 20 MG/ML
INJECTION, SOLUTION INFILTRATION; PERINEURAL PRN
Status: DISCONTINUED | OUTPATIENT
Start: 2023-04-26 | End: 2023-04-26

## 2023-04-26 RX ORDER — FENTANYL CITRATE 50 UG/ML
25 INJECTION, SOLUTION INTRAMUSCULAR; INTRAVENOUS EVERY 5 MIN PRN
Status: DISCONTINUED | OUTPATIENT
Start: 2023-04-26 | End: 2023-04-26 | Stop reason: HOSPADM

## 2023-04-26 RX ORDER — SODIUM CHLORIDE, SODIUM LACTATE, POTASSIUM CHLORIDE, CALCIUM CHLORIDE 600; 310; 30; 20 MG/100ML; MG/100ML; MG/100ML; MG/100ML
INJECTION, SOLUTION INTRAVENOUS CONTINUOUS
Status: DISCONTINUED | OUTPATIENT
Start: 2023-04-26 | End: 2023-04-26 | Stop reason: HOSPADM

## 2023-04-26 RX ORDER — FENTANYL CITRATE 50 UG/ML
25 INJECTION, SOLUTION INTRAMUSCULAR; INTRAVENOUS
Status: DISCONTINUED | OUTPATIENT
Start: 2023-04-26 | End: 2023-04-26 | Stop reason: HOSPADM

## 2023-04-26 RX ORDER — IBUPROFEN 800 MG/1
800 TABLET, FILM COATED ORAL EVERY 6 HOURS PRN
Qty: 30 TABLET | Refills: 0 | Status: SHIPPED | OUTPATIENT
Start: 2023-04-26 | End: 2023-06-30

## 2023-04-26 RX ORDER — ACETAMINOPHEN 325 MG/1
975 TABLET ORAL ONCE
Status: DISCONTINUED | OUTPATIENT
Start: 2023-04-26 | End: 2023-04-26 | Stop reason: HOSPADM

## 2023-04-26 RX ORDER — ONDANSETRON 2 MG/ML
INJECTION INTRAMUSCULAR; INTRAVENOUS PRN
Status: DISCONTINUED | OUTPATIENT
Start: 2023-04-26 | End: 2023-04-26

## 2023-04-26 RX ORDER — IBUPROFEN 800 MG/1
800 TABLET, FILM COATED ORAL ONCE
Status: DISCONTINUED | OUTPATIENT
Start: 2023-04-26 | End: 2023-04-26 | Stop reason: HOSPADM

## 2023-04-26 RX ORDER — ONDANSETRON 2 MG/ML
4 INJECTION INTRAMUSCULAR; INTRAVENOUS EVERY 30 MIN PRN
Status: DISCONTINUED | OUTPATIENT
Start: 2023-04-26 | End: 2023-04-26 | Stop reason: HOSPADM

## 2023-04-26 RX ORDER — AMOXICILLIN 250 MG
1-2 CAPSULE ORAL 2 TIMES DAILY
Qty: 30 TABLET | Refills: 0 | Status: SHIPPED | OUTPATIENT
Start: 2023-04-26 | End: 2024-09-11

## 2023-04-26 RX ORDER — ACETAMINOPHEN 325 MG/1
975 TABLET ORAL ONCE
Status: COMPLETED | OUTPATIENT
Start: 2023-04-26 | End: 2023-04-26

## 2023-04-26 RX ORDER — FENTANYL CITRATE 50 UG/ML
INJECTION, SOLUTION INTRAMUSCULAR; INTRAVENOUS PRN
Status: DISCONTINUED | OUTPATIENT
Start: 2023-04-26 | End: 2023-04-26

## 2023-04-26 RX ORDER — HYDROMORPHONE HCL IN WATER/PF 6 MG/30 ML
0.4 PATIENT CONTROLLED ANALGESIA SYRINGE INTRAVENOUS EVERY 5 MIN PRN
Status: DISCONTINUED | OUTPATIENT
Start: 2023-04-26 | End: 2023-04-26 | Stop reason: HOSPADM

## 2023-04-26 RX ORDER — OXYCODONE HYDROCHLORIDE 5 MG/1
5 TABLET ORAL
Status: COMPLETED | OUTPATIENT
Start: 2023-04-26 | End: 2023-04-26

## 2023-04-26 RX ORDER — LIDOCAINE 40 MG/G
CREAM TOPICAL
Status: DISCONTINUED | OUTPATIENT
Start: 2023-04-26 | End: 2023-04-26 | Stop reason: HOSPADM

## 2023-04-26 RX ORDER — BUPIVACAINE HYDROCHLORIDE 2.5 MG/ML
INJECTION, SOLUTION INFILTRATION; PERINEURAL PRN
Status: DISCONTINUED | OUTPATIENT
Start: 2023-04-26 | End: 2023-04-26 | Stop reason: HOSPADM

## 2023-04-26 RX ORDER — ACETAMINOPHEN 325 MG/1
975 TABLET ORAL EVERY 6 HOURS PRN
Qty: 50 TABLET | Refills: 0 | Status: SHIPPED | OUTPATIENT
Start: 2023-04-26 | End: 2023-06-30

## 2023-04-26 RX ORDER — SODIUM CHLORIDE, SODIUM LACTATE, POTASSIUM CHLORIDE, CALCIUM CHLORIDE 600; 310; 30; 20 MG/100ML; MG/100ML; MG/100ML; MG/100ML
INJECTION, SOLUTION INTRAVENOUS CONTINUOUS PRN
Status: DISCONTINUED | OUTPATIENT
Start: 2023-04-26 | End: 2023-04-26

## 2023-04-26 RX ORDER — PROPOFOL 10 MG/ML
INJECTION, EMULSION INTRAVENOUS CONTINUOUS PRN
Status: DISCONTINUED | OUTPATIENT
Start: 2023-04-26 | End: 2023-04-26

## 2023-04-26 RX ADMIN — FENTANYL CITRATE 100 MCG: 50 INJECTION, SOLUTION INTRAMUSCULAR; INTRAVENOUS at 11:38

## 2023-04-26 RX ADMIN — LIDOCAINE HYDROCHLORIDE 0.1 ML: 10 INJECTION, SOLUTION EPIDURAL; INFILTRATION; INTRACAUDAL; PERINEURAL at 10:31

## 2023-04-26 RX ADMIN — MIDAZOLAM HYDROCHLORIDE 1 MG: 1 INJECTION, SOLUTION INTRAMUSCULAR; INTRAVENOUS at 11:38

## 2023-04-26 RX ADMIN — GABAPENTIN 300 MG: 300 CAPSULE ORAL at 10:24

## 2023-04-26 RX ADMIN — DEXAMETHASONE SODIUM PHOSPHATE 4 MG: 4 INJECTION, SOLUTION INTRA-ARTICULAR; INTRALESIONAL; INTRAMUSCULAR; INTRAVENOUS; SOFT TISSUE at 11:39

## 2023-04-26 RX ADMIN — MIDAZOLAM HYDROCHLORIDE 2 MG: 1 INJECTION, SOLUTION INTRAMUSCULAR; INTRAVENOUS at 11:33

## 2023-04-26 RX ADMIN — ACETAMINOPHEN 975 MG: 325 TABLET ORAL at 10:24

## 2023-04-26 RX ADMIN — MIDAZOLAM HYDROCHLORIDE 2 MG: 1 INJECTION, SOLUTION INTRAMUSCULAR; INTRAVENOUS at 11:35

## 2023-04-26 RX ADMIN — SODIUM CHLORIDE, POTASSIUM CHLORIDE, SODIUM LACTATE AND CALCIUM CHLORIDE: 600; 310; 30; 20 INJECTION, SOLUTION INTRAVENOUS at 11:33

## 2023-04-26 RX ADMIN — ONDANSETRON 4 MG: 2 INJECTION INTRAMUSCULAR; INTRAVENOUS at 11:39

## 2023-04-26 RX ADMIN — PROPOFOL 50 MCG/KG/MIN: 10 INJECTION, EMULSION INTRAVENOUS at 11:38

## 2023-04-26 RX ADMIN — OXYCODONE HYDROCHLORIDE 5 MG: 5 TABLET ORAL at 12:43

## 2023-04-26 RX ADMIN — LIDOCAINE HYDROCHLORIDE 50 MG: 20 INJECTION, SOLUTION INFILTRATION; PERINEURAL at 11:38

## 2023-04-26 RX ADMIN — SODIUM CHLORIDE, POTASSIUM CHLORIDE, SODIUM LACTATE AND CALCIUM CHLORIDE: 600; 310; 30; 20 INJECTION, SOLUTION INTRAVENOUS at 10:31

## 2023-04-26 ASSESSMENT — ACTIVITIES OF DAILY LIVING (ADL)
ADLS_ACUITY_SCORE: 35
ADLS_ACUITY_SCORE: 35

## 2023-04-26 NOTE — OP NOTE
MRN: [id number]  : 1980  Date of Surgery: 2023    Pre-operative Diagnosis: AUB  Post-operative Diagnosis: Same  Procedure(s): hysteroscopy, dilation and curettage, endometrial ablation    Surgeon: Mulu Orozco MD     Anesthesia: MAC  EBL: 10 mL   Urine Output: 75 mL clear urine   Fluids: see anesthesia  Fluid deficit: <100 ml NS    Specimens: endometrial curettings   Complications: None apparent.  Findings: Normal appearing external genitalia, vaginal mucosa, cervix. Hysteroscopy with normal appearing uterine cavity with diffusely thickened endometrium. No evidence of perforation.    Indications: Kristel Martinez is a 42 year old female who presents for AUB. Reviewed options for management and patient preferred above procedure.  Discussed risks, benefits, and alternatives to the procedure including risk of infection, bleeding, and damage to local organs. The patient's questions were answered, understanding confirmed, and the patient signed written informed consent.    Technique: The patient was taken to the operating room where she was placed in the dorsal lithotomy position. MAC anesthesia was administered and the patient was prepped and draped in the usual sterile fashion. A speculum was inserted into the vagina and the cervix visualized. A single-toothed tenaculum was placed at 12 o'clock on the cervix. A paracervical block with 1% plain lidocaine was performed at 4 and 8 o'clock. The cervix was dilated using sequential dilators up to 8 mm.  Hysteroscope inserted with above findings. Myosure used to sample lining diffusely.  The sound was then used to measure the cervical length and uterine cavity length.  The Joanne ablation device was inserted through the internal os and the plunger was applied.  The device was deployed in usual fashion. The system was tested and found to be suitable for ablation after additional small amount of air placed in plunger balloon as small amount of gas could  be seen/heard escaping through the cervix. The ablation ran for 120 seconds. The device was retracted and withdrawn from the uterine cavity, then re-deployed to reveal good remnant of tissue on the device.  Hysteroscope reinserted with diffuse charring noted and no evidence of perforation. The tenaculum was removed from the cervix and good hemostasis was noted.  The speculum was removed from the vagina.  The patient tolerated the procedure well and was taken to the recovery room in stable condition.  Instrument, needle, and sponge counts were correct x2.        Cervical length: 5 cm  Total Uterine cavity length: 9.5 cm, set length: 4.5 cm      Mulu Orozco MD MD  4/26/2023 11:33 AM

## 2023-04-26 NOTE — DISCHARGE INSTRUCTIONS
Same Day Surgery Discharge Instructions  Special Precautions After Surgery - Adult    It is not unusual to feel lightheaded or faint, up to 24 hours after surgery or while taking pain medication.  If you have these symptoms; sit for a few minutes before standing and have someone assist you when getting up.  You should rest and relax for the next 24 hours and must have someone stay with you for at least 24 hours after your discharge.  DO NOT DRIVE any vehicle or operate mechanical equipment for 24 hours following the end of your surgery.  DO NOT DRIVE while taking narcotic pain medications that have been prescribed by your physician.  If you had a limb operated on, you must be able to use it fully to drive.  DO NOT drink alcoholic beverages for 24 hours following surgery or while taking prescription pain medication.  Drink clear liquids (apple juice, ginger ale, broth, 7-Up, etc.).  Progress to your regular diet as you feel able.  Any questions call your physician and do not make important decisions for 24 hours.    __________________________________________________________________________________________________________________________________  IMPORTANT NUMBERS:    [facility] [place] Number:  [phone], [phone]  Pharmacy:  [phone]  Same Day Surgery:  [phone], Monday - Friday until 8:30 p.m.  Urgent Care:  [phone]  Emergency Room:  [phone]       OB Clinic:  [phone]   Nurse Advice Line: [phone]

## 2023-04-26 NOTE — ANESTHESIA POSTPROCEDURE EVALUATION
Patient: Kristel Martinez    Procedure: Procedure(s):  DILATION, CERVIX, WITH ENDOMETRIAL ABLATION, hysteroscopy, dilation and curettage, polypectomy       Anesthesia Type:  General    Note:  Disposition: Outpatient   Postop Pain Control: Uneventful            Sign Out: Well controlled pain   PONV: No   Neuro/Psych: Uneventful            Sign Out: Acceptable/Baseline neuro status   Airway/Respiratory: Uneventful            Sign Out: Acceptable/Baseline resp. status   CV/Hemodynamics: Uneventful            Sign Out: Acceptable CV status; No obvious hypovolemia; No obvious fluid overload   Other NRE: NONE   DID A NON-ROUTINE EVENT OCCUR? No           Last vitals:  Vitals:    04/26/23 1004   BP: 125/79   Resp: 18   Temp: 36.7  C (98  F)   SpO2: 98%       Electronically Signed By: Tao Lin CRNA, APRN CRNA  April 26, 2023  12:31 PM

## 2023-04-26 NOTE — ANESTHESIA CARE TRANSFER NOTE
Patient: Kristel Martinez    Procedure: Procedure(s):  DILATION, CERVIX, WITH ENDOMETRIAL ABLATION, hysteroscopy, dilation and curettage, polypectomy       Diagnosis: Abnormal uterine bleeding (AUB) [N93.9]  Diagnosis Additional Information: No value filed.    Anesthesia Type:   General     Note:    Oropharynx: oropharynx clear of all foreign objects and spontaneously breathing  Level of Consciousness: awake and drowsy  Oxygen Supplementation: room air    Independent Airway: airway patency satisfactory and stable  Dentition: dentition unchanged  Vital Signs Stable: post-procedure vital signs reviewed and stable  Report to RN Given: handoff report given  Patient transferred to: Phase II    Handoff Report: Identifed the Patient, Identified the Reponsible Provider, Reviewed the pertinent medical history, Discussed the surgical course, Reviewed Intra-OP anesthesia mangement and issues during anesthesia, Set expectations for post-procedure period and Allowed opportunity for questions and acknowledgement of understanding      Vitals:  Vitals Value Taken Time   BP     Temp     Pulse     Resp     SpO2         Electronically Signed By: Tao Lin CRNA, APRN CRNA  April 26, 2023  12:23 PM

## 2023-04-27 ENCOUNTER — MYC MEDICAL ADVICE (OUTPATIENT)
Dept: DERMATOLOGY | Facility: CLINIC | Age: 43
End: 2023-04-27
Payer: COMMERCIAL

## 2023-04-30 ENCOUNTER — MYC MEDICAL ADVICE (OUTPATIENT)
Dept: OBGYN | Facility: CLINIC | Age: 43
End: 2023-04-30
Payer: COMMERCIAL

## 2023-05-04 ENCOUNTER — PREP FOR PROCEDURE (OUTPATIENT)
Dept: OBGYN | Facility: CLINIC | Age: 43
End: 2023-05-04

## 2023-05-04 ENCOUNTER — VIRTUAL VISIT (OUTPATIENT)
Dept: OBGYN | Facility: CLINIC | Age: 43
End: 2023-05-04
Payer: COMMERCIAL

## 2023-05-04 DIAGNOSIS — N85.00 ENDOMETRIAL HYPERPLASIA: Primary | ICD-10-CM

## 2023-05-04 PROCEDURE — 99213 OFFICE O/P EST LOW 20 MIN: CPT | Mod: 95 | Performed by: OBSTETRICS & GYNECOLOGY

## 2023-05-04 RX ORDER — CLINDAMYCIN PHOSPHATE 900 MG/50ML
900 INJECTION, SOLUTION INTRAVENOUS
Status: CANCELLED | OUTPATIENT
Start: 2023-05-04

## 2023-05-04 RX ORDER — ACETAMINOPHEN 325 MG/1
975 TABLET ORAL ONCE
Status: CANCELLED | OUTPATIENT
Start: 2023-05-04 | End: 2023-05-04

## 2023-05-04 RX ORDER — PHENAZOPYRIDINE HYDROCHLORIDE 100 MG/1
200 TABLET, FILM COATED ORAL ONCE
Status: CANCELLED | OUTPATIENT
Start: 2023-05-04 | End: 2023-05-04

## 2023-05-04 RX ORDER — CLINDAMYCIN PHOSPHATE 900 MG/50ML
900 INJECTION, SOLUTION INTRAVENOUS SEE ADMIN INSTRUCTIONS
Status: CANCELLED | OUTPATIENT
Start: 2023-05-04

## 2023-05-04 NOTE — PROGRESS NOTES
Kristel is a 42 year old who is being evaluated via a billable telephone visit.      What phone number would you like to be contacted at? [phone] (home)     How would you like to obtain your AVS? MyChart     Gynecology Consult Note      HPI: Kristel Martinez is a 42 year old  with recent diagnosis of endometrial hyperplasia who calls to discuss plan for surgery further.  She notes a maternal aunt with history of ovarian cancer and states that her mother suggested oophorectomy at the time of surgery.  Patient is possibly interested in this but would like to further understand recommendations surrounding ovarian cancer risk management.    ROS: 10 pt ROS neg other than HPI    PMH:   Past Medical History:   Diagnosis Date     Depressive disorder      Interstitial cystitis 2021     Major depressive disorder, recurrent episode, moderate (H) 2007     Papillary adenocarcinoma of thyroid (H) 2014    Overview:  2013: R thyroid lobectomy 1.7 cm follicular variant papillary cancer, MACIS 3.6 2014: Completion Thyroidectomy 0.3 cm incidental papillary cancer left lobe. Iodine ablation with 53 mCi.  2016, 2017: Neck US neg.     Postoperative hypothyroidism 2018       PSHx:   Past Surgical History:   Procedure Laterality Date     BIOPSY       COLONOSCOPY  2018     DILATION AND CURETTAGE, HYSTEROSCOPY, ABLATE ENDOMETRIUM, COMBINED N/A 2023    Procedure: DILATION, CERVIX, WITH ENDOMETRIAL ABLATION, hysteroscopy, dilation and curettage, polypectomy;  Surgeon: Mulu Orozco MD;  Location: [facility] OR     HC INSERTION INTRAUTERINE DEVICE       HC REMOVE INTRAUTERINE DEVICE       HEMORRHOIDECTOMY EXTERNAL N/A 3/27/2023    Procedure: Combined internal and external hemorrhoidectomy;  Surgeon: Feroz Perry DO;  Location: [facility] OR     THYROIDECTOMY      2015     TONSILLECTOMY       TUBAL LIGATION         Medications:   acetaminophen (TYLENOL) 325 MG tablet,  Take 3 tablets (975 mg) by mouth every 6 hours as needed for mild pain  escitalopram (LEXAPRO) 20 MG tablet, Take 1 tablet (20 mg) by mouth daily  fluticasone (FLONASE) 50 MCG/ACT nasal spray, SHAKE LIQUID AND USE 1 SPRAY IN EACH NOSTRIL DAILY  hydrocortisone, Perianal, (HYDROCORTISONE) 2.5 % cream, Place rectally 2 times daily as needed for hemorrhoids  hydrocortisone-pramoxine (PROCTOFOAM-HC) rectal foam, Place 1 Applicatorful rectally 3 times daily (Patient taking differently: Place rectally 3 times daily)  ibuprofen (ADVIL/MOTRIN) 800 MG tablet, Take 1 tablet (800 mg) by mouth every 6 hours as needed for other (mild and/or inflammatory pain)  lactulose (CHRONULAC) 10 GM/15ML solution, Take 15-30 mLs (10-20 g) by mouth daily as needed for constipation  levothyroxine (SYNTHROID/LEVOTHROID) 150 MCG tablet, Take 1 tablet (150 mcg) by mouth daily  oxyCODONE (ROXICODONE) 5 MG tablet, Take 1-2 tablets (5-10 mg) by mouth every 4 hours as needed for moderate to severe pain  polyethylene glycol (MIRALAX) 17 GM/Dose powder, Take 17 g (1 capful.) by mouth daily as needed for constipation (If no bowel movement in 24 hours. Mix in 8 oz of water.  May take twice daily.)  senna-docusate (SENOKOT-S/PERICOLACE) 8.6-50 MG tablet, Take 1-2 tablets by mouth 2 times daily  senna-docusate (SENOKOT-S/PERICOLACE) 8.6-50 MG tablet, Take 1-2 tablets by mouth 2 times daily Use to prevent or treat constipation.  valACYclovir (VALTREX) 1000 mg tablet, TAKE 2 TABLETS(2000 MG) BY MOUTH TWICE DAILY    No current facility-administered medications on file prior to visit.       Allergies:      Allergies   Allergen Reactions     Cephalexin Rash     Clavulanic Acid Diarrhea     Bad diarrhea with nausea and vomiting     Cephalosporins Rash     Cephalexin     Sulfa Antibiotics Rash and Unknown       Social History:   Social History     Socioeconomic History     Marital status:      Spouse name: partner- Flaco     Number of children: 2      Years of education: Not on file     Highest education level: Not on file   Occupational History     Occupation: school paraprofessional   Tobacco Use     Smoking status: Former     Packs/day: 1.00     Years: 5.00     Pack years: 5.00     Types: Cigarettes     Smokeless tobacco: Never   Vaping Use     Vaping status: Never Used   Substance and Sexual Activity     Alcohol use: Yes     Comment: 1 drink per wk     Drug use: No     Sexual activity: Yes     Partners: Male     Birth control/protection: Female Surgical   Other Topics Concern     Parent/sibling w/ CABG, MI or angioplasty before 65F 55M? No   Social History Narrative     Not on file     Social Determinants of Health     Financial Resource Strain: Not on file   Food Insecurity: Not on file   Transportation Needs: Not on file   Physical Activity: Not on file   Stress: Not on file   Social Connections: Not on file   Intimate Partner Violence: Not on file   Housing Stability: Not on file       Family History:  Family History   Problem Relation Age of Onset     Skin Cancer Mother      Other Cancer Mother         Skin cancer     Hypertension Father      Arrhythmia Father      Lymphoma Maternal Grandmother      Cerebrovascular Disease Maternal Grandmother      Diabetes Paternal Grandfather              Asthma Son      Arthritis Daughter        Physical Exam:   Deferred, virtual visit    A&P: Kristel Martinez is a 42 year old  who calls to discuss surgery further.  Patient was recently diagnosed with endometrial hyperplasia with sample collected at time of endometrial ablation.  Previous telephone encounter discussion included recommendation for hysterectomy given the hyperplasia and inability to further stable uterus with future with ablation completed.  Patient is agreeable to hysterectomy including removal of cervix and fallopian tubes.  She has questions today regarding removal of her ovaries.  She has a maternal aunt that was diagnosed with ovarian  cancer.  Patient states that she has already undergone genetic testing and does not have any known genetic mutation that would increase her risk for ovarian cancer.  Therefore, discussed with patient that she generally is at baseline risk for ovarian cancer which is approximately 1 and 70 women.  Discussed with patient that for average risk women with recommendations to leave ovaries in place given the heart and bone health benefits.  Discussed that if she were over age 55 would certainly give more consideration to removal of the ovaries.  At her current age with likely another 10 years until menopause, would generally recommend leaving the ovaries in place as the risks of heart disease are felt to outweigh the potential risk of ovarian cancer in the future.  Did elaborate further that she does have some risk reduction with removal of the fallopian tubes.  Patient states understanding.  She is agreeable with leaving ovaries in place.  Did review that it is ultimately her decision and that if she desires bilateral oophorectomy she can certainly let me know but at this time we will tentatively plan for total vaginal hysterectomy with bilateral salpingectomy.  Orders placed.  Patient will need preop physical.    I spent approximately 7 minutes on the phone with patient.  I spent a total of 20 minutes reviewing chart, obtaining history, counseling, coordinating care, documenting this encounter.    Mulu Orozco MD   5/4/2023 2:55 PM

## 2023-05-11 ENCOUNTER — OFFICE VISIT (OUTPATIENT)
Dept: URGENT CARE | Facility: URGENT CARE | Age: 43
End: 2023-05-11
Payer: COMMERCIAL

## 2023-05-11 VITALS
WEIGHT: 220 LBS | SYSTOLIC BLOOD PRESSURE: 121 MMHG | DIASTOLIC BLOOD PRESSURE: 75 MMHG | OXYGEN SATURATION: 99 % | RESPIRATION RATE: 16 BRPM | TEMPERATURE: 98.3 F | HEART RATE: 84 BPM | BODY MASS INDEX: 32.49 KG/M2

## 2023-05-11 DIAGNOSIS — J01.40 ACUTE NON-RECURRENT PANSINUSITIS: Primary | ICD-10-CM

## 2023-05-11 PROCEDURE — 99213 OFFICE O/P EST LOW 20 MIN: CPT | Performed by: NURSE PRACTITIONER

## 2023-05-11 RX ORDER — DOXYCYCLINE 100 MG/1
100 CAPSULE ORAL 2 TIMES DAILY
Qty: 20 CAPSULE | Refills: 0 | Status: SHIPPED | OUTPATIENT
Start: 2023-05-11 | End: 2023-05-21

## 2023-05-11 NOTE — PROGRESS NOTES
SUBJECTIVE:  Kristel Martinez is a 42 year old female here with concerns about sinus infection.  She states onset of symptoms were 1 week(s) ago.  She has had sinus pressure. Course of illness is worsening. Severity moderate  Current and Associated symptoms: nasal congestion, rhinorrhea, ear pain bilateral, sore throat, facial pain/pressure, headache and post-nasal drainage  Predisposing factors include recent sick contacts.   Recent treatment has included: OTC meds    Past Medical History:   Diagnosis Date     Depressive disorder      Interstitial cystitis 07/09/2021     Major depressive disorder, recurrent episode, moderate (H) 11/16/2007     Papillary adenocarcinoma of thyroid (H) 03/01/2014    Overview:  12/2013: R thyroid lobectomy 1.7 cm follicular variant papillary cancer, MACIS 3.6 03/2014: Completion Thyroidectomy 0.3 cm incidental papillary cancer left lobe. Iodine ablation with 53 mCi.  07/2016, 07/2017: Neck US neg.     Postoperative hypothyroidism 03/23/2018     Social History     Tobacco Use     Smoking status: Former     Packs/day: 1.00     Years: 5.00     Pack years: 5.00     Types: Cigarettes     Smokeless tobacco: Never   Vaping Use     Vaping status: Never Used   Substance Use Topics     Alcohol use: Yes     Comment: 1 drink per wk       OBJECTIVE:  /75   Pulse 84   Temp 98.3  F (36.8  C) (Tympanic)   Resp 16   Wt 99.8 kg (220 lb)   LMP 04/14/2023 (Approximate)   SpO2 99%   BMI 32.49 kg/m    Exam:GENERAL APPEARANCE: healthy, alert and no distress  EYES: EOMI,  PERRL, conjunctiva clear  HENT: ear canals and TM's normal.  Nose and mouth without ulcers, erythema or lesions, pain over maxillary and frontal sinuses.  NECK: supple, nontender, no lymphadenopathy  RESP: lungs clear to auscultation - no rales, rhonchi or wheezes  CV: regular rates and rhythm, normal S1 S2, no murmur noted  ABDOMEN:  soft, nontender, no HSM or masses and bowel sounds normal  NEURO: Normal strength and tone,  sensory exam grossly normal,  normal speech and mentation  SKIN: no suspicious lesions or rashes      ASSESSMENT:  1. Acute non-recurrent pansinusitis  - doxycycline hyclate (VIBRAMYCIN) 100 MG capsule; Take 1 capsule (100 mg) by mouth 2 times daily for 10 days  Dispense: 20 capsule; Refill: 0    PLAN:  1.  Take antibiotic according to bottle instructions with food to avoid stomach upset.  If you are prone to stomach upset with antibiotics, I recommend adding a probiotic to this regimen.  Culturelle is a trusted brand.  2.  I recommend using Mucinex to help thin mucus secretions.  Adding a saline nasal spray can also be helpful with clearing sinus congestion.  3.  Take Advil or Tylenol as needed for pain or fever control.  4.  Follow-up if you not having any improvement in your symptoms over the next 5 days.     normal (ped)...

## 2023-05-22 ENCOUNTER — HOSPITAL ENCOUNTER (EMERGENCY)
Facility: CLINIC | Age: 43
Discharge: HOME | End: 2023-05-22
Attending: FAMILY MEDICINE | Admitting: FAMILY MEDICINE
Payer: COMMERCIAL

## 2023-05-22 ENCOUNTER — APPOINTMENT (OUTPATIENT)
Dept: GENERAL RADIOLOGY | Facility: CLINIC | Age: 43
End: 2023-05-22
Attending: FAMILY MEDICINE
Payer: COMMERCIAL

## 2023-05-22 VITALS
DIASTOLIC BLOOD PRESSURE: 92 MMHG | HEART RATE: 82 BPM | OXYGEN SATURATION: 99 % | WEIGHT: 224.6 LBS | RESPIRATION RATE: 18 BRPM | SYSTOLIC BLOOD PRESSURE: 127 MMHG | BODY MASS INDEX: 33.17 KG/M2 | TEMPERATURE: 98.2 F

## 2023-05-22 DIAGNOSIS — R07.89 ATYPICAL CHEST PAIN: ICD-10-CM

## 2023-05-22 LAB
ANION GAP SERPL CALCULATED.3IONS-SCNC: 9 MMOL/L (ref 7–15)
BASOPHILS # BLD AUTO: 0 10E3/UL (ref 0–0.2)
BASOPHILS NFR BLD AUTO: 0 %
BUN SERPL-MCNC: 11.6 MG/DL (ref 6–20)
CALCIUM SERPL-MCNC: 9.1 MG/DL (ref 8.6–10)
CHLORIDE SERPL-SCNC: 105 MMOL/L (ref 98–107)
CREAT SERPL-MCNC: 0.68 MG/DL (ref 0.51–0.95)
DEPRECATED HCO3 PLAS-SCNC: 27 MMOL/L (ref 22–29)
EOSINOPHIL # BLD AUTO: 0.1 10E3/UL (ref 0–0.7)
EOSINOPHIL NFR BLD AUTO: 1 %
ERYTHROCYTE [DISTWIDTH] IN BLOOD BY AUTOMATED COUNT: 13.8 % (ref 10–15)
GFR SERPL CREATININE-BSD FRML MDRD: >90 ML/MIN/1.73M2
GLUCOSE SERPL-MCNC: 111 MG/DL (ref 70–99)
HCT VFR BLD AUTO: 37.8 % (ref 35–47)
HGB BLD-MCNC: 12.3 G/DL (ref 11.7–15.7)
IMM GRANULOCYTES # BLD: 0 10E3/UL
IMM GRANULOCYTES NFR BLD: 0 %
LYMPHOCYTES # BLD AUTO: 1.3 10E3/UL (ref 0.8–5.3)
LYMPHOCYTES NFR BLD AUTO: 29 %
MCH RBC QN AUTO: 26.4 PG (ref 26.5–33)
MCHC RBC AUTO-ENTMCNC: 32.5 G/DL (ref 31.5–36.5)
MCV RBC AUTO: 81 FL (ref 78–100)
MONOCYTES # BLD AUTO: 0.3 10E3/UL (ref 0–1.3)
MONOCYTES NFR BLD AUTO: 7 %
NEUTROPHILS # BLD AUTO: 2.8 10E3/UL (ref 1.6–8.3)
NEUTROPHILS NFR BLD AUTO: 63 %
NRBC # BLD AUTO: 0 10E3/UL
NRBC BLD AUTO-RTO: 0 /100
PLATELET # BLD AUTO: 232 10E3/UL (ref 150–450)
POTASSIUM SERPL-SCNC: 4 MMOL/L (ref 3.4–5.3)
RBC # BLD AUTO: 4.66 10E6/UL (ref 3.8–5.2)
SODIUM SERPL-SCNC: 141 MMOL/L (ref 136–145)
TROPONIN T SERPL HS-MCNC: <6 NG/L
WBC # BLD AUTO: 4.6 10E3/UL (ref 4–11)

## 2023-05-22 PROCEDURE — 99285 EMERGENCY DEPT VISIT HI MDM: CPT | Mod: 25 | Performed by: FAMILY MEDICINE

## 2023-05-22 PROCEDURE — 71045 X-RAY EXAM CHEST 1 VIEW: CPT

## 2023-05-22 PROCEDURE — 80048 BASIC METABOLIC PNL TOTAL CA: CPT | Performed by: FAMILY MEDICINE

## 2023-05-22 PROCEDURE — 85025 COMPLETE CBC W/AUTO DIFF WBC: CPT | Performed by: FAMILY MEDICINE

## 2023-05-22 PROCEDURE — 93005 ELECTROCARDIOGRAM TRACING: CPT | Performed by: FAMILY MEDICINE

## 2023-05-22 PROCEDURE — 99284 EMERGENCY DEPT VISIT MOD MDM: CPT | Performed by: FAMILY MEDICINE

## 2023-05-22 PROCEDURE — 84484 ASSAY OF TROPONIN QUANT: CPT | Performed by: FAMILY MEDICINE

## 2023-05-22 PROCEDURE — 36415 COLL VENOUS BLD VENIPUNCTURE: CPT | Performed by: FAMILY MEDICINE

## 2023-05-22 ASSESSMENT — ACTIVITIES OF DAILY LIVING (ADL): ADLS_ACUITY_SCORE: 35

## 2023-05-22 NOTE — ED PROVIDER NOTES
History     Chief Complaint   Patient presents with     Chest Pain     HPI  Kristel Martinez is a 42 year old female who presents with chest discomfort and pressure that started just a few hours ago.  She has had this before and usually has been related to anxiety but normally it goes away but the symptoms have not gone away.  Denies any fevers or chills.  Nothing seems to make the symptoms better or worse.  Patient was not feeling stressed or anxious when the symptoms started.  Patient is feeling little lightheaded but denies any shortness of breath.  Denies any tingling anywhere.    Allergies:  Allergies   Allergen Reactions     Cephalexin Rash     Clavulanic Acid Diarrhea     Bad diarrhea with nausea and vomiting     Cephalosporins Rash     Cephalexin     Sulfa Antibiotics Rash and Unknown       Problem List:    Patient Active Problem List    Diagnosis Date Noted     Abnormal Pap smear of cervix 08/08/2022     Priority: Medium     Formatting of this note might be different from the original.  LSIL 2005 remote, bx fine       Interstitial cystitis 07/09/2021     Priority: Medium     Traumatic incomplete tear of right rotator cuff, initial encounter 07/23/2020     Priority: Medium     Bicipital tendonitis of right shoulder 07/23/2020     Priority: Medium     Subacromial impingement of right shoulder 07/23/2020     Priority: Medium     IgA deficiency (H) 09/12/2018     Priority: Medium     Postoperative hypothyroidism 03/23/2018     Priority: Medium     Vitiligo 04/09/2015     Priority: Medium     Papillary adenocarcinoma of thyroid (H) 03/01/2014     Priority: Medium     Overview:   12/2013: R thyroid lobectomy 1.7 cm follicular variant papillary cancer, MACIS 3.6  03/2014: Completion Thyroidectomy 0.3 cm incidental papillary cancer left lobe. Iodine ablation with 53 mCi.   07/2016, 07/2017: Neck US neg.       Vitamin D deficiency 07/06/2009     Priority: Medium     Last Assessment & Plan:   7/09  29.4  vit  d   on  1000units  daily  since         Major depressive disorder, recurrent episode, moderate (H) 2007     Priority: Medium        Past Medical History:    Past Medical History:   Diagnosis Date     Depressive disorder      Interstitial cystitis 2021     Major depressive disorder, recurrent episode, moderate (H) 2007     Papillary adenocarcinoma of thyroid (H) 2014     Postoperative hypothyroidism 2018       Past Surgical History:    Past Surgical History:   Procedure Laterality Date     BIOPSY       COLONOSCOPY  2018     DILATION AND CURETTAGE, HYSTEROSCOPY, ABLATE ENDOMETRIUM, COMBINED N/A 2023    Procedure: DILATION, CERVIX, WITH ENDOMETRIAL ABLATION, hysteroscopy, dilation and curettage, polypectomy;  Surgeon: Mulu Orozco MD;  Location: [facility]     HC INSERTION INTRAUTERINE DEVICE       HC REMOVE INTRAUTERINE DEVICE       HEMORRHOIDECTOMY EXTERNAL N/A 3/27/2023    Procedure: Combined internal and external hemorrhoidectomy;  Surgeon: Feroz Perry DO;  Location: [facility]     THYROIDECTOMY           TONSILLECTOMY       TUBAL LIGATION  2006       Family History:    Family History   Problem Relation Age of Onset     Skin Cancer Mother      Other Cancer Mother         Skin cancer     Hypertension Father      Arrhythmia Father      Lymphoma Maternal Grandmother      Cerebrovascular Disease Maternal Grandmother      Diabetes Paternal Grandfather              Asthma Son      Arthritis Daughter        Social History:  Marital Status:   [4]  Social History     Tobacco Use     Smoking status: Former     Packs/day: 1.00     Years: 5.00     Pack years: 5.00     Types: Cigarettes     Smokeless tobacco: Never   Vaping Use     Vaping status: Never Used   Substance Use Topics     Alcohol use: Yes     Comment: 1 drink per wk     Drug use: No        Medications:    acetaminophen (TYLENOL) 325 MG tablet  escitalopram (LEXAPRO) 20 MG  tablet  fluticasone (FLONASE) 50 MCG/ACT nasal spray  hydrocortisone, Perianal, (HYDROCORTISONE) 2.5 % cream  hydrocortisone-pramoxine (PROCTOFOAM-HC) rectal foam  ibuprofen (ADVIL/MOTRIN) 800 MG tablet  lactulose (CHRONULAC) 10 GM/15ML solution  levothyroxine (SYNTHROID/LEVOTHROID) 150 MCG tablet  polyethylene glycol (MIRALAX) 17 GM/Dose powder  senna-docusate (SENOKOT-S/PERICOLACE) 8.6-50 MG tablet  senna-docusate (SENOKOT-S/PERICOLACE) 8.6-50 MG tablet  valACYclovir (VALTREX) 1000 mg tablet          Review of Systems   All other systems reviewed and are negative.      Physical Exam   BP: (!) 127/92  Pulse: 82  Temp: 98.2  F (36.8  C)  Resp: 18  Weight: 101.9 kg (224 lb 9.6 oz)  SpO2: 99 %      Physical Exam  Vitals and nursing note reviewed.   Constitutional:       General: She is not in acute distress.     Appearance: She is well-developed. She is not diaphoretic.   Eyes:      Conjunctiva/sclera: Conjunctivae normal.   Cardiovascular:      Rate and Rhythm: Normal rate and regular rhythm.      Heart sounds: Normal heart sounds. No murmur heard.     No friction rub. No gallop.   Pulmonary:      Effort: Pulmonary effort is normal. No respiratory distress.      Breath sounds: Normal breath sounds. No wheezing or rales.   Chest:      Chest wall: No tenderness.   Abdominal:      General: Bowel sounds are normal. There is no distension.      Palpations: Abdomen is soft. There is no mass.      Tenderness: There is no abdominal tenderness. There is no guarding.   Musculoskeletal:         General: No tenderness. Normal range of motion.      Cervical back: Normal range of motion and neck supple.   Skin:     General: Skin is warm and dry.      Findings: No rash.   Neurological:      Mental Status: She is alert and oriented to person, place, and time.   Psychiatric:         Judgment: Judgment normal.         ED Course                 Procedures        Results for orders placed or performed during the hospital encounter of  05/22/23 (from the past 24 hour(s))   CBC with platelets differential    Narrative    The following orders were created for panel order CBC with platelets differential.  Procedure                               Abnormality         Status                     ---------                               -----------         ------                     CBC with platelets and d...[[id number]]  Abnormal            Final result                 Please view results for these tests on the individual orders.   Basic metabolic panel   Result Value Ref Range    Sodium 141 136 - 145 mmol/L    Potassium 4.0 3.4 - 5.3 mmol/L    Chloride 105 98 - 107 mmol/L    Carbon Dioxide (CO2) 27 22 - 29 mmol/L    Anion Gap 9 7 - 15 mmol/L    Urea Nitrogen 11.6 6.0 - 20.0 mg/dL    Creatinine 0.68 0.51 - 0.95 mg/dL    Calcium 9.1 8.6 - 10.0 mg/dL    Glucose 111 (H) 70 - 99 mg/dL    GFR Estimate >90 >60 mL/min/1.73m2   Troponin T, High Sensitivity   Result Value Ref Range    Troponin T, High Sensitivity <6 <=14 ng/L   CBC with platelets and differential   Result Value Ref Range    WBC Count 4.6 4.0 - 11.0 10e3/uL    RBC Count 4.66 3.80 - 5.20 10e6/uL    Hemoglobin 12.3 11.7 - 15.7 g/dL    Hematocrit 37.8 35.0 - 47.0 %    MCV 81 78 - 100 fL    MCH 26.4 (L) 26.5 - 33.0 pg    MCHC 32.5 31.5 - 36.5 g/dL    RDW 13.8 10.0 - 15.0 %    Platelet Count 232 150 - 450 10e3/uL    % Neutrophils 63 %    % Lymphocytes 29 %    % Monocytes 7 %    % Eosinophils 1 %    % Basophils 0 %    % Immature Granulocytes 0 %    NRBCs per 100 WBC 0 <1 /100    Absolute Neutrophils 2.8 1.6 - 8.3 10e3/uL    Absolute Lymphocytes 1.3 0.8 - 5.3 10e3/uL    Absolute Monocytes 0.3 0.0 - 1.3 10e3/uL    Absolute Eosinophils 0.1 0.0 - 0.7 10e3/uL    Absolute Basophils 0.0 0.0 - 0.2 10e3/uL    Absolute Immature Granulocytes 0.0 <=0.4 10e3/uL    Absolute NRBCs 0.0 10e3/uL   XR Chest Port 1 View    Narrative    CHEST PORTABLE ONE VIEW May 22, 2023 2:35 PM     HISTORY: Chest pain.    COMPARISON:  11/17/2020.      Impression    IMPRESSION: No acute cardiopulmonary disease.       Medications - No data to display     Labs have come back and are unremarkable, chest x-ray was normal.  I think that her symptoms are related to her anxiety, this does not appear to be cardiac in nature.  I reassured the patient, she is feeling a little bit better.  We will go ahead and discharge the patient home.  Recommend return to the emergency department if her symptoms do change or worsen, otherwise she will follow-up with her doctor in clinic.    Assessments & Plan (with Medical Decision Making)  Atypical chest pain     I have reviewed the nursing notes.    I have reviewed the findings, diagnosis, plan and need for follow up with the patient.      New Prescriptions    No medications on file       Final diagnoses:   Atypical chest pain       5/22/2023   [facility] EMERGENCY DEPT     Francis Ghotra MD  05/22/23 [id number]

## 2023-05-30 ASSESSMENT — ENCOUNTER SYMPTOMS
POOR WOUND HEALING: 0
MYALGIAS: 0
TASTE DISTURBANCE: 0
RECTAL PAIN: 0
ARTHRALGIAS: 0
SWOLLEN GLANDS: 0
NAUSEA: 0
BLOOD IN STOOL: 0
DIARRHEA: 1
CONSTIPATION: 1
DYSPNEA ON EXERTION: 1
BLOATING: 1
LIGHT-HEADEDNESS: 0
SPUTUM PRODUCTION: 0
NECK MASS: 0
HEARTBURN: 0
COUGH: 0
EXERCISE INTOLERANCE: 0
HOARSE VOICE: 0
SYNCOPE: 0
SMELL DISTURBANCE: 0
SNORES LOUDLY: 1
MUSCLE WEAKNESS: 0
NAIL CHANGES: 0
ABDOMINAL PAIN: 0
HEMATURIA: 0
VOMITING: 0
PALPITATIONS: 1
HEMOPTYSIS: 0
NERVOUS/ANXIOUS: 0
SINUS PAIN: 0
DECREASED LIBIDO: 0
HYPERTENSION: 0
DYSURIA: 1
BOWEL INCONTINENCE: 0
SLEEP DISTURBANCES DUE TO BREATHING: 0
STIFFNESS: 0
SHORTNESS OF BREATH: 1
DECREASED CONCENTRATION: 0
HOT FLASHES: 0
POSTURAL DYSPNEA: 0
ORTHOPNEA: 0
BREAST MASS: 1
SINUS CONGESTION: 0
SKIN CHANGES: 0
COUGH DISTURBING SLEEP: 0
NECK PAIN: 1
HYPOTENSION: 0
BREAST PAIN: 1
JOINT SWELLING: 0
JAUNDICE: 0
MUSCLE CRAMPS: 0
WHEEZING: 0
DIFFICULTY URINATING: 0
BRUISES/BLEEDS EASILY: 1
INSOMNIA: 0
BACK PAIN: 1
PANIC: 1
FLANK PAIN: 0
SORE THROAT: 0
DEPRESSION: 1
LEG PAIN: 0
TROUBLE SWALLOWING: 0

## 2023-06-06 ENCOUNTER — VIRTUAL VISIT (OUTPATIENT)
Dept: ENDOCRINOLOGY | Facility: CLINIC | Age: 43
End: 2023-06-06
Payer: COMMERCIAL

## 2023-06-06 DIAGNOSIS — E89.0 POSTOPERATIVE HYPOTHYROIDISM: ICD-10-CM

## 2023-06-06 DIAGNOSIS — C73 PAPILLARY THYROID CARCINOMA (H): Primary | ICD-10-CM

## 2023-06-06 PROCEDURE — 99443 PR PHYSICIAN TELEPHONE EVALUATION 21-30 MIN: CPT | Mod: 93 | Performed by: INTERNAL MEDICINE

## 2023-06-06 NOTE — NURSING NOTE
Is the patient currently in the state of [place]? YES    Visit mode:VIDEO    If the visit is dropped, the patient can be reconnected by: VIDEO VISIT: Text to cell phone: [phone]    Will anyone else be joining the visit? NO      How would you like to obtain your AVS? MyChart    Are changes needed to the allergy or medication list? NO    Reason for visit: MECHE IZAGUIRRE, CMA

## 2023-06-06 NOTE — PATIENT INSTRUCTIONS
Orders Placed This Encounter   Procedures    US head neck soft tissue    Thyroglobulin and Antibody (Sendout to [facility])    TSH with free T4 reflex    Please schedule blood work and neck ultrasound.

## 2023-06-06 NOTE — LETTER
6/6/2023         RE: Kristel Martinez  [id number]  [facility] [address]        Dear Colleague,    Thank you for referring your patient, Kristel Martinez, to the [facility]. Please see a copy of my visit note below.    Endocrinology Clinic Visit    Chief Complaint: RECHECK     Information obtained from:Patient      Assessment/Treatment Plan:      Papillary thyroid cancer follicular variant  Postoperative hypothyroidism  Surgery for thyroid nodule in 2013; pathology showed a 1.7 cm papillary thyroid carcinoma with follicular variant, encapsulated, with no extrathyroidal or lymphatic invasion.  Completion thyroidectomy in 2014-showed papillary thyroid carcinoma 0.3 cm focus.  Patient received 50 mCi I-131 therapy in June 2014 and there was no distant metastasis on the posttherapy scan.  Thyroglobulin most recent suppressed as documented below.  Neck ultrasound no evidence of lymphadenopathy.  Currently on levothyroxine 150 mcg daily which was changed in April 2023.  Check follow-up TSH, thyroglobulin, neck ultrasound and adjust medication based on results.      Test and/or medications prescribed today:  Orders Placed This Encounter   Procedures     US head neck soft tissue     Thyroglobulin and Antibody (Sendout to [facility])     TSH with free T4 reflex         Lorena Sandoval MD  Staff Endocrinologist    Division of Endocrinology and Diabetes      Subjective:         HPI: Kristel Martinez is a 42 year old female with history of thyroid cancer who is here for a follow-up.  Previous endocrinologist Dr. Santillan.    Tired all the time. Sleep study was normal.   Surgery for thyroid nodule in 2013; pathology showed a 1.7 cm papillary thyroid carcinoma with follicular variant, encapsulated, with no extrathyroidal or lymphatic invasion.  Completion thyroidectomy in 2014-showed papillary thyroid carcinoma 0.3 cm focus.  Patient received 50 mCi I-131 therapy in June 2014 and there was no  distant metastasis on the posttherapy scan.  Thyroglobulin most recent suppressed as documented below.  Neck ultrasound no evidence of lymphadenopathy.  Currently on levothyroxine 150 mcg daily which was changed in April 2023.    Answers for HPI/ROS submitted by the patient on 5/30/2023  General Symptoms: No  Skin Symptoms: Yes  HENT Symptoms: Yes  EYE SYMPTOMS: No  HEART SYMPTOMS: Yes  LUNG SYMPTOMS: Yes  INTESTINAL SYMPTOMS: Yes  URINARY SYMPTOMS: Yes  GYNECOLOGIC SYMPTOMS: Yes  BREAST SYMPTOMS: Yes  SKELETAL SYMPTOMS: Yes  BLOOD SYMPTOMS: Yes  NERVOUS SYSTEM SYMPTOMS: No  MENTAL HEALTH SYMPTOMS: Yes  Ear pain: No  Ear discharge: Yes- seen by providers.   Hearing loss: No  Tinnitus: No  Nosebleeds: No  Congestion: No  Sinus pain: No  Trouble swallowing: No   Voice hoarseness: No  Mouth sores: Yes  Sore throat: No  Tooth pain: No  Gum tenderness: No  Bleeding gums: No  Change in taste: No  Change in sense of smell: No  Dry mouth: No  Hearing aid used: No  Neck lump: No  Changes in hair: Yes; hair loss.   Changes in moles/birth marks: No  Itching: No  Rashes: No  Changes in nails: No  Acne: Yes  Hair in places you don't want it: Yes; random hait.   Change in facial hair: No  Warts: No  Non-healing sores: No  Scarring: No  Flaking of skin: No  Color changes of hands/feet in cold : No  Sun sensitivity: Yes  Chest pain or pressure: Yes; earlier this month. Not currently. Work up has been negative per report.   Fast or irregular heartbeat: not currently  Pain in legs with walking: No  Trouble breathing while lying down: No  Fingers or toes appear blue: No  High blood pressure: No  Low blood pressure: No  Fainting: No  Murmurs: No  Pacemaker: No  Varicose veins: No  Wake up at night with shortness of breath: No  Light-headedness: No  Exercise intolerance: No  Cough: No  Sputum or phlegm: No  Coughing up blood: No  Difficulty breating or shortness of breath: not currently.   Snoring: Yes  Wheezing: No  Difficulty  breathing on exertion: not currently   Nighttime Cough: No  Difficulty breathing when lying flat: No  Heart burn or indigestion: No  Nausea: No  Vomiting: No  Abdominal pain: No  Bloating: Yes  Constipation: Yes  Diarrhea: Yes  Blood in stool: No  Black stools: No  Rectal or Anal pain: No  Fecal incontinence: No  Yellowing of skin or eyes: No  Vomit with blood: No  Change in stools: No  Trouble holding urine or incontinence: Yes  Pain or burning: Yes  Trouble starting or stopping: No  Increased frequency of urination: No  Blood in urine: No  Decreased frequency of urination: No  Frequent nighttime urination: No  Flank pain: No  Difficulty emptying bladder: No  Bleeding or spotting between periods: No  Heavy or painful periods: No  Irregular periods: No  Vaginal discharge: No  Hot flashes: No  Vaginal dryness: No  Genital ulcers: No  Reduced libido: No  Painful intercourse: No  Difficulty with sexual arousal: No  Post-menopausal bleeding: No  Discharge: No  Lumps: Yes  Pain: Yes  Nipple retraction: No  Back pain: Yes  Muscle aches: No  Neck pain: Yes  Swollen joints: No  Joint pain: No  Bone pain: No  Muscle cramps: No  Muscle weakness: No  Joint stiffness: No  Bone fracture: No  Edema or swelling: No  Anemia: No  Swollen glands: No  Easy bleeding or bruising: Yes  Nervous or Anxious: No  Depression: Yes  Trouble sleeping: No  Trouble thinking or concentrating: No  Mood changes: Yes  Panic attacks: Yes             Allergies   Allergen Reactions     Cephalexin Rash     Clavulanic Acid Diarrhea     Bad diarrhea with nausea and vomiting     Cephalosporins Rash     Cephalexin     Sulfa Antibiotics Rash and Unknown       Current Outpatient Medications   Medication Sig Dispense Refill     escitalopram (LEXAPRO) 20 MG tablet Take 1 tablet (20 mg) by mouth daily 90 tablet 3     fluticasone (FLONASE) 50 MCG/ACT nasal spray SHAKE LIQUID AND USE 1 SPRAY IN EACH NOSTRIL DAILY 16 g 1     hydrocortisone, Perianal, (HYDROCORTISONE)  "2.5 % cream Place rectally 2 times daily as needed for hemorrhoids 30 g 0     ibuprofen (ADVIL/MOTRIN) 800 MG tablet Take 1 tablet (800 mg) by mouth every 6 hours as needed for other (mild and/or inflammatory pain) 30 tablet 0     lactulose (CHRONULAC) 10 GM/15ML solution Take 15-30 mLs (10-20 g) by mouth daily as needed for constipation 900 mL 1     levothyroxine (SYNTHROID/LEVOTHROID) 150 MCG tablet Take 1 tablet (150 mcg) by mouth daily 90 tablet 1     polyethylene glycol (MIRALAX) 17 GM/Dose powder Take 17 g (1 capful.) by mouth daily as needed for constipation (If no bowel movement in 24 hours. Mix in 8 oz of water.  May take twice daily.) 500 g 0     senna-docusate (SENOKOT-S/PERICOLACE) 8.6-50 MG tablet Take 1-2 tablets by mouth 2 times daily 30 tablet 0     senna-docusate (SENOKOT-S/PERICOLACE) 8.6-50 MG tablet Take 1-2 tablets by mouth 2 times daily Use to prevent or treat constipation. 30 tablet 0     valACYclovir (VALTREX) 1000 mg tablet TAKE 2 TABLETS(2000 MG) BY MOUTH TWICE DAILY 12 tablet 3     acetaminophen (TYLENOL) 325 MG tablet Take 3 tablets (975 mg) by mouth every 6 hours as needed for mild pain 50 tablet 0     hydrocortisone-pramoxine (PROCTOFOAM-HC) rectal foam Place 1 Applicatorful rectally 3 times daily (Patient taking differently: Place rectally 3 times daily) 10 g 1       Review of Systems     11 point review system (Constitutional, HENT, Eyes, Respiratory, Cardiovascular, Gastrointestinal, Genitourinary, Musculoskeletal,Neurological, Psychiatric/Behavioural, Endocrine) is negative or is as per HPI above  Past medical history, past surgical history, social and family history reviewed in epic.    Objective:   LMP 04/14/2023 (Approximate)   Estimated body mass index is 33.17 kg/m  as calculated from the following:    Height as of 4/26/23: 1.753 m (5' 9\").    Weight as of 5/22/23: 101.9 kg (224 lb 9.6 oz).     In House Labs:     DIAGNOSIS  A) SUPERIOR THYROID LYMPH NODE, EXCISION:  1. Benign " lymph node  2. Negative for metastatic carcinoma    B) THYROID, COMPLETION THYROIDECTOMY:  1. Papillary thyroid carcinoma, 0.3 cm focus  2. Surgical margins free of tumor  3. See staging parameters for additional information    2013 THYROID CANCER STAGING PARAMETERS  Case number: [id number]    Patient name: Kristel Martinez  Staging parameters include data from the following case(s): [id number]    Final TNM: [id number]  2013 stage: I    MACROSCOPIC     SPECIMEN TYPE          Total thyroidectomy     DOMINANT TUMOR LOCATION AND SIZE          Location: Right lobe          Greatest dimension: 1.7 cm     ADDITIONAL TUMOR SIZE(S)          Left lobe: 0.3 cm     TUMOR FOCALITY          Multifocal - Bilateral    MICROSCOPIC     HISTOLOGIC TYPE          Papillary carcinoma, follicular variant, encapsulated     MARGINS          Uninvolved by invasive carcinoma     LYMPHATIC VASCULAR INVASION          Not identified     EXTRATHYROIDAL EXTENSION          Not identified    PATHOLOGIC STAGING     EXTENT OF INVASION          pT1b.  (Tumor more than 1 cm but not more than 2 cm in greatest            dimension, limited to the thyroid)     REGIONAL LYMPH NODES          pN0.  (No regional lymph node metastasis)            Number of lymph nodes examined:     1            Number of lymph nodes involved:     0     DISTANT METASTASIS          pM0. (Not applicable)     MACIS SCORE          3.6     PATHOLOGIC STAGE Summary          Final TNM: [id number]  TSH   Date Value Ref Range Status   04/21/2023 0.02 (L) 0.30 - 4.20 uIU/mL Final   02/27/2023 0.15 (L) 0.30 - 4.20 uIU/mL Final   01/12/2023 0.48 0.30 - 4.20 uIU/mL Final   07/27/2022 0.30 (L) 0.40 - 4.00 mU/L Final   06/25/2022 12.42 (H) 0.40 - 4.00 mU/L Final   07/09/2021 <0.01 (L) 0.40 - 4.00 mU/L Final   03/24/2021 0.02 (L) 0.40 - 4.00 mU/L Final   12/29/2020 0.27 (L) 0.40 - 4.00 mU/L Final   08/14/2020 0.26 (L) 0.40 - 4.00 mU/L Final   01/23/2020 6.44 (H) 0.40 - 4.00 mU/L Final     T4 Free    Date Value Ref Range Status   07/09/2021 1.27 0.76 - 1.46 ng/dL Final   03/24/2021 1.01 0.76 - 1.46 ng/dL Final   12/29/2020 1.43 0.76 - 1.46 ng/dL Final   08/14/2020 1.09 0.76 - 1.46 ng/dL Final   10/21/2019 0.94 0.76 - 1.46 ng/dL Final     Free T4   Date Value Ref Range Status   04/21/2023 1.77 (H) 0.90 - 1.70 ng/dL Final   02/27/2023 1.42 0.90 - 1.70 ng/dL Final   07/27/2022 1.33 0.76 - 1.46 ng/dL Final   06/25/2022 0.92 0.76 - 1.46 ng/dL Final       Creatinine   Date Value Ref Range Status   05/22/2023 0.68 0.51 - 0.95 mg/dL Final   03/19/2021 0.82 0.52 - 1.04 mg/dL Final   ]  This note has been dictated using voice recognition software.  As a result, there may be errors in the documentation that have gone undetected.  Please consider this when interpreting information in this documentation.        Video-Visit Details    Type of service:  Video Visit    Video Start Time: 2:30 PM    Video End Time:2:54 PM  Video problem therefore phone used for the visit.   Distant Location (provider location):  Off-site.   43 minutes spent by me on the date of the encounter doing chart review, history and exam, documentation and further activities per the note.    Again, thank you for allowing me to participate in the care of your patient.        Sincerely,        Lorena Sandoval MD

## 2023-06-06 NOTE — PROGRESS NOTES
Endocrinology Clinic Visit    Chief Complaint: RECHECK     Information obtained from:Patient      Assessment/Treatment Plan:      Papillary thyroid cancer follicular variant  Postoperative hypothyroidism  Surgery for thyroid nodule in 2013; pathology showed a 1.7 cm papillary thyroid carcinoma with follicular variant, encapsulated, with no extrathyroidal or lymphatic invasion.  Completion thyroidectomy in 2014-showed papillary thyroid carcinoma 0.3 cm focus.  Patient received 50 mCi I-131 therapy in June 2014 and there was no distant metastasis on the posttherapy scan.  Thyroglobulin most recent suppressed as documented below.  Neck ultrasound no evidence of lymphadenopathy.  Currently on levothyroxine 150 mcg daily which was changed in April 2023.  Check follow-up TSH, thyroglobulin, neck ultrasound and adjust medication based on results.      Test and/or medications prescribed today:  Orders Placed This Encounter   Procedures     US head neck soft tissue     Thyroglobulin and Antibody (Sendout to [facility])     TSH with free T4 reflex         Lorena Sandoval MD  Staff Endocrinologist    Division of Endocrinology and Diabetes      Subjective:         HPI: Kristel Martinez is a 42 year old female with history of thyroid cancer who is here for a follow-up.  Previous endocrinologist Dr. Santillan.    Tired all the time. Sleep study was normal.   Surgery for thyroid nodule in 2013; pathology showed a 1.7 cm papillary thyroid carcinoma with follicular variant, encapsulated, with no extrathyroidal or lymphatic invasion.  Completion thyroidectomy in 2014-showed papillary thyroid carcinoma 0.3 cm focus.  Patient received 50 mCi I-131 therapy in June 2014 and there was no distant metastasis on the posttherapy scan.  Thyroglobulin most recent suppressed as documented below.  Neck ultrasound no evidence of lymphadenopathy.  Currently on levothyroxine 150 mcg daily which was changed in April 2023.    Answers for HPI/ROS submitted  by the patient on 5/30/2023  General Symptoms: No  Skin Symptoms: Yes  HENT Symptoms: Yes  EYE SYMPTOMS: No  HEART SYMPTOMS: Yes  LUNG SYMPTOMS: Yes  INTESTINAL SYMPTOMS: Yes  URINARY SYMPTOMS: Yes  GYNECOLOGIC SYMPTOMS: Yes  BREAST SYMPTOMS: Yes  SKELETAL SYMPTOMS: Yes  BLOOD SYMPTOMS: Yes  NERVOUS SYSTEM SYMPTOMS: No  MENTAL HEALTH SYMPTOMS: Yes  Ear pain: No  Ear discharge: Yes- seen by providers.   Hearing loss: No  Tinnitus: No  Nosebleeds: No  Congestion: No  Sinus pain: No  Trouble swallowing: No   Voice hoarseness: No  Mouth sores: Yes  Sore throat: No  Tooth pain: No  Gum tenderness: No  Bleeding gums: No  Change in taste: No  Change in sense of smell: No  Dry mouth: No  Hearing aid used: No  Neck lump: No  Changes in hair: Yes; hair loss.   Changes in moles/birth marks: No  Itching: No  Rashes: No  Changes in nails: No  Acne: Yes  Hair in places you don't want it: Yes; random hait.   Change in facial hair: No  Warts: No  Non-healing sores: No  Scarring: No  Flaking of skin: No  Color changes of hands/feet in cold : No  Sun sensitivity: Yes  Chest pain or pressure: Yes; earlier this month. Not currently. Work up has been negative per report.   Fast or irregular heartbeat: not currently  Pain in legs with walking: No  Trouble breathing while lying down: No  Fingers or toes appear blue: No  High blood pressure: No  Low blood pressure: No  Fainting: No  Murmurs: No  Pacemaker: No  Varicose veins: No  Wake up at night with shortness of breath: No  Light-headedness: No  Exercise intolerance: No  Cough: No  Sputum or phlegm: No  Coughing up blood: No  Difficulty breating or shortness of breath: not currently.   Snoring: Yes  Wheezing: No  Difficulty breathing on exertion: not currently   Nighttime Cough: No  Difficulty breathing when lying flat: No  Heart burn or indigestion: No  Nausea: No  Vomiting: No  Abdominal pain: No  Bloating: Yes  Constipation: Yes  Diarrhea: Yes  Blood in stool: No  Black stools:  No  Rectal or Anal pain: No  Fecal incontinence: No  Yellowing of skin or eyes: No  Vomit with blood: No  Change in stools: No  Trouble holding urine or incontinence: Yes  Pain or burning: Yes  Trouble starting or stopping: No  Increased frequency of urination: No  Blood in urine: No  Decreased frequency of urination: No  Frequent nighttime urination: No  Flank pain: No  Difficulty emptying bladder: No  Bleeding or spotting between periods: No  Heavy or painful periods: No  Irregular periods: No  Vaginal discharge: No  Hot flashes: No  Vaginal dryness: No  Genital ulcers: No  Reduced libido: No  Painful intercourse: No  Difficulty with sexual arousal: No  Post-menopausal bleeding: No  Discharge: No  Lumps: Yes  Pain: Yes  Nipple retraction: No  Back pain: Yes  Muscle aches: No  Neck pain: Yes  Swollen joints: No  Joint pain: No  Bone pain: No  Muscle cramps: No  Muscle weakness: No  Joint stiffness: No  Bone fracture: No  Edema or swelling: No  Anemia: No  Swollen glands: No  Easy bleeding or bruising: Yes  Nervous or Anxious: No  Depression: Yes  Trouble sleeping: No  Trouble thinking or concentrating: No  Mood changes: Yes  Panic attacks: Yes             Allergies   Allergen Reactions     Cephalexin Rash     Clavulanic Acid Diarrhea     Bad diarrhea with nausea and vomiting     Cephalosporins Rash     Cephalexin     Sulfa Antibiotics Rash and Unknown       Current Outpatient Medications   Medication Sig Dispense Refill     escitalopram (LEXAPRO) 20 MG tablet Take 1 tablet (20 mg) by mouth daily 90 tablet 3     fluticasone (FLONASE) 50 MCG/ACT nasal spray SHAKE LIQUID AND USE 1 SPRAY IN EACH NOSTRIL DAILY 16 g 1     hydrocortisone, Perianal, (HYDROCORTISONE) 2.5 % cream Place rectally 2 times daily as needed for hemorrhoids 30 g 0     ibuprofen (ADVIL/MOTRIN) 800 MG tablet Take 1 tablet (800 mg) by mouth every 6 hours as needed for other (mild and/or inflammatory pain) 30 tablet 0     lactulose (CHRONULAC) 10  "GM/15ML solution Take 15-30 mLs (10-20 g) by mouth daily as needed for constipation 900 mL 1     levothyroxine (SYNTHROID/LEVOTHROID) 150 MCG tablet Take 1 tablet (150 mcg) by mouth daily 90 tablet 1     polyethylene glycol (MIRALAX) 17 GM/Dose powder Take 17 g (1 capful.) by mouth daily as needed for constipation (If no bowel movement in 24 hours. Mix in 8 oz of water.  May take twice daily.) 500 g 0     senna-docusate (SENOKOT-S/PERICOLACE) 8.6-50 MG tablet Take 1-2 tablets by mouth 2 times daily 30 tablet 0     senna-docusate (SENOKOT-S/PERICOLACE) 8.6-50 MG tablet Take 1-2 tablets by mouth 2 times daily Use to prevent or treat constipation. 30 tablet 0     valACYclovir (VALTREX) 1000 mg tablet TAKE 2 TABLETS(2000 MG) BY MOUTH TWICE DAILY 12 tablet 3     acetaminophen (TYLENOL) 325 MG tablet Take 3 tablets (975 mg) by mouth every 6 hours as needed for mild pain 50 tablet 0     hydrocortisone-pramoxine (PROCTOFOAM-HC) rectal foam Place 1 Applicatorful rectally 3 times daily (Patient taking differently: Place rectally 3 times daily) 10 g 1       Review of Systems     11 point review system (Constitutional, HENT, Eyes, Respiratory, Cardiovascular, Gastrointestinal, Genitourinary, Musculoskeletal,Neurological, Psychiatric/Behavioural, Endocrine) is negative or is as per HPI above  Past medical history, past surgical history, social and family history reviewed in epic.    Objective:   LMP 04/14/2023 (Approximate)   Estimated body mass index is 33.17 kg/m  as calculated from the following:    Height as of 4/26/23: 1.753 m (5' 9\").    Weight as of 5/22/23: 101.9 kg (224 lb 9.6 oz).     In House Labs:     DIAGNOSIS  A) SUPERIOR THYROID LYMPH NODE, EXCISION:  1. Benign lymph node  2. Negative for metastatic carcinoma    B) THYROID, COMPLETION THYROIDECTOMY:  1. Papillary thyroid carcinoma, 0.3 cm focus  2. Surgical margins free of tumor  3. See staging parameters for additional information    2013 THYROID CANCER STAGING " PARAMETERS  Case number: [id number]    Patient name: Kristel Martinez  Staging parameters include data from the following case(s): [id number]    Final TNM: [id number]  2013 stage: I    MACROSCOPIC     SPECIMEN TYPE          Total thyroidectomy     DOMINANT TUMOR LOCATION AND SIZE          Location: Right lobe          Greatest dimension: 1.7 cm     ADDITIONAL TUMOR SIZE(S)          Left lobe: 0.3 cm     TUMOR FOCALITY          Multifocal - Bilateral    MICROSCOPIC     HISTOLOGIC TYPE          Papillary carcinoma, follicular variant, encapsulated     MARGINS          Uninvolved by invasive carcinoma     LYMPHATIC VASCULAR INVASION          Not identified     EXTRATHYROIDAL EXTENSION          Not identified    PATHOLOGIC STAGING     EXTENT OF INVASION          pT1b.  (Tumor more than 1 cm but not more than 2 cm in greatest            dimension, limited to the thyroid)     REGIONAL LYMPH NODES          pN0.  (No regional lymph node metastasis)            Number of lymph nodes examined:     1            Number of lymph nodes involved:     0     DISTANT METASTASIS          pM0. (Not applicable)     MACIS SCORE          3.6     PATHOLOGIC STAGE Summary          Final TNM: [id number]  TSH   Date Value Ref Range Status   04/21/2023 0.02 (L) 0.30 - 4.20 uIU/mL Final   02/27/2023 0.15 (L) 0.30 - 4.20 uIU/mL Final   01/12/2023 0.48 0.30 - 4.20 uIU/mL Final   07/27/2022 0.30 (L) 0.40 - 4.00 mU/L Final   06/25/2022 12.42 (H) 0.40 - 4.00 mU/L Final   07/09/2021 <0.01 (L) 0.40 - 4.00 mU/L Final   03/24/2021 0.02 (L) 0.40 - 4.00 mU/L Final   12/29/2020 0.27 (L) 0.40 - 4.00 mU/L Final   08/14/2020 0.26 (L) 0.40 - 4.00 mU/L Final   01/23/2020 6.44 (H) 0.40 - 4.00 mU/L Final     T4 Free   Date Value Ref Range Status   07/09/2021 1.27 0.76 - 1.46 ng/dL Final   03/24/2021 1.01 0.76 - 1.46 ng/dL Final   12/29/2020 1.43 0.76 - 1.46 ng/dL Final   08/14/2020 1.09 0.76 - 1.46 ng/dL Final   10/21/2019 0.94 0.76 - 1.46 ng/dL Final     Free T4   Date  Value Ref Range Status   04/21/2023 1.77 (H) 0.90 - 1.70 ng/dL Final   02/27/2023 1.42 0.90 - 1.70 ng/dL Final   07/27/2022 1.33 0.76 - 1.46 ng/dL Final   06/25/2022 0.92 0.76 - 1.46 ng/dL Final       Creatinine   Date Value Ref Range Status   05/22/2023 0.68 0.51 - 0.95 mg/dL Final   03/19/2021 0.82 0.52 - 1.04 mg/dL Final   ]  This note has been dictated using voice recognition software.  As a result, there may be errors in the documentation that have gone undetected.  Please consider this when interpreting information in this documentation.        Video-Visit Details    Type of service:  Video Visit    Video Start Time: 2:30 PM    Video End Time:2:54 PM  Video problem therefore phone used for the visit.   Distant Location (provider location):  Off-site.   43 minutes spent by me on the date of the encounter doing chart review, history and exam, documentation and further activities per the note.

## 2023-06-19 ENCOUNTER — MYC MEDICAL ADVICE (OUTPATIENT)
Dept: FAMILY MEDICINE | Facility: CLINIC | Age: 43
End: 2023-06-19

## 2023-06-19 ENCOUNTER — HOSPITAL ENCOUNTER (EMERGENCY)
Facility: CLINIC | Age: 43
Discharge: HOME | End: 2023-06-19
Attending: EMERGENCY MEDICINE | Admitting: EMERGENCY MEDICINE
Payer: COMMERCIAL

## 2023-06-19 ENCOUNTER — APPOINTMENT (OUTPATIENT)
Dept: CT IMAGING | Facility: CLINIC | Age: 43
End: 2023-06-19
Attending: EMERGENCY MEDICINE
Payer: COMMERCIAL

## 2023-06-19 VITALS
RESPIRATION RATE: 16 BRPM | BODY MASS INDEX: 31.5 KG/M2 | HEART RATE: 79 BPM | HEIGHT: 70 IN | TEMPERATURE: 97.7 F | OXYGEN SATURATION: 97 % | SYSTOLIC BLOOD PRESSURE: 115 MMHG | WEIGHT: 220 LBS | DIASTOLIC BLOOD PRESSURE: 78 MMHG

## 2023-06-19 DIAGNOSIS — R93.0 ABNORMAL CT SCAN, SINUS: Primary | ICD-10-CM

## 2023-06-19 DIAGNOSIS — R51.9 NONINTRACTABLE HEADACHE, UNSPECIFIED CHRONICITY PATTERN, UNSPECIFIED HEADACHE TYPE: ICD-10-CM

## 2023-06-19 DIAGNOSIS — R93.0 ABNORMAL HEAD CT: ICD-10-CM

## 2023-06-19 LAB
ANION GAP SERPL CALCULATED.3IONS-SCNC: 10 MMOL/L (ref 7–15)
BASOPHILS # BLD AUTO: 0 10E3/UL (ref 0–0.2)
BASOPHILS NFR BLD AUTO: 1 %
BUN SERPL-MCNC: 9.2 MG/DL (ref 6–20)
CALCIUM SERPL-MCNC: 8.8 MG/DL (ref 8.6–10)
CHLORIDE SERPL-SCNC: 105 MMOL/L (ref 98–107)
CREAT SERPL-MCNC: 0.65 MG/DL (ref 0.51–0.95)
CRP SERPL-MCNC: 9.67 MG/L
DEPRECATED HCO3 PLAS-SCNC: 26 MMOL/L (ref 22–29)
EOSINOPHIL # BLD AUTO: 0 10E3/UL (ref 0–0.7)
EOSINOPHIL NFR BLD AUTO: 1 %
ERYTHROCYTE [DISTWIDTH] IN BLOOD BY AUTOMATED COUNT: 14.1 % (ref 10–15)
ERYTHROCYTE [SEDIMENTATION RATE] IN BLOOD BY WESTERGREN METHOD: 11 MM/HR (ref 0–20)
GFR SERPL CREATININE-BSD FRML MDRD: >90 ML/MIN/1.73M2
GLUCOSE SERPL-MCNC: 91 MG/DL (ref 70–99)
HCT VFR BLD AUTO: 37.4 % (ref 35–47)
HGB BLD-MCNC: 12.2 G/DL (ref 11.7–15.7)
IMM GRANULOCYTES # BLD: 0 10E3/UL
IMM GRANULOCYTES NFR BLD: 0 %
LYMPHOCYTES # BLD AUTO: 1.2 10E3/UL (ref 0.8–5.3)
LYMPHOCYTES NFR BLD AUTO: 26 %
MCH RBC QN AUTO: 27.1 PG (ref 26.5–33)
MCHC RBC AUTO-ENTMCNC: 32.6 G/DL (ref 31.5–36.5)
MCV RBC AUTO: 83 FL (ref 78–100)
MONOCYTES # BLD AUTO: 0.3 10E3/UL (ref 0–1.3)
MONOCYTES NFR BLD AUTO: 6 %
NEUTROPHILS # BLD AUTO: 3.2 10E3/UL (ref 1.6–8.3)
NEUTROPHILS NFR BLD AUTO: 66 %
NRBC # BLD AUTO: 0 10E3/UL
NRBC BLD AUTO-RTO: 0 /100
PLATELET # BLD AUTO: 268 10E3/UL (ref 150–450)
POTASSIUM SERPL-SCNC: 4 MMOL/L (ref 3.4–5.3)
RBC # BLD AUTO: 4.51 10E6/UL (ref 3.8–5.2)
SODIUM SERPL-SCNC: 141 MMOL/L (ref 136–145)
WBC # BLD AUTO: 4.8 10E3/UL (ref 4–11)

## 2023-06-19 PROCEDURE — 80048 BASIC METABOLIC PNL TOTAL CA: CPT | Performed by: EMERGENCY MEDICINE

## 2023-06-19 PROCEDURE — 85025 COMPLETE CBC W/AUTO DIFF WBC: CPT | Performed by: EMERGENCY MEDICINE

## 2023-06-19 PROCEDURE — 85652 RBC SED RATE AUTOMATED: CPT | Performed by: EMERGENCY MEDICINE

## 2023-06-19 PROCEDURE — 250N000011 HC RX IP 250 OP 636: Performed by: EMERGENCY MEDICINE

## 2023-06-19 PROCEDURE — 36415 COLL VENOUS BLD VENIPUNCTURE: CPT | Performed by: EMERGENCY MEDICINE

## 2023-06-19 PROCEDURE — 99284 EMERGENCY DEPT VISIT MOD MDM: CPT | Performed by: EMERGENCY MEDICINE

## 2023-06-19 PROCEDURE — 70450 CT HEAD/BRAIN W/O DYE: CPT

## 2023-06-19 PROCEDURE — 86140 C-REACTIVE PROTEIN: CPT | Performed by: EMERGENCY MEDICINE

## 2023-06-19 PROCEDURE — 258N000003 HC RX IP 258 OP 636: Performed by: EMERGENCY MEDICINE

## 2023-06-19 PROCEDURE — 96365 THER/PROPH/DIAG IV INF INIT: CPT | Performed by: EMERGENCY MEDICINE

## 2023-06-19 PROCEDURE — 99285 EMERGENCY DEPT VISIT HI MDM: CPT | Mod: 25 | Performed by: EMERGENCY MEDICINE

## 2023-06-19 PROCEDURE — 96375 TX/PRO/DX INJ NEW DRUG ADDON: CPT | Performed by: EMERGENCY MEDICINE

## 2023-06-19 RX ORDER — DIPHENHYDRAMINE HYDROCHLORIDE 50 MG/ML
25 INJECTION INTRAMUSCULAR; INTRAVENOUS
Status: DISCONTINUED | OUTPATIENT
Start: 2023-06-19 | End: 2023-06-19 | Stop reason: HOSPADM

## 2023-06-19 RX ORDER — SODIUM CHLORIDE, SODIUM LACTATE, POTASSIUM CHLORIDE, CALCIUM CHLORIDE 600; 310; 30; 20 MG/100ML; MG/100ML; MG/100ML; MG/100ML
1000 INJECTION, SOLUTION INTRAVENOUS CONTINUOUS
Status: DISCONTINUED | OUTPATIENT
Start: 2023-06-19 | End: 2023-06-19 | Stop reason: HOSPADM

## 2023-06-19 RX ORDER — DEXAMETHASONE SODIUM PHOSPHATE 10 MG/ML
10 INJECTION, SOLUTION INTRAMUSCULAR; INTRAVENOUS ONCE
Status: COMPLETED | OUTPATIENT
Start: 2023-06-19 | End: 2023-06-19

## 2023-06-19 RX ORDER — MAGNESIUM SULFATE HEPTAHYDRATE 40 MG/ML
2 INJECTION, SOLUTION INTRAVENOUS ONCE
Status: COMPLETED | OUTPATIENT
Start: 2023-06-19 | End: 2023-06-19

## 2023-06-19 RX ORDER — KETOROLAC TROMETHAMINE 15 MG/ML
10 INJECTION, SOLUTION INTRAMUSCULAR; INTRAVENOUS
Status: COMPLETED | OUTPATIENT
Start: 2023-06-19 | End: 2023-06-19

## 2023-06-19 RX ADMIN — KETOROLAC TROMETHAMINE 10 MG: 15 INJECTION, SOLUTION INTRAMUSCULAR; INTRAVENOUS at 12:04

## 2023-06-19 RX ADMIN — MAGNESIUM SULFATE HEPTAHYDRATE 2 G: 40 INJECTION, SOLUTION INTRAVENOUS at 12:14

## 2023-06-19 RX ADMIN — SODIUM CHLORIDE, POTASSIUM CHLORIDE, SODIUM LACTATE AND CALCIUM CHLORIDE 1000 ML: 600; 310; 30; 20 INJECTION, SOLUTION INTRAVENOUS at 12:13

## 2023-06-19 RX ADMIN — DEXAMETHASONE SODIUM PHOSPHATE 10 MG: 10 INJECTION, SOLUTION INTRAMUSCULAR; INTRAVENOUS at 12:06

## 2023-06-19 RX ADMIN — DIPHENHYDRAMINE HYDROCHLORIDE 25 MG: 50 INJECTION, SOLUTION INTRAMUSCULAR; INTRAVENOUS at 13:26

## 2023-06-19 ASSESSMENT — ENCOUNTER SYMPTOMS
EYES NEGATIVE: 1
GASTROINTESTINAL NEGATIVE: 1
HEADACHES: 1
PSYCHIATRIC NEGATIVE: 1
CARDIOVASCULAR NEGATIVE: 1
ENDOCRINE NEGATIVE: 1
ALLERGIC/IMMUNOLOGIC NEGATIVE: 1
MUSCULOSKELETAL NEGATIVE: 1
HEMATOLOGIC/LYMPHATIC NEGATIVE: 1
RESPIRATORY NEGATIVE: 1
CONSTITUTIONAL NEGATIVE: 1

## 2023-06-19 ASSESSMENT — ACTIVITIES OF DAILY LIVING (ADL)
ADLS_ACUITY_SCORE: 35
ADLS_ACUITY_SCORE: 35
ADLS_ACUITY_SCORE: 33

## 2023-06-19 NOTE — LETTER
June 19, 2023      To Whom It May Concern:      Kristel Martinez was seen in our Emergency Department today, 06/19/23.  Please excuse from missing work due to  evaluation in the emergency department today.  Further follow-up care may be required if symptoms persist.  This work note is valid until 6/22/2023      Sincerely,           Dago Lewis MD

## 2023-06-19 NOTE — DISCHARGE INSTRUCTIONS
1) The exact cause of your headache today is not clear. Your evaluation today did not reveal an emergency diagnosis. Head imaging did reveal abnormal findings involving the sphenoid and ethmoid sinuses. Radiology felt that this was likely related to inflammation however further evaluation would be prudent especially if her headache persists.    2) You appears stable for discharge to home at this time and reported some improvement in your headache if your symptoms worsen including but not limited to facial numbness or weakness speech difficulty visual changes or any new concerns you should return to be evaluated.    3) We discussed having your primary care provider provided referral to ENT for further assessment and care based on head CT findings and if your headache persists

## 2023-06-19 NOTE — ED PROVIDER NOTES
History     Chief Complaint   Patient presents with     Headache     Headache x 1 weeks, no relief with prescription/OTC medications     HPI  Kristel Martinez is a 42 year old female who presents with vaginal report of 1 week history of headache with no relief with over-the-counter therapies.    Patient's medical records show history of IgA deficiency, prior diagnosis of papillary adenocarcinoma of the thyroid, depression, and history of interstitial cystitis.    Patient's prescribed medications were reviewed.    On examination patient arrived alone by car reporting that she has had a right temporal headache for about a week.  She reports that she has a distant history of migraine headaches and has not had a migraine exacerbation for about a year.  She reports that she tried ibuprofen last night with some relief but no resolution of her symptoms.  No new therapies today.  She reports that her headache is currently 8 out of 10 and feels like a pressure sensation.  She has had no burning sensation about the scalp or temple and no new loss of vision or visual changes, no extremity weakness or numbness.  She is also not suffered a fall and reports no neck pain or recent neck manipulation.  She reports that she has had no other household contacts with headaches.  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    Allergies:  Allergies   Allergen Reactions     Cephalexin Rash     Clavulanic Acid Diarrhea     Bad diarrhea with nausea and vomiting     Cephalosporins Rash     Cephalexin     Sulfa Antibiotics Rash and Unknown       Problem List:    Patient Active Problem List    Diagnosis Date Noted     Abnormal Pap smear of cervix 08/08/2022     Priority: Medium     Formatting of this note might be different from the original.  LSIL 2005 remote, bx fine       Interstitial cystitis 07/09/2021     Priority: Medium     Traumatic incomplete tear of right rotator cuff, initial encounter 07/23/2020     Priority:  Medium     Bicipital tendonitis of right shoulder 07/23/2020     Priority: Medium     Subacromial impingement of right shoulder 07/23/2020     Priority: Medium     IgA deficiency (H) 09/12/2018     Priority: Medium     Postoperative hypothyroidism 03/23/2018     Priority: Medium     Vitiligo 04/09/2015     Priority: Medium     Papillary adenocarcinoma of thyroid (H) 03/01/2014     Priority: Medium     Overview:   12/2013: R thyroid lobectomy 1.7 cm follicular variant papillary cancer, MACIS 3.6  03/2014: Completion Thyroidectomy 0.3 cm incidental papillary cancer left lobe. Iodine ablation with 53 mCi.   07/2016, 07/2017: Neck US neg.       Vitamin D deficiency 07/06/2009     Priority: Medium     Last Assessment & Plan:   7/09  29.4  vit  d   on 1000units  daily  since  7/09       Major depressive disorder, recurrent episode, moderate (H) 11/16/2007     Priority: Medium        Past Medical History:    Past Medical History:   Diagnosis Date     Depressive disorder      Interstitial cystitis 07/09/2021     Major depressive disorder, recurrent episode, moderate (H) 11/16/2007     Papillary adenocarcinoma of thyroid (H) 03/01/2014     Postoperative hypothyroidism 03/23/2018       Past Surgical History:    Past Surgical History:   Procedure Laterality Date     BIOPSY       COLONOSCOPY  08/27/2018     DILATION AND CURETTAGE, HYSTEROSCOPY, ABLATE ENDOMETRIUM, COMBINED N/A 4/26/2023    Procedure: DILATION, CERVIX, WITH ENDOMETRIAL ABLATION, hysteroscopy, dilation and curettage, polypectomy;  Surgeon: Mulu Orozco MD;  Location: [facility]     HC INSERTION INTRAUTERINE DEVICE  2021     HC REMOVE INTRAUTERINE DEVICE  2021     HEMORRHOIDECTOMY EXTERNAL N/A 3/27/2023    Procedure: Combined internal and external hemorrhoidectomy;  Surgeon: Feroz Perry DO;  Location: [facility]     THYROIDECTOMY      2015     TONSILLECTOMY       TUBAL LIGATION  2006       Family History:    Family History   Problem Relation Age  "of Onset     Skin Cancer Mother      Other Cancer Mother         Skin cancer     Hypertension Father      Arrhythmia Father      Lymphoma Maternal Grandmother      Cerebrovascular Disease Maternal Grandmother      Diabetes Paternal Grandfather              Asthma Son      Arthritis Daughter        Social History:  Marital Status:   [4]  Social History     Tobacco Use     Smoking status: Former     Packs/day: 1.00     Years: 5.00     Pack years: 5.00     Types: Cigarettes     Smokeless tobacco: Never   Vaping Use     Vaping status: Never Used   Substance Use Topics     Alcohol use: Yes     Comment: 1 drink per wk     Drug use: No        Medications:    acetaminophen (TYLENOL) 325 MG tablet  escitalopram (LEXAPRO) 20 MG tablet  fluticasone (FLONASE) 50 MCG/ACT nasal spray  hydrocortisone, Perianal, (HYDROCORTISONE) 2.5 % cream  hydrocortisone-pramoxine (PROCTOFOAM-HC) rectal foam  ibuprofen (ADVIL/MOTRIN) 800 MG tablet  lactulose (CHRONULAC) 10 GM/15ML solution  levothyroxine (SYNTHROID/LEVOTHROID) 150 MCG tablet  polyethylene glycol (MIRALAX) 17 GM/Dose powder  senna-docusate (SENOKOT-S/PERICOLACE) 8.6-50 MG tablet  senna-docusate (SENOKOT-S/PERICOLACE) 8.6-50 MG tablet  valACYclovir (VALTREX) 1000 mg tablet          Review of Systems   Constitutional: Negative.    HENT: Negative.    Eyes: Negative.    Respiratory: Negative.    Cardiovascular: Negative.    Gastrointestinal: Negative.    Endocrine: Negative.    Genitourinary: Negative.    Musculoskeletal: Negative.    Skin: Negative.    Allergic/Immunologic: Negative.    Neurological: Positive for headaches.   Hematological: Negative.    Psychiatric/Behavioral: Negative.    All other systems reviewed and are negative.      Physical Exam   BP: 131/84  Pulse: 83  Temp: 97.7  F (36.5  C)  Resp: 16  Height: 177.8 cm (5' 10\")  Weight: 99.8 kg (220 lb)  SpO2: 100 %      Physical Exam  Constitutional:       General: She is not in acute distress.     " Appearance: Normal appearance. She is not ill-appearing, toxic-appearing or diaphoretic.   HENT:      Head: Normocephalic and atraumatic.      Nose: Nose normal.   Eyes:      Extraocular Movements: Extraocular movements intact.      Pupils: Pupils are equal, round, and reactive to light.   Cardiovascular:      Rate and Rhythm: Normal rate and regular rhythm.      Pulses: Normal pulses.   Pulmonary:      Effort: Pulmonary effort is normal. No respiratory distress.      Breath sounds: Normal breath sounds. No stridor. No wheezing or rhonchi.   Chest:      Chest wall: No tenderness.   Musculoskeletal:         General: No swelling, tenderness, deformity or signs of injury.      Cervical back: Normal range of motion and neck supple.      Right lower leg: No edema.      Left lower leg: No edema.   Skin:     Capillary Refill: Capillary refill takes less than 2 seconds.      Coloration: Skin is not jaundiced or pale.      Findings: No bruising, erythema, lesion or rash.   Neurological:      General: No focal deficit present.      Mental Status: She is alert and oriented to person, place, and time.      Cranial Nerves: No cranial nerve deficit.      Sensory: No sensory deficit.      Motor: No weakness.      Coordination: Coordination normal.      Gait: Gait normal.      Deep Tendon Reflexes: Reflexes normal.   Psychiatric:         Mood and Affect: Mood normal.         Behavior: Behavior normal.         Thought Content: Thought content normal.         Judgment: Judgment normal.         ED Course                 Procedures              Critical Care time:  none             ED medications:  Medications   lactated ringers infusion (has no administration in time range)   diphenhydrAMINE (BENADRYL) injection 25 mg (25 mg Intravenous $Given 6/19/23 1326)   lactated ringers BOLUS 1,000 mL (0 mLs Intravenous Stopped 6/19/23 1327)   ketorolac (TORADOL) injection 10 mg (10 mg Intravenous $Given 6/19/23 1204)   magnesium sulfate 2 g in  50 mL sterile water intermittent infusion (0 g Intravenous Stopped 6/19/23 1327)   dexamethasone PF (DECADRON) injection 10 mg (10 mg Intravenous $Given 6/19/23 1206)       ED Vitals:  Vitals:    06/19/23 1356 06/19/23 1436 06/19/23 1458 06/19/23 1508   BP: 112/67  115/78    Pulse:       Resp:       Temp:       TempSrc:       SpO2: 96% 95% 100% 97%   Weight:       Height:         ED labs and imaging:  Results for orders placed or performed during the hospital encounter of 06/19/23   Head CT w/o contrast     Status: None    Narrative    CT SCAN OF THE HEAD WITHOUT CONTRAST   6/19/2023 1:41 PM     HISTORY: Right temporal headache x 1 week. Evaluate for acute  intracranial process.    TECHNIQUE:  Axial images of the head and coronal reformations without  IV contrast material. Radiation dose for this scan was reduced using  automated exposure control, adjustment of the mA and/or kV according  to patient size, or iterative reconstruction technique.    COMPARISON: MRI brain 9/24/2019.    FINDINGS: There is no evidence of intracranial hemorrhage, mass, acute  infarct or anomaly. The ventricles are normal in size, shape and  configuration. The brain parenchyma and subarachnoid spaces are  normal.     Complete soft tissue opacification of the right sphenoid sinus. Mild  polypoid soft tissue in the posterior right ethmoid and left sphenoid  sinus region. The right sphenoid ostium/sphenoethmoidal recess appear  to be obstructed by soft tissue. The mastoid air cells are clear. The  bony calvarium and bones of the skull base appear intact.       Impression    IMPRESSION:   1. No CT findings of acute intracranial process.  2. Complete soft tissue opacification of the right sphenoid sinus and  mild polypoid soft tissue in the posterior right ethmoid and left  sphenoid sinus regions. There appears to be soft tissue obstruction of  the right sphenoid ostium/sphenoethmoidal recess. This is presumably  inflammatory in nature. Consider  correlation with direct inspection of  the posterior superior right nasal cavity to exclude the possibility  of an underlying lesion.    TY PAYAN MD         SYSTEM ID:  [id number]   Basic metabolic panel     Status: Normal   Result Value Ref Range    Sodium 141 136 - 145 mmol/L    Potassium 4.0 3.4 - 5.3 mmol/L    Chloride 105 98 - 107 mmol/L    Carbon Dioxide (CO2) 26 22 - 29 mmol/L    Anion Gap 10 7 - 15 mmol/L    Urea Nitrogen 9.2 6.0 - 20.0 mg/dL    Creatinine 0.65 0.51 - 0.95 mg/dL    Calcium 8.8 8.6 - 10.0 mg/dL    Glucose 91 70 - 99 mg/dL    GFR Estimate >90 >60 mL/min/1.73m2   CRP inflammation     Status: Abnormal   Result Value Ref Range    CRP Inflammation 9.67 (H) <5.00 mg/L   Erythrocyte sedimentation rate auto     Status: Normal   Result Value Ref Range    Erythrocyte Sedimentation Rate 11 0 - 20 mm/hr   CBC with platelets and differential     Status: None   Result Value Ref Range    WBC Count 4.8 4.0 - 11.0 10e3/uL    RBC Count 4.51 3.80 - 5.20 10e6/uL    Hemoglobin 12.2 11.7 - 15.7 g/dL    Hematocrit 37.4 35.0 - 47.0 %    MCV 83 78 - 100 fL    MCH 27.1 26.5 - 33.0 pg    MCHC 32.6 31.5 - 36.5 g/dL    RDW 14.1 10.0 - 15.0 %    Platelet Count 268 150 - 450 10e3/uL    % Neutrophils 66 %    % Lymphocytes 26 %    % Monocytes 6 %    % Eosinophils 1 %    % Basophils 1 %    % Immature Granulocytes 0 %    NRBCs per 100 WBC 0 <1 /100    Absolute Neutrophils 3.2 1.6 - 8.3 10e3/uL    Absolute Lymphocytes 1.2 0.8 - 5.3 10e3/uL    Absolute Monocytes 0.3 0.0 - 1.3 10e3/uL    Absolute Eosinophils 0.0 0.0 - 0.7 10e3/uL    Absolute Basophils 0.0 0.0 - 0.2 10e3/uL    Absolute Immature Granulocytes 0.0 <=0.4 10e3/uL    Absolute NRBCs 0.0 10e3/uL   CBC with platelets differential     Status: None    Narrative    The following orders were created for panel order CBC with platelets differential.  Procedure                               Abnormality         Status                     ---------                                -----------         ------                     CBC with platelets and d...[[id number]]                      Final result                 Please view results for these tests on the individual orders.         Assessments & Plan (with Medical Decision Making)   Assessment Summary and Clinical Impression: 42-year-old female who presented alone by car  with subacute headache of the right temple over the last week.  She arrived afebrile and hemodynamically normal.  She has multiple commorbidities including history of papillary adenocarcinoma of the thyroid and depression.  She reported history of migraine headaches but no prior exacerbation for about a year.  She noted no red flags on exam specifically fever chills visual disturbance, extremity weakness or numbness and no new neck manipulation or trauma.  No rashes or known tick bites.  GCS 15.  Normal range of motion of the neck.  She was offered therapies to manage her headache is rated on arrival 8 out of 10 with last dose of over-the-counter therapy the night prior.  Head imaging revealed abnormal findings which is felt to be inflammatory in nature involving the sphenoid and ethmoid sinus on both the left and right side.  After therapies given to manage her headache symptoms she reported improvement in her symptoms and expressed comfort going home with outpatient follow-up with her primary care provider for ENT referral if she is medically required if symptoms persist.    ED course and plan:  Reviewed the medical record.  Reviewed visit on 5/22/2023.  We discussed and reviewed possible causes for her headache symptoms given history of migraine headaches with unilateral temporal headache without red flags.  She was offered therapies to manage her headache is rated and reported work-up revealed a normal sed rate CRP was mildly elevated at 9.6.  Normal white count.  She had marginal relief in her headache with initial therapies given on her arrival.  Additional  medications were offered.  She had no change in her neurologic examination.  Head CT revealed no acute intracranial process.  There is complete opacification of the right sphenoid sinus and mild polypoid soft tissue in the right ethmoid and left sigmoid sinus region.  Radiology noted there appears to be soft tissue obstruction of the right sphenoid ostium and sphenoethmoid recess presumed inflammatory nature direct inspection of the posterior superior right nasal cavity was advised to exclude underlying lesion.  See details outlined radiology report. Patient was reexamined after period of care and head imaging.  She reported marked improvement in her headache symptoms.  We discussed head CT interpretation and clinical correlation to her symptoms.  She was advised to follow-up with her primary care provider for ENT referral if headache symptoms persist for further assessment and care.  Reviewed concerning symptoms including reasons to return for evaluation she expressed comfort, understanding and agreement with plan of care.    Disclaimer: This note consists of symbols derived from keyboarding, dictation and/or voice recognition software. As a result, there may be errors in the script that have gone undetected. Please consider this when interpreting information found in this chart.  I have reviewed the nursing notes.    I have reviewed the findings, diagnosis, plan and need for follow up with the patient.           Medical Decision Making  The patient's presentation was of moderate complexity (an acute illness with systemic symptoms).    The patient's evaluation involved:  ordering and/or review of 1 test(s) in this encounter (diagnostic labs)    The patient's management necessitated moderate risk (prescription drug management including medications given in the ED).        New Prescriptions    No medications on file       Final diagnoses:   Nonintractable headache, unspecified chronicity pattern, unspecified headache  type - Right temporal headache of the last 1 week by report   Abnormal head CT - Complete soft tissue opacification of the right sphenoid sinus and mild polypoid soft tissue in the posterior right ethmoid and left sphenoid sinus regions. There appears to be soft tissue obstruction of the right sphenoid ostium/sphenoethmoidal recess. This is presumably inflammatory in nature       6/19/2023   [facility] EMERGENCY DEPT     Jonah Lewis MD  06/19/23 7418

## 2023-06-19 NOTE — ED TRIAGE NOTES
Headache x 1 weeks, no relief with prescription/OTC medications. C/o nausea     Triage Assessment     Row Name 06/19/23 1014       Triage Assessment (Adult)    Airway WDL WDL       Respiratory WDL    Respiratory WDL WDL       Skin Circulation/Temperature WDL    Skin Circulation/Temperature WDL WDL       Cardiac WDL    Cardiac WDL WDL       Peripheral/Neurovascular WDL    Peripheral Neurovascular WDL WDL       Cognitive/Neuro/Behavioral WDL    Cognitive/Neuro/Behavioral WDL WDL

## 2023-06-20 ENCOUNTER — PATIENT OUTREACH (OUTPATIENT)
Dept: CARE COORDINATION | Facility: CLINIC | Age: 43
End: 2023-06-20
Payer: COMMERCIAL

## 2023-06-20 NOTE — LETTER
[facility] CARE COORDINATION  [address] [facility] [address]    June 20, 2023    Kristel Martinez  [address] [place] [address] [id number]  [facility] [id number]      Dear Kristel,        I am a  clinic community health worker who works with DAVID Lazo CNP with the [facility]. I wanted to thank you for spending the time to talk with me.  Below is a description of clinic care coordination and how I can further assist you.       The clinic care coordination team is made up of a registered nurse, , financial resource worker and community health worker who understand the health care system. The goal of clinic care coordination is to help you manage your health and improve access to the health care system. Our team works alongside your provider to assist you in determining your health and social needs. We can help you obtain health care and community resources, providing you with necessary information and education. We can work with you through any barriers and develop a care plan that helps coordinate and strengthen the communication between you and your care team.  Our services are voluntary and are offered without charge to you personally.    If you wish to connect with the Clinic Care Coordination Team, please let your [facility] Primary Care Provider or Clinic Care Team know and they can place a referral. The Clinic Care Coordination team will then reach out by phone to further support you.    We are focused on providing you with the highest-quality healthcare experience possible.    Sincerely,   Your care team with [facility]

## 2023-06-20 NOTE — PROGRESS NOTES
Clinic Care Coordination Contact  Community Health Worker Initial Outreach    CHW Initial Information Gathering:  Referral Source: ED Follow-Up  Preferred Hospital: [place], [place]  [phone]  Preferred Urgent Care: [facility], [phone]  No PCP office visit in Past Year: No       Patient accepts CC: No, due to the patient stating that at this time there are no needs from CCC. Patient will be sent Care Coordination introduction letter for future reference.     JORDAN Zuniga  [phone]  Connected Care Resource Center  [facility]

## 2023-06-21 ENCOUNTER — TELEPHONE (OUTPATIENT)
Dept: OTOLARYNGOLOGY | Facility: CLINIC | Age: 43
End: 2023-06-21
Payer: COMMERCIAL

## 2023-06-21 ENCOUNTER — HOSPITAL ENCOUNTER (OUTPATIENT)
Dept: ULTRASOUND IMAGING | Facility: CLINIC | Age: 43
Discharge: HOME | End: 2023-06-21
Attending: INTERNAL MEDICINE | Admitting: INTERNAL MEDICINE
Payer: COMMERCIAL

## 2023-06-21 DIAGNOSIS — C73 PAPILLARY THYROID CARCINOMA (H): ICD-10-CM

## 2023-06-21 PROCEDURE — 76536 US EXAM OF HEAD AND NECK: CPT

## 2023-06-21 NOTE — TELEPHONE ENCOUNTER
[facility]    Phone Message    May a detailed message be left on voicemail: no     Reason for Call: Appointment Intake    Referring Provider Name: Yulissa Nogueira APRN CNP  Diagnosis and/or Symptoms: R93.0 (ICD-10-CM) - Abnormal CT scan, sinus    Sending to clinic for review, this is an urgent referral.     Unsure if there was a mass, polyp, or what was found on the CT. Can this be reviewed please, sending to [facility] incase of mass    Thanks     Action Taken: Other: ENT    Travel Screening: Not Applicable

## 2023-06-26 NOTE — TELEPHONE ENCOUNTER
FUTURE VISIT INFORMATION:      FUTURE VISIT INFORMATION:    Date: 8/10/23    Time: 3 PM    Location: [facility]  REFERRAL INFORMATION:    Referring provider: Yulissa Nogueira APRN CNP    Referring providers clinic: [facility]    Reason for visit/diagnosis:  Abnormal CT scan, sinus [R93.0], Per notes in chart per Argelia, ref'd by Yulissa Nogueira APRN CNP, rec's in [facility], pt made appt, [facility] location    RECORDS REQUESTED FROM:       Clinic name Comments Records Status Imaging Status   [facility] Family Medicine 6/19/23 mychart advice/ referral from Yulissa Nogueira APRN CNP Critical access hospital Imaging CT head 6/19/23 [facility] Pacs   [facility] Emergency Dept 6/19/23 ER encounter with Jonah Lewis MD Epic

## 2023-06-28 ENCOUNTER — TELEPHONE (OUTPATIENT)
Dept: OBGYN | Facility: CLINIC | Age: 43
End: 2023-06-28
Payer: COMMERCIAL

## 2023-06-28 NOTE — TELEPHONE ENCOUNTER
Patient will call us when she is ready to schedule.    Maribell Collazo [facility] [place] Pool  Awaiting pt to call           Previous Messages       ----- Message -----   From: Maribell Collazo   Sent: 5/4/2023   2:37 PM CDT   To: [facility]  Pool   Subject: FW: Mulu Orozco: Case reques*     Pt will call back when she is ready to schedule.       ----- Message -----   From: Mulu Orozco MD   Sent: 5/4/2023   2:13 PM CDT   To: [facility] Schedule Pool   Subject: Mulu Orozco: Case request or*     Patient Name: Kristel Martinez   MRN: [id number]   Case#: [id number]   Surgeon(s) and Role:      * Mulu Orozco MD - Primary   Date requested: * No dates entered *   Location: [facility] OR   Procedure(s):   total vaginal hysterectomy, bilateral salpingectomy (N/A)

## 2023-06-30 ENCOUNTER — OFFICE VISIT (OUTPATIENT)
Dept: URGENT CARE | Facility: URGENT CARE | Age: 43
End: 2023-06-30
Payer: COMMERCIAL

## 2023-06-30 VITALS
SYSTOLIC BLOOD PRESSURE: 119 MMHG | OXYGEN SATURATION: 100 % | WEIGHT: 227.6 LBS | HEART RATE: 72 BPM | DIASTOLIC BLOOD PRESSURE: 70 MMHG | TEMPERATURE: 98.7 F | BODY MASS INDEX: 32.66 KG/M2

## 2023-06-30 DIAGNOSIS — R30.0 DYSURIA: Primary | ICD-10-CM

## 2023-06-30 LAB
ALBUMIN UR-MCNC: NEGATIVE MG/DL
APPEARANCE UR: CLEAR
BILIRUB UR QL STRIP: NEGATIVE
CLUE CELLS: NORMAL
COLOR UR AUTO: YELLOW
GLUCOSE UR STRIP-MCNC: NEGATIVE MG/DL
HGB UR QL STRIP: NEGATIVE
KETONES UR STRIP-MCNC: NEGATIVE MG/DL
LEUKOCYTE ESTERASE UR QL STRIP: NEGATIVE
NITRATE UR QL: NEGATIVE
PH UR STRIP: 6.5 [PH] (ref 5–7)
SP GR UR STRIP: 1.02 (ref 1–1.03)
TRICHOMONAS, WET PREP: NORMAL
UROBILINOGEN UR STRIP-ACNC: 0.2 E.U./DL
WBC'S/HIGH POWER FIELD, WET PREP: NORMAL
YEAST, WET PREP: NORMAL

## 2023-06-30 PROCEDURE — 99213 OFFICE O/P EST LOW 20 MIN: CPT | Performed by: NURSE PRACTITIONER

## 2023-06-30 PROCEDURE — 81003 URINALYSIS AUTO W/O SCOPE: CPT | Performed by: NURSE PRACTITIONER

## 2023-06-30 PROCEDURE — 87210 SMEAR WET MOUNT SALINE/INK: CPT | Performed by: NURSE PRACTITIONER

## 2023-06-30 NOTE — PROGRESS NOTES
SUBJECTIVE:   Kristel Martinez is a 42 year old female who  presents today for a possible UTI. Symptoms of burning and discharge have been going on for 3day(s).  Hematuria no.  sudden onset and constantand moderate.  There is no history of fever, chills, nausea or vomiting. Having vaginal discharge This patient does have a history of urinary tract infections.  Has been swimming lately.    Past Medical History:   Diagnosis Date     Depressive disorder      Interstitial cystitis 07/09/2021     Major depressive disorder, recurrent episode, moderate (H) 11/16/2007     Papillary adenocarcinoma of thyroid (H) 03/01/2014    Overview:  12/2013: R thyroid lobectomy 1.7 cm follicular variant papillary cancer, MACIS 3.6 03/2014: Completion Thyroidectomy 0.3 cm incidental papillary cancer left lobe. Iodine ablation with 53 mCi.  07/2016, 07/2017: Neck US neg.     Postoperative hypothyroidism 03/23/2018     Current Outpatient Medications   Medication Sig Dispense Refill     escitalopram (LEXAPRO) 20 MG tablet Take 1 tablet (20 mg) by mouth daily 90 tablet 3     fluticasone (FLONASE) 50 MCG/ACT nasal spray SHAKE LIQUID AND USE 1 SPRAY IN EACH NOSTRIL DAILY 16 g 1     hydrocortisone, Perianal, (HYDROCORTISONE) 2.5 % cream Place rectally 2 times daily as needed for hemorrhoids 30 g 0     levothyroxine (SYNTHROID/LEVOTHROID) 150 MCG tablet Take 1 tablet (150 mcg) by mouth daily 90 tablet 1     polyethylene glycol (MIRALAX) 17 GM/Dose powder Take 17 g (1 capful.) by mouth daily as needed for constipation (If no bowel movement in 24 hours. Mix in 8 oz of water.  May take twice daily.) 500 g 0     senna-docusate (SENOKOT-S/PERICOLACE) 8.6-50 MG tablet Take 1-2 tablets by mouth 2 times daily 30 tablet 0     valACYclovir (VALTREX) 1000 mg tablet TAKE 2 TABLETS(2000 MG) BY MOUTH TWICE DAILY 12 tablet 3     acetaminophen (TYLENOL) 325 MG tablet Take 3 tablets (975 mg) by mouth every 6 hours as needed for mild pain 50 tablet 0      hydrocortisone-pramoxine (PROCTOFOAM-HC) rectal foam Place 1 Applicatorful rectally 3 times daily (Patient taking differently: Place rectally 3 times daily) 10 g 1     ibuprofen (ADVIL/MOTRIN) 800 MG tablet Take 1 tablet (800 mg) by mouth every 6 hours as needed for other (mild and/or inflammatory pain) (Patient not taking: Reported on 6/30/2023) 30 tablet 0     lactulose (CHRONULAC) 10 GM/15ML solution Take 15-30 mLs (10-20 g) by mouth daily as needed for constipation (Patient not taking: Reported on 6/30/2023) 900 mL 1     senna-docusate (SENOKOT-S/PERICOLACE) 8.6-50 MG tablet Take 1-2 tablets by mouth 2 times daily Use to prevent or treat constipation. 30 tablet 0     Social History     Tobacco Use     Smoking status: Former     Packs/day: 1.00     Years: 5.00     Pack years: 5.00     Types: Cigarettes     Smokeless tobacco: Never   Substance Use Topics     Alcohol use: Yes     Comment: 1 drink per wk           OBJECTIVE:  /70   Pulse 72   Temp 98.7  F (37.1  C) (Tympanic)   Wt 103.2 kg (227 lb 9.6 oz)   SpO2 100%   BMI 32.66 kg/m    GENERAL APPEARANCE: healthy, alert and no distress  RESP: Normal respirations  Abd: no abd pain, no CVA tenderness  SKIN: no suspicious lesions or rashes    UA: negative for infection.  Wet prep: completely normal    ASSESSMENT:     1. Dysuria  - Wet prep - Clinic Collect  - UA Macroscopic with reflex to Microscopic and Culture      PLAN:  No infection today.  Drink lots of fluids.  Follow up with your primary if symptoms persist.

## 2023-07-11 ENCOUNTER — OFFICE VISIT (OUTPATIENT)
Dept: FAMILY MEDICINE | Facility: CLINIC | Age: 43
End: 2023-07-11
Payer: COMMERCIAL

## 2023-07-11 VITALS
TEMPERATURE: 97.9 F | OXYGEN SATURATION: 98 % | HEART RATE: 77 BPM | RESPIRATION RATE: 12 BRPM | HEIGHT: 70 IN | WEIGHT: 229.9 LBS | DIASTOLIC BLOOD PRESSURE: 78 MMHG | BODY MASS INDEX: 32.91 KG/M2 | SYSTOLIC BLOOD PRESSURE: 126 MMHG

## 2023-07-11 DIAGNOSIS — R07.0 THROAT PAIN: ICD-10-CM

## 2023-07-11 DIAGNOSIS — H66.90 ACUTE OTITIS MEDIA, UNSPECIFIED OTITIS MEDIA TYPE: Primary | ICD-10-CM

## 2023-07-11 LAB — DEPRECATED S PYO AG THROAT QL EIA: NEGATIVE

## 2023-07-11 PROCEDURE — 87651 STREP A DNA AMP PROBE: CPT | Performed by: STUDENT IN AN ORGANIZED HEALTH CARE EDUCATION/TRAINING PROGRAM

## 2023-07-11 PROCEDURE — 99213 OFFICE O/P EST LOW 20 MIN: CPT | Performed by: STUDENT IN AN ORGANIZED HEALTH CARE EDUCATION/TRAINING PROGRAM

## 2023-07-11 RX ORDER — AZITHROMYCIN 250 MG/1
TABLET, FILM COATED ORAL
Qty: 6 TABLET | Refills: 0 | Status: SHIPPED | OUTPATIENT
Start: 2023-07-11 | End: 2023-07-16

## 2023-07-11 ASSESSMENT — PAIN SCALES - GENERAL: PAINLEVEL: MILD PAIN (3)

## 2023-07-11 NOTE — PROGRESS NOTES
1. Acute otitis media, unspecified otitis media type  > Physical exam findings were consistent with a left TM that appeared red, erythematous, irritated although it was difficult to get a good full visualization of the TM in the setting of cerumen, there was some potential clinical suspicion for an infectious process  -Patient has a documented allergy to penicillin and cephalosporins, states that she thinks she has tried azithromycin in the past without complication  -Prescription sent for azithromycin (ZITHROMAX) 250 MG tablet; Take 2 tablets (500 mg) by mouth daily for 1 day, THEN 1 tablet (250 mg) daily for 4 days.  Dispense: 6 tablet; Refill: 0    2. 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, will check the patient for possible Strep with a swab  - Streptococcus A Rapid Screen w/Reflex to PCR - Clinic Collect  -If the patient does come back with a positive strep test, the treatment for that can include azithromycin 500 mg daily for 5 days, given that she already was prescribed a Z-Jagjit course (see above) an additional 4 days of 250 mg can be provided and patient can double up that dose for the remaining 4 days      Kamini Humphries is a 42 year old, presenting for the following health issues:  Ear Problem        7/11/2023     1:49 PM   Additional Questions   Roomed by Payal GODINEZ LPN   Accompanied by self         7/11/2023     1:49 PM   Patient Reported Additional Medications   Patient reports taking the following new medications no new meds     History of Present Illness       Reason for visit:  Ear pain  Symptom onset:  3-7 days ago  Symptoms include:  Plugged ear, ear pain, sinus pressure, runny nose, headache  Symptom intensity:  Moderate  Symptom progression:  Worsening  Had these symptoms before:  Yes  Has tried/received treatment for these symptoms:   "Yes  Previous treatment was successful:  Yes  Prior treatment description:  Antibiotics  What makes it worse:  Abel  What makes it better:  Rest    She eats 0-1 servings of fruits and vegetables daily.She consumes 1 sweetened beverage(s) daily.She exercises with enough effort to increase her heart rate 9 or less minutes per day.  She exercises with enough effort to increase her heart rate 3 or less days per week. She is missing 1 dose(s) of medications per week.  She is not taking prescribed medications regularly due to remembering to take.       Acute Illness  Acute illness concerns: Ear problem  Onset/Duration: last week  Symptoms:  Fever: No  Chills/Sweats: No  Headache (location?): YES  Sinus Pressure: YES  Conjunctivitis:  No  Ear Pain: YES: left  Rhinorrhea: YES  Congestion: YES- a little bit  Sore Throat: YES  Cough: no  Wheeze: No  Decreased Appetite: No  Nausea: No  Vomiting: No  Diarrhea: No  Dysuria/Freq.: No  Dysuria or Hematuria: No  Fatigue/Achiness: No  Sick/Strep Exposure: YES- possibly, she works at a   Therapies tried and outcome: OTC sinus decongestant - little relief  No new discharge from her ears  States sounds on her left ear appear muffled   Denies fevers, chills nausea, vomiting   Has an appointment with ENT in August for congested sinuses     Review of Systems   As above       Objective    /78 (BP Location: Left arm, Patient Position: Sitting, Cuff Size: Adult Regular)   Pulse 77   Temp 97.9  F (36.6  C) (Tympanic)   Resp 12   Ht 1.778 m (5' 10\")   Wt 104.3 kg (229 lb 14.4 oz)   LMP  (LMP Unknown)   SpO2 98%   BMI 32.99 kg/m    Body mass index is 32.99 kg/m .  Physical Exam  Constitutional:       General: She is not in acute distress.  HENT:      Head: Normocephalic and atraumatic.      Right Ear: Tympanic membrane, ear canal and external ear normal. There is no impacted cerumen.      Left Ear: External ear normal. There is no impacted cerumen.      Ears:      Comments: " Left TM did appear somewhat red, irritated, erythematous but it was difficult to visualize in the setting of some cerumen  Although there was some mild dry/irritated, flaky skin at the distal most portion of the ear canal, no active bleeding, draining, pustular discharge or infection noted     Nose:      Comments: Tenderness to palpation worse along the right maxillary sinus     Mouth/Throat:      Mouth: Mucous membranes are moist.      Pharynx: Oropharynx is clear. No oropharyngeal exudate or posterior oropharyngeal erythema.      Comments: No tonsils noted, patient states she had it removed when she was in fourth grade, no masses, bulging, obstructive lesions noted  Eyes:      Extraocular Movements: Extraocular movements intact.   Cardiovascular:      Rate and Rhythm: Normal rate and regular rhythm.      Heart sounds: Normal heart sounds.   Pulmonary:      Effort: Pulmonary effort is normal. No respiratory distress.      Breath sounds: Normal breath sounds. No wheezing or rhonchi.   Abdominal:      General: Bowel sounds are normal.      Palpations: Abdomen is soft. There is no mass.      Tenderness: There is no abdominal tenderness.   Musculoskeletal:         General: No deformity. Normal range of motion.      Cervical back: Normal range of motion and neck supple. No tenderness.   Lymphadenopathy:      Cervical: No cervical adenopathy.   Skin:     General: Skin is warm.      Findings: No rash.   Neurological:      General: No focal deficit present.      Mental Status: She is alert and oriented to person, place, and time.   Psychiatric:         Mood and Affect: Mood normal.

## 2023-07-12 LAB — GROUP A STREP BY PCR: NOT DETECTED

## 2023-07-31 NOTE — PATIENT INSTRUCTIONS
1. Please follow-up after your MRI  2. Please call the ENT clinic with any questions,concerns, new or worsening symptoms.    -Clinic number is [phone]   - Andreina's direct line (Dr. Yabrrough's nurse) [phone]

## 2023-08-04 ENCOUNTER — MYC MEDICAL ADVICE (OUTPATIENT)
Dept: OBGYN | Facility: CLINIC | Age: 43
End: 2023-08-04
Payer: COMMERCIAL

## 2023-08-04 ENCOUNTER — HOSPITAL ENCOUNTER (OUTPATIENT)
Facility: CLINIC | Age: 43
End: 2023-08-04
Attending: OBSTETRICS & GYNECOLOGY | Admitting: OBSTETRICS & GYNECOLOGY
Payer: COMMERCIAL

## 2023-08-04 NOTE — TELEPHONE ENCOUNTER
"[id number]  Kristel Martinez    You are now scheduled for surgery at [facility].  Below are the details for your surgery.  Please read the \"Preparing for Your Surgery\" instructions and let us know if you have any questions.    Type of surgery: total vaginal hysterectomy, bilateral salpingectomy   Surgeon:  Mulu Orozco MD  Location of surgery: [facility] OR    Date of surgery: 9/6/23    Time: 9:30am   Arrival Time: 8:00am    Time can change, to be confirmed a couple of days prior by pre-op surgery nurse.    Pre-Op Appt Date: Patient to schedule with a PCP or Family Practice Provider within 30 days to the surgery.  Post-Op Appt Date: 10/16/23   Time: 3:30 at [place]    Packet sent out: Yes  Pre-cert/Authorization completed:  TBD by Financial Securing Office.   MA Sterilization/Hysterectomy Acknowledgment Consent signed: Yes AM of surgery    [facility] OB [facility]YN [facility]  [phone]    Fax: [phone]  Same Day Surgery [phone]  Fax: [phone]  [facility] [phone]    "

## 2023-08-04 NOTE — TELEPHONE ENCOUNTER
Pt would like to schedule hysterectomy d/t endometrial hyperplasia    Surgical consult: 5/4/2023 - orders were placed for vag hyst with bilat salpingectomy   Past notes indicate that patient was not ready to schedule after appointment and would reach out when ready - she would like to schedule now    Pt reports that she has heavy bleeding and is wondering if she should take iron - most recent Hgb 12.2 on 6/19/23    Routing to scheduling team to schedule procedure    Routing to Dr. Orozco as FYI and to inquire if iron is recommended - I reassured pt that Hgb was within normal range and iron not likely necessary but would reach out for confirmation.    Thank you!    Margarita, RN  Ob/Gyn Clinic

## 2023-08-10 ENCOUNTER — PRE VISIT (OUTPATIENT)
Dept: OTOLARYNGOLOGY | Facility: CLINIC | Age: 43
End: 2023-08-10

## 2023-08-10 ENCOUNTER — MYC MEDICAL ADVICE (OUTPATIENT)
Dept: OTOLARYNGOLOGY | Facility: CLINIC | Age: 43
End: 2023-08-10

## 2023-08-10 ENCOUNTER — OFFICE VISIT (OUTPATIENT)
Dept: OTOLARYNGOLOGY | Facility: CLINIC | Age: 43
End: 2023-08-10
Payer: COMMERCIAL

## 2023-08-10 VITALS
HEIGHT: 70 IN | HEART RATE: 65 BPM | WEIGHT: 227 LBS | DIASTOLIC BLOOD PRESSURE: 69 MMHG | SYSTOLIC BLOOD PRESSURE: 104 MMHG | BODY MASS INDEX: 32.5 KG/M2 | OXYGEN SATURATION: 100 %

## 2023-08-10 DIAGNOSIS — R93.0 ABNORMAL CT SCAN, SINUS: ICD-10-CM

## 2023-08-10 PROCEDURE — 99204 OFFICE O/P NEW MOD 45 MIN: CPT | Mod: 25 | Performed by: OTOLARYNGOLOGY

## 2023-08-10 PROCEDURE — 31231 NASAL ENDOSCOPY DX: CPT | Performed by: OTOLARYNGOLOGY

## 2023-08-10 ASSESSMENT — PAIN SCALES - GENERAL: PAINLEVEL: NO PAIN (0)

## 2023-08-10 NOTE — PROGRESS NOTES
Dear Dr. Nogueira:    I had the pleasure of meeting Kristel Martinez in consultation today at the [facility] at your request.     History of Present Illness: 42-year-old AA female here in the otolaryngology clinic for evaluation of right sphenoid sinus lesion.  The patient presented to the emergency department back in June 2023 with severe right-sided headache.  CT head was obtained that demonstrated soft tissue density within the right sphenoid sinus.  The patient does complain of clear rhinorrhea.  This appears to be bilaterally.  It does not a drip.  The patient states that the nostrils get wet.  She has not had any other episodes of headaches.  She denies vision changes.  The patient has history of thyroid carcinoma that was treated with total thyroidectomy and postoperative radioactive iodine few years ago.  She is currently under the care of of an endocrinologist for this.    MEDICATIONS:     Current Outpatient Medications   Medication Sig Dispense Refill     escitalopram (LEXAPRO) 20 MG tablet Take 1 tablet (20 mg) by mouth daily 90 tablet 3     fluticasone (FLONASE) 50 MCG/ACT nasal spray SHAKE LIQUID AND USE 1 SPRAY IN EACH NOSTRIL DAILY 16 g 1     levothyroxine (SYNTHROID/LEVOTHROID) 150 MCG tablet Take 1 tablet (150 mcg) by mouth daily 90 tablet 1     polyethylene glycol (MIRALAX) 17 GM/Dose powder Take 17 g (1 capful.) by mouth daily as needed for constipation (If no bowel movement in 24 hours. Mix in 8 oz of water.  May take twice daily.) 500 g 0     senna-docusate (SENOKOT-S/PERICOLACE) 8.6-50 MG tablet Take 1-2 tablets by mouth 2 times daily 30 tablet 0     valACYclovir (VALTREX) 1000 mg tablet TAKE 2 TABLETS(2000 MG) BY MOUTH TWICE DAILY 12 tablet 3     hydrocortisone, Perianal, (HYDROCORTISONE) 2.5 % cream Place rectally 2 times daily as needed for hemorrhoids 30 g 0     hydrocortisone-pramoxine (PROCTOFOAM-HC) rectal foam Place 1 Applicatorful rectally 3 times daily  (Patient taking differently: Place rectally 3 times daily) 10 g 1       ALLERGIES:    Allergies   Allergen Reactions     Cephalexin Rash     Clavulanic Acid Diarrhea     Bad diarrhea with nausea and vomiting     Cephalosporins Rash     Cephalexin     Sulfa Antibiotics Rash and Unknown       PAST MEDICAL HISTORY:   Past Medical History:   Diagnosis Date     Depressive disorder      Interstitial cystitis 2021     Major depressive disorder, recurrent episode, moderate (H) 2007     Papillary adenocarcinoma of thyroid (H) 2014    Overview:  2013: R thyroid lobectomy 1.7 cm follicular variant papillary cancer, MACIS 3.6 2014: Completion Thyroidectomy 0.3 cm incidental papillary cancer left lobe. Iodine ablation with 53 mCi.  2016, 2017: Neck US neg.     Postoperative hypothyroidism 2018        FAMILY HISTORY:    Family History   Problem Relation Age of Onset     Skin Cancer Mother      Other Cancer Mother         Skin cancer     Hypertension Father      Arrhythmia Father      Lymphoma Maternal Grandmother      Cerebrovascular Disease Maternal Grandmother      Diabetes Paternal Grandfather              Asthma Son      Arthritis Daughter        REVIEW OF SYSTEMS:  12 point ROS was negative other than the symptoms noted above in the HPI.    PHYSICAL EXAMINATION:      Constitutional:  The patient was unaccompanied, well-groomed, and in no acute distress.     Skin: Normal:  warm and pink without rash   Neurologic: Alert and oriented x 3.  CN's III-XII within normal limits.  Voice normal.    Psychiatric: The patient's affect was calm, cooperative, and appropriate.     Communication:  Normal; communicates verbally, normal voice quality.   Respiratory: Breathing comfortably without stridor or exertion of accessory muscles.    Head/Face:  Normocephalic and atraumatic.  No lesions or scars. No sinus tenderness.    Salivary glands -  Normal size, no tenderness, swelling, or palpable masses    Eyes: Pupils were equal and reactive.  Extraocular movement intact.         Nose: Sinuses were non-tender.  Anterior rhinoscopy revealed midline septum and absence of purulence or polyps.     Oral Cavity: Normal tongue, floor of mouth, buccal mucosa, and palate.  No lesions or masses on inspection or palpation.     Oropharynx: Normal mucosa, palate symmetric with normal elevation. No abnormal lymph tissue in the oropharynx.              Neck: Supple with normal laryngeal and tracheal landmarks.  The parotid beds were without masses.  No palpable thyroid.  Normal range of motion   Lymphatic: There is no palpable lymphadenopathy in the neck.          Nasal endoscopy.  Consent for nasal endoscopy was obtained.  I confirmed correctness of the procedure and identity of the patient.  Nasal endoscopy was indicated due to the right sphenoid sinus lesion.  The nose was topically decongested and anesthetized.  The nasal endoscope was passed under endoscopic vision.  Septum is in the midline.  On the right side the middle and inferior turbinate are within normal limits the nasopharynx is normal.  I was not able to advance the scope between the septum and the middle turbinate due to a very narrow space.  I did not see any masses or lesions in the area.  On the right side the inferior and middle turbinate are within normal limits.  No masses seen on the left side.      IMPRESSION AND PLAN: 42-year-old female with an incidental finding of a right sphenoid sinus lesion.  This is not very well correct arise on the head CT that she had in the emergency department back in June 2023.  I am recommending obtaining as sinus MRI with IV contrast to better characterize this lesion.  After the MRI is completed I will review it and make the final decision for what to do next.  I do not think that this is representing CSF leak a.  But I have that still in my differential.    Thank you very much for the opportunity to participate in the care  of your patient.      Alessandro Alberto MD, M.D.  Otolaryngology- Head & Neck Surgery  [phone]

## 2023-08-10 NOTE — LETTER
8/10/2023       RE: Kristel Martinez  [address]  [place] [id number]     Dear Colleague,    Thank you for referring your patient, Kristel Martinez, to the [facility] [place] [facility]. Please see a copy of my visit note below.    Dear Dr. Nogueira:    I had the pleasure of meeting Kristel Martinez in consultation today at the [facility] at your request.     History of Present Illness: 42-year-old AA female here in the otolaryngology clinic for evaluation of right sphenoid sinus lesion.  The patient presented to the emergency department back in June 2023 with severe right-sided headache.  CT head was obtained that demonstrated soft tissue density within the right sphenoid sinus.  The patient does complain of clear rhinorrhea.  This appears to be bilaterally.  It does not a drip.  The patient states that the nostrils get wet.  She has not had any other episodes of headaches.  She denies vision changes.  The patient has history of thyroid carcinoma that was treated with total thyroidectomy and postoperative radioactive iodine few years ago.  She is currently under the care of of an endocrinologist for this.    MEDICATIONS:     Current Outpatient Medications   Medication Sig Dispense Refill     escitalopram (LEXAPRO) 20 MG tablet Take 1 tablet (20 mg) by mouth daily 90 tablet 3     fluticasone (FLONASE) 50 MCG/ACT nasal spray SHAKE LIQUID AND USE 1 SPRAY IN EACH NOSTRIL DAILY 16 g 1     levothyroxine (SYNTHROID/LEVOTHROID) 150 MCG tablet Take 1 tablet (150 mcg) by mouth daily 90 tablet 1     polyethylene glycol (MIRALAX) 17 GM/Dose powder Take 17 g (1 capful.) by mouth daily as needed for constipation (If no bowel movement in 24 hours. Mix in 8 oz of water.  May take twice daily.) 500 g 0     senna-docusate (SENOKOT-S/PERICOLACE) 8.6-50 MG tablet Take 1-2 tablets by mouth 2 times daily 30  tablet 0     valACYclovir (VALTREX) 1000 mg tablet TAKE 2 TABLETS(2000 MG) BY MOUTH TWICE DAILY 12 tablet 3     hydrocortisone, Perianal, (HYDROCORTISONE) 2.5 % cream Place rectally 2 times daily as needed for hemorrhoids 30 g 0     hydrocortisone-pramoxine (PROCTOFOAM-HC) rectal foam Place 1 Applicatorful rectally 3 times daily (Patient taking differently: Place rectally 3 times daily) 10 g 1       ALLERGIES:    Allergies   Allergen Reactions     Cephalexin Rash     Clavulanic Acid Diarrhea     Bad diarrhea with nausea and vomiting     Cephalosporins Rash     Cephalexin     Sulfa Antibiotics Rash and Unknown       PAST MEDICAL HISTORY:   Past Medical History:   Diagnosis Date     Depressive disorder      Interstitial cystitis 2021     Major depressive disorder, recurrent episode, moderate (H) 2007     Papillary adenocarcinoma of thyroid (H) 2014    Overview:  2013: R thyroid lobectomy 1.7 cm follicular variant papillary cancer, MACIS 3.6 2014: Completion Thyroidectomy 0.3 cm incidental papillary cancer left lobe. Iodine ablation with 53 mCi.  2016, 2017: Neck US neg.     Postoperative hypothyroidism 2018        FAMILY HISTORY:    Family History   Problem Relation Age of Onset     Skin Cancer Mother      Other Cancer Mother         Skin cancer     Hypertension Father      Arrhythmia Father      Lymphoma Maternal Grandmother      Cerebrovascular Disease Maternal Grandmother      Diabetes Paternal Grandfather              Asthma Son      Arthritis Daughter        REVIEW OF SYSTEMS:  12 point ROS was negative other than the symptoms noted above in the HPI.    PHYSICAL EXAMINATION:      Constitutional:  The patient was unaccompanied, well-groomed, and in no acute distress.     Skin: Normal:  warm and pink without rash   Neurologic: Alert and oriented x 3.  CN's III-XII within normal limits.  Voice normal.    Psychiatric: The patient's affect was calm, cooperative, and  appropriate.     Communication:  Normal; communicates verbally, normal voice quality.   Respiratory: Breathing comfortably without stridor or exertion of accessory muscles.    Head/Face:  Normocephalic and atraumatic.  No lesions or scars. No sinus tenderness.    Salivary glands -  Normal size, no tenderness, swelling, or palpable masses   Eyes: Pupils were equal and reactive.  Extraocular movement intact.         Nose: Sinuses were non-tender.  Anterior rhinoscopy revealed midline septum and absence of purulence or polyps.     Oral Cavity: Normal tongue, floor of mouth, buccal mucosa, and palate.  No lesions or masses on inspection or palpation.     Oropharynx: Normal mucosa, palate symmetric with normal elevation. No abnormal lymph tissue in the oropharynx.              Neck: Supple with normal laryngeal and tracheal landmarks.  The parotid beds were without masses.  No palpable thyroid.  Normal range of motion   Lymphatic: There is no palpable lymphadenopathy in the neck.          Nasal endoscopy.  Consent for nasal endoscopy was obtained.  I confirmed correctness of the procedure and identity of the patient.  Nasal endoscopy was indicated due to the right sphenoid sinus lesion.  The nose was topically decongested and anesthetized.  The nasal endoscope was passed under endoscopic vision.  Septum is in the midline.  On the right side the middle and inferior turbinate are within normal limits the nasopharynx is normal.  I was not able to advance the scope between the septum and the middle turbinate due to a very narrow space.  I did not see any masses or lesions in the area.  On the right side the inferior and middle turbinate are within normal limits.  No masses seen on the left side.      IMPRESSION AND PLAN: 42-year-old female with an incidental finding of a right sphenoid sinus lesion.  This is not very well correct arise on the head CT that she had in the emergency department back in June 2023.  I am  recommending obtaining as sinus MRI with IV contrast to better characterize this lesion.  After the MRI is completed I will review it and make the final decision for what to do next.  I do not think that this is representing CSF leak a.  But I have that still in my differential.    Thank you very much for the opportunity to participate in the care of your patient.      Alessandro Alberto MD, M.D.  Otolaryngology- Head & Neck Surgery  [phone]           Again, thank you for allowing me to participate in the care of your patient.      Sincerely,    Alessandro Alberto MD

## 2023-08-14 ENCOUNTER — MYC MEDICAL ADVICE (OUTPATIENT)
Dept: OBGYN | Facility: CLINIC | Age: 43
End: 2023-08-14
Payer: COMMERCIAL

## 2023-08-15 NOTE — TELEPHONE ENCOUNTER
Patient sent my chart message needing to cancel her surgery that was on 9/6/23. Pt states she needs a MRI of her sinuses and might need surgery on that. She will call back to reschedule at a later time.     Will need a new case request.    OR notified   [place] updated  MD notified    Kelly Gale   Clinic Station    [facility]  [phone]

## 2023-08-16 ENCOUNTER — HOSPITAL ENCOUNTER (OUTPATIENT)
Dept: MRI IMAGING | Facility: CLINIC | Age: 43
Discharge: HOME | End: 2023-08-16
Attending: OTOLARYNGOLOGY | Admitting: OTOLARYNGOLOGY
Payer: COMMERCIAL

## 2023-08-16 PROCEDURE — A9585 GADOBUTROL INJECTION: HCPCS | Mod: JZ | Performed by: OTOLARYNGOLOGY

## 2023-08-16 PROCEDURE — 255N000002 HC RX 255 OP 636: Mod: JZ | Performed by: OTOLARYNGOLOGY

## 2023-08-16 PROCEDURE — 70543 MRI ORBT/FAC/NCK W/O &W/DYE: CPT

## 2023-08-16 RX ORDER — GADOBUTROL 604.72 MG/ML
10 INJECTION INTRAVENOUS ONCE
Status: COMPLETED | OUTPATIENT
Start: 2023-08-16 | End: 2023-08-16

## 2023-08-16 RX ADMIN — GADOBUTROL 10 ML: 604.72 INJECTION INTRAVENOUS at 14:51

## 2023-08-23 ENCOUNTER — MYC MEDICAL ADVICE (OUTPATIENT)
Dept: FAMILY MEDICINE | Facility: CLINIC | Age: 43
End: 2023-08-23

## 2023-08-23 ENCOUNTER — OFFICE VISIT (OUTPATIENT)
Dept: FAMILY MEDICINE | Facility: CLINIC | Age: 43
End: 2023-08-23
Payer: COMMERCIAL

## 2023-08-23 VITALS
SYSTOLIC BLOOD PRESSURE: 106 MMHG | RESPIRATION RATE: 16 BRPM | BODY MASS INDEX: 32.38 KG/M2 | HEIGHT: 70 IN | HEART RATE: 75 BPM | OXYGEN SATURATION: 98 % | DIASTOLIC BLOOD PRESSURE: 70 MMHG | TEMPERATURE: 97.7 F | WEIGHT: 226.2 LBS

## 2023-08-23 DIAGNOSIS — M79.661 BILATERAL CALF PAIN: Primary | ICD-10-CM

## 2023-08-23 DIAGNOSIS — M79.662 BILATERAL CALF PAIN: Primary | ICD-10-CM

## 2023-08-23 DIAGNOSIS — Z13.220 LIPID SCREENING: ICD-10-CM

## 2023-08-23 LAB
CHOLEST SERPL-MCNC: 194 MG/DL
CK SERPL-CCNC: 63 U/L (ref 26–192)
HDLC SERPL-MCNC: 45 MG/DL
HOLD SPECIMEN: NORMAL
LDLC SERPL CALC-MCNC: 131 MG/DL
NONHDLC SERPL-MCNC: 149 MG/DL
TRIGL SERPL-MCNC: 89 MG/DL

## 2023-08-23 PROCEDURE — 36415 COLL VENOUS BLD VENIPUNCTURE: CPT | Performed by: NURSE PRACTITIONER

## 2023-08-23 PROCEDURE — 82550 ASSAY OF CK (CPK): CPT | Performed by: NURSE PRACTITIONER

## 2023-08-23 PROCEDURE — 80061 LIPID PANEL: CPT | Performed by: NURSE PRACTITIONER

## 2023-08-23 PROCEDURE — 99214 OFFICE O/P EST MOD 30 MIN: CPT | Performed by: NURSE PRACTITIONER

## 2023-08-23 RX ORDER — METHOCARBAMOL 750 MG/1
750 TABLET, FILM COATED ORAL 4 TIMES DAILY PRN
Qty: 30 TABLET | Refills: 1 | Status: SHIPPED | OUTPATIENT
Start: 2023-08-23 | End: 2024-03-28

## 2023-08-23 ASSESSMENT — PAIN SCALES - GENERAL: PAINLEVEL: MILD PAIN (3)

## 2023-08-23 ASSESSMENT — PATIENT HEALTH QUESTIONNAIRE - PHQ9
SUM OF ALL RESPONSES TO PHQ QUESTIONS 1-9: 13
10. IF YOU CHECKED OFF ANY PROBLEMS, HOW DIFFICULT HAVE THESE PROBLEMS MADE IT FOR YOU TO DO YOUR WORK, TAKE CARE OF THINGS AT HOME, OR GET ALONG WITH OTHER PEOPLE: VERY DIFFICULT
SUM OF ALL RESPONSES TO PHQ QUESTIONS 1-9: 13

## 2023-08-23 NOTE — PROGRESS NOTES
Assessment & Plan     Bilateral calf pain  No acute distress, no red flag symptoms.  Chronic pain which appear to be musculoskeletal however are worsening despite interventions, plan ultrasound to rule out DVT. Methocarbamol and night splints recommended.  Symptoms which would warrant immediate follow up discussed. Consider ortho or podiatry referral pending ultrasound.   - US Lower Extremity Venous Duplex Bilateral; Future  - methocarbamol (ROBAXIN) 750 MG tablet; Take 1 tablet (750 mg) by mouth 4 times daily as needed for muscle spasms  - CK total; Future  - CK total    Lipid screening    - Lipid panel reflex to direct LDL Fasting; Future  - Lipid panel reflex to direct LDL Fasting      DAVID Lazo CNP  [facility]    Kamini Humphries is a 43 year old, presenting for the following health issues:  Musculoskeletal Problem        8/23/2023     7:43 AM   Additional Questions   Roomed by Bev ESPITIA CMA       History of Present Illness       Reason for visit:  Calve pain  Symptom onset:  More than a month  Symptoms include:  Tight and sore calves and heals  Symptom intensity:  Severe  Symptom progression:  Worsening  Had these symptoms before:  Yes  Has tried/received treatment for these symptoms:  Yes  Previous treatment was successful:  No  What makes it worse:  Standing  What makes it better:  Not much    She eats 0-1 servings of fruits and vegetables daily.She consumes 2 sweetened beverage(s) daily.She exercises with enough effort to increase her heart rate 9 or less minutes per day.  She exercises with enough effort to increase her heart rate 3 or less days per week. She is missing 1 dose(s) of medications per week.  She is not taking prescribed medications regularly due to remembering to take.     Ongoing for years but worsening  Feels tight and sensitive   No tingling or burning   Stretching multiple times per day, massage, lidocaine patches, magnesium did not help  Orthotics  "did not help  No shortness of breath or chest pain  In the morning feet will feel stuck in extension   Not worse with walking, pain at all times      Review of Systems   Constitutional, HEENT, cardiovascular, pulmonary, gi and gu systems are negative, except as otherwise noted.      Objective    /70 (BP Location: Right arm, Patient Position: Sitting, Cuff Size: Adult Large)   Pulse 75   Temp 97.7  F (36.5  C) (Tympanic)   Resp 16   Ht 1.778 m (5' 10\")   Wt 102.6 kg (226 lb 3.2 oz)   LMP  (LMP Unknown)   SpO2 98%   BMI 32.46 kg/m    Body mass index is 32.46 kg/m .  Physical Exam   GENERAL: healthy, alert and no distress  RESP: lungs clear to auscultation - no rales, rhonchi or wheezes  CV: regular rate and rhythm, normal S1 S2, no S3 or S4, no murmur, click or rub, no peripheral edema and peripheral pulses strong  MS: extremities normal- no gross deformities noted, tenderness generalized bilateral calf, right calf 16.75 in, left 17 in  PSYCH: mentation appears normal, affect normal/bright            "

## 2023-08-23 NOTE — PATIENT INSTRUCTIONS
Try the methocarbamol at bedtime, can use during the day if tolerating    Schedule your ultrasound    Follow up if symptoms do not improve or worsen.

## 2023-08-23 NOTE — TELEPHONE ENCOUNTER
Pls see [employer] message below.     Referral pended and message routed to provider for consideration.     Julie Behrendt RN

## 2023-08-28 ENCOUNTER — HOSPITAL ENCOUNTER (OUTPATIENT)
Dept: ULTRASOUND IMAGING | Facility: CLINIC | Age: 43
Discharge: HOME | End: 2023-08-28
Attending: NURSE PRACTITIONER | Admitting: NURSE PRACTITIONER
Payer: COMMERCIAL

## 2023-08-28 DIAGNOSIS — M79.661 BILATERAL CALF PAIN: ICD-10-CM

## 2023-08-28 DIAGNOSIS — M79.662 BILATERAL CALF PAIN: ICD-10-CM

## 2023-08-28 PROCEDURE — 93970 EXTREMITY STUDY: CPT

## 2023-09-11 ENCOUNTER — OFFICE VISIT (OUTPATIENT)
Dept: URGENT CARE | Facility: URGENT CARE | Age: 43
End: 2023-09-11
Payer: COMMERCIAL

## 2023-09-11 VITALS
BODY MASS INDEX: 32.43 KG/M2 | TEMPERATURE: 98.5 F | RESPIRATION RATE: 16 BRPM | WEIGHT: 226 LBS | DIASTOLIC BLOOD PRESSURE: 78 MMHG | OXYGEN SATURATION: 99 % | SYSTOLIC BLOOD PRESSURE: 113 MMHG | HEART RATE: 77 BPM

## 2023-09-11 DIAGNOSIS — B96.89 BACTERIAL VAGINOSIS: Primary | ICD-10-CM

## 2023-09-11 DIAGNOSIS — N89.8 VAGINAL ODOR: ICD-10-CM

## 2023-09-11 DIAGNOSIS — N76.0 BACTERIAL VAGINOSIS: Primary | ICD-10-CM

## 2023-09-11 LAB
CLUE CELLS: PRESENT
TRICHOMONAS, WET PREP: ABNORMAL
WBC'S/HIGH POWER FIELD, WET PREP: ABNORMAL
YEAST, WET PREP: ABNORMAL

## 2023-09-11 PROCEDURE — 87210 SMEAR WET MOUNT SALINE/INK: CPT | Performed by: NURSE PRACTITIONER

## 2023-09-11 PROCEDURE — 99213 OFFICE O/P EST LOW 20 MIN: CPT | Performed by: NURSE PRACTITIONER

## 2023-09-11 RX ORDER — METRONIDAZOLE 500 MG/1
500 TABLET ORAL 2 TIMES DAILY
Qty: 14 TABLET | Refills: 0 | Status: SHIPPED | OUTPATIENT
Start: 2023-09-11 | End: 2023-09-18

## 2023-09-11 NOTE — PATIENT INSTRUCTIONS
You currently have a vaginal infection called bacterial vaginosis, BV for short.   This is not a sexually transmitted infection and it cannot be transmitted to your partner.  BV typically occurs due to a change in pH balance of the vagina. The following things may cause BV to occur: douching, new soaps or lubricants, or oral sex. Sometimes we can't prevent BV because it can happen for no reason at all.   Sometimes BV is asymptomatic, but classically it causes thin or creamy odorous vaginal discharge. It can also cause some low abdominal discomfort.   Today we will treat this infection with a antibiotic called Metronidazole (Flagyl). Take this medication two times a day for 7 days. You must avoid drinking alcohol while you are taking this medication. If you drink while taking Flagyl you may experience severe headache, nausea/vomiting, or liver dysfunction.  Follow up if you continue to have symptoms after you have completed your treatment.

## 2023-09-11 NOTE — PROGRESS NOTES
SUBJECTIVE:   Kristel Martinez is a 43 year old female presenting with a chief complaint of   Chief Complaint   Patient presents with    Urinary Problem     Lower pelvic pain and bad odor. Pt states that she left a tampon in for approximately 2-3 days.         Past Medical History:   Diagnosis Date    Depressive disorder     Interstitial cystitis 2021    Major depressive disorder, recurrent episode, moderate (H) 2007    Papillary adenocarcinoma of thyroid (H) 2014    Overview:  2013: R thyroid lobectomy 1.7 cm follicular variant papillary cancer, MACIS 3.6 2014: Completion Thyroidectomy 0.3 cm incidental papillary cancer left lobe. Iodine ablation with 53 mCi.  2016, 2017: Neck US neg.    Postoperative hypothyroidism 2018     Family History   Problem Relation Age of Onset    Skin Cancer Mother     Other Cancer Mother         Skin cancer    Hypertension Father     Arrhythmia Father     Lymphoma Maternal Grandmother     Cerebrovascular Disease Maternal Grandmother     Diabetes Paternal Grandfather             Asthma Son     Arthritis Daughter      Current Outpatient Medications   Medication Sig Dispense Refill    escitalopram (LEXAPRO) 20 MG tablet Take 1 tablet (20 mg) by mouth daily 90 tablet 3    fluticasone (FLONASE) 50 MCG/ACT nasal spray SHAKE LIQUID AND USE 1 SPRAY IN EACH NOSTRIL DAILY 16 g 1    levothyroxine (SYNTHROID/LEVOTHROID) 150 MCG tablet Take 1 tablet (150 mcg) by mouth daily 90 tablet 1    methocarbamol (ROBAXIN) 750 MG tablet Take 1 tablet (750 mg) by mouth 4 times daily as needed for muscle spasms 30 tablet 1    polyethylene glycol (MIRALAX) 17 GM/Dose powder Take 17 g (1 capful.) by mouth daily as needed for constipation (If no bowel movement in 24 hours. Mix in 8 oz of water.  May take twice daily.) 500 g 0    senna-docusate (SENOKOT-S/PERICOLACE) 8.6-50 MG tablet Take 1-2 tablets by mouth 2 times daily 30 tablet 0    valACYclovir (VALTREX) 1000 mg tablet  TAKE 2 TABLETS(2000 MG) BY MOUTH TWICE DAILY 12 tablet 3     Social History     Tobacco Use    Smoking status: Former     Packs/day: 1.00     Years: 5.00     Pack years: 5.00     Types: Cigarettes    Smokeless tobacco: Never   Substance Use Topics    Alcohol use: Yes     Comment: 1 drink per wk       OBJECTIVE  /78   Pulse 77   Temp 98.5  F (36.9  C) (Tympanic)   Resp 16   Wt 102.5 kg (226 lb)   SpO2 99%   BMI 32.43 kg/m      Physical Exam  Constitutional:       Appearance: Normal appearance.   Neurological:      General: No focal deficit present.      Mental Status: She is alert and oriented to person, place, and time.   Psychiatric:         Mood and Affect: Mood normal.         Behavior: Behavior normal.     Wet Prep: +clue cells    Labs:  Results for orders placed or performed in visit on 09/11/23 (from the past 24 hour(s))   Wet prep - Clinic Collect    Specimen: Vagina; Swab   Result Value Ref Range    Trichomonas Absent Absent    Yeast Absent Absent    Clue Cells Present (A) Absent    WBCs/high power field 2+ (A) None         ASSESSMENT:    1. Bacterial vaginosis    - metroNIDAZOLE (FLAGYL) 500 MG tablet; Take 1 tablet (500 mg) by mouth 2 times daily for 7 days  Dispense: 14 tablet; Refill: 0    2. Vaginal odor    - Wet prep - Clinic Collect      PLAN:  You currently have a vaginal infection called bacterial vaginosis, BV for short.   This is not a sexually transmitted infection and it cannot be transmitted to your partner.  BV typically occurs due to a change in pH balance of the vagina. The following things may cause BV to occur: douching, new soaps or lubricants, or oral sex. Sometimes we can't prevent BV because it can happen for no reason at all.   Sometimes BV is asymptomatic, but classically it causes thin or creamy odorous vaginal discharge. It can also cause some low abdominal discomfort.   Today we will treat this infection with a antibiotic called Metronidazole (Flagyl). Take this  medication two times a day for 7 days. You must avoid drinking alcohol while you are taking this medication. If you drink while taking Flagyl you may experience severe headache, nausea/vomiting, or liver dysfunction.  Follow up if you continue to have symptoms after you have completed your treatment.

## 2023-10-23 ENCOUNTER — E-VISIT (OUTPATIENT)
Dept: FAMILY MEDICINE | Facility: CLINIC | Age: 43
End: 2023-10-23
Payer: COMMERCIAL

## 2023-10-23 ENCOUNTER — LAB (OUTPATIENT)
Dept: LAB | Facility: CLINIC | Age: 43
End: 2023-10-23
Payer: COMMERCIAL

## 2023-10-23 DIAGNOSIS — J02.9 SORE THROAT: ICD-10-CM

## 2023-10-23 DIAGNOSIS — J02.9 SORE THROAT: Primary | ICD-10-CM

## 2023-10-23 LAB
DEPRECATED S PYO AG THROAT QL EIA: NEGATIVE
GROUP A STREP BY PCR: NOT DETECTED

## 2023-10-23 PROCEDURE — 99207 PR NON-BILLABLE SERV PER CHARTING: CPT | Performed by: NURSE PRACTITIONER

## 2023-10-23 PROCEDURE — 87651 STREP A DNA AMP PROBE: CPT

## 2023-11-08 ENCOUNTER — MYC REFILL (OUTPATIENT)
Dept: FAMILY MEDICINE | Facility: CLINIC | Age: 43
End: 2023-11-08
Payer: COMMERCIAL

## 2023-11-08 DIAGNOSIS — E89.0 POSTOPERATIVE HYPOTHYROIDISM: ICD-10-CM

## 2023-11-09 DIAGNOSIS — E89.0 POSTOPERATIVE HYPOTHYROIDISM: ICD-10-CM

## 2023-11-09 RX ORDER — LEVOTHYROXINE SODIUM 150 UG/1
150 TABLET ORAL DAILY
Qty: 90 TABLET | Refills: 1 | OUTPATIENT
Start: 2023-11-09

## 2023-11-09 RX ORDER — LEVOTHYROXINE SODIUM 150 UG/1
150 TABLET ORAL DAILY
Qty: 90 TABLET | Refills: 0 | Status: SHIPPED | OUTPATIENT
Start: 2023-11-09 | End: 2024-01-18

## 2023-11-28 ENCOUNTER — TELEPHONE (OUTPATIENT)
Dept: FAMILY MEDICINE | Facility: CLINIC | Age: 43
End: 2023-11-28
Payer: COMMERCIAL

## 2023-11-28 NOTE — TELEPHONE ENCOUNTER
Patient Quality Outreach    Patient is due for the following:       Topic Date Due    Hepatitis B Vaccine (1 of 3 - 3-dose series) Never done    Pneumococcal Vaccine (1 - PCV) Never done    Flu Vaccine (1) 09/01/2023    COVID-19 Vaccine (4 - 2023-24 season) 09/01/2023       Next Steps:   Patient was assigned appropriate questionnaire to complete    Type of outreach:    Sent [facility] message.      Questions for provider review:    None           Bailee Kahler

## 2023-12-15 ENCOUNTER — OFFICE VISIT (OUTPATIENT)
Dept: URGENT CARE | Facility: URGENT CARE | Age: 43
End: 2023-12-15
Payer: COMMERCIAL

## 2023-12-15 VITALS
BODY MASS INDEX: 33.29 KG/M2 | DIASTOLIC BLOOD PRESSURE: 65 MMHG | TEMPERATURE: 97.3 F | OXYGEN SATURATION: 100 % | SYSTOLIC BLOOD PRESSURE: 113 MMHG | WEIGHT: 232 LBS | HEART RATE: 77 BPM

## 2023-12-15 DIAGNOSIS — H65.91 OME (OTITIS MEDIA WITH EFFUSION), RIGHT: Primary | ICD-10-CM

## 2023-12-15 DIAGNOSIS — B37.31 YEAST INFECTION OF THE VAGINA: ICD-10-CM

## 2023-12-15 PROCEDURE — 99213 OFFICE O/P EST LOW 20 MIN: CPT | Performed by: NURSE PRACTITIONER

## 2023-12-15 RX ORDER — AZITHROMYCIN 250 MG/1
TABLET, FILM COATED ORAL
Qty: 6 TABLET | Refills: 0 | Status: SHIPPED | OUTPATIENT
Start: 2023-12-15 | End: 2023-12-20

## 2023-12-15 RX ORDER — FLUCONAZOLE 150 MG/1
150 TABLET ORAL
Qty: 3 TABLET | Refills: 0 | Status: SHIPPED | OUTPATIENT
Start: 2023-12-15 | End: 2023-12-22

## 2023-12-15 NOTE — PATIENT INSTRUCTIONS
You have an ear infection. We will treat this with an antibiotic. I have sent a zpack to your pharmacy. Please take this as directed. Take with food to avoid stomach upset. Consider taking a probiotic while on antibiotics to put the good bacteria back in your gut. Follow up in the next 7-10 days if not improving as expected, sooner if worse.

## 2023-12-18 NOTE — COMMUNITY RESOURCES LIST (ENGLISH)
12/18/2023   [facility]  N/A  For questions about this resource list or additional care needs, please contact your primary care clinic or care manager.  Phone: [phone]   Email: N/A   Address: [address] [place] [address]   Hours: N/A        Financial Stability       Rent and mortgage payment assistance  1  [facility] ([facility]) [facility] Office - Emergency Housing Assistance - Rent and mortgage payment assistance Distance: 10.41 miles      In-Person   [address] [place] [address]  Language: English  Hours: Mon - Fri 8:00 AM - 4:30 PM  Fees: Free   Phone: (654) 300-2509 Email: mark@[employer] Website: https://www.[employer]/agency-information     2  [facility] Office - Emergency Housing Assistance - Rent and mortgage payment assistance Distance: 21.73 miles      In-Person   [address] [place] [address]  Language: English  Hours: Mon - Fri 6:00 AM - 6:30 PM  Fees: Free   Phone: (436) 142-7423 Email: mark@[employer] Website: http://www.[employer]Flip     Utility payment assistance  3  [facility] Distance: 1.91 miles      In-Person, Phone/Virtual   [address] [place] [address]  Language: English  Hours: Mon - Fri 8:00 AM - 4:30 PM  Fees: Free   Phone: (726) 326-9592 Email: tamica@[facility]. Website: https://www.[facility]./170/Family-Services     4  [facility] ([facility]) [facility] Office - Energy Assistance Program Distance: 10.41 miles      Phone/Virtual   [address] [place] [address]  Language: English  Hours: Mon - Fri 8:00 AM - 4:30 PM  Fees: Free   Phone: (414) 795-9156 Email: mark@[employer] Website: https://www.Dive[employer].YEOXIN VMall/agency-information          Food and Nutrition       Food pantry  5  [facility] Food Distribution ([facility]  [facility] Distance: 0.44 miles      Pickup   [address] [place] [address]  Language: English  Hours: Tue 2:30 PM - 5:00 PM  Fees: Free   Phone: (260) 688-5178 Website: http://BuyerMLSorg/     6  [employer] [facility] [employer] Distance: 9.39 miles      In-Person   [address] [place] [address]  Language: English  Hours: Sat 8:30 AM - 11:30 AM  Fees: Free   Phone: (817) 798-6554 Email: contact@[employer] Website: http://Chtiogen/     SNAP application assistance  7  [facility] Distance: 1.91 miles      In-Person, Phone/Virtual   [address] [place] [address]  Language: English  Hours: Mon - Fri 8:00 AM - 4:30 PM  Fees: Free   Phone: (919) 955-7619 Email: tamica@[facility]. Website: https://www.[facility]./170/Family-Services     8  [facility] Community Action [place] ([facility]) [facility] Office Distance: 10.41 miles      In-Person, Phone/Virtual   [address] [place] [address]56  Language: English  Hours: Mon - Fri 8:00 AM - 4:30 PM  Fees: Free   Phone: (629) 207-6691 Email: mark@[employer] Website: https://www.[facility].org/agency-information          Important Numbers & Websites       Emergency Services   911  City Services   311  Poison Control   (149) 867-1586  Suicide Prevention Lifeline   (915) 315-6675 (TALK)  Child Abuse Hotline   (932) 218-4458 (4-A-Child)  Sexual Assault Hotline   (770) 305-3323 (HOPE)  National Runaway Safeline   (298) 571-5777 (RUNAWAY)  All-Options Talkline   (994) 441-1674  Substance Abuse Referral   (834) 683-3076 (HELP)

## 2023-12-18 NOTE — COMMUNITY RESOURCES LIST (ENGLISH)
12/18/2023   [facility]  N/A  For questions about this resource list or additional care needs, please contact your primary care clinic or care manager.  Phone: [phone]   Email: N/A   Address: [address] [place] [address]   Hours: N/A        Financial Stability       Rent and mortgage payment assistance  1  [facility] ([facility]) [facility] Office - Emergency Housing Assistance - Rent and mortgage payment assistance Distance: 10.41 miles      In-Person   [address] [place] [address]  Language: English  Hours: Mon - Fri 8:00 AM - 4:30 PM  Fees: Free   Phone: (618) 906-9648 Email: mark@[employer] Website: https://www.[employer]lexpo/agency-information     2  [facility] Office - Emergency Housing Assistance - Rent and mortgage payment assistance Distance: 21.73 miles      In-Person   [address] [place] [address]  Language: English  Hours: Mon - Fri 6:00 AM - 6:30 PM  Fees: Free   Phone: (497) 602-2497 Email: mark@[employer] Website: http://www.Collexpo     Utility payment assistance  3  [facility] Distance: 1.91 miles      In-Person, Phone/Virtual   [address] [place] [address]  Language: English  Hours: Mon - Fri 8:00 AM - 4:30 PM  Fees: Free   Phone: (740) 901-9687 Email: tamica@[facility]. Website: https://www.[facility]./170/Family-Services     4  [facility] ([facility]) [facility] Office - Energy Assistance Program Distance: 10.41 miles      Phone/Virtual   [address] [place] [address]  Language: English  Hours: Mon - Fri 8:00 AM - 4:30 PM  Fees: Free   Phone: (454) 332-1386 Email: mark@[employer] Website: https://www.[employer]/agency-information          Food and Nutrition       Food pantry  5  [facility] Food Distribution ([facility]  [facility] Distance: 0.44 miles      Pickup   [address] [place] [address]  Language: English  Hours: Tue 2:30 PM - 5:00 PM  Fees: Free   Phone: (839) 806-9905 Website: http://Vubiquityorg/     6  [facility] [employer] [facility] [employer] Distance: 9.39 miles      In-Person   [address] [place] [address]  Language: English  Hours: Sat 8:30 AM - 11:30 AM  Fees: Free   Phone: (651) 943-2118 Email: contact@[employer] Website: http://GBooking/     SNAP application assistance  7  [facility] Distance: 1.91 miles      In-Person, Phone/Virtual   [address] [place] [address]  Language: English  Hours: Mon - Fri 8:00 AM - 4:30 PM  Fees: Free   Phone: (230) 345-8815 Email: tamica@[facility]. Website: https://www.[facility]./170/Family-Services     8  [facility] Community Action [place] ([facility]) [facility] Office Distance: 10.41 miles      In-Person, Phone/Virtual   [address] [place] [address]  Language: English  Hours: Mon - Fri 8:00 AM - 4:30 PM  Fees: Free   Phone: (161) 482-3765 Email: mark@[employer] Website: https://www.[facility].org/agency-information          Important Numbers & Websites       Emergency Services   911  City Services   311  Poison Control   (333) 173-5929  Suicide Prevention Lifeline   (331) 354-5183 (TALK)  Child Abuse Hotline   (138) 285-7733 (4-A-Child)  Sexual Assault Hotline   (667) 751-5352 (HOPE)  National Runaway Safeline   (376) 180-1334 (RUNAWAY)  All-Options Talkline   (714) 430-5897  Substance Abuse Referral   (256) 468-4776 (HELP)

## 2024-01-08 ENCOUNTER — MYC MEDICAL ADVICE (OUTPATIENT)
Dept: FAMILY MEDICINE | Facility: CLINIC | Age: 44
End: 2024-01-08
Payer: COMMERCIAL

## 2024-01-08 DIAGNOSIS — F33.1 MAJOR DEPRESSIVE DISORDER, RECURRENT EPISODE, MODERATE (H): Primary | ICD-10-CM

## 2024-01-08 NOTE — TELEPHONE ENCOUNTER
Referral pended, see recent PHQ9 and MALCOM:        6/12/2023     6:10 AM 8/23/2023     7:43 AM 11/28/2023    10:00 AM   PHQ   PHQ-9 Total Score 12 13 13   Q9: Thoughts of better off dead/self-harm past 2 weeks Not at all Not at all Not at all         1/27/2021     4:24 PM 8/19/2022    12:06 PM 11/28/2023    10:01 AM   MALCOM-7 SCORE   Total Score  12 (moderate anxiety) 11 (moderate anxiety)   Total Score 16 12 11

## 2024-01-15 ASSESSMENT — ENCOUNTER SYMPTOMS
HEARTBURN: 0
DYSURIA: 1
PALPITATIONS: 1
SORE THROAT: 0
FEVER: 0
EYE PAIN: 0
SHORTNESS OF BREATH: 0
ABDOMINAL PAIN: 0
CONSTIPATION: 1
MYALGIAS: 1
ARTHRALGIAS: 0
NAUSEA: 1
JOINT SWELLING: 0
COUGH: 0
FREQUENCY: 0
CHILLS: 0
BREAST MASS: 0
NERVOUS/ANXIOUS: 1
DIARRHEA: 1
WEAKNESS: 0
DIZZINESS: 1
HEADACHES: 1
HEMATURIA: 0
PARESTHESIAS: 0
HEMATOCHEZIA: 0

## 2024-01-15 ASSESSMENT — ANXIETY QUESTIONNAIRES
5. BEING SO RESTLESS THAT IT IS HARD TO SIT STILL: SEVERAL DAYS
4. TROUBLE RELAXING: SEVERAL DAYS
GAD7 TOTAL SCORE: 9
8. IF YOU CHECKED OFF ANY PROBLEMS, HOW DIFFICULT HAVE THESE MADE IT FOR YOU TO DO YOUR WORK, TAKE CARE OF THINGS AT HOME, OR GET ALONG WITH OTHER PEOPLE?: SOMEWHAT DIFFICULT
3. WORRYING TOO MUCH ABOUT DIFFERENT THINGS: MORE THAN HALF THE DAYS
GAD7 TOTAL SCORE: 9
6. BECOMING EASILY ANNOYED OR IRRITABLE: MORE THAN HALF THE DAYS
7. FEELING AFRAID AS IF SOMETHING AWFUL MIGHT HAPPEN: NOT AT ALL
GAD7 TOTAL SCORE: 9
7. FEELING AFRAID AS IF SOMETHING AWFUL MIGHT HAPPEN: NOT AT ALL
1. FEELING NERVOUS, ANXIOUS, OR ON EDGE: SEVERAL DAYS
IF YOU CHECKED OFF ANY PROBLEMS ON THIS QUESTIONNAIRE, HOW DIFFICULT HAVE THESE PROBLEMS MADE IT FOR YOU TO DO YOUR WORK, TAKE CARE OF THINGS AT HOME, OR GET ALONG WITH OTHER PEOPLE: SOMEWHAT DIFFICULT
2. NOT BEING ABLE TO STOP OR CONTROL WORRYING: MORE THAN HALF THE DAYS

## 2024-01-15 ASSESSMENT — PATIENT HEALTH QUESTIONNAIRE - PHQ9
10. IF YOU CHECKED OFF ANY PROBLEMS, HOW DIFFICULT HAVE THESE PROBLEMS MADE IT FOR YOU TO DO YOUR WORK, TAKE CARE OF THINGS AT HOME, OR GET ALONG WITH OTHER PEOPLE: VERY DIFFICULT
SUM OF ALL RESPONSES TO PHQ QUESTIONS 1-9: 11
SUM OF ALL RESPONSES TO PHQ QUESTIONS 1-9: 11

## 2024-01-16 ENCOUNTER — OFFICE VISIT (OUTPATIENT)
Dept: FAMILY MEDICINE | Facility: CLINIC | Age: 44
End: 2024-01-16
Payer: COMMERCIAL

## 2024-01-16 VITALS
RESPIRATION RATE: 20 BRPM | OXYGEN SATURATION: 96 % | BODY MASS INDEX: 33.67 KG/M2 | SYSTOLIC BLOOD PRESSURE: 118 MMHG | HEIGHT: 70 IN | HEART RATE: 90 BPM | WEIGHT: 235.2 LBS | TEMPERATURE: 97.5 F | DIASTOLIC BLOOD PRESSURE: 82 MMHG

## 2024-01-16 DIAGNOSIS — E89.0 POSTOPERATIVE HYPOTHYROIDISM: ICD-10-CM

## 2024-01-16 DIAGNOSIS — F33.1 MAJOR DEPRESSIVE DISORDER, RECURRENT EPISODE, MODERATE (H): ICD-10-CM

## 2024-01-16 DIAGNOSIS — B00.1 RECURRENT COLD SORES: ICD-10-CM

## 2024-01-16 DIAGNOSIS — E78.5 HYPERLIPIDEMIA LDL GOAL <130: ICD-10-CM

## 2024-01-16 DIAGNOSIS — F41.1 GAD (GENERALIZED ANXIETY DISORDER): ICD-10-CM

## 2024-01-16 DIAGNOSIS — D80.2 IGA DEFICIENCY (H): ICD-10-CM

## 2024-01-16 DIAGNOSIS — C73 PAPILLARY THYROID CARCINOMA (H): ICD-10-CM

## 2024-01-16 DIAGNOSIS — Z12.31 ENCOUNTER FOR SCREENING MAMMOGRAM FOR BREAST CANCER: ICD-10-CM

## 2024-01-16 DIAGNOSIS — Z00.00 ROUTINE GENERAL MEDICAL EXAMINATION AT A HEALTH CARE FACILITY: Primary | ICD-10-CM

## 2024-01-16 DIAGNOSIS — E66.811 CLASS 1 OBESITY WITH SERIOUS COMORBIDITY AND BODY MASS INDEX (BMI) OF 33.0 TO 33.9 IN ADULT, UNSPECIFIED OBESITY TYPE: ICD-10-CM

## 2024-01-16 LAB — TSH SERPL DL<=0.005 MIU/L-ACNC: 1.51 UIU/ML (ref 0.3–4.2)

## 2024-01-16 PROCEDURE — 90686 IIV4 VACC NO PRSV 0.5 ML IM: CPT | Performed by: NURSE PRACTITIONER

## 2024-01-16 PROCEDURE — 84432 ASSAY OF THYROGLOBULIN: CPT | Mod: 90 | Performed by: NURSE PRACTITIONER

## 2024-01-16 PROCEDURE — 99396 PREV VISIT EST AGE 40-64: CPT | Mod: 25 | Performed by: NURSE PRACTITIONER

## 2024-01-16 PROCEDURE — 86800 THYROGLOBULIN ANTIBODY: CPT | Mod: 90 | Performed by: NURSE PRACTITIONER

## 2024-01-16 PROCEDURE — 84443 ASSAY THYROID STIM HORMONE: CPT | Performed by: NURSE PRACTITIONER

## 2024-01-16 PROCEDURE — 99214 OFFICE O/P EST MOD 30 MIN: CPT | Mod: 25 | Performed by: NURSE PRACTITIONER

## 2024-01-16 PROCEDURE — 36415 COLL VENOUS BLD VENIPUNCTURE: CPT | Performed by: NURSE PRACTITIONER

## 2024-01-16 PROCEDURE — 90471 IMMUNIZATION ADMIN: CPT | Performed by: NURSE PRACTITIONER

## 2024-01-16 PROCEDURE — 99000 SPECIMEN HANDLING OFFICE-LAB: CPT | Performed by: NURSE PRACTITIONER

## 2024-01-16 RX ORDER — ESCITALOPRAM OXALATE 20 MG/1
20 TABLET ORAL DAILY
Qty: 90 TABLET | Refills: 3 | Status: SHIPPED | OUTPATIENT
Start: 2024-01-16

## 2024-01-16 RX ORDER — VALACYCLOVIR HYDROCHLORIDE 1 G/1
TABLET, FILM COATED ORAL
Qty: 12 TABLET | Refills: 3 | Status: SHIPPED | OUTPATIENT
Start: 2024-01-16

## 2024-01-16 ASSESSMENT — ENCOUNTER SYMPTOMS
DYSURIA: 1
PARESTHESIAS: 0
HEARTBURN: 0
NAUSEA: 1
BREAST MASS: 0
HEMATOCHEZIA: 0
DIZZINESS: 1
JOINT SWELLING: 0
CONSTIPATION: 1
FREQUENCY: 0
CHILLS: 0
WEAKNESS: 0
HEMATURIA: 0
EYE PAIN: 0
NERVOUS/ANXIOUS: 1
FEVER: 0
MYALGIAS: 1
PALPITATIONS: 1
ARTHRALGIAS: 0

## 2024-01-16 ASSESSMENT — PAIN SCALES - GENERAL: PAINLEVEL: NO PAIN (0)

## 2024-01-16 NOTE — PATIENT INSTRUCTIONS
Zepbound titration sent to the pharmacy, increase dose every 28 days     Preventive Health Recommendations  Female Ages 40 to 49    Yearly exam:   See your health care provider every year in order to  Review health changes.   Discuss preventive care.    Review your medicines if your doctor prescribed any.    Get a Pap test every three years (unless you have an abnormal result and your provider advises testing more often).    If you get Pap tests with HPV test, you only need to test every 5 years, unless you have an abnormal result. You do not need a Pap test if your uterus was removed (hysterectomy) and you have not had cancer.    You should be tested each year for STDs (sexually transmitted diseases), if you're at risk.   Ask your doctor if you should have a mammogram.    Have a colonoscopy (test for colon cancer) beginning at age 45.  Ask your provider about other options like a yearly FIT test or Cologuard test every 3 years (stool tests)      Have a cholesterol test every 5 years.     Have a diabetes test (fasting glucose) after age 45. If you are at risk for diabetes, you should have this test every 3 years.    Shots: Get a flu shot each year. Get a tetanus shot every 10 years.     Nutrition:   Eat at least 5 servings of fruits and vegetables each day.  Eat whole-grain bread, whole-wheat pasta and brown rice instead of white grains and rice.  Get adequate Calcium and Vitamin D.      Lifestyle  Exercise at least 150 minutes a week (an average of 30 minutes a day, 5 days a week). This will help you control your weight and prevent disease.  Limit alcohol to one drink per day.  No smoking.   Wear sunscreen to prevent skin cancer.  See your dentist every six months for an exam and cleaning.

## 2024-01-16 NOTE — COMMUNITY RESOURCES LIST (ENGLISH)
01/16/2024   [facility]  N/A  For questions about this resource list or additional care needs, please contact your primary care clinic or care manager.  Phone: [phone]   Email: N/A   Address: [address] [place] [address]   Hours: N/A        Financial Stability       Rent and mortgage payment assistance  1  [facility] Office - Emergency Housing Assistance - Rent and mortgage payment assistance Distance: 10.4 miles      In-Person   [address] [place] [address]  Language: English  Hours: Mon - Fri 8:00 AM - 4:30 PM  Fees: Free   Phone: (375) 330-4469 Email: mark@[employer] Website: https://www.[employer]/agency-information     2  [facility] Office - Emergency Housing Assistance - Rent and mortgage payment assistance Distance: 21.74 miles      In-Person   [address] [place] [address]  Language: English  Hours: Mon - Fri 6:00 AM - 6:30 PM  Fees: Free   Phone: (999) 370-9852 Email: mark@[employer] Website: http://www.[employer] Systems     Utility payment assistance  3  [facility] Distance: 1.91 miles      In-Person, Phone/Virtual   [address] [place] [address]  Language: English  Hours: Mon - Fri 8:00 AM - 4:30 PM  Fees: Free   Phone: (137) 482-3971 Email: tamica@[facility]. Website: https://www.[facility]./170/Family-Services     4  [facility] Office - Energy Assistance Program Distance: 10.4 miles      Phone/Virtual   [address] [place] [address]  Language: English  Hours: Mon - Fri 8:00 AM - 4:30 PM  Fees: Free   Phone: (892) 271-6920 Email: mark@[employer] Website: https://www.Jo[employer]/agency-information          Food and Nutrition       Food pantry  5  [facility] Food Distribution ([facility]  [facility] Distance: 0.45 miles      Pickup   [address] [place] [address]  Language: English  Hours: Tue 2:30 PM - 5:00 PM  Fees: Free   Phone: (398) 755-9655 Website: http://NewsCredorg/     6  [employer] Distance: 9.38 miles      In-Person   [address] [place] [address]  Language: English  Hours: Sat 8:30 AM - 11:30 AM  Fees: Free   Phone: (806) 226-3419 Email: contact@[employer] Website: http://[employer]/     SNAP application assistance  7  [facility] Distance: 1.91 miles      In-Person, Phone/Virtual   [address] [place] [address]  Language: English  Hours: Mon - Fri 8:00 AM - 4:30 PM  Fees: Free   Phone: (235) 537-8962 Email: tamica@[facility]. Website: https://www.[facility]./170/Family-Services     8  [facility] Community Action [place] ([facility]) - [employer] Office Distance: 10.4 miles      In-Person, Phone/Virtual   [address] [place] [id number]  Language: English  Hours: Mon - Fri 8:00 AM - 4:30 PM  Fees: Free   Phone: (998) 215-9835 Email: mark@[employer] Website: https://www.[facility].org/agency-information          Important Numbers & Websites       Emergency Services   911  City Services   311  Poison Control   (572) 753-3457  Suicide Prevention Lifeline   (592) 495-5795 (TALK)  Child Abuse Hotline   (665) 920-4705 (4-A-Child)  Sexual Assault Hotline   (281) 820-7823 (HOPE)  National Runaway Safeline   (510) 910-2075 (RUNAWAY)  All-Options Talkline   (883) 602-6203  Substance Abuse Referral   (512) 237-1933 (HELP)

## 2024-01-16 NOTE — PROGRESS NOTES
SUBJECTIVE:   Kristel is a 43 year old, presenting for the following:  Physical, Ear Problem (bilateral), and weight management        1/16/2024     7:48 AM   Additional Questions   Roomed by Anne-Marie JENNINGS CMA       Healthy Habits:     Getting at least 3 servings of Calcium per day:  Yes    Bi-annual eye exam:  Yes    Dental care twice a year:  Yes    Sleep apnea or symptoms of sleep apnea:  Daytime drowsiness and Excessive snoring    Diet:  Regular (no restrictions)    Frequency of exercise:  2-3 days/week    Duration of exercise:  30-45 minutes    Taking medications regularly:  Yes    Medication side effects:  None    Additional concerns today:  Yes    -Patient would like to discuss weight loss options.  States she has done some research, and would like to consider starting a shot for weight loss.   Decreasing alcohol intake  Eating healthier   Walking more   Signed up for a work up program      -Bilateral ear problem.  Drain a lot, has had a few ear infections in the last within the last year.  When not draining, they are dry and itchy.    Today's PHQ-9 Score:       1/15/2024    11:45 PM   PHQ-9 SCORE   PHQ-9 Total Score MyChart 11 (Moderate depression)   PHQ-9 Total Score 11   Answers submitted by the patient for this visit:  Patient Health Questionnaire (Submitted on 1/15/2024)  If you checked off any problems, how difficult have these problems made it for you to do your work, take care of things at home, or get along with other people?: Very difficult  PHQ9 TOTAL SCORE: 11  MALCOM-7 (Submitted on 1/15/2024)  MALCOM 7 TOTAL SCORE: 9      Social History     Tobacco Use    Smoking status: Former     Packs/day: 1.00     Years: 5.00     Additional pack years: 0.00     Total pack years: 5.00     Types: Cigarettes    Smokeless tobacco: Never   Substance Use Topics    Alcohol use: Yes     Comment: 1 drink per wk           1/15/2024    11:48 PM   Alcohol Use   Prescreen: >3 drinks/day or >7 drinks/week? No     Reviewed orders  "with patient.  Reviewed health maintenance and updated orders accordingly - Yes  Lab work is in process    Breast Cancer Screening:    FHS-7:        No data to display                Pertinent mammograms are reviewed under the imaging tab.    History of abnormal Pap smear: NO - age 30-65 PAP every 5 years with negative HPV co-testing recommended      Latest Ref Rng & Units 3/6/2023     8:24 AM 3/23/2018     3:55 PM 3/23/2018     3:15 PM   PAP / HPV   PAP  Negative for Intraepithelial Lesion or Malignancy (NILM)      PAP (Historical)   NIL     HPV 16 DNA Negative Negative   Negative    HPV 18 DNA Negative Negative   Negative    Other HR HPV Negative Negative   Negative      Reviewed and updated as needed this visit by clinical staff   Tobacco  Allergies  Meds              Reviewed and updated as needed this visit by Provider                     Review of Systems   Constitutional:  Negative for chills and fever.   Eyes:  Negative for pain and visual disturbance.   Cardiovascular:  Positive for palpitations. Negative for chest pain and peripheral edema.   Gastrointestinal:  Positive for constipation and nausea. Negative for heartburn and hematochezia.   Breasts:  Negative for tenderness, breast mass and discharge.   Genitourinary:  Positive for dysuria and vaginal bleeding. Negative for frequency, genital sores, hematuria, pelvic pain, urgency and vaginal discharge.   Musculoskeletal:  Positive for myalgias. Negative for arthralgias and joint swelling.   Neurological:  Positive for dizziness. Negative for weakness and paresthesias.   Psychiatric/Behavioral:  Positive for mood changes. The patient is nervous/anxious.       OBJECTIVE:   /82 (BP Location: Right arm, Patient Position: Sitting, Cuff Size: Adult Large)   Pulse 90   Temp 97.5  F (36.4  C) (Tympanic)   Resp 20   Ht 1.778 m (5' 10\")   Wt 106.7 kg (235 lb 3.2 oz)   LMP  (LMP Unknown)   SpO2 96%   BMI 33.75 kg/m    Physical Exam  GENERAL: " healthy, alert and no distress  EYES: Eyes grossly normal to inspection, PERRL and conjunctivae and sclerae normal  HENT: ear canals and TM's normal, nose and mouth without ulcers or lesions  NECK: no adenopathy, no asymmetry, masses, or scars and thyroid normal to palpation  RESP: lungs clear to auscultation - no rales, rhonchi or wheezes  BREAST: normal without masses, tenderness or nipple discharge and no palpable axillary masses or adenopathy  CV: regular rate and rhythm, normal S1 S2, no S3 or S4, no murmur, click or rub, no peripheral edema and peripheral pulses strong  ABDOMEN: soft, nontender, no hepatosplenomegaly, no masses and bowel sounds normal  MS: no gross musculoskeletal defects noted, no edema  SKIN: no suspicious lesions or rashes  NEURO: Normal strength and tone, mentation intact and speech normal  PSYCH: mentation appears normal, affect normal/bright    Diagnostic Test Results:  Labs reviewed in Epic    ASSESSMENT/PLAN:   (Z00.00) Routine general medical examination at a health care facility  (primary encounter diagnosis)    (E66.9,  Z68.33) Class 1 obesity with serious comorbidity and body mass index (BMI) of 33.0 to 33.9 in adult, unspecified obesity type  Comment: BMI 33 complicated by postoperative hypothyroidism, depression and  on most recent labs. Kristel interested in a trial of a GLP-1 to assist in weight loss in addition to lifestyle changes.  Recommend Zepbound. Risks and benefits of medication discussed. No personal or family history of medullary thyroid cancers or of Multiple Endocrine Neoplasia syndrome type 2. Does have a history of papillary thyroid cancer discussion on close monitoring.  Written information provided on medications  Plan: tirzepatide-Weight Management (ZEPBOUND) 7.5         MG/0.5ML prefilled pen, tirzepatide-Weight         Management (ZEPBOUND) 5 MG/0.5ML prefilled pen,        tirzepatide-Weight Management (ZEPBOUND) 2.5         MG/0.5ML prefilled pen         "  (F33.1) Major depressive disorder, recurrent episode, moderate (H)  Comment: stable, does have an appointment with counselor scheduled.   Plan: tirzepatide-Weight Management (ZEPBOUND) 7.5         MG/0.5ML prefilled pen, tirzepatide-Weight         Management (ZEPBOUND) 5 MG/0.5ML prefilled pen,        tirzepatide-Weight Management (ZEPBOUND) 2.5         MG/0.5ML prefilled pen, escitalopram (LEXAPRO)         20 MG tablet          (F41.1) MALCOM (generalized anxiety disorder)  Comment: stable. Per above.   Plan: escitalopram (LEXAPRO) 20 MG tablet          (E78.5) Hyperlipidemia LDL goal <130  Comment: per above. Lifestyle recommendations discussed to bring levels down  Plan: tirzepatide-Weight Management (ZEPBOUND) 7.5         MG/0.5ML prefilled pen, tirzepatide-Weight         Management (ZEPBOUND) 5 MG/0.5ML prefilled pen,        tirzepatide-Weight Management (ZEPBOUND) 2.5         MG/0.5ML prefilled pen          (B00.1) Recurrent cold sores  Comment: stable with as needed medication  Plan: valACYclovir (VALTREX) 1000 mg tablet          (C73) Papillary thyroid carcinoma (H)  Comment: per above. Followed by endocrinology     (D80.2) IgA deficiency (H)  Comment: no clinical symptoms, no history of recurrent infections    (Z12.31) Encounter for screening mammogram for breast cancer  Comment:   Plan: MA Screen Bilateral w/[facility]          (E89.0) Postoperative hypothyroidism  Comment: per above.   Plan: tirzepatide-Weight Management (ZEPBOUND) 7.5         MG/0.5ML prefilled pen, tirzepatide-Weight         Management (ZEPBOUND) 5 MG/0.5ML prefilled pen,        tirzepatide-Weight Management (ZEPBOUND) 2.5         MG/0.5ML prefilled pen            COUNSELING:  Reviewed preventive health counseling, as reflected in patient instructions      BMI:   Estimated body mass index is 33.75 kg/m  as calculated from the following:    Height as of this encounter: 1.778 m (5' 10\").    Weight as of this encounter: 106.7 kg (235 lb 3.2 oz). "   Weight management plan: Discussed healthy diet and exercise guidelines      She reports that she has quit smoking. Her smoking use included cigarettes. She has a 5 pack-year smoking history. She has never used smokeless tobacco.        DAVID Lazo CNP  [facility]

## 2024-01-17 ENCOUNTER — TELEPHONE (OUTPATIENT)
Dept: FAMILY MEDICINE | Facility: CLINIC | Age: 44
End: 2024-01-17

## 2024-01-17 DIAGNOSIS — E66.811 CLASS 1 OBESITY WITH SERIOUS COMORBIDITY AND BODY MASS INDEX (BMI) OF 33.0 TO 33.9 IN ADULT, UNSPECIFIED OBESITY TYPE: Primary | ICD-10-CM

## 2024-01-17 NOTE — TELEPHONE ENCOUNTER
Prior Authorization Retail Medication Request    Medication/Dose: Zepbound 5MG/0.5ML   Diagnosis and ICD code (if different than what is on RX):    New/renewal/insurance change PA/secondary ins. PA:  Previously Tried and Failed:    Rationale:      Insurance   Primary:   Insurance ID:      Secondary (if applicable):  Insurance ID:      Pharmacy Information (if different than what is on RX)  Name:    Phone:    Fax:      Plan does not cover. Please call [phone] to initiate prior auth. Patient ID is [id number]

## 2024-01-18 DIAGNOSIS — E89.0 POSTOPERATIVE HYPOTHYROIDISM: ICD-10-CM

## 2024-01-18 RX ORDER — LEVOTHYROXINE SODIUM 150 UG/1
150 TABLET ORAL DAILY
Qty: 112 TABLET | Refills: 3 | Status: SHIPPED | OUTPATIENT
Start: 2024-01-18 | End: 2024-06-10

## 2024-01-22 LAB — SCANNED LAB RESULT: NORMAL

## 2024-01-24 ENCOUNTER — PRE VISIT (OUTPATIENT)
Dept: UROLOGY | Facility: CLINIC | Age: 44
End: 2024-01-24
Payer: COMMERCIAL

## 2024-01-24 NOTE — TELEPHONE ENCOUNTER
Reason for visit: Consult     Relevant information: interstitial cystitis    Records/imaging/labs/orders: in epic    Pt called: No need for a call    At Rooming: virtual visit    Vahid Davis  1/24/2024  3:31 PM

## 2024-01-27 NOTE — TELEPHONE ENCOUNTER
No insurance info in [facility]. Will call pharmacy on Monday to get insurance info.

## 2024-01-29 NOTE — TELEPHONE ENCOUNTER
PA Initiation    Medication: ZEPBOUND 5 MG/0.5ML SC SOAJ  Insurance Company: [employer] - Phone [phone] Fax [phone]  Pharmacy Filling the Rx: [id number] - [address] [place] AT [facility] & [address]  Filling Pharmacy Phone: [phone]  Filling Pharmacy Fax: [phone]  Start Date: 1/27/2024

## 2024-01-31 NOTE — TELEPHONE ENCOUNTER
PRIOR AUTHORIZATION DENIED    Medication: ZEPBOUND 5 MG/0.5ML SC SOAJ    Insurance Company: [employer] - Phone [phone] Fax [phone]    Denial Date: 1/30/2024    Denial Reason(s): Patient needs to try and fail phentermine.     Appeal Information:

## 2024-02-03 ENCOUNTER — HEALTH MAINTENANCE LETTER (OUTPATIENT)
Age: 44
End: 2024-02-03

## 2024-02-05 RX ORDER — TOPIRAMATE 25 MG/1
25 TABLET, FILM COATED ORAL DAILY
Qty: 30 TABLET | Refills: 1 | Status: SHIPPED | OUTPATIENT
Start: 2024-02-05 | End: 2024-02-06

## 2024-02-05 RX ORDER — PHENTERMINE HYDROCHLORIDE 15 MG/1
15 CAPSULE ORAL EVERY MORNING
Qty: 30 CAPSULE | Refills: 1 | Status: SHIPPED | OUTPATIENT
Start: 2024-02-05 | End: 2024-07-22

## 2024-02-06 DIAGNOSIS — E66.811 CLASS 1 OBESITY WITH SERIOUS COMORBIDITY AND BODY MASS INDEX (BMI) OF 33.0 TO 33.9 IN ADULT, UNSPECIFIED OBESITY TYPE: ICD-10-CM

## 2024-02-06 RX ORDER — TOPIRAMATE 25 MG/1
25 TABLET, FILM COATED ORAL DAILY
Qty: 90 TABLET | Refills: 0 | Status: SHIPPED | OUTPATIENT
Start: 2024-02-06 | End: 2024-08-19

## 2024-02-06 NOTE — TELEPHONE ENCOUNTER
Please notify Kristel that her insurance denied the Zepbound, she has to try and fail the phentermine which is the other medication we discussed. I sent the phentermine and topiramate combination to the pharmacy. Plan recheck in 1-2 months, sooner with any problems.     Thanks,     DAVID Lazo CNP

## 2024-02-07 ENCOUNTER — VIRTUAL VISIT (OUTPATIENT)
Dept: UROLOGY | Facility: CLINIC | Age: 44
End: 2024-02-07
Payer: COMMERCIAL

## 2024-02-07 VITALS — WEIGHT: 234 LBS | BODY MASS INDEX: 33.58 KG/M2

## 2024-02-07 DIAGNOSIS — N30.00 ACUTE CYSTITIS WITHOUT HEMATURIA: Primary | ICD-10-CM

## 2024-02-07 DIAGNOSIS — B96.89 BACTERIAL VAGINOSIS: ICD-10-CM

## 2024-02-07 DIAGNOSIS — N76.0 BACTERIAL VAGINOSIS: ICD-10-CM

## 2024-02-07 PROCEDURE — 99213 OFFICE O/P EST LOW 20 MIN: CPT | Mod: 95 | Performed by: UROLOGY

## 2024-02-07 ASSESSMENT — PAIN SCALES - GENERAL: PAINLEVEL: MILD PAIN (3)

## 2024-02-07 NOTE — PROGRESS NOTES
HPI:  Kristel Wiley is a 43 year old female with bladder burning since January of 2024.  She has had similar episodes but they usually last about a week and then resolve on their own.  She finally got some macrobid yesterday and as expected  no change in her symptoms.  She was also started on flagyl.    Reviewed previous notes from Yulissa Nogueira from 1/16/24 as well as Dr. Poe from 2/16/23    Exam:  Wt 106.1 kg (234 lb)   LMP  (LMP Unknown)   BMI 33.58 kg/m    GENERAL: alert and no distress  EYES: Eyes grossly normal to inspection.  No discharge or erythema, or obvious scleral/conjunctival abnormalities.  RESP: No audible wheeze, cough, or visible cyanosis.    SKIN: Visible skin clear. No significant rash, abnormal pigmentation or lesions.  NEURO: Cranial nerves grossly intact.  Mentation and speech appropriate for age.  PSYCH: Appropriate affect, tone, and pace of words      Review of Labs:  The following labs were reviewed by me and discussed with the patient:  Lab Results   Component Value Date    WBC 4.8 06/19/2023    WBC 5.7 07/09/2021     Lab Results   Component Value Date    RBC 4.51 06/19/2023    RBC 5.07 07/09/2021     Lab Results   Component Value Date    HGB 12.2 06/19/2023    HGB 13.6 07/09/2021     Lab Results   Component Value Date    HCT 37.4 06/19/2023    HCT 42.5 07/09/2021     Lab Results   Component Value Date    MCV 83 06/19/2023    MCV 84 07/09/2021     Lab Results   Component Value Date    MCH 27.1 06/19/2023    MCH 26.8 07/09/2021     Lab Results   Component Value Date    MCHC 32.6 06/19/2023    MCHC 32.0 07/09/2021     Lab Results   Component Value Date    RDW 14.1 06/19/2023    RDW 12.8 07/09/2021     Lab Results   Component Value Date     06/19/2023     07/09/2021        Last Comprehensive Metabolic Panel:  Sodium   Date Value Ref Range Status   06/19/2023 141 136 - 145 mmol/L Final   03/19/2021 137 133 - 144 mmol/L Final     Potassium   Date Value Ref Range Status    06/19/2023 4.0 3.4 - 5.3 mmol/L Final   03/19/2021 3.9 3.4 - 5.3 mmol/L Final     Chloride   Date Value Ref Range Status   06/19/2023 105 98 - 107 mmol/L Final   03/19/2021 106 94 - 109 mmol/L Final     Carbon Dioxide   Date Value Ref Range Status   03/19/2021 26 20 - 32 mmol/L Final     Carbon Dioxide (CO2)   Date Value Ref Range Status   06/19/2023 26 22 - 29 mmol/L Final     Anion Gap   Date Value Ref Range Status   06/19/2023 10 7 - 15 mmol/L Final   03/19/2021 5 3 - 14 mmol/L Final     Glucose   Date Value Ref Range Status   06/19/2023 91 70 - 99 mg/dL Final   03/19/2021 82 70 - 99 mg/dL Final     Urea Nitrogen   Date Value Ref Range Status   06/19/2023 9.2 6.0 - 20.0 mg/dL Final   03/19/2021 12 7 - 30 mg/dL Final     Creatinine   Date Value Ref Range Status   06/19/2023 0.65 0.51 - 0.95 mg/dL Final   03/19/2021 0.82 0.52 - 1.04 mg/dL Final     GFR Estimate   Date Value Ref Range Status   06/19/2023 >90 >60 mL/min/1.73m2 Final     Comment:     eGFR calculated using 2021 CKD-EPI equation.   03/19/2021 90 >60 mL/min/[1.73_m2] Final     Comment:     Non  GFR Calc  Starting 12/18/2018, serum creatinine based estimated GFR (eGFR) will be   calculated using the Chronic Kidney Disease Epidemiology Collaboration   (CKD-EPI) equation.       Calcium   Date Value Ref Range Status   06/19/2023 8.8 8.6 - 10.0 mg/dL Final   03/19/2021 8.8 8.5 - 10.1 mg/dL Final     Bilirubin Total   Date Value Ref Range Status   01/12/2023 0.2 <=1.2 mg/dL Final   03/19/2021 0.2 0.2 - 1.3 mg/dL Final     Alkaline Phosphatase   Date Value Ref Range Status   01/12/2023 60 35 - 104 U/L Final   03/19/2021 56 40 - 150 U/L Final     ALT   Date Value Ref Range Status   01/12/2023 12 10 - 35 U/L Final   03/19/2021 16 0 - 50 U/L Final     AST   Date Value Ref Range Status   01/12/2023 16 10 - 35 U/L Final   03/19/2021 7 0 - 45 U/L Final                Color Urine (no units)   Date Value   06/30/2023 Yellow   06/23/2021 Kristel      Appearance Urine (no units)   Date Value   06/30/2023 Clear   06/23/2021 Slightly Cloudy     Glucose Urine (mg/dL)   Date Value   06/30/2023 Negative   06/23/2021 Negative     Bilirubin Urine (no units)   Date Value   06/30/2023 Negative   06/23/2021 Moderate (A)     Ketones Urine (mg/dL)   Date Value   06/30/2023 Negative   06/23/2021 Negative     Specific Gravity Urine (no units)   Date Value   06/30/2023 1.020   06/23/2021 1.035     pH Urine   Date Value   06/30/2023 6.5   06/23/2021 5.0 pH     Protein Albumin Urine (mg/dL)   Date Value   06/30/2023 Negative   06/23/2021 30 (A)     Urobilinogen Urine   Date Value   06/30/2023 0.2 E.U./dL   03/24/2021 0.2 EU/dL     Nitrite Urine (no units)   Date Value   06/30/2023 Negative   06/23/2021 Negative     Leukocyte Esterase Urine (no units)   Date Value   06/30/2023 Negative   06/23/2021 Negative        Component  Ref Range & Units 3 mo ago 7 mo ago 1 yr ago 2 yr ago    Group A strep by PCR  Not Detected Not Detected N                     Component  Ref Range & Units 4 mo ago  (9/11/23) 7 mo ago  (6/30/23) 11 mo ago  (2/27/23) 1 yr ago  (8/29/22) 1 yr ago  (8/8/22) 1 yr ago  (6/7/22) 2 yr ago  (7/27/21)    Trichomonas  Absent Absent Absent Absent Absent Absent Absent Absent    Yeast  Absent Absent Absent Absent Absent Absent Absent Absent    Clue Cells  Absent Present Abnormal  Absent Present Abnormal  Absent Present Abnormal  Absent Absent    WBCs/high power field  None 2+ Abnormal  None 1+ Abnormal  1+ Abnormal  1+ Abnormal  2+ Abnormal  1+ Abnormal           Assessment & Plan   44 y/o female with intermittent burning in the bladder.  Her UA has been normal but she is getting empirical treatment with macrobid.  Her wet prep was positive so she was also started on metronidazole.  We discussed that recurrent BV can sometimes cause this, also that sometimes UA's can be falsely negative.  Therefore if symptoms do not improve will proceed with further therapy.  -finish  courses of metronidazole and macrobid  -if symptoms don't improve then we will plan to do a DNA sequencing test and cystoscopy, pt. To reach out after treatment is complete.    Cedric Wade MD  [facility] [place]

## 2024-02-07 NOTE — LETTER
2/7/2024       RE: Kristel Wiley  [id number]  [facility] [address]     Dear Colleague,    Thank you for referring your patient, Kristel Wiley, to the [facility] [place] at [facility]. Please see a copy of my visit note below.    Virtual Visit Details    Type of service:  Video Visit   Video Start Time: 10:20 AM  Video End Time:10:33 AM    Originating Location (pt. Location): Home    Distant Location (provider location):  Off-site  Platform used for Video Visit: [facility]    HPI:  Kristel Wiley is a 43 year old female with bladder burning since January of 2024.  She has had similar episodes but they usually last about a week and then resolve on their own.  She finally got some macrobid yesterday and as expected  no change in her symptoms.  She was also started on flagyl.    Reviewed previous notes from Yulissa Nogueira from 1/16/24 as well as Dr. Poe from 2/16/23    Exam:  Wt 106.1 kg (234 lb)   LMP  (LMP Unknown)   BMI 33.58 kg/m    GENERAL: alert and no distress  EYES: Eyes grossly normal to inspection.  No discharge or erythema, or obvious scleral/conjunctival abnormalities.  RESP: No audible wheeze, cough, or visible cyanosis.    SKIN: Visible skin clear. No significant rash, abnormal pigmentation or lesions.  NEURO: Cranial nerves grossly intact.  Mentation and speech appropriate for age.  PSYCH: Appropriate affect, tone, and pace of words      Review of Labs:  The following labs were reviewed by me and discussed with the patient:  Lab Results   Component Value Date    WBC 4.8 06/19/2023    WBC 5.7 07/09/2021     Lab Results   Component Value Date    RBC 4.51 06/19/2023    RBC 5.07 07/09/2021     Lab Results   Component Value Date    HGB 12.2 06/19/2023    HGB 13.6 07/09/2021     Lab Results   Component Value Date    HCT 37.4 06/19/2023    HCT 42.5 07/09/2021     Lab Results   Component Value Date    MCV 83 06/19/2023    MCV 84  07/09/2021     Lab Results   Component Value Date    MCH 27.1 06/19/2023    MCH 26.8 07/09/2021     Lab Results   Component Value Date    MCHC 32.6 06/19/2023    MCHC 32.0 07/09/2021     Lab Results   Component Value Date    RDW 14.1 06/19/2023    RDW 12.8 07/09/2021     Lab Results   Component Value Date     06/19/2023     07/09/2021        Last Comprehensive Metabolic Panel:  Sodium   Date Value Ref Range Status   06/19/2023 141 136 - 145 mmol/L Final   03/19/2021 137 133 - 144 mmol/L Final     Potassium   Date Value Ref Range Status   06/19/2023 4.0 3.4 - 5.3 mmol/L Final   03/19/2021 3.9 3.4 - 5.3 mmol/L Final     Chloride   Date Value Ref Range Status   06/19/2023 105 98 - 107 mmol/L Final   03/19/2021 106 94 - 109 mmol/L Final     Carbon Dioxide   Date Value Ref Range Status   03/19/2021 26 20 - 32 mmol/L Final     Carbon Dioxide (CO2)   Date Value Ref Range Status   06/19/2023 26 22 - 29 mmol/L Final     Anion Gap   Date Value Ref Range Status   06/19/2023 10 7 - 15 mmol/L Final   03/19/2021 5 3 - 14 mmol/L Final     Glucose   Date Value Ref Range Status   06/19/2023 91 70 - 99 mg/dL Final   03/19/2021 82 70 - 99 mg/dL Final     Urea Nitrogen   Date Value Ref Range Status   06/19/2023 9.2 6.0 - 20.0 mg/dL Final   03/19/2021 12 7 - 30 mg/dL Final     Creatinine   Date Value Ref Range Status   06/19/2023 0.65 0.51 - 0.95 mg/dL Final   03/19/2021 0.82 0.52 - 1.04 mg/dL Final     GFR Estimate   Date Value Ref Range Status   06/19/2023 >90 >60 mL/min/1.73m2 Final     Comment:     eGFR calculated using 2021 CKD-EPI equation.   03/19/2021 90 >60 mL/min/[1.73_m2] Final     Comment:     Non  GFR Calc  Starting 12/18/2018, serum creatinine based estimated GFR (eGFR) will be   calculated using the Chronic Kidney Disease Epidemiology Collaboration   (CKD-EPI) equation.       Calcium   Date Value Ref Range Status   06/19/2023 8.8 8.6 - 10.0 mg/dL Final   03/19/2021 8.8 8.5 - 10.1 mg/dL Final      Bilirubin Total   Date Value Ref Range Status   01/12/2023 0.2 <=1.2 mg/dL Final   03/19/2021 0.2 0.2 - 1.3 mg/dL Final     Alkaline Phosphatase   Date Value Ref Range Status   01/12/2023 60 35 - 104 U/L Final   03/19/2021 56 40 - 150 U/L Final     ALT   Date Value Ref Range Status   01/12/2023 12 10 - 35 U/L Final   03/19/2021 16 0 - 50 U/L Final     AST   Date Value Ref Range Status   01/12/2023 16 10 - 35 U/L Final   03/19/2021 7 0 - 45 U/L Final                Color Urine (no units)   Date Value   06/30/2023 Yellow   06/23/2021 Kristel     Appearance Urine (no units)   Date Value   06/30/2023 Clear   06/23/2021 Slightly Cloudy     Glucose Urine (mg/dL)   Date Value   06/30/2023 Negative   06/23/2021 Negative     Bilirubin Urine (no units)   Date Value   06/30/2023 Negative   06/23/2021 Moderate (A)     Ketones Urine (mg/dL)   Date Value   06/30/2023 Negative   06/23/2021 Negative     Specific Gravity Urine (no units)   Date Value   06/30/2023 1.020   06/23/2021 1.035     pH Urine   Date Value   06/30/2023 6.5   06/23/2021 5.0 pH     Protein Albumin Urine (mg/dL)   Date Value   06/30/2023 Negative   06/23/2021 30 (A)     Urobilinogen Urine   Date Value   06/30/2023 0.2 E.U./dL   03/24/2021 0.2 EU/dL     Nitrite Urine (no units)   Date Value   06/30/2023 Negative   06/23/2021 Negative     Leukocyte Esterase Urine (no units)   Date Value   06/30/2023 Negative   06/23/2021 Negative        Component  Ref Range & Units 3 mo ago 7 mo ago 1 yr ago 2 yr ago    Group A strep by PCR  Not Detected Not Detected N                     Component  Ref Range & Units 4 mo ago  (9/11/23) 7 mo ago  (6/30/23) 11 mo ago  (2/27/23) 1 yr ago  (8/29/22) 1 yr ago  (8/8/22) 1 yr ago  (6/7/22) 2 yr ago  (7/27/21)    Trichomonas  Absent Absent Absent Absent Absent Absent Absent Absent    Yeast  Absent Absent Absent Absent Absent Absent Absent Absent    Clue Cells  Absent Present Abnormal  Absent Present Abnormal  Absent Present Abnormal   Absent Absent    WBCs/high power field  None 2+ Abnormal  None 1+ Abnormal  1+ Abnormal  1+ Abnormal  2+ Abnormal  1+ Abnormal           Assessment & Plan  42 y/o female with intermittent burning in the bladder.  Her UA has been normal but she is getting empirical treatment with macrobid.  Her wet prep was positive so she was also started on metronidazole.  We discussed that recurrent BV can sometimes cause this, also that sometimes UA's can be falsely negative.  Therefore if symptoms do not improve will proceed with further therapy.  -finish courses of metronidazole and macrobid  -if symptoms don't improve then we will plan to do a DNA sequencing test and cystoscopy, pt. To reach out after treatment is complete.    Cedric Wade MD  [facility] [place]

## 2024-02-07 NOTE — NURSING NOTE
Is the patient currently in the state of [place]? YES    Visit mode:VIDEO    If the visit is dropped, the patient can be reconnected by: VIDEO VISIT: Text to cell phone:   Telephone Information:   Mobile [phone]       Will anyone else be joining the visit? NO  (If patient encounters technical issues they should call [phone]    How would you like to obtain your AVS? MyChart    Are changes needed to the allergy or medication list? No    Reason for visit: RECHECK    Kristel MARKS

## 2024-02-07 NOTE — PATIENT INSTRUCTIONS
-finish courses of metronidazole and macrobid  -if symptoms don't improve then we will plan to do a DNA sequencing test and cystoscopy, pt. To reach out after treatment is complete.

## 2024-02-07 NOTE — PROGRESS NOTES
Virtual Visit Details    Type of service:  Video Visit   Video Start Time: 10:20 AM  Video End Time:10:33 AM    Originating Location (pt. Location): Home    Distant Location (provider location):  Off-site  Platform used for Video Visit: Well

## 2024-02-09 NOTE — PROGRESS NOTES
COVID-19 PCR test completed. Patient handout For Patients Who Have Been Tested for Covid-19 (Coronavirus) was given to the patient, which includes test result notification process.    
alert

## 2024-02-13 ENCOUNTER — VIRTUAL VISIT (OUTPATIENT)
Dept: BEHAVIORAL HEALTH | Facility: CLINIC | Age: 44
End: 2024-02-13
Attending: NURSE PRACTITIONER
Payer: COMMERCIAL

## 2024-02-13 DIAGNOSIS — F43.23 ADJUSTMENT DISORDER WITH MIXED ANXIETY AND DEPRESSED MOOD: Primary | ICD-10-CM

## 2024-02-13 DIAGNOSIS — F33.1 MAJOR DEPRESSIVE DISORDER, RECURRENT EPISODE, MODERATE (H): ICD-10-CM

## 2024-02-13 PROCEDURE — 90791 PSYCH DIAGNOSTIC EVALUATION: CPT | Mod: 95

## 2024-02-13 ASSESSMENT — ANXIETY QUESTIONNAIRES
GAD7 TOTAL SCORE: 10
6. BECOMING EASILY ANNOYED OR IRRITABLE: NEARLY EVERY DAY
3. WORRYING TOO MUCH ABOUT DIFFERENT THINGS: MORE THAN HALF THE DAYS
8. IF YOU CHECKED OFF ANY PROBLEMS, HOW DIFFICULT HAVE THESE MADE IT FOR YOU TO DO YOUR WORK, TAKE CARE OF THINGS AT HOME, OR GET ALONG WITH OTHER PEOPLE?: SOMEWHAT DIFFICULT
7. FEELING AFRAID AS IF SOMETHING AWFUL MIGHT HAPPEN: SEVERAL DAYS
1. FEELING NERVOUS, ANXIOUS, OR ON EDGE: SEVERAL DAYS
5. BEING SO RESTLESS THAT IT IS HARD TO SIT STILL: NOT AT ALL
GAD7 TOTAL SCORE: 10
GAD7 TOTAL SCORE: 10
7. FEELING AFRAID AS IF SOMETHING AWFUL MIGHT HAPPEN: SEVERAL DAYS
4. TROUBLE RELAXING: SEVERAL DAYS
IF YOU CHECKED OFF ANY PROBLEMS ON THIS QUESTIONNAIRE, HOW DIFFICULT HAVE THESE PROBLEMS MADE IT FOR YOU TO DO YOUR WORK, TAKE CARE OF THINGS AT HOME, OR GET ALONG WITH OTHER PEOPLE: SOMEWHAT DIFFICULT
2. NOT BEING ABLE TO STOP OR CONTROL WORRYING: MORE THAN HALF THE DAYS

## 2024-02-13 ASSESSMENT — COLUMBIA-SUICIDE SEVERITY RATING SCALE - C-SSRS
TOTAL  NUMBER OF INTERRUPTED ATTEMPTS LIFETIME: NO
5. HAVE YOU STARTED TO WORK OUT OR WORKED OUT THE DETAILS OF HOW TO KILL YOURSELF? DO YOU INTEND TO CARRY OUT THIS PLAN?: YES
TOTAL  NUMBER OF ABORTED OR SELF INTERRUPTED ATTEMPTS LIFETIME: NO
LETHALITY/MEDICAL DAMAGE CODE MOST LETHAL ACTUAL ATTEMPT: MODERATE PHYSICAL DAMAGE, MEDICAL ATTENTION NEEDED
6. HAVE YOU EVER DONE ANYTHING, STARTED TO DO ANYTHING, OR PREPARED TO DO ANYTHING TO END YOUR LIFE?: NO
3. HAVE YOU BEEN THINKING ABOUT HOW YOU MIGHT KILL YOURSELF?: YES
ATTEMPT PAST THREE MONTHS: NO
4. HAVE YOU HAD THESE THOUGHTS AND HAD SOME INTENTION OF ACTING ON THEM?: YES
2. HAVE YOU ACTUALLY HAD ANY THOUGHTS OF KILLING YOURSELF?: YES
REASONS FOR IDEATION PAST MONTH: DOES NOT APPLY
1. HAVE YOU WISHED YOU WERE DEAD OR WISHED YOU COULD GO TO SLEEP AND NOT WAKE UP?: YES
2. HAVE YOU ACTUALLY HAD ANY THOUGHTS OF KILLING YOURSELF?: NO
TOTAL  NUMBER OF ACTUAL ATTEMPTS LIFETIME: 1
ATTEMPT LIFETIME: YES
5. HAVE YOU STARTED TO WORK OUT OR WORKED OUT THE DETAILS OF HOW TO KILL YOURSELF? DO YOU INTEND TO CARRY OUT THIS PLAN?: NO
1. IN THE PAST MONTH, HAVE YOU WISHED YOU WERE DEAD OR WISHED YOU COULD GO TO SLEEP AND NOT WAKE UP?: NO
REASONS FOR IDEATION LIFETIME: MOSTLY TO END OR STOP THE PAIN (YOU COULDN'T GO ON LIVING WITH THE PAIN OR HOW YOU WERE FEELING)
4. HAVE YOU HAD THESE THOUGHTS AND HAD SOME INTENTION OF ACTING ON THEM?: NO
6. HAVE YOU EVER DONE ANYTHING, STARTED TO DO ANYTHING, OR PREPARED TO DO ANYTHING TO END YOUR LIFE?: NO
LETHALITY/MEDICAL DAMAGE CODE MOST RECENT ACTUAL ATTEMPT: MODERATE PHYSICAL DAMAGE, MEDICAL ATTENTION NEEDED

## 2024-02-13 ASSESSMENT — PATIENT HEALTH QUESTIONNAIRE - PHQ9
10. IF YOU CHECKED OFF ANY PROBLEMS, HOW DIFFICULT HAVE THESE PROBLEMS MADE IT FOR YOU TO DO YOUR WORK, TAKE CARE OF THINGS AT HOME, OR GET ALONG WITH OTHER PEOPLE: VERY DIFFICULT
SUM OF ALL RESPONSES TO PHQ QUESTIONS 1-9: 12
SUM OF ALL RESPONSES TO PHQ QUESTIONS 1-9: 12

## 2024-02-13 NOTE — PROGRESS NOTES
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 health and well-being.     8. Records:   These were not available for review at time of assessment.   Information in this assessment was obtained from the medical record and  provided by patient who is a good historian.    Patient will have open access to their mental health medical record.    9.   Interactive Complexity: No    10. Safety Plan:  Patient has not experienced SI/SIB/SA for over 20 years.  Patient contracted for safety.  Should she ever experience an increase in frequency or severity of symptoms related to SI/SIB, patient was given crisis resource of [phone].    Patient reports that after her attempt at age 20, she has never experienced ideation since and has no plans of harming herself or others.      Provider Name/ Credentials:  ROSEANNA Villasenor, [facility]    February 13, 2024

## 2024-02-18 ENCOUNTER — TRANSFERRED RECORDS (OUTPATIENT)
Dept: HEALTH INFORMATION MANAGEMENT | Facility: CLINIC | Age: 44
End: 2024-02-18
Payer: COMMERCIAL

## 2024-02-21 ENCOUNTER — MYC MEDICAL ADVICE (OUTPATIENT)
Dept: FAMILY MEDICINE | Facility: CLINIC | Age: 44
End: 2024-02-21
Payer: COMMERCIAL

## 2024-02-21 ENCOUNTER — E-VISIT (OUTPATIENT)
Dept: FAMILY MEDICINE | Facility: CLINIC | Age: 44
End: 2024-02-21
Payer: COMMERCIAL

## 2024-02-21 DIAGNOSIS — H10.31 ACUTE BACTERIAL CONJUNCTIVITIS OF RIGHT EYE: Primary | ICD-10-CM

## 2024-02-21 PROCEDURE — 99207 PR NON-BILLABLE SERV PER CHARTING: CPT | Performed by: NURSE PRACTITIONER

## 2024-02-22 RX ORDER — POLYMYXIN B SULFATE AND TRIMETHOPRIM 1; 10000 MG/ML; [USP'U]/ML
1-2 SOLUTION OPHTHALMIC EVERY 4 HOURS
Qty: 10 ML | Refills: 0 | Status: SHIPPED | OUTPATIENT
Start: 2024-02-22 | End: 2024-08-02

## 2024-02-22 NOTE — PATIENT INSTRUCTIONS
Thank you for choosing us for your care. I have placed an order for a prescription so that you can start treatment. View your full visit summary for details by clicking on the link below. Your pharmacist will able to address any questions you may have about the medication.     If you're not feeling better within 5-7 days, please schedule an appointment.  You can schedule an appointment right here in [facility], or call [phone]  If the visit is for the same symptoms as your eVisit, we'll refund the cost of your eVisit if seen within seven days.

## 2024-03-28 ENCOUNTER — OFFICE VISIT (OUTPATIENT)
Dept: URGENT CARE | Facility: URGENT CARE | Age: 44
End: 2024-03-28

## 2024-03-28 VITALS
SYSTOLIC BLOOD PRESSURE: 128 MMHG | RESPIRATION RATE: 16 BRPM | DIASTOLIC BLOOD PRESSURE: 79 MMHG | WEIGHT: 230 LBS | HEART RATE: 84 BPM | OXYGEN SATURATION: 100 % | BODY MASS INDEX: 33 KG/M2 | TEMPERATURE: 98.3 F

## 2024-03-28 DIAGNOSIS — B96.89 BACTERIAL VAGINOSIS: Primary | ICD-10-CM

## 2024-03-28 DIAGNOSIS — N76.0 BACTERIAL VAGINOSIS: Primary | ICD-10-CM

## 2024-03-28 LAB
ALBUMIN UR-MCNC: NEGATIVE MG/DL
APPEARANCE UR: CLEAR
BACTERIA #/AREA URNS HPF: ABNORMAL /HPF
BILIRUB UR QL STRIP: NEGATIVE
CLUE CELLS: PRESENT
COLOR UR AUTO: YELLOW
GLUCOSE UR STRIP-MCNC: NEGATIVE MG/DL
HGB UR QL STRIP: ABNORMAL
KETONES UR STRIP-MCNC: NEGATIVE MG/DL
LEUKOCYTE ESTERASE UR QL STRIP: NEGATIVE
NITRATE UR QL: NEGATIVE
PH UR STRIP: 5.5 [PH] (ref 5–7)
RBC #/AREA URNS AUTO: ABNORMAL /HPF
SP GR UR STRIP: >=1.03 (ref 1–1.03)
SQUAMOUS #/AREA URNS AUTO: ABNORMAL /LPF
TRICHOMONAS, WET PREP: ABNORMAL
UROBILINOGEN UR STRIP-ACNC: 0.2 E.U./DL
WBC #/AREA URNS AUTO: ABNORMAL /HPF
WBC'S/HIGH POWER FIELD, WET PREP: ABNORMAL
YEAST, WET PREP: ABNORMAL

## 2024-03-28 PROCEDURE — 99214 OFFICE O/P EST MOD 30 MIN: CPT

## 2024-03-28 PROCEDURE — 87210 SMEAR WET MOUNT SALINE/INK: CPT

## 2024-03-28 PROCEDURE — 81001 URINALYSIS AUTO W/SCOPE: CPT

## 2024-03-28 RX ORDER — METRONIDAZOLE 500 MG/1
500 TABLET ORAL 2 TIMES DAILY
Qty: 14 TABLET | Refills: 0 | Status: CANCELLED | OUTPATIENT
Start: 2024-03-28 | End: 2024-04-04

## 2024-03-28 RX ORDER — METRONIDAZOLE 7.5 MG/G
1 GEL VAGINAL DAILY
Qty: 25 G | Refills: 0 | Status: SHIPPED | OUTPATIENT
Start: 2024-03-28 | End: 2024-04-02

## 2024-03-28 NOTE — PROGRESS NOTES
URGENT CARE  Assessment & Plan   Assessment:   Kristel Wiley is a 43 year old female who's clinical presentation today is consistent with:   1. Bacterial vaginosis - recurrent   - UA Macroscopic with reflex to Microscopic and Culture   - Wet prep - Clinic Collect  - metroNIDAZOLE (METROGEL) 0.75 % vaginal gel;   Plan:  Will treat patient for recurrent bacterial vaginosis today; prescription sent and side effects of medication reviewed. Additionally educated patient on persistent / recurrent infections that she should follow up with her pcp for possible prophylactic treatment options, or a longer regimen of treatment for suppression.  Additionally we discussed if symptoms do not improve after starting today's treatment (or if symptoms worsen) to follow up in 7-10 days.    No alarm signs or symptoms present   Differential Diagnoses for this patient's chief complaint that I considered include:  UTI, candidiasis, Vulvovaginitis, atrophic vaginitis, contact vs allergic vaginitis vs inflammatory or irritative,  cervicitis, lichen planus, Malignancy (vaginal, ovarian, cervical)     Patient is} agreeable to treatment plan and state they will follow-up if symptoms do not improve and/or if symptoms worsen   see patient's AVS 'monitor for' section for specific patient instructions given and discussed regarding what to watch for and when to follow up    DAVID Gaviria [facility] CARE [facility]      ______________________________________________________________________      Subjective     HPI: Kristel Wiley  is a 43 year old  female who presents today for evaluation the following concerns:   Patient presents today with complains of vaginal burning, foul-smelling vaginal odor and slight urgency/frequency of urine  Patient reports these symptoms started 5 days ago   Patient denies} vaginal skin/vulvar redness and irritation.    Denies any significant pelvic pain, flank pain or fevers.   Patient states she gets  recurrent BV, she last was diagnosed a few months ago.    Review of Systems:  Pertinent review of systems as reflected in HPI, otherwise negative.     Objective    Physical Exam:  Vitals:    03/28/24 1251   BP: 128/79   Pulse: 84   Resp: 16   Temp: 98.3  F (36.8  C)   TempSrc: Tympanic   SpO2: 100%   Weight: 104.3 kg (230 lb)      General: Alert and oriented, no acute distress, afebrile, normotensive  Psy/mental status: Nonanxious, cooperative  SKIN: Intact, no open areas  ABDOMEN:  soft, non-tender, non-distended    No flank pain or CVA tenderness   Pelvic/ :   Deferred    LABS:   Results for orders placed or performed in visit on 03/28/24   UA Macroscopic with reflex to Microscopic and Culture - Clinic Collect     Status: Abnormal    Specimen: Urine, Clean Catch   Result Value Ref Range    Color Urine Yellow Colorless, Straw, Light Yellow, Yellow    Appearance Urine Clear Clear    Glucose Urine Negative Negative mg/dL    Bilirubin Urine Negative Negative    Ketones Urine Negative Negative mg/dL    Specific Gravity Urine >=1.030 1.003 - 1.035    Blood Urine Trace (A) Negative    pH Urine 5.5 5.0 - 7.0    Protein Albumin Urine Negative Negative mg/dL    Urobilinogen Urine 0.2 0.2, 1.0 E.U./dL    Nitrite Urine Negative Negative    Leukocyte Esterase Urine Negative Negative   UA Microscopic with Reflex to Culture     Status: Abnormal   Result Value Ref Range    Bacteria Urine Few (A) None Seen /HPF    RBC Urine 0-2 0-2 /HPF /HPF    WBC Urine 0-5 0-5 /HPF /HPF    Squamous Epithelials Urine Few (A) None Seen /LPF    Narrative    Urine Culture not indicated   Wet prep - Clinic Collect     Status: Abnormal    Specimen: Vagina; Swab   Result Value Ref Range    Trichomonas Absent Absent    Yeast Absent Absent    Clue Cells Present (A) Absent    WBCs/high power field 2+ (A) None     ______________________________________________________________________    I explained my diagnostic considerations and recommendations to the  patient, who voiced understanding and agreement with the treatment plan.   All questions were answered.   We discussed potential side effects, risks and benefits of any prescribed or recommended therapies, as well as expectations for response to treatments.  Please see AVS for any patient instructions & handouts given.   Patient was advised to contact the Nurse Care Line, their Primary Care provider, Urgent Care, or the Emergency Department if there are new or worsening symptoms, or call 911 for emergencies.

## 2024-03-28 NOTE — PATIENT INSTRUCTIONS
Diagnosis: vaginitis bacterial vaginosis   Plan:   BV: antibiotic medications  Metronidazole /clindamycin  Limit alcohol intake while on medication at it can a disulfiram-like reaction  Which is flushing, sweating, elevated heart rate, palpitations, nausea, and vomiting   Follow up with your pcp for persistent or recurrence and to discuss maintained /prevention therapy   No need to treat sexual partners   Prevention: avoid douching and irritants such as strong soaps   Avoid bubble baths  Can try to add probiotics to diet     Monitor for:   You have a fever of 101F  or higher, or as directed by your provider.  Your symptoms get worse, or they don't go away within a few days of starting treatment.  You have new pain in the lower belly or pelvic region.  You or any of your sex partners have new symptoms, such as a rash, joint pain, or sores.        Bacterial Vaginosis  You have a vaginal infection called bacterial vaginosis (BV).  Both good and bad bacteria are present in a healthy vagina.   Bacterial vaginosis (BV) occurs when these bacteria get out of balance.   The numbers of good bacteria decrease.   This allows the numbers of bad bacteria to increase and cause BV. In most cases, BV is not a serious problem.  BV is linked with sexual activity, but it's not a sexually transmitted infection (STI/D)   The cause of BV is not clear. Douching may lead to it. Having sex with a new partner or more than 1 partner makes it more likely.  Symptoms of BV vary for each woman. Some women have few symptoms or none at all. If symptoms are present, they can include:  Thin, milky white or gray or sometimes green discharge  Unpleasant  fishy  odor  Irritation, itching, and burning at opening of vagina. This may mean it's caused by more than one type of bacteria.   Burning or irritation with sex or urination. This may mean it's caused by more than one type of bacteria.  Risks if not treated   Increased risk for  delivery if  you're pregnant  Increased risk for complications to your reproductive organs  Possible increased risk for pelvic inflammatory disease (PID)

## 2024-04-10 ENCOUNTER — VIRTUAL VISIT (OUTPATIENT)
Dept: BEHAVIORAL HEALTH | Facility: CLINIC | Age: 44
End: 2024-04-10

## 2024-04-10 DIAGNOSIS — F43.23 ADJUSTMENT DISORDER WITH MIXED ANXIETY AND DEPRESSED MOOD: ICD-10-CM

## 2024-04-10 DIAGNOSIS — F33.1 MAJOR DEPRESSIVE DISORDER, RECURRENT EPISODE, MODERATE (H): Primary | ICD-10-CM

## 2024-04-10 PROCEDURE — 90837 PSYTX W PT 60 MINUTES: CPT | Mod: 95

## 2024-04-10 ASSESSMENT — PATIENT HEALTH QUESTIONNAIRE - PHQ9
SUM OF ALL RESPONSES TO PHQ QUESTIONS 1-9: 11
SUM OF ALL RESPONSES TO PHQ QUESTIONS 1-9: 11
10. IF YOU CHECKED OFF ANY PROBLEMS, HOW DIFFICULT HAVE THESE PROBLEMS MADE IT FOR YOU TO DO YOUR WORK, TAKE CARE OF THINGS AT HOME, OR GET ALONG WITH OTHER PEOPLE: SOMEWHAT DIFFICULT

## 2024-04-10 NOTE — PROGRESS NOTES
[facility]                                     Progress Note    Patient Name: Kristel CANNON Mix  Date: 4/10/2024         Service Type: Individual      Session Start Time: 12pm  Session End Time: 12:45pm     Session Length: 45 min    Session #: 1    Attendees: Client attended alone    Service Modality:  Video Visit:      Provider verified identity through the following two step process.  Patient provided:  Patient  and Patient address    Telemedicine Visit: The patient's condition can be safely assessed and treated via synchronous audio and visual telemedicine encounter.      Reason for Telemedicine Visit: Patient has requested telehealth visit    Originating Site (Patient Location): Patient's place of employment    Distant Site (Provider Location): Provider Remote Setting- Home Office    Consent:  The patient/guardian has verbally consented to: the potential risks and benefits of telemedicine (video visit) versus in person care; bill my insurance or make self-payment for services provided; and responsibility for payment of non-covered services.     Patient would like the video invitation sent by:  My Chart    Mode of Communication:  Video Conference via [employer]well    Distant Location (Provider):  Off-site    As the provider I attest to compliance with applicable laws and regulations related to telemedicine.    DATA  Interactive Complexity: No  Crisis: No        Progress Since Last Session (Related to Symptoms / Goals / Homework):   Symptoms: No change (grief, anxiety, depression)    Homework: Achieved / completed to satisfaction      Episode of Care Goals: Satisfactory progress - CONTEMPLATION (Considering change and yet undecided); Intervened by assessing the negative and positive thinking (ambivalence) about behavior change     Current / Ongoing Stressors and Concerns:   Today, session was spent drafting the treatment plan.  See detailed plan below.  Patient and therapist also discussed learning  styles, attendance policy, and expectations for how sessions will be structured moving forward.  Therapist assessed for risk and safety - no concerns at this time.  In the coming week, patient will engage in self-care.  Patient will bring 1-3 things to discuss during next session.         Treatment Objective(s) Addressed in This Session:   Discussed in session       Intervention:   DBT: validation  Emotion Focused Therapy: emotional processing  Interpersonal Therapy: rapport building  Motivational Interviewing: OARS skills, stages of change  Solution Focused: strengths-based    Assessments completed prior to visit:  The following assessments were completed by patient for this visit:  PHQ9:       4/21/2023     8:47 AM 6/12/2023     6:10 AM 8/23/2023     7:43 AM 11/28/2023    10:00 AM 1/15/2024    11:45 PM 2/13/2024    11:22 AM 4/10/2024    11:48 AM   PHQ-9 SCORE   PHQ-9 Total Score MyChart 11 (Moderate depression) 12 (Moderate depression) 13 (Moderate depression) 13 (Moderate depression) 11 (Moderate depression) 12 (Moderate depression) 11 (Moderate depression)   PHQ-9 Total Score 11 12 13 13 11 12 11     GAD7:       6/5/2020     9:54 AM 1/20/2021     9:00 AM 1/27/2021     4:24 PM 8/19/2022    12:06 PM 11/28/2023    10:01 AM 1/15/2024    11:45 PM 2/13/2024    11:22 AM   MALCOM-7 SCORE   Total Score    12 (moderate anxiety) 11 (moderate anxiety) 9 (mild anxiety) 10 (moderate anxiety)   Total Score 6 9 16 12 11 9 10     PROMIS 10-Global Health (only subscores and total score):       2/13/2024    11:35 AM   PROMIS-10 Scores Only   Global Mental Health Score 10   Global Physical Health Score 12   PROMIS TOTAL - SUBSCORES 22         ASSESSMENT: Current Emotional / Mental Status (status of significant symptoms):   Risk status (Self / Other harm or suicidal ideation)   Patient denies current fears or concerns for personal safety.   Patient denies current or recent suicidal ideation or behaviors.   Patient denies current or  recent homicidal ideation or behaviors.   Patient denies current or recent self injurious behavior or ideation.   Patient denies other safety concerns.   Patient reports there has been no change in risk factors since their last session.     Patient reports there has been no change in protective factors since their last session.     Recommended that patient call 911 or go to the local ED should there be a change in any of these risk factors.     Appearance:   Appropriate    Eye Contact:   Good    Psychomotor Behavior: Normal    Attitude:   Cooperative    Orientation:   All   Speech    Rate / Production: Normal     Volume:  Normal    Mood:    Normal   Affect:    Appropriate    Thought Content:  Clear    Thought Form:  Coherent  Logical    Insight:    Good      Medication Review:   No current psychiatric medications prescribed     Medication Compliance:   NA     Changes in Health Issues:   None reported     Chemical Use Review:   Substance Use: Chemical use reviewed, no active concerns identified      Tobacco Use: No current tobacco use.      Diagnosis:  1. Major depressive disorder, recurrent episode, moderate (H)    2. Adjustment disorder with mixed anxiety and depressed mood        Collateral Reports Completed:   Not Applicable    PLAN: (Patient Tasks / Therapist Tasks / Other)  In the coming week, patient will engage in self-care (walk outside once per week).    Patient will bring 1-3 things to discuss during next session.          ROSEANNA Villasenor, [facility]   4/10/2024                                                         ______________________________________________________________________    Individual Treatment Plan    Patient's Name: Kristel Wiley  YOB: 1980    Date of Creation: 4/10/2024  Date Treatment Plan Last Reviewed/Revised: 4/10/2024    DSM5 Diagnoses: 296.32 (F33.1) Major Depressive Disorder, Recurrent Episode, Moderate _ and With anxious distress or Adjustment Disorders  309.28  "(F43.23) With mixed anxiety and depressed mood, R/O MALCOM  Psychosocial / Contextual Factors: hx of complex grief / multiple losses; mother of 2; hx of therapy; hx of thyroid cancer; financial stress; positive work environment; hx of SA over 20 years ago; stressors tied to housing  PROMIS (reviewed every 90 days):       2/13/2024    11:35 AM   PROMIS-10 Scores Only   Global Mental Health Score 10   Global Physical Health Score 12   PROMIS TOTAL - SUBSCORES 22       Referral / Collaboration:  Referral to another professional/service is not indicated at this time..    Anticipated number of session for this episode of care: 9-12 sessions  Anticipation frequency of session: Monthly  Anticipated Duration of each session: 38-52 minutes  Treatment plan will be reviewed in 90 days or when goals have been changed.       MeasurableTreatment Goal(s) related to diagnosis / functional impairment(s)  Goal 1: Patient will decrease symptoms of depression as evidenced by decreased PHQ-9 scores.    I will know I've met my goal when I have more balance in my life and I am able to keep up with household tasks and have more energy.  I will also do more self-care.\"    Objective #A (Patient Action)    Patient will Increase interest, engagement, and pleasure in doing things  Decrease frequency and intensity of feeling down, depressed, hopeless  Improve quantity and quality of night time sleep / decrease daytime naps  Feel less tired and more energy during the day   Identify negative self-talk and behaviors: challenge core beliefs, myths, and actions  Improve concentration, focus, and mindfulness in daily activities .  Status: New - Date: 4/10/2024      Intervention(s)  Therapist will teach CBT skills to challenge cognitive distortions and core beliefs.  Therapist will teach and model positive self-talk behaviors.  Therapist will teach DBT mindfulness and emotion regulation skills.          Goal 2: Patient will decrease anxious / depressed " mood and increase coping skills for management of life adjustments.      I will know I've met my goal when I am able to communicate about the losses in my life.      Objective #A (Patient Action)    Patient will identify coping skills and strategies to more effectively address stressors.  Patient will increase understanding of steps in the grief process.   Patient will process through distressing family / relational dynamics.     Status: New - Date: 4/10/2024      Intervention(s)  Therapist will use psychodynamic approaches to explore early attachments and schemas.  Therapist will engage in psychoeducation around grief / loss.  Therapist will teach ACT skills to engage in value-based living.        Patient has reviewed and agreed to the above plan.      ROSEANNA Villasenor, [facility]  4/10/2024

## 2024-04-18 ENCOUNTER — MYC MEDICAL ADVICE (OUTPATIENT)
Dept: DERMATOLOGY | Facility: CLINIC | Age: 44
End: 2024-04-18

## 2024-04-19 NOTE — TELEPHONE ENCOUNTER
Rissa Corona, ELLE  You36 minutes ago (9:34 AM)     BB  Yes she needs an appointment. She can see anyone - if she wants to see me specifically she can also look for appointments in EP or [facility]. Thanks!

## 2024-05-09 ENCOUNTER — E-VISIT (OUTPATIENT)
Dept: URGENT CARE | Facility: CLINIC | Age: 44
End: 2024-05-09

## 2024-05-09 ENCOUNTER — LAB (OUTPATIENT)
Dept: LAB | Facility: CLINIC | Age: 44
End: 2024-05-09

## 2024-05-09 DIAGNOSIS — B37.31 YEAST INFECTION OF THE VAGINA: Primary | ICD-10-CM

## 2024-05-09 DIAGNOSIS — N76.0 ACUTE VAGINITIS: Primary | ICD-10-CM

## 2024-05-09 DIAGNOSIS — N76.0 ACUTE VAGINITIS: ICD-10-CM

## 2024-05-09 LAB
CLUE CELLS: ABNORMAL
TRICHOMONAS, WET PREP: ABNORMAL
WBC'S/HIGH POWER FIELD, WET PREP: ABNORMAL
YEAST, WET PREP: PRESENT

## 2024-05-09 PROCEDURE — 99421 OL DIG E/M SVC 5-10 MIN: CPT | Performed by: PREVENTIVE MEDICINE

## 2024-05-09 PROCEDURE — 87491 CHLMYD TRACH DNA AMP PROBE: CPT

## 2024-05-09 PROCEDURE — 87591 N.GONORRHOEAE DNA AMP PROB: CPT

## 2024-05-09 PROCEDURE — 87210 SMEAR WET MOUNT SALINE/INK: CPT

## 2024-05-09 RX ORDER — FLUCONAZOLE 150 MG/1
150 TABLET ORAL ONCE
Qty: 1 TABLET | Refills: 0 | Status: SHIPPED | OUTPATIENT
Start: 2024-05-09 | End: 2024-05-09

## 2024-05-09 NOTE — PATIENT INSTRUCTIONS
Thank you for choosing us for your care. Given your symptoms, I would like you to do a lab-only visit to determine what is causing them.  I have placed the orders.  Please schedule an appointment with the lab right here in [facility], or call [phone].  I will let you know when the results are back and next steps to take.  Vaginitis: Care Instructions  Overview     Vaginitis is soreness or infection of the vagina. This common problem can cause itching and burning. And it can cause a change in vaginal discharge. Sometimes it can cause pain during sex. Vaginitis may be caused by bacteria, yeast, or other germs. Some infections that cause it are caught from a sexual partner. Bath products, spermicides, and douches can irritate the vagina too.  This problem can also happen during and after menopause. A drop in estrogen levels during this time can cause dryness, soreness, and pain during sex.  Your doctor can give you medicine to treat vaginitis. And home care may help you feel better. For certain types of infections, your sex partner(s) must be treated too.  Follow-up care is a key part of your treatment and safety. Be sure to make and go to all appointments, and call your doctor if you are having problems. It's also a good idea to know your test results and keep a list of the medicines you take.  How can you care for yourself at home?  Take your medicines exactly as prescribed. Call your doctor if you think you are having a problem with your medicine.  Ask your doctor if your sex partner(s) also needs treatment.  Do not eat or drink anything that has alcohol if you are taking metronidazole (Flagyl).  Wash your vulva daily with water. You also can use a mild, unscented soap if you want.  Do not use scented bath products. And do not use vaginal sprays or douches.  Change out of wet or damp clothes as soon as possible. Wear cotton underwear. And avoid tight clothing that could increase moisture.  Put a washcloth soaked  "in cool water on the area to relieve itching. Or you can take cool baths.  If you have dryness because of menopause, use estrogen cream or pills that your doctor prescribes.  Ask your doctor about when it is okay to have sex.  Use a personal lubricant before sex if you have dryness. Examples are Astroglide, K-Y Jelly, and Wet Lubricant Gel.  When should you call for help?   Call your doctor now or seek immediate medical care if:    You have a fever.     You have new or increased pain in your vagina or pelvis.     You have new or worse vaginal itching or discharge.   Watch closely for changes in your health, and be sure to contact your doctor if:    You have bleeding other than your period.     You do not get better as expected.   Where can you learn more?  Go to https://www.[employer].net/patiented  Enter F219 in the search box to learn more about \"Vaginitis: Care Instructions.\"  Current as of: November 27, 2023               Content Version: 14.0    6056-5857 [employer].   Care instructions adapted under license by your healthcare professional. If you have questions about a medical condition or this instruction, always ask your healthcare professional. [employer] disclaims any warranty or liability for your use of this information.      "

## 2024-05-10 LAB
C TRACH DNA SPEC QL NAA+PROBE: NEGATIVE
N GONORRHOEA DNA SPEC QL NAA+PROBE: NEGATIVE

## 2024-05-30 ENCOUNTER — E-VISIT (OUTPATIENT)
Dept: URGENT CARE | Facility: CLINIC | Age: 44
End: 2024-05-30

## 2024-05-30 DIAGNOSIS — N39.0 ACUTE UTI (URINARY TRACT INFECTION): Primary | ICD-10-CM

## 2024-05-30 PROCEDURE — 99421 OL DIG E/M SVC 5-10 MIN: CPT | Performed by: EMERGENCY MEDICINE

## 2024-05-30 RX ORDER — NITROFURANTOIN 25; 75 MG/1; MG/1
100 CAPSULE ORAL 2 TIMES DAILY
Qty: 10 CAPSULE | Refills: 0 | Status: SHIPPED | OUTPATIENT
Start: 2024-05-30 | End: 2024-06-04

## 2024-05-30 NOTE — PATIENT INSTRUCTIONS
Dear Kristel Wiley    After reviewing your responses, I've been able to diagnose you with a urinary tract infection, which is a common infection of the bladder with bacteria.  This is not a sexually transmitted infection, though urinating immediately after intercourse can help prevent infections.  Drinking lots of fluids is also helpful to clear your current infection and prevent the next one.      I have sent a prescription for antibiotics to your pharmacy to treat this infection.    It is important that you take all of your prescribed medication even if your symptoms are improving after a few doses.  Taking all of your medicine helps prevent the symptoms from returning.     If your symptoms worsen, you develop pain in your back or stomach, develop fevers, or are not improving in 5 days, please contact your primary care provider for an appointment or visit any of our convenient Walk-in or Urgent Care Centers to be seen, which can be found on our website here.    Thanks again for choosing us as your health care partner,    Feroz Nunes MD  Urinary Tract Infection (UTI) in Women: Care Instructions  Overview     A urinary tract infection (UTI) is an infection caused by bacteria. It can happen anywhere in the urinary tract. A UTI can happen in the:  Kidneys.  Ureters, the tubes that connect the kidneys to the bladder.  Bladder.  Urethra, where the urine comes out.  Most UTIs are bladder infections. They often cause pain or burning when you urinate.  Most UTIs can be cured with antibiotics. If you are prescribed antibiotics, be sure to complete your treatment so that the infection does not get worse.  Follow-up care is a key part of your treatment and safety. Be sure to make and go to all appointments, and call your doctor if you are having problems. It's also a good idea to know your test results and keep a list of the medicines you take.  How can you care for yourself at home?  Take your antibiotics as directed. Do  "not stop taking them just because you feel better. You need to take the full course of antibiotics.  Drink extra water and other fluids for the next day or two. This will help make the urine less concentrated and help wash out the bacteria that are causing the infection. (If you have kidney, heart, or liver disease and have to limit fluids, talk with your doctor before you increase the amount of fluids you drink.)  Avoid drinks that are carbonated or have caffeine. They can irritate the bladder.  Urinate often. Try to empty your bladder each time.  To relieve pain, take a hot bath or lay a heating pad set on low over your lower belly or genital area. Never go to sleep with a heating pad in place.  To prevent UTIs  Drink plenty of water each day. This helps you urinate often, which clears bacteria from your system. (If you have kidney, heart, or liver disease and have to limit fluids, talk with your doctor before you increase the amount of fluids you drink.)  Urinate when you need to.  If you are sexually active, urinate right after you have sex.  Change sanitary pads often.  Avoid douches, bubble baths, feminine hygiene sprays, and other feminine hygiene products that have deodorants.  After going to the bathroom, wipe from front to back.  When should you call for help?   Call your doctor now or seek immediate medical care if:    You have new or worse fever, chills, nausea, or vomiting.     You have new pain in your back just below your rib cage. This is called flank pain.     There is new blood or pus in your urine.     You have any problems with your antibiotic medicine.   Watch closely for changes in your health, and be sure to contact your doctor if:    You are not getting better after taking an antibiotic for 2 days.     Your symptoms go away but then come back.   Where can you learn more?  Go to https://www.healthwise.net/patiented  Enter K848 in the search box to learn more about \"Urinary Tract Infection " "(UTI) in Women: Care Instructions.\"  Current as of: November 15, 2023               Content Version: 14.0    7440-3168 [employer].   Care instructions adapted under license by your healthcare professional. If you have questions about a medical condition or this instruction, always ask your healthcare professional. [employer] disclaims any warranty or liability for your use of this information.      "

## 2024-06-10 ENCOUNTER — VIRTUAL VISIT (OUTPATIENT)
Dept: ENDOCRINOLOGY | Facility: CLINIC | Age: 44
End: 2024-06-10
Payer: MEDICAID

## 2024-06-10 DIAGNOSIS — E89.0 POSTOPERATIVE HYPOTHYROIDISM: ICD-10-CM

## 2024-06-10 DIAGNOSIS — C73 PAPILLARY THYROID CARCINOMA (H): Primary | ICD-10-CM

## 2024-06-10 PROCEDURE — G2211 COMPLEX E/M VISIT ADD ON: HCPCS | Mod: 95 | Performed by: INTERNAL MEDICINE

## 2024-06-10 PROCEDURE — 99214 OFFICE O/P EST MOD 30 MIN: CPT | Mod: 95 | Performed by: INTERNAL MEDICINE

## 2024-06-10 RX ORDER — LEVOTHYROXINE SODIUM 150 UG/1
150 TABLET ORAL DAILY
Qty: 90 TABLET | Refills: 3 | Status: SHIPPED | OUTPATIENT
Start: 2024-06-10 | End: 2024-09-16

## 2024-06-10 ASSESSMENT — PATIENT HEALTH QUESTIONNAIRE - PHQ9: SUM OF ALL RESPONSES TO PHQ QUESTIONS 1-9: 7

## 2024-06-10 NOTE — PROGRESS NOTES
Endocrinology Clinic Visit    Chief Complaint: RECHECK (1 year follow up- post operative hypothyroidism )     Information obtained from:Patient      Assessment/Treatment Plan:      Papillary thyroid cancer follicular variant  Postoperative hypothyroidism  Surgery for thyroid nodule in 2013; pathology showed a 1.7 cm papillary thyroid carcinoma with follicular variant, encapsulated, with no extrathyroidal or lymphatic invasion.  Completion thyroidectomy in 2014-showed papillary thyroid carcinoma 0.3 cm focus.  Patient received 50 mCi I-131 therapy in June 2014 and there was no distant metastasis on the posttherapy scan.  Thyroglobulin most recent suppressed as documented below.  Neck ultrasound no evidence of lymphadenopathy.  Currently on levothyroxine 150 mcg daily.  TSH target 0.5- 1.  Will adjust dose based on follow up lab.     Test and/or medications prescribed today:  Orders Placed This Encounter   Procedures    TSH with free T4 reflex    Thyroglobulin and Antibody (Sendout to [facility])    TSH with free T4 reflex    Hemoglobin A1c         Lorena Sandoval MD  Staff Endocrinologist    Division of Endocrinology and Diabetes      Subjective:         HPI: Kristel Martinez is a 43 year old female with history of thyroid cancer who is here for a follow-up.    Surgery for thyroid nodule in 2013; pathology showed a 1.7 cm papillary thyroid carcinoma with follicular variant, encapsulated, with no extrathyroidal or lymphatic invasion.  Completion thyroidectomy in 2014-showed papillary thyroid carcinoma 0.3 cm focus.  Patient received 50 mCi I-131 therapy in June 2014 and there was no distant metastasis on the posttherapy scan.  Thyroglobulin most recent suppressed as documented below.  Neck ultrasound no evidence of lymphadenopathy.  Currently on levothyroxine 150 mcg daily.  Last blood work in January 2024 showed TSH of 1.5 and at that time advised to add half a tablet a week however she has not changed the.  For now we  will continue the same dose until she can follow-up thyroid blood work.  Thyroglobulin from January 2024 documented below.           Allergies   Allergen Reactions    Cephalexin Rash    Cephalosporins Rash     Cephalexin    Clavulanic Acid Diarrhea     Bad diarrhea with nausea and vomiting    Sulfa Antibiotics Rash and Unknown       Current Outpatient Medications   Medication Sig Dispense Refill    levothyroxine (SYNTHROID/LEVOTHROID) 150 MCG tablet Take 1 tablet (150 mcg) by mouth daily 90 tablet 3    escitalopram (LEXAPRO) 20 MG tablet Take 1 tablet (20 mg) by mouth daily 90 tablet 3    fluticasone (FLONASE) 50 MCG/ACT nasal spray SHAKE LIQUID AND USE 1 SPRAY IN EACH NOSTRIL DAILY 16 g 1    phentermine (ADIPEX-P) 15 MG capsule Take 1 capsule (15 mg) by mouth every morning 30 capsule 1    polyethylene glycol (MIRALAX) 17 GM/Dose powder Take 17 g (1 capful.) by mouth daily as needed for constipation (If no bowel movement in 24 hours. Mix in 8 oz of water.  May take twice daily.) 500 g 0    polymixin b-trimethoprim (POLYTRIM) 36389-1.1 UNIT/ML-% ophthalmic solution Place 1-2 drops into the right eye every 4 hours 10 mL 0    senna-docusate (SENOKOT-S/PERICOLACE) 8.6-50 MG tablet Take 1-2 tablets by mouth 2 times daily 30 tablet 0    tirzepatide-Weight Management (ZEPBOUND) 2.5 MG/0.5ML prefilled pen Inject 0.5 mLs (2.5 mg) Subcutaneous every 7 days 2 mL 0    tirzepatide-Weight Management (ZEPBOUND) 5 MG/0.5ML prefilled pen Inject 0.5 mLs (5 mg) Subcutaneous every 7 days 2 mL 0    tirzepatide-Weight Management (ZEPBOUND) 7.5 MG/0.5ML prefilled pen Inject 0.5 mLs (7.5 mg) Subcutaneous every 7 days 2 mL 0    topiramate (TOPAMAX) 25 MG tablet TAKE 1 TABLET(25 MG) BY MOUTH DAILY 90 tablet 0    valACYclovir (VALTREX) 1000 mg tablet TAKE 2 TABLETS(2000 MG) BY MOUTH TWICE DAILY 12 tablet 3       Review of Systems     11 point review system (Constitutional, HENT, Eyes, Respiratory, Cardiovascular, Gastrointestinal,  "Genitourinary, Musculoskeletal,Neurological, Psychiatric/Behavioural, Endocrine) is negative or is as per HPI above  Past medical history, past surgical history, social and family history reviewed in epic.    Objective:   There were no vitals taken for this visit.  Estimated body mass index is 33 kg/m  as calculated from the following:    Height as of 1/16/24: 1.778 m (5' 10\").    Weight as of 3/28/24: 104.3 kg (230 lb).   Appears well.  In House Labs:     DIAGNOSIS  A) SUPERIOR THYROID LYMPH NODE, EXCISION:  1. Benign lymph node  2. Negative for metastatic carcinoma    B) THYROID, COMPLETION THYROIDECTOMY:  1. Papillary thyroid carcinoma, 0.3 cm focus  2. Surgical margins free of tumor  3. See staging parameters for additional information    2013 THYROID CANCER STAGING PARAMETERS  Case number: [id number]    Patient name: Kristel Martinez  Staging parameters include data from the following case(s): [id number]    Final TNM: [id number]  2013 stage: I    MACROSCOPIC     SPECIMEN TYPE          Total thyroidectomy     DOMINANT TUMOR LOCATION AND SIZE          Location: Right lobe          Greatest dimension: 1.7 cm     ADDITIONAL TUMOR SIZE(S)          Left lobe: 0.3 cm     TUMOR FOCALITY          Multifocal - Bilateral    MICROSCOPIC     HISTOLOGIC TYPE          Papillary carcinoma, follicular variant, encapsulated     MARGINS          Uninvolved by invasive carcinoma     LYMPHATIC VASCULAR INVASION          Not identified     EXTRATHYROIDAL EXTENSION          Not identified    PATHOLOGIC STAGING     EXTENT OF INVASION          pT1b.  (Tumor more than 1 cm but not more than 2 cm in greatest            dimension, limited to the thyroid)     REGIONAL LYMPH NODES          pN0.  (No regional lymph node metastasis)            Number of lymph nodes examined:     1            Number of lymph nodes involved:     0     DISTANT METASTASIS          pM0. (Not applicable)     MACIS SCORE          3.6     PATHOLOGIC STAGE Summary          " Final TNM: [id number]  TSH   Date Value Ref Range Status   01/16/2024 1.51 0.30 - 4.20 uIU/mL Final   04/21/2023 0.02 (L) 0.30 - 4.20 uIU/mL Final   02/27/2023 0.15 (L) 0.30 - 4.20 uIU/mL Final   01/12/2023 0.48 0.30 - 4.20 uIU/mL Final   07/27/2022 0.30 (L) 0.40 - 4.00 mU/L Final   06/25/2022 12.42 (H) 0.40 - 4.00 mU/L Final   07/09/2021 <0.01 (L) 0.40 - 4.00 mU/L Final   03/24/2021 0.02 (L) 0.40 - 4.00 mU/L Final   12/29/2020 0.27 (L) 0.40 - 4.00 mU/L Final   08/14/2020 0.26 (L) 0.40 - 4.00 mU/L Final   01/23/2020 6.44 (H) 0.40 - 4.00 mU/L Final     T4 Free   Date Value Ref Range Status   07/09/2021 1.27 0.76 - 1.46 ng/dL Final   03/24/2021 1.01 0.76 - 1.46 ng/dL Final   12/29/2020 1.43 0.76 - 1.46 ng/dL Final   08/14/2020 1.09 0.76 - 1.46 ng/dL Final   10/21/2019 0.94 0.76 - 1.46 ng/dL Final     Free T4   Date Value Ref Range Status   04/21/2023 1.77 (H) 0.90 - 1.70 ng/dL Final   02/27/2023 1.42 0.90 - 1.70 ng/dL Final   07/27/2022 1.33 0.76 - 1.46 ng/dL Final   06/25/2022 0.92 0.76 - 1.46 ng/dL Final       Creatinine   Date Value Ref Range Status   06/19/2023 0.65 0.51 - 0.95 mg/dL Final   03/19/2021 0.82 0.52 - 1.04 mg/dL Final   ]  This note has been dictated using voice recognition software.  As a result, there may be errors in the documentation that have gone undetected.  Please consider this when interpreting information in this documentation.        Video-Visit Details    Type of service:  Video Visit  Joined the call at 6/10/2024, 1:39:53 pm.  Left the call at 6/10/2024, 1:45:28 pm.  You were on the call for 5 minutes 34 seconds .    Distant Location (provider location):  Off-site.     Platform used for Video Visit: Edson  The longitudinal plan of care for the diagnosis(es)/condition(s) as documented were addressed during this visit. Due to the added complexity in care, I will continue to support Kristel in the subsequent management and with ongoing continuity of care.

## 2024-06-10 NOTE — LETTER
6/10/2024      Kristel Wiley  [id number]  [place] [address]      Dear Colleague,    Thank you for referring your patient, Kristel Wiley, to the [facility]. Please see a copy of my visit note below.    Endocrinology Clinic Visit    Chief Complaint: RECHECK (1 year follow up- post operative hypothyroidism )     Information obtained from:Patient      Assessment/Treatment Plan:      Papillary thyroid cancer follicular variant  Postoperative hypothyroidism  Surgery for thyroid nodule in 2013; pathology showed a 1.7 cm papillary thyroid carcinoma with follicular variant, encapsulated, with no extrathyroidal or lymphatic invasion.  Completion thyroidectomy in 2014-showed papillary thyroid carcinoma 0.3 cm focus.  Patient received 50 mCi I-131 therapy in June 2014 and there was no distant metastasis on the posttherapy scan.  Thyroglobulin most recent suppressed as documented below.  Neck ultrasound no evidence of lymphadenopathy.  Currently on levothyroxine 150 mcg daily.  TSH target 0.5- 1.  Will adjust dose based on follow up lab.     Test and/or medications prescribed today:  Orders Placed This Encounter   Procedures     TSH with free T4 reflex     Thyroglobulin and Antibody (Sendout to [facility])     TSH with free T4 reflex     Hemoglobin A1c         Lorena Sandoval MD  Staff Endocrinologist    Division of Endocrinology and Diabetes      Subjective:         HPI: Kristel Martinez is a 43 year old female with history of thyroid cancer who is here for a follow-up.    Surgery for thyroid nodule in 2013; pathology showed a 1.7 cm papillary thyroid carcinoma with follicular variant, encapsulated, with no extrathyroidal or lymphatic invasion.  Completion thyroidectomy in 2014-showed papillary thyroid carcinoma 0.3 cm focus.  Patient received 50 mCi I-131 therapy in June 2014 and there was no distant metastasis on the posttherapy scan.  Thyroglobulin most recent suppressed as documented  below.  Neck ultrasound no evidence of lymphadenopathy.  Currently on levothyroxine 150 mcg daily.  Last blood work in January 2024 showed TSH of 1.5 and at that time advised to add half a tablet a week however she has not changed the.  For now we will continue the same dose until she can follow-up thyroid blood work.  Thyroglobulin from January 2024 documented below.           Allergies   Allergen Reactions     Cephalexin Rash     Cephalosporins Rash     Cephalexin     Clavulanic Acid Diarrhea     Bad diarrhea with nausea and vomiting     Sulfa Antibiotics Rash and Unknown       Current Outpatient Medications   Medication Sig Dispense Refill     levothyroxine (SYNTHROID/LEVOTHROID) 150 MCG tablet Take 1 tablet (150 mcg) by mouth daily 90 tablet 3     escitalopram (LEXAPRO) 20 MG tablet Take 1 tablet (20 mg) by mouth daily 90 tablet 3     fluticasone (FLONASE) 50 MCG/ACT nasal spray SHAKE LIQUID AND USE 1 SPRAY IN EACH NOSTRIL DAILY 16 g 1     phentermine (ADIPEX-P) 15 MG capsule Take 1 capsule (15 mg) by mouth every morning 30 capsule 1     polyethylene glycol (MIRALAX) 17 GM/Dose powder Take 17 g (1 capful.) by mouth daily as needed for constipation (If no bowel movement in 24 hours. Mix in 8 oz of water.  May take twice daily.) 500 g 0     polymixin b-trimethoprim (POLYTRIM) 92960-4.1 UNIT/ML-% ophthalmic solution Place 1-2 drops into the right eye every 4 hours 10 mL 0     senna-docusate (SENOKOT-S/PERICOLACE) 8.6-50 MG tablet Take 1-2 tablets by mouth 2 times daily 30 tablet 0     tirzepatide-Weight Management (ZEPBOUND) 2.5 MG/0.5ML prefilled pen Inject 0.5 mLs (2.5 mg) Subcutaneous every 7 days 2 mL 0     tirzepatide-Weight Management (ZEPBOUND) 5 MG/0.5ML prefilled pen Inject 0.5 mLs (5 mg) Subcutaneous every 7 days 2 mL 0     tirzepatide-Weight Management (ZEPBOUND) 7.5 MG/0.5ML prefilled pen Inject 0.5 mLs (7.5 mg) Subcutaneous every 7 days 2 mL 0     topiramate (TOPAMAX) 25 MG tablet TAKE 1 TABLET(25 MG)  "BY MOUTH DAILY 90 tablet 0     valACYclovir (VALTREX) 1000 mg tablet TAKE 2 TABLETS(2000 MG) BY MOUTH TWICE DAILY 12 tablet 3       Review of Systems     11 point review system (Constitutional, HENT, Eyes, Respiratory, Cardiovascular, Gastrointestinal, Genitourinary, Musculoskeletal,Neurological, Psychiatric/Behavioural, Endocrine) is negative or is as per HPI above  Past medical history, past surgical history, social and family history reviewed in epic.    Objective:   There were no vitals taken for this visit.  Estimated body mass index is 33 kg/m  as calculated from the following:    Height as of 1/16/24: 1.778 m (5' 10\").    Weight as of 3/28/24: 104.3 kg (230 lb).   Appears well.  In House Labs:     DIAGNOSIS  A) SUPERIOR THYROID LYMPH NODE, EXCISION:  1. Benign lymph node  2. Negative for metastatic carcinoma    B) THYROID, COMPLETION THYROIDECTOMY:  1. Papillary thyroid carcinoma, 0.3 cm focus  2. Surgical margins free of tumor  3. See staging parameters for additional information    2013 THYROID CANCER STAGING PARAMETERS  Case number: [id number]    Patient name: Kristel Martinez  Staging parameters include data from the following case(s): [id number]    Final TNM: [id number]  2013 stage: I    MACROSCOPIC     SPECIMEN TYPE          Total thyroidectomy     DOMINANT TUMOR LOCATION AND SIZE          Location: Right lobe          Greatest dimension: 1.7 cm     ADDITIONAL TUMOR SIZE(S)          Left lobe: 0.3 cm     TUMOR FOCALITY          Multifocal - Bilateral    MICROSCOPIC     HISTOLOGIC TYPE          Papillary carcinoma, follicular variant, encapsulated     MARGINS          Uninvolved by invasive carcinoma     LYMPHATIC VASCULAR INVASION          Not identified     EXTRATHYROIDAL EXTENSION          Not identified    PATHOLOGIC STAGING     EXTENT OF INVASION          pT1b.  (Tumor more than 1 cm but not more than 2 cm in greatest            dimension, limited to the thyroid)     REGIONAL LYMPH NODES          pN0.  " (No regional lymph node metastasis)            Number of lymph nodes examined:     1            Number of lymph nodes involved:     0     DISTANT METASTASIS          pM0. (Not applicable)     MACIS SCORE          3.6     PATHOLOGIC STAGE Summary          Final TNM: [id number]  TSH   Date Value Ref Range Status   01/16/2024 1.51 0.30 - 4.20 uIU/mL Final   04/21/2023 0.02 (L) 0.30 - 4.20 uIU/mL Final   02/27/2023 0.15 (L) 0.30 - 4.20 uIU/mL Final   01/12/2023 0.48 0.30 - 4.20 uIU/mL Final   07/27/2022 0.30 (L) 0.40 - 4.00 mU/L Final   06/25/2022 12.42 (H) 0.40 - 4.00 mU/L Final   07/09/2021 <0.01 (L) 0.40 - 4.00 mU/L Final   03/24/2021 0.02 (L) 0.40 - 4.00 mU/L Final   12/29/2020 0.27 (L) 0.40 - 4.00 mU/L Final   08/14/2020 0.26 (L) 0.40 - 4.00 mU/L Final   01/23/2020 6.44 (H) 0.40 - 4.00 mU/L Final     T4 Free   Date Value Ref Range Status   07/09/2021 1.27 0.76 - 1.46 ng/dL Final   03/24/2021 1.01 0.76 - 1.46 ng/dL Final   12/29/2020 1.43 0.76 - 1.46 ng/dL Final   08/14/2020 1.09 0.76 - 1.46 ng/dL Final   10/21/2019 0.94 0.76 - 1.46 ng/dL Final     Free T4   Date Value Ref Range Status   04/21/2023 1.77 (H) 0.90 - 1.70 ng/dL Final   02/27/2023 1.42 0.90 - 1.70 ng/dL Final   07/27/2022 1.33 0.76 - 1.46 ng/dL Final   06/25/2022 0.92 0.76 - 1.46 ng/dL Final       Creatinine   Date Value Ref Range Status   06/19/2023 0.65 0.51 - 0.95 mg/dL Final   03/19/2021 0.82 0.52 - 1.04 mg/dL Final   ]  This note has been dictated using voice recognition software.  As a result, there may be errors in the documentation that have gone undetected.  Please consider this when interpreting information in this documentation.        Video-Visit Details    Type of service:  Video Visit  Joined the call at 6/10/2024, 1:39:53 pm.  Left the call at 6/10/2024, 1:45:28 pm.  You were on the call for 5 minutes 34 seconds .    Distant Location (provider location):  Off-site.     Platform used for Video Visit: Edson  The longitudinal plan of care for  the diagnosis(es)/condition(s) as documented were addressed during this visit. Due to the added complexity in care, I will continue to support Kristel in the subsequent management and with ongoing continuity of care.      Again, thank you for allowing me to participate in the care of your patient.        Sincerely,        Lorena Sandoval MD

## 2024-06-10 NOTE — PATIENT INSTRUCTIONS
Levothyroxine should be taken on empty stomach and wait at least 30 minutes before eating. Don't take supplements or multivitamins containing iron and calcium within 4 hours of taking levothyroxine tablet.

## 2024-06-11 ENCOUNTER — TELEPHONE (OUTPATIENT)
Dept: ENDOCRINOLOGY | Facility: CLINIC | Age: 44
End: 2024-06-11
Payer: MEDICAID

## 2024-06-11 NOTE — TELEPHONE ENCOUNTER
Left Voicemail (1st Attempt) for the patient to call back and schedule the following:    Appointment type: return  Provider: dr. moura  Return date: 6/10/2025  Specialty phone number: [phone]   Additonal Notes: Return in about 1 year (around 6/10/2025).     [facility]   Orthopedics, Podiatry, Sports Medicine, Ent ,Eye , Audiology, Adult Endocrine & Diabetes, Nutrition & Medication Therapy Management Specialties   [facility]

## 2024-06-16 ENCOUNTER — MYC MEDICAL ADVICE (OUTPATIENT)
Dept: FAMILY MEDICINE | Facility: CLINIC | Age: 44
End: 2024-06-16
Payer: MEDICAID

## 2024-06-21 ENCOUNTER — E-VISIT (OUTPATIENT)
Dept: URGENT CARE | Facility: CLINIC | Age: 44
End: 2024-06-21

## 2024-06-21 DIAGNOSIS — R30.0 DYSURIA: Primary | ICD-10-CM

## 2024-06-21 PROCEDURE — 99207 PR NON-BILLABLE SERV PER CHARTING: CPT | Performed by: PHYSICIAN ASSISTANT

## 2024-06-21 NOTE — PATIENT INSTRUCTIONS
Dear Kristel Wiley,     After reviewing your responses, I would like you to come in for a urine test to make sure we treat you correctly. This urine test is to evaluate you for a possible urinary tract infection, and should be scheduled for today or tomorrow. Schedule a Lab Only appointment here.     Lab appointments are not available at most locations on the weekends. If no Lab Only appointment is available, you should be seen in any of our convenient Walk-in or Urgent Care Centers, which can be found on our website here.     You will receive instructions with your results in [employer] once they are available.     If your symptoms worsen, you develop pain in your back or stomach, develop fevers, or are not improving in 5 days, please contact your primary care provider for an appointment or visit a Walk-in or Urgent Care Center to be seen.     Thanks again for choosing us as your health care partner,     Delfina Wagner PA-C

## 2024-06-22 ENCOUNTER — APPOINTMENT (OUTPATIENT)
Dept: LAB | Facility: CLINIC | Age: 44
End: 2024-06-22
Payer: MEDICAID

## 2024-07-18 DIAGNOSIS — E66.811 CLASS 1 OBESITY WITH SERIOUS COMORBIDITY AND BODY MASS INDEX (BMI) OF 33.0 TO 33.9 IN ADULT, UNSPECIFIED OBESITY TYPE: ICD-10-CM

## 2024-07-22 RX ORDER — PHENTERMINE HYDROCHLORIDE 15 MG/1
15 CAPSULE ORAL EVERY MORNING
Qty: 30 CAPSULE | Refills: 0 | Status: SHIPPED | OUTPATIENT
Start: 2024-07-22 | End: 2024-08-29

## 2024-07-25 ENCOUNTER — E-VISIT (OUTPATIENT)
Dept: URGENT CARE | Facility: CLINIC | Age: 44
End: 2024-07-25

## 2024-07-25 DIAGNOSIS — B37.31 CANDIDAL VULVOVAGINITIS: Primary | ICD-10-CM

## 2024-07-25 PROCEDURE — 99207 PR NON-BILLABLE SERV PER CHARTING: CPT | Performed by: NURSE PRACTITIONER

## 2024-07-25 RX ORDER — FLUCONAZOLE 150 MG/1
150 TABLET ORAL ONCE
Qty: 1 TABLET | Refills: 0 | Status: SHIPPED | OUTPATIENT
Start: 2024-07-25 | End: 2024-07-25

## 2024-07-25 NOTE — PATIENT INSTRUCTIONS
Thank you for choosing us for your care. I have placed an order for a prescription so that you can start treatment. View your full visit summary for details by clicking on the link below. Your pharmacist will able to address any questions you may have about the medication.     If you re not feeling better within 2-3 days, please schedule an appointment.  You can schedule an appointment right here in [facility], or call [phone]  If the visit is for the same symptoms as your eVisit, we ll refund the cost of your eVisit if seen within seven days.    Vaginal Yeast Infection: Care Instructions  Overview     A vaginal yeast infection is the growth of too many yeast cells in the vagina. This is a common problem. Itching, vaginal discharge and irritation, and other symptoms can bother you. But yeast infections don't often cause other health problems.  Some medicines can increase your risk of getting a yeast infection. These include antibiotics, hormones, and steroids. You may also be more likely to get a yeast infection if you are pregnant, have diabetes, douche, or wear tight clothes.  With treatment, most yeast infections get better in a few days.  Follow-up care is a key part of your treatment and safety. Be sure to make and go to all appointments, and call your doctor if you are having problems. It's also a good idea to know your test results and keep a list of the medicines you take.  How can you care for yourself at home?  Take your medicines exactly as prescribed. Call your doctor if you think you are having a problem with your medicine.  Ask your doctor about over-the-counter (OTC) medicines for yeast infections. If you use an OTC treatment, read and follow all instructions on the label.  Don't use tampons while using a vaginal cream or suppository. The tampons can absorb the medicine. Use pads instead.  Wear loose cotton clothing. Don't wear nylon or other fabric that holds body heat and moisture close to the  "skin.  Try sleeping without underwear.  Don't scratch. Relieve itching with a cold pack or a cool bath.  Don't wash your vulva more than once a day. Use plain water or a mild, unscented soap. Air-dry the vulva.  Change out of wet or damp clothes as soon as possible.  If you are using a vaginal medicine, don't have sex until you have finished your treatment. But if you do have sex, don't depend on a condom or diaphragm for birth control. The oil in some vaginal medicines weakens latex.  Don't douche or use powders, sprays, or perfumes in your vagina or on your vulva. These items can change the normal balance of organisms in your vagina.  When should you call for help?   Call your doctor now or seek immediate medical care if:    You have new or increased pain in your vagina or pelvis.   Watch closely for changes in your health, and be sure to contact your doctor if:    You have unexpected vaginal bleeding.     You have a fever.     You are not getting better after 2 days.     Your symptoms come back after you finish your medicines.   Where can you learn more?  Go to https://[email or web address].net/patiented  Enter F639 in the search box to learn more about \"Vaginal Yeast Infection: Care Instructions.\"  Current as of: November 27, 2023               Content Version: 14.0    [id number] [employer].   Care instructions adapted under license by your healthcare professional. If you have questions about a medical condition or this instruction, always ask your healthcare professional. [employer] disclaims any warranty or liability for your use of this information.      "

## 2024-07-26 ENCOUNTER — E-VISIT (OUTPATIENT)
Dept: URGENT CARE | Facility: CLINIC | Age: 44
End: 2024-07-26

## 2024-07-26 ENCOUNTER — MYC MEDICAL ADVICE (OUTPATIENT)
Dept: FAMILY MEDICINE | Facility: CLINIC | Age: 44
End: 2024-07-26

## 2024-07-26 DIAGNOSIS — N89.8 VAGINAL ITCHING: Primary | ICD-10-CM

## 2024-07-26 DIAGNOSIS — R21 RASH AND NONSPECIFIC SKIN ERUPTION: Primary | ICD-10-CM

## 2024-07-26 PROCEDURE — 99207 PR NON-BILLABLE SERV PER CHARTING: CPT | Performed by: NURSE PRACTITIONER

## 2024-07-26 NOTE — PATIENT INSTRUCTIONS
Dear Kristel Wiley,    We are sorry you are not feeling well. Based on the responses you provided, it is recommended that you be seen in-person in urgent care so we can better evaluate your symptoms. Please click here to find the nearest urgent care location to you.   You will not be charged for this Visit. Thank you for trusting us with your care.    DAVID Lam CNP

## 2024-07-30 ENCOUNTER — OFFICE VISIT (OUTPATIENT)
Dept: URGENT CARE | Facility: URGENT CARE | Age: 44
End: 2024-07-30
Payer: MEDICAID

## 2024-07-30 VITALS
SYSTOLIC BLOOD PRESSURE: 118 MMHG | TEMPERATURE: 98.2 F | RESPIRATION RATE: 16 BRPM | WEIGHT: 228 LBS | HEART RATE: 95 BPM | OXYGEN SATURATION: 100 % | DIASTOLIC BLOOD PRESSURE: 75 MMHG | BODY MASS INDEX: 32.71 KG/M2

## 2024-07-30 DIAGNOSIS — R30.0 DYSURIA: Primary | ICD-10-CM

## 2024-07-30 LAB
ALBUMIN UR-MCNC: NEGATIVE MG/DL
APPEARANCE UR: CLEAR
BILIRUB UR QL STRIP: NEGATIVE
CLUE CELLS: NORMAL
COLOR UR AUTO: YELLOW
GLUCOSE UR STRIP-MCNC: NEGATIVE MG/DL
HGB UR QL STRIP: NEGATIVE
KETONES UR STRIP-MCNC: NEGATIVE MG/DL
LEUKOCYTE ESTERASE UR QL STRIP: NEGATIVE
NITRATE UR QL: NEGATIVE
PH UR STRIP: 6 [PH] (ref 5–7)
SP GR UR STRIP: <=1.005 (ref 1–1.03)
TRICHOMONAS, WET PREP: NORMAL
UROBILINOGEN UR STRIP-ACNC: 0.2 E.U./DL
WBC'S/HIGH POWER FIELD, WET PREP: NORMAL
YEAST, WET PREP: NORMAL

## 2024-07-30 PROCEDURE — 87210 SMEAR WET MOUNT SALINE/INK: CPT | Performed by: PHYSICIAN ASSISTANT

## 2024-07-30 PROCEDURE — 99213 OFFICE O/P EST LOW 20 MIN: CPT | Performed by: PHYSICIAN ASSISTANT

## 2024-07-30 PROCEDURE — 81003 URINALYSIS AUTO W/O SCOPE: CPT | Performed by: PHYSICIAN ASSISTANT

## 2024-07-30 NOTE — PROGRESS NOTES
"  Assessment & Plan     Dysuria  Reassurance, normal wet prep and UA. Continue to monitor symptoms. Follow up as needed.     - Wet preparation  - UA Macroscopic with reflex to Microscopic and Culture          BMI  Estimated body mass index is 32.71 kg/m  as calculated from the following:    Height as of 1/16/24: 1.778 m (5' 10\").    Weight as of this encounter: 103.4 kg (228 lb).             No follow-ups on file.          Subjective   Chief Complaint   Patient presents with    Vaginal Problem     Treated for a yeast infection, does not feel like getting better then today felt like she had a bladder infection.        HPI     UTI    Onset of symptoms was 1day(s).  Course of illness is same  Severity moderate  Current and associated symptoms dysuria and frequency  Treatment and measures tried Increase fluid intake  Predisposing factors include none  Patient denies rigors and flank pain                            Objective    /75   Pulse 95   Temp 98.2  F (36.8  C) (Tympanic)   Resp 16   Wt 103.4 kg (228 lb)   SpO2 100%   BMI 32.71 kg/m    Body mass index is 32.71 kg/m .  Physical Exam  Constitutional:       General: She is not in acute distress.     Appearance: She is well-developed.   Psychiatric:         Behavior: Behavior normal.                    Signed Electronically by: Tess Laboy PA-C    "

## 2024-08-15 DIAGNOSIS — E66.811 CLASS 1 OBESITY WITH SERIOUS COMORBIDITY AND BODY MASS INDEX (BMI) OF 33.0 TO 33.9 IN ADULT, UNSPECIFIED OBESITY TYPE: ICD-10-CM

## 2024-08-16 NOTE — TELEPHONE ENCOUNTER
Pending Prescriptions:                       Disp   Refills    topiramate (TOPAMAX) 25 MG tablet [Pharmac*60 tab*0        Sig: TAKE ONE TABLET BY MOUTH ONCE DAILY    Routing refill request to provider for review/approval because:  Requested Prescriptions   Pending Prescriptions Disp Refills    topiramate (TOPAMAX) 25 MG tablet [Pharmacy Med Name: TOPIRAMATE 25MG TABS] 60 tablet 0     Sig: TAKE ONE TABLET BY MOUTH ONCE DAILY       Anti-Seizure Meds Protocol  Failed - 8/15/2024 10:57 PM        Failed - Review Authorizing provider's last note.      Refer to last progress notes: confirm request is for original authorizing provider (cannot be through other providers).          Failed - Has GFR on file in past 12 months and most recent value is normal        Failed - Medication indicated for associated diagnosis     Medication is associated with one or more of the following diagnoses:     Bipolar   Dementia   Depression   Epilepsy   Migraine   Seizure   Trigeminal Neuralgia   Cyclothymia          Passed - Normal ALT or AST on file in past 26 months     Recent Labs   Lab Test 01/12/23  1619   ALT 12     Recent Labs   Lab Test 01/12/23  1619   AST 16             Passed - Medication is active on med list        Passed - Recent (12 mo) or future (90 days) visit within the authorizing provider's specialty     The patient must have completed an in-person or virtual visit within the past 12 months or has a future visit scheduled within the next 90 days with the authorizing provider s specialty.  Urgent care and e-visits do not quality as an office visit for this protocol.          Passed - No active pregnancy on record        Passed - No positive pregnancy test in last 12 months           Kristel Vyas RN  [facility]

## 2024-08-19 RX ORDER — TOPIRAMATE 25 MG/1
25 TABLET, FILM COATED ORAL DAILY
Qty: 60 TABLET | Refills: 0 | Status: SHIPPED | OUTPATIENT
Start: 2024-08-19 | End: 2024-09-11

## 2024-08-20 ENCOUNTER — MYC MEDICAL ADVICE (OUTPATIENT)
Dept: FAMILY MEDICINE | Facility: CLINIC | Age: 44
End: 2024-08-20
Payer: MEDICAID

## 2024-08-20 DIAGNOSIS — F33.1 MAJOR DEPRESSIVE DISORDER, RECURRENT EPISODE, MODERATE (H): ICD-10-CM

## 2024-08-20 DIAGNOSIS — F41.1 GAD (GENERALIZED ANXIETY DISORDER): ICD-10-CM

## 2024-08-22 RX ORDER — ESCITALOPRAM OXALATE 20 MG/1
20 TABLET ORAL DAILY
Qty: 90 TABLET | Refills: 3 | OUTPATIENT
Start: 2024-08-22

## 2024-08-29 ENCOUNTER — MYC REFILL (OUTPATIENT)
Dept: FAMILY MEDICINE | Facility: CLINIC | Age: 44
End: 2024-08-29
Payer: MEDICAID

## 2024-08-29 DIAGNOSIS — E66.811 CLASS 1 OBESITY WITH SERIOUS COMORBIDITY AND BODY MASS INDEX (BMI) OF 33.0 TO 33.9 IN ADULT, UNSPECIFIED OBESITY TYPE: ICD-10-CM

## 2024-08-30 RX ORDER — PHENTERMINE HYDROCHLORIDE 15 MG/1
15 CAPSULE ORAL EVERY MORNING
Qty: 30 CAPSULE | Refills: 0 | Status: SHIPPED | OUTPATIENT
Start: 2024-08-30 | End: 2024-09-11

## 2024-08-30 RX ORDER — TOPIRAMATE 25 MG/1
25 TABLET, FILM COATED ORAL DAILY
Qty: 60 TABLET | Refills: 0 | OUTPATIENT
Start: 2024-08-30

## 2024-08-30 NOTE — TELEPHONE ENCOUNTER
Routing refill request to provider for review/approval because:  Drug not on the [facility] refill protocol   Last Written Prescription Date:  7/22/24  Last Fill Quantity: 30,  # refills: 0   Last office visit: 1/16/2024 ; last virtual visit: Visit date not found with prescribing provider:     Future Office Visit:   Next 5 appointments (look out 90 days)      Sep 11, 2024 5:00 PM  (Arrive by 4:55 PM)  Provider Visit with DAVID Lazo CNP  [facility] ([facility] ) [address] [place] [id number] [phone]             Asking for bridge refill until her 9/11/24 appointment.   Julie Behrendt RN

## 2024-08-31 ENCOUNTER — OFFICE VISIT (OUTPATIENT)
Dept: URGENT CARE | Facility: URGENT CARE | Age: 44
End: 2024-08-31
Payer: MEDICAID

## 2024-08-31 ENCOUNTER — TRANSFERRED RECORDS (OUTPATIENT)
Dept: HEALTH INFORMATION MANAGEMENT | Facility: CLINIC | Age: 44
End: 2024-08-31

## 2024-08-31 VITALS
SYSTOLIC BLOOD PRESSURE: 122 MMHG | WEIGHT: 227 LBS | HEART RATE: 100 BPM | RESPIRATION RATE: 16 BRPM | TEMPERATURE: 99.1 F | BODY MASS INDEX: 32.57 KG/M2 | OXYGEN SATURATION: 100 % | DIASTOLIC BLOOD PRESSURE: 85 MMHG

## 2024-08-31 DIAGNOSIS — R30.0 DYSURIA: Primary | ICD-10-CM

## 2024-08-31 LAB
ALBUMIN UR-MCNC: NEGATIVE MG/DL
APPEARANCE UR: CLEAR
BILIRUB UR QL STRIP: NEGATIVE
CLUE CELLS: PRESENT
COLOR UR AUTO: YELLOW
GLUCOSE UR STRIP-MCNC: NEGATIVE MG/DL
HGB UR QL STRIP: NEGATIVE
KETONES UR STRIP-MCNC: NEGATIVE MG/DL
LEUKOCYTE ESTERASE UR QL STRIP: NEGATIVE
NITRATE UR QL: NEGATIVE
PH UR STRIP: 7 [PH] (ref 5–7)
SP GR UR STRIP: 1.01 (ref 1–1.03)
TRICHOMONAS, WET PREP: ABNORMAL
UROBILINOGEN UR STRIP-ACNC: 0.2 E.U./DL
WBC'S/HIGH POWER FIELD, WET PREP: ABNORMAL
YEAST, WET PREP: ABNORMAL

## 2024-08-31 PROCEDURE — 81003 URINALYSIS AUTO W/O SCOPE: CPT | Performed by: PEDIATRICS

## 2024-08-31 PROCEDURE — 99213 OFFICE O/P EST LOW 20 MIN: CPT | Performed by: PEDIATRICS

## 2024-08-31 PROCEDURE — 87210 SMEAR WET MOUNT SALINE/INK: CPT | Performed by: PEDIATRICS

## 2024-08-31 RX ORDER — METRONIDAZOLE 500 MG/1
500 TABLET ORAL 2 TIMES DAILY
Qty: 14 TABLET | Refills: 0 | Status: SHIPPED | OUTPATIENT
Start: 2024-08-31 | End: 2024-09-07

## 2024-08-31 ASSESSMENT — ENCOUNTER SYMPTOMS
DYSURIA: 1
FREQUENCY: 1
CONSTITUTIONAL NEGATIVE: 1

## 2024-08-31 NOTE — PROGRESS NOTES
Patient presents with:  UTI: Burning and urinary frequency for about 4 days.      Clinical Decision Making:      ICD-10-CM    1. Dysuria  R30.0 UA Macroscopic with reflex to Microscopic and Culture     Wet prep - Clinic Collect      - Flagyl for 7 days for BV. Advised not to take in alcohol during this time.    There are no Patient Instructions on file for this visit.    HPI:  Kristel Wiley is a 44 year old female who presents today complaining of burning with urination and increased frequency. No lower back pain. No fevers at home. No nausea or vomiting. Has had UTI and BV before, feels  similar to then.    History obtained from the patient.    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 Iodine ablation with 53 mCi.  07/2016, 07/2017: Neck US neg.    Postoperative hypothyroidism 03/23/2018       Social History     Tobacco Use    Smoking status: Former     Current packs/day: 1.00     Average packs/day: 1 pack/day for 5.0 years (5.0 ttl pk-yrs)     Types: Cigarettes    Smokeless tobacco: Never   Substance Use Topics    Alcohol use: Yes     Comment: 1 drink per wk        Review of Systems   Constitutional: Negative.    Genitourinary:  Positive for dysuria and frequency.       Vitals:    08/31/24 1306   BP: 122/85   BP Location: Right arm   Patient Position: Sitting   Cuff Size: Adult Large   Pulse: 100   Resp: 16   Temp: 99.1  F (37.3  C)   TempSrc: Tympanic   SpO2: 100%   Weight: 103 kg (227 lb)       Physical Exam  Constitutional:       General: She is not in acute distress.     Appearance: Normal appearance. She is not toxic-appearing.   HENT:      Head: Normocephalic.      Nose: Nose normal.      Mouth/Throat:      Mouth: Mucous membranes are moist.      Pharynx: Oropharynx is clear.   Eyes:      Conjunctiva/sclera: Conjunctivae normal.   Cardiovascular:      Rate and Rhythm: Normal rate.   Pulmonary:      Effort: Pulmonary effort is normal.   Musculoskeletal:         General: Normal range of motion.      Cervical back: Normal range of motion.   Skin:     General: Skin is warm and dry.   Neurological:      Mental Status: She is alert.         Results:  Results for orders placed or performed in visit on 08/31/24   UA Macroscopic with reflex to Microscopic and Culture     Status: Normal    Specimen: Urine, Midstream   Result Value Ref Range    Color Urine Yellow Colorless, Straw, Light Yellow, Yellow    Appearance Urine Clear Clear    Glucose Urine Negative Negative mg/dL    Bilirubin Urine Negative Negative    Ketones Urine Negative Negative mg/dL    Specific Gravity Urine 1.010 1.003 - 1.035    Blood Urine Negative Negative    pH Urine 7.0 5.0 - 7.0    Protein Albumin Urine Negative Negative mg/dL    Urobilinogen Urine 0.2 0.2, 1.0 E.U./dL    Nitrite Urine Negative Negative    Leukocyte Esterase Urine Negative Negative    Narrative    Microscopic not indicated   Wet prep - Clinic Collect     Status: Abnormal    Specimen: Vagina; Swab   Result Value Ref Range    Trichomonas Absent Absent    Yeast Absent Absent    Clue Cells Present (A) Absent    WBCs/high power field 1+ (A)  None         At the end of the encounter, I discussed results, diagnosis, medications. Discussed indications for follow up if no improvement. Patient understood and agreed to plan. Patient was stable for discharge.    Gregg Hernandez MD, MPH

## 2024-09-11 ENCOUNTER — VIRTUAL VISIT (OUTPATIENT)
Dept: FAMILY MEDICINE | Facility: CLINIC | Age: 44
End: 2024-09-11
Payer: COMMERCIAL

## 2024-09-11 DIAGNOSIS — E66.811 CLASS 1 OBESITY WITH SERIOUS COMORBIDITY AND BODY MASS INDEX (BMI) OF 33.0 TO 33.9 IN ADULT, UNSPECIFIED OBESITY TYPE: ICD-10-CM

## 2024-09-11 DIAGNOSIS — G47.00 INSOMNIA, UNSPECIFIED TYPE: Primary | ICD-10-CM

## 2024-09-11 PROCEDURE — 99214 OFFICE O/P EST MOD 30 MIN: CPT | Mod: 95 | Performed by: NURSE PRACTITIONER

## 2024-09-11 PROCEDURE — G2211 COMPLEX E/M VISIT ADD ON: HCPCS | Mod: 95 | Performed by: NURSE PRACTITIONER

## 2024-09-11 RX ORDER — TOPIRAMATE 25 MG/1
25 TABLET, FILM COATED ORAL 2 TIMES DAILY
Qty: 60 TABLET | Refills: 1 | Status: SHIPPED | OUTPATIENT
Start: 2024-09-11 | End: 2024-09-12

## 2024-09-11 RX ORDER — TRAZODONE HYDROCHLORIDE 50 MG/1
25-50 TABLET, FILM COATED ORAL AT BEDTIME
Qty: 30 TABLET | Refills: 2 | Status: SHIPPED | OUTPATIENT
Start: 2024-09-11

## 2024-09-11 RX ORDER — PHENTERMINE HYDROCHLORIDE 30 MG/1
30 CAPSULE ORAL EVERY MORNING
Qty: 30 CAPSULE | Refills: 1 | Status: SHIPPED | OUTPATIENT
Start: 2024-09-11

## 2024-09-11 RX ORDER — PHENTERMINE HYDROCHLORIDE 15 MG/1
15 CAPSULE ORAL EVERY MORNING
Qty: 30 CAPSULE | Refills: 0 | Status: CANCELLED | OUTPATIENT
Start: 2024-09-11

## 2024-09-11 ASSESSMENT — PATIENT HEALTH QUESTIONNAIRE - PHQ9: SUM OF ALL RESPONSES TO PHQ QUESTIONS 1-9: 19

## 2024-09-11 NOTE — PROGRESS NOTES
Kristel is a 44 year old who is being evaluated via a billable video visit.    How would you like to obtain your AVS? MyChart  If the video visit is dropped, the invitation should be resent by: Text to cell phone: [phone]  Will anyone else be joining your video visit? No      Assessment & Plan     Class 1 obesity with serious comorbidity and body mass index (BMI) of 33.0 to 33.9 in adult, unspecified obesity type  Doing well with the medication and lifestyle will do a trial of increasing the phentermine to 30 mg for added benefit.  Risks and benefits of medication discussed, written information provided in AVS.  Recheck in 1 to 2 months, sooner if needed.  - topiramate (TOPAMAX) 25 MG tablet; Take 1 tablet (25 mg) by mouth 2 times daily.  - phentermine 30 MG capsule; Take 1 capsule (30 mg) by mouth every morning.    Insomnia, unspecified type  Chronic insomnia, unsure if made worse by phentermine.  Plan a trial of trazodone as needed for sleep.  Risks and benefits of medication discussed, written information provided in AVS.  - traZODone (DESYREL) 50 MG tablet; Take 0.5-1 tablets (25-50 mg) by mouth at bedtime.    The longitudinal plan of care for the diagnosis(es)/condition(s) as documented were addressed during this visit. Due to the added complexity in care, I will continue to support Kristel in the subsequent management and with ongoing continuity of care.    Subjective   Kristel is a 44 year old, presenting for the following health issues:  Recheck Medication        9/11/2024     4:30 PM   Additional Questions   Roomed by bina   Accompanied by self         9/11/2024    12:05 PM   Patient Reported Additional Medications   Patient reports taking the following new medications no new meds     Video Start Time:  5:20 PM     History of Present Illness       Reason for visit:  Med refills    She eats 0-1 servings of fruits and vegetables daily.She consumes 1 sweetened beverage(s) daily.She exercises with enough  effort to increase her heart rate 9 or less minutes per day.  She exercises with enough effort to increase her heart rate 3 or less days per week.   She is taking medications regularly.       Medication Followup of weight loss   Taking Medication as prescribed: yes  Side Effects:  None  Medication Helping Symptoms:  yes    Trouble falling asleep and staying asleep  Most nights  Generally 4-5 hours of sleep per night not at one time  Ongoing for years      Review of Systems  Constitutional, HEENT, cardiovascular, pulmonary, gi and gu systems are negative, except as otherwise noted.      Objective           Vitals:  No vitals were obtained today due to virtual visit.    Physical Exam   GENERAL: alert and no distress  EYES: Eyes grossly normal to inspection.  No discharge or erythema, or obvious scleral/conjunctival abnormalities.  RESP: No audible wheeze, cough, or visible cyanosis.    SKIN: Visible skin clear. No significant rash, abnormal pigmentation or lesions.  NEURO: Cranial nerves grossly intact.  Mentation and speech appropriate for age.  PSYCH: Appropriate affect, tone, and pace of words        Video-Visit Details    Type of service:  Video Visit   Video End Time:5:31 PM  Originating Location (pt. Location): Home  Distant Location (provider location):  On-site  Platform used for Video Visit: Edson  Signed Electronically by: DAVID Lazo CNP

## 2024-09-11 NOTE — PROGRESS NOTES
"Kristel is a 44 year old who is being evaluated via a billable video visit.    {ROOMING STAFF complete during rooming of virtual visit (Optional):947896}  {If patient encounters technical issues they should call [phone] :843704}    {PROVIDER CHARTING PREFERENCE:981692}    Subjective   Kristel is a 44 year old, presenting for the following health issues:  Recheck Medication      9/11/2024    12:05 PM   Additional Questions   Roomed by Argelia JARA   Accompanied by self         9/11/2024    12:05 PM   Patient Reported Additional Medications   Patient reports taking the following new medications no new meds     Video Start Time: {video visit start/end time for provider to select:120122}    History of Present Illness       Reason for visit:  Med refills    She eats 0-1 servings of fruits and vegetables daily.She consumes 1 sweetened beverage(s) daily.She exercises with enough effort to increase her heart rate 9 or less minutes per day.  She exercises with enough effort to increase her heart rate 3 or less days per week.   She is taking medications regularly.       {SUPERLIST (Optional):235879}  {additonal problems for provider to add (Optional):762942}    {ROS Picklists (Optional):094257}      Objective           Vitals:  No vitals were obtained today due to virtual visit.    Physical Exam   {video visit exam brief selected:403497}    {Diagnostic Test Results (Optional):380900}      Video-Visit Details    Type of service:  Video Visit   Video End Time:{video visit start/end time for provider to select:682733}  Originating Location (pt. Location): {video visit patient location:372081::\"Home\"}  {PROVIDER LOCATION On-site should be selected for visits conducted from your clinic location or adjoining [facility] hospital, academic office, or other nearby [facility] building. Off-site should be selected for all other provider locations, including home:648484}  Distant Location (provider location):  {virtual location " "provider:055016}  Platform used for Video Visit: {Virtual Visit Platforms:527734::\"[employer]\"}  Signed Electronically by: DAVID Lazo CNP  {Email feedback regarding this note to [email or web address]   :404245}  "

## 2024-09-12 RX ORDER — TOPIRAMATE 25 MG/1
25 TABLET, FILM COATED ORAL 2 TIMES DAILY
Qty: 180 TABLET | Refills: 1 | Status: SHIPPED | OUTPATIENT
Start: 2024-09-12

## 2024-09-13 ENCOUNTER — LAB (OUTPATIENT)
Dept: LAB | Facility: CLINIC | Age: 44
End: 2024-09-13
Payer: COMMERCIAL

## 2024-09-13 ENCOUNTER — MYC MEDICAL ADVICE (OUTPATIENT)
Dept: FAMILY MEDICINE | Facility: CLINIC | Age: 44
End: 2024-09-13

## 2024-09-13 ENCOUNTER — HOSPITAL ENCOUNTER (OUTPATIENT)
Dept: MAMMOGRAPHY | Facility: CLINIC | Age: 44
Discharge: HOME | End: 2024-09-13
Attending: NURSE PRACTITIONER | Admitting: NURSE PRACTITIONER
Payer: COMMERCIAL

## 2024-09-13 DIAGNOSIS — R53.83 FATIGUE, UNSPECIFIED TYPE: ICD-10-CM

## 2024-09-13 DIAGNOSIS — D80.2 IGA DEFICIENCY (H): ICD-10-CM

## 2024-09-13 DIAGNOSIS — C73 PAPILLARY THYROID CARCINOMA (H): ICD-10-CM

## 2024-09-13 DIAGNOSIS — Z12.31 ENCOUNTER FOR SCREENING MAMMOGRAM FOR BREAST CANCER: ICD-10-CM

## 2024-09-13 DIAGNOSIS — D80.2 IGA DEFICIENCY (H): Primary | ICD-10-CM

## 2024-09-13 DIAGNOSIS — E89.0 POSTOPERATIVE HYPOTHYROIDISM: ICD-10-CM

## 2024-09-13 LAB
HBA1C MFR BLD: 5.3 %
T4 FREE SERPL-MCNC: 1.51 NG/DL (ref 0.9–1.7)
TSH SERPL DL<=0.005 MIU/L-ACNC: 0.05 UIU/ML (ref 0.3–4.2)

## 2024-09-13 PROCEDURE — 77063 BREAST TOMOSYNTHESIS BI: CPT

## 2024-09-13 PROCEDURE — 84432 ASSAY OF THYROGLOBULIN: CPT | Mod: 90

## 2024-09-13 PROCEDURE — 99000 SPECIMEN HANDLING OFFICE-LAB: CPT

## 2024-09-13 PROCEDURE — 84443 ASSAY THYROID STIM HORMONE: CPT

## 2024-09-13 PROCEDURE — 84439 ASSAY OF FREE THYROXINE: CPT

## 2024-09-13 PROCEDURE — 83550 IRON BINDING TEST: CPT

## 2024-09-13 PROCEDURE — 36415 COLL VENOUS BLD VENIPUNCTURE: CPT

## 2024-09-13 PROCEDURE — 83036 HEMOGLOBIN GLYCOSYLATED A1C: CPT

## 2024-09-13 PROCEDURE — 82784 ASSAY IGA/IGD/IGG/IGM EACH: CPT

## 2024-09-13 PROCEDURE — 82306 VITAMIN D 25 HYDROXY: CPT

## 2024-09-13 PROCEDURE — 83540 ASSAY OF IRON: CPT

## 2024-09-13 PROCEDURE — 86800 THYROGLOBULIN ANTIBODY: CPT | Mod: 90

## 2024-09-13 NOTE — TELEPHONE ENCOUNTER
Pls see [employer] message below.     Orders pended, message routed to provider for consideration.   Julie Behrendt RN       [facility]

## 2024-09-16 DIAGNOSIS — E89.0 POSTOPERATIVE HYPOTHYROIDISM: ICD-10-CM

## 2024-09-16 LAB
IRON BINDING CAPACITY (ROCHE): 329 UG/DL (ref 240–430)
IRON SATN MFR SERPL: 11 % (ref 15–46)
IRON SERPL-MCNC: 37 UG/DL (ref 37–145)

## 2024-09-16 RX ORDER — LEVOTHYROXINE SODIUM 150 UG/1
150 TABLET ORAL DAILY
Qty: 90 TABLET | Refills: 3 | Status: SHIPPED | OUTPATIENT
Start: 2024-09-16

## 2024-09-16 NOTE — RESULT ENCOUNTER NOTE
Hello -    Here are my comments about your recent results:  -TSH (thyroid stimulating hormone) is abnormal suggesting you are currently overreplaced.  ADVISE: changing your medication dose to 150 micrograms/day six days a week and take half a tablet one day a week  and rechecking your TSH with a lab appointment in 8 weeks.  For additional lab test information, labtestsonline.org is an excellent reference..    Please let us know if you have any questions or concerns.     Regards,  Lorena Sandoval MD

## 2024-09-17 ENCOUNTER — LAB (OUTPATIENT)
Dept: LAB | Facility: CLINIC | Age: 44
End: 2024-09-17
Payer: COMMERCIAL

## 2024-09-17 ENCOUNTER — OFFICE VISIT (OUTPATIENT)
Dept: URGENT CARE | Facility: URGENT CARE | Age: 44
End: 2024-09-17
Payer: COMMERCIAL

## 2024-09-17 VITALS
BODY MASS INDEX: 32.57 KG/M2 | TEMPERATURE: 97.8 F | WEIGHT: 227 LBS | HEART RATE: 78 BPM | OXYGEN SATURATION: 100 % | RESPIRATION RATE: 14 BRPM

## 2024-09-17 DIAGNOSIS — R53.83 FATIGUE, UNSPECIFIED TYPE: ICD-10-CM

## 2024-09-17 DIAGNOSIS — N76.0 BV (BACTERIAL VAGINOSIS): Primary | ICD-10-CM

## 2024-09-17 DIAGNOSIS — B96.89 BV (BACTERIAL VAGINOSIS): Primary | ICD-10-CM

## 2024-09-17 DIAGNOSIS — R30.0 DYSURIA: ICD-10-CM

## 2024-09-17 LAB
CLUE CELLS: PRESENT
IGA SERPL-MCNC: 75 MG/DL (ref 84–499)
RBC #/AREA URNS AUTO: NORMAL /HPF
TRICHOMONAS, WET PREP: ABNORMAL
VIT D+METAB SERPL-MCNC: 25 NG/ML (ref 20–50)
WBC #/AREA URNS AUTO: NORMAL /HPF
WBC'S/HIGH POWER FIELD, WET PREP: ABNORMAL
YEAST, WET PREP: ABNORMAL

## 2024-09-17 PROCEDURE — 36415 COLL VENOUS BLD VENIPUNCTURE: CPT

## 2024-09-17 PROCEDURE — 87210 SMEAR WET MOUNT SALINE/INK: CPT | Performed by: PHYSICIAN ASSISTANT

## 2024-09-17 PROCEDURE — 82607 VITAMIN B-12: CPT

## 2024-09-17 PROCEDURE — 99213 OFFICE O/P EST LOW 20 MIN: CPT | Performed by: PHYSICIAN ASSISTANT

## 2024-09-17 PROCEDURE — 81015 MICROSCOPIC EXAM OF URINE: CPT | Performed by: PHYSICIAN ASSISTANT

## 2024-09-17 RX ORDER — CLINDAMYCIN HCL 300 MG
300 CAPSULE ORAL 2 TIMES DAILY
Qty: 14 CAPSULE | Refills: 0 | Status: SHIPPED | OUTPATIENT
Start: 2024-09-17 | End: 2024-09-24

## 2024-09-17 NOTE — PROGRESS NOTES
"  Assessment & Plan     BV (bacterial vaginosis)  Will treat with clindamycin x 7 days. Return to clinic if symptoms worsen or do not improve; otherwise follow up as needed     - clindamycin (CLEOCIN) 300 MG capsule; Take 1 capsule (300 mg) by mouth 2 times daily for 7 days.    Dysuria    - Wet prep - Clinic Collect  - UA Microscopic with Reflex to Culture          BMI  Estimated body mass index is 32.57 kg/m  as calculated from the following:    Height as of 1/16/24: 1.778 m (5' 10\").    Weight as of this encounter: 103 kg (227 lb).             Return in about 1 week (around 9/24/2024), or if symptoms worsen or fail to improve.          Subjective   Chief Complaint   Patient presents with    Urinary Problem     Pt having burning and frequency, recently treated for BV       HPI     UTI    Onset of symptoms was 3day(s).  Course of illness is same  Severity mild/mod  Current and associated symptoms burning, frequency   Treatment and measures tried Increase fluid intake  Predisposing factors include none  Patient denies fever                        Objective    Pulse 78   Temp 97.8  F (36.6  C) (Tympanic)   Resp 14   Wt 103 kg (227 lb)   SpO2 100%   BMI 32.57 kg/m    Body mass index is 32.57 kg/m .  Physical Exam  Constitutional:       General: She is not in acute distress.     Appearance: She is well-developed.   Psychiatric:         Behavior: Behavior normal.                    Signed Electronically by: Tess Laboy PA-C    "

## 2024-09-18 ENCOUNTER — TRANSFERRED RECORDS (OUTPATIENT)
Dept: HEALTH INFORMATION MANAGEMENT | Facility: CLINIC | Age: 44
End: 2024-09-18
Payer: COMMERCIAL

## 2024-09-18 LAB — VIT B12 SERPL-MCNC: 269 PG/ML (ref 232–1245)

## 2024-09-19 ENCOUNTER — MYC MEDICAL ADVICE (OUTPATIENT)
Dept: FAMILY MEDICINE | Facility: CLINIC | Age: 44
End: 2024-09-19
Payer: COMMERCIAL

## 2024-09-19 DIAGNOSIS — E53.8 VITAMIN B12 DEFICIENCY (NON ANEMIC): Primary | ICD-10-CM

## 2024-09-19 LAB — SCANNED LAB RESULT: NORMAL

## 2024-09-23 NOTE — TELEPHONE ENCOUNTER
Spoke with patient directly.  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  Starts physical therapy next week.    Discussed options:   1: will prescribe a muscle relaxer, risks discussed and understands she should not take this while working. Understands can cause sedation    2: will provide letter with restrictions: No overhead work or lifting, no lifting >10 pounds, no repetitive motion with the right shoulder.    3: will order an intra-articular injection under xray. Will mychart Phone number to schedule.        She will [employer] fax number for letter.     DAVID Friend, CNP  Orthopedic Surgery       No abnormalities noted

## 2024-09-24 RX ORDER — CYANOCOBALAMIN 1000 UG/ML
1000 INJECTION, SOLUTION INTRAMUSCULAR; SUBCUTANEOUS
Status: ACTIVE | OUTPATIENT
Start: 2024-09-24 | End: 2025-09-19

## 2024-11-03 ENCOUNTER — OFFICE VISIT (OUTPATIENT)
Dept: URGENT CARE | Facility: URGENT CARE | Age: 44
End: 2024-11-03
Payer: COMMERCIAL

## 2024-11-03 VITALS
DIASTOLIC BLOOD PRESSURE: 79 MMHG | SYSTOLIC BLOOD PRESSURE: 118 MMHG | OXYGEN SATURATION: 100 % | BODY MASS INDEX: 33.29 KG/M2 | HEART RATE: 96 BPM | WEIGHT: 232 LBS

## 2024-11-03 DIAGNOSIS — H60.391 BACTERIAL EXTERNAL EAR INFECTION, RIGHT: Primary | ICD-10-CM

## 2024-11-03 PROCEDURE — 99213 OFFICE O/P EST LOW 20 MIN: CPT | Performed by: NURSE PRACTITIONER

## 2024-11-03 RX ORDER — CIPROFLOXACIN AND DEXAMETHASONE 3; 1 MG/ML; MG/ML
4 SUSPENSION/ DROPS AURICULAR (OTIC) 2 TIMES DAILY
Qty: 7.5 ML | Refills: 0 | Status: SHIPPED | OUTPATIENT
Start: 2024-11-03 | End: 2024-11-10

## 2024-11-03 ASSESSMENT — PAIN SCALES - GENERAL: PAINLEVEL_OUTOF10: SEVERE PAIN (6)

## 2024-11-03 NOTE — PROGRESS NOTES
SUBJECTIVE:   Kristel Wiley is a 44 year old female presenting with a chief complaint of   Chief Complaint   Patient presents with    Ear Problem     Right ear pain started 3 weeks ago and has gotten worse, ibuprofen and decongestant meds don't work      No cold like symptoms, no fevers.   Pt notes swollen nodes on right neck.  Pt has hx of eczema of ear canals. When eczema flares pt has increased flaky skin in ears with drainage.   Pt has noted increased flakes in the left ear, and drainage from the right ear with pain.    Past Medical History:   Diagnosis Date    Depressive disorder     Interstitial cystitis 2021    Major depressive disorder, recurrent episode, moderate (H) 2007    Papillary adenocarcinoma of thyroid (H) 2014    Overview:  2013: R thyroid lobectomy 1.7 cm follicular variant papillary cancer, MACIS 3.6 2014: Completion Thyroidectomy 0.3 cm incidental papillary cancer left lobe. Iodine ablation with 53 mCi.  2016, 2017: Neck US neg.    Postoperative hypothyroidism 2018     Family History   Problem Relation Age of Onset    Skin Cancer Mother     Other Cancer Mother         Skin cancer    Hypertension Father     Arrhythmia Father     Lymphoma Maternal Grandmother     Cerebrovascular Disease Maternal Grandmother     Diabetes Paternal Grandfather             Asthma Son     Arthritis Daughter      Current Outpatient Medications   Medication Sig Dispense Refill    escitalopram (LEXAPRO) 20 MG tablet Take 1 tablet (20 mg) by mouth daily 90 tablet 3    fluticasone (FLONASE) 50 MCG/ACT nasal spray SHAKE LIQUID AND USE 1 SPRAY IN EACH NOSTRIL DAILY 16 g 1    levothyroxine (SYNTHROID/LEVOTHROID) 150 MCG tablet Take 1 tablet (150 mcg) by mouth daily. Six days a week and take half tablet (75 mcg) one day a week. 90 tablet 3    phentermine 30 MG capsule Take 1 capsule (30 mg) by mouth every morning. 30 capsule 1    topiramate (TOPAMAX) 25 MG tablet TAKE 1 TABLET(25 MG) BY  MOUTH TWICE DAILY 180 tablet 1    traZODone (DESYREL) 50 MG tablet Take 0.5-1 tablets (25-50 mg) by mouth at bedtime. 30 tablet 2    valACYclovir (VALTREX) 1000 mg tablet TAKE 2 TABLETS(2000 MG) BY MOUTH TWICE DAILY 12 tablet 3     Social History     Tobacco Use    Smoking status: Former     Current packs/day: 1.00     Average packs/day: 1 pack/day for 5.0 years (5.0 ttl pk-yrs)     Types: Cigarettes    Smokeless tobacco: Never   Substance Use Topics    Alcohol use: Yes     Comment: 1 drink per wk       OBJECTIVE  /79 (BP Location: Right arm, Patient Position: Sitting, Cuff Size: Adult Regular)   Pulse 96   Wt 105.2 kg (232 lb)   SpO2 100%   BMI 33.29 kg/m      Physical Exam  Constitutional:       Appearance: Normal appearance.   HENT:      Right Ear: Drainage and tenderness present. A middle ear effusion is present.      Ears:      Comments: Both canals with dry flaking skin. Right ear canal is red and has some clear drainage.   Both ears with fluid behind the TM, clear non purulent.  Musculoskeletal:      Cervical back: Neck supple.   Lymphadenopathy:      Cervical: Cervical adenopathy (Righ cervical adenopathy) present.   Neurological:      Mental Status: She is alert.         ASSESSMENT:  1. Bacterial external ear infection, right (Primary)    - ciprofloxacin-dexAMETHasone (CIPRODEX) 0.3-0.1 % otic suspension; Place 4 drops into the right ear 2 times daily for 7 days.  Dispense: 7.5 mL; Refill: 0      PLAN:  Ear drops to affected ear twice a day for 7 days.  Flonase may help clear the fluid behind the ear drum. Or oral zyrtec or benadryl.  Follow up with primary provider in the next 3-7 if not improving as expected.

## 2024-11-03 NOTE — PATIENT INSTRUCTIONS
Ear drops to affected ear twice a day for 7 days.  Flonase may help clear the fluid behind the ear drum. Or oral zyrtec or benadryl.  Follow up with primary provider in the next 3-7 if not improving as expected.

## 2024-11-06 NOTE — PATIENT INSTRUCTIONS
If you have any questions regarding your visit, Please contact your care team.     [facility]: [phone]    To Schedule an Appointment 24/7  Call: [phone]     Ko Villatoro MD  Medical Director        Maricruz-GENESIS Murphy-RN  Bev Black-Surgery Scheduler  Agnes-Surgery Scheduler               Tuesday-[place]  7:30 a.m-4:30 p.m    Thursday-[place]  7:30 a.m-4:30 p.m    Typical Surgery Days: Tuesday or Friday [facility] [place]  [address] [place] [address]  PH: [phone]    Imaging Scheduling all locations  PH: [phone]     [facility] Labor and Delivery  [address] [place] [address] [place] [address]  PH: [phone]    [facility]  [address] [place] [address]  PH: [phone] Fax      **Surgeries** Our Surgery Schedulers will contact you to schedule. If you do not receive a call within 3 business days, please call [phone].    Urgent Care locations:  [facility] Monday-Friday  10 am - 8 pm  Saturday and Sunday   9 am - 5 pm  Monday-Friday   10 am - 8 pm  Saturday and Sunday   9 am - 5 pm   [phone]   If you need a medication refill, please contact your pharmacy. Please allow 3 business days for your refill to be completed.  As always, Thank you for trusting us with your healthcare needs!    see additional instructions from your care team below

## 2024-11-07 ENCOUNTER — OFFICE VISIT (OUTPATIENT)
Dept: OBGYN | Facility: CLINIC | Age: 44
End: 2024-11-07
Payer: COMMERCIAL

## 2024-11-07 VITALS
SYSTOLIC BLOOD PRESSURE: 120 MMHG | WEIGHT: 232 LBS | DIASTOLIC BLOOD PRESSURE: 80 MMHG | OXYGEN SATURATION: 98 % | HEART RATE: 93 BPM | BODY MASS INDEX: 33.29 KG/M2

## 2024-11-07 DIAGNOSIS — Z80.3 FAMILY HISTORY OF MALIGNANT NEOPLASM OF BREAST: ICD-10-CM

## 2024-11-07 DIAGNOSIS — N85.00 ENDOMETRIAL HYPERPLASIA WITHOUT ATYPIA: Primary | ICD-10-CM

## 2024-11-07 DIAGNOSIS — R92.30 DENSE BREAST TISSUE ON MAMMOGRAM, UNSPECIFIED TYPE: ICD-10-CM

## 2024-11-07 PROCEDURE — 99215 OFFICE O/P EST HI 40 MIN: CPT | Performed by: OBSTETRICS & GYNECOLOGY

## 2024-11-07 NOTE — PROGRESS NOTES
[facility] SURGERY PLANNING/SCHEDULING WORKSHEET                                                     Kristel Wiley                :  1980  MRN:  [id number]  Home Phone [phone]   Work Phone Not on file.   Mobile [phone]         Surgeon: Ko Villatoro MD    DIAGNOSIS:   Endometrial Hyperplasia without atypia    SURGICAL PROCEDURE:  TOTAL LAPAROSCOPIC HYSTERECTOMY BILATERAL SALPINGO-OOPHRECTOMY     Surgery Location:  [facility]  Patient Surgery Class:  SDS  Length of Procedure:  120 minutes  Type of anesthesia:  General    Multi-surgeon case: No  Additional OR technician needed for assistance?:   Yes  Vendor needed: No  Positioning:  See Preference Card  Laterality:  NA  Date requested:        Special Equipment: None  Special Instructions for patient:  Not needed  Precautions:  NONE  :  NOT NEEDED    Sterilization consent:   Hysterectomy consent signed .    Preop: Pre-op options: PCP  Pre-surgery consult needed:  Not applicable.  Postop evaluation needed:  2 weeks    ALLERGIES:   Allergies   Allergen Reactions    Cephalexin Rash    Cephalosporins Rash     Cephalexin    Clavulanic Acid Diarrhea     Bad diarrhea with nausea and vomiting    Sulfa Antibiotics Rash and Unknown      BMI:Body mass index is 33.29 kg/m .      The proposed surgical procedure is considered LOW risk.      Ko Villatoro MD    2024

## 2024-11-07 NOTE — PROGRESS NOTES
Kristel is a 44 year old   is here today to discuss hysterectomy.  She had an ablation last year and endometrial hyperplasia was found on the biopsies done at the time of surgery.  She had lost her insurance but is now ready to proceed.  She is also concerned that her mammogram showed dense breasts and her Mother was just diagnosed with breast cancer.      ROS: Pertinent ROS as above.    Gyn Hx:     Past Medical History:   Diagnosis Date    Depressive disorder     Interstitial cystitis 2021    Major depressive disorder, recurrent episode, moderate (H) 2007    Papillary adenocarcinoma of thyroid (H) 2014    Overview:  2013: R thyroid lobectomy 1.7 cm follicular variant papillary cancer, MACIS 3.6 2014: Completion Thyroidectomy 0.3 cm incidental papillary cancer left lobe. Iodine ablation with 53 mCi.  2016, 2017: Neck US neg.    Postoperative hypothyroidism 2018     Past Surgical History:   Procedure Laterality Date    BIOPSY      COLONOSCOPY  2018    DILATION AND CURETTAGE, HYSTEROSCOPY, ABLATE ENDOMETRIUM, COMBINED N/A 2023    Procedure: DILATION, CERVIX, WITH ENDOMETRIAL ABLATION, hysteroscopy, dilation and curettage, polypectomy;  Surgeon: Mulu Orozco MD;  Location: [facility] OR    HC INSERTION INTRAUTERINE DEVICE      HC REMOVE INTRAUTERINE DEVICE      HEMORRHOIDECTOMY EXTERNAL N/A 3/27/2023    Procedure: Combined internal and external hemorrhoidectomy;  Surgeon: Feroz Perry DO;  Location: [facility] OR    THYROIDECTOMY          TONSILLECTOMY      TUBAL LIGATION           ALL/Meds: Her medication and allergy histories were reviewed and are documented in their appropriate chart areas.    SH: Reviewed and documented in the appropriate area of the chart.  FH:  Her family history is reviewed and updated in the chart, today.  PMH: Her past medical, surgical, and obstetric histories were reviewed and updated today in the appropriate  chart areas.    PE: /80 (BP Location: Left arm, Patient Position: Sitting, Cuff Size: Adult Large)   Pulse 93   Wt 105.2 kg (232 lb)   SpO2 98%   BMI 33.29 kg/m    Body mass index is 33.29 kg/m .    Pertinent Physical exam findings:    General Appearance:  healthy, alert, active, no distress  U/S:  4/2023  UTERUS: 8.3 x 5.0 x 4.7 cm. Normal in size with no masses. Retroverted position again noted.     ENDOMETRIUM: 2 mm. Normal smooth endometrium. Previously described possible endometrial polyp no longer visualized.     RIGHT OVARY: 3.4 x 1.9 x 1.8 cm. Normal.     LEFT OVARY: 2.9 x 1.7 x 1.7 cm. Normal.    Reviewed the risks, benefits, and alternatives of hysterectomy including but not limited to bleeding, infection, injury to bowel,bladder and other structures.  The patient is aware that hysterectomy will render her sterile and unable to have further children.  We discussed the different routes of surgery including abdominal, vaginal, and laparoscopic and the possibility that the route of surgery may change during the procedure.  We discussed both total and supracervical hysterectomy and the benefits and contraindications involved.  We discussed ovarian sparing as well as oophrectomy. Reviewed pre and post op course. Patient was given the opportunity to ask questions and have them answered. The patient wants to proceed with hysterectomy and BILATERAL SALPINGO-OOPHRECTOMY.  We would plan to do estrogen replacement, at least for a short period of time.        A/P:(N85.00) Endometrial hyperplasia without atypia  (primary encounter diagnosis)  Comment: 45 minutes spent on the date of the encounter doing chart review, review of test results, patient visit, and documentation    Plan: TOTAL LAPAROSCOPIC HYSTERECTOMY BILATERAL SALPINGO-OOPHRECTOMY     (R92.30) Dense breast tissue on mammogram, unspecified type  (Z80.3) Family history of malignant neoplasm of breast  Comment:   Plan: MR Breast Bilateral w/o & w  Contrast                   - No orders of the defined types were placed in this encounter.

## 2024-11-08 ENCOUNTER — TELEPHONE (OUTPATIENT)
Dept: OBGYN | Facility: CLINIC | Age: 44
End: 2024-11-08
Payer: COMMERCIAL

## 2024-11-08 ENCOUNTER — MYC MEDICAL ADVICE (OUTPATIENT)
Dept: OBGYN | Facility: CLINIC | Age: 44
End: 2024-11-08
Payer: COMMERCIAL

## 2024-11-08 NOTE — TELEPHONE ENCOUNTER
[facility] SURGERY PLANNING/SCHEDULING WORKSHEET                                                     Kristel Wiley                :  1980  MRN:  [id number]  Home Phone [phone]   Work Phone Not on file.   Mobile [phone]         Surgeon: Ko Villatoro MD     DIAGNOSIS:   Endometrial Hyperplasia without atypia     SURGICAL PROCEDURE:  TOTAL LAPAROSCOPIC HYSTERECTOMY BILATERAL SALPINGO-OOPHRECTOMY      Surgery Location:  [facility]  Patient Surgery Class:  SDS  Length of Procedure:  120 minutes  Type of anesthesia:  General     Multi-surgeon case: No  Additional OR technician needed for assistance?:   Yes  Vendor needed: No  Positioning:  See Preference Card  Laterality:  NA  Date requested:         Special Equipment: None  Special Instructions for patient:  Not needed  Precautions:  NONE  :  NOT NEEDED     Sterilization consent:   Hysterectomy consent signed .     Preop: Pre-op options: PCP  Pre-surgery consult needed:  Not applicable.  Postop evaluation needed:  2 weeks     ALLERGIES:   Allergies         Allergies   Allergen Reactions    Cephalexin Rash    Cephalosporins Rash       Cephalexin    Clavulanic Acid Diarrhea       Bad diarrhea with nausea and vomiting    Sulfa Antibiotics Rash and Unknown         BMI:Body mass index is 33.29 kg/m .       The proposed surgical procedure is considered LOW risk.        Ko Villatoro MD    2024  SURGERY SCHEDULING AND PRECERTIFICATION    Medical Record Number: [id number]  Kristel Wiley  YOB: 1980   Phone: [phone] (home)   Primary Provider: Yulissa Nogueira    Reason for Admit:  ICD-10 CODE:  N85.00    Surgeon: Ko Villatoro MD  Surgical Procedure: TOTAL LAPAROSCOPIC HYSTERECTOMY BILATERAL SALPINGO-OOPHRECTOMY     Date of Surgery  Time of Surgery 7:30am  Surgery to be performed at:  [facility]  Status: Outpatient  Type of Anesthesia Anticipated: General    Sterilization consent:   Yes and was signed on 11/07.    Pre-Op: On 12/16 with Yulissa Nogueira at [place]  COVID testing:  Per Provider's discretion Covid testing is not indicated.     Post-Op:  2 weeks on 01/09 with Dr Villatoro at [place]    Pre-certification routed to [employer]:  Yes    Surgery packet mailed to patient's home address: Yes  Patient instructed NPO 12 hours prior to surgery, arrive 1.5 hours  prior to surgery, must have a .  Patient understood and agrees to the plan.      Requestor:  Kiersten Saunders     Location:  [facility]'s [phone]

## 2024-11-17 ENCOUNTER — ANCILLARY PROCEDURE (OUTPATIENT)
Dept: MRI IMAGING | Facility: CLINIC | Age: 44
End: 2024-11-17
Attending: OBSTETRICS & GYNECOLOGY
Payer: COMMERCIAL

## 2024-11-17 DIAGNOSIS — Z80.3 FAMILY HISTORY OF MALIGNANT NEOPLASM OF BREAST: ICD-10-CM

## 2024-11-17 DIAGNOSIS — R92.30 DENSE BREAST TISSUE ON MAMMOGRAM, UNSPECIFIED TYPE: ICD-10-CM

## 2024-11-17 PROCEDURE — 77049 MRI BREAST C-+ W/CAD BI: CPT

## 2024-11-17 PROCEDURE — A9585 GADOBUTROL INJECTION: HCPCS | Mod: JW

## 2024-11-17 RX ORDER — GADOBUTROL 604.72 MG/ML
15 INJECTION INTRAVENOUS ONCE
Status: COMPLETED | OUTPATIENT
Start: 2024-11-17 | End: 2024-11-17

## 2024-11-17 RX ADMIN — GADOBUTROL 10.5 ML: 604.72 INJECTION INTRAVENOUS at 09:22

## 2024-11-17 NOTE — DISCHARGE INSTRUCTIONS
MRI Contrast Discharge Instructions    The IV contrast you received today will pass out of your body in your  urine. This will happen in the next 24 hours. You will not feel this process.  Your urine will not change color.    Drink at least 4 extra glasses of water or juice today (unless your doctor  has restricted your fluids). This reduces the stress on your kidneys.  You may take your regular medicines.    If you are on dialysis: It is best to have dialysis today.    If you have a reaction: Most reactions happen right away. If you have  any new symptoms after leaving the hospital (such as hives or swelling),  call your hospital at the correct number below. Or call your family doctor.  If you have breathing distress or wheezing, call 911.    Special instructions: ***    I have read and understand the above information.    Signature:______________________________________ Date:___________    Staff:__________________________________________ Date:___________     Time:__________    [facility] Departments:    ___[place]: [phone]  ___[facility]: [phone]  ___[place]: [phone] ___[facility]: [phone]  ___[facility]: [phone]  ___[facility]: [phone]  ___[phone]  ___[facility]: [phone]  ___[phone]

## 2024-11-18 ENCOUNTER — MYC MEDICAL ADVICE (OUTPATIENT)
Dept: FAMILY MEDICINE | Facility: CLINIC | Age: 44
End: 2024-11-18
Payer: COMMERCIAL

## 2024-11-18 DIAGNOSIS — F41.1 GAD (GENERALIZED ANXIETY DISORDER): ICD-10-CM

## 2024-11-18 DIAGNOSIS — F33.1 MAJOR DEPRESSIVE DISORDER, RECURRENT EPISODE, MODERATE (H): Primary | ICD-10-CM

## 2024-12-03 ENCOUNTER — MYC MEDICAL ADVICE (OUTPATIENT)
Dept: FAMILY MEDICINE | Facility: CLINIC | Age: 44
End: 2024-12-03
Payer: COMMERCIAL

## 2024-12-03 DIAGNOSIS — M79.662 BILATERAL CALF PAIN: ICD-10-CM

## 2024-12-03 DIAGNOSIS — M79.661 BILATERAL CALF PAIN: ICD-10-CM

## 2024-12-03 NOTE — TELEPHONE ENCOUNTER
Patient is requesting refill of methocarbamol which she uses at  for her neck pain.     Medication was discontinued from med list on 3/28/24. Patient asking for small bridge refill until her appointment 12/16/24.    Last Written Prescription Date:  8/23/23  Last Fill Quantity: 30,  # refills: 1   Last office visit: 1/16/2024 ; last virtual visit: 9/11/2024 with prescribing provider:     Future Office Visit:   Next 5 appointments (look out 90 days)      Dec 16, 2024 8:30 AM  (Arrive by 8:10 AM)  Pre-Operative Physical with DAVID Lazo CNP  [facility] ([facility] ) [address] [place] [address] [phone]           Rx pended, message routed to provider for consideration.     Julie Behrendt RN

## 2024-12-04 RX ORDER — METHOCARBAMOL 750 MG/1
750 TABLET, FILM COATED ORAL 4 TIMES DAILY PRN
Qty: 30 TABLET | Refills: 0 | Status: SHIPPED | OUTPATIENT
Start: 2024-12-04

## 2024-12-11 ENCOUNTER — TELEPHONE (OUTPATIENT)
Dept: ENDOCRINOLOGY | Facility: CLINIC | Age: 44
End: 2024-12-11
Payer: COMMERCIAL

## 2024-12-11 DIAGNOSIS — F33.1 MAJOR DEPRESSIVE DISORDER, RECURRENT EPISODE, MODERATE (H): ICD-10-CM

## 2024-12-11 DIAGNOSIS — F41.1 GAD (GENERALIZED ANXIETY DISORDER): ICD-10-CM

## 2024-12-11 NOTE — TELEPHONE ENCOUNTER
Left Voicemail (2nd Attempt) for the patient to call back and schedule the following:    Appointment type: return  Provider: dr. moura  Return date: 6/10/2025   Specialty phone number: [phone]   Additonal Notes: Return in about 1 year (around 6/10/2025)     [facility]   Orthopedics, Podiatry, Sports Medicine, Ent ,Eye , Audiology, Adult Endocrine & Diabetes, Nutrition & Medication Therapy Management Specialties   [facility]

## 2024-12-11 NOTE — TELEPHONE ENCOUNTER
Has appointment scheduled for 12/16/24.      Requested Prescriptions   Pending Prescriptions Disp Refills    escitalopram (LEXAPRO) 20 MG tablet [Pharmacy Med Name: ESCITALOPRAM 20MG TABLETS] 90 tablet 3     Sig: TAKE 1 TABLET BY MOUTH EVERY DAY       SSRIs Protocol Failed - 12/11/2024  5:15 PM        Failed - PHQ-9 score less than 5 in past 6 months     Please review last PHQ-9 score.           Failed - MALCOM-7 score of less than 5 in past 6 months.     Please review last MALCOM-7 score.           Passed - Medication is active on med list        Passed - Recent (12 mo) or future (90 days) visit within the authorizing provider's specialty     The patient must have completed an in-person or virtual visit within the past 12 months or has a future visit scheduled within the next 90 days with the authorizing provider s specialty.  Urgent care and e-visits do not qualify as an office visit for this protocol.          Passed - Medication indicated for associated diagnosis     Medication is associated with one or more of the following diagnoses:              Anxiety             Bipolar  Depression  Obsessive-compulsive disorder             Panic disorder  Postmenopausal flushing             Premenstrual dysphoric disorder             Social phobia   Adjustment disorder with depressed mood   Mood disorder   Adjustment disorder with anxious mood          Passed - Patient is age 18 or older        Passed - No active pregnancy on record        Passed - No positive pregnancy test in last 12 months

## 2024-12-12 RX ORDER — ESCITALOPRAM OXALATE 20 MG/1
20 TABLET ORAL DAILY
Qty: 90 TABLET | Refills: 0 | Status: SHIPPED | OUTPATIENT
Start: 2024-12-12

## 2024-12-16 ENCOUNTER — OFFICE VISIT (OUTPATIENT)
Dept: FAMILY MEDICINE | Facility: CLINIC | Age: 44
End: 2024-12-16
Payer: COMMERCIAL

## 2024-12-16 VITALS
HEART RATE: 82 BPM | WEIGHT: 227 LBS | DIASTOLIC BLOOD PRESSURE: 88 MMHG | BODY MASS INDEX: 32.5 KG/M2 | OXYGEN SATURATION: 98 % | SYSTOLIC BLOOD PRESSURE: 122 MMHG | RESPIRATION RATE: 18 BRPM | TEMPERATURE: 98.4 F | HEIGHT: 70 IN

## 2024-12-16 DIAGNOSIS — E89.0 POSTOPERATIVE HYPOTHYROIDISM: ICD-10-CM

## 2024-12-16 DIAGNOSIS — Z01.818 PREOP GENERAL PHYSICAL EXAM: Primary | ICD-10-CM

## 2024-12-16 DIAGNOSIS — E55.9 VITAMIN D DEFICIENCY: ICD-10-CM

## 2024-12-16 DIAGNOSIS — N85.00 ENDOMETRIAL HYPERPLASIA WITHOUT ATYPIA: ICD-10-CM

## 2024-12-16 DIAGNOSIS — E53.8 VITAMIN B12 DEFICIENCY (NON ANEMIC): ICD-10-CM

## 2024-12-16 LAB
ANION GAP SERPL CALCULATED.3IONS-SCNC: 12 MMOL/L (ref 7–15)
BUN SERPL-MCNC: 13.2 MG/DL (ref 6–20)
CALCIUM SERPL-MCNC: 9.1 MG/DL (ref 8.8–10.4)
CHLORIDE SERPL-SCNC: 106 MMOL/L (ref 98–107)
CREAT SERPL-MCNC: 0.68 MG/DL (ref 0.51–0.95)
EGFRCR SERPLBLD CKD-EPI 2021: >90 ML/MIN/1.73M2
ERYTHROCYTE [DISTWIDTH] IN BLOOD BY AUTOMATED COUNT: 12.9 % (ref 10–15)
GLUCOSE SERPL-MCNC: 96 MG/DL (ref 70–99)
HCO3 SERPL-SCNC: 21 MMOL/L (ref 22–29)
HCT VFR BLD AUTO: 40 % (ref 35–47)
HGB BLD-MCNC: 13.4 G/DL (ref 11.7–15.7)
MCH RBC QN AUTO: 28.1 PG (ref 26.5–33)
MCHC RBC AUTO-ENTMCNC: 33.5 G/DL (ref 31.5–36.5)
MCV RBC AUTO: 84 FL (ref 78–100)
PLATELET # BLD AUTO: 264 10E3/UL (ref 150–450)
POTASSIUM SERPL-SCNC: 4 MMOL/L (ref 3.4–5.3)
RBC # BLD AUTO: 4.77 10E6/UL (ref 3.8–5.2)
SODIUM SERPL-SCNC: 139 MMOL/L (ref 135–145)
T4 FREE SERPL-MCNC: 1.32 NG/DL (ref 0.9–1.7)
TSH SERPL DL<=0.005 MIU/L-ACNC: 0.11 UIU/ML (ref 0.3–4.2)
VIT B12 SERPL-MCNC: 345 PG/ML (ref 232–1245)
VIT D+METAB SERPL-MCNC: 39 NG/ML (ref 20–50)
WBC # BLD AUTO: 4.4 10E3/UL (ref 4–11)

## 2024-12-16 PROCEDURE — 99214 OFFICE O/P EST MOD 30 MIN: CPT | Mod: 25 | Performed by: NURSE PRACTITIONER

## 2024-12-16 PROCEDURE — 36415 COLL VENOUS BLD VENIPUNCTURE: CPT | Performed by: NURSE PRACTITIONER

## 2024-12-16 PROCEDURE — 82306 VITAMIN D 25 HYDROXY: CPT | Performed by: NURSE PRACTITIONER

## 2024-12-16 PROCEDURE — 80048 BASIC METABOLIC PNL TOTAL CA: CPT | Performed by: NURSE PRACTITIONER

## 2024-12-16 PROCEDURE — 85027 COMPLETE CBC AUTOMATED: CPT | Performed by: NURSE PRACTITIONER

## 2024-12-16 PROCEDURE — 90656 IIV3 VACC NO PRSV 0.5 ML IM: CPT | Performed by: NURSE PRACTITIONER

## 2024-12-16 PROCEDURE — 90471 IMMUNIZATION ADMIN: CPT | Performed by: NURSE PRACTITIONER

## 2024-12-16 PROCEDURE — 84443 ASSAY THYROID STIM HORMONE: CPT | Performed by: NURSE PRACTITIONER

## 2024-12-16 PROCEDURE — 82607 VITAMIN B-12: CPT | Performed by: NURSE PRACTITIONER

## 2024-12-16 PROCEDURE — 91320 SARSCV2 VAC 30MCG TRS-SUC IM: CPT | Performed by: NURSE PRACTITIONER

## 2024-12-16 PROCEDURE — 90480 ADMN SARSCOV2 VAC 1/ONLY CMP: CPT | Performed by: NURSE PRACTITIONER

## 2024-12-16 PROCEDURE — 84439 ASSAY OF FREE THYROXINE: CPT | Performed by: NURSE PRACTITIONER

## 2024-12-16 RX ORDER — FERROUS SULFATE 325(65) MG
325 TABLET ORAL
COMMUNITY
Start: 2024-12-16

## 2024-12-16 ASSESSMENT — PAIN SCALES - GENERAL: PAINLEVEL_OUTOF10: NO PAIN (0)

## 2024-12-16 ASSESSMENT — PATIENT HEALTH QUESTIONNAIRE - PHQ9
SUM OF ALL RESPONSES TO PHQ QUESTIONS 1-9: 15
10. IF YOU CHECKED OFF ANY PROBLEMS, HOW DIFFICULT HAVE THESE PROBLEMS MADE IT FOR YOU TO DO YOUR WORK, TAKE CARE OF THINGS AT HOME, OR GET ALONG WITH OTHER PEOPLE: VERY DIFFICULT
SUM OF ALL RESPONSES TO PHQ QUESTIONS 1-9: 15

## 2024-12-16 NOTE — PATIENT INSTRUCTIONS
How to Take Your Medication Before Surgery  Preoperative Medication Instructions   Antiplatelet or Anticoagulation Medication Instructions   - Patient is on no antiplatelet or anticoagulation medications.    Additional Medication Instructions  Take all scheduled medications on the day of surgery EXCEPT for modifications listed below:   - Herbal medications and vitamins: DO NOT TAKE 14 days prior to surgery.       Patient Education   Preparing for Your Surgery  For Adults  Getting started  In most cases, a nurse will call to review your health history and instructions. They will give you an arrival time based on your scheduled surgery time. Please be ready to share:  Your doctor's clinic name and phone number  Your medical, surgical, and anesthesia history  A list of allergies and sensitivities  A list of medicines, including herbal treatments and over-the-counter drugs  Whether the patient has a legal guardian (ask how to send us the papers in advance)  Note: You may not receive a call if you were seen at our PAC (Preoperative Assessment Center).  Please tell us if you're pregnant--or if there's any chance you might be pregnant. Some surgeries may injure a fetus (unborn baby), so they require a pregnancy test. Surgeries that are safe for a fetus don't always need a test, and you can choose whether to have one.   Preparing for surgery  Within 10 to 30 days of surgery: Have a pre-op exam (sometimes called an H&P, or History and Physical). This can be done at a clinic or pre-operative center.  If you're having a , you may not need this exam. Talk to your care team.  At your pre-op exam, talk to your care team about all medicines you take. (This includes CBD oil and any drugs, such as THC, marijuana, and other forms of cannabis.) If you need to stop any medicine before surgery, ask when to start taking it again.  This is for your safety. Many medicines and drugs can make you bleed too much during surgery. Some  change how well surgery (anesthesia) drugs work.  Call your insurance company to let them know you're having surgery. (If you don't have insurance, call [phone].)  Call your clinic if there's any change in your health. This includes a scrape or scratch near the surgery site, or any signs of a cold (sore throat, runny nose, cough, rash, fever).  Eating and drinking guidelines  For your safety: Unless your surgeon tells you otherwise, follow the guidelines below.  Eat and drink as normal until 8 hours before you arrive for surgery. After that, no food or milk. You can spit out gum when you arrive.  Drink clear liquids until 2 hours before you arrive. These are liquids you can see through, like water, Gatorade, and Propel Water. They also include plain black coffee and tea (no cream or milk).  No alcohol for 24 hours before you arrive. The night before surgery, stop any drinks that contain THC.  If your care team tells you to take medicine on the morning of surgery, it's okay to take it with a sip of water. No other medicines or drugs are allowed (including CBD oil)--follow your care team's instructions.  If you have questions the day of surgery, call your hospital or surgery center.   Preventing infection  Shower or bathe the night before and the morning of surgery. Follow the instructions your clinic gave you. (If no instructions, use regular soap.)  Don't shave or clip hair near your surgery site. We'll remove the hair if needed.  Don't smoke or vape the morning of surgery. No chewing tobacco for 6 hours before you arrive. A nicotine patch is okay. You may spit out nicotine gum when you arrive.  For some surgeries, the surgeon will tell you to fully quit smoking and nicotine.  We will make every effort to keep you safe from infection. We will:  Clean our hands often with soap and water (or an alcohol-based hand rub).  Clean the skin at your surgery site with a special soap that kills germs.  Give you a special  gown to keep you warm. (Cold raises the risk of infection.)  Wear hair covers, masks, gowns, and gloves during surgery.  Give antibiotic medicine, if prescribed. Not all surgeries need this medicine.  What to bring on the day of surgery  Photo ID and insurance card  Copy of your health care directive, if you have one  Glasses and hearing aids (bring cases)  You can't wear contacts during surgery  Inhaler and eye drops, if you use them (tell us about these when you arrive)  CPAP machine or breathing device, if you use them  A few personal items, if spending the night  If you have . . .  A pacemaker, ICD (cardiac defibrillator), or other implant: Bring the ID card.  An implanted stimulator: Bring the remote control.  A legal guardian: Bring a copy of the certified (court-stamped) guardianship papers.  Please remove any jewelry, including body piercings. Leave jewelry and other valuables at home.  If you're going home the day of surgery  You must have a responsible adult drive you home. They should stay with you overnight as well.  If you don't have someone to stay with you, and you aren't safe to go home alone, we may keep you overnight. Insurance often won't pay for this.  After surgery  If it's hard to control your pain or you need more pain medicine, please call your surgeon's office.  Questions?   If you have any questions for your care team, list them here:   ____________________________________________________________________________________________________________________________________________________________________________________________________________________________________________________________  For informational purposes only. Not to replace the advice of your health care provider. Copyright   2003, 2019 [facility]. All rights reserved. Clinically reviewed by Paulino Díaz MD. [employer] [id number] - REV 08/24.

## 2024-12-16 NOTE — PROGRESS NOTES
Preoperative Evaluation  [facility]  [address] [place] [id number]  Phone: [phone]  Fax: [phone]  Primary Provider: DAVID Lazo CNP  Pre-op Performing Provider: DAVID Lazo CNP  Dec 16, 2024         12/16/2024   Surgical Information   What procedure is being done? hysterectomy    Facility or Hospital where procedure/surgery will be performed: [facility]    Who is doing the procedure / surgery? maged    Date of surgery / procedure: 24th    Time of surgery / procedure: 730am    Where do you plan to recover after surgery? at home with family        Patient-reported     Fax number for surgical facility: Note does not need to be faxed, will be available electronically in Epic.    Assessment & Plan     The proposed surgical procedure is considered INTERMEDIATE risk.    Preop general physical exam  - Basic metabolic panel  (Ca, Cl, CO2, Creat, Gluc, K, Na, BUN); Future  - CBC with platelets; Future    Endometrial hyperplasia without atypia    Postoperative hypothyroidism    - TSH with free T4 reflex; Future    Vitamin D deficiency    - Vitamin D Deficiency; Future    Vitamin B12 deficiency (non anemic)    - Vitamin B12; Future         - No identified additional risk factors other than previously addressed    Antiplatelet or Anticoagulation Medication Instructions   - Patient is on no antiplatelet or anticoagulation medications.    Additional Medication Instructions  Take all scheduled medications on the day of surgery EXCEPT for modifications listed below:   - Herbal medications and vitamins: DO NOT TAKE 14 days prior to surgery.    Recommendation  Approval given to proceed with proposed procedure, without further diagnostic evaluation.    Kamini Humphries is a 44 year old, presenting for the following:  Pre-Op Exam          12/16/2024     8:24 AM   Additional Questions   Roomed by Argelia JARA   Accompanied by self         12/16/2024     8:24 AM    Patient Reported Additional Medications   Patient reports taking the following new medications no new meds     HPI related to upcoming procedure: endometrial hyperplasia         12/16/2024   Pre-Op Questionnaire   Have you ever had a heart attack or stroke? No    Have you ever had surgery on your heart or blood vessels, such as a stent placement, a coronary artery bypass, or surgery on an artery in your head, neck, heart, or legs? No    Do you have chest pain with activity? No    Do you have a history of heart failure? No    Do you currently have a cold, bronchitis or symptoms of other infection? No    Do you have a cough, shortness of breath, or wheezing? No    Do you or anyone in your family have previous history of blood clots? (!) YES -grandmother when doing chemo    Do you or does anyone in your family have a serious bleeding problem such as prolonged bleeding following surgeries or cuts? No    Have you ever had problems with anemia or been told to take iron pills? No    Have you had any abnormal blood loss such as black, tarry or bloody stools, or abnormal vaginal bleeding? No    Have you ever had a blood transfusion? No    Are you willing to have a blood transfusion if it is medically needed before, during, or after your surgery? Yes    Have you or any of your relatives ever had problems with anesthesia? (!) YES -takes awhile to wake up    Do you have sleep apnea, excessive snoring or daytime drowsiness? (!) YES-snoring    Do you have a CPAP machine? (!) NO -no sleep apnea on sleep study    Do you have any artifical heart valves or other implanted medical devices like a pacemaker, defibrillator, or continuous glucose monitor? No    Do you have artificial joints? No    Are you allergic to latex? No        Patient-reported     Health Care Directive  Patient does not have a Health Care Directive: Discussed advance care planning with patient; however, patient declined at this time.    Preoperative Review of     reviewed - no concerns    Status of Chronic Conditions:  See problem list for active medical problems.  Problems all longstanding and stable, except as noted/documented.  See ROS for pertinent symptoms related to these conditions.    Patient Active Problem List    Diagnosis Date Noted    Endometrial hyperplasia without atypia 11/07/2024     Priority: Medium    Abnormal Pap smear of cervix 08/08/2022     Priority: Medium     Formatting of this note might be different from the original.  LSIL 2005 remote, bx fine      Interstitial cystitis 07/09/2021     Priority: Medium    Traumatic incomplete tear of right rotator cuff, initial encounter 07/23/2020     Priority: Medium    Bicipital tendonitis of right shoulder 07/23/2020     Priority: Medium    Subacromial impingement of right shoulder 07/23/2020     Priority: Medium    IgA deficiency (H) 09/12/2018     Priority: Medium    Postoperative hypothyroidism 03/23/2018     Priority: Medium    Vitiligo 04/09/2015     Priority: Medium    Papillary adenocarcinoma of thyroid (H) 03/01/2014     Priority: Medium     Overview:   12/2013: R thyroid lobectomy 1.7 cm follicular variant papillary cancer, MACIS 3.6  03/2014: Completion Thyroidectomy 0.3 cm incidental papillary cancer left lobe. Iodine ablation with 53 mCi.   07/2016, 07/2017: Neck US neg.      Vitamin D deficiency 07/06/2009     Priority: Medium     Last Assessment & Plan:   7/09  29.4  vit  d   on 1000units  daily  since  7/09      Major depressive disorder, recurrent episode, moderate (H) 11/16/2007     Priority: Medium      Past Medical History:   Diagnosis Date    Depressive disorder     Interstitial cystitis 07/09/2021    Major depressive disorder, recurrent episode, moderate (H) 11/16/2007    Papillary adenocarcinoma of thyroid (H) 03/01/2014    Overview:  12/2013: R thyroid lobectomy 1.7 cm follicular variant papillary cancer, MACIS 3.6 03/2014: Completion Thyroidectomy 0.3 cm incidental papillary  cancer left lobe. Iodine ablation with 53 mCi.  07/2016, 07/2017: Neck US neg.    Postoperative hypothyroidism 03/23/2018     Past Surgical History:   Procedure Laterality Date    BIOPSY      COLONOSCOPY  08/27/2018    DILATION AND CURETTAGE, HYSTEROSCOPY, ABLATE ENDOMETRIUM, COMBINED N/A 4/26/2023    Procedure: DILATION, CERVIX, WITH ENDOMETRIAL ABLATION, hysteroscopy, dilation and curettage, polypectomy;  Surgeon: Mulu Orozco MD;  Location: [facility] OR    HC INSERTION INTRAUTERINE DEVICE  2021    HC REMOVE INTRAUTERINE DEVICE  2021    HEMORRHOIDECTOMY EXTERNAL N/A 3/27/2023    Procedure: Combined internal and external hemorrhoidectomy;  Surgeon: Feroz Perry DO;  Location: [facility]    THYROIDECTOMY      2015    TONSILLECTOMY      TUBAL LIGATION  2006     Current Outpatient Medications   Medication Sig Dispense Refill    cholecalciferol (VITAMIN D3) 125 mcg (5000 units) capsule Take by mouth daily.      escitalopram (LEXAPRO) 20 MG tablet TAKE 1 TABLET BY MOUTH EVERY DAY 90 tablet 0    ferrous sulfate (FEROSUL) 325 (65 Fe) MG tablet Take 1 tablet (325 mg) by mouth daily (with breakfast).      fluticasone (FLONASE) 50 MCG/ACT nasal spray SHAKE LIQUID AND USE 1 SPRAY IN EACH NOSTRIL DAILY 16 g 1    levothyroxine (SYNTHROID/LEVOTHROID) 150 MCG tablet Take 1 tablet (150 mcg) by mouth daily. Six days a week and take half tablet (75 mcg) one day a week. 90 tablet 3    methocarbamol (ROBAXIN) 750 MG tablet Take 1 tablet (750 mg) by mouth 4 times daily as needed for muscle spasms. 30 tablet 0    topiramate (TOPAMAX) 25 MG tablet TAKE 1 TABLET(25 MG) BY MOUTH TWICE DAILY 180 tablet 1    valACYclovir (VALTREX) 1000 mg tablet TAKE 2 TABLETS(2000 MG) BY MOUTH TWICE DAILY 12 tablet 3       Allergies   Allergen Reactions    Cephalexin Rash    Cephalosporins Rash     Cephalexin    Clavulanic Acid Diarrhea     Bad diarrhea with nausea and vomiting    Sulfa Antibiotics Rash and Unknown        Social History  "    Tobacco Use    Smoking status: Former     Current packs/day: 1.00     Average packs/day: 1 pack/day for 5.0 years (5.0 ttl pk-yrs)     Types: Cigarettes    Smokeless tobacco: Never   Substance Use Topics    Alcohol use: Yes     Comment: 1 drink per wk     History   Drug Use No             Review of Systems  CONSTITUTIONAL: NEGATIVE for fever, chills, change in weight  INTEGUMENTARY/SKIN: NEGATIVE for worrisome rashes, moles or lesions  EYES: NEGATIVE for vision changes or irritation  ENT/MOUTH: NEGATIVE for ear, mouth and throat problems  RESP: NEGATIVE for significant cough or SOB  CV: NEGATIVE for chest pain, palpitations or peripheral edema  GI: NEGATIVE for nausea, abdominal pain, heartburn, or change in bowel habits  : NEGATIVE for frequency, dysuria, or hematuria  NEURO: NEGATIVE for weakness, dizziness or paresthesias  ENDOCRINE: NEGATIVE for temperature intolerance, skin/hair changes  HEME: NEGATIVE for bleeding problems  PSYCHIATRIC: NEGATIVE for changes in mood or affect    Objective    /88   Pulse 82   Temp 98.4  F (36.9  C) (Tympanic)   Resp 18   Ht 1.778 m (5' 10\")   Wt 103 kg (227 lb)   SpO2 98%   BMI 32.57 kg/m     Estimated body mass index is 32.57 kg/m  as calculated from the following:    Height as of this encounter: 1.778 m (5' 10\").    Weight as of this encounter: 103 kg (227 lb).  Physical Exam  GENERAL: alert and no distress  EYES: Eyes grossly normal to inspection, PERRL and conjunctivae and sclerae normal  HENT: ear canals and TM's normal, nose and mouth without ulcers or lesions  NECK: no adenopathy, no asymmetry, masses, or scars  RESP: lungs clear to auscultation - no rales, rhonchi or wheezes  CV: regular rate and rhythm, normal S1 S2, no S3 or S4, no murmur, click or rub, no peripheral edema  ABDOMEN: soft, nontender, no hepatosplenomegaly, no masses and bowel sounds normal  MS: no gross musculoskeletal defects noted, no edema  SKIN: no suspicious lesions or " rashes  NEURO: Normal strength and tone, mentation intact and speech normal  PSYCH: mentation appears normal, affect normal/bright    Recent Labs   Lab Test 09/13/24  1458   A1C 5.3        Diagnostics  Recent Results (from the past 24 hours)   CBC with platelets    Collection Time: 12/16/24  9:04 AM   Result Value Ref Range    WBC Count 4.4 4.0 - 11.0 10e3/uL    RBC Count 4.77 3.80 - 5.20 10e6/uL    Hemoglobin 13.4 11.7 - 15.7 g/dL    Hematocrit 40.0 35.0 - 47.0 %    MCV 84 78 - 100 fL    MCH 28.1 26.5 - 33.0 pg    MCHC 33.5 31.5 - 36.5 g/dL    RDW 12.9 10.0 - 15.0 %    Platelet Count 264 150 - 450 10e3/uL   Basic metabolic panel  (Ca, Cl, CO2, Creat, Gluc, K, Na, BUN)    Collection Time: 12/16/24  9:04 AM   Result Value Ref Range    Sodium 139 135 - 145 mmol/L    Potassium 4.0 3.4 - 5.3 mmol/L    Chloride 106 98 - 107 mmol/L    Carbon Dioxide (CO2) 21 (L) 22 - 29 mmol/L    Anion Gap 12 7 - 15 mmol/L    Urea Nitrogen 13.2 6.0 - 20.0 mg/dL    Creatinine 0.68 0.51 - 0.95 mg/dL    GFR Estimate >90 >60 mL/min/1.73m2    Calcium 9.1 8.8 - 10.4 mg/dL    Glucose 96 70 - 99 mg/dL   TSH with free T4 reflex    Collection Time: 12/16/24  9:04 AM   Result Value Ref Range    TSH 0.11 (L) 0.30 - 4.20 uIU/mL      No EKG required, no history of coronary heart disease, significant arrhythmia, peripheral arterial disease or other structural heart disease.    Revised Cardiac Risk Index (RCRI)  The patient has the following serious cardiovascular risks for perioperative complications:   - No serious cardiac risks = 0 points     RCRI Interpretation: 0 points: Class I (very low risk - 0.4% complication rate)         Signed Electronically by: DAVID Lazo CNP  A copy of this evaluation report is provided to the requesting physician.

## 2024-12-17 ENCOUNTER — PATIENT OUTREACH (OUTPATIENT)
Dept: CARE COORDINATION | Facility: CLINIC | Age: 44
End: 2024-12-17
Payer: COMMERCIAL

## 2024-12-24 ENCOUNTER — TELEPHONE (OUTPATIENT)
Dept: OBGYN | Facility: CLINIC | Age: 44
End: 2024-12-24
Payer: COMMERCIAL

## 2024-12-29 ENCOUNTER — MYC MEDICAL ADVICE (OUTPATIENT)
Dept: OBGYN | Facility: CLINIC | Age: 44
End: 2024-12-29
Payer: COMMERCIAL

## 2024-12-30 NOTE — TELEPHONE ENCOUNTER
Kevint received from patient with complaint of abdominal pain and swelling.     S/P 12/24/2024  TOTAL LAPAROSCOPIC HYSTERECTOMY BILATERAL SALPINGO-OOPHRECTOMY     Abdominal pain, swelling, denies bleeding or discharge, incision appears to be healing well.   Denies fever, denies urinary problems, pain 5/10.  Took glycerine to have BM.     Ibuprofen/tylenol aren't relieving pain.  Unable to sleep last night.   Tried ice packs & heat.     -Waiting for pt response.

## 2025-01-06 ENCOUNTER — MYC MEDICAL ADVICE (OUTPATIENT)
Dept: OBGYN | Facility: CLINIC | Age: 45
End: 2025-01-06
Payer: COMMERCIAL

## 2025-01-06 DIAGNOSIS — N30.00 ACUTE CYSTITIS WITHOUT HEMATURIA: Primary | ICD-10-CM

## 2025-01-06 RX ORDER — NITROFURANTOIN 25; 75 MG/1; MG/1
100 CAPSULE ORAL 2 TIMES DAILY
Qty: 14 CAPSULE | Refills: 0 | Status: SHIPPED | OUTPATIENT
Start: 2025-01-06

## 2025-01-06 NOTE — TELEPHONE ENCOUNTER
S/P 12/24/2024  TOTAL LAPAROSCOPIC HYSTERECTOMY BILATERAL SALPINGO-OOPHRECTOMY     Pt is complaining of intermittent abdominal pain located in the center of her lower abdomen above her pubic symphysis.  She also states it is painful to urinate.  Has a odorous vaginal discharge.    Denies fever, s/s of incision infections.    Pt wonders if she has a bladder infection.  She does have a hx of cystitis.  She is also stating she needs a doctor's note to be off work longer, she was supposed to go back to work today after her hysterectomy but doesn't feel like she is able to.

## 2025-01-06 NOTE — TELEPHONE ENCOUNTER
Please provide her with a work note for an additional week off due to her surgery.  Ko Villatoro MD FACOG

## 2025-01-06 NOTE — LETTER
January 6, 2025      Kristel Wiley  [address] [place] [address] [address]  [facility] [id number]        To Whom It May Concern:    Kristel Wiley  is being seen for care post operatively. Please allow her to be off work until 1/13/2025 for recovery due to her surgery. Please contact the office with any further questions.        Sincerely,        Ko Villatoro MD    Electronically signed

## 2025-01-06 NOTE — PATIENT INSTRUCTIONS
If you have any questions regarding your visit, Please contact your care team.     [facility]: [phone]    To Schedule an Appointment 24/7  Call: [phone]     Ko Villatoro MD  Medical Director        Maricruz-GENESIS Murphy-RN  Bev Black-Surgery Scheduler  Agnes-Surgery Scheduler               Tuesday-[place]  7:30 a.m-4:30 p.m    Thursday-[place]  7:30 a.m-4:30 p.m    Typical Surgery Days: Tuesday or Friday [facility]  [address] [place] [address]  PH: [phone]    Imaging Scheduling all locations  PH: [phone]     [facility] Labor and Delivery  [address] [place] [address] [place] [address]  PH: [phone]    [facility]  [address] [place] [address]  PH: [phone] Fax      **Surgeries** Our Surgery Schedulers will contact you to schedule. If you do not receive a call within 3 business days, please call [phone].    Urgent Care locations:  [facility] Monday-Friday  10 am - 8 pm  Saturday and Sunday   9 am - 5 pm  Monday-Friday   10 am - 8 pm  Saturday and Sunday   9 am - 5 pm   [phone]   If you need a medication refill, please contact your pharmacy. Please allow 3 business days for your refill to be completed.  As always, Thank you for trusting us with your healthcare needs!    see additional instructions from your care team below]

## 2025-01-09 ENCOUNTER — OFFICE VISIT (OUTPATIENT)
Dept: OBGYN | Facility: CLINIC | Age: 45
End: 2025-01-09
Payer: COMMERCIAL

## 2025-01-09 VITALS
DIASTOLIC BLOOD PRESSURE: 79 MMHG | SYSTOLIC BLOOD PRESSURE: 121 MMHG | HEART RATE: 96 BPM | WEIGHT: 227.4 LBS | OXYGEN SATURATION: 99 % | BODY MASS INDEX: 32.63 KG/M2

## 2025-01-09 DIAGNOSIS — E89.40 SURGICAL MENOPAUSE ON HORMONE REPLACEMENT THERAPY: Primary | ICD-10-CM

## 2025-01-09 DIAGNOSIS — Z79.890 SURGICAL MENOPAUSE ON HORMONE REPLACEMENT THERAPY: Primary | ICD-10-CM

## 2025-01-09 RX ORDER — ESTRADIOL 1 MG/1
1 TABLET ORAL DAILY
Qty: 90 TABLET | Refills: 3 | Status: SHIPPED | OUTPATIENT
Start: 2025-01-09

## 2025-01-09 NOTE — PROGRESS NOTES
Kristel is a 44 year old  2 weeks s/p  TOTAL LAPAROSCOPIC HYSTERECTOMY BILATERAL SALPINGO-OOPHRECTOMY complicated by urinary tract symptoms.  She is currently requiring nothing for pain management.  - vaginal bleeding, - hot flashes.  - GI/ complaints improving.  Energy level is Medium.  Denies fever.  Reviewed the pathology results Uterus, cervix, ovaries and fallopian tubes, hysterectomy and bilateral salpingo-oophorectomy -   Uterus with unremarkable cervix, disordered proliferative endometrium with focal scarring, and unremarkable myometrium  Ovaries with cystic follicles  Fallopian tubes with focal hydrosalpinx  Negative for atypia or malignancy  PE: There were no vitals taken for this visit.  Abd: soft, non tender, no masses  Incision: intact, no erythema, induration or discharge  vaginal cuff intact and non tender  Ext. Genitalia: Normal  Vagina:cuff healing, no lesions, Normal mucosa, no discharge  BME: no tenderness    A/P 2 weeks s/p surgery, doing well     1. Pelvic rest for 4 weeks  Ko Villatoro MD

## 2025-01-13 SDOH — HEALTH STABILITY: PHYSICAL HEALTH: ON AVERAGE, HOW MANY DAYS PER WEEK DO YOU ENGAGE IN MODERATE TO STRENUOUS EXERCISE (LIKE A BRISK WALK)?: 3 DAYS

## 2025-01-13 SDOH — HEALTH STABILITY: PHYSICAL HEALTH: ON AVERAGE, HOW MANY MINUTES DO YOU ENGAGE IN EXERCISE AT THIS LEVEL?: 20 MIN

## 2025-01-13 ASSESSMENT — SOCIAL DETERMINANTS OF HEALTH (SDOH): HOW OFTEN DO YOU GET TOGETHER WITH FRIENDS OR RELATIVES?: MORE THAN THREE TIMES A WEEK

## 2025-01-16 ENCOUNTER — OFFICE VISIT (OUTPATIENT)
Dept: FAMILY MEDICINE | Facility: CLINIC | Age: 45
End: 2025-01-16
Attending: NURSE PRACTITIONER
Payer: COMMERCIAL

## 2025-01-16 VITALS
SYSTOLIC BLOOD PRESSURE: 122 MMHG | RESPIRATION RATE: 18 BRPM | OXYGEN SATURATION: 98 % | HEART RATE: 88 BPM | HEIGHT: 69 IN | TEMPERATURE: 97.9 F | DIASTOLIC BLOOD PRESSURE: 80 MMHG | BODY MASS INDEX: 33.77 KG/M2 | WEIGHT: 228 LBS

## 2025-01-16 DIAGNOSIS — B00.1 RECURRENT COLD SORES: ICD-10-CM

## 2025-01-16 DIAGNOSIS — M79.662 BILATERAL CALF PAIN: ICD-10-CM

## 2025-01-16 DIAGNOSIS — D80.2 IGA DEFICIENCY (H): ICD-10-CM

## 2025-01-16 DIAGNOSIS — Z00.00 ROUTINE GENERAL MEDICAL EXAMINATION AT A HEALTH CARE FACILITY: Primary | ICD-10-CM

## 2025-01-16 DIAGNOSIS — E61.1 IRON DEFICIENCY: ICD-10-CM

## 2025-01-16 DIAGNOSIS — F33.1 MAJOR DEPRESSIVE DISORDER, RECURRENT EPISODE, MODERATE (H): ICD-10-CM

## 2025-01-16 DIAGNOSIS — F41.1 GAD (GENERALIZED ANXIETY DISORDER): ICD-10-CM

## 2025-01-16 DIAGNOSIS — M79.661 BILATERAL CALF PAIN: ICD-10-CM

## 2025-01-16 PROCEDURE — G2211 COMPLEX E/M VISIT ADD ON: HCPCS | Performed by: NURSE PRACTITIONER

## 2025-01-16 PROCEDURE — 99214 OFFICE O/P EST MOD 30 MIN: CPT | Mod: 25 | Performed by: NURSE PRACTITIONER

## 2025-01-16 PROCEDURE — 99396 PREV VISIT EST AGE 40-64: CPT | Mod: 25 | Performed by: NURSE PRACTITIONER

## 2025-01-16 PROCEDURE — 90471 IMMUNIZATION ADMIN: CPT | Performed by: NURSE PRACTITIONER

## 2025-01-16 PROCEDURE — 90746 HEPB VACCINE 3 DOSE ADULT IM: CPT | Performed by: NURSE PRACTITIONER

## 2025-01-16 RX ORDER — ESCITALOPRAM OXALATE 20 MG/1
20 TABLET ORAL DAILY
Qty: 90 TABLET | Refills: 1 | Status: SHIPPED | OUTPATIENT
Start: 2025-01-16

## 2025-01-16 RX ORDER — VALACYCLOVIR HYDROCHLORIDE 1 G/1
TABLET, FILM COATED ORAL
Qty: 12 TABLET | Refills: 5 | Status: SHIPPED | OUTPATIENT
Start: 2025-01-16

## 2025-01-16 RX ORDER — METHOCARBAMOL 750 MG/1
750 TABLET, FILM COATED ORAL 4 TIMES DAILY PRN
Qty: 30 TABLET | Refills: 5 | Status: SHIPPED | OUTPATIENT
Start: 2025-01-16

## 2025-01-16 ASSESSMENT — ANXIETY QUESTIONNAIRES
5. BEING SO RESTLESS THAT IT IS HARD TO SIT STILL: SEVERAL DAYS
IF YOU CHECKED OFF ANY PROBLEMS ON THIS QUESTIONNAIRE, HOW DIFFICULT HAVE THESE PROBLEMS MADE IT FOR YOU TO DO YOUR WORK, TAKE CARE OF THINGS AT HOME, OR GET ALONG WITH OTHER PEOPLE: SOMEWHAT DIFFICULT
2. NOT BEING ABLE TO STOP OR CONTROL WORRYING: MORE THAN HALF THE DAYS
GAD7 TOTAL SCORE: 9
1. FEELING NERVOUS, ANXIOUS, OR ON EDGE: MORE THAN HALF THE DAYS
GAD7 TOTAL SCORE: 9
3. WORRYING TOO MUCH ABOUT DIFFERENT THINGS: MORE THAN HALF THE DAYS
6. BECOMING EASILY ANNOYED OR IRRITABLE: MORE THAN HALF THE DAYS
7. FEELING AFRAID AS IF SOMETHING AWFUL MIGHT HAPPEN: NOT AT ALL

## 2025-01-16 ASSESSMENT — PAIN SCALES - GENERAL: PAINLEVEL_OUTOF10: NO PAIN (0)

## 2025-01-16 ASSESSMENT — PATIENT HEALTH QUESTIONNAIRE - PHQ9
SUM OF ALL RESPONSES TO PHQ QUESTIONS 1-9: 12
5. POOR APPETITE OR OVEREATING: NOT AT ALL

## 2025-01-16 NOTE — PATIENT INSTRUCTIONS
Patient Education   Preventive Care Advice   This is general advice given by our system to help you stay healthy. However, your care team may have specific advice just for you. Please talk to your care team about your preventive care needs.  Nutrition  Eat 5 or more servings of fruits and vegetables each day.  Try wheat bread, brown rice and whole grain pasta (instead of white bread, rice, and pasta).  Get enough calcium and vitamin D. Check the label on foods and aim for 100% of the RDA (recommended daily allowance).  Lifestyle  Exercise at least 150 minutes each week  (30 minutes a day, 5 days a week).  Do muscle strengthening activities 2 days a week. These help control your weight and prevent disease.  No smoking.  Wear sunscreen to prevent skin cancer.  Have a dental exam and cleaning every 6 months.  Yearly exams  See your health care team every year to talk about:  Any changes in your health.  Any medicines your care team has prescribed.  Preventive care, family planning, and ways to prevent chronic diseases.  Shots (vaccines)   HPV shots (up to age 26), if you've never had them before.  Hepatitis B shots (up to age 59), if you've never had them before.  COVID-19 shot: Get this shot when it's due.  Flu shot: Get a flu shot every year.  Tetanus shot: Get a tetanus shot every 10 years.  Pneumococcal, hepatitis A, and RSV shots: Ask your care team if you need these based on your risk.  Shingles shot (for age 50 and up)  General health tests  Diabetes screening:  Starting at age 35, Get screened for diabetes at least every 3 years.  If you are younger than age 35, ask your care team if you should be screened for diabetes.  Cholesterol test: At age 39, start having a cholesterol test every 5 years, or more often if advised.  Bone density scan (DEXA): At age 50, ask your care team if you should have this scan for osteoporosis (brittle bones).  Hepatitis C: Get tested at least once in your life.  STIs (sexually  transmitted infections)  Before age 24: Ask your care team if you should be screened for STIs.  After age 24: Get screened for STIs if you're at risk. You are at risk for STIs (including HIV) if:  You are sexually active with more than one person.  You don't use condoms every time.  You or a partner was diagnosed with a sexually transmitted infection.  If you are at risk for HIV, ask about PrEP medicine to prevent HIV.  Get tested for HIV at least once in your life, whether you are at risk for HIV or not.  Cancer screening tests  Cervical cancer screening: If you have a cervix, begin getting regular cervical cancer screening tests starting at age 21.  Breast cancer scan (mammogram): If you've ever had breasts, begin having regular mammograms starting at age 40. This is a scan to check for breast cancer.  Colon cancer screening: It is important to start screening for colon cancer at age 45.  Have a colonoscopy test every 10 years (or more often if you're at risk) Or, ask your provider about stool tests like a FIT test every year or Cologuard test every 3 years.  To learn more about your testing options, visit:   .  For help making a decision, visit:   https://bit.ly/lr33104.  Prostate cancer screening test: If you have a prostate, ask your care team if a prostate cancer screening test (PSA) at age 55 is right for you.  Lung cancer screening: If you are a current or former smoker ages 50 to 80, ask your care team if ongoing lung cancer screenings are right for you.  For informational purposes only. Not to replace the advice of your health care provider. Copyright   2023 [facility]. All rights reserved. Clinically reviewed by the [facility]. Collect 160089 - REV 01/24.  Learning About Stress  What is stress?     Stress is your body's response to a hard situation. Your body can have a physical, emotional, or mental response. Stress is a fact of life for most people, and it  affects everyone differently. What causes stress for you may not be stressful for someone else.  A lot of things can cause stress. You may feel stress when you go on a job interview, take a test, or run a race. This kind of short-term stress is normal and even useful. It can help you if you need to work hard or react quickly. For example, stress can help you finish an important job on time.  Long-term stress is caused by ongoing stressful situations or events. Examples of long-term stress include long-term health problems, ongoing problems at work, or conflicts in your family. Long-term stress can harm your health.  How does stress affect your health?  When you are stressed, your body responds as though you are in danger. It makes hormones that speed up your heart, make you breathe faster, and give you a burst of energy. This is called the fight-or-flight stress response. If the stress is over quickly, your body goes back to normal and no harm is done.  But if stress happens too often or lasts too long, it can have bad effects. Long-term stress can make you more likely to get sick, and it can make symptoms of some diseases worse. If you tense up when you are stressed, you may develop neck, shoulder, or low back pain. Stress is linked to high blood pressure and heart disease.  Stress also harms your emotional health. It can make you tena, tense, or depressed. Your relationships may suffer, and you may not do well at work or school.  What can you do to manage stress?  You can try these things to help manage stress:   Do something active. Exercise or activity can help reduce stress. Walking is a great way to get started. Even everyday activities such as housecleaning or yard work can help.  Try yoga or huma chi. These techniques combine exercise and meditation. You may need some training at first to learn them.  Do something you enjoy. For example, listen to music or go to a movie. Practice your hobby or do volunteer  "work.  Meditate. This can help you relax, because you are not worrying about what happened before or what may happen in the future.  Do guided imagery. Imagine yourself in any setting that helps you feel calm. You can use online videos, books, or a teacher to guide you.  Do breathing exercises. For example:  From a standing position, bend forward from the waist with your knees slightly bent. Let your arms dangle close to the floor.  Breathe in slowly and deeply as you return to a standing position. Roll up slowly and lift your head last.  Hold your breath for just a few seconds in the standing position.  Breathe out slowly and bend forward from the waist.  Let your feelings out. Talk, laugh, cry, and express anger when you need to. Talking with supportive friends or family, a counselor, or a andrae leader about your feelings is a healthy way to relieve stress. Avoid discussing your feelings with people who make you feel worse.  Write. It may help to write about things that are bothering you. This helps you find out how much stress you feel and what is causing it. When you know this, you can find better ways to cope.  What can you do to prevent stress?  You might try some of these things to help prevent stress:  Manage your time. This helps you find time to do the things you want and need to do.  Get enough sleep. Your body recovers from the stresses of the day while you are sleeping.  Get support. Your family, friends, and community can make a difference in how you experience stress.  Limit your news feed. Avoid or limit time on social media or news that may make you feel stressed.  Do something active. Exercise or activity can help reduce stress. Walking is a great way to get started.  Where can you learn more?  Go to https://www.[employer].net/patiented  Enter N032 in the search box to learn more about \"Learning About Stress.\"  Current as of: October 24, 2023  Content Version: 14.3    2024 [employer]. "   Care instructions adapted under license by your healthcare professional. If you have questions about a medical condition or this instruction, always ask your healthcare professional. [employer] disclaims any warranty or liability for your use of this information.    Safer Sex: Care Instructions  Overview  Safer sex is a way to reduce your risk of getting a sexually transmitted infection (STI). It can also help prevent pregnancy.  Several products can help you practice safer sex and reduce your chance of STIs. One of the best is a condom. There are internal and external condoms. You can use a special rubber sheet (dental dam) for protection during oral sex. Disposable gloves can keep your hands from touching blood, semen, or other body fluids that can carry infections.  Remember that birth control methods such as diaphragms, IUDs, foams, and birth control pills do not stop you from getting STIs.  Follow-up care is a key part of your treatment and safety. Be sure to make and go to all appointments, and call your doctor if you are having problems. It's also a good idea to know your test results and keep a list of the medicines you take.  How can you care for yourself at home?  Think about getting vaccinated to help prevent hepatitis A, hepatitis B, and human papillomavirus (HPV). They can be spread through sex.  Use a condom every time you have sex. Use an external condom, which goes on the penis. Or use an internal condom, which goes into the vagina or anus.  Make sure you use the right size external condom. A condom that's too small can break easily. A condom that's too big can slip off during sex.  Use a new condom each time you have sex. Be careful not to poke a hole in the condom when you open the wrapper.  Don't use an internal condom and an external condom at the same time.  Never use petroleum jelly (such as Vaseline), grease, hand lotion, baby oil, or anything with oil in it. These products can  "make holes in the condom.  After intercourse, hold the edge of the condom as you remove it. This will help keep semen from spilling out of the condom.  Do not have sex with anyone who has symptoms of an STI, such as sores on the genitals or mouth.  Do not drink a lot of alcohol or use drugs before sex.  Limit your sex partners. Sex with one partner who has sex only with you can reduce your risk of getting an STI.  Don't share sex toys. But if you do share them, use a condom and clean the sex toys between each use.  Talk to any partners before you have sex. Talk about what you feel comfortable with and whether you have any boundaries with sex. And find out if your partner or partners may be at risk for any STI. Keep in mind that a person may be able to spread an STI even if they do not have symptoms. You and any partners may want to get tested for STIs.  Where can you learn more?  Go to https://www.[employer].net/patiented  Enter B608 in the search box to learn more about \"Safer Sex: Care Instructions.\"  Current as of: April 30, 2024  Content Version: 14.3    2024 [employer].   Care instructions adapted under license by your healthcare professional. If you have questions about a medical condition or this instruction, always ask your healthcare professional. [employer] disclaims any warranty or liability for your use of this information.       "

## 2025-01-16 NOTE — PROGRESS NOTES
Preventive Care Visit  [facility]  DAVID Lazo CNP, Family Medicine  Jan 16, 2025    Assessment & Plan     Routine general medical examination at a health care facility    Major depressive disorder, recurrent episode, moderate (H)  Stable with current medication  - escitalopram (LEXAPRO) 20 MG tablet; Take 1 tablet (20 mg) by mouth daily.    MALCOM (generalized anxiety disorder)  Stable.  - escitalopram (LEXAPRO) 20 MG tablet; Take 1 tablet (20 mg) by mouth daily.    Bilateral calf pain  Workup unremarkable, methocarbamol helpful for discomfort as needed at night  - methocarbamol (ROBAXIN) 750 MG tablet; Take 1 tablet (750 mg) by mouth 4 times daily as needed for muscle spasms.    Recurrent cold sores  Stable with as needed medication  - valACYclovir (VALTREX) 1000 mg tablet; TAKE 2 TABLETS(2000 MG) BY MOUTH TWICE DAILY    IgA deficiency (H)  No clinical symptoms, no history of recurrent infections     Iron deficiency  Okay to stop iron and since hysterectomy plan recheck of labs in 2 to 3 months for monitoring  - CBC with platelets; Future  - Ferritin; Future  - Iron and iron binding capacity; Future      Counseling  Appropriate preventive services were addressed with this patient via screening, questionnaire, or discussion as appropriate for fall prevention, nutrition, physical activity, Tobacco-use cessation, social engagement, weight loss and cognition.  Checklist reviewing preventive services available has been given to the patient.  Reviewed patient's diet, addressing concerns and/or questions.   She is at risk for lack of exercise and has been provided with information to increase physical activity for the benefit of her well-being.   She is at risk for psychosocial distress and has been provided with information to reduce risk.         Kamini Humphries is a 44 year old, presenting for the following:  Physical        1/16/2025     1:44 PM   Additional Questions   Roomed by  Argelia MAREK   Accompanied by self         1/16/2025     1:44 PM   Patient Reported Additional Medications   Patient reports taking the following new medications no new meds        Discuss iron pill.     HPI    Depression and Anxiety   How are you doing with your depression since your last visit? No change  How are you doing with your anxiety since your last visit?  No change  Are you having other symptoms that might be associated with depression or anxiety? No  Have you had a significant life event? OTHER: life stress     Do you have any concerns with your use of alcohol or other drugs? No    Social History     Tobacco Use    Smoking status: Former     Current packs/day: 1.00     Average packs/day: 1 pack/day for 5.0 years (5.0 ttl pk-yrs)     Types: Cigarettes    Smokeless tobacco: Never   Vaping Use    Vaping status: Never Used   Substance Use Topics    Alcohol use: Yes     Comment: 1 drink per wk    Drug use: No         6/10/2024     1:32 PM 9/11/2024     4:32 PM 12/16/2024     8:21 AM   PHQ   PHQ-9 Total Score 7 19 15    Q9: Thoughts of better off dead/self-harm past 2 weeks Not at all Not at all Not at all       Patient-reported         1/15/2024    11:45 PM 2/13/2024    11:22 AM 5/16/2024    10:59 AM   MALCOM-7 SCORE   Total Score 9 (mild anxiety) 10 (moderate anxiety) 11 (moderate anxiety)   Total Score 9 10 11       Pain History:  When did you first notice your pain? Chronic    Have you seen this provider for your pain in the past? Yes   Where in your body do you have pain? Calve pain, has been getting massages   Are you seeing anyone else for your pain? No        6/10/2024     1:32 PM 9/11/2024     4:32 PM 12/16/2024     8:21 AM   PHQ-9 SCORE   PHQ-9 Total Score MyChart   15 (Moderately severe depression)   PHQ-9 Total Score 7 19 15        Patient-reported           1/15/2024    11:45 PM 2/13/2024    11:22 AM 5/16/2024    10:59 AM   MALCOM-7 SCORE   Total Score 9 (mild anxiety) 10 (moderate anxiety) 11 (moderate  anxiety)   Total Score 9 10 11       PDMP Review         Value Time User    State PDMP site checked  Yes 12/16/2024  8:42 AM Yulissa Nogueira APRN CNP          Last CSA Agreement:   CSA -- Patient Level:    CSA: None found at the patient level.       Health Care Directive  Patient does not have a Health Care Directive: Discussed advance care planning with patient; however, patient declined at this time.      1/13/2025   General Health   How would you rate your overall physical health? Good   Feel stress (tense, anxious, or unable to sleep) Rather much   (!) STRESS CONCERN      1/13/2025   Nutrition   Three or more servings of calcium each day? Yes   Diet: Regular (no restrictions)   How many servings of fruit and vegetables per day? (!) 0-1   How many sweetened beverages each day? 0-1         1/13/2025   Exercise   Days per week of moderate/strenous exercise 3 days   Average minutes spent exercising at this level 20 min         1/13/2025   Social Factors   Frequency of gathering with friends or relatives More than three times a week   Worry food won't last until get money to buy more No   Food not last or not have enough money for food? No   Do you have housing? (Housing is defined as stable permanent housing and does not include staying ouside in a car, in a tent, in an abandoned building, in an overnight shelter, or couch-surfing.) Yes   Are you worried about losing your housing? No   Lack of transportation? No   Unable to get utilities (heat,electricity)? Yes   Want help with housing or utility concern? (!) YES   (!) FINANCIAL RESOURCE STRAIN CONCERN      1/13/2025   Dental   Dentist two times every year? Yes         1/13/2025   TB Screening   Were you born outside of the [place]? No           1/13/2025   Substance Use   Alcohol more than 3/day or more than 7/wk No   Do you use any other substances recreationally? No     Social History     Tobacco Use    Smoking status: Former     Current packs/day: 1.00     Average  packs/day: 1 pack/day for 5.0 years (5.0 ttl pk-yrs)     Types: Cigarettes    Smokeless tobacco: Never   Vaping Use    Vaping status: Never Used   Substance Use Topics    Alcohol use: Yes     Comment: 1 drink per wk    Drug use: No         9/13/2024   LAST FHS-7 RESULTS   1st degree relative breast or ovarian cancer No   Any relative bilateral breast cancer No   Any male have breast cancer No   Any ONE woman have BOTH breast AND ovarian cancer No   Any woman with breast cancer before 50yrs No   2 or more relatives with breast AND/OR ovarian cancer No   2 or more relatives with breast AND/OR bowel cancer No     Mammogram Screening - Mammogram every 1-2 years updated in Health Maintenance based on mutual decision making        1/13/2025   STI Screening   New sexual partner(s) since last STI/HIV test? No     History of abnormal Pap smear: Status post hysterectomy with removal of cervix and no history of CIN2 or greater or cervical cancer. Health Maintenance and Surgical History updated.        Latest Ref Rng & Units 3/6/2023     8:24 AM 3/23/2018     3:55 PM 3/23/2018     3:15 PM   PAP / HPV   PAP  Negative for Intraepithelial Lesion or Malignancy (NILM)      PAP (Historical)   NIL     HPV 16 DNA Negative Negative   Negative    HPV 18 DNA Negative Negative   Negative    Other HR HPV Negative Negative   Negative      ASCVD Risk   The 10-year ASCVD risk score (Vik WANG, et al., 2019) is: 0.9%    Values used to calculate the score:      Age: 44 years      Sex: Female      Is Non- : No      Diabetic: No      Tobacco smoker: No      Systolic Blood Pressure: 122 mmHg      Is BP treated: No      HDL Cholesterol: 45 mg/dL      Total Cholesterol: 194 mg/dL    Reviewed and updated as needed this visit by Provider                        Review of Systems  Constitutional, HEENT, cardiovascular, pulmonary, gi and gu systems are negative, except as otherwise noted.     Objective    Exam  /80   " Pulse 88   Temp 97.9  F (36.6  C) (Tympanic)   Resp 18   Ht 1.759 m (5' 9.25\")   Wt 103.4 kg (228 lb)   LMP  (LMP Unknown)   SpO2 98%   BMI 33.43 kg/m     Estimated body mass index is 33.43 kg/m  as calculated from the following:    Height as of this encounter: 1.759 m (5' 9.25\").    Weight as of this encounter: 103.4 kg (228 lb).    Physical Exam  GENERAL: alert and no distress  EYES: Eyes grossly normal to inspection, PERRL and conjunctivae and sclerae normal  HENT: ear canals and TM's normal, nose and mouth without ulcers or lesions  NECK: no adenopathy, no asymmetry, masses, or scars  RESP: lungs clear to auscultation - no rales, rhonchi or wheezes  CV: regular rate and rhythm, normal S1 S2, no S3 or S4, no murmur, click or rub, no peripheral edema  ABDOMEN: soft, nontender, no hepatosplenomegaly, no masses and bowel sounds normal  MS: no gross musculoskeletal defects noted, no edema  SKIN: no suspicious lesions or rashes  NEURO: Normal strength and tone, mentation intact and speech normal  PSYCH: mentation appears normal, affect normal/bright        Signed Electronically by: DAVID Lazo CNP    "

## 2025-01-20 ENCOUNTER — MYC MEDICAL ADVICE (OUTPATIENT)
Dept: OBGYN | Facility: CLINIC | Age: 45
End: 2025-01-20
Payer: COMMERCIAL

## 2025-01-20 DIAGNOSIS — Z79.890 SURGICAL MENOPAUSE ON HORMONE REPLACEMENT THERAPY: ICD-10-CM

## 2025-01-20 DIAGNOSIS — E89.40 SURGICAL MENOPAUSE ON HORMONE REPLACEMENT THERAPY: ICD-10-CM

## 2025-01-20 RX ORDER — ESTRADIOL 1 MG/1
1.5 TABLET ORAL DAILY
Qty: 90 TABLET | Refills: 3 | Status: SHIPPED | OUTPATIENT
Start: 2025-01-20

## 2025-01-20 NOTE — TELEPHONE ENCOUNTER
Fely received from patient with c/o worsening hot flashes, night sweats.     Last OV 1/09/2025 Surgical menopause on hormone replacement therapy   Was prescribed estradiol 1 mg tabs daily.     Night sweats and hot flashes began to worsen over the past week-end (1/18/2025).  Patient has been taking estradiol since 1/09/2025 (11 days).    Advised, it typically takes a few weeks for medication to start helping improve symptoms.  Will route message to provider for any additional recommendations.     Vira SEVILLA RN - [facility]

## 2025-01-21 ENCOUNTER — E-VISIT (OUTPATIENT)
Dept: FAMILY MEDICINE | Facility: CLINIC | Age: 45
End: 2025-01-21
Payer: COMMERCIAL

## 2025-01-21 DIAGNOSIS — J02.9 SORE THROAT: Primary | ICD-10-CM

## 2025-01-21 NOTE — PATIENT INSTRUCTIONS
Dear Kristel,    After reviewing your responses, I would like you to come in for a swab to make sure we treat you correctly. This swab is to evaluate you for possible Strep Throat, and should be scheduled for today or tomorrow. Please use the Schedule Now button in [employer] to schedule your swab. Otherwise, click this link to schedule a lab only appointment.    Lab appointments are not available at most locations on the weekends. If no Lab Only appointment is available, you should be seen in any of our convenient Urgent Care Centers for an in person visit, which can be found on our website here.    You will receive instructions with your results in [employer] once they are available.     If your symptoms worsen, you develop difficulty breathing, difficulty with drinking enough to stay hydrated, difficulty swallowing your saliva or have fevers for more than 5 days, please contact your primary care provider for an appointment or visit an Urgent Care Center to be seen.      Thanks again for choosing us as your health care partner.   DAVID Lazo CNP  Sore Throat: Care Instructions  Overview     Infection by bacteria or a virus causes most sore throats. Cigarette smoke, dry air, air pollution, allergies, and yelling can also cause a sore throat. Sore throats can be painful and annoying. Fortunately, most sore throats go away on their own. If you have a bacterial infection, your doctor may prescribe antibiotics.  Follow-up care is a key part of your treatment and safety. Be sure to make and go to all appointments, and call your doctor if you are having problems. It's also a good idea to know your test results and keep a list of the medicines you take.  How can you care for yourself at home?  If your doctor prescribed antibiotics, take them as directed. Do not stop taking them just because you feel better. You need to take the full course of antibiotics.  Gargle with warm salt water several times a day to help reduce  "swelling and relieve pain. Mix 1/2 teaspoon of salt in 1 cup of warm water.  Take an over-the-counter pain medicine, such as acetaminophen (Tylenol), ibuprofen (Advil, Motrin), or naproxen (Aleve). Read and follow all instructions on the label.  Be careful when taking over-the-counter cold or flu medicines and Tylenol at the same time. Many of these medicines have acetaminophen, which is Tylenol. Read the labels to make sure that you are not taking more than the recommended dose. Too much acetaminophen (Tylenol) can be harmful.  Drink plenty of fluids. Fluids may help soothe an irritated throat. Hot fluids, such as tea or soup, may help decrease throat pain.  Use over-the-counter throat lozenges to soothe pain. Regular cough drops or hard candy may also help. These should not be given to young children because of the risk of choking.  Do not smoke or allow others to smoke around you. If you need help quitting, talk to your doctor about stop-smoking programs and medicines. These can increase your chances of quitting for good.  Use a vaporizer or humidifier to add moisture to your bedroom. Follow the directions for cleaning the machine.  When should you call for help?   Call your doctor now or seek immediate medical care if:    You have trouble breathing.     Your sore throat gets much worse on one side.     You have new or worse trouble swallowing.     You have a new or higher fever.   Watch closely for changes in your health, and be sure to contact your doctor if you do not get better as expected.  Where can you learn more?  Go to https://www.[employer].net/patiented  Enter U420 in the search box to learn more about \"Sore Throat: Care Instructions.\"  Current as of: September 27, 2023  Content Version: 14.3    2024 [facility] [employer].   Care instructions adapted under license by your healthcare professional. If you have questions about a medical condition or this instruction, always ask your healthcare " professional. [facility] [employer], [facility] disclaims any warranty or liability for your use of this information.

## 2025-03-05 NOTE — PROGRESS NOTES
SUBJECTIVE:  Kristel Wiley is a 43 year old female who presents with right ear pain, hearing loss, and discharge for 1 week(s).   Severity: moderate   Timing:sudden onset  Additional symptoms include headache and sinus pressure.      History of recurrent otitis: has had many ear infections in the past, not classified as recurrent.    Past Medical History:   Diagnosis Date    Depressive disorder     Interstitial cystitis 07/09/2021    Major depressive disorder, recurrent episode, moderate (H) 11/16/2007    Papillary adenocarcinoma of thyroid (H) 03/01/2014    Overview:  12/2013: R thyroid lobectomy 1.7 cm follicular variant papillary cancer, MACIS 3.6 03/2014: Completion Thyroidectomy 0.3 cm incidental papillary cancer left lobe. Iodine ablation with 53 mCi.  07/2016, 07/2017: Neck US neg.    Postoperative hypothyroidism 03/23/2018     Current Outpatient Medications   Medication Sig Dispense Refill    escitalopram (LEXAPRO) 20 MG tablet Take 1 tablet (20 mg) by mouth daily 90 tablet 3    fluticasone (FLONASE) 50 MCG/ACT nasal spray SHAKE LIQUID AND USE 1 SPRAY IN EACH NOSTRIL DAILY 16 g 1    levothyroxine (SYNTHROID/LEVOTHROID) 150 MCG tablet Take 1 tablet (150 mcg) by mouth daily -due for follow up labs 90 tablet 0    methocarbamol (ROBAXIN) 750 MG tablet Take 1 tablet (750 mg) by mouth 4 times daily as needed for muscle spasms 30 tablet 1    polyethylene glycol (MIRALAX) 17 GM/Dose powder Take 17 g (1 capful.) by mouth daily as needed for constipation (If no bowel movement in 24 hours. Mix in 8 oz of water.  May take twice daily.) 500 g 0    senna-docusate (SENOKOT-S/PERICOLACE) 8.6-50 MG tablet Take 1-2 tablets by mouth 2 times daily 30 tablet 0    valACYclovir (VALTREX) 1000 mg tablet TAKE 2 TABLETS(2000 MG) BY MOUTH TWICE DAILY 12 tablet 3     Social History     Tobacco Use    Smoking status: Former     Packs/day: 1.00     Years: 5.00     Additional pack years: 0.00     Total pack years: 5.00     Types: Cigarettes     Smokeless tobacco: Never   Substance Use Topics    Alcohol use: Yes     Comment: 1 drink per wk       OBJECTIVE:  /65   Pulse 77   Temp 97.3  F (36.3  C) (Tympanic)   Wt 105.2 kg (232 lb)   SpO2 100%   BMI 33.29 kg/m     EXAM:  The right TM is bubbles, bulging, and distorted light reflex     The right auditory canal is normal and without drainage, edema or erythema  The left TM is normal: no effusions, no erythema, and normal landmarks  The left auditory canal is normal and without drainage, edema or erythema  Oropharynx exam is normal: no lesions, erythema, adenopathy or exudate.    ASSESSMENT:  1. OME (otitis media with effusion), right    - azithromycin (ZITHROMAX) 250 MG tablet; Take 2 tablets (500 mg) by mouth daily for 1 day, THEN 1 tablet (250 mg) daily for 4 days.  Dispense: 6 tablet; Refill: 0    2. Yeast infection of the vagina    - fluconazole (DIFLUCAN) 150 MG tablet; Take 1 tablet (150 mg) by mouth every 3 days for 3 doses  Dispense: 3 tablet; Refill: 0      PLAN:  You have an ear infection. We will treat this with an antibiotic. I have sent a zpack to your pharmacy. Please take this as directed. Take with food to avoid stomach upset. Consider taking a probiotic while on antibiotics to put the good bacteria back in your gut. Follow up in the next 7-10 days if not improving as expected, sooner if worse.      negative

## 2025-03-17 ENCOUNTER — E-VISIT (OUTPATIENT)
Dept: URGENT CARE | Facility: CLINIC | Age: 45
End: 2025-03-17
Payer: COMMERCIAL

## 2025-03-17 ENCOUNTER — LAB (OUTPATIENT)
Dept: LAB | Facility: CLINIC | Age: 45
End: 2025-03-17
Payer: COMMERCIAL

## 2025-03-17 DIAGNOSIS — R30.0 DYSURIA: Primary | ICD-10-CM

## 2025-03-17 DIAGNOSIS — R31.9 HEMATURIA, UNSPECIFIED TYPE: ICD-10-CM

## 2025-03-17 DIAGNOSIS — R30.0 DYSURIA: ICD-10-CM

## 2025-03-17 LAB
ALBUMIN UR-MCNC: NEGATIVE MG/DL
APPEARANCE UR: CLEAR
BILIRUB UR QL STRIP: NEGATIVE
COLOR UR AUTO: YELLOW
GLUCOSE UR STRIP-MCNC: NEGATIVE MG/DL
HGB UR QL STRIP: NEGATIVE
KETONES UR STRIP-MCNC: NEGATIVE MG/DL
LEUKOCYTE ESTERASE UR QL STRIP: NEGATIVE
NITRATE UR QL: NEGATIVE
PH UR STRIP: 6.5 [PH] (ref 5–7)
SP GR UR STRIP: <=1.005 (ref 1–1.03)
UROBILINOGEN UR STRIP-ACNC: 0.2 E.U./DL

## 2025-03-17 PROCEDURE — 81003 URINALYSIS AUTO W/O SCOPE: CPT

## 2025-03-17 NOTE — PATIENT INSTRUCTIONS
Dear Kristel Wiley,     After reviewing your responses, I would like you to come in for a urine test to make sure we treat you correctly. This urine test is to evaluate you for a possible urinary tract infection, and should be scheduled for today or tomorrow. Schedule a Lab Only appointment here.     Lab appointments are not available at most locations on the weekends. If no Lab Only appointment is available, you should be seen in any of our convenient Walk-in or Urgent Care Centers, which can be found on our website here.     You will receive instructions with your results in [employer] once they are available.     If your symptoms worsen, you develop pain in your back or stomach, develop fevers, or are not improving in 5 days, please contact your primary care provider for an appointment or visit a Walk-in or Urgent Care Center to be seen.     Thanks again for choosing us as your health care partner,     Kenia Ivan MD

## 2025-03-24 ENCOUNTER — TELEPHONE (OUTPATIENT)
Dept: UROLOGY | Facility: CLINIC | Age: 45
End: 2025-03-24
Payer: COMMERCIAL

## 2025-03-24 NOTE — TELEPHONE ENCOUNTER
[facility] Call Center    Phone Message    May a detailed message be left on voicemail: yes     Reason for Call: Appointment Intake    Referring Provider Name: Poe  Diagnosis and/or Symptoms: Cystoscopy    Writer sending TE per scheduling protocols.     Action Taken: Message routed to:  Other: [place] [facility]    Travel Screening: Not Applicable     Date of Service:

## 2025-03-25 ENCOUNTER — MYC MEDICAL ADVICE (OUTPATIENT)
Dept: UROLOGY | Facility: CLINIC | Age: 45
End: 2025-03-25
Payer: COMMERCIAL

## 2025-03-25 DIAGNOSIS — B96.89 BACTERIAL VAGINOSIS: ICD-10-CM

## 2025-03-25 DIAGNOSIS — N76.0 BACTERIAL VAGINOSIS: ICD-10-CM

## 2025-03-25 DIAGNOSIS — N30.00 ACUTE CYSTITIS WITHOUT HEMATURIA: Primary | ICD-10-CM

## 2025-03-25 NOTE — TELEPHONE ENCOUNTER
Called and left pt generic VM to call clinic back.    Hortencia Lowry RN          Pt's stress test canceled due to abnormal EKG with peak T waves.

## 2025-03-26 ENCOUNTER — ALLIED HEALTH/NURSE VISIT (OUTPATIENT)
Dept: NURSING | Facility: CLINIC | Age: 45
End: 2025-03-26
Payer: COMMERCIAL

## 2025-03-26 ENCOUNTER — MEDICAL CORRESPONDENCE (OUTPATIENT)
Dept: HEALTH INFORMATION MANAGEMENT | Facility: CLINIC | Age: 45
End: 2025-03-26

## 2025-03-26 ENCOUNTER — TRANSFERRED RECORDS (OUTPATIENT)
Dept: HEALTH INFORMATION MANAGEMENT | Facility: CLINIC | Age: 45
End: 2025-03-26

## 2025-03-26 DIAGNOSIS — B96.89 BACTERIAL VAGINOSIS: ICD-10-CM

## 2025-03-26 DIAGNOSIS — N76.0 BACTERIAL VAGINOSIS: ICD-10-CM

## 2025-03-26 DIAGNOSIS — N30.10 INTERSTITIAL CYSTITIS: ICD-10-CM

## 2025-03-26 DIAGNOSIS — N39.0 URINARY TRACT INFECTION: Primary | ICD-10-CM

## 2025-03-26 NOTE — TELEPHONE ENCOUNTER
Left Voicemail (1st Attempt) for the patient to call back and schedule the following:    Appointment type: cystoscopy  Provider: Dr. Wade  Return date: next available  Specialty phone number: [phone] (direct)  Additional appointment(s) needed: N/a

## 2025-03-26 NOTE — NURSING NOTE
Kristel T Mix comes into clinic today at the request of Dr. Wade Ordering Provider for [employer] sample drop off.  Pt reviewed and signed lab form.    Pt voided, sample packaged and shipped to [facility].    This service provided today was under the supervising provider of the day Dr. Michele, who was available if needed.    Staff message sent to our team to follow up on results.   Hortencia Lowry RN

## 2025-03-31 NOTE — TELEPHONE ENCOUNTER
Patient confirmed scheduled appointment:  Date: 6/11  Time: 2pm  Visit type: Cystoscopy  Provider: Dr. Wade  [place] [facility] [place]  Additional notes: Added appt to wait list

## 2025-04-01 DIAGNOSIS — N30.01 ACUTE CYSTITIS WITH HEMATURIA: Primary | ICD-10-CM

## 2025-04-02 DIAGNOSIS — N39.0 URINARY TRACT INFECTION: Primary | ICD-10-CM

## 2025-04-02 RX ORDER — GRANULES FOR ORAL 3 G/1
3 POWDER ORAL ONCE
Qty: 1 PACKET | Refills: 0 | Status: SHIPPED | OUTPATIENT
Start: 2025-04-02 | End: 2025-04-02

## 2025-04-02 NOTE — PROGRESS NOTES
Call placed to patient. Let her know about the 2 bacteria on her Microgen results and the order for fosfomycin Dr Wade has requested. Instructed her to let us know if she isn't feeling better within 24-48 hours and Dr Wade has said that we could order 2 further doses.      Thank you,  Bryanna Mcconnell RN, BSN   Urology Triage Nurse

## 2025-04-21 DIAGNOSIS — N39.0 URINARY TRACT INFECTION: Primary | ICD-10-CM

## 2025-04-21 RX ORDER — GRANULES FOR ORAL 3 G/1
3 POWDER ORAL ONCE
Qty: 2 PACKET | Refills: 0 | Status: SHIPPED | OUTPATIENT
Start: 2025-04-21 | End: 2025-04-21

## 2025-04-21 NOTE — PROGRESS NOTES
Fosfomycin x 2 doses ordered. !st dose today and the next 2 days after that. Call placed to patient and message left to let her know.      Thank you,  Bryanna Mcconnell RN, BSN   Urology Triage Nurse

## 2025-05-05 DIAGNOSIS — R39.89 SUSPECTED UTI: Primary | ICD-10-CM

## 2025-05-12 ENCOUNTER — TELEPHONE (OUTPATIENT)
Dept: FAMILY MEDICINE | Facility: CLINIC | Age: 45
End: 2025-05-12

## 2025-05-12 ENCOUNTER — LAB (OUTPATIENT)
Dept: LAB | Facility: CLINIC | Age: 45
End: 2025-05-12
Payer: COMMERCIAL

## 2025-05-12 DIAGNOSIS — E61.1 IRON DEFICIENCY: ICD-10-CM

## 2025-05-12 DIAGNOSIS — R39.89 SUSPECTED UTI: ICD-10-CM

## 2025-05-12 DIAGNOSIS — E89.0 POSTOPERATIVE HYPOTHYROIDISM: ICD-10-CM

## 2025-05-12 LAB
ALBUMIN UR-MCNC: NEGATIVE MG/DL
APPEARANCE UR: CLEAR
BILIRUB UR QL STRIP: NEGATIVE
COLOR UR AUTO: YELLOW
ERYTHROCYTE [DISTWIDTH] IN BLOOD BY AUTOMATED COUNT: 13 % (ref 10–15)
FERRITIN SERPL-MCNC: 34 NG/ML (ref 6–175)
GLUCOSE UR STRIP-MCNC: NEGATIVE MG/DL
HCT VFR BLD AUTO: 39.3 % (ref 35–47)
HGB BLD-MCNC: 13 G/DL (ref 11.7–15.7)
HGB UR QL STRIP: NEGATIVE
IRON BINDING CAPACITY (ROCHE): 362 UG/DL (ref 240–430)
IRON SATN MFR SERPL: 25 % (ref 15–46)
IRON SERPL-MCNC: 89 UG/DL (ref 37–145)
KETONES UR STRIP-MCNC: NEGATIVE MG/DL
LEUKOCYTE ESTERASE UR QL STRIP: NEGATIVE
MCH RBC QN AUTO: 27.7 PG (ref 26.5–33)
MCHC RBC AUTO-ENTMCNC: 33.1 G/DL (ref 31.5–36.5)
MCV RBC AUTO: 84 FL (ref 78–100)
NITRATE UR QL: NEGATIVE
PH UR STRIP: 8 [PH] (ref 5–7)
PLATELET # BLD AUTO: 235 10E3/UL (ref 150–450)
RBC # BLD AUTO: 4.7 10E6/UL (ref 3.8–5.2)
RBC #/AREA URNS AUTO: ABNORMAL /HPF
SP GR UR STRIP: 1.01 (ref 1–1.03)
SQUAMOUS #/AREA URNS AUTO: ABNORMAL /LPF
TSH SERPL DL<=0.005 MIU/L-ACNC: 0.93 UIU/ML (ref 0.3–4.2)
UROBILINOGEN UR STRIP-ACNC: 0.2 E.U./DL
WBC # BLD AUTO: 4.1 10E3/UL (ref 4–11)
WBC #/AREA URNS AUTO: ABNORMAL /HPF

## 2025-05-12 PROCEDURE — 36415 COLL VENOUS BLD VENIPUNCTURE: CPT

## 2025-05-12 PROCEDURE — 82728 ASSAY OF FERRITIN: CPT

## 2025-05-12 PROCEDURE — 85027 COMPLETE CBC AUTOMATED: CPT

## 2025-05-12 PROCEDURE — 83540 ASSAY OF IRON: CPT

## 2025-05-12 PROCEDURE — 84443 ASSAY THYROID STIM HORMONE: CPT

## 2025-05-12 PROCEDURE — 81001 URINALYSIS AUTO W/SCOPE: CPT

## 2025-05-12 PROCEDURE — 83550 IRON BINDING TEST: CPT

## 2025-05-12 NOTE — TELEPHONE ENCOUNTER
Appointment was scheduled for today at 12:00- lab orders are in her MyChart.     Argelia Christensen/ Patient

## 2025-05-12 NOTE — TELEPHONE ENCOUNTER
Reason for Call:  Appointment Request    Patient requesting this type of appt:  same day lab    Requested provider: Guicho Huddleston    Reason patient unable to be scheduled: Needs to be scheduled by clinic    When does patient want to be seen/preferred time: Same day    Comments: Labs- Dr. Wade    Could we send this information to you in [employer] or would you prefer to receive a phone call?:   Patient would like to be contacted via [employer]    Call taken on 5/12/2025 at 9:35 AM by Shayla VICK

## 2025-05-14 ENCOUNTER — RESULTS FOLLOW-UP (OUTPATIENT)
Dept: UROLOGY | Facility: CLINIC | Age: 45
End: 2025-05-14

## 2025-05-14 DIAGNOSIS — N39.0 URINARY TRACT INFECTION: Primary | ICD-10-CM

## 2025-05-14 RX ORDER — VANCOMYCIN HYDROCHLORIDE 250 MG/1
500 CAPSULE ORAL EVERY 12 HOURS
Qty: 14 CAPSULE | Refills: 0 | Status: SHIPPED | OUTPATIENT
Start: 2025-05-14

## 2025-05-14 NOTE — PROGRESS NOTES
Per Dr. Wade, pt to take vancomycin 500mg tablets BID x7days.       Gopi WHEATLEY RN  Urology Triage

## 2025-05-15 ENCOUNTER — RESULTS FOLLOW-UP (OUTPATIENT)
Dept: ENDOCRINOLOGY | Facility: CLINIC | Age: 45
End: 2025-05-15

## 2025-05-15 LAB
BACTERIA UR CULT: ABNORMAL

## 2025-05-15 NOTE — RESULT ENCOUNTER NOTE
Hello -    Here are my comments about your recent results:  -TSH (thyroid stimulating hormone) level is normal which indicates appropriate thyroid replacement dosing.  ADVISE: continuing same replacement dose and rechecking this in 6 months.  For additional lab test information, labtestsonline.org is an excellent reference..    Please let us know if you have any questions or concerns.    If you have a question about the results, please use the ? (question zay) option at the upper right corner.     Regards,  Lorena Sandoval MD

## 2025-05-16 ENCOUNTER — RESULTS FOLLOW-UP (OUTPATIENT)
Dept: FAMILY MEDICINE | Facility: CLINIC | Age: 45
End: 2025-05-16

## 2025-05-20 DIAGNOSIS — B37.31 YEAST INFECTION OF THE VAGINA: Primary | ICD-10-CM

## 2025-05-20 RX ORDER — FLUCONAZOLE 150 MG/1
150 TABLET ORAL ONCE
Qty: 1 TABLET | Refills: 0 | Status: SHIPPED | OUTPATIENT
Start: 2025-05-20 | End: 2025-05-20

## 2025-05-27 ENCOUNTER — MYC MEDICAL ADVICE (OUTPATIENT)
Dept: FAMILY MEDICINE | Facility: CLINIC | Age: 45
End: 2025-05-27
Payer: COMMERCIAL

## 2025-05-29 NOTE — TELEPHONE ENCOUNTER
Left message for patient to call the clinic, as well as sent a my chart message to let patient know that we would like to help her get an appointment with Bev Nogueira.     I did hold a 2:40/3 pm spot on 5/30 incase that would work for patient.     Bev Rizvi, [facility]

## 2025-06-09 ENCOUNTER — PRE VISIT (OUTPATIENT)
Dept: UROLOGY | Facility: CLINIC | Age: 45
End: 2025-06-09
Payer: COMMERCIAL

## 2025-06-09 DIAGNOSIS — N30.00 ACUTE CYSTITIS WITHOUT HEMATURIA: Primary | ICD-10-CM

## 2025-06-09 RX ORDER — LIDOCAINE HYDROCHLORIDE 20 MG/ML
JELLY TOPICAL ONCE
Status: ACTIVE | OUTPATIENT
Start: 2025-06-11

## 2025-06-09 NOTE — TELEPHONE ENCOUNTER
"Reason for visit: Cystoscopy         Relevant information: Last seen 2/7/24- \"-finish courses of metronidazole and macrobid  -if symptoms don't improve then we will plan to do a DNA sequencing test and cystoscopy, pt. To reach out after treatment is complete\"  Patient's symptoms continued and cystoscopy was scheduled.\"    H/o acute cystitis, dysuria, stress incontinence     MyChart info sent: Yes    Records/imaging/labs/orders: all records available    Pt called: no need for a call    At Rooming: Collect a urine sample (dip if patient has UTI symptoms)    Ernestina Choi  6/9/2025  11:46 AM  "

## 2025-06-26 ENCOUNTER — OFFICE VISIT (OUTPATIENT)
Dept: FAMILY MEDICINE | Facility: CLINIC | Age: 45
End: 2025-06-26
Payer: COMMERCIAL

## 2025-06-26 VITALS
TEMPERATURE: 97.4 F | DIASTOLIC BLOOD PRESSURE: 88 MMHG | RESPIRATION RATE: 16 BRPM | OXYGEN SATURATION: 98 % | HEART RATE: 93 BPM | WEIGHT: 235.6 LBS | BODY MASS INDEX: 34.9 KG/M2 | HEIGHT: 69 IN | SYSTOLIC BLOOD PRESSURE: 112 MMHG

## 2025-06-26 DIAGNOSIS — H60.542 ACUTE ECZEMATOID OTITIS EXTERNA OF LEFT EAR: Primary | ICD-10-CM

## 2025-06-26 DIAGNOSIS — L03.211 CELLULITIS, FACE: ICD-10-CM

## 2025-06-26 RX ORDER — LEVOFLOXACIN 500 MG/1
500 TABLET, FILM COATED ORAL DAILY
Qty: 7 TABLET | Refills: 0 | Status: SHIPPED | OUTPATIENT
Start: 2025-06-26 | End: 2025-07-03

## 2025-06-26 RX ORDER — PREDNISONE 20 MG/1
20 TABLET ORAL DAILY
Qty: 5 TABLET | Refills: 0 | Status: SHIPPED | OUTPATIENT
Start: 2025-06-26 | End: 2025-07-01

## 2025-06-26 RX ORDER — TRIAMCINOLONE ACETONIDE 1 MG/G
OINTMENT TOPICAL 2 TIMES DAILY
Qty: 30 G | Refills: 3 | Status: SHIPPED | OUTPATIENT
Start: 2025-06-26

## 2025-06-26 RX ORDER — CIPROFLOXACIN AND DEXAMETHASONE 3; 1 MG/ML; MG/ML
4 SUSPENSION/ DROPS AURICULAR (OTIC) 2 TIMES DAILY
Qty: 7.5 ML | Refills: 0 | Status: SHIPPED | OUTPATIENT
Start: 2025-06-26 | End: 2025-07-01

## 2025-06-26 ASSESSMENT — PATIENT HEALTH QUESTIONNAIRE - PHQ9
SUM OF ALL RESPONSES TO PHQ QUESTIONS 1-9: 12
10. IF YOU CHECKED OFF ANY PROBLEMS, HOW DIFFICULT HAVE THESE PROBLEMS MADE IT FOR YOU TO DO YOUR WORK, TAKE CARE OF THINGS AT HOME, OR GET ALONG WITH OTHER PEOPLE: SOMEWHAT DIFFICULT
SUM OF ALL RESPONSES TO PHQ QUESTIONS 1-9: 12

## 2025-06-26 NOTE — PROGRESS NOTES
"  Assessment & Plan     Acute eczematoid otitis externa of left ear  Use Kenalog in the future for eczema PRN  - levofloxacin (LEVAQUIN) 500 MG tablet; Take 1 tablet (500 mg) by mouth daily for 7 days.  - predniSONE (DELTASONE) 20 MG tablet; Take 1 tablet (20 mg) by mouth daily for 5 days.  - triamcinolone (KENALOG) 0.1 % external ointment; Apply topically 2 times daily. To outer ear canal as needed for cracking  - ciprofloxacin-dexAMETHasone (CIPRODEX) 0.3-0.1 % otic suspension; Place 4 drops into the right ear 2 times daily for 5 days.    Cellulitis, face    - levofloxacin (LEVAQUIN) 500 MG tablet; Take 1 tablet (500 mg) by mouth daily for 7 days.  - predniSONE (DELTASONE) 20 MG tablet; Take 1 tablet (20 mg) by mouth daily for 5 days.          BMI  Estimated body mass index is 34.54 kg/m  as calculated from the following:    Height as of this encounter: 1.759 m (5' 9.25\").    Weight as of this encounter: 106.9 kg (235 lb 9.6 oz).       Follow up in 3 days for symptoms that are not improving, sooner for new or worsening sx.         Subjective   Kristel is a 44 year old, presenting for the following health issues:  Ear Problem        6/26/2025     3:51 PM   Additional Questions   Roomed by Lauren LAN     Ear Problem    History of Present Illness       Reason for visit:  Right ear pain  Symptom onset:  3-7 days ago  Symptoms include:  Swelling and pain, redness and warm to the touch  Symptom intensity:  Moderate  Symptom progression:  Worsening  Had these symptoms before:  No  What makes it worse:  Touching it  What makes it better:  Ice and heat   She is taking medications regularly.        Has eczema and fissures frequently in ear canals.        Review of Systems  Constitutional, HEENT, cardiovascular, pulmonary, gi and gu systems are negative, except as otherwise noted.      Objective    /88   Pulse 93   Temp 97.4  F (36.3  C) (Tympanic)   Resp 16   Ht 1.759 m (5' 9.25\")   Wt 106.9 kg (235 lb 9.6 oz)   LMP  " (LMP Unknown)   SpO2 98%   BMI 34.54 kg/m    Body mass index is 34.54 kg/m .  Physical Exam   GENERAL: alert and no distress  EYES: Eyes grossly normal to inspection and conjunctivae and sclerae normal  HENT: normal cephalic/atraumatic, right ear: red and boggy canal and tragus tender to palpation, left ear: normal: no effusions, no erythema, normal landmarks, oropharynx clear, and oral mucous membranes moist. Fissure at 12 o'clock to outer right canal.  NECK: cervical adenopathy - anterior and posterior on right side  NEURO: Normal strength and tone, mentation intact and speech normal  PSYCH: mentation appears normal, affect normal/bright            Signed Electronically by: DAVID Garcia CNP

## 2025-07-03 ENCOUNTER — OFFICE VISIT (OUTPATIENT)
Dept: URGENT CARE | Facility: URGENT CARE | Age: 45
End: 2025-07-03
Payer: COMMERCIAL

## 2025-07-03 VITALS
RESPIRATION RATE: 18 BRPM | OXYGEN SATURATION: 99 % | SYSTOLIC BLOOD PRESSURE: 122 MMHG | TEMPERATURE: 98.2 F | HEART RATE: 99 BPM | WEIGHT: 240 LBS | DIASTOLIC BLOOD PRESSURE: 86 MMHG | BODY MASS INDEX: 35.19 KG/M2

## 2025-07-03 DIAGNOSIS — R10.2 SUPRAPUBIC ABDOMINAL PAIN: Primary | ICD-10-CM

## 2025-07-03 ASSESSMENT — ENCOUNTER SYMPTOMS
EYES NEGATIVE: 1
DIARRHEA: 0
WOUND: 0
ABDOMINAL PAIN: 1
WEAKNESS: 0
BLOOD IN STOOL: 0
NAUSEA: 0
RESPIRATORY NEGATIVE: 1
RHINORRHEA: 0
MUSCULOSKELETAL NEGATIVE: 1
MYALGIAS: 0
COUGH: 0
NECK PAIN: 0
DIZZINESS: 0
SORE THROAT: 0
BACK PAIN: 0
HEADACHES: 0
ENDOCRINE NEGATIVE: 1
ALLERGIC/IMMUNOLOGIC NEGATIVE: 1
NECK STIFFNESS: 0
JOINT SWELLING: 0
VOMITING: 0
SHORTNESS OF BREATH: 0
CHILLS: 0
CARDIOVASCULAR NEGATIVE: 1
LIGHT-HEADEDNESS: 0
PALPITATIONS: 0
ARTHRALGIAS: 0
FEVER: 0

## 2025-07-03 NOTE — PROGRESS NOTES
Chief Complaint:    Chief Complaint   Patient presents with    Urgent Care    Abdominal Pain     Patient reports for the past couple of weeks she started having lower abdominal pain,states in 12/2024 she had a hysterectomy and it feels the exact same way after having that procedure, states pain does not radiate, no constipation or diarrhea , no gas or bloating.      Medical Decision Making:    Vital signs reviewed by Feroz Lawton PA-C  /86   Pulse 99   Temp 98.2  F (36.8  C) (Oral)   Resp 18   Wt 108.9 kg (240 lb)   LMP  (LMP Unknown)   SpO2 99%   BMI 35.19 kg/m      Differential Diagnosis:  Post-op adhesions, diverticulitis, UTI, IBS flares     ASSESSMENT:    1. Suprapubic abdominal pain      Denies blood in stool, nausea, vomiting, fever or chills. No urinary symptoms or vaginal discharge. States her IBS is under control and does not usually cause this type of pain.     PLAN:    Patient is in no acute distress.  She is afebrile with stable vital signs.  She has tenderness in the LLQ with no guarding.  Unclear cause of her pain.  Discussed emergent evaluation and patient would like to continue monitoring this.  Ibuprofen and Tylenol alternating every 4-6 hours for pain.  Patient instructed to follow up with PCP in the next week if symptoms are not improving.    Worrisome symptoms discussed with instructions to go to the ED.  Patient verbalized understanding and agreed with this plan.    Labs:     No results found for any visits on 07/03/25.    Current Meds:    Current Outpatient Medications:     cholecalciferol (VITAMIN D3) 125 mcg (5000 units) capsule, Take by mouth daily., Disp: , Rfl:     escitalopram (LEXAPRO) 20 MG tablet, Take 1 tablet (20 mg) by mouth daily., Disp: 90 tablet, Rfl: 1    estradiol (ESTRACE) 1 MG tablet, Take 1.5 tablets (1.5 mg) by mouth daily., Disp: 90 tablet, Rfl: 3    levofloxacin (LEVAQUIN) 500 MG tablet, Take 1 tablet (500 mg) by mouth daily for 7 days., Disp: 7 tablet,  Rfl: 0    levothyroxine (SYNTHROID/LEVOTHROID) 150 MCG tablet, Take 1 tablet (150 mcg) by mouth daily. Six days a week and take half tablet (75 mcg) one day a week., Disp: 90 tablet, Rfl: 3    methocarbamol (ROBAXIN) 750 MG tablet, Take 1 tablet (750 mg) by mouth 4 times daily as needed for muscle spasms., Disp: 30 tablet, Rfl: 5    triamcinolone (KENALOG) 0.1 % external ointment, Apply topically 2 times daily. To outer ear canal as needed for cracking, Disp: 30 g, Rfl: 3    valACYclovir (VALTREX) 1000 mg tablet, TAKE 2 TABLETS(2000 MG) BY MOUTH TWICE DAILY, Disp: 12 tablet, Rfl: 5    Current Facility-Administered Medications:     cyanocobalamin injection 1,000 mcg, 1,000 mcg, Intramuscular, Q30 Days,     lidocaine (XYLOCAINE) 2 % external gel, , Urethral, Once, Cedric Wade MD    Allergies:  Allergies   Allergen Reactions    Cephalexin Rash    Cephalosporins Rash     Cephalexin    Clavulanic Acid Diarrhea     Bad diarrhea with nausea and vomiting    Sulfa Antibiotics Rash and Unknown       SUBJECTIVE    HPI: Kristel Wiley is an 44 year old female who presents for evaluation and treatment of lower abdominal pain. The patient reports onset of lower abdominal pain couple of weeks ago. It has been persistent since it started with about 4-5/10 in severity. She is s/p radical hysterectomy and stated that it feel similar to post-op pain. Mild improvement with heating pad. She denies fever or chills, nausea, vomiting, constipation.    ROS:    Review of Systems   Constitutional:  Negative for chills and fever.   HENT:  Negative for congestion, ear pain, rhinorrhea and sore throat.    Eyes: Negative.    Respiratory: Negative.  Negative for cough and shortness of breath.    Cardiovascular: Negative.  Negative for chest pain and palpitations.   Gastrointestinal:  Positive for abdominal pain. Negative for blood in stool, diarrhea, nausea and vomiting.   Endocrine: Negative.    Genitourinary: Negative.    Musculoskeletal:  Negative.  Negative for arthralgias, back pain, joint swelling, myalgias, neck pain and neck stiffness.   Skin: Negative.  Negative for rash and wound.   Allergic/Immunologic: Negative.  Negative for immunocompromised state.   Neurological:  Negative for dizziness, weakness, light-headedness and headaches.        Family History   Family History   Problem Relation Age of Onset    Breast Cancer Mother 67    Skin Cancer Mother     Other Cancer Mother         Skin cancer    Cancer Mother     Hypertension Father     Arrhythmia Father     Lymphoma Maternal Grandmother     Cerebrovascular Disease Maternal Grandmother     Cancer Maternal Grandmother     Other Cancer Maternal Grandmother     Osteoporosis Maternal Grandmother     Hypertension Maternal Grandfather     Diabetes Paternal Grandfather             Arthritis Daughter     Asthma Son     Asthma Nephew        Social History  Social History     Socioeconomic History    Marital status:      Spouse name: partner- Flaco    Number of children: 2    Years of education: Not on file    Highest education level: Not on file   Occupational History    Occupation: school paraprofessional   Tobacco Use    Smoking status: Former     Current packs/day: 1.00     Average packs/day: 1 pack/day for 5.0 years (5.0 ttl pk-yrs)     Types: Cigarettes    Smokeless tobacco: Never   Vaping Use    Vaping status: Never Used   Substance and Sexual Activity    Alcohol use: Yes     Comment: 1 drink per wk    Drug use: No    Sexual activity: Yes     Partners: Male     Birth control/protection: Female Surgical   Other Topics Concern    Parent/sibling w/ CABG, MI or angioplasty before 65F 55M? No   Social History Narrative    Not on file     Social Drivers of Health     Financial Resource Strain: High Risk (2025)    Financial Resource Strain     Within the past 12 months, have you or your family members you live with been unable to get utilities (heat, electricity) when it was  really needed?: Yes   Food Insecurity: Low Risk  (1/13/2025)    Food Insecurity     Within the past 12 months, did you worry that your food would run out before you got money to buy more?: No     Within the past 12 months, did the food you bought just not last and you didn t have money to get more?: No   Transportation Needs: Low Risk  (1/13/2025)    Transportation Needs     Within the past 12 months, has lack of transportation kept you from medical appointments, getting your medicines, non-medical meetings or appointments, work, or from getting things that you need?: No   Physical Activity: Insufficiently Active (1/13/2025)    Exercise Vital Sign     Days of Exercise per Week: 3 days     Minutes of Exercise per Session: 20 min   Stress: Stress Concern Present (1/13/2025)    [employer] - Occupational Stress Questionnaire     Feeling of Stress : Rather much   Social Connections: Unknown (1/13/2025)    Social Connection and Isolation Panel [NHANES]     Frequency of Communication with Friends and Family: Not on file     Frequency of Social Gatherings with Friends and Family: More than three times a week     Attends Cheondoism Services: Not on file     Active Member of Clubs or Organizations: Not on file     Attends Club or Organization Meetings: Not on file     Marital Status: Not on file   Interpersonal Safety: Low Risk  (1/16/2025)    Interpersonal Safety     Do you feel physically and emotionally safe where you currently live?: Yes     Within the past 12 months, have you been hit, slapped, kicked or otherwise physically hurt by someone?: No     Within the past 12 months, have you been humiliated or emotionally abused in other ways by your partner or ex-partner?: No   Housing Stability: Low Risk  (1/13/2025)    Housing Stability     Do you have housing? : Yes     Are you worried about losing your housing?: No        Surgical History:  Past Surgical History:   Procedure Laterality Date     BIOPSY      COLONOSCOPY  08/27/2018    DILATION AND CURETTAGE, HYSTEROSCOPY, ABLATE ENDOMETRIUM, COMBINED N/A 04/26/2023    Procedure: DILATION, CERVIX, WITH ENDOMETRIAL ABLATION, hysteroscopy, dilation and curettage, polypectomy;  Surgeon: Mulu Orozco MD;  Location: [facility]    HC INSERTION INTRAUTERINE DEVICE  2021    HC REMOVE INTRAUTERINE DEVICE  2021    HEMORRHOIDECTOMY EXTERNAL N/A 03/27/2023    Procedure: Combined internal and external hemorrhoidectomy;  Surgeon: Feroz Perry DO;  Location: [facility]    HYSTERECTOMY, PAP NO LONGER INDICATED  12/2024    THYROIDECTOMY      2015    TONSILLECTOMY      TUBAL LIGATION  2006        Problem List:  Patient Active Problem List   Diagnosis    Major depressive disorder, recurrent episode, moderate (H)    Papillary adenocarcinoma of thyroid (H)    Postoperative hypothyroidism    Vitiligo    Vitamin D deficiency    IgA deficiency (H)    Traumatic incomplete tear of right rotator cuff, initial encounter    Bicipital tendonitis of right shoulder    Subacromial impingement of right shoulder    Interstitial cystitis    Abnormal Pap smear of cervix    Endometrial hyperplasia without atypia        OBJECTIVE:     Vital signs noted and reviewed by Feroz Lawton PA-C  /86   Pulse 99   Temp 98.2  F (36.8  C) (Oral)   Resp 18   Wt 108.9 kg (240 lb)   LMP  (LMP Unknown)   SpO2 99%   BMI 35.19 kg/m       PEFR:    Physical Exam  Vitals and nursing note reviewed.   Constitutional:       General: She is not in acute distress.     Appearance: She is well-developed. She is not ill-appearing, toxic-appearing or diaphoretic.   HENT:      Head: Normocephalic and atraumatic.      Right Ear: Tympanic membrane and external ear normal. No drainage, swelling or tenderness. Tympanic membrane is not perforated, erythematous, retracted or bulging.      Left Ear: Tympanic membrane and external ear normal. No drainage, swelling or tenderness. Tympanic membrane is  not perforated, erythematous, retracted or bulging.      Nose: No mucosal edema, congestion or rhinorrhea.      Right Sinus: No maxillary sinus tenderness or frontal sinus tenderness.      Left Sinus: No maxillary sinus tenderness or frontal sinus tenderness.      Mouth/Throat:      Pharynx: No pharyngeal swelling, oropharyngeal exudate, posterior oropharyngeal erythema or uvula swelling.      Tonsils: No tonsillar abscesses.   Eyes:      Pupils: Pupils are equal, round, and reactive to light.   Neck:      Trachea: Trachea normal.   Cardiovascular:      Rate and Rhythm: Normal rate and regular rhythm.      Heart sounds: Normal heart sounds, S1 normal and S2 normal. No murmur heard.     No friction rub. No gallop.   Pulmonary:      Effort: Pulmonary effort is normal. No respiratory distress.      Breath sounds: Normal breath sounds. No decreased breath sounds, wheezing, rhonchi or rales.   Abdominal:      General: Bowel sounds are normal. There is no distension.      Palpations: Abdomen is soft. Abdomen is not rigid. There is no mass.      Tenderness: There is abdominal tenderness in the left lower quadrant. There is no right CVA tenderness, left CVA tenderness, guarding or rebound. Negative signs include Contreras's sign and McBurney's sign.   Musculoskeletal:      Cervical back: Full passive range of motion without pain, normal range of motion and neck supple.   Lymphadenopathy:      Cervical: No cervical adenopathy.   Skin:     General: Skin is warm and dry.   Neurological:      Mental Status: She is alert and oriented to person, place, and time.      Cranial Nerves: No cranial nerve deficit.      Deep Tendon Reflexes: Reflexes are normal and symmetric.   Psychiatric:         Behavior: Behavior normal. Behavior is cooperative.         Thought Content: Thought content normal.         Judgment: Judgment normal.             Feroz Lawton PA-C  7/3/2025, 2:39 PM

## 2025-07-03 NOTE — PROGRESS NOTES
Urgent Care Clinic Visit    Chief Complaint   Patient presents with    Urgent Care    Abdominal Pain     Patient reports for the past couple of weeks she started having lower abdominal pain,states in 12/2024 she had a hysterectomy and it feels the exact same way after having that procedure, states pain does not radiate, no constipation or diarrhea , no gas or bloating.                7/3/2025     2:38 PM   Additional Questions   Roomed by PHIL Snyder   Accompanied by Self

## 2025-07-08 ENCOUNTER — OFFICE VISIT (OUTPATIENT)
Dept: OBGYN | Facility: CLINIC | Age: 45
End: 2025-07-08
Payer: COMMERCIAL

## 2025-07-08 VITALS
BODY MASS INDEX: 35.58 KG/M2 | SYSTOLIC BLOOD PRESSURE: 121 MMHG | TEMPERATURE: 97.7 F | HEART RATE: 78 BPM | RESPIRATION RATE: 18 BRPM | HEIGHT: 69 IN | DIASTOLIC BLOOD PRESSURE: 80 MMHG | WEIGHT: 240.2 LBS

## 2025-07-08 DIAGNOSIS — R10.2 PELVIC PAIN IN FEMALE: Primary | ICD-10-CM

## 2025-07-08 DIAGNOSIS — K66.0 ADHESION, POSTOPERATIVE: ICD-10-CM

## 2025-07-08 PROCEDURE — 3079F DIAST BP 80-89 MM HG: CPT | Performed by: OBSTETRICS & GYNECOLOGY

## 2025-07-08 PROCEDURE — 3074F SYST BP LT 130 MM HG: CPT | Performed by: OBSTETRICS & GYNECOLOGY

## 2025-07-08 PROCEDURE — 99213 OFFICE O/P EST LOW 20 MIN: CPT | Performed by: OBSTETRICS & GYNECOLOGY

## 2025-07-08 NOTE — PROGRESS NOTES
"ESTABLISHED PATIENT OUTPATIENT VISIT    Chief complaint/Reason for visit: Abdominal, pelvic discomfort    HPI: 44 year old presents with above concerns.  She is s/p Total Laparoscopic Hysterectomy BSO, Lysis of Adhesions for endometrial hyperplasia in 12/2024.  Over the past 4 weeks she has noticed a pulling sensation around the umbilicus and in her LLQ.  She tends to notice it when she bends over to pick something up, engages her abdominal muscles, and when sleeping on her side.  She took a muscle relaxant the other night to help her sleep.  It also helps for her to wear a belly band.  She denies F/C, N/V, bowel or bladder changes.  She is followed by urology for recurrent cystitis but has no bladder symptoms presently.  She was seen in  recently for this pain and advised to follow up with OB-Gyn.    ------------------------------------------------------------------------  Review of Systems:     With the exception of any items noted in HPI, the remainder of the GI and  ROS is negative.    ------------------------------------------------------------------------    The medical, surgical, social and family histories were reviewed and updated.     ------------------------------------------------------------------------  Physical Exam:    Constitutional:   - /80 (BP Location: Right arm, Patient Position: Sitting, Cuff Size: Adult Regular)   Pulse 78   Temp 97.7  F (36.5  C)   Resp 18   Ht 1.759 m (5' 9.25\")   Wt 109 kg (240 lb 3.2 oz)   LMP  (LMP Unknown)   BMI 35.22 kg/m    - General Appearance: pleasant, well-appearing, NAD    Abdomen:  - Exam: soft, no masses, nondistended.  Mildly TTP at infraumbilical skin incision site and along left groin.  No rebound/guarding.  - No evidence of hernia    Cardiovascular:  - Extremities: no edema or calf tenderness    Genitourinary/Female Genitalia:  - EGBUS: normal-appearing external genitalia and perineum  - Bimanual exam: cuff intact.  Again with slight TTP " along left groin with abdominal hand.  No masses/tenderness to palpation with vaginal hand.    ------------------------------------------------------------------------  Assessment/Plan:  44 year old with pulling sensation/discomfort focused around umbilicus and left groin area.  History of LSC total hysterectomy, BSO.  -- Discussed possibility of adhesions vs muscle spasm/pelvic floor dysfunction playing a role.  -- Referral to pelvic floor PT placed, will try this first to see if gets relief.  -- If pain persists or worsens, consider CT A/P to further evaluate.    The above plan was discussed with patient and she is in agreement.    June Bates MD

## 2025-07-20 ENCOUNTER — HOSPITAL ENCOUNTER (EMERGENCY)
Facility: CLINIC | Age: 45
Discharge: HOME | End: 2025-07-20
Attending: STUDENT IN AN ORGANIZED HEALTH CARE EDUCATION/TRAINING PROGRAM | Admitting: STUDENT IN AN ORGANIZED HEALTH CARE EDUCATION/TRAINING PROGRAM
Payer: COMMERCIAL

## 2025-07-20 ENCOUNTER — APPOINTMENT (OUTPATIENT)
Dept: CT IMAGING | Facility: CLINIC | Age: 45
End: 2025-07-20
Attending: STUDENT IN AN ORGANIZED HEALTH CARE EDUCATION/TRAINING PROGRAM
Payer: COMMERCIAL

## 2025-07-20 VITALS
BODY MASS INDEX: 35.23 KG/M2 | OXYGEN SATURATION: 97 % | HEART RATE: 75 BPM | SYSTOLIC BLOOD PRESSURE: 133 MMHG | DIASTOLIC BLOOD PRESSURE: 85 MMHG | RESPIRATION RATE: 16 BRPM | WEIGHT: 240.3 LBS | TEMPERATURE: 97.6 F

## 2025-07-20 DIAGNOSIS — M54.41 ACUTE MIDLINE LOW BACK PAIN WITH BILATERAL SCIATICA: ICD-10-CM

## 2025-07-20 DIAGNOSIS — M54.42 ACUTE MIDLINE LOW BACK PAIN WITH BILATERAL SCIATICA: ICD-10-CM

## 2025-07-20 PROCEDURE — 99283 EMERGENCY DEPT VISIT LOW MDM: CPT | Performed by: STUDENT IN AN ORGANIZED HEALTH CARE EDUCATION/TRAINING PROGRAM

## 2025-07-20 PROCEDURE — 96375 TX/PRO/DX INJ NEW DRUG ADDON: CPT | Performed by: STUDENT IN AN ORGANIZED HEALTH CARE EDUCATION/TRAINING PROGRAM

## 2025-07-20 PROCEDURE — 72131 CT LUMBAR SPINE W/O DYE: CPT

## 2025-07-20 PROCEDURE — 99285 EMERGENCY DEPT VISIT HI MDM: CPT | Mod: 25 | Performed by: STUDENT IN AN ORGANIZED HEALTH CARE EDUCATION/TRAINING PROGRAM

## 2025-07-20 PROCEDURE — 96376 TX/PRO/DX INJ SAME DRUG ADON: CPT | Performed by: STUDENT IN AN ORGANIZED HEALTH CARE EDUCATION/TRAINING PROGRAM

## 2025-07-20 PROCEDURE — 250N000011 HC RX IP 250 OP 636: Performed by: STUDENT IN AN ORGANIZED HEALTH CARE EDUCATION/TRAINING PROGRAM

## 2025-07-20 PROCEDURE — 96374 THER/PROPH/DIAG INJ IV PUSH: CPT | Performed by: STUDENT IN AN ORGANIZED HEALTH CARE EDUCATION/TRAINING PROGRAM

## 2025-07-20 RX ORDER — METHYLPREDNISOLONE 4 MG/1
TABLET ORAL
Qty: 21 TABLET | Refills: 0 | Status: SHIPPED | OUTPATIENT
Start: 2025-07-20

## 2025-07-20 RX ORDER — KETOROLAC TROMETHAMINE 30 MG/ML
30 INJECTION, SOLUTION INTRAMUSCULAR; INTRAVENOUS ONCE
Status: COMPLETED | OUTPATIENT
Start: 2025-07-20 | End: 2025-07-20

## 2025-07-20 RX ORDER — DEXAMETHASONE SODIUM PHOSPHATE 10 MG/ML
10 INJECTION, SOLUTION INTRAMUSCULAR; INTRAVENOUS ONCE
Status: COMPLETED | OUTPATIENT
Start: 2025-07-20 | End: 2025-07-20

## 2025-07-20 RX ORDER — HYDROMORPHONE HYDROCHLORIDE 1 MG/ML
0.5 INJECTION, SOLUTION INTRAMUSCULAR; INTRAVENOUS; SUBCUTANEOUS ONCE
Refills: 0 | Status: COMPLETED | OUTPATIENT
Start: 2025-07-20 | End: 2025-07-20

## 2025-07-20 RX ORDER — OXYCODONE HYDROCHLORIDE 5 MG/1
5 TABLET ORAL EVERY 6 HOURS PRN
Qty: 12 TABLET | Refills: 0 | Status: SHIPPED | OUTPATIENT
Start: 2025-07-20 | End: 2025-07-24

## 2025-07-20 RX ORDER — CYCLOBENZAPRINE HCL 10 MG
10 TABLET ORAL 3 TIMES DAILY PRN
Qty: 20 TABLET | Refills: 0 | Status: SHIPPED | OUTPATIENT
Start: 2025-07-20 | End: 2025-07-27

## 2025-07-20 RX ORDER — ORPHENADRINE CITRATE 30 MG/ML
60 INJECTION INTRAMUSCULAR; INTRAVENOUS ONCE
Status: COMPLETED | OUTPATIENT
Start: 2025-07-20 | End: 2025-07-20

## 2025-07-20 RX ADMIN — HYDROMORPHONE HYDROCHLORIDE 0.5 MG: 1 INJECTION, SOLUTION INTRAMUSCULAR; INTRAVENOUS; SUBCUTANEOUS at 20:23

## 2025-07-20 RX ADMIN — HYDROMORPHONE HYDROCHLORIDE 1 MG: 1 INJECTION, SOLUTION INTRAMUSCULAR; INTRAVENOUS; SUBCUTANEOUS at 18:32

## 2025-07-20 RX ADMIN — DEXAMETHASONE SODIUM PHOSPHATE 10 MG: 10 INJECTION, SOLUTION INTRAMUSCULAR; INTRAVENOUS at 18:26

## 2025-07-20 RX ADMIN — KETOROLAC TROMETHAMINE 30 MG: 30 INJECTION, SOLUTION INTRAMUSCULAR at 18:25

## 2025-07-20 RX ADMIN — ORPHENADRINE CITRATE 60 MG: 60 INJECTION INTRAMUSCULAR; INTRAVENOUS at 18:28

## 2025-07-20 ASSESSMENT — COLUMBIA-SUICIDE SEVERITY RATING SCALE - C-SSRS
1. IN THE PAST MONTH, HAVE YOU WISHED YOU WERE DEAD OR WISHED YOU COULD GO TO SLEEP AND NOT WAKE UP?: NO
6. HAVE YOU EVER DONE ANYTHING, STARTED TO DO ANYTHING, OR PREPARED TO DO ANYTHING TO END YOUR LIFE?: NO
2. HAVE YOU ACTUALLY HAD ANY THOUGHTS OF KILLING YOURSELF IN THE PAST MONTH?: NO

## 2025-07-20 ASSESSMENT — ENCOUNTER SYMPTOMS
DIARRHEA: 0
DYSURIA: 0
SHORTNESS OF BREATH: 0
FEVER: 0
APPETITE CHANGE: 0
NECK STIFFNESS: 0
ACTIVITY CHANGE: 0
MYALGIAS: 0
NAUSEA: 0
ARTHRALGIAS: 0
BACK PAIN: 1
VOMITING: 0
ABDOMINAL PAIN: 0
SORE THROAT: 0
FATIGUE: 0
RHINORRHEA: 0
DIZZINESS: 0
EYE REDNESS: 0
CHILLS: 0
HEADACHES: 0
COUGH: 0
HEMATURIA: 0

## 2025-07-20 ASSESSMENT — ACTIVITIES OF DAILY LIVING (ADL)
ADLS_ACUITY_SCORE: 41

## 2025-07-20 NOTE — Clinical Note
Kristel Wiley was seen and treated in our emergency department on 7/20/2025.  She may return to work on 07/23/2025.       If you have any questions or concerns, please don't hesitate to call.      Maylin Carlton MD

## 2025-07-20 NOTE — ED TRIAGE NOTES
Patient presents with generalized low back pain that started Friday and has progressively gotten worse. Reports history of degenerative disc disease and fractured vertebrae in the lower spine from 20 years ago. Tried muscle relaxer and OTC pain meds without relief.     Triage Assessment (Adult)       Row Name 07/20/25 2647          Triage Assessment    Airway WDL WDL        Respiratory WDL    Respiratory WDL WDL        Skin Circulation/Temperature WDL    Skin Circulation/Temperature WDL WDL        Cardiac WDL    Cardiac WDL WDL        Peripheral/Neurovascular WDL    Peripheral Neurovascular WDL WDL        Cognitive/Neuro/Behavioral WDL    Cognitive/Neuro/Behavioral WDL WDL

## 2025-07-20 NOTE — ED PROVIDER NOTES
History     Chief Complaint   Patient presents with    Back Pain     HPI  Kristel Wiley is a 44 year old female who presents with lower back pain.  Started yesterday and has been moderately worsening over the past 24 hours.  She had initially had some upper thigh numbness earlier this morning but this dissipated.  She is able to walk with discomfort and feels like it is more midline and lower.  She has a history of a vertebral fracture in that area and disc disease.  She has not had any urinary change or stooling changes no numbness or tingling in the inner thighs and no history of recent fevers coughs colds or mid back manipulation.  Denies history of drug abuse    Allergies:  Allergies   Allergen Reactions    Cephalexin Rash    Cephalosporins Rash     Cephalexin    Clavulanic Acid Diarrhea     Bad diarrhea with nausea and vomiting    Sulfa Antibiotics Rash and Unknown       Problem List:    Patient Active Problem List    Diagnosis Date Noted    Endometrial hyperplasia without atypia 11/07/2024     Priority: Medium    Abnormal Pap smear of cervix 08/08/2022     Priority: Medium     Formatting of this note might be different from the original.  LSIL 2005 remote, bx fine      Interstitial cystitis 07/09/2021     Priority: Medium    Traumatic incomplete tear of right rotator cuff, initial encounter 07/23/2020     Priority: Medium    Bicipital tendonitis of right shoulder 07/23/2020     Priority: Medium    Subacromial impingement of right shoulder 07/23/2020     Priority: Medium    IgA deficiency (H) 09/12/2018     Priority: Medium    Postoperative hypothyroidism 03/23/2018     Priority: Medium    Vitiligo 04/09/2015     Priority: Medium    Papillary adenocarcinoma of thyroid (H) 03/01/2014     Priority: Medium     Overview:   12/2013: R thyroid lobectomy 1.7 cm follicular variant papillary cancer, MACIS 3.6  03/2014: Completion Thyroidectomy 0.3 cm incidental papillary cancer left lobe. Iodine ablation with 53 mCi.    2016, 2017: Neck US neg.      Vitamin D deficiency 2009     Priority: Medium     Last Assessment & Plan:     29.4  vit  d   on 1000units  daily  since        Major depressive disorder, recurrent episode, moderate (H) 2007     Priority: Medium        Past Medical History:    Past Medical History:   Diagnosis Date    Autoimmune disease     Depressive disorder     Interstitial cystitis 2021    Major depressive disorder, recurrent episode, moderate (H) 2007    Papillary adenocarcinoma of thyroid (H) 2014    Postoperative hypothyroidism 2018       Past Surgical History:    Past Surgical History:   Procedure Laterality Date    BIOPSY      COLONOSCOPY  2018    DILATION AND CURETTAGE, HYSTEROSCOPY, ABLATE ENDOMETRIUM, COMBINED N/A 2023    Procedure: DILATION, CERVIX, WITH ENDOMETRIAL ABLATION, hysteroscopy, dilation and curettage, polypectomy;  Surgeon: Mulu Orozco MD;  Location: [facility] OR    HC INSERTION INTRAUTERINE DEVICE      HC REMOVE INTRAUTERINE DEVICE      HEMORRHOIDECTOMY EXTERNAL N/A 2023    Procedure: Combined internal and external hemorrhoidectomy;  Surgeon: Feroz Perry DO;  Location: [facility]    HYSTERECTOMY, PAP NO LONGER INDICATED  2024    THYROIDECTOMY      2015    TONSILLECTOMY      TUBAL LIGATION         Family History:    Family History   Problem Relation Age of Onset    Breast Cancer Mother 67    Skin Cancer Mother     Other Cancer Mother         Skin cancer    Cancer Mother     Hypertension Father     Arrhythmia Father     Lymphoma Maternal Grandmother     Cerebrovascular Disease Maternal Grandmother     Cancer Maternal Grandmother     Other Cancer Maternal Grandmother     Osteoporosis Maternal Grandmother     Hypertension Maternal Grandfather     Diabetes Paternal Grandfather             Arthritis Daughter     Asthma Son     Asthma Nephew        Social History:  Marital Status:    [4]  Social History     Tobacco Use    Smoking status: Former     Current packs/day: 1.00     Average packs/day: 1 pack/day for 5.0 years (5.0 ttl pk-yrs)     Types: Cigarettes    Smokeless tobacco: Never   Vaping Use    Vaping status: Never Used   Substance Use Topics    Alcohol use: Yes     Comment: 1 drink per wk    Drug use: No        Medications:    cyclobenzaprine (FLEXERIL) 10 MG tablet  methylPREDNISolone (MEDROL DOSEPAK) 4 MG tablet therapy pack  oxyCODONE (ROXICODONE) 5 MG tablet  cholecalciferol (VITAMIN D3) 125 mcg (5000 units) capsule  escitalopram (LEXAPRO) 20 MG tablet  estradiol (ESTRACE) 1 MG tablet  levothyroxine (SYNTHROID/LEVOTHROID) 150 MCG tablet  methocarbamol (ROBAXIN) 750 MG tablet  triamcinolone (KENALOG) 0.1 % external ointment  valACYclovir (VALTREX) 1000 mg tablet          Review of Systems   Constitutional:  Negative for activity change, appetite change, chills, fatigue and fever.   HENT:  Negative for congestion, rhinorrhea and sore throat.    Eyes:  Negative for redness.   Respiratory:  Negative for cough and shortness of breath.    Cardiovascular:  Negative for chest pain.   Gastrointestinal:  Negative for abdominal pain, diarrhea, nausea and vomiting.   Genitourinary:  Negative for dysuria and hematuria.   Musculoskeletal:  Positive for back pain. Negative for arthralgias, myalgias and neck stiffness.   Skin:  Negative for rash.   Neurological:  Negative for dizziness and headaches.   All other systems reviewed and are negative.      Physical Exam   BP: 120/79  Pulse: 98  Temp: 97.6  F (36.4  C)  Resp: 16  Weight: 109 kg (240 lb 4.8 oz)  SpO2: 98 %      Physical Exam  Vitals and nursing note reviewed.   Constitutional:       General: She is not in acute distress.     Appearance: Normal appearance. She is not diaphoretic.   HENT:      Head: Atraumatic.      Mouth/Throat:      Mouth: Mucous membranes are moist.   Eyes:      General: No scleral icterus.     Conjunctiva/sclera:  Conjunctivae normal.   Cardiovascular:      Rate and Rhythm: Normal rate.      Pulses: Normal pulses.      Heart sounds: Normal heart sounds.   Pulmonary:      Effort: Pulmonary effort is normal. No respiratory distress.      Breath sounds: Normal breath sounds.   Abdominal:      General: Abdomen is flat. Bowel sounds are normal. There is no distension.      Palpations: Abdomen is soft.      Tenderness: There is no abdominal tenderness. There is no guarding.   Musculoskeletal:      Cervical back: Neck supple.        Legs:       Comments: Some tenderness noted above with no overlying skin changes or bruising.  No step-off deformities.  Mild SI joint discomfort bilaterally mild lateral hip greater trochanter tenderness on palpation.  No warmth or heat.   Skin:     General: Skin is warm.      Findings: No rash.   Neurological:      Mental Status: She is alert.         ED Course        Procedures                Recent Results (from the past 24 hours)   CT Lumbar Spine w/o Contrast    Narrative    EXAM: CT LUMBAR SPINE W/O CONTRAST  LOCATION: [facility]  DATE: 7/20/2025    INDICATION: L103 spinal pricess tenderness, severe low back pain   0 Red flag. Hx of vertebral fractures  COMPARISON: None.  TECHNIQUE: Routine CT Lumbar Spine without IV contrast. Multiplanar reformats. Dose reduction techniques were used.     FINDINGS:  VERTEBRA: 5 lumbar type vertebrae. Mild dextrocurvature of the lumbar spine. Normal sagittal alignment. Mild superior endplate compression fracture of L1, age indeterminate. No pars defects. Mild degenerative changes of the sacroiliac joints.    CANAL/FORAMINA: Small broad-based disc bulges at L4-L5 and L5-S1. No high-grade spinal canal narrowing. Mild bilateral neuroforaminal narrowing at L4-L5.    PARASPINAL: Mild symmetric atrophy of the posterior paraspinal musculature. Normal diameter of the descending aorta.      Impression    IMPRESSION:  1.  Mild superior  endplate compression fracture of L1, age indeterminate. Please correlate with point tenderness over this level.  2.  Mild degenerative changes of the lumbar spine. No high-grade spinal canal narrowing. Mild bilateral neuroforaminal narrowing at L4-L5.         Medications   dexAMETHasone PF (DECADRON) injection 10 mg (10 mg Intravenous $Given 7/20/25 1826)   HYDROmorphone (DILAUDID) injection 1 mg (1 mg Intravenous $Given 7/20/25 1832)   orphenadrine (NORFLEX) injection 60 mg (60 mg Intravenous $Given 7/20/25 1828)   ketorolac (TORADOL) injection 30 mg (30 mg Intravenous $Given 7/20/25 1825)   HYDROmorphone (PF) (DILAUDID) injection 0.5 mg (0.5 mg Intravenous $Given 7/20/25 2023)       Assessments & Plan (with Medical Decision Making)     I have reviewed the nursing notes.    I have reviewed the findings, diagnosis, plan and need for follow up with the patient.      Medical Decision Making  Kristel Wiley is a 44 year old female who presents with lower back pain.  Started yesterday and has been moderately worsening over the past 24 hours.  She had initially had some upper thigh numbness earlier this morning but this dissipated.  She is able to walk with discomfort and feels like it is more midline and lower.  She has a history of a vertebral fracture in that area and disc disease.  She has not had any urinary change or stooling changes no numbness or tingling in the inner thighs and no history of recent fevers coughs colds or mid back manipulation.  Denies history of drug abuse    Vitals reassuring with blood pressure 120/79 temperature 97.6 pulse 98 oxygen saturation 98% room air    Her examination shows significant tenderness to the L1-L2-L3 spinous processes.  Some mild SI joint tenderness bilaterally and mild paraspinal muscle tenderness.  Strength is intact.  Single to stand up without difficulty.  Patient is moderately tearful throughout the examination however no other acute signs of distress or pathology  requiring further addressing.  Will perform CT imaging of the lumbar spine just to ensure no new findings due to patient presentation and spinal process tenderness and may require higher level of imaging.  Pain control initiated with IV Decadron, Toradol, Dilaudid and Norflex.    Pain is now down to 4 out of 10.  CT imaging with a indeterminate L1 fracture she notes that she had some fractures approximately 20 years ago and is unsure but she notes that it is in the lower area.  There are some mild foraminal narrowing.  We discussed the findings with the patient and pain control for outpatient management.  We also discussed neurosurgery consult patient is open to this and she is therefore consult placed.  As patient is improving no red flag symptoms are present and felt comfortable patient discharged home with pain control and  at bedside to continue monitoring with work note for the next 2 days.    New Prescriptions    CYCLOBENZAPRINE (FLEXERIL) 10 MG TABLET    Take 1 tablet (10 mg) by mouth 3 times daily as needed.    METHYLPREDNISOLONE (MEDROL DOSEPAK) 4 MG TABLET THERAPY PACK    Follow Package Directions    OXYCODONE (ROXICODONE) 5 MG TABLET    Take 1 tablet (5 mg) by mouth every 6 hours as needed.       Final diagnoses:   Acute midline low back pain with bilateral sciatica       7/20/2025   [facility] EMERGENCY DEPT       Maylin Carlton MD  07/20/25 2030

## 2025-07-21 ENCOUNTER — TELEPHONE (OUTPATIENT)
Dept: FAMILY MEDICINE | Facility: CLINIC | Age: 45
End: 2025-07-21
Payer: COMMERCIAL

## 2025-07-21 ENCOUNTER — MYC MEDICAL ADVICE (OUTPATIENT)
Dept: FAMILY MEDICINE | Facility: CLINIC | Age: 45
End: 2025-07-21
Payer: COMMERCIAL

## 2025-07-21 NOTE — CONFIDENTIAL NOTE
NEUROSURGERY - NEW PREVISIT PLANNING    Referring Provider: Maylin Carlton MD    [facility] ED 7/20/2025   Reason For Visit: M54.42, M54.41 (ICD-10-CM) - Acute midline low back pain with bilateral sciatica        IMAGING STATUS/LOCATION DATE/TYPE   MRI PACS 09/22/2016  Lumbar  [facility]   CT PACS 07/20/2025  Lumbar  [facility]   XRAY N/A    NOTES STATUS/LOCATION DATE/TYPE   Other specialist [facility]: N/A    EMG N/A    INJECTION Media Scan / CDI 7.22.25 11/18/2008, lumbar TFESI   PHYSICAL THERAPY N/A    SURGERY N/A      Does patient have C2C?  Year last updated Action     YES   []      Please update at appointment if outdated more than 5 years       NO     [x]   N/A   Please complete C2C at appointment

## 2025-07-21 NOTE — DISCHARGE INSTRUCTIONS
Please continue to take 600 mg Motrin every 6 hours staggered with 1000 mg of Tylenol every 6 hours do not exceed these doses.  Please use the oxycodone only for severe pain otherwise Flexeril can be used for muscle spasm relief and can initiate your Medrol Dosepak tomorrow morning.  Work note provided if needed otherwise rest for the next 24 hours important but getting moving as appropriate after 24 hours as tolerable to improve time to baseline

## 2025-07-21 NOTE — TELEPHONE ENCOUNTER
Pt notified of below.  Pt reports understanding.  Pt does not have further questions or concerns.    Lisa Louis   [facility]  RN          Please speak with patient about this.  Patient should ideally go where PT is in network.  I'm not sure a prior auth is going to go anywhere.  And she should go through PCP for knee pain.     Thanks.  Dr. Bates

## 2025-07-21 NOTE — TELEPHONE ENCOUNTER
Jim is calling from the [facility] billing department. Reports that this location is out of network and needing a Prior Authorization.    Noted that physical therapy referral was placed by Dr. June Bates for pelvic pain. Provider from OB/GYN.    Will route to provider and OB care team.    Patient is also asking about referral for knee pain. Patient will need to discuss concern with PCP or request through an e visit.    Thank you,  Maya Oakes RN  [facility]

## 2025-07-23 ENCOUNTER — TELEPHONE (OUTPATIENT)
Dept: NEUROSURGERY | Facility: CLINIC | Age: 45
End: 2025-07-23
Payer: COMMERCIAL

## 2025-07-23 NOTE — TELEPHONE ENCOUNTER
Attempted to reach patient to remind them about appointment scheduled with Mulu Montaño NP on 7/24/25 in our [facility].    A voicemail was left with a call back number if the patient has questions or would like to reschedule.

## 2025-07-24 ENCOUNTER — PRE VISIT (OUTPATIENT)
Dept: NEUROSURGERY | Facility: CLINIC | Age: 45
End: 2025-07-24

## 2025-07-24 ENCOUNTER — OFFICE VISIT (OUTPATIENT)
Dept: NEUROSURGERY | Facility: CLINIC | Age: 45
End: 2025-07-24
Attending: STUDENT IN AN ORGANIZED HEALTH CARE EDUCATION/TRAINING PROGRAM
Payer: COMMERCIAL

## 2025-07-24 VITALS
HEIGHT: 69 IN | DIASTOLIC BLOOD PRESSURE: 74 MMHG | WEIGHT: 236.5 LBS | SYSTOLIC BLOOD PRESSURE: 118 MMHG | TEMPERATURE: 97.4 F | BODY MASS INDEX: 35.03 KG/M2

## 2025-07-24 DIAGNOSIS — M54.42 ACUTE MIDLINE LOW BACK PAIN WITH BILATERAL SCIATICA: ICD-10-CM

## 2025-07-24 DIAGNOSIS — M54.41 ACUTE MIDLINE LOW BACK PAIN WITH BILATERAL SCIATICA: ICD-10-CM

## 2025-07-24 ASSESSMENT — PAIN SCALES - GENERAL: PAINLEVEL_OUTOF10: MILD PAIN (3)

## 2025-07-24 NOTE — PROGRESS NOTES
[facility] Neurosurgery  Neurosurgery Clinic Visit      CC: back and hip pain    Primary care Provider: Yulissa Nogueira    Reason For Visit:   I was asked by Dr. Maylin Carlton to consult on the patient for acute midline low back pain with bilateral radiculopathy.    HPI: Kristel Wiley is a 44 year old female with a 1 week history of low back pain who presents for evaluation.  She describes the pain began after a long car ride to/from [place].  She describes bilateral low back pain to the bilateral hips.  No radicular leg pain paresthesias or overt weakness.  No complaints of bowel or bladder dysfunction.  She was provided a course of oral steroids with mild improvement in symptoms.  She has not tried any physical therapy at this point.    Past Medical History:   Diagnosis Date    Autoimmune disease     Depressive disorder     Interstitial cystitis 07/09/2021    Major depressive disorder, recurrent episode, moderate (H) 11/16/2007    Papillary adenocarcinoma of thyroid (H) 03/01/2014    Overview:  12/2013: R thyroid lobectomy 1.7 cm follicular variant papillary cancer, MACIS 3.6 03/2014: Completion Thyroidectomy 0.3 cm incidental papillary cancer left lobe. Iodine ablation with 53 mCi.  07/2016, 07/2017: Neck US neg.    Postoperative hypothyroidism 03/23/2018       Past Medical History reviewed with patient during visit.    Past Surgical History:   Procedure Laterality Date    BIOPSY      COLONOSCOPY  08/27/2018    DILATION AND CURETTAGE, HYSTEROSCOPY, ABLATE ENDOMETRIUM, COMBINED N/A 04/26/2023    Procedure: DILATION, CERVIX, WITH ENDOMETRIAL ABLATION, hysteroscopy, dilation and curettage, polypectomy;  Surgeon: Mulu Orozco MD;  Location: [facility] OR    HC INSERTION INTRAUTERINE DEVICE  2021    HC REMOVE INTRAUTERINE DEVICE  2021    HEMORRHOIDECTOMY EXTERNAL N/A 03/27/2023    Procedure: Combined internal and external hemorrhoidectomy;  Surgeon: Feroz Perry DO;  Location:  OR     HYSTERECTOMY, PAP NO LONGER INDICATED  12/2024    THYROIDECTOMY      2015    TONSILLECTOMY      TUBAL LIGATION  2006     Past Surgical History reviewed with patient during visit.    Current Outpatient Medications   Medication Sig Dispense Refill    cholecalciferol (VITAMIN D3) 125 mcg (5000 units) capsule Take by mouth daily.      cyclobenzaprine (FLEXERIL) 10 MG tablet Take 1 tablet (10 mg) by mouth 3 times daily as needed. 20 tablet 0    escitalopram (LEXAPRO) 20 MG tablet Take 1 tablet (20 mg) by mouth daily. 90 tablet 1    estradiol (ESTRACE) 1 MG tablet Take 1.5 tablets (1.5 mg) by mouth daily. 90 tablet 3    levothyroxine (SYNTHROID/LEVOTHROID) 150 MCG tablet Take 1 tablet (150 mcg) by mouth daily. Six days a week and take half tablet (75 mcg) one day a week. 90 tablet 3    methocarbamol (ROBAXIN) 750 MG tablet Take 1 tablet (750 mg) by mouth 4 times daily as needed for muscle spasms. 30 tablet 5    methylPREDNISolone (MEDROL DOSEPAK) 4 MG tablet therapy pack Follow Package Directions 21 tablet 0    oxyCODONE (ROXICODONE) 5 MG tablet Take 1 tablet (5 mg) by mouth every 6 hours as needed. 12 tablet 0    triamcinolone (KENALOG) 0.1 % external ointment Apply topically 2 times daily. To outer ear canal as needed for cracking 30 g 3    valACYclovir (VALTREX) 1000 mg tablet TAKE 2 TABLETS(2000 MG) BY MOUTH TWICE DAILY 12 tablet 5     Current Facility-Administered Medications   Medication Dose Route Frequency Provider Last Rate Last Admin    cyanocobalamin injection 1,000 mcg  1,000 mcg Intramuscular Q30 Days         lidocaine (XYLOCAINE) 2 % external gel   Urethral Once Cedric Wade MD           Allergies   Allergen Reactions    Cephalexin Rash    Cephalosporins Rash     Cephalexin    Clavulanic Acid Diarrhea     Bad diarrhea with nausea and vomiting    Sulfa Antibiotics Rash and Unknown       Social History     Socioeconomic History    Marital status:      Spouse name: partner- Flaco    Number of  children: 2    Years of education: None    Highest education level: None   Occupational History    Occupation: school paraprofessional   Tobacco Use    Smoking status: Former     Current packs/day: 1.00     Average packs/day: 1 pack/day for 5.0 years (5.0 ttl pk-yrs)     Types: Cigarettes    Smokeless tobacco: Never   Vaping Use    Vaping status: Never Used   Substance and Sexual Activity    Alcohol use: Yes     Comment: 1 drink per wk    Drug use: No    Sexual activity: Yes     Partners: Male     Birth control/protection: Female Surgical   Other Topics Concern    Parent/sibling w/ CABG, MI or angioplasty before 65F 55M? No     Social Drivers of Health     Financial Resource Strain: High Risk (1/13/2025)    Financial Resource Strain     Within the past 12 months, have you or your family members you live with been unable to get utilities (heat, electricity) when it was really needed?: Yes   Food Insecurity: Low Risk  (1/13/2025)    Food Insecurity     Within the past 12 months, did you worry that your food would run out before you got money to buy more?: No     Within the past 12 months, did the food you bought just not last and you didn t have money to get more?: No   Transportation Needs: Low Risk  (1/13/2025)    Transportation Needs     Within the past 12 months, has lack of transportation kept you from medical appointments, getting your medicines, non-medical meetings or appointments, work, or from getting things that you need?: No   Physical Activity: Insufficiently Active (1/13/2025)    Exercise Vital Sign     Days of Exercise per Week: 3 days     Minutes of Exercise per Session: 20 min   Stress: Stress Concern Present (1/13/2025)    [employer] - Occupational Stress Questionnaire     Feeling of Stress : Rather much   Social Connections: Unknown (1/13/2025)    Social Connection and Isolation Panel [NHANES]     Frequency of Social Gatherings with Friends and Family: More than three  "times a week   Interpersonal Safety: Low Risk  (2025)    Interpersonal Safety     Do you feel physically and emotionally safe where you currently live?: Yes     Within the past 12 months, have you been hit, slapped, kicked or otherwise physically hurt by someone?: No     Within the past 12 months, have you been humiliated or emotionally abused in other ways by your partner or ex-partner?: No   Housing Stability: Low Risk  (2025)    Housing Stability     Do you have housing? : Yes     Are you worried about losing your housing?: No       Family History   Problem Relation Age of Onset    Breast Cancer Mother 67    Skin Cancer Mother     Other Cancer Mother         Skin cancer    Cancer Mother     Hypertension Father     Arrhythmia Father     Lymphoma Maternal Grandmother     Cerebrovascular Disease Maternal Grandmother     Cancer Maternal Grandmother     Other Cancer Maternal Grandmother     Osteoporosis Maternal Grandmother     Hypertension Maternal Grandfather     Diabetes Paternal Grandfather             Arthritis Daughter     Asthma Son     Asthma Nephew          ROS: 10 point ROS neg other than the symptoms noted above in the HPI.    Vital Signs:   /74 (BP Location: Right arm, Patient Position: Sitting, Cuff Size: Adult Regular)   Temp 97.4  F (36.3  C) (Temporal)   Ht 5' 9.25\" (1.759 m)   Wt 236 lb 8 oz (107.3 kg)   LMP  (LMP Unknown)   BMI 34.67 kg/m        Examination:  Constitutional:  Alert, well nourished, NAD.  Memory: recent and remote memory   HEENT: Normocephalic, atraumatic.   Pulm:  Without shortness of breath   CV:  No pitting edema of BLE.      Neurological:  Awake  Alert  Oriented x 3  Speech clear    Motor exam:   Hip Flexion:                 Right: 5/5  Left:  5/5  Hip Abduction:             Right:  5/5  Left:  5/5  Hip Adduction:             Right:  5/5  Left:  5/5  Plantar Flexion:           Right:  5/5  Left:  5/5  Dorsal Flexion:            Right:  5/5  Left:  " 5/5  EHL:                            Right:  5/5  Left:  5/5     Sensation normal to bilateral upper and lower extremities  Muscle tone to bilateral upper and lower extremities   Gait: Able to stand from a seated position. Normal non-antalgic, non-myelopathic gait.  Able to heel/toe walk without loss of balance    Lumbar examination reveals no tenderness of the spine or paraspinous muscles.  Hip height is symmetrical. Negative sciatic notch or greater trochanteric tenderness to palpation bilaterally.  Straight leg raise is negative bilaterally.      Imaging:   Narrative & Impression   EXAM: CT LUMBAR SPINE W/O CONTRAST  LOCATION: [facility]  DATE: 7/20/2025     INDICATION: L103 spinal pricess tenderness, severe low back pain   0 Red flag. Hx of vertebral fractures  COMPARISON: None.  TECHNIQUE: Routine CT Lumbar Spine without IV contrast. Multiplanar reformats. Dose reduction techniques were used.      FINDINGS:  VERTEBRA: 5 lumbar type vertebrae. Mild dextrocurvature of the lumbar spine. Normal sagittal alignment. Mild superior endplate compression fracture of L1, age indeterminate. No pars defects. Mild degenerative changes of the sacroiliac joints.     CANAL/FORAMINA: Small broad-based disc bulges at L4-L5 and L5-S1. No high-grade spinal canal narrowing. Mild bilateral neuroforaminal narrowing at L4-L5.     PARASPINAL: Mild symmetric atrophy of the posterior paraspinal musculature. Normal diameter of the descending aorta.                                                                      IMPRESSION:  1.  Mild superior endplate compression fracture of L1, age indeterminate. Please correlate with point tenderness over this level.  2.  Mild degenerative changes of the lumbar spine. No high-grade spinal canal narrowing. Mild bilateral neuroforaminal narrowing at L4-L5.     Assessment/Plan:   Lumbar radiculopathy    Reviewed lumbar CT in clinic.  Due to acute onset of symptoms with  mild improvement will begin physical therapy at this point.  If symptoms persist or worsen after course of therapy would consider lumbar MRI for further evaluation at that time.  She will contact my office if symptoms persist or worsen.  She verbalized understanding and agreement with the plan.    Patient Instructions   -Physical therapy ordered. They will contact you to schedule.  -Contact my office in 4-6 weeks with an update.   -Please contact our clinic with questions or concerns at [phone].    Mulu Montaño, [facility] Neurosurgery  [address] [place] [id number]  Tel [phone]  Fax [phone]

## 2025-07-24 NOTE — PATIENT INSTRUCTIONS
-Physical therapy ordered. They will contact you to schedule.  -Contact my office in 4-6 weeks with an update.   -Please contact our clinic with questions or concerns at [phone].

## 2025-07-24 NOTE — LETTER
"7/24/2025      Kristel Wiley  [address] [facility] [address]      Dear Colleague,    Thank you for referring your patient, Kristel Wiley, to the [facility]UROS[facility] [place]. Please see a copy of my visit note below.    Kristel Wiley is a 44 year old female who presents for:  Chief Complaint   Patient presents with     Neurologic Problem        Initial Vitals:  /74 (BP Location: Right arm, Patient Position: Sitting, Cuff Size: Adult Regular)   Temp 97.4  F (36.3  C) (Temporal)   Ht 5' 9.25\" (1.759 m)   Wt 236 lb 8 oz (107.3 kg)   LMP  (LMP Unknown)   BMI 34.67 kg/m   Estimated body mass index is 34.67 kg/m  as calculated from the following:    Height as of this encounter: 5' 9.25\" (1.759 m).    Weight as of this encounter: 236 lb 8 oz (107.3 kg).. Body surface area is 2.29 meters squared. BP completed using cuff size: regular  Mild Pain (3)    Nursing Comments:     Nona Leal LPN      [facility] Neurosurgery  Neurosurgery Clinic Visit      CC: back and hip pain    Primary care Provider: Yulissa Nogueira    Reason For Visit:   I was asked by Dr. Maylin Carlton to consult on the patient for acute midline low back pain with bilateral radiculopathy.    HPI: Kristel Wiley is a 44 year old female with a 1 week history of low back pain who presents for evaluation.  She describes the pain began after a long car ride to/from [place].  She describes bilateral low back pain to the bilateral hips.  No radicular leg pain paresthesias or overt weakness.  No complaints of bowel or bladder dysfunction.  She was provided a course of oral steroids with mild improvement in symptoms.  She has not tried any physical therapy at this point.    Past Medical History:   Diagnosis Date     Autoimmune disease      Depressive disorder      Interstitial cystitis 07/09/2021     Major depressive disorder, recurrent episode, moderate (H) 11/16/2007     Papillary adenocarcinoma of thyroid (H) 03/01/2014 "    Overview:  12/2013: R thyroid lobectomy 1.7 cm follicular variant papillary cancer, MACIS 3.6 03/2014: Completion Thyroidectomy 0.3 cm incidental papillary cancer left lobe. Iodine ablation with 53 mCi.  07/2016, 07/2017: Neck US neg.     Postoperative hypothyroidism 03/23/2018       Past Medical History reviewed with patient during visit.    Past Surgical History:   Procedure Laterality Date     BIOPSY       COLONOSCOPY  08/27/2018     DILATION AND CURETTAGE, HYSTEROSCOPY, ABLATE ENDOMETRIUM, COMBINED N/A 04/26/2023    Procedure: DILATION, CERVIX, WITH ENDOMETRIAL ABLATION, hysteroscopy, dilation and curettage, polypectomy;  Surgeon: Mulu Orozco MD;  Location: [facility] OR     HC INSERTION INTRAUTERINE DEVICE  2021     HC REMOVE INTRAUTERINE DEVICE  2021     HEMORRHOIDECTOMY EXTERNAL N/A 03/27/2023    Procedure: Combined internal and external hemorrhoidectomy;  Surgeon: Feroz Perry DO;  Location: [facility] OR     HYSTERECTOMY, PAP NO LONGER INDICATED  12/2024     THYROIDECTOMY      2015     TONSILLECTOMY       TUBAL LIGATION  2006     Past Surgical History reviewed with patient during visit.    Current Outpatient Medications   Medication Sig Dispense Refill     cholecalciferol (VITAMIN D3) 125 mcg (5000 units) capsule Take by mouth daily.       cyclobenzaprine (FLEXERIL) 10 MG tablet Take 1 tablet (10 mg) by mouth 3 times daily as needed. 20 tablet 0     escitalopram (LEXAPRO) 20 MG tablet Take 1 tablet (20 mg) by mouth daily. 90 tablet 1     estradiol (ESTRACE) 1 MG tablet Take 1.5 tablets (1.5 mg) by mouth daily. 90 tablet 3     levothyroxine (SYNTHROID/LEVOTHROID) 150 MCG tablet Take 1 tablet (150 mcg) by mouth daily. Six days a week and take half tablet (75 mcg) one day a week. 90 tablet 3     methocarbamol (ROBAXIN) 750 MG tablet Take 1 tablet (750 mg) by mouth 4 times daily as needed for muscle spasms. 30 tablet 5     methylPREDNISolone (MEDROL DOSEPAK) 4 MG tablet therapy pack Follow  Package Directions 21 tablet 0     oxyCODONE (ROXICODONE) 5 MG tablet Take 1 tablet (5 mg) by mouth every 6 hours as needed. 12 tablet 0     triamcinolone (KENALOG) 0.1 % external ointment Apply topically 2 times daily. To outer ear canal as needed for cracking 30 g 3     valACYclovir (VALTREX) 1000 mg tablet TAKE 2 TABLETS(2000 MG) BY MOUTH TWICE DAILY 12 tablet 5     Current Facility-Administered Medications   Medication Dose Route Frequency Provider Last Rate Last Admin     cyanocobalamin injection 1,000 mcg  1,000 mcg Intramuscular Q30 Days          lidocaine (XYLOCAINE) 2 % external gel   Urethral Once Cedric Wade MD           Allergies   Allergen Reactions     Cephalexin Rash     Cephalosporins Rash     Cephalexin     Clavulanic Acid Diarrhea     Bad diarrhea with nausea and vomiting     Sulfa Antibiotics Rash and Unknown       Social History     Socioeconomic History     Marital status:      Spouse name: partner- Flaco     Number of children: 2     Years of education: None     Highest education level: None   Occupational History     Occupation: school paraprofessional   Tobacco Use     Smoking status: Former     Current packs/day: 1.00     Average packs/day: 1 pack/day for 5.0 years (5.0 ttl pk-yrs)     Types: Cigarettes     Smokeless tobacco: Never   Vaping Use     Vaping status: Never Used   Substance and Sexual Activity     Alcohol use: Yes     Comment: 1 drink per wk     Drug use: No     Sexual activity: Yes     Partners: Male     Birth control/protection: Female Surgical   Other Topics Concern     Parent/sibling w/ CABG, MI or angioplasty before 65F 55M? No     Social Drivers of Health     Financial Resource Strain: High Risk (1/13/2025)    Financial Resource Strain      Within the past 12 months, have you or your family members you live with been unable to get utilities (heat, electricity) when it was really needed?: Yes   Food Insecurity: Low Risk  (1/13/2025)    Food Insecurity       Within the past 12 months, did you worry that your food would run out before you got money to buy more?: No      Within the past 12 months, did the food you bought just not last and you didn t have money to get more?: No   Transportation Needs: Low Risk  (1/13/2025)    Transportation Needs      Within the past 12 months, has lack of transportation kept you from medical appointments, getting your medicines, non-medical meetings or appointments, work, or from getting things that you need?: No   Physical Activity: Insufficiently Active (1/13/2025)    Exercise Vital Sign      Days of Exercise per Week: 3 days      Minutes of Exercise per Session: 20 min   Stress: Stress Concern Present (1/13/2025)    [employer] - Occupational Stress Questionnaire      Feeling of Stress : Rather much   Social Connections: Unknown (1/13/2025)    Social Connection and Isolation Panel [NHANES]      Frequency of Social Gatherings with Friends and Family: More than three times a week   Interpersonal Safety: Low Risk  (1/16/2025)    Interpersonal Safety      Do you feel physically and emotionally safe where you currently live?: Yes      Within the past 12 months, have you been hit, slapped, kicked or otherwise physically hurt by someone?: No      Within the past 12 months, have you been humiliated or emotionally abused in other ways by your partner or ex-partner?: No   Housing Stability: Low Risk  (1/13/2025)    Housing Stability      Do you have housing? : Yes      Are you worried about losing your housing?: No       Family History   Problem Relation Age of Onset     Breast Cancer Mother 67     Skin Cancer Mother      Other Cancer Mother         Skin cancer     Cancer Mother      Hypertension Father      Arrhythmia Father      Lymphoma Maternal Grandmother      Cerebrovascular Disease Maternal Grandmother      Cancer Maternal Grandmother      Other Cancer Maternal Grandmother      Osteoporosis Maternal  "Grandmother      Hypertension Maternal Grandfather      Diabetes Paternal Grandfather              Arthritis Daughter      Asthma Son      Asthma Nephew          ROS: 10 point ROS neg other than the symptoms noted above in the HPI.    Vital Signs:   /74 (BP Location: Right arm, Patient Position: Sitting, Cuff Size: Adult Regular)   Temp 97.4  F (36.3  C) (Temporal)   Ht 5' 9.25\" (1.759 m)   Wt 236 lb 8 oz (107.3 kg)   LMP  (LMP Unknown)   BMI 34.67 kg/m        Examination:  Constitutional:  Alert, well nourished, NAD.  Memory: recent and remote memory   HEENT: Normocephalic, atraumatic.   Pulm:  Without shortness of breath   CV:  No pitting edema of BLE.      Neurological:  Awake  Alert  Oriented x 3  Speech clear    Motor exam:   Hip Flexion:                 Right: 5/5  Left:  5/5  Hip Abduction:             Right:  5/5  Left:  5/5  Hip Adduction:             Right:  5/5  Left:  5/5  Plantar Flexion:           Right:  5/5  Left:  5/5  Dorsal Flexion:            Right:  5/5  Left:  5/5  EHL:                            Right:  5/5  Left:  5/5     Sensation normal to bilateral upper and lower extremities  Muscle tone to bilateral upper and lower extremities   Gait: Able to stand from a seated position. Normal non-antalgic, non-myelopathic gait.  Able to heel/toe walk without loss of balance    Lumbar examination reveals no tenderness of the spine or paraspinous muscles.  Hip height is symmetrical. Negative sciatic notch or greater trochanteric tenderness to palpation bilaterally.  Straight leg raise is negative bilaterally.      Imaging:   Narrative & Impression   EXAM: CT LUMBAR SPINE W/O CONTRAST  LOCATION: [facility]  DATE: 2025     INDICATION: L103 spinal pricess tenderness, severe low back pain   0 Red flag. Hx of vertebral fractures  COMPARISON: None.  TECHNIQUE: Routine CT Lumbar Spine without IV contrast. Multiplanar reformats. Dose reduction techniques " were used.      FINDINGS:  VERTEBRA: 5 lumbar type vertebrae. Mild dextrocurvature of the lumbar spine. Normal sagittal alignment. Mild superior endplate compression fracture of L1, age indeterminate. No pars defects. Mild degenerative changes of the sacroiliac joints.     CANAL/FORAMINA: Small broad-based disc bulges at L4-L5 and L5-S1. No high-grade spinal canal narrowing. Mild bilateral neuroforaminal narrowing at L4-L5.     PARASPINAL: Mild symmetric atrophy of the posterior paraspinal musculature. Normal diameter of the descending aorta.                                                                      IMPRESSION:  1.  Mild superior endplate compression fracture of L1, age indeterminate. Please correlate with point tenderness over this level.  2.  Mild degenerative changes of the lumbar spine. No high-grade spinal canal narrowing. Mild bilateral neuroforaminal narrowing at L4-L5.     Assessment/Plan:   Lumbar radiculopathy    Reviewed lumbar CT in clinic.  Due to acute onset of symptoms with mild improvement will begin physical therapy at this point.  If symptoms persist or worsen after course of therapy would consider lumbar MRI for further evaluation at that time.  She will contact my office if symptoms persist or worsen.  She verbalized understanding and agreement with the plan.    Patient Instructions   -Physical therapy ordered. They will contact you to schedule.  -Contact my office in 4-6 weeks with an update.   -Please contact our clinic with questions or concerns at [phone].    Mulu Montaño CNP  [facility] Neurosurgery  [address] [place] [id number]  Tel [phone]  Fax [phone]      Again, thank you for allowing me to participate in the care of your patient.        Sincerely,        Mulu Montaño NP    Electronically signed

## 2025-07-24 NOTE — PROGRESS NOTES
"Kristel Wiley is a 44 year old female who presents for:  Chief Complaint   Patient presents with    Neurologic Problem        Initial Vitals:  /74 (BP Location: Right arm, Patient Position: Sitting, Cuff Size: Adult Regular)   Temp 97.4  F (36.3  C) (Temporal)   Ht 5' 9.25\" (1.759 m)   Wt 236 lb 8 oz (107.3 kg)   LMP  (LMP Unknown)   BMI 34.67 kg/m   Estimated body mass index is 34.67 kg/m  as calculated from the following:    Height as of this encounter: 5' 9.25\" (1.759 m).    Weight as of this encounter: 236 lb 8 oz (107.3 kg).. Body surface area is 2.29 meters squared. BP completed using cuff size: regular  Mild Pain (3)    Nursing Comments:     Nona Leal LPN    "

## 2025-07-26 DIAGNOSIS — Z79.890 SURGICAL MENOPAUSE ON HORMONE REPLACEMENT THERAPY: ICD-10-CM

## 2025-07-26 DIAGNOSIS — E89.40 SURGICAL MENOPAUSE ON HORMONE REPLACEMENT THERAPY: ICD-10-CM

## 2025-07-28 RX ORDER — ESTRADIOL 1 MG/1
TABLET ORAL
Qty: 135 TABLET | Refills: 1 | Status: SHIPPED | OUTPATIENT
Start: 2025-07-28

## 2025-07-28 NOTE — TELEPHONE ENCOUNTER
Requested Prescriptions   Pending Prescriptions Disp Refills    estradiol (ESTRACE) 1 MG tablet [Pharmacy Med Name: ESTRADIOL 1MG TABLETS] 135 tablet      Sig: TAKE 1 AND 1/2 TABLETS(1.5 MG) BY MOUTH DAILY       Hormone Replacement Therapy Failed - 7/28/2025  9:18 AM        Failed - Medication is active on med list and the sig matches. RN to manually verify dose and sig if red X/fail.     If the protocol passes (green check), you do not need to verify med dose and sig.    A prescription matches if they are the same clinical intention.    For Example: once daily and every morning are the same.    The protocol can not identify upper and lower case letters as matching and will fail.     For Example: Take 1 tablet (50 mg) by mouth daily     TAKE 1 TABLET (50 MG) BY MOUTH DAILY    For all fails (red x), verify dose and sig.    If the refill does match what is on file, the RN can still proceed to approve the refill request.       If they do not match, route to the appropriate provider.             Passed - Most recent blood pressure under 140/90 in past 12 months     BP Readings from Last 3 Encounters:   07/24/25 118/74   07/20/25 133/85   07/08/25 121/80       No data recorded            Passed - Recent (12 month) or future (90 days) visit with authorizing provider's specialty (provided they have been seen in the past 15 months)     The patient must have completed an in-person or virtual visit within the past 12 months or has a future visit scheduled within the next 90 days with the authorizing provider s specialty.  Urgent care and e-visits do not qualify as an office visit for this protocol.          Passed - Medication indicated for associated diagnosis     The medication is prescribed for one or more of the following conditions:    Menopause   Vulva/Vaginal atrophy   Low Estrogen   Gender Dysphoria   Male to Female transgender          Passed - Patient is 18 years of age or older        Passed - No active pregnancy  on record        Passed - No positive pregnancy test on record in past 12 months           Pt last seen 1/9/2025 for post op TLH w/ BSO    Last prescribed 1/20/2025 for 90 tablets with 3 refills    RN routing to provider if OK to fill through end of year until when pt is due to be seen    Maricruz Gao RN on 7/28/2025 at 9:20 AM

## 2025-08-25 ENCOUNTER — LAB (OUTPATIENT)
Dept: LAB | Facility: CLINIC | Age: 45
End: 2025-08-25
Payer: COMMERCIAL

## 2025-08-25 ENCOUNTER — E-VISIT (OUTPATIENT)
Dept: URGENT CARE | Facility: CLINIC | Age: 45
End: 2025-08-25
Payer: COMMERCIAL

## 2025-08-25 ENCOUNTER — RESULTS FOLLOW-UP (OUTPATIENT)
Dept: URGENT CARE | Facility: CLINIC | Age: 45
End: 2025-08-25

## 2025-08-25 DIAGNOSIS — N39.0 ACUTE UTI: ICD-10-CM

## 2025-08-25 DIAGNOSIS — R30.0 DYSURIA: Primary | ICD-10-CM

## 2025-08-25 DIAGNOSIS — R30.0 DYSURIA: ICD-10-CM

## 2025-08-25 LAB
BACTERIA #/AREA URNS HPF: ABNORMAL /HPF
MUCOUS THREADS #/AREA URNS LPF: PRESENT /LPF
RBC #/AREA URNS AUTO: ABNORMAL /HPF
SQUAMOUS #/AREA URNS AUTO: ABNORMAL /LPF
WBC #/AREA URNS AUTO: ABNORMAL /HPF
WBC CLUMPS #/AREA URNS HPF: PRESENT /HPF

## 2025-08-25 PROCEDURE — 87086 URINE CULTURE/COLONY COUNT: CPT

## 2025-08-25 PROCEDURE — 81015 MICROSCOPIC EXAM OF URINE: CPT

## 2025-08-25 PROCEDURE — 87186 SC STD MICRODIL/AGAR DIL: CPT

## 2025-08-25 RX ORDER — NITROFURANTOIN 25; 75 MG/1; MG/1
100 CAPSULE ORAL 2 TIMES DAILY
Qty: 10 CAPSULE | Refills: 0 | Status: SHIPPED | OUTPATIENT
Start: 2025-08-25 | End: 2025-08-30

## 2025-08-26 LAB — BACTERIA UR CULT: ABNORMAL

## (undated) DEVICE — CATH INTERMITTENT CLEAN-CATH FEMALE 14FR 6" VINYL LF 420614

## (undated) DEVICE — PREP SKIN SCRUB TRAY 4461A

## (undated) DEVICE — ADH LIQUID MASTISOL TOPICAL VIAL 2-3ML 0523-48

## (undated) DEVICE — SU VICRYL 3-0 SH 27" UND J416H

## (undated) DEVICE — SYR EAR BULB 2OZ

## (undated) DEVICE — NDL SPINAL 22GA 3.5" QUINCKE 405181

## (undated) DEVICE — ESU LIGASURE OPEN SEALER/DIVIDER SM JAW 16.5MM LF1212A

## (undated) DEVICE — DECANTER VIAL 2006S

## (undated) DEVICE — DRAPE LAP W/ARMBOARD 29410

## (undated) DEVICE — PAD PERI INDIV WRAP 11" 2022

## (undated) DEVICE — DRSG ABDOMINAL 07 1/2X8" 7197D

## (undated) DEVICE — Device

## (undated) DEVICE — TUBING SUCTION 12"X1/4" N612

## (undated) DEVICE — SOL WATER IRRIG 1000ML BOTTLE 07139-09

## (undated) DEVICE — DEVICE ENDOMETRIAL ABLATION SYS MINERVA HANDPIECE MIN9770

## (undated) DEVICE — PACK LAPAROSCOPY/PELVISCOPY STD

## (undated) DEVICE — GLOVE BIOGEL PI ULTRATOUCH G SZ 7.5 42175

## (undated) DEVICE — GLOVE BIOGEL PI MICRO SZ 6.0 48560

## (undated) DEVICE — GOWN LG DISP 9515

## (undated) DEVICE — PACK MINOR PROCEDURE CUSTOM

## (undated) DEVICE — SUCTION MANIFOLD NEPTUNE 2 SYS 1 PORT 702-025-000

## (undated) DEVICE — KIT PROCEDURE FLUENT IN/OUT FLOWPAK TISS TRAP FLT-112S

## (undated) DEVICE — SUCTION MANIFOLD NEPTUNE 2 SYS 4 PORT 0702-020-000

## (undated) DEVICE — GLOVE BIOGEL PI MICRO INDICATOR UNDERGLOVE SZ 6.5 48965

## (undated) DEVICE — SOL NACL 0.9% IRRIG 1000ML BOTTLE 07138-09

## (undated) DEVICE — SOL NACL 0.9% INJ 1000ML BAG 07983-09

## (undated) DEVICE — GLOVE BIOGEL PI MICRO INDICATOR UNDERGLOVE SZ 8.0 48980

## (undated) DEVICE — SEAL SET MYOSURE ROD LENS SCOPE SINGLE USE 40-902

## (undated) DEVICE — DRSG TELFA 3X8" 1238

## (undated) DEVICE — PREP CHLORHEXIDINE 4% 4OZ (HIBICLENS) 57504

## (undated) RX ORDER — ACETAMINOPHEN 325 MG/1
TABLET ORAL
Status: DISPENSED
Start: 2023-04-26

## (undated) RX ORDER — LIDOCAINE HYDROCHLORIDE 10 MG/ML
INJECTION, SOLUTION EPIDURAL; INFILTRATION; INTRACAUDAL; PERINEURAL
Status: DISPENSED
Start: 2023-04-26

## (undated) RX ORDER — ONDANSETRON 2 MG/ML
INJECTION INTRAMUSCULAR; INTRAVENOUS
Status: DISPENSED
Start: 2023-03-27

## (undated) RX ORDER — BUPIVACAINE HYDROCHLORIDE 2.5 MG/ML
INJECTION, SOLUTION EPIDURAL; INFILTRATION; INTRACAUDAL
Status: DISPENSED
Start: 2023-04-26

## (undated) RX ORDER — PROPOFOL 10 MG/ML
INJECTION, EMULSION INTRAVENOUS
Status: DISPENSED
Start: 2023-04-26

## (undated) RX ORDER — LIDOCAINE HYDROCHLORIDE 10 MG/ML
INJECTION, SOLUTION EPIDURAL; INFILTRATION; INTRACAUDAL; PERINEURAL
Status: DISPENSED
Start: 2023-03-27

## (undated) RX ORDER — OXYCODONE HYDROCHLORIDE 5 MG/1
TABLET ORAL
Status: DISPENSED
Start: 2023-04-26

## (undated) RX ORDER — PROPOFOL 10 MG/ML
INJECTION, EMULSION INTRAVENOUS
Status: DISPENSED
Start: 2023-03-27

## (undated) RX ORDER — HYDROMORPHONE HYDROCHLORIDE 1 MG/ML
INJECTION, SOLUTION INTRAMUSCULAR; INTRAVENOUS; SUBCUTANEOUS
Status: DISPENSED
Start: 2023-03-27

## (undated) RX ORDER — FENTANYL CITRATE 50 UG/ML
INJECTION, SOLUTION INTRAMUSCULAR; INTRAVENOUS
Status: DISPENSED
Start: 2023-03-27

## (undated) RX ORDER — ONDANSETRON 2 MG/ML
INJECTION INTRAMUSCULAR; INTRAVENOUS
Status: DISPENSED
Start: 2023-04-26

## (undated) RX ORDER — FENTANYL CITRATE 50 UG/ML
INJECTION, SOLUTION INTRAMUSCULAR; INTRAVENOUS
Status: DISPENSED
Start: 2023-04-26

## (undated) RX ORDER — GABAPENTIN 300 MG/1
CAPSULE ORAL
Status: DISPENSED
Start: 2023-04-26

## (undated) RX ORDER — DEXAMETHASONE SODIUM PHOSPHATE 4 MG/ML
INJECTION, SOLUTION INTRA-ARTICULAR; INTRALESIONAL; INTRAMUSCULAR; INTRAVENOUS; SOFT TISSUE
Status: DISPENSED
Start: 2023-04-26

## (undated) RX ORDER — CLINDAMYCIN PHOSPHATE 900 MG/50ML
INJECTION, SOLUTION INTRAVENOUS
Status: DISPENSED
Start: 2023-04-26

## (undated) RX ORDER — KETOROLAC TROMETHAMINE 30 MG/ML
INJECTION, SOLUTION INTRAMUSCULAR; INTRAVENOUS
Status: DISPENSED
Start: 2023-03-27

## (undated) RX ORDER — DEXAMETHASONE SODIUM PHOSPHATE 10 MG/ML
INJECTION, SOLUTION INTRAMUSCULAR; INTRAVENOUS
Status: DISPENSED
Start: 2023-03-27

## (undated) RX ORDER — LIDOCAINE HYDROCHLORIDE 20 MG/ML
JELLY TOPICAL
Status: DISPENSED
Start: 2023-04-26

## (undated) RX ORDER — FENTANYL CITRATE-0.9 % NACL/PF 10 MCG/ML
PLASTIC BAG, INJECTION (ML) INTRAVENOUS
Status: DISPENSED
Start: 2023-03-27

## (undated) RX ORDER — BUPIVACAINE HYDROCHLORIDE AND EPINEPHRINE 2.5; 5 MG/ML; UG/ML
INJECTION, SOLUTION EPIDURAL; INFILTRATION; INTRACAUDAL; PERINEURAL
Status: DISPENSED
Start: 2023-03-27

## (undated) RX ORDER — GLYCOPYRROLATE 0.2 MG/ML
INJECTION, SOLUTION INTRAMUSCULAR; INTRAVENOUS
Status: DISPENSED
Start: 2023-04-26

## (undated) RX ORDER — KETOROLAC TROMETHAMINE 30 MG/ML
INJECTION, SOLUTION INTRAMUSCULAR; INTRAVENOUS
Status: DISPENSED
Start: 2023-04-26